# Patient Record
Sex: MALE | Race: WHITE | NOT HISPANIC OR LATINO | Employment: FULL TIME | URBAN - METROPOLITAN AREA
[De-identification: names, ages, dates, MRNs, and addresses within clinical notes are randomized per-mention and may not be internally consistent; named-entity substitution may affect disease eponyms.]

---

## 2017-01-02 RX ORDER — PREDNISONE 10 MG/1
TABLET ORAL
Qty: 25 TABLET | Refills: 0 | Status: SHIPPED | OUTPATIENT
Start: 2017-01-02 | End: 2017-08-24 | Stop reason: ALTCHOICE

## 2017-01-05 ENCOUNTER — LAB VISIT (OUTPATIENT)
Dept: LAB | Facility: HOSPITAL | Age: 62
End: 2017-01-05
Attending: INTERNAL MEDICINE
Payer: COMMERCIAL

## 2017-01-05 ENCOUNTER — PATIENT MESSAGE (OUTPATIENT)
Dept: RHEUMATOLOGY | Facility: CLINIC | Age: 62
End: 2017-01-05

## 2017-01-05 DIAGNOSIS — M06.9 RHEUMATOID ARTHRITIS, INVOLVING UNSPECIFIED SITE, UNSPECIFIED RHEUMATOID FACTOR PRESENCE: ICD-10-CM

## 2017-01-05 LAB
ALBUMIN SERPL BCP-MCNC: 3.5 G/DL
ALP SERPL-CCNC: 64 U/L
ALT SERPL W/O P-5'-P-CCNC: 23 U/L
ANION GAP SERPL CALC-SCNC: 7 MMOL/L
AST SERPL-CCNC: 17 U/L
BASOPHILS # BLD AUTO: 0.03 K/UL
BASOPHILS NFR BLD: 0.2 %
BILIRUB SERPL-MCNC: 0.5 MG/DL
BUN SERPL-MCNC: 16 MG/DL
CALCIUM SERPL-MCNC: 9.1 MG/DL
CHLORIDE SERPL-SCNC: 104 MMOL/L
CO2 SERPL-SCNC: 28 MMOL/L
CREAT SERPL-MCNC: 0.8 MG/DL
CRP SERPL-MCNC: 1.6 MG/L
DIFFERENTIAL METHOD: ABNORMAL
EOSINOPHIL # BLD AUTO: 0.3 K/UL
EOSINOPHIL NFR BLD: 2.1 %
ERYTHROCYTE [DISTWIDTH] IN BLOOD BY AUTOMATED COUNT: 13.9 %
ERYTHROCYTE [SEDIMENTATION RATE] IN BLOOD BY WESTERGREN METHOD: 24 MM/HR
EST. GFR  (AFRICAN AMERICAN): >60 ML/MIN/1.73 M^2
EST. GFR  (NON AFRICAN AMERICAN): >60 ML/MIN/1.73 M^2
GLUCOSE SERPL-MCNC: 68 MG/DL
HCT VFR BLD AUTO: 45.9 %
HGB BLD-MCNC: 15.5 G/DL
LYMPHOCYTES # BLD AUTO: 4.2 K/UL
LYMPHOCYTES NFR BLD: 30.9 %
MCH RBC QN AUTO: 31.7 PG
MCHC RBC AUTO-ENTMCNC: 33.8 %
MCV RBC AUTO: 94 FL
MONOCYTES # BLD AUTO: 1.3 K/UL
MONOCYTES NFR BLD: 9.6 %
NEUTROPHILS # BLD AUTO: 7.7 K/UL
NEUTROPHILS NFR BLD: 56.9 %
PLATELET # BLD AUTO: 231 K/UL
PMV BLD AUTO: 10.9 FL
POTASSIUM SERPL-SCNC: 4 MMOL/L
PROT SERPL-MCNC: 7.1 G/DL
RBC # BLD AUTO: 4.89 M/UL
SODIUM SERPL-SCNC: 139 MMOL/L
WBC # BLD AUTO: 13.57 K/UL

## 2017-01-05 PROCEDURE — 85025 COMPLETE CBC W/AUTO DIFF WBC: CPT

## 2017-01-05 PROCEDURE — 80053 COMPREHEN METABOLIC PANEL: CPT

## 2017-01-05 PROCEDURE — 36415 COLL VENOUS BLD VENIPUNCTURE: CPT

## 2017-01-05 PROCEDURE — 85652 RBC SED RATE AUTOMATED: CPT

## 2017-01-05 PROCEDURE — 86140 C-REACTIVE PROTEIN: CPT

## 2017-01-10 ENCOUNTER — OFFICE VISIT (OUTPATIENT)
Dept: RHEUMATOLOGY | Facility: CLINIC | Age: 62
End: 2017-01-10
Payer: COMMERCIAL

## 2017-01-10 VITALS
SYSTOLIC BLOOD PRESSURE: 117 MMHG | WEIGHT: 174 LBS | BODY MASS INDEX: 28.08 KG/M2 | DIASTOLIC BLOOD PRESSURE: 83 MMHG | RESPIRATION RATE: 18 BRPM | HEART RATE: 83 BPM

## 2017-01-10 DIAGNOSIS — M06.9 RHEUMATOID ARTHRITIS INVOLVING MULTIPLE JOINTS: Primary | ICD-10-CM

## 2017-01-10 PROCEDURE — 99214 OFFICE O/P EST MOD 30 MIN: CPT | Mod: S$GLB,,, | Performed by: INTERNAL MEDICINE

## 2017-01-10 PROCEDURE — 99999 PR PBB SHADOW E&M-EST. PATIENT-LVL III: CPT | Mod: PBBFAC,,, | Performed by: INTERNAL MEDICINE

## 2017-01-10 RX ORDER — METHOTREXATE 2.5 MG/1
TABLET ORAL
Qty: 32 TABLET | Refills: 0 | Status: SHIPPED | OUTPATIENT
Start: 2017-01-10 | End: 2017-09-06 | Stop reason: SDUPTHER

## 2017-01-10 ASSESSMENT — ROUTINE ASSESSMENT OF PATIENT INDEX DATA (RAPID3)
MDHAQ FUNCTION SCORE: .5
TOTAL RAPID3 SCORE: 4.89
FATIGUE SCORE: 6.5
AM STIFFNESS SCORE: 1, YES
PSYCHOLOGICAL DISTRESS SCORE: 0
WHEN YOU AWAKENED IN THE MORNING OVER THE LAST WEEK, PLEASE INDICATE THE AMOUNT OF TIME IT TAKES UNTIL YOU ARE AS LIMBER AS YOU WILL BE FOR THE DAY: 30 MIN
PATIENT GLOBAL ASSESSMENT SCORE: 6.5
PAIN SCORE: 6.5

## 2017-01-10 NOTE — PROGRESS NOTES
Subjective:       Patient ID: Fabiano Garvey is a 60 y.o. male.    Chief Complaint: OTHER and Swelling    HPI     59 yo male with PMH of basal cell cancer (around 2010), HL here for evaluation of join pain.  Reports pain in hands, elbows. and wrists.  Reports  swelling in hands, ankles, and feet.  Reports also has bilateral knee pain and shoulders.   Reports stiffness in morning for 30 minutes.  He has trouble swelling. Denies any rashes. No oral ulcers. No hair loss. Denies photosensitivity.  Denies any raynauds.Denies blood clots.  Denies dry eyes and dry mouth.  Reports symptoms for a year. In November, he noted the nodules in elbows.  Thinks he has swelling in lower neck.  He has trouble picking up things due to pain and swelling.  He reports that prednisone improves his pain and swelling.  He has been off steroids.  No changes in weight.  Reports good appetite.      Interval history:  He has been off MTX since early December. He reports having flare around thanksgiving that responded to prednisone.  He takes advil occasionally helps.  On occasion, he will get pain in hands,wrists, and knees. He has numbness in hands but not every day.   Reports morning stiffness for  5 minutes.  He will take advil on occasion with improvement.Pain level today is 6.5/10 and aching. He has pain hands, wrists, ankles, and feet.      Past Medical History   Diagnosis Date    Hyperlipidemia     Basal cell carcinoma      medial canthus       Review of Systems   Constitutional: Negative for fever, chills, appetite change and fatigue.   HENT: Negative for hearing loss, mouth sores, rhinorrhea, sinus pressure and trouble swallowing.    Eyes: Negative for photophobia, pain, discharge, itching and visual disturbance.   Respiratory:  Negative for cough, choking, chest tightness, wheezing and stridor.    Cardiovascular: Negative for chest pain and palpitations.   Gastrointestinal: Negative for blood in stool and abdominal distention.    Endocrine: Negative for cold intolerance and heat intolerance.   Genitourinary: Negative for dysuria, hematuria and flank pain.   Musculoskeletal: Positive for myalgias, back pain, joint swelling, arthralgias.  Skin: Negative for color change, pallor and rash.   Neurological: Negative for dizziness, light-headedness, numbness and headaches.   Hematological: Negative for adenopathy. Does not bruise/bleed easily.   Psychiatric/Behavioral: Negative for decreased concentration and agitation. The patient is not nervous/anxious.            Objective:        Physical Exam   Constitutional: He is oriented to person, place, and time and well-developed, well-nourished, and in no distress. No distress.   HENT:   Head: Normocephalic and atraumatic.   Right Ear: External ear normal.   Left Ear: External ear normal.   Nose: Nose normal.   Mouth/Throat: Oropharynx is clear and moist. No oropharyngeal exudate.   Eyes: Conjunctivae and EOM are normal. Pupils are equal, round, and reactive to light. Right eye exhibits no discharge. Left eye exhibits no discharge. No scleral icterus.   Neck: Normal range of motion. Neck supple. No JVD present. No tracheal deviation present. No thyromegaly present.   Cardiovascular: Normal rate, regular rhythm and intact distal pulses.  Exam reveals no gallop and no friction rub.    No murmur heard.  Pulmonary/Chest: Effort normal and breath sounds normal. No stridor. No respiratory distress. He has no wheezes. He has no rales. He exhibits no tenderness.   Abdominal: Soft. Bowel sounds are normal. He exhibits no distension. There is no tenderness. There is no rebound.   Lymphadenopathy:     He has no cervical adenopathy.   Neurological: He is alert and oriented to person, place, and time.   Skin: Skin is warm. He is not diaphoretic.     Musculoskeletal:   Shoulders- decreased ROM of both shoulders to 130 degrees bilaterally (resoved)  Elbows- rheumatoid nodules in extensor surfaces bilaterally; mild  restriction in extension of right elbow  Wrist- synovitis with decreased extension bilaterally (resolved)  Hands- synovitis of mcps on right side, worse on the right  Knees-bony hypertrophy but no effusions or tenderness; crepitus  Ankles- no tenderness  Feet-bilaterally mtp tenderness resolved           Labs:  Robel: 1:320  dna-1:160<160  Esr-61<44   ccp-306  rf-876  Ro,la-negative  Nicole, rnp -negtayive   Hepatitis B-negative  Hepatitis C-negative  Urine-negative      Imaging:  I personally reviewed the xrays    Arthritis survey:      12/2015: hand x rays- no erosions  12/2015: feet xray: no erosions    Chest xray (2/2016)-negative      Assessment:     60 yo male with PMH of basal cell cancer (around 2010), HL here for  Follow up of  Seropositive RA.  He has seropositive RA with nodules and also serologies consistent with early SLE given (+ROBEL and +DNA) with no other features of  SLE.  Tried plaquenil but reports it gave him dizziness after a few doses.  At this point, main issue is his arthritis so will hold off on another trial of plaquenil in the future.  Regarding is arthritis, he was doing well on MTX 8 pills a week but developed transaminitis that is now resolved.  Told patient that we would retry it but not push him past 6.  Given skin cancer history, can add rituxan.      Plan:       -off prednisone  * - follow up with  dermatology evaluation given history of skin cancer  -continue  baclofen 10mg po qhs for upper back tension and sleep  -restart MTX 4 pills a week for 2 weeks and then increase to 6 pills a week  -continue folic acid 1 mg po qday  given history of basal cell cancer and +DNA , will avoid anti-tnf therapy  - if max dose Methotrexate does not provide relief, I will likely put him on rituxan   -labs in 4 weeks and 8 weeks  rtc  In  8  weeks

## 2017-01-10 NOTE — PROGRESS NOTES
Subjective:       Patient ID: Fabiano Garvey is a 60 y.o. male.    Chief Complaint: OTHER and Swelling    HPI     59 yo male with PMH of basal cell cancer (around 2010), HL here for evaluation of join pain.  Reports pain in hands, elbows. and wrists.  Reports  swelling in hands, ankles, and feet.  Reports also has bilateral knee pain and shoulders.   Reports stiffness in morning for 30 minutes.  He has trouble swelling. Denies any rashes. No oral ulcers. No hair loss. Denies photosensitivity.  Denies any raynauds.Denies blood clots.  Denies dry eyes and dry mouth.  Reports symptoms for a year. In November, he noted the nodules in elbows.  Thinks he has swelling in lower neck.  He has trouble picking up things due to pain and swelling.  He reports that prednisone improves his pain and swelling.  He has been off steroids.  No changes in weight.  Reports good appetite.      Interval history:  He is taking MTX 6 pills a week.    Reports pain level is 4/10.Reports he may getting in swelling in hands and knees.  On occasion, he will get pain in hands,wrists, and knees. He has numbness in hands but not every day.   Reports morning stiffness for less than 5 minutes. Reports pain in both hips for last couple of weeks. He will take advil on occasion with improvement.  Reports pain in left buttock that radiates down to leg and up to back.      Past Medical History   Diagnosis Date    Hyperlipidemia     Basal cell carcinoma      medial canthus       Review of Systems   Constitutional: Negative for fever, chills, appetite change and fatigue.   HENT: Negative for hearing loss, mouth sores, rhinorrhea, sinus pressure and trouble swallowing.    Eyes: Negative for photophobia, pain, discharge, itching and visual disturbance.   Respiratory:  Negative for cough, choking, chest tightness, wheezing and stridor.    Cardiovascular: Negative for chest pain and palpitations.   Gastrointestinal: Negative for blood in stool and abdominal  distention.   Endocrine: Negative for cold intolerance and heat intolerance.   Genitourinary: Negative for dysuria, hematuria and flank pain.   Musculoskeletal: Positive for myalgias, back pain, joint swelling, arthralgias.  Skin: Negative for color change, pallor and rash.   Neurological: Negative for dizziness, light-headedness, numbness and headaches.   Hematological: Negative for adenopathy. Does not bruise/bleed easily.   Psychiatric/Behavioral: Negative for decreased concentration and agitation. The patient is not nervous/anxious.            Objective:        Physical Exam   Constitutional: He is oriented to person, place, and time and well-developed, well-nourished, and in no distress. No distress.   HENT:   Head: Normocephalic and atraumatic.   Right Ear: External ear normal.   Left Ear: External ear normal.   Nose: Nose normal.   Mouth/Throat: Oropharynx is clear and moist. No oropharyngeal exudate.   Eyes: Conjunctivae and EOM are normal. Pupils are equal, round, and reactive to light. Right eye exhibits no discharge. Left eye exhibits no discharge. No scleral icterus.   Neck: Normal range of motion. Neck supple. No JVD present. No tracheal deviation present. No thyromegaly present.   Cardiovascular: Normal rate, regular rhythm and intact distal pulses.  Exam reveals no gallop and no friction rub.    No murmur heard.  Pulmonary/Chest: Effort normal and breath sounds normal. No stridor. No respiratory distress. He has no wheezes. He has no rales. He exhibits no tenderness.   Abdominal: Soft. Bowel sounds are normal. He exhibits no distension. There is no tenderness. There is no rebound.   Lymphadenopathy:     He has no cervical adenopathy.   Neurological: He is alert and oriented to person, place, and time.   Skin: Skin is warm. He is not diaphoretic.     Musculoskeletal:   Shoulders- decreased ROM of both shoulders to 130 degrees bilaterally (resoved)  Elbows- rheumatoid nodules in extensor surfaces  bilaterally; mild restriction in extension of right elbow  Wrist- synovitis with decreased extension bilaterally (resolved)  Hands- synovitis of mcps on right side, worse on the right  Knees-bony hypertrophy but no effusions or tenderness; crepitus  Ankles- no tenderness  Feet-bilaterally mtp tenderness resolved           Labs:  Robel: 1:320  dna-1:160<160  Esr-61<44   ccp-306  rf-876  Ro,la-negative  Nicole, rnp -negtayive   Hepatitis B-negative  Hepatitis C-negative  Urine-negative      Imaging:  I personally reviewed the xrays    Arthritis survey:      12/2015: hand x rays- no erosions  12/2015: feet xray: no erosions    Chest xray (2/2016)-negative      Assessment:     62 yo male with PMH of basal cell cancer (around 2010), HL here for  Follow up of  Seropositive RA.  He has seropositive RA with nodules and also serologies consistent with early SLE given (+ROBEL and +DNA) with no other features of  SLE.  Tried plaquenil but reports it gave him dizziness after a few doses.  At this point, main issue is his arthritis so will hold off on another trial of plaquenil in the future.  Regarding is arthritis, he has worsening stiffness and swelling so will increase MTX.    He has increasing neck pain so I asked him to restart the baclofen.      Plan:       -off prednisone  * - follow up with  dermatology evaluation given history of skin cancer  -restart  baclofen 10mg po qhs for upper back tension and sleep  -continue MTX 6 pills a week  -continue folic acid 1 mg po qday  given history of basal cell cancer and +DNA , will avoid anti-tnf therapy  - if max dose Methotrexate does not provide relief, I will likely put him on rituxan   -labs in 5 weeks  rtc  In  10  weeks

## 2017-01-10 NOTE — MR AVS SNAPSHOT
[facility]  [address]  [place] [address]  Phone: [phone]  Fax: [phone]                  Fabiano Garvey   1/10/2017 8:00 AM   Office Visit    Description:  Male : 1955   Provider:  Margaret Brenner MD   Department:  [facility]           Diagnoses this Visit        Comments    Rheumatoid arthritis involving multiple joints    -  Primary            To Do List           Future Appointments        Provider Department Dept Phone    3/7/2017 7:10 AM LAB, APPOINTMENT [place] [facility] [phone]    3/8/2017 4:00 PM Margaret Brenner MD [facility] [phone]      Goals (5 Years of Data)     None       These Medications        Disp Refills Start End    methotrexate 2.5 MG Tab 32 tablet 0 1/10/2017     Take 4 tabs weekly for 2 weeks and then increase to 6 tabs weekly    Pharmacy: [facility]/pharm[facility] #[id number] - [place] - [address] AT [facility] Ph #: [phone]         [facility]     [facility] Care [facility] -  Assistance  Registered nurses in the [facility] provide clinical advisement, health education, appointment booking, and other advisory services.  Call for this free service at [phone].             Medications           Message regarding Medications     Verify the changes and/or additions to your medication regime listed below are the same as discussed with your clinician today.  If any of these changes or additions are incorrect, please notify your healthcare provider.        START taking these NEW medications        Refills    methotrexate 2.5 MG Tab 0    Sig: Take 4 tabs weekly for 2 weeks and then increase to 6 tabs weekly    Class: Normal           Verify that the below list of medications is an accurate representation of the medications you are currently taking.  If none reported, the list may be blank. If incorrect, please contact your  healthcare provider. Carry this list with you in case of emergency.           Current Medications     atorvastatin (LIPITOR) 40 MG tablet TAKE 1 TABLET (40 MG TOTAL) BY MOUTH ONCE DAILY.    folic acid (FOLVITE) 1 MG tablet TAKE 1 TABLET (1 MG TOTAL) BY MOUTH ONCE DAILY.    predniSONE (DELTASONE) 10 MG tablet TAKE PREDNISONE 2 TABS A DAY FOR 10 DAYS AND THEN ONE DAILY FOR 5 DAYS    methotrexate 2.5 MG Tab Take 4 tabs weekly for 2 weeks and then increase to 6 tabs weekly    VITAMIN D2 50,000 unit capsule TAKE 1 CAPSULE (50,000 UNITS TOTAL) BY MOUTH EVERY 7 DAYS.           Clinical Reference Information           Vital Signs - Last Recorded  Most recent update: 1/10/2017  8:02 AM by Zeenat Stovall RN    BP Pulse Resp Wt BMI    117/83 83 18 78.9 kg (174 lb) 28.08 kg/m2      Blood Pressure          Most Recent Value    BP  117/83      Allergies as of 1/10/2017     Plaquenil [Hydroxychloroquine]      Immunizations Administered on Date of Encounter - 1/10/2017     None      Orders Placed During Today's Visit     Future Labs/Procedures Expected by Expires    C-reactive protein  1/10/2017 3/11/2018    C-reactive protein  1/10/2017 3/11/2018    CBC auto differential  1/10/2017 3/11/2018    CBC auto differential  1/10/2017 3/11/2018    Comprehensive metabolic panel  1/10/2017 3/11/2018    Comprehensive metabolic panel  1/10/2017 3/11/2018    Sedimentation rate, manual  1/10/2017 3/11/2018    Sedimentation rate, manual  1/10/2017 3/11/2018    Vitamin D  1/10/2017 3/11/2018

## 2017-02-27 RX ORDER — FOLIC ACID 1 MG/1
TABLET ORAL
Qty: 30 TABLET | Refills: 3 | Status: SHIPPED | OUTPATIENT
Start: 2017-02-27 | End: 2017-07-02 | Stop reason: SDUPTHER

## 2017-03-07 ENCOUNTER — LAB VISIT (OUTPATIENT)
Dept: LAB | Facility: HOSPITAL | Age: 62
End: 2017-03-07
Attending: INTERNAL MEDICINE
Payer: COMMERCIAL

## 2017-03-07 DIAGNOSIS — M06.9 RHEUMATOID ARTHRITIS INVOLVING MULTIPLE JOINTS: ICD-10-CM

## 2017-03-07 LAB
25(OH)D3+25(OH)D2 SERPL-MCNC: 34 NG/ML
ALBUMIN SERPL BCP-MCNC: 3.4 G/DL
ALP SERPL-CCNC: 67 U/L
ALT SERPL W/O P-5'-P-CCNC: 23 U/L
ANION GAP SERPL CALC-SCNC: 7 MMOL/L
AST SERPL-CCNC: 24 U/L
BASOPHILS # BLD AUTO: 0.04 K/UL
BASOPHILS NFR BLD: 0.4 %
BILIRUB SERPL-MCNC: 0.4 MG/DL
BUN SERPL-MCNC: 13 MG/DL
CALCIUM SERPL-MCNC: 9.3 MG/DL
CHLORIDE SERPL-SCNC: 105 MMOL/L
CO2 SERPL-SCNC: 26 MMOL/L
CREAT SERPL-MCNC: 0.9 MG/DL
CRP SERPL-MCNC: 0.7 MG/L
DIFFERENTIAL METHOD: ABNORMAL
EOSINOPHIL # BLD AUTO: 0.5 K/UL
EOSINOPHIL NFR BLD: 4.4 %
ERYTHROCYTE [DISTWIDTH] IN BLOOD BY AUTOMATED COUNT: 15.2 %
ERYTHROCYTE [SEDIMENTATION RATE] IN BLOOD BY WESTERGREN METHOD: 29 MM/HR
ERYTHROCYTE [SEDIMENTATION RATE] IN BLOOD BY WESTERGREN METHOD: 29 MM/HR
EST. GFR  (AFRICAN AMERICAN): >60 ML/MIN/1.73 M^2
EST. GFR  (NON AFRICAN AMERICAN): >60 ML/MIN/1.73 M^2
GLUCOSE SERPL-MCNC: 84 MG/DL
HCT VFR BLD AUTO: 44.1 %
HGB BLD-MCNC: 14.4 G/DL
LYMPHOCYTES # BLD AUTO: 2.7 K/UL
LYMPHOCYTES NFR BLD: 24.7 %
MCH RBC QN AUTO: 31 PG
MCHC RBC AUTO-ENTMCNC: 32.7 %
MCV RBC AUTO: 95 FL
MONOCYTES # BLD AUTO: 1.2 K/UL
MONOCYTES NFR BLD: 10.8 %
NEUTROPHILS # BLD AUTO: 6.4 K/UL
NEUTROPHILS NFR BLD: 59.5 %
PLATELET # BLD AUTO: 220 K/UL
PMV BLD AUTO: 11.2 FL
POTASSIUM SERPL-SCNC: 4.2 MMOL/L
PROT SERPL-MCNC: 7.3 G/DL
RBC # BLD AUTO: 4.64 M/UL
SODIUM SERPL-SCNC: 138 MMOL/L
WBC # BLD AUTO: 10.79 K/UL

## 2017-03-07 PROCEDURE — 80053 COMPREHEN METABOLIC PANEL: CPT

## 2017-03-07 PROCEDURE — 86140 C-REACTIVE PROTEIN: CPT

## 2017-03-07 PROCEDURE — 36415 COLL VENOUS BLD VENIPUNCTURE: CPT

## 2017-03-07 PROCEDURE — 85652 RBC SED RATE AUTOMATED: CPT

## 2017-03-07 PROCEDURE — 82306 VITAMIN D 25 HYDROXY: CPT

## 2017-03-07 PROCEDURE — 85025 COMPLETE CBC W/AUTO DIFF WBC: CPT

## 2017-03-08 ENCOUNTER — TELEPHONE (OUTPATIENT)
Dept: RHEUMATOLOGY | Facility: CLINIC | Age: 62
End: 2017-03-08

## 2017-03-08 ENCOUNTER — OFFICE VISIT (OUTPATIENT)
Dept: RHEUMATOLOGY | Facility: CLINIC | Age: 62
End: 2017-03-08
Payer: COMMERCIAL

## 2017-03-08 VITALS
WEIGHT: 170.88 LBS | DIASTOLIC BLOOD PRESSURE: 64 MMHG | RESPIRATION RATE: 20 BRPM | BODY MASS INDEX: 27.58 KG/M2 | HEART RATE: 88 BPM | SYSTOLIC BLOOD PRESSURE: 112 MMHG

## 2017-03-08 DIAGNOSIS — M06.9 RHEUMATOID ARTHRITIS INVOLVING MULTIPLE JOINTS: Primary | ICD-10-CM

## 2017-03-08 DIAGNOSIS — M06.9 RHEUMATOID ARTHRITIS INVOLVING MULTIPLE JOINTS: ICD-10-CM

## 2017-03-08 DIAGNOSIS — M25.40 JOINT SWELLING: Primary | ICD-10-CM

## 2017-03-08 PROCEDURE — 99999 PR PBB SHADOW E&M-EST. PATIENT-LVL III: CPT | Mod: PBBFAC,,, | Performed by: INTERNAL MEDICINE

## 2017-03-08 PROCEDURE — 99214 OFFICE O/P EST MOD 30 MIN: CPT | Mod: S$GLB,,, | Performed by: INTERNAL MEDICINE

## 2017-03-08 PROCEDURE — 1160F RVW MEDS BY RX/DR IN RCRD: CPT | Mod: S$GLB,,, | Performed by: INTERNAL MEDICINE

## 2017-03-08 RX ORDER — ERGOCALCIFEROL 1.25 MG/1
50000 CAPSULE ORAL
Qty: 8 CAPSULE | Refills: 3 | Status: SHIPPED | OUTPATIENT
Start: 2017-03-08 | End: 2017-11-03 | Stop reason: SDUPTHER

## 2017-03-08 ASSESSMENT — ROUTINE ASSESSMENT OF PATIENT INDEX DATA (RAPID3)
MDHAQ FUNCTION SCORE: .6
PAIN SCORE: 3
TOTAL RAPID3 SCORE: 2.67
WHEN YOU AWAKENED IN THE MORNING OVER THE LAST WEEK, PLEASE INDICATE THE AMOUNT OF TIME IT TAKES UNTIL YOU ARE AS LIMBER AS YOU WILL BE FOR THE DAY: 30 MIN
FATIGUE SCORE: 3
AM STIFFNESS SCORE: 1, YES
PATIENT GLOBAL ASSESSMENT SCORE: 3
PSYCHOLOGICAL DISTRESS SCORE: 1.1

## 2017-03-08 NOTE — PROGRESS NOTES
Subjective:       Patient ID: Fabiano Garvey is a 60 y.o. male.    Chief Complaint: OTHER and Swelling    HPI     59 yo male with PMH of basal cell cancer (around 2010), HL here for evaluation of join pain.  Reports pain in hands, elbows. and wrists.  Reports  swelling in hands, ankles, and feet.  Reports also has bilateral knee pain and shoulders.   Reports stiffness in morning for 30 minutes.  He has trouble swelling. Denies any rashes. No oral ulcers. No hair loss. Denies photosensitivity.  Denies any raynauds.Denies blood clots.  Denies dry eyes and dry mouth.  Reports symptoms for a year. In November, he noted the nodules in elbows.  Thinks he has swelling in lower neck.  He has trouble picking up things due to pain and swelling.  He reports that prednisone improves his pain and swelling.  He has been off steroids.  No changes in weight.  Reports good appetite.      Interval history:  He has been on MTX 6 pills for several weeks.  On occasion, he will get pain in hands,wrists, and knees. He has numbness in hands but not every day.  He reports swelling in feet on occasion.  Reports morning stiffness for  5 minutes.  He will take advil on occasion with improvement.Pain level today is 6.5/10 and aching. He has pain hands, wrists, ankles, and feet.      Past Medical History   Diagnosis Date    Hyperlipidemia     Basal cell carcinoma      medial canthus       Review of Systems   Constitutional: Negative for fever, chills, appetite change and fatigue.   HENT: Negative for hearing loss, mouth sores, rhinorrhea, sinus pressure and trouble swallowing.    Eyes: Negative for photophobia, pain, discharge, itching and visual disturbance.   Respiratory:  Negative for cough, choking, chest tightness, wheezing and stridor.    Cardiovascular: Negative for chest pain and palpitations.   Gastrointestinal: Negative for blood in stool and abdominal distention.   Endocrine: Negative for cold intolerance and heat intolerance.    Genitourinary: Negative for dysuria, hematuria and flank pain.   Musculoskeletal: Positive for myalgias, back pain, joint swelling, arthralgias.  Skin: Negative for color change, pallor and rash.   Neurological: Negative for dizziness, light-headedness, numbness and headaches.   Hematological: Negative for adenopathy. Does not bruise/bleed easily.   Psychiatric/Behavioral: Negative for decreased concentration and agitation. The patient is not nervous/anxious.            Objective:        Physical Exam   Constitutional: He is oriented to person, place, and time and well-developed, well-nourished, and in no distress. No distress.   HENT:   Head: Normocephalic and atraumatic.   Right Ear: External ear normal.   Left Ear: External ear normal.   Nose: Nose normal.   Mouth/Throat: Oropharynx is clear and moist. No oropharyngeal exudate.   Eyes: Conjunctivae and EOM are normal. Pupils are equal, round, and reactive to light. Right eye exhibits no discharge. Left eye exhibits no discharge. No scleral icterus.   Neck: Normal range of motion. Neck supple. No JVD present. No tracheal deviation present. No thyromegaly present.   Cardiovascular: Normal rate, regular rhythm and intact distal pulses.  Exam reveals no gallop and no friction rub.    No murmur heard.  Pulmonary/Chest: Effort normal and breath sounds normal. No stridor. No respiratory distress. He has no wheezes. He has no rales. He exhibits no tenderness.   Abdominal: Soft. Bowel sounds are normal. He exhibits no distension. There is no tenderness. There is no rebound.   Lymphadenopathy:     He has no cervical adenopathy.   Neurological: He is alert and oriented to person, place, and time.   Skin: Skin is warm. He is not diaphoretic.     Musculoskeletal:   Shoulders- decreased ROM of both shoulders to 130 degrees bilaterally (resoved)  Elbows- rheumatoid nodules in extensor surfaces bilaterally; mild restriction in extension of right elbow  Wrist- synovitis with  decreased extension bilaterally (resolved)  Hands- synovitis of mcps on right side, worse on the right  Knees-bony hypertrophy but no effusions or tenderness; crepitus  Ankles- no tenderness  Feet-bilaterally mtp tenderness resolved           Labs:  Robel: 1:320  dna-1:160<160  Esr-61<44   ccp-306  rf-876  Ro,la-negative  Nicole, rnp -negtayive   Hepatitis B-negative  Hepatitis C-negative  Urine-negative      Imaging:  I personally reviewed the xrays    Arthritis survey:      12/2015: hand x rays- no erosions  12/2015: feet xray: no erosions    Chest xray (2/2016)-negative      Assessment:     62 yo male with PMH of basal cell cancer (around 2010), HL here for  Follow up of  Seropositive RA.  He has seropositive RA with nodules and also serologies consistent with early SLE given (+ROBEL and +DNA) with no other features of  SLE.  Tried plaquenil but reports it gave him dizziness after a few doses.  At this point, main issue is his arthritis so will hold off on another trial of plaquenil in the future.  Regarding is arthritis, he is on MTX 6 pills a week but reports containing to get nodules at pads of feet and painful swelling in feet and sometimes in wrists.  He developed transaminitis with 8 pills so will keep him on 6 pills a week.    Plan:       -off prednisone  * - follow up with  dermatology evaluation given history of skin cancer  -continue  baclofen 10mg po qhs for upper back tension and sleep  -continue MTX 6 pills a week   -continue folic acid 1 mg po qday  given history of basal cell cancer and +DNA , will avoid anti-tnf therapy  - DISCUSSED ADDING RITUXAN WHICH HE SHOULD  Consider  Continue vit D once a week  labs in 4 weeks   rtc in 8

## 2017-03-10 ENCOUNTER — PATIENT MESSAGE (OUTPATIENT)
Dept: RHEUMATOLOGY | Facility: CLINIC | Age: 62
End: 2017-03-10

## 2017-03-10 ENCOUNTER — HOSPITAL ENCOUNTER (OUTPATIENT)
Dept: RADIOLOGY | Facility: HOSPITAL | Age: 62
Discharge: HOME | End: 2017-03-10
Attending: INTERNAL MEDICINE
Payer: COMMERCIAL

## 2017-03-10 DIAGNOSIS — M25.40 JOINT SWELLING: ICD-10-CM

## 2017-03-10 PROCEDURE — 77077 JOINT SURVEY SINGLE VIEW: CPT | Mod: 26,,, | Performed by: RADIOLOGY

## 2017-03-10 PROCEDURE — 77077 JOINT SURVEY SINGLE VIEW: CPT | Mod: TC

## 2017-04-07 ENCOUNTER — PATIENT MESSAGE (OUTPATIENT)
Dept: RHEUMATOLOGY | Facility: CLINIC | Age: 62
End: 2017-04-07

## 2017-04-07 ENCOUNTER — LAB VISIT (OUTPATIENT)
Dept: LAB | Facility: HOSPITAL | Age: 62
End: 2017-04-07
Attending: INTERNAL MEDICINE
Payer: COMMERCIAL

## 2017-04-07 DIAGNOSIS — M06.9 RHEUMATOID ARTHRITIS INVOLVING MULTIPLE JOINTS: ICD-10-CM

## 2017-04-07 LAB
25(OH)D3+25(OH)D2 SERPL-MCNC: 37 NG/ML
ALBUMIN SERPL BCP-MCNC: 3.4 G/DL
ALP SERPL-CCNC: 67 U/L
ALT SERPL W/O P-5'-P-CCNC: 17 U/L
ANION GAP SERPL CALC-SCNC: 6 MMOL/L
AST SERPL-CCNC: 18 U/L
BASOPHILS # BLD AUTO: 0.04 K/UL
BASOPHILS NFR BLD: 0.4 %
BILIRUB SERPL-MCNC: 0.4 MG/DL
BUN SERPL-MCNC: 16 MG/DL
CALCIUM SERPL-MCNC: 9.1 MG/DL
CHLORIDE SERPL-SCNC: 105 MMOL/L
CO2 SERPL-SCNC: 29 MMOL/L
CREAT SERPL-MCNC: 0.9 MG/DL
CRP SERPL-MCNC: 4.2 MG/L
DIFFERENTIAL METHOD: ABNORMAL
EOSINOPHIL # BLD AUTO: 0.4 K/UL
EOSINOPHIL NFR BLD: 4.2 %
ERYTHROCYTE [DISTWIDTH] IN BLOOD BY AUTOMATED COUNT: 15.2 %
ERYTHROCYTE [SEDIMENTATION RATE] IN BLOOD BY WESTERGREN METHOD: 35 MM/HR
EST. GFR  (AFRICAN AMERICAN): >60 ML/MIN/1.73 M^2
EST. GFR  (NON AFRICAN AMERICAN): >60 ML/MIN/1.73 M^2
GLUCOSE SERPL-MCNC: 72 MG/DL
HCT VFR BLD AUTO: 43.8 %
HGB BLD-MCNC: 14.5 G/DL
LYMPHOCYTES # BLD AUTO: 2.7 K/UL
LYMPHOCYTES NFR BLD: 29.6 %
MCH RBC QN AUTO: 31.5 PG
MCHC RBC AUTO-ENTMCNC: 33.1 %
MCV RBC AUTO: 95 FL
MONOCYTES # BLD AUTO: 1.1 K/UL
MONOCYTES NFR BLD: 11.5 %
NEUTROPHILS # BLD AUTO: 5 K/UL
NEUTROPHILS NFR BLD: 54.2 %
PLATELET # BLD AUTO: 236 K/UL
PMV BLD AUTO: 11 FL
POTASSIUM SERPL-SCNC: 4 MMOL/L
PROT SERPL-MCNC: 7.1 G/DL
RBC # BLD AUTO: 4.61 M/UL
SODIUM SERPL-SCNC: 140 MMOL/L
WBC # BLD AUTO: 9.21 K/UL

## 2017-04-07 PROCEDURE — 36415 COLL VENOUS BLD VENIPUNCTURE: CPT

## 2017-04-07 PROCEDURE — 85652 RBC SED RATE AUTOMATED: CPT

## 2017-04-07 PROCEDURE — 82306 VITAMIN D 25 HYDROXY: CPT

## 2017-04-07 PROCEDURE — 80053 COMPREHEN METABOLIC PANEL: CPT

## 2017-04-07 PROCEDURE — 85025 COMPLETE CBC W/AUTO DIFF WBC: CPT

## 2017-04-07 PROCEDURE — 86140 C-REACTIVE PROTEIN: CPT

## 2017-05-07 ENCOUNTER — PATIENT MESSAGE (OUTPATIENT)
Dept: FAMILY MEDICINE | Facility: CLINIC | Age: 62
End: 2017-05-07

## 2017-05-07 DIAGNOSIS — E78.2 MIXED HYPERLIPIDEMIA: ICD-10-CM

## 2017-05-08 RX ORDER — ATORVASTATIN CALCIUM 40 MG/1
40 TABLET, FILM COATED ORAL DAILY
Qty: 90 TABLET | Refills: 0 | Status: SHIPPED | OUTPATIENT
Start: 2017-05-08 | End: 2018-01-28 | Stop reason: SDUPTHER

## 2017-05-12 ENCOUNTER — OFFICE VISIT (OUTPATIENT)
Dept: FAMILY MEDICINE | Facility: CLINIC | Age: 62
End: 2017-05-12
Payer: COMMERCIAL

## 2017-05-12 VITALS
HEART RATE: 97 BPM | DIASTOLIC BLOOD PRESSURE: 71 MMHG | WEIGHT: 169 LBS | TEMPERATURE: 99 F | OXYGEN SATURATION: 95 % | SYSTOLIC BLOOD PRESSURE: 99 MMHG | RESPIRATION RATE: 16 BRPM | BODY MASS INDEX: 27.28 KG/M2

## 2017-05-12 DIAGNOSIS — Z23 NEED FOR PNEUMOCOCCAL VACCINATION: ICD-10-CM

## 2017-05-12 DIAGNOSIS — M06.9 RHEUMATOID ARTHRITIS, INVOLVING UNSPECIFIED SITE, UNSPECIFIED RHEUMATOID FACTOR PRESENCE: ICD-10-CM

## 2017-05-12 DIAGNOSIS — Z00.00 ROUTINE GENERAL MEDICAL EXAMINATION AT A HEALTH CARE FACILITY: Primary | ICD-10-CM

## 2017-05-12 DIAGNOSIS — K42.9 UMBILICAL HERNIA WITHOUT OBSTRUCTION AND WITHOUT GANGRENE: ICD-10-CM

## 2017-05-12 DIAGNOSIS — E78.5 HYPERLIPIDEMIA, UNSPECIFIED HYPERLIPIDEMIA TYPE: ICD-10-CM

## 2017-05-12 DIAGNOSIS — Z12.11 COLON CANCER SCREENING: ICD-10-CM

## 2017-05-12 PROCEDURE — 90471 IMMUNIZATION ADMIN: CPT | Mod: S$GLB,,, | Performed by: FAMILY MEDICINE

## 2017-05-12 PROCEDURE — 99396 PREV VISIT EST AGE 40-64: CPT | Mod: 25,S$GLB,, | Performed by: FAMILY MEDICINE

## 2017-05-12 PROCEDURE — 90670 PCV13 VACCINE IM: CPT | Mod: S$GLB,,, | Performed by: FAMILY MEDICINE

## 2017-05-12 PROCEDURE — 99999 PR PBB SHADOW E&M-EST. PATIENT-LVL III: CPT | Mod: PBBFAC,,, | Performed by: FAMILY MEDICINE

## 2017-05-12 RX ORDER — FLUTICASONE PROPIONATE 50 MCG
2 SPRAY, SUSPENSION (ML) NASAL DAILY
Qty: 1 BOTTLE | Refills: 0 | Status: SHIPPED | OUTPATIENT
Start: 2017-05-12 | End: 2020-02-28 | Stop reason: SDUPTHER

## 2017-05-12 NOTE — PROGRESS NOTES
(Portions of this note were dictated using voice recognition software and may contain dictation related errors in spelling/grammar/syntax not found on text review)    CC:   Chief Complaint   Patient presents with    Annual Exam       HPI: 61 y.o. male here for annual exam.  Hyperlipidemia on Lipitor 40 mg.  He has seropositive Rheumatoid arthritis, followed by rheumatology. Was on plaquenil but didn't tolerate. Currently on methotrexate 2.5 mg, 6 pills weekly along with folic acid. Had discussed biologics but overall stable on MTX.  Overall doing well.  Does have small umbilical hernia but is not bothersome    Past Medical History:   Diagnosis Date    Basal cell carcinoma     medial canthus    Hyperlipidemia        Past Surgical History:   Procedure Laterality Date    NO PAST SURGERIES         Family History   Problem Relation Age of Onset    Heart failure Mother      60s    Coronary artery disease Father      70s    Cancer Paternal Uncle      lymphoma??    Diabetes Neg Hx        Social History     Social History    Marital status:      Spouse name: N/A    Number of children: N/A    Years of education: N/A     Occupational History     [employer] [employer] [facility]     Social History Main Topics    Smoking status: Former Smoker     Quit date: 7/12/2012    Smokeless tobacco: Not on file    Alcohol use Yes      Comment: occasional    Drug use: Not on file    Sexual activity: Not on file     Other Topics Concern    Not on file     Social History Narrative    No aerobic exercise, walks a lot    No diet             HEALTH SCREENINGS   Immunizations:  Adacel 2009  No PCV or PPSV on file.       Age/Gender Appropriate screenings:  Has not had a colonoscopy.      Prostate screening due    Lab Results   Component Value Date    WBC 9.21 04/07/2017    HGB 14.5 04/07/2017    HCT 43.8 04/07/2017     04/07/2017    CHOL 157 01/29/2015    TRIG 95 01/29/2015    HDL 41 01/29/2015    ALT 17 04/07/2017     AST 18 04/07/2017     04/07/2017    K 4.0 04/07/2017     04/07/2017    CREATININE 0.9 04/07/2017    BUN 16 04/07/2017    CO2 29 04/07/2017    TSH 1.243 01/29/2015    PSA 0.62 01/29/2015    LDLCALC 97.0 01/29/2015    GLU 72 04/07/2017                 Vital signs reviewed  PE:   APPEARANCE: Well nourished, well developed, in no acute distress.    HEAD: Normocephalic, atraumatic.  EYES: PERRL. EOMI.   Conjunctivae noninjected.  EARS: TM's intact. Light reflex normal. No retraction or perforation.    NOSE: Mucosa pink. Airway clear.  MOUTH & THROAT: No tonsillar enlargement. No pharyngeal erythema or exudate.   NECK: Supple with no cervical lymphadenopathy.    CHEST: Good inspiratory effort. Lungs clear to auscultation with no wheezes or crackles.  CARDIOVASCULAR: Normal S1, S2. No rubs, murmurs, or gallops.  ABDOMEN: Bowel sounds normal. Not distended. Soft. No tenderness or masses except for small reducible umbilical hernia. No organomegaly.  EXTREMITIES: No edema, cyanosis, or clubbing.  PROSTATE: Smooth, symmetric, non-enlarged, nontender      IMPRESSION  1. Routine general medical examination at a health care facility    2. Rheumatoid arthritis, involving unspecified site, unspecified rheumatoid factor presence    3. Colon cancer screening    4. Hyperlipidemia, unspecified hyperlipidemia type    5. Need for pneumococcal vaccination        PLAN  Orders Placed This Encounter   Procedures    Pneumococcal Conjugate Vaccine (13 Valent) (IM)    PSA, Screening    Lipid panel     Reviewed recent labs.  Add PSA and lipids    Needs pneumococcal series secondary to compromise status from his rheumatoid arthritis medication.  Prevnar today, Pneumovax in the next 3 months    Colonoscopy ordered    Prostate screening done today    He will continue hematology follow-up for his rheumatoid arthritis.  Stable on methotrexate plus folic acid    Hyperlipidemia, stable on Lipitor.  Lipids as above    Umbilical hernia: No  treatment needed at this time.  Could consider Gen. surgery referral if there is more symptomology developing

## 2017-05-12 NOTE — MR AVS SNAPSHOT
[address]  [place] [address]  Phone: [phone]  Fax: [phone]                  Fabiano Garvey   2017 1:20 PM   Office Visit    Description:  Male : 1955   Provider:  Clain Buchanan MD   Department:  [facility]           Reason for Visit     Annual Exam           Diagnoses this Visit        Comments    Routine general medical examination at a health care facility    -  Primary     Rheumatoid arthritis, involving unspecified site, unspecified rheumatoid factor presence         Colon cancer screening         Hyperlipidemia, unspecified hyperlipidemia type         Need for pneumococcal vaccination         Umbilical hernia without obstruction and without gangrene                To Do List           Future Appointments        Provider Department Dept Phone    5/15/2017 10:20 AM APPOINTMENT LAB, [place] [facility] [phone]    2017 4:00 PM Margaret Brenner MD [facility] Rheumatology [phone]      Goals (5 Years of Data)     None       These Medications        Disp Refills Start End    fluticasone (FLONASE) 50 mcg/actuation nasal spray 1 Bottle 0 2017     2 sprays by Each Nare route once daily. - Each Nare    Pharmacy: [facility]/pharmacy #[id number] - [place] - [address] AT [facility] #: [phone]         [facility]     [facility] - 24/7 Assistance  Unless otherwise directed by your provider, please contact [facility], our nurse care line that is available for 24/7 assistance.     Registered nurses in the [facility] provide: appointment scheduling, clinical advisement, health education, and other advisory services.  Call: [phone] (toll free)               Medications           Message regarding Medications     Verify the changes and/or additions to your medication regime listed below are the same as discussed  with your clinician today.  If any of these changes or additions are incorrect, please notify your healthcare provider.        START taking these NEW medications        Refills    fluticasone (FLONASE) 50 mcg/actuation nasal spray 0    Si sprays by Each Nare route once daily.    Class: Normal    Route: Each Nare           Verify that the below list of medications is an accurate representation of the medications you are currently taking.  If none reported, the list may be blank. If incorrect, please contact your healthcare provider. Carry this list with you in case of emergency.           Current Medications     atorvastatin (LIPITOR) 40 MG tablet Take 1 tablet (40 mg total) by mouth once daily.    ergocalciferol (ERGOCALCIFEROL) 50,000 unit Cap Take 1 capsule (50,000 Units total) by mouth every 7 days.    folic acid (FOLVITE) 1 MG tablet TAKE 1 TABLET (1 MG TOTAL) BY MOUTH ONCE DAILY.    methotrexate 2.5 MG Tab Take 4 tabs weekly for 2 weeks and then increase to 6 tabs weekly    predniSONE (DELTASONE) 10 MG tablet TAKE PREDNISONE 2 TABS A DAY FOR 10 DAYS AND THEN ONE DAILY FOR 5 DAYS    fluticasone (FLONASE) 50 mcg/actuation nasal spray 2 sprays by Each Nare route once daily.           Clinical Reference Information           Your Vitals Were     BP Pulse Temp Resp Weight SpO2    99/71 97 98.5 °F (36.9 °C) (Oral) 16 76.7 kg (169 lb) 95%    BMI                27.28 kg/m2          Blood Pressure          Most Recent Value    BP  99/71      Allergies as of 2017     Plaquenil [Hydroxychloroquine]      Immunizations Administered on Date of Encounter - 2017     Name Date Dose VIS Date Route    Pneumococcal Conjugate - 13 Valent  Incomplete 0.5 mL 2015 Intramuscular      Orders Placed During Today's Visit      Normal Orders This Visit    Case request GI: COLONOSCOPY     Pneumococcal Conjugate Vaccine (13 Valent) (IM)     Future Labs/Procedures Expected by Expires    Lipid panel  2017     PSA, Screening  5/12/2017 5/12/2018      Language Assistance Services     ATTENTION: Language assistance services are available, free of charge. Please call [phone].      ATENCIÓN: Si habla franchesca, tiene a navarrete disposición servicios gratuitos de asistencia lingüística. Llame al [phone].     CHÚ Ý: N?u b?n nói Ti?ng Vi?t, có các d?ch v? h? tr? ngôn ng? mi?n phí dành cho b?n. G?i s? [phone].         [facility] complies with applicable Federal civil rights laws and does not discriminate on the basis of race, color, national origin, age, disability, or sex.

## 2017-05-15 ENCOUNTER — PATIENT MESSAGE (OUTPATIENT)
Dept: FAMILY MEDICINE | Facility: CLINIC | Age: 62
End: 2017-05-15

## 2017-05-15 ENCOUNTER — LAB VISIT (OUTPATIENT)
Dept: LAB | Facility: HOSPITAL | Age: 62
End: 2017-05-15
Attending: FAMILY MEDICINE
Payer: COMMERCIAL

## 2017-05-15 DIAGNOSIS — E78.5 HYPERLIPIDEMIA, UNSPECIFIED HYPERLIPIDEMIA TYPE: ICD-10-CM

## 2017-05-15 DIAGNOSIS — Z00.00 ROUTINE GENERAL MEDICAL EXAMINATION AT A HEALTH CARE FACILITY: ICD-10-CM

## 2017-05-15 LAB
CHOLEST/HDLC SERPL: 3.8 {RATIO}
COMPLEXED PSA SERPL-MCNC: 1.3 NG/ML
HDL/CHOLESTEROL RATIO: 26.5 %
HDLC SERPL-MCNC: 136 MG/DL
HDLC SERPL-MCNC: 36 MG/DL
LDLC SERPL CALC-MCNC: 86.4 MG/DL
NONHDLC SERPL-MCNC: 100 MG/DL
TRIGL SERPL-MCNC: 68 MG/DL

## 2017-05-15 PROCEDURE — 36415 COLL VENOUS BLD VENIPUNCTURE: CPT

## 2017-05-15 PROCEDURE — 80061 LIPID PANEL: CPT

## 2017-05-15 PROCEDURE — 84153 ASSAY OF PSA TOTAL: CPT

## 2017-06-07 ENCOUNTER — OFFICE VISIT (OUTPATIENT)
Dept: RHEUMATOLOGY | Facility: CLINIC | Age: 62
End: 2017-06-07
Payer: COMMERCIAL

## 2017-06-07 VITALS
RESPIRATION RATE: 18 BRPM | HEART RATE: 62 BPM | SYSTOLIC BLOOD PRESSURE: 104 MMHG | BODY MASS INDEX: 27.28 KG/M2 | HEIGHT: 66 IN | WEIGHT: 169.75 LBS | DIASTOLIC BLOOD PRESSURE: 60 MMHG

## 2017-06-07 DIAGNOSIS — Z79.631 ENCOUNTER FOR METHOTREXATE MONITORING: Primary | ICD-10-CM

## 2017-06-07 DIAGNOSIS — M06.9 RHEUMATOID ARTHRITIS INVOLVING MULTIPLE JOINTS: ICD-10-CM

## 2017-06-07 DIAGNOSIS — Z12.11 SPECIAL SCREENING FOR MALIGNANT NEOPLASMS, COLON: Primary | ICD-10-CM

## 2017-06-07 DIAGNOSIS — Z51.81 ENCOUNTER FOR METHOTREXATE MONITORING: Primary | ICD-10-CM

## 2017-06-07 PROCEDURE — 99999 PR PBB SHADOW E&M-EST. PATIENT-LVL III: CPT | Mod: PBBFAC,,, | Performed by: INTERNAL MEDICINE

## 2017-06-07 PROCEDURE — 99214 OFFICE O/P EST MOD 30 MIN: CPT | Mod: S$GLB,,, | Performed by: INTERNAL MEDICINE

## 2017-06-07 RX ORDER — SODIUM, POTASSIUM,MAG SULFATES 17.5-3.13G
1 SOLUTION, RECONSTITUTED, ORAL ORAL
Qty: 1 BOTTLE | Refills: 0 | Status: ON HOLD | OUTPATIENT
Start: 2017-06-07 | End: 2017-07-25 | Stop reason: HOSPADM

## 2017-06-07 NOTE — PROGRESS NOTES
Subjective:       Patient ID: Fabiano Garvey is a 60 y.o. male.    Chief Complaint: OTHER and Swelling    HPI     59 yo male with PMH of basal cell cancer (around 2010), HL here for evaluation of join pain.  Reports pain in hands, elbows. and wrists.  Reports  swelling in hands, ankles, and feet.  Reports also has bilateral knee pain and shoulders.   Reports stiffness in morning for 30 minutes.  He has trouble swelling. Denies any rashes. No oral ulcers. No hair loss. Denies photosensitivity.  Denies any raynauds.Denies blood clots.  Denies dry eyes and dry mouth.  Reports symptoms for a year. In November, he noted the nodules in elbows.  Thinks he has swelling in lower neck.  He has trouble picking up things due to pain and swelling.  He reports that prednisone improves his pain and swelling.  He has been off steroids.  No changes in weight.  Reports good appetite.      Interval history: Last seen in January.   He has been on MTX 6 pills for several weeks.  On occasion, he will get pain in hands,wrists, and knees. He has numbness in hands but not every day.  He reports swelling in feet on occasion.  Reports morning stiffness for  5 minutes.  He will take advil on occasion with improvement.Pain level today is 6.5/10 and aching. He has pain hands, wrists, ankles, and feet.      Past Medical History   Diagnosis Date    Hyperlipidemia     Basal cell carcinoma      medial canthus       Review of Systems   Constitutional: Negative for fever, chills, appetite change and fatigue.   HENT: Negative for hearing loss, mouth sores, rhinorrhea, sinus pressure and trouble swallowing.    Eyes: Negative for photophobia, pain, discharge, itching and visual disturbance.   Respiratory:  Negative for cough, choking, chest tightness, wheezing and stridor.    Cardiovascular: Negative for chest pain and palpitations.   Gastrointestinal: Negative for blood in stool and abdominal distention.   Endocrine: Negative for cold intolerance  and heat intolerance.   Genitourinary: Negative for dysuria, hematuria and flank pain.   Musculoskeletal: Positive for myalgias, back pain, joint swelling, arthralgias.  Skin: Negative for color change, pallor and rash.   Neurological: Negative for dizziness, light-headedness, numbness and headaches.   Hematological: Negative for adenopathy. Does not bruise/bleed easily.   Psychiatric/Behavioral: Negative for decreased concentration and agitation. The patient is not nervous/anxious.            Objective:        Physical Exam   Constitutional: He is oriented to person, place, and time and well-developed, well-nourished, and in no distress. No distress.   HENT:   Head: Normocephalic and atraumatic.   Right Ear: External ear normal.   Left Ear: External ear normal.   Nose: Nose normal.   Mouth/Throat: Oropharynx is clear and moist. No oropharyngeal exudate.   Eyes: Conjunctivae and EOM are normal. Pupils are equal, round, and reactive to light. Right eye exhibits no discharge. Left eye exhibits no discharge. No scleral icterus.   Neck: Normal range of motion. Neck supple. No JVD present. No tracheal deviation present. No thyromegaly present.   Cardiovascular: Normal rate, regular rhythm and intact distal pulses.  Exam reveals no gallop and no friction rub.    No murmur heard.  Pulmonary/Chest: Effort normal and breath sounds normal. No stridor. No respiratory distress. He has no wheezes. He has no rales. He exhibits no tenderness.   Abdominal: Soft. Bowel sounds are normal. He exhibits no distension. There is no tenderness. There is no rebound.   Lymphadenopathy:     He has no cervical adenopathy.   Neurological: He is alert and oriented to person, place, and time.   Skin: Skin is warm. He is not diaphoretic.     Musculoskeletal:   Shoulders- decreased ROM of both shoulders to 130 degrees bilaterally (resoved)  Elbows- rheumatoid nodules in extensor surfaces bilaterally; mild restriction in extension of right  elbow  Wrist- synovitis with decreased extension bilaterally (resolved)  Hands- synovitis of mcps on right side, worse on the right  Knees-bony hypertrophy but no effusions or tenderness; crepitus  Ankles- no tenderness  Feet-bilaterally mtp tenderness resolved           Labs:  Robel: 1:320  dna-1:160<160  Esr-61<44   ccp-306  rf-876  Ro,la-negative  Nicole, rnp -negtayive   Hepatitis B-negative  Hepatitis C-negative  Urine-negative      Imaging:  I personally reviewed the xrays    Arthritis survey:      12/2015: hand x rays- no erosions  12/2015: feet xray: no erosions    Chest xray (2/2016)-negative      Assessment:     62 yo male with PMH of basal cell cancer (around 2010), HL here for  Follow up of  Seropositive RA.  He has seropositive RA with nodules. He also had serologies consistent with early SLE given (+ROBEL and +DNA) with no other features of  SLE.  Tried plaquenil but reports it gave him dizziness after a few doses.  At this point, main issue is his arthritis so will hold off on another trial of plaquenil in the future.  Regarding his arthritis, he is on MTX 6 pills a week but reports continuing to get nodules at pads of feet and painful swelling in feet and sometimes in wrists but much improved since last visit  Discussed adding rituxan but he declines..  He developed transaminitis with 8 pills so will keep him on 6 pills a week.    Plan:       -off prednisone  * - follow up with  dermatology evaluation given history of skin cancer  -continue  baclofen 10mg po qhs for upper back tension and sleep  -continue MTX 6 pills a week   -continue folic acid 1 mg po qday  given history of basal cell cancer and history of +DNA , will avoid anti-tnf therapy  - DISCUSSED ADDING RITUXAN WHICH HE SHOULD  Consider  Continue vit D once a week  labs in 4 weeks   rtc in 12 weeks

## 2017-07-02 RX ORDER — FOLIC ACID 1 MG/1
TABLET ORAL
Qty: 30 TABLET | Refills: 3 | Status: SHIPPED | OUTPATIENT
Start: 2017-07-02 | End: 2017-12-04 | Stop reason: SDUPTHER

## 2017-07-25 ENCOUNTER — ANESTHESIA EVENT (OUTPATIENT)
Dept: ENDOSCOPY | Facility: HOSPITAL | Age: 62
End: 2017-07-25
Payer: COMMERCIAL

## 2017-07-25 ENCOUNTER — HOSPITAL ENCOUNTER (OUTPATIENT)
Facility: HOSPITAL | Age: 62
Discharge: HOME | End: 2017-07-25
Attending: INTERNAL MEDICINE | Admitting: INTERNAL MEDICINE
Payer: COMMERCIAL

## 2017-07-25 ENCOUNTER — SURGERY (OUTPATIENT)
Age: 62
End: 2017-07-25

## 2017-07-25 ENCOUNTER — ANESTHESIA (OUTPATIENT)
Dept: ENDOSCOPY | Facility: HOSPITAL | Age: 62
End: 2017-07-25
Payer: COMMERCIAL

## 2017-07-25 VITALS
BODY MASS INDEX: 27.32 KG/M2 | DIASTOLIC BLOOD PRESSURE: 79 MMHG | SYSTOLIC BLOOD PRESSURE: 106 MMHG | HEIGHT: 66 IN | TEMPERATURE: 98 F | HEART RATE: 81 BPM | OXYGEN SATURATION: 95 % | WEIGHT: 170 LBS | RESPIRATION RATE: 18 BRPM

## 2017-07-25 VITALS — RESPIRATION RATE: 19 BRPM

## 2017-07-25 DIAGNOSIS — Z12.11 COLON CANCER SCREENING: Primary | ICD-10-CM

## 2017-07-25 PROCEDURE — 63600175 PHARM REV CODE 636 W HCPCS: Performed by: NURSE ANESTHETIST, CERTIFIED REGISTERED

## 2017-07-25 PROCEDURE — 37000009 HC ANESTHESIA EA ADD 15 MINS: Performed by: INTERNAL MEDICINE

## 2017-07-25 PROCEDURE — D9220A PRA ANESTHESIA: Mod: CRNA,,, | Performed by: NURSE ANESTHETIST, CERTIFIED REGISTERED

## 2017-07-25 PROCEDURE — D9220A PRA ANESTHESIA: Mod: ANES,,, | Performed by: ANESTHESIOLOGY

## 2017-07-25 PROCEDURE — G0121 COLON CA SCRN NOT HI RSK IND: HCPCS | Performed by: INTERNAL MEDICINE

## 2017-07-25 PROCEDURE — 37000008 HC ANESTHESIA 1ST 15 MINUTES: Performed by: INTERNAL MEDICINE

## 2017-07-25 PROCEDURE — 25000003 PHARM REV CODE 250: Performed by: INTERNAL MEDICINE

## 2017-07-25 PROCEDURE — G0121 COLON CA SCRN NOT HI RSK IND: HCPCS | Mod: ,,, | Performed by: INTERNAL MEDICINE

## 2017-07-25 RX ORDER — PROPOFOL 10 MG/ML
VIAL (ML) INTRAVENOUS
Status: DISCONTINUED | OUTPATIENT
Start: 2017-07-25 | End: 2017-07-25

## 2017-07-25 RX ORDER — SODIUM CHLORIDE 9 MG/ML
INJECTION, SOLUTION INTRAVENOUS CONTINUOUS
Status: DISCONTINUED | OUTPATIENT
Start: 2017-07-25 | End: 2017-07-25 | Stop reason: HOSPADM

## 2017-07-25 RX ORDER — SODIUM CHLORIDE 9 MG/ML
INJECTION, SOLUTION INTRAVENOUS CONTINUOUS
Status: CANCELLED | OUTPATIENT
Start: 2017-07-25

## 2017-07-25 RX ORDER — LIDOCAINE HCL/PF 100 MG/5ML
SYRINGE (ML) INTRAVENOUS
Status: DISCONTINUED | OUTPATIENT
Start: 2017-07-25 | End: 2017-07-25

## 2017-07-25 RX ORDER — PROPOFOL 10 MG/ML
VIAL (ML) INTRAVENOUS CONTINUOUS PRN
Status: DISCONTINUED | OUTPATIENT
Start: 2017-07-25 | End: 2017-07-25

## 2017-07-25 RX ADMIN — SODIUM CHLORIDE: 0.9 INJECTION, SOLUTION INTRAVENOUS at 10:07

## 2017-07-25 RX ADMIN — PROPOFOL 200 MCG/KG/MIN: 10 INJECTION, EMULSION INTRAVENOUS at 11:07

## 2017-07-25 RX ADMIN — PROPOFOL 20 MG: 10 INJECTION, EMULSION INTRAVENOUS at 11:07

## 2017-07-25 RX ADMIN — PROPOFOL 100 MG: 10 INJECTION, EMULSION INTRAVENOUS at 11:07

## 2017-07-25 RX ADMIN — LIDOCAINE HYDROCHLORIDE 100 MG: 20 INJECTION, SOLUTION INTRAVENOUS at 11:07

## 2017-07-25 NOTE — ANESTHESIA POSTPROCEDURE EVALUATION
"Anesthesia Post Evaluation    Patient: Fabiano Garvey    Procedure(s) Performed: Procedure(s) (LRB):  COLONOSCOPY (N/A)    Final Anesthesia Type: general  Patient location during evaluation: GI PACU  Patient participation: Yes- Able to Participate  Level of consciousness: awake and alert  Post-procedure vital signs: reviewed and stable  Pain management: adequate  Airway patency: patent  PONV status at discharge: No PONV  Anesthetic complications: no      Cardiovascular status: blood pressure returned to baseline  Respiratory status: unassisted, spontaneous ventilation and room air  Hydration status: euvolemic  Follow-up not needed.        Visit Vitals  /79 (BP Location: Left arm, Patient Position: Sitting, BP Method: Automatic)   Pulse 81   Temp 36.9 °C (98.4 °F) (Oral)   Resp 18   Ht 5' 6" (1.676 m)   Wt 77.1 kg (170 lb)   SpO2 95%   BMI 27.44 kg/m²       Pain/Briana Score: Pain Assessment Performed: Yes (7/25/2017 12:00 PM)  Presence of Pain: denies (7/25/2017 12:00 PM)  Briana Score: 10 (7/25/2017 12:00 PM)      "

## 2017-07-25 NOTE — ANESTHESIA PREPROCEDURE EVALUATION
07/25/2017  Fabiano Garvey is a 62 y.o., male.    Anesthesia Evaluation    I have reviewed the Patient Summary Reports.    I have reviewed the Nursing Notes.   I have reviewed the Medications.     Review of Systems  Anesthesia Hx:  No problems with previous Anesthesia    Hematology/Oncology:  Hematology Normal   Oncology Normal     EENT/Dental:EENT/Dental Normal   Cardiovascular:   Denies Hypertension.     Pulmonary:   Sleep Apnea    Renal/:  Renal/ Normal     Hepatic/GI:   Bowel Prep.    Neurological:  Neurology Normal    Endocrine:  Endocrine Normal        Physical Exam  General:  Obesity    Airway/Jaw/Neck:  Airway Findings: Mouth Opening: Normal Tongue: Normal  General Airway Assessment: Adult  Mallampati: II      Dental:  Dental Findings: In tact   Chest/Lungs:  Chest/Lungs Findings: Clear to auscultation, Normal Respiratory Rate     Heart/Vascular:  Heart Findings: Rate: Normal  Rhythm: Regular Rhythm        Mental Status:  Mental Status Findings:  Cooperative, Alert and Oriented         Anesthesia Plan  Type of Anesthesia, risks & benefits discussed:  Anesthesia Type:  general  Patient's Preference:   Intra-op Monitoring Plan:   Intra-op Monitoring Plan Comments:   Post Op Pain Control Plan:   Post Op Pain Control Plan Comments:   Induction:   IV  Beta Blocker:  Patient is not currently on a Beta-Blocker (No further documentation required).       Informed Consent: Patient understands risks and agrees with Anesthesia plan.  Questions answered. Anesthesia consent signed with patient.  ASA Score: 2     Day of Surgery Review of History & Physical:    H&P update referred to the surgeon.         Ready For Surgery From Anesthesia Perspective.

## 2017-07-25 NOTE — TRANSFER OF CARE
"Anesthesia Transfer of Care Note    Patient: Fabiano Garvey    Procedure(s) Performed: Procedure(s) (LRB):  COLONOSCOPY (N/A)    Patient location: PACU    Anesthesia Type: general    Transport from OR: Transported from OR on room air with adequate spontaneous ventilation    Post pain: adequate analgesia    Post assessment: no apparent anesthetic complications    Post vital signs: stable    Level of consciousness: sedated    Nausea/Vomiting: no nausea/vomiting    Complications: none    Transfer of care protocol was followed      Last vitals:   Visit Vitals  /63 (BP Location: Left arm, Patient Position: Lying, BP Method: Automatic)   Pulse 84   Temp 36.9 °C (98.4 °F) (Oral)   Resp 16   Ht 5' 6" (1.676 m)   Wt 77.1 kg (170 lb)   SpO2 (!) 94%   BMI 27.44 kg/m²     "

## 2017-07-25 NOTE — PATIENT INSTRUCTIONS
Discharge Summary/Instructions after an Endoscopic Procedure  Patient Name: Fabiano Garvey  Patient MRN: [id number]  Patient YOB: 1955 Tuesday, July 25, 2017  Bill Nicole MD  RESTRICTIONS ON ACTIVITY:  - DO NOT drive a car, operate machinery, make legal/financial decisions, or   drink alcohol until the day after the procedure.    - The following day: return to full activity including work, except no heavy   lifting, straining or running for 3 days if polyps were removed.  - Diet: Eat and drink normally unless instructed otherwise.  TREATMENT FOR COMMON SIDE EFFECTS:  - Mild abdominal pain, bloating or excessive gas: rest, eat lightly and use   a heating pad.  - Sore Throat - treat with throat lozenges. Gargle with warm salt water.  SYMPTOMS TO WATCH FOR AND REPORT TO YOUR PHYSICIAN:  1. Severe abdominal pain or bloating.  2. Pain in chest.  3. Chills or fever occurring within 24 hours after a procedure.  4. A large amount of rectal bleeding, which would show as bright red,   maroon, or black stools. (A small amount of blood from the rectum is not   serious, especially if hemorrhoids are present.)  5. Because air was used during the procedure, expelling large amounts of air   from your rectum or belching is normal.  6. If a bowel prep was taken, you may not have a bowel movement for 1-3   days.  This is normal.  7. Go directly to the emergency room if you notice any of the following:   Chills and/or fever over 101 F   Persistent vomiting   Severe abdominal pain, other than gas cramps   Severe chest pain   Black, tarry stools   Any bleeding - exceeding one tablespoon  Your doctor recommends these additional instructions:  If any biopsies were performed, my office will call you in 5 to 6 business   days with any results.  You have a contact number available for emergencies.  The signs and symptoms   of potential delayed complications were discussed with you.  You may return   to normal activities tomorrow.   Written discharge instructions were   provided to you.   You are being discharged to home.   Resume your previous diet.   Continue your present medications.   Your physician has recommended a repeat colonoscopy in 10 years for   screening purposes.  For questions, problems or results please call your physician - Bill Nicole MD at Work:  [phone].  [facility], EMERGENCY ROOM PHONE NUMBER: [phone]  IF A COMPLICATION OR EMERGENCY SITUATION ARISES AND YOU ARE UNABLE TO REACH   YOUR PHYSICIAN - GO TO THE EMERGENCY ROOM.  Bill Nicole MD  7/25/2017 11:27:48 AM  This report has been verified and signed electronically.

## 2017-07-25 NOTE — H&P
[facility]  History & Physical    Subjective:      Chief Complaint/Reason for Admission: screening    Fabiano Garvey is a 62 y.o. male.    Past Medical History:   Diagnosis Date    Basal cell carcinoma     medial canthus    Hyperlipidemia     NU (obstructive sleep apnea)     Rheumatoid arthritis      Past Surgical History:   Procedure Laterality Date    NO PAST SURGERIES       Family History   Problem Relation Age of Onset    Heart failure Mother      60s    Coronary artery disease Father      70s    Cancer Paternal Uncle      lymphoma??    Diabetes Neg Hx      Social History   Substance Use Topics    Smoking status: Former Smoker     Quit date: 7/12/2012    Smokeless tobacco: Never Used    Alcohol use Yes      Comment: occasional       PTA Medications   Medication Sig    atorvastatin (LIPITOR) 40 MG tablet Take 1 tablet (40 mg total) by mouth once daily.    ergocalciferol (ERGOCALCIFEROL) 50,000 unit Cap Take 1 capsule (50,000 Units total) by mouth every 7 days.    fluticasone (FLONASE) 50 mcg/actuation nasal spray 2 sprays by Each Nare route once daily.    folic acid (FOLVITE) 1 MG tablet TAKE 1 TABLET (1 MG TOTAL) BY MOUTH ONCE DAILY.    methotrexate 2.5 MG Tab Take 4 tabs weekly for 2 weeks and then increase to 6 tabs weekly    sodium,potassium,mag sulfates 17.5-3.13-1.6 gram SolR Take 1 kit by mouth as needed.    predniSONE (DELTASONE) 10 MG tablet TAKE PREDNISONE 2 TABS A DAY FOR 10 DAYS AND THEN ONE DAILY FOR 5 DAYS     Review of patient's allergies indicates:   Allergen Reactions    Plaquenil [hydroxychloroquine]      Lightheaded and muscle aches        Review of Systems   Respiratory: Negative.    Cardiovascular: Negative.        Objective:      Vital Signs (Most Recent)       Vital Signs Range (Last 24H):       Physical Exam   Constitutional: He is oriented to person, place, and time.   Neck: Normal range of motion.   Cardiovascular: Normal rate and regular  rhythm.    Pulmonary/Chest: Effort normal and breath sounds normal.   Neurological: He is alert and oriented to person, place, and time.       Data Review:     ECG: .     Assessment:      There are no hospital problems to display for this patient.      Plan:    Indication for procedure:screening    ASA:II  Airway normal  Malampati class:per anes    Personal and family history negative for anesthesia problems    Plan:colon  Anesthesia plan: general

## 2017-08-01 ENCOUNTER — TELEPHONE (OUTPATIENT)
Dept: ENDOSCOPY | Facility: HOSPITAL | Age: 62
End: 2017-08-01

## 2017-08-24 ENCOUNTER — OFFICE VISIT (OUTPATIENT)
Dept: DERMATOLOGY | Facility: CLINIC | Age: 62
End: 2017-08-24
Payer: COMMERCIAL

## 2017-08-24 DIAGNOSIS — L82.1 SK (SEBORRHEIC KERATOSIS): ICD-10-CM

## 2017-08-24 DIAGNOSIS — L81.4 LENTIGO: ICD-10-CM

## 2017-08-24 DIAGNOSIS — L90.5 SCAR: Primary | ICD-10-CM

## 2017-08-24 DIAGNOSIS — Z85.828 PERSONAL HISTORY OF SKIN CANCER: ICD-10-CM

## 2017-08-24 DIAGNOSIS — D48.9 NEOPLASM OF UNCERTAIN BEHAVIOR: ICD-10-CM

## 2017-08-24 DIAGNOSIS — L57.0 AK (ACTINIC KERATOSIS): ICD-10-CM

## 2017-08-24 PROCEDURE — 3008F BODY MASS INDEX DOCD: CPT | Mod: S$GLB,,, | Performed by: DERMATOLOGY

## 2017-08-24 PROCEDURE — 17003 DESTRUCT PREMALG LES 2-14: CPT | Mod: S$GLB,,, | Performed by: DERMATOLOGY

## 2017-08-24 PROCEDURE — 11100 PR BIOPSY OF SKIN LESION: CPT | Mod: 59,S$GLB,, | Performed by: DERMATOLOGY

## 2017-08-24 PROCEDURE — 88305 TISSUE EXAM BY PATHOLOGIST: CPT | Performed by: PATHOLOGY

## 2017-08-24 PROCEDURE — 99213 OFFICE O/P EST LOW 20 MIN: CPT | Mod: 25,S$GLB,, | Performed by: DERMATOLOGY

## 2017-08-24 PROCEDURE — 17000 DESTRUCT PREMALG LESION: CPT | Mod: S$GLB,,, | Performed by: DERMATOLOGY

## 2017-08-24 PROCEDURE — 99999 PR PBB SHADOW E&M-EST. PATIENT-LVL II: CPT | Mod: PBBFAC,,, | Performed by: DERMATOLOGY

## 2017-08-24 NOTE — PROGRESS NOTES
Subjective:       Patient ID:  Fabiano Garvey is a 62 y.o. male who presents for   Chief Complaint   Patient presents with    Skin Check     High risk pt with hx NMSC nose 11 years ago here to check for the development of new lesions./  C.o lesion scalp x several years.  denies pain or bleeding.  Periodically changes.  Feels getting larger and darker.   This is a high risk patient here to check for the development of new lesions.  Pt on MTX for RA        Review of Systems   Constitutional: Negative for fever, chills, fatigue and malaise.   Skin: Positive for activity-related sunscreen use. Negative for daily sunscreen use and tendency to form keloidal scars.   Hematologic/Lymphatic: Does not bruise/bleed easily.        Objective:    Physical Exam   Constitutional: He appears well-developed and well-nourished. No distress.   Neurological: He is alert and oriented to person, place, and time. He is not disoriented.   Psychiatric: He has a normal mood and affect.   Skin:   Areas Examined (abnormalities noted in diagram):   Scalp / Hair Palpated and Inspected  Head / Face Inspection Performed  Neck Inspection Performed  Chest / Axilla Inspection Performed  Abdomen Inspection Performed  Back Inspection Performed  RUE Inspected  LUE Inspection Performed  Nails and Digits Inspection Performed                       Diagram Legend     Erythematous scaling macule/papule c/w actinic keratosis       Vascular papule c/w angioma      Pigmented verrucoid papule/plaque c/w seborrheic keratosis      Yellow umbilicated papule c/w sebaceous hyperplasia      Irregularly shaped tan macule c/w lentigo     1-2 mm smooth white papules consistent with Milia      Movable subcutaneous cyst with punctum c/w epidermal inclusion cyst      Subcutaneous movable cyst c/w pilar cyst      Firm pink to brown papule c/w dermatofibroma      Pedunculated fleshy papule(s) c/w skin tag(s)      Evenly pigmented macule c/w junctional nevus     Mildly  variegated pigmented, slightly irregular-bordered macule c/w mildly atypical nevus      Flesh colored to evenly pigmented papule c/w intradermal nevus       Pink pearly papule/plaque c/w basal cell carcinoma      Erythematous hyperkeratotic cursted plaque c/w SCC      Surgical scar with no sign of skin cancer recurrence      Open and closed comedones      Inflammatory papules and pustules      Verrucoid papule consistent consistent with wart     Erythematous eczematous patches and plaques     Dystrophic onycholytic nail with subungual debris c/w onychomycosis     Umbilicated papule    Erythematous-base heme-crusted tan verrucoid plaque consistent with inflamed seborrheic keratosis     Erythematous Silvery Scaling Plaque c/w Psoriasis     See annotation      Assessment / Plan:      Pathology Orders:      Normal Orders This Visit    Tissue Specimen To Pathology, Dermatology     Questions:    Directional Terms:  Other(comment)    Clinical information:  r/o inflamed keratosis    Specific Site:  right crown of scalp        Scar  Personal history of skin cancer  Area(s) of previous NMSC evaluated with no signs of recurrence.    Upper body skin examination performed today including at least 6 points as noted in physical examination. No lesions suspicious for malignancy noted.    Lentigo  This is a benign hyperpigmented sun induced lesion. Daily sun protection will reduce the number of new lesions. Treatment of these benign lesions are considered cosmetic.  The nature of sun-induced photo-aging and skin cancers is discussed.  Sun avoidance, protective clothing, and the use of 30-SPF sunscreens is advised. Observe closely for skin damage/changes, and call if such occurs.    SK (seborrheic keratosis)  These are benign inherited growths without a malignant potential. Reassurance given to patient. No treatment is necessary.     AK (actinic keratosis)  Cryosurgery Procedure Note    Verbal consent from the patient is obtained and  the patient is aware of the precancerous quality and need for treatment of these lesions. Liquid nitrogen cryosurgery is applied to the 7 actinic keratoses, as detailed in the physical exam, to produce a freeze injury. The patient is aware that blisters may form and is instructed on wound care with gentle cleansing and use of vaseline ointment to keep moist until healed. The patient is supplied a handout on cryosurgery and is instructed to call if lesions do not completely resolve.    Neoplasm of uncertain behavior  Shave biopsy procedure note:    Shave biopsy performed after verbal consent including risk of infection, scar, recurrence, need for additional treatment of site. Area prepped with alcohol, anesthetized with approximately 1.0cc of 1% lidocaine with epinephrine. Lesional tissue shaved with razor blade. Hemostasis achieved with application of aluminum chloride followed by hyfrecation. No complications. Dressing applied. Wound care explained.      -     Tissue Specimen To Pathology, Dermatology             Return in about 6 months (around 2/24/2018) for prn bx report.

## 2017-08-29 ENCOUNTER — PATIENT MESSAGE (OUTPATIENT)
Dept: DERMATOLOGY | Facility: CLINIC | Age: 62
End: 2017-08-29

## 2017-09-05 ENCOUNTER — TELEPHONE (OUTPATIENT)
Dept: RHEUMATOLOGY | Facility: CLINIC | Age: 62
End: 2017-09-05

## 2017-09-05 DIAGNOSIS — M06.9 RHEUMATOID ARTHRITIS INVOLVING MULTIPLE JOINTS: Primary | ICD-10-CM

## 2017-09-05 RX ORDER — METHOTREXATE 2.5 MG/1
20 TABLET ORAL
Qty: 96 TABLET | Refills: 3 | OUTPATIENT
Start: 2017-09-05 | End: 2017-12-04

## 2017-09-05 NOTE — TELEPHONE ENCOUNTER
Left v/m                Please tell patient that he needs to do blood work and contact me after the blood work to get the methotrexate        //AEL

## 2017-09-06 ENCOUNTER — LAB VISIT (OUTPATIENT)
Dept: LAB | Facility: HOSPITAL | Age: 62
End: 2017-09-06
Attending: INTERNAL MEDICINE
Payer: COMMERCIAL

## 2017-09-06 ENCOUNTER — PATIENT MESSAGE (OUTPATIENT)
Dept: RHEUMATOLOGY | Facility: CLINIC | Age: 62
End: 2017-09-06

## 2017-09-06 DIAGNOSIS — M06.9 RHEUMATOID ARTHRITIS INVOLVING MULTIPLE JOINTS: ICD-10-CM

## 2017-09-06 LAB
ALBUMIN SERPL BCP-MCNC: 3.4 G/DL
ALP SERPL-CCNC: 67 U/L
ALT SERPL W/O P-5'-P-CCNC: 19 U/L
ANION GAP SERPL CALC-SCNC: 7 MMOL/L
AST SERPL-CCNC: 19 U/L
BASOPHILS # BLD AUTO: 0.04 K/UL
BASOPHILS NFR BLD: 0.4 %
BILIRUB SERPL-MCNC: 0.4 MG/DL
BUN SERPL-MCNC: 14 MG/DL
CALCIUM SERPL-MCNC: 9.2 MG/DL
CHLORIDE SERPL-SCNC: 107 MMOL/L
CO2 SERPL-SCNC: 27 MMOL/L
CREAT SERPL-MCNC: 0.8 MG/DL
CRP SERPL-MCNC: 1.1 MG/L
DIFFERENTIAL METHOD: ABNORMAL
EOSINOPHIL # BLD AUTO: 0.5 K/UL
EOSINOPHIL NFR BLD: 5 %
ERYTHROCYTE [DISTWIDTH] IN BLOOD BY AUTOMATED COUNT: 14.4 %
ERYTHROCYTE [SEDIMENTATION RATE] IN BLOOD BY WESTERGREN METHOD: 29 MM/HR
EST. GFR  (AFRICAN AMERICAN): >60 ML/MIN/1.73 M^2
EST. GFR  (NON AFRICAN AMERICAN): >60 ML/MIN/1.73 M^2
GLUCOSE SERPL-MCNC: 86 MG/DL
HCT VFR BLD AUTO: 43.2 %
HGB BLD-MCNC: 14.4 G/DL
LYMPHOCYTES # BLD AUTO: 2.7 K/UL
LYMPHOCYTES NFR BLD: 29.8 %
MCH RBC QN AUTO: 31.9 PG
MCHC RBC AUTO-ENTMCNC: 33.3 G/DL
MCV RBC AUTO: 96 FL
MONOCYTES # BLD AUTO: 1.1 K/UL
MONOCYTES NFR BLD: 12.2 %
NEUTROPHILS # BLD AUTO: 4.7 K/UL
NEUTROPHILS NFR BLD: 52.4 %
PLATELET # BLD AUTO: 243 K/UL
PMV BLD AUTO: 10.4 FL
POTASSIUM SERPL-SCNC: 4.2 MMOL/L
PROT SERPL-MCNC: 7.2 G/DL
RBC # BLD AUTO: 4.52 M/UL
SODIUM SERPL-SCNC: 141 MMOL/L
WBC # BLD AUTO: 9 K/UL

## 2017-09-06 PROCEDURE — 85652 RBC SED RATE AUTOMATED: CPT

## 2017-09-06 PROCEDURE — 85025 COMPLETE CBC W/AUTO DIFF WBC: CPT

## 2017-09-06 PROCEDURE — 86140 C-REACTIVE PROTEIN: CPT

## 2017-09-06 PROCEDURE — 36415 COLL VENOUS BLD VENIPUNCTURE: CPT

## 2017-09-06 PROCEDURE — 80053 COMPREHEN METABOLIC PANEL: CPT

## 2017-09-06 RX ORDER — METHOTREXATE 2.5 MG/1
15 TABLET ORAL
Qty: 32 TABLET | Refills: 3 | Status: SHIPPED | OUTPATIENT
Start: 2017-09-06 | End: 2017-12-13 | Stop reason: SDUPTHER

## 2017-09-07 ENCOUNTER — PATIENT MESSAGE (OUTPATIENT)
Dept: RHEUMATOLOGY | Facility: CLINIC | Age: 62
End: 2017-09-07

## 2017-09-07 RX ORDER — BACLOFEN 10 MG/1
10 TABLET ORAL NIGHTLY
Qty: 30 TABLET | Refills: 11 | Status: SHIPPED | OUTPATIENT
Start: 2017-09-07 | End: 2021-01-06 | Stop reason: SDUPTHER

## 2017-09-13 ENCOUNTER — OFFICE VISIT (OUTPATIENT)
Dept: RHEUMATOLOGY | Facility: CLINIC | Age: 62
End: 2017-09-13
Payer: COMMERCIAL

## 2017-09-13 VITALS
BODY MASS INDEX: 27.17 KG/M2 | HEART RATE: 68 BPM | DIASTOLIC BLOOD PRESSURE: 68 MMHG | RESPIRATION RATE: 18 BRPM | HEIGHT: 66 IN | WEIGHT: 169.06 LBS | SYSTOLIC BLOOD PRESSURE: 122 MMHG

## 2017-09-13 DIAGNOSIS — Z51.81 ENCOUNTER FOR METHOTREXATE MONITORING: ICD-10-CM

## 2017-09-13 DIAGNOSIS — M06.9 RHEUMATOID ARTHRITIS INVOLVING MULTIPLE JOINTS: Primary | ICD-10-CM

## 2017-09-13 DIAGNOSIS — Z79.631 ENCOUNTER FOR METHOTREXATE MONITORING: ICD-10-CM

## 2017-09-13 PROCEDURE — 99214 OFFICE O/P EST MOD 30 MIN: CPT | Mod: S$GLB,,, | Performed by: INTERNAL MEDICINE

## 2017-09-13 PROCEDURE — 99999 PR PBB SHADOW E&M-EST. PATIENT-LVL III: CPT | Mod: PBBFAC,,, | Performed by: INTERNAL MEDICINE

## 2017-09-13 PROCEDURE — 3008F BODY MASS INDEX DOCD: CPT | Mod: S$GLB,,, | Performed by: INTERNAL MEDICINE

## 2017-09-13 NOTE — PROGRESS NOTES
Subjective:       Patient ID: Fabiano Garvey is a 60 y.o. male.    Chief Complaint: OTHER and Swelling    HPI     59 yo male with PMH of basal cell cancer (around 2010), HL here for evaluation of join pain.  Reports pain in hands, elbows. and wrists.  Reports  swelling in hands, ankles, and feet.  Reports also has bilateral knee pain and shoulders.   Reports stiffness in morning for 30 minutes.  He has trouble swelling. Denies any rashes. No oral ulcers. No hair loss. Denies photosensitivity.  Denies any raynauds.Denies blood clots.  Denies dry eyes and dry mouth.  Reports symptoms for a year. In November, he noted the nodules in elbows.  Thinks he has swelling in lower neck.  He has trouble picking up things due to pain and swelling.  He reports that prednisone improves his pain and swelling.  He has been off steroids.  No changes in weight.  Reports good appetite.      Interval history:   He continues on MTX 6 pills a week.  On occasion, he will get pain in hands,wrists, and knees. He has numbness in hands but not every day.  He denies any swelling.  Reports morning stiffness for  5 minutes.  He will take advil on occasion with improvement.Pain level today is 1 /10 and aching.      Past Medical History   Diagnosis Date    Hyperlipidemia     Basal cell carcinoma      medial canthus       Review of Systems   Constitutional: Negative for fever, chills, appetite change and fatigue.   HENT: Negative for hearing loss, mouth sores, rhinorrhea, sinus pressure and trouble swallowing.    Eyes: Negative for photophobia, pain, discharge, itching and visual disturbance.   Respiratory:  Negative for cough, choking, chest tightness, wheezing and stridor.    Cardiovascular: Negative for chest pain and palpitations.   Gastrointestinal: Negative for blood in stool and abdominal distention.   Endocrine: Negative for cold intolerance and heat intolerance.   Genitourinary: Negative for dysuria, hematuria and flank pain.    Musculoskeletal: Positive for myalgias, back pain, joint swelling, arthralgias.  Skin: Negative for color change, pallor and rash.   Neurological: Negative for dizziness, light-headedness, numbness and headaches.   Hematological: Negative for adenopathy. Does not bruise/bleed easily.   Psychiatric/Behavioral: Negative for decreased concentration and agitation. The patient is not nervous/anxious.            Objective:        Physical Exam   Constitutional: He is oriented to person, place, and time and well-developed, well-nourished, and in no distress. No distress.   HENT:   Head: Normocephalic and atraumatic.   Right Ear: External ear normal.   Left Ear: External ear normal.   Nose: Nose normal.   Mouth/Throat: Oropharynx is clear and moist. No oropharyngeal exudate.   Eyes: Conjunctivae and EOM are normal. Pupils are equal, round, and reactive to light. Right eye exhibits no discharge. Left eye exhibits no discharge. No scleral icterus.   Neck: Normal range of motion. Neck supple. No JVD present. No tracheal deviation present. No thyromegaly present.   Cardiovascular: Normal rate, regular rhythm and intact distal pulses.  Exam reveals no gallop and no friction rub.    No murmur heard.  Pulmonary/Chest: Effort normal and breath sounds normal. No stridor. No respiratory distress. He has no wheezes. He has no rales. He exhibits no tenderness.   Abdominal: Soft. Bowel sounds are normal. He exhibits no distension. There is no tenderness. There is no rebound.   Lymphadenopathy:     He has no cervical adenopathy.   Neurological: He is alert and oriented to person, place, and time.   Skin: Skin is warm. He is not diaphoretic.     Musculoskeletal:   Shoulders- decreased ROM of both shoulders to 130 degrees bilaterally (resoved)  Elbows- rheumatoid nodules in extensor surfaces bilaterally; mild restriction in extension of right elbow  Wrist- synovitis with decreased extension bilaterally (resolved)  Hands- synovitis of  mcps on right side, worse on the right  Knees-bony hypertrophy but no effusions or tenderness; crepitus  Ankles- no tenderness  Feet-bilaterally mtp tenderness resolved           Labs:  Robel: 1:320  dna-1:160<160  Esr-61<44   ccp-306  rf-876  Ro,la-negative  Nicole, rnp -negtayive   Hepatitis B-negative  Hepatitis C-negative  Urine-negative      Imaging:  I personally reviewed the xrays    Arthritis survey:      12/2015: hand x rays- no erosions  12/2015: feet xray: no erosions    Chest xray (2/2016)-negative      Assessment:     62 yo male with PMH of basal cell cancer (around 2010), HL here for  Follow up of  Seropositive RA.  He has seropositive RA with nodules. He also had serologies consistent with early SLE given (+ROBEL and +DNA) with no other features of  SLE.  Tried plaquenil but reports it gave him dizziness after a few doses.  At this point, main issue is his arthritis so will hold off on another trial of plaquenil in the future.  Regarding his arthritis, he is on MTX 6 pills a week and doing well.  He developed transaminitis with 8 pills so will keep him on 6 pills a week.    Plan:       -off prednisone  * - follow up with  dermatology evaluation given history of skin cancer  -continue  baclofen 10mg po qhs for upper back tension and sleep  -continue MTX 6 pills a week   -continue folic acid 1 mg po qday  given history of basal cell cancer and history of +DNA , will avoid anti-tnf therapy  - DISCUSSED ADDING RITUXAN WHICH HE SHOULD  Consider in the future  Continue vit D once a week     rtc in 12 weeks with labs before

## 2017-11-03 RX ORDER — ERGOCALCIFEROL 1.25 MG/1
50000 CAPSULE ORAL
Qty: 8 CAPSULE | Refills: 3 | Status: SHIPPED | OUTPATIENT
Start: 2017-11-03 | End: 2018-06-21 | Stop reason: SDUPTHER

## 2017-12-04 RX ORDER — FOLIC ACID 1 MG/1
TABLET ORAL
Qty: 30 TABLET | Refills: 3 | OUTPATIENT
Start: 2017-12-04

## 2017-12-05 RX ORDER — FOLIC ACID 1 MG/1
TABLET ORAL
Qty: 30 TABLET | Refills: 11 | Status: SHIPPED | OUTPATIENT
Start: 2017-12-05 | End: 2018-12-01 | Stop reason: SDUPTHER

## 2017-12-13 ENCOUNTER — LAB VISIT (OUTPATIENT)
Dept: LAB | Facility: HOSPITAL | Age: 62
End: 2017-12-13
Attending: INTERNAL MEDICINE
Payer: COMMERCIAL

## 2017-12-13 ENCOUNTER — OFFICE VISIT (OUTPATIENT)
Dept: RHEUMATOLOGY | Facility: CLINIC | Age: 62
End: 2017-12-13
Payer: COMMERCIAL

## 2017-12-13 ENCOUNTER — PATIENT MESSAGE (OUTPATIENT)
Dept: RHEUMATOLOGY | Facility: CLINIC | Age: 62
End: 2017-12-13

## 2017-12-13 VITALS
SYSTOLIC BLOOD PRESSURE: 102 MMHG | HEIGHT: 66 IN | BODY MASS INDEX: 27.37 KG/M2 | WEIGHT: 170.31 LBS | RESPIRATION RATE: 18 BRPM | DIASTOLIC BLOOD PRESSURE: 71 MMHG | HEART RATE: 98 BPM

## 2017-12-13 DIAGNOSIS — R74.01 TRANSAMINITIS: ICD-10-CM

## 2017-12-13 DIAGNOSIS — Z51.81 ENCOUNTER FOR METHOTREXATE MONITORING: ICD-10-CM

## 2017-12-13 DIAGNOSIS — M06.9 RHEUMATOID ARTHRITIS INVOLVING MULTIPLE JOINTS: ICD-10-CM

## 2017-12-13 DIAGNOSIS — M06.9 RHEUMATOID ARTHRITIS INVOLVING MULTIPLE JOINTS: Primary | ICD-10-CM

## 2017-12-13 DIAGNOSIS — Z79.631 ENCOUNTER FOR METHOTREXATE MONITORING: ICD-10-CM

## 2017-12-13 LAB
ALBUMIN SERPL BCP-MCNC: 3.3 G/DL
ALP SERPL-CCNC: 72 U/L
ALT SERPL W/O P-5'-P-CCNC: 49 U/L
ANION GAP SERPL CALC-SCNC: 7 MMOL/L
AST SERPL-CCNC: 29 U/L
BASOPHILS # BLD AUTO: 0.03 K/UL
BASOPHILS NFR BLD: 0.3 %
BILIRUB SERPL-MCNC: 0.5 MG/DL
BUN SERPL-MCNC: 14 MG/DL
CALCIUM SERPL-MCNC: 9.2 MG/DL
CHLORIDE SERPL-SCNC: 104 MMOL/L
CO2 SERPL-SCNC: 28 MMOL/L
CREAT SERPL-MCNC: 0.8 MG/DL
CRP SERPL-MCNC: 1 MG/L
DIFFERENTIAL METHOD: ABNORMAL
EOSINOPHIL # BLD AUTO: 0.3 K/UL
EOSINOPHIL NFR BLD: 3.2 %
ERYTHROCYTE [DISTWIDTH] IN BLOOD BY AUTOMATED COUNT: 14.5 %
ERYTHROCYTE [SEDIMENTATION RATE] IN BLOOD BY WESTERGREN METHOD: 26 MM/HR
EST. GFR  (AFRICAN AMERICAN): >60 ML/MIN/1.73 M^2
EST. GFR  (NON AFRICAN AMERICAN): >60 ML/MIN/1.73 M^2
GLUCOSE SERPL-MCNC: 157 MG/DL
HCT VFR BLD AUTO: 45 %
HGB BLD-MCNC: 14.8 G/DL
LYMPHOCYTES # BLD AUTO: 2.2 K/UL
LYMPHOCYTES NFR BLD: 21.7 %
MCH RBC QN AUTO: 31.7 PG
MCHC RBC AUTO-ENTMCNC: 32.9 G/DL
MCV RBC AUTO: 96 FL
MONOCYTES # BLD AUTO: 0.8 K/UL
MONOCYTES NFR BLD: 7.3 %
NEUTROPHILS # BLD AUTO: 7 K/UL
NEUTROPHILS NFR BLD: 67.4 %
PLATELET # BLD AUTO: 236 K/UL
PMV BLD AUTO: 10.7 FL
POTASSIUM SERPL-SCNC: 4.5 MMOL/L
PROT SERPL-MCNC: 7 G/DL
RBC # BLD AUTO: 4.67 M/UL
SODIUM SERPL-SCNC: 139 MMOL/L
WBC # BLD AUTO: 10.34 K/UL

## 2017-12-13 PROCEDURE — 99999 PR PBB SHADOW E&M-EST. PATIENT-LVL III: CPT | Mod: PBBFAC,,, | Performed by: INTERNAL MEDICINE

## 2017-12-13 PROCEDURE — 99214 OFFICE O/P EST MOD 30 MIN: CPT | Mod: S$GLB,,, | Performed by: INTERNAL MEDICINE

## 2017-12-13 PROCEDURE — 86140 C-REACTIVE PROTEIN: CPT

## 2017-12-13 PROCEDURE — 85652 RBC SED RATE AUTOMATED: CPT

## 2017-12-13 PROCEDURE — 80053 COMPREHEN METABOLIC PANEL: CPT

## 2017-12-13 PROCEDURE — 85025 COMPLETE CBC W/AUTO DIFF WBC: CPT

## 2017-12-13 PROCEDURE — 36415 COLL VENOUS BLD VENIPUNCTURE: CPT

## 2017-12-13 RX ORDER — METHOTREXATE 2.5 MG/1
15 TABLET ORAL
Qty: 32 TABLET | Refills: 3 | Status: SHIPPED | OUTPATIENT
Start: 2017-12-13 | End: 2018-04-18 | Stop reason: SDUPTHER

## 2017-12-13 RX ORDER — METHOTREXATE 2.5 MG/1
15 TABLET ORAL
Qty: 32 TABLET | Refills: 3 | Status: SHIPPED | OUTPATIENT
Start: 2017-12-13 | End: 2017-12-13 | Stop reason: SDUPTHER

## 2017-12-13 NOTE — PROGRESS NOTES
Subjective:       Patient ID: Fabiano Garvey is a 60 y.o. male.    Chief Complaint: OTHER and Swelling    HPI     61 yo male with PMH of basal cell cancer (around 2010), HL here for evaluation of join pain.  Reports pain in hands, elbows. and wrists.  Reports  swelling in hands, ankles, and feet.  Reports also has bilateral knee pain and shoulders.   Reports stiffness in morning for 30 minutes.  He has trouble swelling. Denies any rashes. No oral ulcers. No hair loss. Denies photosensitivity.  Denies any raynauds.Denies blood clots.  Denies dry eyes and dry mouth.  Reports symptoms for a year. In November, he noted the nodules in elbows.  Thinks he has swelling in lower neck.  He has trouble picking up things due to pain and swelling.  He reports that prednisone improves h is pain and swelling.  He has been off steroids.  No changes in weight.  Reports good appetite.      Interval history:   He continues on MTX 6 pills a week.  On occasion, he will get pain in hands,wrists, and knees. He has numbness in hands but not every day.  He denies any swelling.  Reports morning stiffness for  5 minutes.  He will take advil on occasion with improvement.Pain level today is 1 /10 and aching.      Past Medical History   Diagnosis Date    Hyperlipidemia     Basal cell carcinoma      medial canthus       Review of Systems   Constitutional: Negative for fever, chills, appetite change and fatigue.   HENT: Negative for hearing loss, mouth sores, rhinorrhea, sinus pressure and trouble swallowing.    Eyes: Negative for photophobia, pain, discharge, itching and visual disturbance.   Respiratory:  Negative for cough, choking, chest tightness, wheezing and stridor.    Cardiovascular: Negative for chest pain and palpitations.   Gastrointestinal: Negative for blood in stool and abdominal distention.   Endocrine: Negative for cold intolerance and heat intolerance.   Genitourinary: Negative for dysuria, hematuria and flank pain.    Musculoskeletal: Positive for myalgias, back pain, joint swelling, arthralgias.  Skin: Negative for color change, pallor and rash.   Neurological: Negative for dizziness, light-headedness, numbness and headaches.   Hematological: Negative for adenopathy. Does not bruise/bleed easily.   Psychiatric/Behavioral: Negative for decreased concentration and agitation. The patient is not nervous/anxious.            Objective:        Physical Exam   Constitutional: He is oriented to person, place, and time and well-developed, well-nourished, and in no distress. No distress.   HENT:   Head: Normocephalic and atraumatic.   Right Ear: External ear normal.   Left Ear: External ear normal.   Nose: Nose normal.   Mouth/Throat: Oropharynx is clear and moist. No oropharyngeal exudate.   Eyes: Conjunctivae and EOM are normal. Pupils are equal, round, and reactive to light. Right eye exhibits no discharge. Left eye exhibits no discharge. No scleral icterus.   Neck: Normal range of motion. Neck supple. No JVD present. No tracheal deviation present. No thyromegaly present.   Cardiovascular: Normal rate, regular rhythm and intact distal pulses.  Exam reveals no gallop and no friction rub.    No murmur heard.  Pulmonary/Chest: Effort normal and breath sounds normal. No stridor. No respiratory distress. He has no wheezes. He has no rales. He exhibits no tenderness.   Abdominal: Soft. Bowel sounds are normal. He exhibits no distension. There is no tenderness. There is no rebound.   Lymphadenopathy:     He has no cervical adenopathy.   Neurological: He is alert and oriented to person, place, and time.   Skin: Skin is warm. He is not diaphoretic.     Musculoskeletal:   Shoulders- decreased ROM of both shoulders to 130 degrees bilaterally (resoved)  Elbows- rheumatoid nodules in extensor surfaces bilaterally; mild restriction in extension of right elbow  Wrist- synovitis with decreased extension bilaterally (resolved)  Hands- synovitis of  mcps on right side, worse on the right  Knees-bony hypertrophy but no effusions or tenderness; crepitus  Ankles- no tenderness  Feet-bilaterally mtp tenderness resolved           Labs:  Robel: 1:320  dna-1:160<160  Esr-61<44   ccp-306  rf-876  Ro,la-negative  Nicole, rnp -negtayive   Hepatitis B-negative  Hepatitis C-negative  Urine-negative      Imaging:  I personally reviewed the xrays    Arthritis survey:      12/2015: hand x rays- no erosions  12/2015: feet xray: no erosions    Chest xray (2/2016)-negative      Assessment:     62 yo male with PMH of basal cell cancer (around 2010), HL here for  Follow up of  Seropositive RA.  He has seropositive RA with nodules. He also had serologies consistent with early SLE given (+ROBEL and +DNA) with no other features of  SLE.  Tried plaquenil but reports it gave him dizziness after a few doses.  At this point, main issue is his arthritis so will hold off on another trial of plaquenil in the future.  Regarding his arthritis, he is on MTX 6 pills a week and doing well.  He developed transaminitis with 8 pills so will keep him on 6 pills a week.  He had mild transaminitis with last blood work but also reports drinking alcohol so asked him to abstain.    Plan:      * - follow up with  dermatology evaluation given history of skin cancer  -continue  baclofen 10mg po qhs for upper back tension and sleep  -continue MTX 6 pills a week   -continue folic acid 1 mg po qday  given history of basal cell cancer and history of +DNA , will avoid anti-tnf therapy  - DISCUSSED ADDING RITUXAN WHICH HE SHOULD  Consider in the future  Continue vit D once a week   labs in 8 weeeks  rtc in 12 weeks

## 2018-01-28 DIAGNOSIS — E78.2 MIXED HYPERLIPIDEMIA: ICD-10-CM

## 2018-01-28 RX ORDER — ATORVASTATIN CALCIUM 40 MG/1
40 TABLET, FILM COATED ORAL DAILY
Qty: 90 TABLET | Refills: 3 | Status: SHIPPED | OUTPATIENT
Start: 2018-01-28 | End: 2019-01-30 | Stop reason: SDUPTHER

## 2018-02-07 ENCOUNTER — PATIENT MESSAGE (OUTPATIENT)
Dept: RHEUMATOLOGY | Facility: CLINIC | Age: 63
End: 2018-02-07

## 2018-02-07 ENCOUNTER — LAB VISIT (OUTPATIENT)
Dept: LAB | Facility: HOSPITAL | Age: 63
End: 2018-02-07
Attending: INTERNAL MEDICINE
Payer: COMMERCIAL

## 2018-02-07 DIAGNOSIS — M06.9 RHEUMATOID ARTHRITIS INVOLVING MULTIPLE JOINTS: ICD-10-CM

## 2018-02-07 LAB
25(OH)D3+25(OH)D2 SERPL-MCNC: 29 NG/ML
ALBUMIN SERPL BCP-MCNC: 3.3 G/DL
ALP SERPL-CCNC: 70 U/L
ALT SERPL W/O P-5'-P-CCNC: 43 U/L
ANION GAP SERPL CALC-SCNC: 7 MMOL/L
AST SERPL-CCNC: 30 U/L
BASOPHILS # BLD AUTO: 0.05 K/UL
BASOPHILS NFR BLD: 0.5 %
BILIRUB SERPL-MCNC: 0.4 MG/DL
BUN SERPL-MCNC: 15 MG/DL
CALCIUM SERPL-MCNC: 9.2 MG/DL
CHLORIDE SERPL-SCNC: 106 MMOL/L
CO2 SERPL-SCNC: 26 MMOL/L
CREAT SERPL-MCNC: 0.8 MG/DL
CRP SERPL-MCNC: 0.8 MG/L
DIFFERENTIAL METHOD: ABNORMAL
EOSINOPHIL # BLD AUTO: 0.4 K/UL
EOSINOPHIL NFR BLD: 3.9 %
ERYTHROCYTE [DISTWIDTH] IN BLOOD BY AUTOMATED COUNT: 14.4 %
ERYTHROCYTE [SEDIMENTATION RATE] IN BLOOD BY WESTERGREN METHOD: 26 MM/HR
EST. GFR  (AFRICAN AMERICAN): >60 ML/MIN/1.73 M^2
EST. GFR  (NON AFRICAN AMERICAN): >60 ML/MIN/1.73 M^2
GLUCOSE SERPL-MCNC: 89 MG/DL
HCT VFR BLD AUTO: 44.5 %
HGB BLD-MCNC: 14.6 G/DL
LYMPHOCYTES # BLD AUTO: 3.1 K/UL
LYMPHOCYTES NFR BLD: 29.7 %
MCH RBC QN AUTO: 31.4 PG
MCHC RBC AUTO-ENTMCNC: 32.8 G/DL
MCV RBC AUTO: 96 FL
MONOCYTES # BLD AUTO: 1.2 K/UL
MONOCYTES NFR BLD: 11.2 %
NEUTROPHILS # BLD AUTO: 5.7 K/UL
NEUTROPHILS NFR BLD: 54.5 %
PLATELET # BLD AUTO: 241 K/UL
PMV BLD AUTO: 11.3 FL
POTASSIUM SERPL-SCNC: 4.1 MMOL/L
PROT SERPL-MCNC: 7 G/DL
RBC # BLD AUTO: 4.65 M/UL
SODIUM SERPL-SCNC: 139 MMOL/L
WBC # BLD AUTO: 10.45 K/UL

## 2018-02-07 PROCEDURE — 82306 VITAMIN D 25 HYDROXY: CPT

## 2018-02-07 PROCEDURE — 80053 COMPREHEN METABOLIC PANEL: CPT

## 2018-02-07 PROCEDURE — 36415 COLL VENOUS BLD VENIPUNCTURE: CPT

## 2018-02-07 PROCEDURE — 85025 COMPLETE CBC W/AUTO DIFF WBC: CPT

## 2018-02-07 PROCEDURE — 85652 RBC SED RATE AUTOMATED: CPT

## 2018-02-07 PROCEDURE — 86140 C-REACTIVE PROTEIN: CPT

## 2018-02-08 ENCOUNTER — PATIENT MESSAGE (OUTPATIENT)
Dept: RHEUMATOLOGY | Facility: CLINIC | Age: 63
End: 2018-02-08

## 2018-03-20 ENCOUNTER — TELEPHONE (OUTPATIENT)
Dept: FAMILY MEDICINE | Facility: CLINIC | Age: 63
End: 2018-03-20

## 2018-03-20 DIAGNOSIS — Z00.00 ROUTINE GENERAL MEDICAL EXAMINATION AT A HEALTH CARE FACILITY: Primary | ICD-10-CM

## 2018-03-20 DIAGNOSIS — E78.5 HYPERLIPIDEMIA, UNSPECIFIED HYPERLIPIDEMIA TYPE: ICD-10-CM

## 2018-03-21 ENCOUNTER — PATIENT MESSAGE (OUTPATIENT)
Dept: FAMILY MEDICINE | Facility: CLINIC | Age: 63
End: 2018-03-21

## 2018-03-21 NOTE — TELEPHONE ENCOUNTER
Labs 1 wk prior to appt     Orders Placed This Encounter   Procedures    CBC auto differential    Comprehensive metabolic panel    TSH    PSA, Screening    Lipid panel

## 2018-04-13 ENCOUNTER — PATIENT MESSAGE (OUTPATIENT)
Dept: RHEUMATOLOGY | Facility: CLINIC | Age: 63
End: 2018-04-13

## 2018-04-13 DIAGNOSIS — M06.9 RHEUMATOID ARTHRITIS INVOLVING MULTIPLE JOINTS: Primary | ICD-10-CM

## 2018-04-18 ENCOUNTER — OFFICE VISIT (OUTPATIENT)
Dept: RHEUMATOLOGY | Facility: CLINIC | Age: 63
End: 2018-04-18
Payer: COMMERCIAL

## 2018-04-18 ENCOUNTER — PATIENT MESSAGE (OUTPATIENT)
Dept: RHEUMATOLOGY | Facility: CLINIC | Age: 63
End: 2018-04-18

## 2018-04-18 VITALS
DIASTOLIC BLOOD PRESSURE: 79 MMHG | SYSTOLIC BLOOD PRESSURE: 102 MMHG | HEIGHT: 66 IN | HEART RATE: 96 BPM | WEIGHT: 170 LBS | BODY MASS INDEX: 27.32 KG/M2 | RESPIRATION RATE: 18 BRPM

## 2018-04-18 DIAGNOSIS — Z51.81 ENCOUNTER FOR METHOTREXATE MONITORING: ICD-10-CM

## 2018-04-18 DIAGNOSIS — M06.9 RHEUMATOID ARTHRITIS INVOLVING MULTIPLE JOINTS: Primary | ICD-10-CM

## 2018-04-18 DIAGNOSIS — Z79.631 ENCOUNTER FOR METHOTREXATE MONITORING: ICD-10-CM

## 2018-04-18 DIAGNOSIS — E55.9 VITAMIN D DEFICIENCY: ICD-10-CM

## 2018-04-18 PROCEDURE — 99999 PR PBB SHADOW E&M-EST. PATIENT-LVL III: CPT | Mod: PBBFAC,,, | Performed by: INTERNAL MEDICINE

## 2018-04-18 PROCEDURE — 99214 OFFICE O/P EST MOD 30 MIN: CPT | Mod: S$GLB,,, | Performed by: INTERNAL MEDICINE

## 2018-04-18 RX ORDER — METHOTREXATE 2.5 MG/1
15 TABLET ORAL
Qty: 32 TABLET | Refills: 3 | Status: SHIPPED | OUTPATIENT
Start: 2018-04-18 | End: 2018-10-10 | Stop reason: SDUPTHER

## 2018-04-18 NOTE — PROGRESS NOTES
Subjective:       Patient ID: Fabiano Garvey is a 60 y.o. male.    Chief Complaint: OTHER and Swelling    HPI     59 yo male with PMH of basal cell cancer (around 2010), HL here for evaluation of join pain.  Reports pain in hands, elbows. and wrists.  Reports  swelling in hands, ankles, and feet.  Reports also has bilateral knee pain and shoulders.   Reports stiffness in morning for 30 minutes.  He has trouble swelling. Denies any rashes. No oral ulcers. No hair loss. Denies photosensitivity.  Denies any raynauds.Denies blood clots.  Denies dry eyes and dry mouth.  Reports symptoms for a year. In November, he noted the nodules in elbows.  Thinks he has swelling in lower neck.  He has trouble picking up things due to pain and swelling.  He reports that prednisone improves h is pain and swelling.  He has been off steroids.  No changes in weight.  Reports good appetite.      Interval history:   He continues on MTX 6 pills a week.  On occasion, he will get pain in hands,wrists, and knees. He has numbness in hands but not every day.  He denies any swelling.  Reports morning stiffness for  5 minutes.  He will take advil on occasion with improvement.Pain level today is 1 /10 and aching.      Past Medical History   Diagnosis Date    Hyperlipidemia     Basal cell carcinoma      medial canthus       Review of Systems   Constitutional: Negative for fever, chills, appetite change and fatigue.   HENT: Negative for hearing loss, mouth sores, rhinorrhea, sinus pressure and trouble swallowing.    Eyes: Negative for photophobia, pain, discharge, itching and visual disturbance.   Respiratory:  Negative for cough, choking, chest tightness, wheezing and stridor.    Cardiovascular: Negative for chest pain and palpitations.   Gastrointestinal: Negative for blood in stool and abdominal distention.   Endocrine: Negative for cold intolerance and heat intolerance.   Genitourinary: Negative for dysuria, hematuria and flank pain.    Musculoskeletal: Positive for myalgias, back pain, joint swelling, arthralgias.  Skin: Negative for color change, pallor and rash.   Neurological: Negative for dizziness, light-headedness, numbness and headaches.   Hematological: Negative for adenopathy. Does not bruise/bleed easily.   Psychiatric/Behavioral: Negative for decreased concentration and agitation. The patient is not nervous/anxious.            Objective:        Physical Exam   Constitutional: He is oriented to person, place, and time and well-developed, well-nourished, and in no distress. No distress.   HENT:   Head: Normocephalic and atraumatic.   Right Ear: External ear normal.   Left Ear: External ear normal.   Nose: Nose normal.   Mouth/Throat: Oropharynx is clear and moist. No oropharyngeal exudate.   Eyes: Conjunctivae and EOM are normal. Pupils are equal, round, and reactive to light. Right eye exhibits no discharge. Left eye exhibits no discharge. No scleral icterus.   Neck: Normal range of motion. Neck supple. No JVD present. No tracheal deviation present. No thyromegaly present.   Cardiovascular: Normal rate, regular rhythm and intact distal pulses.  Exam reveals no gallop and no friction rub.    No murmur heard.  Pulmonary/Chest: Effort normal and breath sounds normal. No stridor. No respiratory distress. He has no wheezes. He has no rales. He exhibits no tenderness.   Abdominal: Soft. Bowel sounds are normal. He exhibits no distension. There is no tenderness. There is no rebound.   Lymphadenopathy:     He has no cervical adenopathy.   Neurological: He is alert and oriented to person, place, and time.   Skin: Skin is warm. He is not diaphoretic.     Musculoskeletal:   Shoulders- decreased ROM of both shoulders to 130 degrees bilaterally (resoved)  Elbows- rheumatoid nodules in extensor surfaces bilaterally; mild restriction in extension of right elbow  Wrist- synovitis with decreased extension bilaterally (resolved)  Hands- synovitis of  mcps on right side, worse on the right  Knees-bony hypertrophy but no effusions or tenderness; crepitus  Ankles- no tenderness  Feet-bilaterally mtp tenderness resolved           Labs:  Robel: 1:320  dna-1:160<160  Esr-61<44   ccp-306  rf-876  Ro,la-negative  Nicole, rnp -negtayive   Hepatitis B-negative  Hepatitis C-negative  Urine-negative      Imaging:  I personally reviewed the xrays    Arthritis survey:      12/2015: hand x rays- no erosions  12/2015: feet xray: no erosions    Chest xray (2/2016)-negative      Assessment:     61 yo male with PMH of basal cell cancer (around 2010), HL here for  Follow up of  Seropositive RA.  He has seropositive RA with nodules. He also had serologies consistent with early SLE given (+ROBEL and +DNA) with no other features of  SLE.    Tried plaquenil but reports it gave him dizziness after a few doses.  At this point, main issue is his arthritis so will hold off on another trial of plaquenil in the future.    Regarding his arthritis, he is on MTX 6 pills a week and doing well.  He developed transaminitis with 8 pills so will keep him on 6 pills a week.  He had mild transaminitis with last blood work but also reports drinking alcohol so asked him to abstain.    Plan:      * - follow up with  dermatology evaluation given history of skin cancer  -continue  baclofen 10mg po qhs prn for upper back tension and sleep  -continue MTX 6 pills a week   -continue folic acid 1 mg po qday  given history of basal cell cancer and history of +DNA , will avoid anti-tnf therapy  - DISCUSSED ADDING RITUXAN WHICH HE SHOULD  Consider in the future  Continue vit D once a week   labs in may   rtc in 12 weeks

## 2018-05-04 RX ORDER — METHOTREXATE 2.5 MG/1
15 TABLET ORAL
Qty: 32 TABLET | Refills: 3 | Status: SHIPPED | OUTPATIENT
Start: 2018-05-04 | End: 2018-10-10

## 2018-05-08 ENCOUNTER — LAB VISIT (OUTPATIENT)
Dept: LAB | Facility: HOSPITAL | Age: 63
End: 2018-05-08
Attending: FAMILY MEDICINE
Payer: COMMERCIAL

## 2018-05-08 DIAGNOSIS — E78.5 HYPERLIPIDEMIA, UNSPECIFIED HYPERLIPIDEMIA TYPE: ICD-10-CM

## 2018-05-08 DIAGNOSIS — Z00.00 ROUTINE GENERAL MEDICAL EXAMINATION AT A HEALTH CARE FACILITY: ICD-10-CM

## 2018-05-08 DIAGNOSIS — M06.9 RHEUMATOID ARTHRITIS INVOLVING MULTIPLE JOINTS: ICD-10-CM

## 2018-05-08 LAB
25(OH)D3+25(OH)D2 SERPL-MCNC: 29 NG/ML
ALBUMIN SERPL BCP-MCNC: 3.4 G/DL
ALBUMIN SERPL BCP-MCNC: 3.4 G/DL
ALP SERPL-CCNC: 74 U/L
ALP SERPL-CCNC: 74 U/L
ALT SERPL W/O P-5'-P-CCNC: 41 U/L
ALT SERPL W/O P-5'-P-CCNC: 41 U/L
ANION GAP SERPL CALC-SCNC: 7 MMOL/L
ANION GAP SERPL CALC-SCNC: 7 MMOL/L
AST SERPL-CCNC: 29 U/L
AST SERPL-CCNC: 29 U/L
BASOPHILS # BLD AUTO: 0.03 K/UL
BASOPHILS # BLD AUTO: 0.03 K/UL
BASOPHILS NFR BLD: 0.3 %
BASOPHILS NFR BLD: 0.3 %
BILIRUB SERPL-MCNC: 0.6 MG/DL
BILIRUB SERPL-MCNC: 0.6 MG/DL
BUN SERPL-MCNC: 17 MG/DL
BUN SERPL-MCNC: 17 MG/DL
CALCIUM SERPL-MCNC: 9.1 MG/DL
CALCIUM SERPL-MCNC: 9.1 MG/DL
CHLORIDE SERPL-SCNC: 109 MMOL/L
CHLORIDE SERPL-SCNC: 109 MMOL/L
CHOLEST SERPL-MCNC: 144 MG/DL
CHOLEST/HDLC SERPL: 3.4 {RATIO}
CO2 SERPL-SCNC: 25 MMOL/L
CO2 SERPL-SCNC: 25 MMOL/L
COMPLEXED PSA SERPL-MCNC: 0.77 NG/ML
CREAT SERPL-MCNC: 0.8 MG/DL
CREAT SERPL-MCNC: 0.8 MG/DL
CRP SERPL-MCNC: 1 MG/L
DIFFERENTIAL METHOD: ABNORMAL
DIFFERENTIAL METHOD: ABNORMAL
EOSINOPHIL # BLD AUTO: 0.4 K/UL
EOSINOPHIL # BLD AUTO: 0.4 K/UL
EOSINOPHIL NFR BLD: 4.3 %
EOSINOPHIL NFR BLD: 4.3 %
ERYTHROCYTE [DISTWIDTH] IN BLOOD BY AUTOMATED COUNT: 14.6 %
ERYTHROCYTE [DISTWIDTH] IN BLOOD BY AUTOMATED COUNT: 14.6 %
ERYTHROCYTE [SEDIMENTATION RATE] IN BLOOD BY WESTERGREN METHOD: 31 MM/HR
EST. GFR  (AFRICAN AMERICAN): >60 ML/MIN/1.73 M^2
EST. GFR  (AFRICAN AMERICAN): >60 ML/MIN/1.73 M^2
EST. GFR  (NON AFRICAN AMERICAN): >60 ML/MIN/1.73 M^2
EST. GFR  (NON AFRICAN AMERICAN): >60 ML/MIN/1.73 M^2
GLUCOSE SERPL-MCNC: 104 MG/DL
GLUCOSE SERPL-MCNC: 104 MG/DL
HCT VFR BLD AUTO: 44.9 %
HCT VFR BLD AUTO: 44.9 %
HDLC SERPL-MCNC: 42 MG/DL
HDLC SERPL: 29.2 %
HGB BLD-MCNC: 14.6 G/DL
HGB BLD-MCNC: 14.6 G/DL
LDLC SERPL CALC-MCNC: 87 MG/DL
LYMPHOCYTES # BLD AUTO: 2.2 K/UL
LYMPHOCYTES # BLD AUTO: 2.2 K/UL
LYMPHOCYTES NFR BLD: 20.9 %
LYMPHOCYTES NFR BLD: 20.9 %
MCH RBC QN AUTO: 31.1 PG
MCH RBC QN AUTO: 31.1 PG
MCHC RBC AUTO-ENTMCNC: 32.5 G/DL
MCHC RBC AUTO-ENTMCNC: 32.5 G/DL
MCV RBC AUTO: 96 FL
MCV RBC AUTO: 96 FL
MONOCYTES # BLD AUTO: 0.9 K/UL
MONOCYTES # BLD AUTO: 0.9 K/UL
MONOCYTES NFR BLD: 8.6 %
MONOCYTES NFR BLD: 8.6 %
NEUTROPHILS # BLD AUTO: 6.8 K/UL
NEUTROPHILS # BLD AUTO: 6.8 K/UL
NEUTROPHILS NFR BLD: 65.7 %
NEUTROPHILS NFR BLD: 65.7 %
NONHDLC SERPL-MCNC: 102 MG/DL
PLATELET # BLD AUTO: 233 K/UL
PLATELET # BLD AUTO: 233 K/UL
PMV BLD AUTO: 10.8 FL
PMV BLD AUTO: 10.8 FL
POTASSIUM SERPL-SCNC: 4.3 MMOL/L
POTASSIUM SERPL-SCNC: 4.3 MMOL/L
PROT SERPL-MCNC: 7.1 G/DL
PROT SERPL-MCNC: 7.1 G/DL
RBC # BLD AUTO: 4.7 M/UL
RBC # BLD AUTO: 4.7 M/UL
SODIUM SERPL-SCNC: 141 MMOL/L
SODIUM SERPL-SCNC: 141 MMOL/L
TRIGL SERPL-MCNC: 75 MG/DL
TSH SERPL DL<=0.005 MIU/L-ACNC: 1.05 UIU/ML
WBC # BLD AUTO: 10.27 K/UL
WBC # BLD AUTO: 10.27 K/UL

## 2018-05-08 PROCEDURE — 36415 COLL VENOUS BLD VENIPUNCTURE: CPT

## 2018-05-08 PROCEDURE — 86480 TB TEST CELL IMMUN MEASURE: CPT

## 2018-05-08 PROCEDURE — 80053 COMPREHEN METABOLIC PANEL: CPT

## 2018-05-08 PROCEDURE — 84443 ASSAY THYROID STIM HORMONE: CPT

## 2018-05-08 PROCEDURE — 84153 ASSAY OF PSA TOTAL: CPT

## 2018-05-08 PROCEDURE — 85652 RBC SED RATE AUTOMATED: CPT

## 2018-05-08 PROCEDURE — 85025 COMPLETE CBC W/AUTO DIFF WBC: CPT

## 2018-05-08 PROCEDURE — 86140 C-REACTIVE PROTEIN: CPT

## 2018-05-08 PROCEDURE — 80061 LIPID PANEL: CPT

## 2018-05-08 PROCEDURE — 82306 VITAMIN D 25 HYDROXY: CPT

## 2018-05-10 LAB
MITOGEN NIL: >10 IU/ML
NIL: 0.04 IU/ML
TB ANTIGEN NIL: -0.01 IU/ML
TB ANTIGEN: 0.03 IU/ML
TB GOLD: NEGATIVE

## 2018-05-14 ENCOUNTER — TELEPHONE (OUTPATIENT)
Dept: FAMILY MEDICINE | Facility: CLINIC | Age: 63
End: 2018-05-14

## 2018-05-14 NOTE — TELEPHONE ENCOUNTER
Advised patient to contact Dr Brenner to ensure that patient should or should not have labs scheduled on 5/15.

## 2018-05-14 NOTE — TELEPHONE ENCOUNTER
----- Message from Gaston Brown sent at 5/14/2018  2:44 PM CDT -----  Contact: ext [phone] [employer] employee  Patient wanted to know if he needs to do the blood work that is scheduled for tomorrow because he did blood work last week. Please call and advise.

## 2018-05-15 ENCOUNTER — OFFICE VISIT (OUTPATIENT)
Dept: FAMILY MEDICINE | Facility: CLINIC | Age: 63
End: 2018-05-15
Payer: COMMERCIAL

## 2018-05-15 VITALS
HEART RATE: 99 BPM | TEMPERATURE: 99 F | HEIGHT: 66 IN | DIASTOLIC BLOOD PRESSURE: 71 MMHG | WEIGHT: 169.56 LBS | OXYGEN SATURATION: 95 % | SYSTOLIC BLOOD PRESSURE: 109 MMHG | BODY MASS INDEX: 27.25 KG/M2

## 2018-05-15 DIAGNOSIS — R73.09 ABNORMAL BLOOD SUGAR: ICD-10-CM

## 2018-05-15 DIAGNOSIS — Z00.00 ROUTINE GENERAL MEDICAL EXAMINATION AT A HEALTH CARE FACILITY: Primary | ICD-10-CM

## 2018-05-15 DIAGNOSIS — E78.5 HYPERLIPIDEMIA, UNSPECIFIED HYPERLIPIDEMIA TYPE: ICD-10-CM

## 2018-05-15 DIAGNOSIS — M21.612 BILATERAL BUNIONS: ICD-10-CM

## 2018-05-15 DIAGNOSIS — M06.9 RHEUMATOID ARTHRITIS, INVOLVING UNSPECIFIED SITE, UNSPECIFIED RHEUMATOID FACTOR PRESENCE: ICD-10-CM

## 2018-05-15 DIAGNOSIS — M21.611 BILATERAL BUNIONS: ICD-10-CM

## 2018-05-15 PROCEDURE — 99396 PREV VISIT EST AGE 40-64: CPT | Mod: S$GLB,,, | Performed by: FAMILY MEDICINE

## 2018-05-15 PROCEDURE — 99999 PR PBB SHADOW E&M-EST. PATIENT-LVL IV: CPT | Mod: PBBFAC,,, | Performed by: FAMILY MEDICINE

## 2018-05-15 NOTE — PATIENT INSTRUCTIONS
Make sure to get the ordered (hemoglobin A1c) diabetes test with your next set of lab work from your rheumatologist.    Try KENISHA garsia supports to help with foot pain        Nutrition: How to Make Healthier Food Choices     Nutrition: How to Make Healthier Food Choices     Why is healthy eating important?  When combined with exercise, a healthy diet can help you lose weight, lower your cholesterol level and improve the way your body functions on a daily basis.  The U.S. Department of Agricultures (USDA) Food Guide Pyramid divides food into 6 basic food groups, consisting of 1) grains, 2) fruits, 3) vegetables, 4) meats and beans, 5) dairy and 6) fats.  The USDA recommends an adult daily diet include the following:  3 ounces of whole grains, and 6 ounces of grains total   2 cups of fruit   2 1/2 cups of vegetables   3 cups fat-free or low-fat dairy   The following are some ways to make healthier food choices and to get the recommended amounts of whole grains, fruits, vegetables and dairy.    Grains  Whole-grain breads are low in fat; they're also high in fiber and complex carbohydrates, which help you feel newman longer and prevent overeating. Choose breads whose first ingredient says whole in front of the grain, for example, whole wheat flour or whole white flour; enriched or other types of flour have the important fiber and nutrients removed. Choose whole grain breads for sandwiches and as additions to meals.  Avoid rich bakery foods such as donuts, sweet rolls and muffins. These foods can contain more than 50% fat calories. Snacks such as irma food cake and gingersnap cookies can satisfy your sweet tooth without adding fat to your diet.  Hot and cold cereals are usually low in fat. But instant cereals with cream may contain high-fat oils or butterfat. Granola cereals may also contain high-fat oils and extra sugars. Look for low-sugar options for both instant and granola cereals.  Avoid fried snacks such  "as potato chips and tortilla chips. Try the low-fat or baked versions instead.  Instead of this: Try this:   Croissants, biscuits, white breads and rolls Low-fat whole grain breads and rolls (wheat, rye and pumpernickel)   Doughnuts, pastries and scones English muffins and small whole grain bagels   Fried tortillas Soft tortillas (corn or whole wheat)   Sugar cereals and regular granola Oatmeal, low-fat granola and whole-grain cereal   Snack crackers Crackers (animal, lj, rye, soda, saltine, oyster)   Potato or corn chips and buttered popcorn Pretzels (unsalted) and popcorn (unbuttered)   White pasta Whole-wheat pasta   White rice Brown rice   Fried rice, or pasta and rice mixes that contain high-fat sauces Rice or pasta (without egg yolk) with vegetable sauces   All-purpose white flour 100% whole-wheat flour     Fruits and Vegetables  Fruits and vegetables are naturally low in fat. They add flavor and variety to your diet. They also contain fiber, vitamins and minerals.  Margarine, butter, mayonnaise and sour cream add fat to vegetables and fruits. Try using nonfat or low-fat versions of these foods. You can also use nonfat or low-fat yogurt or herbs as seasonings instead.  Instead of this: Try this:   Fried vegetables or vegetables served with cream, cheese or butter sauces All vegetables raw, steamed, broiled, baked or tossed with a very small amount of olive oil and salt and pepper   Coconut Fruit (fresh)   French fries, hash browns and potato   chips Baked white or sweet potatoes     Meat, Poultry and Fish  Beef, Pork, Veal and Lamb  Baking, broiling and roasting are the healthiest ways to prepare meat. Lean cuts can be pan-broiled or stir-fried. Use either a nonstick pan or nonstick spray coating instead of butter or margarine.  Trim outside fat before cooking. Trim any inside, separable fat before eating. Select low-fat, lean cuts of meat. Lean beef and veal cuts have the word "loin" or "round" in their " "names. Lean pork cuts have the word "loin" or "leg" in their names.  Use herbs, spices, fresh vegetables and nonfat marinades to season meat. Avoid high-fat sauces and gravies.  Poultry  Baking, broiling and roasting are the healthiest ways to prepare poultry. Skinless poultry can be pan-broiled or stir-fried. Use either a nonstick pan or nonstick spray coating instead of butter or margarine.  Remove skin and visible fat before cooking. Chicken breasts are a good choice because they are low in fat and high in protein. Use domestic goose and duck only once in a while because both are high in fat.  Fish  Poaching, steaming, baking and broiling are the healthiest ways to prepare fish. Fresh fish should have a clear color, a moist look, a clean smell and firm, springy flesh. If good-quality fresh fish isn't available, buy frozen fish.  Most seafood is high in healthy polyunsaturated fat. Omega-3 fatty acids are also found in some fatty fish, such as salmon and cold-water trout. They may help lower the risk of heart disease in some people.  Cross-over Foods  Dry beans, peas and lentils offer protein and fiber without the cholesterol and fat of meats. Once in a while, try substituting beans for meat in a favorite recipe, such as lasagna or chili.  TVP, or textured vegetable protein, is widely available in many foods. Vegetarian "hot dogs," "hamburger" and "chicken nuggets" are low-fat, cholesterol-free alternatives to meat.  Instead of this: Try this:   Regular or breaded fish sticks or cakes, fish canned in oil, seafood prepared with butter or served in high-fat sauce Fish (fresh, frozen, canned in water), low-fat fish sticks or cakes and shellfish (such as shrimp)   Prime and marbled cuts Select-grade lean beef (round, sirloin and loin)   Pork spare ribs and julian Lean pork (tenderloin and loin chop) and turkey julian   Regular ground beef Lean or extra-lean ground beef, ground chicken and turkey breast   Lunch meats " such as pepperoni, salami, bologna and liverwurst Lean lunch meats such as turkey, chicken and ham   Regular hot dogs or sausage Fat-free hot dogs and turkey dogs     Dairy  Choose skim milk or low-fat buttermilk. Substitute evaporated skim milk for cream in recipes for soups, sauces and coffee.  Try low-fat cheeses. Skim ricotta can replace cream cheese on a bagel or in a vegetable dip. Use part-skim cheeses in recipes. Use 1% cottage cheese for salads and cooking. String cheese is a low-fat, high-calcium snack option.  Plain nonfat yogurt can replace sour cream in many recipes. (To maintain texture, stir 1 tablespoon of cornstarch into each cup of yogurt that you use in cooking.) Try mixing frozen nonfat or low-fat yogurt with fruit for dessert.  Skim sherbet is an alternative to ice cream. Soft-serve and regular ice creams are also lower in fat than premium styles.  Instead of this: Try this:   Whole or 2% milk Non-fat or 1% milk   Evaporated milk Evaporated non-fat milk   Regular buttermilk Buttermilk made from non-fat (or 1%) milk   Yogurt made with whole milk Nonfat or low-fat yogurt   Regular cheese (examples: American, blue, Brie, cheddar, Jm and Parmesan) Low-fat cheese with less than 3 grams of fat per serving (example: natural cheese, processed cheese and nondairy cheese such as soy cheese)   Regular cottage cheese Low-fat, nonfat, and dry-curd cottage cheese with less than 2% fat   Regular cream cheese Low-fat cream cheese (no more than 3 grams of fat per ounce)   Regular ice cream Sorbet, sherbet and nonfat or low-fat ice cream (no more than 3 grams of fat per 1/2 cup serving)     Fats, Oils and Sweets  Eating too many high-fat foods not only adds excess calories (which can lead to obesity and weight gain), but can increase your risk factor for several diseases. Heart disease, diabetes, certain types of cancer and osteoarthritis have all been linked to diets too high in fat. If you consume too much  saturated and trans fats, you are more likely to develop high cholesterol and coronary artery disease.  Sugar-sweetened drinks, such as fruit juice, fruit drinks, regular soft drinks, sports drinks, energy drinks, sweetened or flavored milk and sweetened iced tea can add lots of sugar and calories to your diet. But staying hydrated is important for good health. Substitute water, zero-calorie flavored water, non-fat or reduced-fat milk, unsweetened tea or diet soda for sweetened drinks. Talk with your family doctor or a dietitian if you have questions about your diet or healthy eating for your family.  Instead of this: Try this:   Cookies Fig bars, gingersnaps and molasses cookies   Shortening, butter or margarine Olive, soybean and canola oils   Regular mayonnaise Nonfat or light mayonnaise   Regular salad dressing Nonfat or light salad dressing   Using fat (including butter) to grease pan Nonstick cooking spray         Understanding Bunions    A bunion is a bony bump on the joint at the base of the big toe. A bunion changes the shape of the foot. It can also cause pain and problems using the foot.  A person with a bunion will have a bony bump at the base of the big toe. This is caused by misalignment of the toe joint, causing the bones to sit at an angle instead of straight. The big toe often turns in toward the second toe. In time, the big toe may start to overlap the second toe.    What causes bunions?  It is not clear what causes bunions, but many factors are likely involved. Bunions often run in families. They may be more common in people who have loose joints. Wearing tight shoes may also cause bunions.  Symptoms of bunions  At first, the problem may be painless. As symptoms develop, they may include:  · Foot pain or stiffness  · Swelling over the joint  · Skin irritation or thickened skin over the joint  Treatment for bunions  In most cases, your healthcare provider will try nonsurgical treatments first. Most  of these treatments wont cure the bunion, but they can help relieve symptoms and keep the bunion from getting worse. These include:  · Wearing low-heeled shoes with a wide toe box. This can help relieve pressure on the bunion and make walking more comfortable.  · Using padding or special shoe inserts called orthotics. Inserts can be custom-made for your foot. They help support the foot and may change how the foot is aligned.  · Wearing a toe splint at night. This can help hold the toe in a straighter position.  · Putting an ice pack on a swollen joint. This can help reduce pain and swelling.  If the bunion causes severe pain or problems using the foot, your provider may recommend surgery. During surgery, excess bone may be removed and the joint is realigned.     When to call your healthcare provider  Call your healthcare provider right away if you have any of these:  · Fever of 100.4°F (38°C) or higher, or as directed  · Symptoms that dont get better, or get worse  · New symptoms   Date Last Reviewed: 3/10/2016  © 7529-9540 [employer]. [address] [place] [address]. All rights reserved. This information is not intended as a substitute for professional medical care. Always follow your healthcare professional's instructions.      LOW BACK PAIN EXERCISES    Exercises that stretch and strengthen the muscles of your abdomen and spine can help prevent back problems. Strong back and abdominal muscles help you keep good posture, with your spine in its correct position.  If your muscles are tight, take a warm shower or bath before doing the exercises. Exercise on a rug or mat. Wear loose clothing. Dont wear shoes. Stop doing any exercise that causes pain until you have talked with your healthcare provider.  Ask your provider or physical therapist to help you develop an exercise program. Ask your provider how many times a week you need to do the exercises. Remember to start slowly.    Exercises  These exercises are intended only as suggestions. Be sure to check with your provider before starting the exercises.   Standing hamstring stretch: Put the heel of one leg on a stool about 15 inches high. Keep your leg straight. Lean forward, bending at the hips until you feel a mild stretch in the back of your thigh. Make sure you do not roll your shoulders or bend at the waist when doing this. You want to stretch your leg, not your lower back. Hold the stretch for 15 to 30 seconds. Repeat with each leg 3 times.   Cat and camel: Get down on your hands and knees. Let your stomach sag, allowing your back to curve downward. Hold this position for 5 seconds. Then arch your back and hold for 5 seconds. Do 2 sets of 15.   Quadruped arm and leg raise: Get down on your hands and knees. Pull in your belly button and tighten your abdominal muscles to stiffen your spine. While keeping your abdominals tight, raise one arm and the opposite leg away from you. Hold this position for 5 seconds. Lower your arm and leg slowly and change sides. Do this 10 times on each side.   Pelvic tilt: Lie on your back with your knees bent and your feet flat on the floor. Pull your belly button in towards your spine and push your lower back into the floor, flattening your back. Hold this position for 15 seconds, then relax. Repeat 5 to 10 times.   Partial curl: Lie on your back with your knees bent and your feet flat on the floor. Draw in your abdomen and tighten your stomach muscles. With your hands stretched out in front of you, curl your upper body forward until your shoulders clear the floor. Hold this position for 3 seconds. Don't hold your breath. It helps to breathe out as you lift your shoulders. Relax back to the floor. Repeat 10 times. Build to 2 sets of 15. To challenge yourself, clasp your hands behind your head and keep your elbows out to your sides.   Gluteal stretch: Lie on your back with both knees bent. Rest your  right ankle over the knee of your left leg. Grasp the thigh of the left leg and pull toward your chest. You will feel a stretch along the buttocks and possibly along the outside of your hip. Hold the stretch for 15 to 30 seconds. Then repeat the exercise with your left ankle over your right knee. Do the exercise 3 times with each leg.   Extension exercise   Lie face down on the floor for 5 minutes. If this hurts too much, lie face down with a pillow under your stomach. This should relieve your leg or back pain. When you can lie on your stomach for 5 minutes without a pillow, you can continue with Part B of this exercise.   After lying on your stomach for 5 minutes, prop yourself up on your elbows for another 5 minutes. If you can do this without having more leg or buttock pain, you can start doing part C of this exercise.   Lie on your stomach with your hands under your shoulders. Then press down on your hands and extend your elbows while keeping your hips flat on the floor. Hold for 1 second and lower yourself to the floor. Do 3 to 5 sets of 10 repetitions. Rest for 1 minute between sets. You should have no pain in your legs when you do this, but it is normal to feel some pain in your lower back.   Do this exercise several times a day.  Side plank: Lie on your side with your legs, hips, and shoulders in a straight line. Prop yourself up onto your forearm with your elbow directly under your shoulder. Lift your hips off the floor and balance on your forearm and the outside of your foot. Try to hold this position for 15 seconds and then slowly lower your hip to the ground. Switch sides and repeat. Work up to holding for 1 minute. This exercise can be made easier by starting with your knees and hips flexed toward your chest.            Back Exercises: Leg Pull        To start, lie on your back with your knees bent and feet flat on the floor. Dont press your neck or lower back to the floor. Breathe deeply. You should  feel comfortable and relaxed in this position.  · Pull one knee to your chest.  · Hold for 30-60 seconds. Return to starting position.  · Repeat 2 times.  · Switch legs.  · For a double leg pull, pull both legs to your chest at the same time. Repeat 2 times.  For your safety, check with your healthcare provider before starting an exercise program.   © 9700-4562 [employer]. [address] [place] [address], [place] [address]. All rights reserved. This information is not intended as a substitute for professional medical care. Always follow your healthcare professional's instructions.

## 2018-05-15 NOTE — PROGRESS NOTES
(Portions of this note were dictated using voice recognition software and may contain dictation related errors in spelling/grammar/syntax not found on text review)    CC: Annual exam    HPI: 62 y.o. male     Last visit in May 2017.  Here for annual exam.    Hyperlipidemia: On Lipitor 40 mg daily    Mature arthritis, followed by rheumatology.  Did not tolerate Plaquenil.  Currently on methotrexate 2.5 mg, 6 pills weekly along with folic acid.  Had gone up to 8 pills but started having LFT elevations.  Last visit with rheumatology was last month    Recent labs shown below    Bilateral bunions, sometimes uncomfortable.  Would like to consult with podiatry in case she may need surgery in the future    Sleep apnea, admittedly does not use a CPAP very often because it is quite uncomfortable.  He feels usually fairly energetic during the day and feels rested.  Sometimes he will doze off when he is watching TV.  Initially tried fullface mask but later tried nasal mask.  Even with this however, he followed for difficult to comply throughout the night    Past Medical History:   Diagnosis Date    Basal cell carcinoma     medial canthus    Hyperlipidemia     NU (obstructive sleep apnea)     Rheumatoid arthritis        Past Surgical History:   Procedure Laterality Date    COLONOSCOPY N/A 7/25/2017    Procedure: COLONOSCOPY;  Surgeon: Bill Nicole MD;  Location: [facility] ([address] [place] [address]);  Service: Endoscopy;  Laterality: N/A;    NO PAST SURGERIES         Family History   Problem Relation Age of Onset    Heart failure Mother         60s    Coronary artery disease Father         70s    Cancer Paternal Uncle         lymphoma??    Diabetes Neg Hx        Social History     Social History    Marital status:      Spouse name: N/A    Number of children: N/A    Years of education: N/A     Occupational History     [employer]     Social History Main Topics    Smoking status: Former Smoker     Quit  date: 7/12/2012    Smokeless tobacco: Never Used    Alcohol use Yes      Comment: occasional    Drug use: Unknown    Sexual activity: Not on file     Other Topics Concern    Not on file     Social History Narrative    No aerobic exercise, walks a lot    No diet     HEALTH SCREENINGS  Immunizations:  Adacel 2009  PCV 5/12/17     Age/Gender Appropriate screenings:  Colonoscopy July 2017: Single nonbleeding angiodysplastic lesion noted otherwise normal .  Repeat in 10 years  Prostate screening done last year     Lab Results   Component Value Date    WBC 10.27 05/08/2018    WBC 10.27 05/08/2018    HGB 14.6 05/08/2018    HGB 14.6 05/08/2018    HCT 44.9 05/08/2018    HCT 44.9 05/08/2018     05/08/2018     05/08/2018    CHOL 144 05/08/2018    TRIG 75 05/08/2018    HDL 42 05/08/2018    ALT 41 05/08/2018    ALT 41 05/08/2018    AST 29 05/08/2018    AST 29 05/08/2018     05/08/2018     05/08/2018    K 4.3 05/08/2018    K 4.3 05/08/2018     05/08/2018     05/08/2018    CREATININE 0.8 05/08/2018    CREATININE 0.8 05/08/2018    CALCIUM 9.1 05/08/2018    CALCIUM 9.1 05/08/2018    BUN 17 05/08/2018    BUN 17 05/08/2018    CO2 25 05/08/2018    CO2 25 05/08/2018    TSH 1.054 05/08/2018    PSA 0.77 05/08/2018    LDLCALC 87.0 05/08/2018     05/08/2018     05/08/2018                   Vital signs reviewed  PE:   APPEARANCE: Well nourished, well developed, in no acute distress.    HEAD: Normocephalic, atraumatic.  EYES: PERRL. EOMI.   Conjunctivae noninjected.  EARS: TM's intact. Light reflex normal. No retraction or perforation.    NOSE: Mucosa pink. Airway clear.  MOUTH & THROAT: No tonsillar enlargement. No pharyngeal erythema or exudate.   NECK: Supple with no cervical lymphadenopathy.    CHEST: Good inspiratory effort. Lungs clear to auscultation with no wheezes or crackles.  CARDIOVASCULAR: Normal S1, S2. No rubs, murmurs, or gallops.  ABDOMEN: Bowel sounds normal. Not  distended. Soft. No tenderness or masses. No organomegaly.  EXTREMITIES: No edema, cyanosis, or clubbing.  Bilateral bunions of first/fifth MTP  PROSTATE: Smooth, symmetric, non-enlarged, nontender      IMPRESSION  1. Routine general medical examination at a health care facility    2. Rheumatoid arthritis, involving unspecified site, unspecified rheumatoid factor presence    3. Hyperlipidemia, unspecified hyperlipidemia type    4. Abnormal blood sugar    5. Bilateral bunions        PLAN  Orders Placed This Encounter   Procedures    Hemoglobin A1c   with next set of rheumatology labs.    Advise proper exercise and healthy diet    Podiatry referral for bunions, discuss foot stretches, wide shoes, arch supports    Sleep apnea: Discussed referral to sleep medicine for further evaluation and discussion of potential options for management.  He will think about this and let me know if he would like referral in the future.  He will try again to use his home CPAP device and see if he can get more used to it    Answers for HPI/ROS submitted by the patient on 5/14/2018   activity change: No  unexpected weight change: No  neck pain: No  hearing loss: No  rhinorrhea: No  trouble swallowing: No  eye discharge: No  visual disturbance: No  chest tightness: No  wheezing: No  chest pain: No  palpitations: No  blood in stool: No  constipation: No  vomiting: No  diarrhea: No  polydipsia: No  polyuria: No  difficulty urinating: No  urgency: No  hematuria: No  joint swelling: Yes  arthralgias: Yes  headaches: No  weakness: No  confusion: No  dysphoric mood: No

## 2018-06-07 ENCOUNTER — OFFICE VISIT (OUTPATIENT)
Dept: PODIATRY | Facility: CLINIC | Age: 63
End: 2018-06-07
Payer: COMMERCIAL

## 2018-06-07 VITALS
DIASTOLIC BLOOD PRESSURE: 74 MMHG | SYSTOLIC BLOOD PRESSURE: 117 MMHG | HEIGHT: 66 IN | WEIGHT: 169 LBS | BODY MASS INDEX: 27.16 KG/M2 | HEART RATE: 88 BPM

## 2018-06-07 DIAGNOSIS — M62.469 GASTROCNEMIUS EQUINUS, UNSPECIFIED LATERALITY: ICD-10-CM

## 2018-06-07 DIAGNOSIS — M20.11 VALGUS DEFORMITY OF BOTH GREAT TOES: Primary | ICD-10-CM

## 2018-06-07 DIAGNOSIS — M20.5X9 HALLUX LIMITUS, UNSPECIFIED LATERALITY: ICD-10-CM

## 2018-06-07 DIAGNOSIS — M20.12 VALGUS DEFORMITY OF BOTH GREAT TOES: Primary | ICD-10-CM

## 2018-06-07 PROCEDURE — 3008F BODY MASS INDEX DOCD: CPT | Mod: CPTII,S$GLB,, | Performed by: PODIATRIST

## 2018-06-07 PROCEDURE — 99203 OFFICE O/P NEW LOW 30 MIN: CPT | Mod: S$GLB,,, | Performed by: PODIATRIST

## 2018-06-07 PROCEDURE — 99999 PR PBB SHADOW E&M-EST. PATIENT-LVL III: CPT | Mod: PBBFAC,,, | Performed by: PODIATRIST

## 2018-06-07 NOTE — PATIENT INSTRUCTIONS
Recommended Sof Sole Fit Series Arch Supports with neutral arch found on [employer]        Bent-Knee Calf Stretch    This exercise is designed to stretch and strengthen your feet and ankles. Before beginning the exercise, read through all the instructions. While exercising, breathe normally and dont bounce. If you feel any pain, stop the exercise. If pain persists, inform your healthcare provider:  · Stand an arms length away from a wall. Place the palms of your hands on the wall. Step forward about 12 inches with your ______ foot.  · Keeping toes pointed forward and both heels on the floor, bend both knees and lean forward. Hold for __30____ seconds. Relax.  · Repeat __3____ times. Do __2-3____ sets a day.  Date Last Reviewed: 8/16/2015  © 1878-4332 [employer]. [address] [place] [address], [place] [address]. All rights reserved. This information is not intended as a substitute for professional medical care. Always follow your healthcare professional's instructions.

## 2018-06-07 NOTE — LETTER
Nhung 10, 2018      Calin Buchanan MD  [address]  [address]  [facility] [address]           [facility]  [address] [facility] [address]  Phone: [phone]          Patient: Fabiano Garvey   MR Number: [id number]   YOB: 1955   Date of Visit: 6/7/2018       Dear Dr. Calin Buchanan:    Thank you for referring Fabiano Garvey to me for evaluation. Attached you will find relevant portions of my assessment and plan of care.    If you have questions, please do not hesitate to call me. I look forward to following Fabiano Garvey along with you.    Sincerely,    Gopi Greene, MICHELLE    Enclosure  CC:  No Recipients    If you would like to receive this communication electronically, please contact [email or web address] or [phone] to request more information on [email or web address] Link access.    For providers and/or their staff who would like to refer a patient to [facility], please contact us through our one-stop-shop provider referral line, [facility] , at [phone].    If you feel you have received this communication in error or would no longer like to receive these types of communications, please e-mail [email or web address]

## 2018-06-10 NOTE — PROGRESS NOTES
"Subjective:      Patient ID: Fabiano Garvey is a 62 y.o. male.    Chief Complaint: Foot Problem (bilateral bunions)    Complains of painful bunions on both feet present for several years. Pain aggravated by enclosed shoes and certain activities. Concerned because he is planning a hiking trip with his daughter later this year. No pain today.    Vitals:    06/07/18 1503   BP: 117/74   Pulse: 88   Weight: 76.7 kg (169 lb)   Height: 5' 6" (1.676 m)   PainSc: 0-No pain      Past Medical History:   Diagnosis Date    Basal cell carcinoma     medial canthus    Hyperlipidemia     NU (obstructive sleep apnea)     Rheumatoid arthritis        Past Surgical History:   Procedure Laterality Date    COLONOSCOPY N/A 7/25/2017    Procedure: COLONOSCOPY;  Surgeon: Bill Nicole MD;  Location: [facility] ([address] [place] [address]);  Service: Endoscopy;  Laterality: N/A;    NO PAST SURGERIES         Family History   Problem Relation Age of Onset    Heart failure Mother         60s    Coronary artery disease Father         70s    Cancer Paternal Uncle         lymphoma??    Diabetes Neg Hx        Social History     Social History    Marital status:      Spouse name: N/A    Number of children: N/A    Years of education: N/A     Occupational History     [employer]     Social History Main Topics    Smoking status: Former Smoker     Quit date: 7/12/2012    Smokeless tobacco: Never Used    Alcohol use Yes      Comment: occasional    Drug use: Unknown    Sexual activity: Not on file     Other Topics Concern    Not on file     Social History Narrative    No aerobic exercise, walks a lot    No diet                                                                                                                                                                                           Current Outpatient Prescriptions   Medication Sig Dispense Refill    atorvastatin (LIPITOR) 40 MG tablet TAKE 1 TABLET (40 MG TOTAL) " BY MOUTH ONCE DAILY. 90 tablet 3    baclofen (LIORESAL) 10 MG tablet Take 1 tablet (10 mg total) by mouth every evening. 30 tablet 11    fluticasone (FLONASE) 50 mcg/actuation nasal spray 2 sprays by Each Nare route once daily. 1 Bottle 0    folic acid (FOLVITE) 1 MG tablet TAKE 1 TABLET (1 MG TOTAL) BY MOUTH ONCE DAILY. 30 tablet 11    methotrexate 2.5 MG Tab Take 6 tablets (15 mg total) by mouth every 7 days. 32 tablet 3    methotrexate 2.5 MG Tab TAKE 6 TABLETS (15 MG TOTAL) BY MOUTH EVERY 7 DAYS. 32 tablet 3    VITAMIN D2 50,000 unit capsule TAKE 1 CAPSULE (50,000 UNITS TOTAL) BY MOUTH EVERY 7 DAYS. 8 capsule 3     No current facility-administered medications for this visit.        Review of patient's allergies indicates:   Allergen Reactions    Plaquenil [hydroxychloroquine]      Lightheaded and muscle aches         Review of Systems   Constitution: Negative for chills, fever, weakness and malaise/fatigue.   Cardiovascular: Negative for chest pain, claudication and leg swelling.   Respiratory: Negative for cough and shortness of breath.    Skin: Negative for color change, itching, poor wound healing and rash.   Musculoskeletal: Negative for back pain, joint pain, muscle cramps and muscle weakness.   Gastrointestinal: Negative for nausea and vomiting.   Neurological: Negative for numbness and paresthesias.   Psychiatric/Behavioral: Negative for altered mental status.           Objective:      Physical Exam   Constitutional: He is oriented to person, place, and time. He appears well-developed and well-nourished. No distress.   Cardiovascular: Intact distal pulses.    Pulses:       Dorsalis pedis pulses are 2+ on the right side, and 2+ on the left side.        Posterior tibial pulses are 2+ on the right side, and 2+ on the left side.   CFT< 3 secs all toes bilateral foot, skin temp warm bilateral foot, diminished digital hair growth bilateral foot, no lower extremity edema bilateral.     Musculoskeletal:  "  Palpable bony prominence dorsal medial first met head bilateral. 1 MTP ROM <20 deg DF with loading of the first ray bilateral. Without loading 1 MTP DF reaches > 65 deg bilateral. No pain with ROM or MMT. Non-track bound hallux valgus bilateral.    Inverted met 1-5 relationship bilateral with dorsiflexed first ray.    + equinus that reduces with knee bent bilateral.       Neurological: He is alert and oriented to person, place, and time. He has normal strength. No sensory deficit.   Skin: Skin is warm, dry and intact. Capillary refill takes less than 2 seconds. No ecchymosis, no lesion, no petechiae and no rash noted. He is not diaphoretic. No cyanosis or erythema. No pallor. Nails show no clubbing.             Assessment:       Encounter Diagnoses   Name Primary?    Valgus deformity of both great toes Yes    Hallux limitus, unspecified laterality     Gastrocnemius equinus, unspecified laterality          Plan:       Fabiano was seen today for foot problem.    Diagnoses and all orders for this visit:    Valgus deformity of both great toes    Hallux limitus, unspecified laterality    Gastrocnemius equinus, unspecified laterality      I counseled the patient on his conditions, their implications and medical management.    Dispensed literature on and demonstrated calf muscle stretches. Reviewed appropriate shoe gear and discussed activity modification such as avoiding flat shoes and barefoot walking. Recommend 1/2-1" heel height.    Recommended Sof Sol Fit Series Arch Supports with neutral arch found on [employer]     Discussed importance of wearing shoes with adequate room in toe box and stiff shank.    Discussed possible surgical intervention if his pain is not alleviated by conservative means.    RTC prn.    .         "

## 2018-06-21 RX ORDER — ERGOCALCIFEROL 1.25 MG/1
50000 CAPSULE ORAL
Qty: 8 CAPSULE | Refills: 3 | Status: SHIPPED | OUTPATIENT
Start: 2018-06-21 | End: 2019-01-29 | Stop reason: SDUPTHER

## 2018-09-25 ENCOUNTER — LAB VISIT (OUTPATIENT)
Dept: LAB | Facility: HOSPITAL | Age: 63
End: 2018-09-25
Attending: INTERNAL MEDICINE
Payer: COMMERCIAL

## 2018-09-25 ENCOUNTER — PATIENT MESSAGE (OUTPATIENT)
Dept: RHEUMATOLOGY | Facility: CLINIC | Age: 63
End: 2018-09-25

## 2018-09-25 DIAGNOSIS — M06.9 RHEUMATOID ARTHRITIS INVOLVING MULTIPLE JOINTS: ICD-10-CM

## 2018-09-25 PROCEDURE — 36415 COLL VENOUS BLD VENIPUNCTURE: CPT

## 2018-09-25 PROCEDURE — 86480 TB TEST CELL IMMUN MEASURE: CPT

## 2018-09-26 LAB
M TB IFN-G CD4+ BCKGRND COR BLD-ACNC: 0 IU/ML
MITOGEN IGNF BCKGRD COR BLD-ACNC: >10 IU/ML
MITOGEN IGNF BCKGRD COR BLD-ACNC: NEGATIVE [IU]/ML
NIL: 0.05 IU/ML
TB2 - NIL: -0.02 IU/ML

## 2018-10-07 ENCOUNTER — PATIENT MESSAGE (OUTPATIENT)
Dept: RHEUMATOLOGY | Facility: CLINIC | Age: 63
End: 2018-10-07

## 2018-10-10 ENCOUNTER — OFFICE VISIT (OUTPATIENT)
Dept: RHEUMATOLOGY | Facility: CLINIC | Age: 63
End: 2018-10-10
Payer: COMMERCIAL

## 2018-10-10 VITALS
DIASTOLIC BLOOD PRESSURE: 79 MMHG | BODY MASS INDEX: 28.1 KG/M2 | WEIGHT: 174.88 LBS | HEART RATE: 107 BPM | SYSTOLIC BLOOD PRESSURE: 106 MMHG | HEIGHT: 66 IN

## 2018-10-10 DIAGNOSIS — Z79.631 ENCOUNTER FOR METHOTREXATE MONITORING: ICD-10-CM

## 2018-10-10 DIAGNOSIS — M06.9 RHEUMATOID ARTHRITIS INVOLVING MULTIPLE JOINTS: Primary | ICD-10-CM

## 2018-10-10 DIAGNOSIS — R76.8 DS DNA ANTIBODY POSITIVE: ICD-10-CM

## 2018-10-10 DIAGNOSIS — Z51.81 ENCOUNTER FOR METHOTREXATE MONITORING: ICD-10-CM

## 2018-10-10 PROCEDURE — 99214 OFFICE O/P EST MOD 30 MIN: CPT | Mod: S$GLB,,, | Performed by: INTERNAL MEDICINE

## 2018-10-10 PROCEDURE — 99999 PR PBB SHADOW E&M-EST. PATIENT-LVL III: CPT | Mod: PBBFAC,,, | Performed by: INTERNAL MEDICINE

## 2018-10-10 PROCEDURE — 3008F BODY MASS INDEX DOCD: CPT | Mod: CPTII,S$GLB,, | Performed by: INTERNAL MEDICINE

## 2018-10-10 RX ORDER — METHOTREXATE 2.5 MG/1
TABLET ORAL
Qty: 96 TABLET | Refills: 0 | Status: SHIPPED | OUTPATIENT
Start: 2018-10-10 | End: 2019-01-09 | Stop reason: SDUPTHER

## 2018-10-10 RX ORDER — PREDNISONE 20 MG/1
20 TABLET ORAL DAILY
Qty: 10 TABLET | Refills: 0 | Status: SHIPPED | OUTPATIENT
Start: 2018-10-10 | End: 2018-10-20

## 2018-10-10 NOTE — PROGRESS NOTES
Subjective:       Patient ID: Fabiano Garvey is a 60 y.o. male.    Chief Complaint: OTHER and Swelling    HPI     59 yo male with PMH of basal cell cancer (around 2010), HL here for evaluation of join pain.  Reports pain in hands, elbows. and wrists.  Reports  swelling in hands, ankles, and feet.  Reports also has bilateral knee pain and shoulders.   Reports stiffness in morning for 30 minutes.  He has trouble swelling. Denies any rashes. No oral ulcers. No hair loss. Denies photosensitivity.  Denies any raynauds.Denies blood clots.  Denies dry eyes and dry mouth.  Reports symptoms for a year. In November, he noted the nodules in elbows.  Thinks he has swelling in lower neck.  He has trouble picking up things due to pain and swelling.  He reports that prednisone improves h is pain and swelling.  He has been off steroids.  No changes in weight.  Reports good appetite.      Interval history:   He continues on MTX 6 pills a week. He is on antibiotic right now for tooth abcess. Last dose of MTX was about 7 days.  On occasion, he will get pain in hands,wrists, and knees. He has numbness in hands but not every day.  He denies any swelling.  Reports morning stiffness for  5 minutes.  He will take advil on occasion with improvement.Pain level today is 1 /10 and aching.      Past Medical History   Diagnosis Date    Hyperlipidemia     Basal cell carcinoma      medial canthus       Review of Systems   Constitutional: Negative for fever, chills, appetite change and fatigue.   HENT: Negative for hearing loss, mouth sores, rhinorrhea, sinus pressure and trouble swallowing.    Eyes: Negative for photophobia, pain, discharge, itching and visual disturbance.   Respiratory:  Negative for cough, choking, chest tightness, wheezing and stridor.    Cardiovascular: Negative for chest pain and palpitations.   Gastrointestinal: Negative for blood in stool and abdominal distention.   Endocrine: Negative for cold intolerance and heat  intolerance.   Genitourinary: Negative for dysuria, hematuria and flank pain.   Musculoskeletal: Positive for myalgias, back pain, joint swelling, arthralgias.  Skin: Negative for color change, pallor and rash.   Neurological: Negative for dizziness, light-headedness, numbness and headaches.   Hematological: Negative for adenopathy. Does not bruise/bleed easily.   Psychiatric/Behavioral: Negative for decreased concentration and agitation. The patient is not nervous/anxious.            Objective:        Physical Exam   Constitutional: He is oriented to person, place, and time and well-developed, well-nourished, and in no distress. No distress.   HENT:   Head: Normocephalic and atraumatic.   Right Ear: External ear normal.   Left Ear: External ear normal.   Nose: Nose normal.   Mouth/Throat: Oropharynx is clear and moist. No oropharyngeal exudate.   Eyes: Conjunctivae and EOM are normal. Pupils are equal, round, and reactive to light. Right eye exhibits no discharge. Left eye exhibits no discharge. No scleral icterus.   Neck: Normal range of motion. Neck supple. No JVD present. No tracheal deviation present. No thyromegaly present.   Cardiovascular: Normal rate, regular rhythm and intact distal pulses.  Exam reveals no gallop and no friction rub.    No murmur heard.  Pulmonary/Chest: Effort normal and breath sounds normal. No stridor. No respiratory distress. He has no wheezes. He has no rales. He exhibits no tenderness.   Abdominal: Soft. Bowel sounds are normal. He exhibits no distension. There is no tenderness. There is no rebound.   Lymphadenopathy:     He has no cervical adenopathy.   Neurological: He is alert and oriented to person, place, and time.   Skin: Skin is warm. He is not diaphoretic.     Musculoskeletal:   Shoulders- decreased ROM of both shoulders to 130 degrees bilaterally (resoved)  Elbows- rheumatoid nodules in extensor surfaces bilaterally; mild restriction in extension of right elbow  Wrist-  synovitis with decreased extension bilaterally (resolved)  Hands- synovitis of mcps on right side, worse on the right  Knees-bony hypertrophy but no effusions or tenderness; crepitus  Ankles- no tenderness  Feet-bilaterally mtp tenderness resolved           Labs:  Robel: 1:320  dna-1:160<160  Esr-61<44   ccp-306  rf-876  Ro,la-negative  Nicole, rnp -negtayive   Hepatitis B-negative  Hepatitis C-negative  Urine-negative      Imaging:  I personally reviewed the xrays    Arthritis survey:      12/2015: hand x rays- no erosions  12/2015: feet xray: no erosions    Chest xray (2/2016)-negative      Assessment:     64 yo male with PMH of basal cell cancer (around 2010), HL here for  Follow up of  Seropositive RA.  He has seropositive RA with nodules. He also had serologies consistent with early SLE given (+ROBEL and +DNA) with no other features of  SLE.    Tried plaquenil but reports it gave him dizziness after a few doses.  At this point, main issue is his arthritis so will hold off on another trial of plaquenil in the future.    Regarding his arthritis, he is on MTX 6 pills a week and doing well.  He developed transaminitis with 8 pills so will keep him on 6 pills a week.  He is going hiking soon so will him course of steroids in case he flares.    Plan:      * - follow up with  dermatology evaluation given history of skin cancer  -continue  baclofen 10mg po qhs prn for upper back tension and sleep  -continue MTX 6 pills a week   -continue folic acid 1 mg po qday  given history of +DNA ,  CONSIDER  ORENCIA rather than Humira  -Continue vit D once a week   labs in may   rtc in 12 weeks

## 2018-10-21 ENCOUNTER — PATIENT MESSAGE (OUTPATIENT)
Dept: RHEUMATOLOGY | Facility: CLINIC | Age: 63
End: 2018-10-21

## 2018-11-02 ENCOUNTER — PATIENT MESSAGE (OUTPATIENT)
Dept: RHEUMATOLOGY | Facility: CLINIC | Age: 63
End: 2018-11-02

## 2018-11-02 ENCOUNTER — LAB VISIT (OUTPATIENT)
Dept: LAB | Facility: HOSPITAL | Age: 63
End: 2018-11-02
Attending: FAMILY MEDICINE
Payer: COMMERCIAL

## 2018-11-02 DIAGNOSIS — M06.9 RHEUMATOID ARTHRITIS INVOLVING MULTIPLE JOINTS: ICD-10-CM

## 2018-11-02 LAB
25(OH)D3+25(OH)D2 SERPL-MCNC: 22 NG/ML
ALBUMIN SERPL BCP-MCNC: 3 G/DL
ALP SERPL-CCNC: 69 U/L
ALT SERPL W/O P-5'-P-CCNC: 24 U/L
ANION GAP SERPL CALC-SCNC: 7 MMOL/L
AST SERPL-CCNC: 19 U/L
BASOPHILS # BLD AUTO: 0.03 K/UL
BASOPHILS NFR BLD: 0.3 %
BILIRUB SERPL-MCNC: 0.4 MG/DL
BUN SERPL-MCNC: 13 MG/DL
CALCIUM SERPL-MCNC: 8.8 MG/DL
CHLORIDE SERPL-SCNC: 107 MMOL/L
CO2 SERPL-SCNC: 26 MMOL/L
CREAT SERPL-MCNC: 0.8 MG/DL
CRP SERPL-MCNC: 2.4 MG/L
DIFFERENTIAL METHOD: ABNORMAL
EOSINOPHIL # BLD AUTO: 0.3 K/UL
EOSINOPHIL NFR BLD: 2.8 %
ERYTHROCYTE [DISTWIDTH] IN BLOOD BY AUTOMATED COUNT: 14.4 %
ERYTHROCYTE [SEDIMENTATION RATE] IN BLOOD BY WESTERGREN METHOD: 24 MM/HR
EST. GFR  (AFRICAN AMERICAN): >60 ML/MIN/1.73 M^2
EST. GFR  (NON AFRICAN AMERICAN): >60 ML/MIN/1.73 M^2
GLUCOSE SERPL-MCNC: 76 MG/DL
HCT VFR BLD AUTO: 46 %
HGB BLD-MCNC: 15 G/DL
LYMPHOCYTES # BLD AUTO: 1.9 K/UL
LYMPHOCYTES NFR BLD: 16.9 %
MCH RBC QN AUTO: 31.4 PG
MCHC RBC AUTO-ENTMCNC: 32.6 G/DL
MCV RBC AUTO: 96 FL
MONOCYTES # BLD AUTO: 1.2 K/UL
MONOCYTES NFR BLD: 10.4 %
NEUTROPHILS # BLD AUTO: 8 K/UL
NEUTROPHILS NFR BLD: 69.4 %
PLATELET # BLD AUTO: 188 K/UL
PMV BLD AUTO: 10.9 FL
POTASSIUM SERPL-SCNC: 4.2 MMOL/L
PROT SERPL-MCNC: 6.7 G/DL
RBC # BLD AUTO: 4.77 M/UL
SODIUM SERPL-SCNC: 140 MMOL/L
WBC # BLD AUTO: 11.48 K/UL

## 2018-11-02 PROCEDURE — 36415 COLL VENOUS BLD VENIPUNCTURE: CPT

## 2018-11-02 PROCEDURE — 86140 C-REACTIVE PROTEIN: CPT

## 2018-11-02 PROCEDURE — 85025 COMPLETE CBC W/AUTO DIFF WBC: CPT

## 2018-11-02 PROCEDURE — 80053 COMPREHEN METABOLIC PANEL: CPT

## 2018-11-02 PROCEDURE — 82306 VITAMIN D 25 HYDROXY: CPT

## 2018-11-02 PROCEDURE — 85652 RBC SED RATE AUTOMATED: CPT

## 2018-11-07 RX ORDER — PREDNISONE 5 MG/1
10 TABLET ORAL DAILY
Qty: 60 TABLET | Refills: 1 | Status: SHIPPED | OUTPATIENT
Start: 2018-11-07 | End: 2018-11-07

## 2018-11-07 RX ORDER — PREDNISONE 5 MG/1
10 TABLET ORAL DAILY
Qty: 60 TABLET | Refills: 1 | Status: SHIPPED | OUTPATIENT
Start: 2018-11-07 | End: 2018-12-07

## 2018-12-03 RX ORDER — FOLIC ACID 1 MG/1
TABLET ORAL
Qty: 30 TABLET | Refills: 11 | Status: SHIPPED | OUTPATIENT
Start: 2018-12-03 | End: 2019-04-10 | Stop reason: SDUPTHER

## 2019-01-09 ENCOUNTER — OFFICE VISIT (OUTPATIENT)
Dept: RHEUMATOLOGY | Facility: CLINIC | Age: 64
End: 2019-01-09
Payer: COMMERCIAL

## 2019-01-09 VITALS
SYSTOLIC BLOOD PRESSURE: 108 MMHG | HEIGHT: 66 IN | BODY MASS INDEX: 27.97 KG/M2 | HEART RATE: 103 BPM | DIASTOLIC BLOOD PRESSURE: 78 MMHG | WEIGHT: 174 LBS

## 2019-01-09 DIAGNOSIS — Z51.81 ENCOUNTER FOR METHOTREXATE MONITORING: ICD-10-CM

## 2019-01-09 DIAGNOSIS — M79.10 MUSCLE TENDERNESS: ICD-10-CM

## 2019-01-09 DIAGNOSIS — Z79.631 ENCOUNTER FOR METHOTREXATE MONITORING: ICD-10-CM

## 2019-01-09 DIAGNOSIS — M06.9 RHEUMATOID ARTHRITIS INVOLVING MULTIPLE JOINTS: Primary | ICD-10-CM

## 2019-01-09 PROCEDURE — 3008F BODY MASS INDEX DOCD: CPT | Mod: CPTII,S$GLB,, | Performed by: INTERNAL MEDICINE

## 2019-01-09 PROCEDURE — 99214 OFFICE O/P EST MOD 30 MIN: CPT | Mod: S$GLB,,, | Performed by: INTERNAL MEDICINE

## 2019-01-09 PROCEDURE — 99214 PR OFFICE/OUTPT VISIT, EST, LEVL IV, 30-39 MIN: ICD-10-PCS | Mod: S$GLB,,, | Performed by: INTERNAL MEDICINE

## 2019-01-09 PROCEDURE — 3008F PR BODY MASS INDEX (BMI) DOCUMENTED: ICD-10-PCS | Mod: CPTII,S$GLB,, | Performed by: INTERNAL MEDICINE

## 2019-01-09 PROCEDURE — 99999 PR PBB SHADOW E&M-EST. PATIENT-LVL III: ICD-10-PCS | Mod: PBBFAC,,, | Performed by: INTERNAL MEDICINE

## 2019-01-09 PROCEDURE — 99999 PR PBB SHADOW E&M-EST. PATIENT-LVL III: CPT | Mod: PBBFAC,,, | Performed by: INTERNAL MEDICINE

## 2019-01-09 RX ORDER — METHOTREXATE 2.5 MG/1
TABLET ORAL
Qty: 72 TABLET | Refills: 0 | Status: SHIPPED | OUTPATIENT
Start: 2019-01-09 | End: 2019-01-09

## 2019-01-09 RX ORDER — METHOTREXATE 2.5 MG/1
TABLET ORAL
Qty: 96 TABLET | Refills: 0 | Status: SHIPPED | OUTPATIENT
Start: 2019-01-09 | End: 2019-04-10 | Stop reason: SDUPTHER

## 2019-01-09 NOTE — PROGRESS NOTES
Subjective:       Patient ID: Fabiano Garvey is a 60 y.o. male.    Chief Complaint: OTHER and Swelling    HPI     59 yo male with PMH of basal cell cancer (around 2010), HL here for evaluation of join pain.  Reports pain in hands, elbows. and wrists.  Reports  swelling in hands, ankles, and feet.  Reports also has bilateral knee pain and shoulders.   Reports stiffness in morning for 30 minutes.  He has trouble swelling. Denies any rashes. No oral ulcers. No hair loss. Denies photosensitivity.  Denies any raynauds.Denies blood clots.  Denies dry eyes and dry mouth.  Reports symptoms for a year. In November, he noted the nodules in elbows.  Thinks he has swelling in lower neck.  He has trouble picking up things due to pain and swelling.  He reports that prednisone improves h is pain and swelling.  He has been off steroids.  No changes in weight.  Reports good appetite.      Interval history:   He continues on MTX 6 pills a week.The last time he had flare was about a month and half ago, and he had swelling and pain in hands.  He took steroids.He denies any pain today.       Past Medical History   Diagnosis Date    Hyperlipidemia     Basal cell carcinoma      medial canthus       Review of Systems   Constitutional: Negative for fever, chills, appetite change and fatigue.   HENT: Negative for hearing loss, mouth sores, rhinorrhea, sinus pressure and trouble swallowing.    Eyes: Negative for photophobia, pain, discharge, itching and visual disturbance.   Respiratory:  Negative for cough, choking, chest tightness, wheezing and stridor.    Cardiovascular: Negative for chest pain and palpitations.   Gastrointestinal: Negative for blood in stool and abdominal distention.   Endocrine: Negative for cold intolerance and heat intolerance.   Genitourinary: Negative for dysuria, hematuria and flank pain.   Musculoskeletal: Positive for myalgias, back pain, joint swelling, arthralgias.  Skin: Negative for color change, pallor  and rash.   Neurological: Negative for dizziness, light-headedness, numbness and headaches.   Hematological: Negative for adenopathy. Does not bruise/bleed easily.   Psychiatric/Behavioral: Negative for decreased concentration and agitation. The patient is not nervous/anxious.            Objective:        Physical Exam   Constitutional: He is oriented to person, place, and time and well-developed, well-nourished, and in no distress. No distress.   HENT:   Head: Normocephalic and atraumatic.   Right Ear: External ear normal.   Left Ear: External ear normal.   Nose: Nose normal.   Mouth/Throat: Oropharynx is clear and moist. No oropharyngeal exudate.   Eyes: Conjunctivae and EOM are normal. Pupils are equal, round, and reactive to light. Right eye exhibits no discharge. Left eye exhibits no discharge. No scleral icterus.   Neck: Normal range of motion. Neck supple. No JVD present. No tracheal deviation present. No thyromegaly present.   Cardiovascular: Normal rate, regular rhythm and intact distal pulses.  Exam reveals no gallop and no friction rub.    No murmur heard.  Pulmonary/Chest: Effort normal and breath sounds normal. No stridor. No respiratory distress. He has no wheezes. He has no rales. He exhibits no tenderness.   Abdominal: Soft. Bowel sounds are normal. He exhibits no distension. There is no tenderness. There is no rebound.   Lymphadenopathy:     He has no cervical adenopathy.   Neurological: He is alert and oriented to person, place, and time.   Skin: Skin is warm. He is not diaphoretic.     Musculoskeletal:   Shoulders- decreased ROM of both shoulders to 130 degrees bilaterally (resoved)  Elbows- rheumatoid nodules in extensor surfaces bilaterally; mild restriction in extension of right elbow  Wrist- synovitis with decreased extension bilaterally (resolved)  Hands- synovitis of mcps on right side, worse on the right  Knees-bony hypertrophy but no effusions or tenderness; crepitus  Ankles- no  tenderness  Feet-bilaterally mtp tenderness resolved           Labs:  Robel: 1:320  dna-1:160<160  Esr-61<44   ccp-306  rf-876  Ro,la-negative  Nicole, rnp -negtayive   Hepatitis B-negative  Hepatitis C-negative  Urine-negative      Imaging:  I personally reviewed the xrays    Arthritis survey:      12/2015: hand x rays- no erosions  12/2015: feet xray: no erosions    Chest xray (2/2016)-negative      Assessment:     64 yo male with PMH of basal cell cancer (around 2010), HL here for  Follow up of  Seropositive RA.  He has seropositive RA with nodules. He also had serologies consistent with early SLE given (+ROBEL and +DNA) with no other features of  SLE.        Tried plaquenil but reports it gave him dizziness after a few doses.  At this point, main issue is his arthritis so will hold off on another trial of plaquenil in the future.    Regarding his arthritis, he is on MTX 6 pills a week and doing well.  He developed transaminitis with 8 pills so will keep him on 6 pills a week.  He is going hiking soon so will him course of steroids in case he flares.    Plan:      * - follow up with  dermatology evaluation given history of skin cancer  -continue  baclofen 10mg po qhs prn for upper back tension and sleep  -continue MTX 6 pills a week   -continue folic acid 1 mg po qday  given history of +DNA ,  CONSIDER  ORENCIA rather than Humira  -Continue vit D once a week   labs  Before next visit  rtc in 12 weeks

## 2019-01-29 ENCOUNTER — TELEPHONE (OUTPATIENT)
Dept: ENDOCRINOLOGY | Facility: CLINIC | Age: 64
End: 2019-01-29

## 2019-01-29 RX ORDER — ERGOCALCIFEROL 1.25 MG/1
50000 CAPSULE ORAL
Qty: 12 CAPSULE | Refills: 3 | Status: SHIPPED | OUTPATIENT
Start: 2019-01-29 | End: 2019-04-10 | Stop reason: SDUPTHER

## 2019-01-30 DIAGNOSIS — E78.2 MIXED HYPERLIPIDEMIA: ICD-10-CM

## 2019-01-30 RX ORDER — ATORVASTATIN CALCIUM 40 MG/1
40 TABLET, FILM COATED ORAL DAILY
Qty: 90 TABLET | Refills: 3 | Status: SHIPPED | OUTPATIENT
Start: 2019-01-30 | End: 2020-03-15 | Stop reason: SDUPTHER

## 2019-02-11 ENCOUNTER — LAB VISIT (OUTPATIENT)
Dept: LAB | Facility: HOSPITAL | Age: 64
End: 2019-02-11
Attending: INTERNAL MEDICINE
Payer: COMMERCIAL

## 2019-02-11 DIAGNOSIS — M06.9 RHEUMATOID ARTHRITIS INVOLVING MULTIPLE JOINTS: ICD-10-CM

## 2019-02-11 LAB
25(OH)D3+25(OH)D2 SERPL-MCNC: 27 NG/ML
ALBUMIN SERPL BCP-MCNC: 3.2 G/DL
ALP SERPL-CCNC: 77 U/L
ALT SERPL W/O P-5'-P-CCNC: 19 U/L
ANION GAP SERPL CALC-SCNC: 7 MMOL/L
AST SERPL-CCNC: 22 U/L
BASOPHILS # BLD AUTO: 0.02 K/UL
BASOPHILS NFR BLD: 0.2 %
BILIRUB SERPL-MCNC: 0.5 MG/DL
BUN SERPL-MCNC: 12 MG/DL
CALCIUM SERPL-MCNC: 9.2 MG/DL
CHLORIDE SERPL-SCNC: 106 MMOL/L
CO2 SERPL-SCNC: 27 MMOL/L
CREAT SERPL-MCNC: 0.9 MG/DL
CRP SERPL-MCNC: 1.8 MG/L
DIFFERENTIAL METHOD: ABNORMAL
EOSINOPHIL # BLD AUTO: 0.4 K/UL
EOSINOPHIL NFR BLD: 4.1 %
ERYTHROCYTE [DISTWIDTH] IN BLOOD BY AUTOMATED COUNT: 15.1 %
ERYTHROCYTE [SEDIMENTATION RATE] IN BLOOD BY WESTERGREN METHOD: 53 MM/HR
EST. GFR  (AFRICAN AMERICAN): >60 ML/MIN/1.73 M^2
EST. GFR  (NON AFRICAN AMERICAN): >60 ML/MIN/1.73 M^2
GLUCOSE SERPL-MCNC: 83 MG/DL
HCT VFR BLD AUTO: 43.2 %
HGB BLD-MCNC: 13.9 G/DL
LYMPHOCYTES # BLD AUTO: 2.2 K/UL
LYMPHOCYTES NFR BLD: 25.7 %
MCH RBC QN AUTO: 31.2 PG
MCHC RBC AUTO-ENTMCNC: 32.2 G/DL
MCV RBC AUTO: 97 FL
MONOCYTES # BLD AUTO: 1 K/UL
MONOCYTES NFR BLD: 11.7 %
NEUTROPHILS # BLD AUTO: 5 K/UL
NEUTROPHILS NFR BLD: 58.1 %
PLATELET # BLD AUTO: 246 K/UL
PMV BLD AUTO: 10.8 FL
POTASSIUM SERPL-SCNC: 4.4 MMOL/L
PROT SERPL-MCNC: 7 G/DL
RBC # BLD AUTO: 4.45 M/UL
SODIUM SERPL-SCNC: 140 MMOL/L
WBC # BLD AUTO: 8.56 K/UL

## 2019-02-11 PROCEDURE — 82306 VITAMIN D 25 HYDROXY: CPT

## 2019-02-11 PROCEDURE — 36415 COLL VENOUS BLD VENIPUNCTURE: CPT

## 2019-02-11 PROCEDURE — 85025 COMPLETE CBC W/AUTO DIFF WBC: CPT

## 2019-02-11 PROCEDURE — 80053 COMPREHEN METABOLIC PANEL: CPT

## 2019-02-11 PROCEDURE — 86140 C-REACTIVE PROTEIN: CPT

## 2019-02-11 PROCEDURE — 85652 RBC SED RATE AUTOMATED: CPT

## 2019-02-19 ENCOUNTER — PATIENT MESSAGE (OUTPATIENT)
Dept: RHEUMATOLOGY | Facility: CLINIC | Age: 64
End: 2019-02-19

## 2019-04-10 ENCOUNTER — OFFICE VISIT (OUTPATIENT)
Dept: RHEUMATOLOGY | Facility: CLINIC | Age: 64
End: 2019-04-10
Payer: COMMERCIAL

## 2019-04-10 VITALS
SYSTOLIC BLOOD PRESSURE: 111 MMHG | BODY MASS INDEX: 27.16 KG/M2 | WEIGHT: 169 LBS | HEART RATE: 102 BPM | DIASTOLIC BLOOD PRESSURE: 77 MMHG | HEIGHT: 66 IN

## 2019-04-10 DIAGNOSIS — E55.9 VITAMIN D DEFICIENCY: ICD-10-CM

## 2019-04-10 DIAGNOSIS — M06.9 RHEUMATOID ARTHRITIS INVOLVING MULTIPLE JOINTS: Primary | ICD-10-CM

## 2019-04-10 DIAGNOSIS — Z79.631 ENCOUNTER FOR METHOTREXATE MONITORING: ICD-10-CM

## 2019-04-10 DIAGNOSIS — Z51.81 ENCOUNTER FOR METHOTREXATE MONITORING: ICD-10-CM

## 2019-04-10 PROCEDURE — 99214 OFFICE O/P EST MOD 30 MIN: CPT | Mod: S$GLB,,, | Performed by: INTERNAL MEDICINE

## 2019-04-10 PROCEDURE — 99999 PR PBB SHADOW E&M-EST. PATIENT-LVL III: CPT | Mod: PBBFAC,,, | Performed by: INTERNAL MEDICINE

## 2019-04-10 PROCEDURE — 99999 PR PBB SHADOW E&M-EST. PATIENT-LVL III: ICD-10-PCS | Mod: PBBFAC,,, | Performed by: INTERNAL MEDICINE

## 2019-04-10 PROCEDURE — 99214 PR OFFICE/OUTPT VISIT, EST, LEVL IV, 30-39 MIN: ICD-10-PCS | Mod: S$GLB,,, | Performed by: INTERNAL MEDICINE

## 2019-04-10 PROCEDURE — 3008F PR BODY MASS INDEX (BMI) DOCUMENTED: ICD-10-PCS | Mod: CPTII,S$GLB,, | Performed by: INTERNAL MEDICINE

## 2019-04-10 PROCEDURE — 3008F BODY MASS INDEX DOCD: CPT | Mod: CPTII,S$GLB,, | Performed by: INTERNAL MEDICINE

## 2019-04-10 RX ORDER — METHOTREXATE 2.5 MG/1
17.5 TABLET ORAL
Qty: 84 TABLET | Refills: 0 | Status: SHIPPED | OUTPATIENT
Start: 2019-04-10 | End: 2019-07-09

## 2019-04-10 RX ORDER — ERGOCALCIFEROL 1.25 MG/1
50000 CAPSULE ORAL
Qty: 12 CAPSULE | Refills: 3 | Status: SHIPPED | OUTPATIENT
Start: 2019-04-10 | End: 2020-07-22 | Stop reason: SDUPTHER

## 2019-04-10 RX ORDER — METHOTREXATE 2.5 MG/1
TABLET ORAL
Qty: 96 TABLET | Refills: 0 | Status: SHIPPED | OUTPATIENT
Start: 2019-04-10 | End: 2019-04-10 | Stop reason: SDUPTHER

## 2019-04-10 RX ORDER — FOLIC ACID 1 MG/1
1 TABLET ORAL DAILY
Qty: 90 TABLET | Refills: 4 | Status: SHIPPED | OUTPATIENT
Start: 2019-04-10 | End: 2021-01-06

## 2019-04-10 NOTE — PROGRESS NOTES
Subjective:       Patient ID: Fabiano Garvey is a 60 y.o. male.    Chief Complaint: OTHER and Swelling    HPI     59 yo male with PMH of basal cell cancer (around 2010), HL here for evaluation of join pain.  Reports pain in hands, elbows. and wrists.  Reports  swelling in hands, ankles, and feet.  Reports also has bilateral knee pain and shoulders.   Reports stiffness in morning for 30 minutes.  He has trouble swelling. Denies any rashes. No oral ulcers. No hair loss. Denies photosensitivity.  Denies any raynauds.Denies blood clots.  Denies dry eyes and dry mouth.  Reports symptoms for a year. In November, he noted the nodules in elbows.  Thinks he has swelling in lower neck.  He has trouble picking up things due to pain and swelling.  He reports that prednisone improves h is pain and swelling.  He has been off steroids.  No changes in weight.  Reports good appetite.      Interval history:   He continues on MTX 6 pills a week.The last time he had flare was about a month and half ago, and he had swelling and pain in hands.  He took steroids.He denies any pain today.       Past Medical History   Diagnosis Date    Hyperlipidemia     Basal cell carcinoma      medial canthus       Review of Systems   Constitutional: Negative for fever, chills, appetite change and fatigue.   HENT: Negative for hearing loss, mouth sores, rhinorrhea, sinus pressure and trouble swallowing.    Eyes: Negative for photophobia, pain, discharge, itching and visual disturbance.   Respiratory:  Negative for cough, choking, chest tightness, wheezing and stridor.    Cardiovascular: Negative for chest pain and palpitations.   Gastrointestinal: Negative for blood in stool and abdominal distention.   Endocrine: Negative for cold intolerance and heat intolerance.   Genitourinary: Negative for dysuria, hematuria and flank pain.   Musculoskeletal: Positive for myalgias, back pain, joint swelling, arthralgias.  Skin: Negative for color change, pallor  and rash.   Neurological: Negative for dizziness, light-headedness, numbness and headaches.   Hematological: Negative for adenopathy. Does not bruise/bleed easily.   Psychiatric/Behavioral: Negative for decreased concentration and agitation. The patient is not nervous/anxious.            Objective:        Physical Exam   Constitutional: He is oriented to person, place, and time and well-developed, well-nourished, and in no distress. No distress.   HENT:   Head: Normocephalic and atraumatic.   Right Ear: External ear normal.   Left Ear: External ear normal.   Nose: Nose normal.   Mouth/Throat: Oropharynx is clear and moist. No oropharyngeal exudate.   Eyes: Conjunctivae and EOM are normal. Pupils are equal, round, and reactive to light. Right eye exhibits no discharge. Left eye exhibits no discharge. No scleral icterus.   Neck: Normal range of motion. Neck supple. No JVD present. No tracheal deviation present. No thyromegaly present.   Cardiovascular: Normal rate, regular rhythm and intact distal pulses.  Exam reveals no gallop and no friction rub.    No murmur heard.  Pulmonary/Chest: Effort normal and breath sounds normal. No stridor. No respiratory distress. He has no wheezes. He has no rales. He exhibits no tenderness.   Abdominal: Soft. Bowel sounds are normal. He exhibits no distension. There is no tenderness. There is no rebound.   Lymphadenopathy:     He has no cervical adenopathy.   Neurological: He is alert and oriented to person, place, and time.   Skin: Skin is warm. He is not diaphoretic.     Musculoskeletal:   Shoulders- decreased ROM of both shoulders to 130 degrees bilaterally (resoved)  Elbows- rheumatoid nodules in extensor surfaces bilaterally; mild restriction in extension of right elbow  Wrist- synovitis with decreased extension bilaterally (resolved)  Hands- synovitis of mcps on right side, worse on the right  Knees-bony hypertrophy but no effusions or tenderness; crepitus  Ankles- no  tenderness  Feet-bilaterally mtp tenderness resolved           Labs:  Robel: 1:320  dna-1:160<160  Esr-61<44   ccp-306  rf-876  Ro,la-negative  Nicole, rnp -negtayive   Hepatitis B-negative  Hepatitis C-negative  Urine-negative      Imaging:  I personally reviewed the xrays    Arthritis survey:      12/2015: hand x rays- no erosions  12/2015: feet xray: no erosions    Chest xray (2/2016)-negative      Assessment:     62 yo male with PMH of basal cell cancer (around 2010), HL here for  Follow up of  Seropositive RA.  He has seropositive RA with nodules. He also had serologies consistent with early SLE given (+ROBEL and +DNA) with no other features of  SLE.        Tried plaquenil but reports it gave him dizziness after a few doses.  At this point, main issue is his arthritis so will hold off on another trial of plaquenil in the future.    Regarding his arthritis, he is on MTX 6 pills a week and doing well.  He developed transaminitis with 8 pills so will keep him on 6 pills a week.  He had elevation of inflammatory markers in last labs so will monitor.    Plan:      * - follow up with  dermatology evaluation given history of skin cancer  -continue  baclofen 10mg po qhs prn for upper back tension and sleep  -continue MTX 6 pills a week   -continue folic acid 1 mg po qday  given history of +DNA ,  CONSIDER  ORENCIA rather than Humira  -Continue vit D once a week   labs  Before next visit

## 2019-05-09 RX ORDER — METHOTREXATE 2.5 MG/1
TABLET ORAL
Qty: 96 TABLET | Refills: 0 | Status: SHIPPED | OUTPATIENT
Start: 2019-05-09 | End: 2019-08-14 | Stop reason: SDUPTHER

## 2019-05-10 ENCOUNTER — LAB VISIT (OUTPATIENT)
Dept: LAB | Facility: HOSPITAL | Age: 64
End: 2019-05-10
Attending: INTERNAL MEDICINE
Payer: COMMERCIAL

## 2019-05-10 DIAGNOSIS — M06.9 RHEUMATOID ARTHRITIS INVOLVING MULTIPLE JOINTS: ICD-10-CM

## 2019-05-10 LAB
25(OH)D3+25(OH)D2 SERPL-MCNC: 32 NG/ML (ref 30–96)
ALBUMIN SERPL BCP-MCNC: 3.3 G/DL (ref 3.5–5.2)
ALP SERPL-CCNC: 73 U/L (ref 55–135)
ALT SERPL W/O P-5'-P-CCNC: 22 U/L (ref 10–44)
ANION GAP SERPL CALC-SCNC: 4 MMOL/L (ref 8–16)
AST SERPL-CCNC: 21 U/L (ref 10–40)
BASOPHILS # BLD AUTO: 0.06 K/UL (ref 0–0.2)
BASOPHILS NFR BLD: 0.6 % (ref 0–1.9)
BILIRUB SERPL-MCNC: 0.2 MG/DL (ref 0.1–1)
BUN SERPL-MCNC: 15 MG/DL (ref 8–23)
CALCIUM SERPL-MCNC: 9.4 MG/DL (ref 8.7–10.5)
CHLORIDE SERPL-SCNC: 108 MMOL/L (ref 95–110)
CO2 SERPL-SCNC: 28 MMOL/L (ref 23–29)
CREAT SERPL-MCNC: 0.9 MG/DL (ref 0.5–1.4)
CRP SERPL-MCNC: 1.5 MG/L (ref 0–8.2)
DIFFERENTIAL METHOD: ABNORMAL
EOSINOPHIL # BLD AUTO: 0.5 K/UL (ref 0–0.5)
EOSINOPHIL NFR BLD: 5.3 % (ref 0–8)
ERYTHROCYTE [DISTWIDTH] IN BLOOD BY AUTOMATED COUNT: 14.2 % (ref 11.5–14.5)
ERYTHROCYTE [SEDIMENTATION RATE] IN BLOOD BY WESTERGREN METHOD: 47 MM/HR (ref 0–10)
EST. GFR  (AFRICAN AMERICAN): >60 ML/MIN/1.73 M^2
EST. GFR  (NON AFRICAN AMERICAN): >60 ML/MIN/1.73 M^2
GLUCOSE SERPL-MCNC: 74 MG/DL (ref 70–110)
HCT VFR BLD AUTO: 42.8 % (ref 40–54)
HGB BLD-MCNC: 13.9 G/DL (ref 14–18)
LYMPHOCYTES # BLD AUTO: 2.6 K/UL (ref 1–4.8)
LYMPHOCYTES NFR BLD: 27.4 % (ref 18–48)
MCH RBC QN AUTO: 31.6 PG (ref 27–31)
MCHC RBC AUTO-ENTMCNC: 32.5 G/DL (ref 32–36)
MCV RBC AUTO: 97 FL (ref 82–98)
MONOCYTES # BLD AUTO: 0.9 K/UL (ref 0.3–1)
MONOCYTES NFR BLD: 9.8 % (ref 4–15)
NEUTROPHILS # BLD AUTO: 5.4 K/UL (ref 1.8–7.7)
NEUTROPHILS NFR BLD: 56.7 % (ref 38–73)
PLATELET # BLD AUTO: 238 K/UL (ref 150–350)
PMV BLD AUTO: 10.9 FL (ref 9.2–12.9)
POTASSIUM SERPL-SCNC: 4.3 MMOL/L (ref 3.5–5.1)
PROT SERPL-MCNC: 7.2 G/DL (ref 6–8.4)
RBC # BLD AUTO: 4.4 M/UL (ref 4.6–6.2)
SODIUM SERPL-SCNC: 140 MMOL/L (ref 136–145)
WBC # BLD AUTO: 9.51 K/UL (ref 3.9–12.7)

## 2019-05-10 PROCEDURE — 82306 VITAMIN D 25 HYDROXY: CPT

## 2019-05-10 PROCEDURE — 85652 RBC SED RATE AUTOMATED: CPT

## 2019-05-10 PROCEDURE — 80053 COMPREHEN METABOLIC PANEL: CPT

## 2019-05-10 PROCEDURE — 86140 C-REACTIVE PROTEIN: CPT

## 2019-05-10 PROCEDURE — 85025 COMPLETE CBC W/AUTO DIFF WBC: CPT

## 2019-05-17 ENCOUNTER — PATIENT MESSAGE (OUTPATIENT)
Dept: RHEUMATOLOGY | Facility: CLINIC | Age: 64
End: 2019-05-17

## 2019-05-17 DIAGNOSIS — M06.9 RHEUMATOID ARTHRITIS INVOLVING MULTIPLE JOINTS: Primary | ICD-10-CM

## 2019-06-12 ENCOUNTER — OFFICE VISIT (OUTPATIENT)
Dept: DERMATOLOGY | Facility: CLINIC | Age: 64
End: 2019-06-12
Payer: COMMERCIAL

## 2019-06-12 DIAGNOSIS — L82.1 SK (SEBORRHEIC KERATOSIS): ICD-10-CM

## 2019-06-12 DIAGNOSIS — L90.5 SCAR: ICD-10-CM

## 2019-06-12 DIAGNOSIS — L57.0 AK (ACTINIC KERATOSIS): ICD-10-CM

## 2019-06-12 DIAGNOSIS — Z85.828 PERSONAL HISTORY OF SKIN CANCER: ICD-10-CM

## 2019-06-12 DIAGNOSIS — D48.5 NEOPLASM OF UNCERTAIN BEHAVIOR OF SKIN: Primary | ICD-10-CM

## 2019-06-12 DIAGNOSIS — L81.4 LENTIGO: ICD-10-CM

## 2019-06-12 PROCEDURE — 17003 DESTRUCTION, PREMALIGNANT LESIONS; SECOND THROUGH 14 LESIONS: ICD-10-PCS | Mod: 59,S$GLB,, | Performed by: DERMATOLOGY

## 2019-06-12 PROCEDURE — 88305 TISSUE SPECIMEN TO PATHOLOGY, DERMATOLOGY: ICD-10-PCS | Mod: 26,,, | Performed by: PATHOLOGY

## 2019-06-12 PROCEDURE — 99213 OFFICE O/P EST LOW 20 MIN: CPT | Mod: 25,S$GLB,, | Performed by: DERMATOLOGY

## 2019-06-12 PROCEDURE — 99999 PR PBB SHADOW E&M-EST. PATIENT-LVL III: CPT | Mod: PBBFAC,,, | Performed by: DERMATOLOGY

## 2019-06-12 PROCEDURE — 99213 PR OFFICE/OUTPT VISIT, EST, LEVL III, 20-29 MIN: ICD-10-PCS | Mod: 25,S$GLB,, | Performed by: DERMATOLOGY

## 2019-06-12 PROCEDURE — 88305 TISSUE EXAM BY PATHOLOGIST: CPT | Performed by: PATHOLOGY

## 2019-06-12 PROCEDURE — 88342 IMHCHEM/IMCYTCHM 1ST ANTB: CPT | Mod: 26,,, | Performed by: PATHOLOGY

## 2019-06-12 PROCEDURE — 11102 PR TANGENTIAL BIOPSY, SKIN, SINGLE LESION: ICD-10-PCS | Mod: S$GLB,,, | Performed by: DERMATOLOGY

## 2019-06-12 PROCEDURE — 17000 DESTRUCT PREMALG LESION: CPT | Mod: 59,S$GLB,, | Performed by: DERMATOLOGY

## 2019-06-12 PROCEDURE — 11102 TANGNTL BX SKIN SINGLE LES: CPT | Mod: S$GLB,,, | Performed by: DERMATOLOGY

## 2019-06-12 PROCEDURE — 17003 DESTRUCT PREMALG LES 2-14: CPT | Mod: 59,S$GLB,, | Performed by: DERMATOLOGY

## 2019-06-12 PROCEDURE — 99999 PR PBB SHADOW E&M-EST. PATIENT-LVL III: ICD-10-PCS | Mod: PBBFAC,,, | Performed by: DERMATOLOGY

## 2019-06-12 PROCEDURE — 17000 PR DESTRUCTION(LASER SURGERY,CRYOSURGERY,CHEMOSURGERY),PREMALIGNANT LESIONS,FIRST LESION: ICD-10-PCS | Mod: 59,S$GLB,, | Performed by: DERMATOLOGY

## 2019-06-12 PROCEDURE — 88342 TISSUE SPECIMEN TO PATHOLOGY, DERMATOLOGY: ICD-10-PCS | Mod: 26,,, | Performed by: PATHOLOGY

## 2019-06-12 NOTE — PROGRESS NOTES
Subjective:       Patient ID:  Fabiano Garvey is a 63 y.o. male who presents for   Chief Complaint   Patient presents with    Skin Check    Lesion     Pt here today for a UBSE. Pt c/o scaly and dark colored lesions on face, back and right shoulders x a few months. No bleeding, pain or prev tx.  Pt also has lesion on left temple.present for over 6mo. Itching, scaling, flaking and not resolving. No tx,.   This is a high risk patient here to check for the development of new lesions.        Review of Systems   Skin: Positive for activity-related sunscreen use. Negative for daily sunscreen use, tendency to form keloidal scars and recent sunburn.   Hematologic/Lymphatic: Does not bruise/bleed easily.        Objective:    Physical Exam   Constitutional: He appears well-developed and well-nourished. No distress.   Neurological: He is alert and oriented to person, place, and time. He is not disoriented.   Psychiatric: He has a normal mood and affect.   Skin:   Areas Examined (abnormalities noted in diagram):   Scalp / Hair Palpated and Inspected  Head / Face Inspection Performed  Neck Inspection Performed  Chest / Axilla Inspection Performed  Abdomen Inspection Performed  Back Inspection Performed  RUE Inspected  LUE Inspection Performed  Nails and Digits Inspection Performed                           Diagram Legend     Erythematous scaling macule/papule c/w actinic keratosis       Vascular papule c/w angioma      Pigmented verrucoid papule/plaque c/w seborrheic keratosis      Yellow umbilicated papule c/w sebaceous hyperplasia      Irregularly shaped tan macule c/w lentigo     1-2 mm smooth white papules consistent with Milia      Movable subcutaneous cyst with punctum c/w epidermal inclusion cyst      Subcutaneous movable cyst c/w pilar cyst      Firm pink to brown papule c/w dermatofibroma      Pedunculated fleshy papule(s) c/w skin tag(s)      Evenly pigmented macule c/w junctional nevus     Mildly variegated  pigmented, slightly irregular-bordered macule c/w mildly atypical nevus      Flesh colored to evenly pigmented papule c/w intradermal nevus       Pink pearly papule/plaque c/w basal cell carcinoma      Erythematous hyperkeratotic cursted plaque c/w SCC      Surgical scar with no sign of skin cancer recurrence      Open and closed comedones      Inflammatory papules and pustules      Verrucoid papule consistent consistent with wart     Erythematous eczematous patches and plaques     Dystrophic onycholytic nail with subungual debris c/w onychomycosis     Umbilicated papule    Erythematous-base heme-crusted tan verrucoid plaque consistent with inflamed seborrheic keratosis     Erythematous Silvery Scaling Plaque c/w Psoriasis     See annotation      Assessment / Plan:      Pathology Orders:     Normal Orders This Visit    Tissue Specimen To Pathology, Dermatology     Questions:    Directional Terms:  Other(comment)    Clinical Information:  r/o scc    Specific Site:  left lateral temple        Neoplasm of uncertain behavior of skin  Shave biopsy procedure note:    Shave biopsy performed after verbal consent including risk of infection, scar, recurrence, need for additional treatment of site. Area prepped with alcohol, anesthetized with approximately 1.0cc of 1% lidocaine with epinephrine. Lesional tissue shaved with razor blade. Hemostasis achieved with application of aluminum chloride followed by hyfrecation. No complications. Dressing applied. Wound care explained.    If biopsy positive, schedule procedure for definitive excision.   -     Tissue Specimen To Pathology, Dermatology    AK (actinic keratosis)  Cryosurgery Procedure Note    Verbal consent from the patient is obtained including, but not limited to, risk of hypopigmentation/hyperpigmentation, scar, recurrence of lesion. The patient is aware of the precancerous quality and need for treatment of these lesions. Liquid nitrogen cryosurgery is applied to the 9  actinic keratoses, as detailed in the physical exam, to produce a freeze injury. The patient is aware that blisters may form and is instructed on wound care with gentle cleansing and use of vaseline ointment to keep moist until healed. The patient is supplied a handout on cryosurgery and is instructed to call if lesions do not completely resolve.    Will schedule PDT for numerous (>15) actinic keratoses on face with an incubation time of 70 minutes. Counseled patient about photodynamic therapy and that (s)he should avoid sunlight and bright artificial light for 48 hours after the procedure. After that, vigilant sun protection and sunscreen should be used on a daily basis. Skin will be red and peeling for about 5-7 days after the procedure, and rarely for 2 weeks. Discussed that some patients require repeat treatment 8 weeks after initial treatment.    Lentigo  This is a benign hyperpigmented sun induced lesion. Daily sun protection will reduce the number of new lesions. Treatment of these benign lesions are considered cosmetic.  The nature of sun-induced photo-aging and skin cancers is discussed.  Sun avoidance, protective clothing, and the use of 30-SPF sunscreens is advised. Observe closely for skin damage/changes, and call if such occurs.    SK (seborrheic keratosis)  These are benign inherited growths without a malignant potential. Reassurance given to patient. No treatment is necessary.     Personal history of skin cancer  Scar  Area(s) of previous NMSC evaluated with no signs of recurrence.    Upper body skin examination performed today including at least 6 points as noted in physical examination. Suspicious lesions noted.           Follow up for prn bx report.

## 2019-06-24 ENCOUNTER — TELEPHONE (OUTPATIENT)
Dept: DERMATOLOGY | Facility: CLINIC | Age: 64
End: 2019-06-24

## 2019-06-24 ENCOUNTER — PATIENT MESSAGE (OUTPATIENT)
Dept: DERMATOLOGY | Facility: CLINIC | Age: 64
End: 2019-06-24

## 2019-06-24 NOTE — TELEPHONE ENCOUNTER
The pt does not want to have PDT appt at this time. He need to think about it. He said he would call when he is ready.

## 2019-07-01 ENCOUNTER — PATIENT MESSAGE (OUTPATIENT)
Dept: DERMATOLOGY | Facility: CLINIC | Age: 64
End: 2019-07-01

## 2019-07-31 ENCOUNTER — PROCEDURE VISIT (OUTPATIENT)
Dept: DERMATOLOGY | Facility: CLINIC | Age: 64
End: 2019-07-31
Payer: COMMERCIAL

## 2019-07-31 DIAGNOSIS — D04.30 SQUAMOUS CELL CARCINOMA IN SITU (SCCIS) OF SKIN OF FACE: Primary | ICD-10-CM

## 2019-07-31 PROCEDURE — 88305 TISSUE EXAM BY PATHOLOGIST: CPT | Mod: 26,,, | Performed by: PATHOLOGY

## 2019-07-31 PROCEDURE — 12052 INTMD RPR FACE/MM 2.6-5.0 CM: CPT | Mod: S$GLB,,, | Performed by: DERMATOLOGY

## 2019-07-31 PROCEDURE — 12052 PR INTERMED WOUND REPAIR FACE/EAR/EYELID/NOSE/LIP/MUC MEBR, 2.6 TO 5.0CM: ICD-10-PCS | Mod: S$GLB,,, | Performed by: DERMATOLOGY

## 2019-07-31 PROCEDURE — 88305 TISSUE EXAM BY PATHOLOGIST: CPT | Performed by: PATHOLOGY

## 2019-07-31 PROCEDURE — 11642 EXC F/E/E/N/L MAL+MRG 1.1-2: CPT | Mod: 51,S$GLB,, | Performed by: DERMATOLOGY

## 2019-07-31 PROCEDURE — 88305 TISSUE SPECIMEN TO PATHOLOGY, DERMATOLOGY: ICD-10-PCS | Mod: 26,,, | Performed by: PATHOLOGY

## 2019-07-31 PROCEDURE — 11642 PR EXC SKIN MALIG 1.1-2 CM FACE,FACIAL: ICD-10-PCS | Mod: 51,S$GLB,, | Performed by: DERMATOLOGY

## 2019-07-31 PROCEDURE — 99499 NO LOS: ICD-10-PCS | Mod: S$GLB,,, | Performed by: DERMATOLOGY

## 2019-07-31 PROCEDURE — 99499 UNLISTED E&M SERVICE: CPT | Mod: S$GLB,,, | Performed by: DERMATOLOGY

## 2019-07-31 NOTE — PROGRESS NOTES
PROCEDURE: Elliptical excision with intermediate layered repair in order to decrease tension.    ANESTHETIC: 4.0 cc 1% Xylocaine with Epinephrine 1:100,000, buffered    SURGEON: Lucie Judge M.D.    ASSISTANTS: Ammy Arechiga M.D.    PREOPERATIVE DIAGNOSIS:  Biopsy-proven Squamous Cell Carcinoma in situ     POSTOPERATIVE DIAGNOSIS:  Same as preoperative diagnosis    PATHOLOGIC DIAGNOSIS: Pending    LOCATION: left lateral temple    INITIAL LESION SIZE: 0.6 cm    EXCISED DIAMETER: 1.3 cm    PREPARATION: The diagnosis, procedure, alternatives, benefits and risks, including but not limited to: infection, bleeding/bruising, drug reactions, pain, scar or cosmetic defect, local sensation disturbances, wound dehiscence (separation of wound edges after sutures removed) and/or recurrence of present condition were explained to the patient. The patient elected to proceed.  Patient's identity was verified using 2 patient identifiers and the side and site was verified.  Time out period with surgeon, assistant and patient in surgical suite was taken.    PROCEDURE: The location noted above was prepped, draped, and anesthetized in the usual sterile fashion per Alivia Segovia LPN. Lesional tissue was carefully marked with at least 0.-0.4 mm margins of clinically normal skin in all directions. A fusiform elliptical excision was done with #15 blade carried down completely through the dermis into the deep subcutaneous tissues to the level of the non-muscle fascia, and dissection was carried out in that plane.  Electrocoagulation was used to obtain hemostasis. Blood loss was minimal. The wound was then approximated in a layered fashion with subcutaneous and intradermal sutures of 5.0 Monocryl, approximately 2 in number, and the wound was then superficially closed with runningsutures of 5.0 Prolene.    The patient tolerated the procedure well.    The area was cleaned and dressed appropriately and the patient was given wound care  instructions, as well as an appointment for follow-up evaluation.    LENGTH OF REPAIR: 4.2 cm

## 2019-08-08 NOTE — PROGRESS NOTES
Post- operative squamous l cell carcinoma- margins clear  F/u 6 months for skin check  FINAL PATHOLOGIC DIAGNOSIS  Skin, left lateral temple, excision:  -SCAR (POST-SURGICAL)  -NO RESIDUAL SQUAMOUS CELL CARCINOMA IN-SITU IDENTIFIED

## 2019-08-09 ENCOUNTER — PATIENT MESSAGE (OUTPATIENT)
Dept: RHEUMATOLOGY | Facility: CLINIC | Age: 64
End: 2019-08-09

## 2019-08-09 ENCOUNTER — LAB VISIT (OUTPATIENT)
Dept: LAB | Facility: HOSPITAL | Age: 64
End: 2019-08-09
Attending: INTERNAL MEDICINE
Payer: COMMERCIAL

## 2019-08-09 DIAGNOSIS — M06.9 RHEUMATOID ARTHRITIS INVOLVING MULTIPLE JOINTS: ICD-10-CM

## 2019-08-09 DIAGNOSIS — R79.89 ELEVATED LFTS: ICD-10-CM

## 2019-08-09 LAB
25(OH)D3+25(OH)D2 SERPL-MCNC: 31 NG/ML (ref 30–96)
ALBUMIN SERPL BCP-MCNC: 3.6 G/DL (ref 3.5–5.2)
ALP SERPL-CCNC: 76 U/L (ref 55–135)
ALT SERPL W/O P-5'-P-CCNC: 75 U/L (ref 10–44)
ANION GAP SERPL CALC-SCNC: 6 MMOL/L (ref 8–16)
AST SERPL-CCNC: 52 U/L (ref 10–40)
BASOPHILS # BLD AUTO: 0.02 K/UL (ref 0–0.2)
BASOPHILS NFR BLD: 0.2 % (ref 0–1.9)
BILIRUB SERPL-MCNC: 0.5 MG/DL (ref 0.1–1)
BUN SERPL-MCNC: 15 MG/DL (ref 8–23)
CALCIUM SERPL-MCNC: 9.6 MG/DL (ref 8.7–10.5)
CHLORIDE SERPL-SCNC: 104 MMOL/L (ref 95–110)
CO2 SERPL-SCNC: 29 MMOL/L (ref 23–29)
CREAT SERPL-MCNC: 0.9 MG/DL (ref 0.5–1.4)
CRP SERPL-MCNC: 1.4 MG/L (ref 0–8.2)
DIFFERENTIAL METHOD: ABNORMAL
EOSINOPHIL # BLD AUTO: 0.4 K/UL (ref 0–0.5)
EOSINOPHIL NFR BLD: 4.1 % (ref 0–8)
ERYTHROCYTE [DISTWIDTH] IN BLOOD BY AUTOMATED COUNT: 14.6 % (ref 11.5–14.5)
ERYTHROCYTE [SEDIMENTATION RATE] IN BLOOD BY WESTERGREN METHOD: 44 MM/HR (ref 0–10)
EST. GFR  (AFRICAN AMERICAN): >60 ML/MIN/1.73 M^2
EST. GFR  (NON AFRICAN AMERICAN): >60 ML/MIN/1.73 M^2
GLUCOSE SERPL-MCNC: 80 MG/DL (ref 70–110)
HCT VFR BLD AUTO: 43.8 % (ref 40–54)
HGB BLD-MCNC: 14.4 G/DL (ref 14–18)
LYMPHOCYTES # BLD AUTO: 2.6 K/UL (ref 1–4.8)
LYMPHOCYTES NFR BLD: 27.3 % (ref 18–48)
MCH RBC QN AUTO: 31.9 PG (ref 27–31)
MCHC RBC AUTO-ENTMCNC: 32.9 G/DL (ref 32–36)
MCV RBC AUTO: 97 FL (ref 82–98)
MONOCYTES # BLD AUTO: 0.9 K/UL (ref 0.3–1)
MONOCYTES NFR BLD: 9.2 % (ref 4–15)
NEUTROPHILS # BLD AUTO: 5.7 K/UL (ref 1.8–7.7)
NEUTROPHILS NFR BLD: 59.2 % (ref 38–73)
PLATELET # BLD AUTO: 248 K/UL (ref 150–350)
PMV BLD AUTO: 10.8 FL (ref 9.2–12.9)
POTASSIUM SERPL-SCNC: 3.9 MMOL/L (ref 3.5–5.1)
PROT SERPL-MCNC: 7.2 G/DL (ref 6–8.4)
RBC # BLD AUTO: 4.52 M/UL (ref 4.6–6.2)
SODIUM SERPL-SCNC: 139 MMOL/L (ref 136–145)
WBC # BLD AUTO: 9.66 K/UL (ref 3.9–12.7)

## 2019-08-09 PROCEDURE — 86225 DNA ANTIBODY NATIVE: CPT

## 2019-08-09 PROCEDURE — 85652 RBC SED RATE AUTOMATED: CPT

## 2019-08-09 PROCEDURE — 82306 VITAMIN D 25 HYDROXY: CPT

## 2019-08-09 PROCEDURE — 80053 COMPREHEN METABOLIC PANEL: CPT

## 2019-08-09 PROCEDURE — 86140 C-REACTIVE PROTEIN: CPT

## 2019-08-09 PROCEDURE — 85025 COMPLETE CBC W/AUTO DIFF WBC: CPT

## 2019-08-09 PROCEDURE — 36415 COLL VENOUS BLD VENIPUNCTURE: CPT

## 2019-08-12 LAB — DSDNA AB SER-ACNC: NORMAL [IU]/ML

## 2019-08-13 ENCOUNTER — TELEPHONE (OUTPATIENT)
Dept: RHEUMATOLOGY | Facility: CLINIC | Age: 64
End: 2019-08-13

## 2019-08-14 RX ORDER — METHOTREXATE 2.5 MG/1
TABLET ORAL
Qty: 96 TABLET | Refills: 0 | Status: SHIPPED | OUTPATIENT
Start: 2019-08-14 | End: 2019-08-22 | Stop reason: SDUPTHER

## 2019-08-22 RX ORDER — METHOTREXATE 2.5 MG/1
TABLET ORAL
Qty: 96 TABLET | Refills: 0 | Status: SHIPPED | OUTPATIENT
Start: 2019-08-22 | End: 2020-01-27 | Stop reason: SDUPTHER

## 2019-09-13 ENCOUNTER — OFFICE VISIT (OUTPATIENT)
Dept: FAMILY MEDICINE | Facility: CLINIC | Age: 64
End: 2019-09-13
Payer: COMMERCIAL

## 2019-09-13 ENCOUNTER — LAB VISIT (OUTPATIENT)
Dept: LAB | Facility: HOSPITAL | Age: 64
End: 2019-09-13
Attending: INTERNAL MEDICINE
Payer: COMMERCIAL

## 2019-09-13 VITALS
SYSTOLIC BLOOD PRESSURE: 110 MMHG | WEIGHT: 166.69 LBS | OXYGEN SATURATION: 95 % | HEART RATE: 97 BPM | TEMPERATURE: 98 F | BODY MASS INDEX: 26.79 KG/M2 | HEIGHT: 66 IN | DIASTOLIC BLOOD PRESSURE: 80 MMHG

## 2019-09-13 DIAGNOSIS — R74.01 ELEVATED TRANSAMINASE LEVEL: ICD-10-CM

## 2019-09-13 DIAGNOSIS — Z00.00 ROUTINE GENERAL MEDICAL EXAMINATION AT A HEALTH CARE FACILITY: Primary | ICD-10-CM

## 2019-09-13 DIAGNOSIS — R79.89 ELEVATED LFTS: ICD-10-CM

## 2019-09-13 DIAGNOSIS — M06.9 RHEUMATOID ARTHRITIS, INVOLVING UNSPECIFIED SITE, UNSPECIFIED RHEUMATOID FACTOR PRESENCE: ICD-10-CM

## 2019-09-13 DIAGNOSIS — G47.33 OSA (OBSTRUCTIVE SLEEP APNEA): ICD-10-CM

## 2019-09-13 LAB
ALBUMIN SERPL BCP-MCNC: 3.4 G/DL (ref 3.5–5.2)
ALP SERPL-CCNC: 75 U/L (ref 55–135)
ALT SERPL W/O P-5'-P-CCNC: 65 U/L (ref 10–44)
AST SERPL-CCNC: 42 U/L (ref 10–40)
BILIRUB DIRECT SERPL-MCNC: 0.2 MG/DL (ref 0.1–0.3)
BILIRUB SERPL-MCNC: 0.5 MG/DL (ref 0.1–1)
PROT SERPL-MCNC: 6.8 G/DL (ref 6–8.4)

## 2019-09-13 PROCEDURE — 99999 PR PBB SHADOW E&M-EST. PATIENT-LVL IV: ICD-10-PCS | Mod: PBBFAC,,, | Performed by: FAMILY MEDICINE

## 2019-09-13 PROCEDURE — 90732 PPSV23 VACC 2 YRS+ SUBQ/IM: CPT | Mod: S$GLB,,, | Performed by: FAMILY MEDICINE

## 2019-09-13 PROCEDURE — 90732 PNEUMOCOCCAL POLYSACCHARIDE VACCINE 23-VALENT =>2YO SQ IM: ICD-10-PCS | Mod: S$GLB,,, | Performed by: FAMILY MEDICINE

## 2019-09-13 PROCEDURE — 99999 PR PBB SHADOW E&M-EST. PATIENT-LVL IV: CPT | Mod: PBBFAC,,, | Performed by: FAMILY MEDICINE

## 2019-09-13 PROCEDURE — 99397 PER PM REEVAL EST PAT 65+ YR: CPT | Mod: 25,S$GLB,, | Performed by: FAMILY MEDICINE

## 2019-09-13 PROCEDURE — 90471 PNEUMOCOCCAL POLYSACCHARIDE VACCINE 23-VALENT =>2YO SQ IM: ICD-10-PCS | Mod: S$GLB,,, | Performed by: FAMILY MEDICINE

## 2019-09-13 PROCEDURE — 36415 COLL VENOUS BLD VENIPUNCTURE: CPT

## 2019-09-13 PROCEDURE — 99397 PR PREVENTIVE VISIT,EST,65 & OVER: ICD-10-PCS | Mod: 25,S$GLB,, | Performed by: FAMILY MEDICINE

## 2019-09-13 PROCEDURE — 90471 IMMUNIZATION ADMIN: CPT | Mod: S$GLB,,, | Performed by: FAMILY MEDICINE

## 2019-09-13 PROCEDURE — 80076 HEPATIC FUNCTION PANEL: CPT

## 2019-09-13 NOTE — PROGRESS NOTES
(Portions of this note were dictated using voice recognition software and may contain dictation related errors in spelling/grammar/syntax not found on text review)    CC:   Chief Complaint   Patient presents with    Annual Exam       HPI: 64 y.o. male     Hyperlipidemia: On Lipitor 40 mg daily     RA, followed by rheumatology.  Did not tolerate Plaquenil.  Currently on methotrexate 2.5 mg, 6 pills weekly along with folic acid.  Had gone up to 8 pills but started having LFT elevations.  Last visit with rheumatology was .  Recent labs did show again some transaminitis but improved slightly on repeat check most recently.     Sleep apnea, admittedly does not use a CPAP very often because it is quite uncomfortable.  He feels usually fairly energetic during the day and feels rested.  Sometimes he will doze off when he is watching TV.  Initially tried fullface mask but later tried nasal mask.  Even with this however, he followed for difficult to comply throughout the night.  Had discussed referral to Sleep Medicine for other options .  On today's discussion he feels that he is not sleeping as well and does desire further consultation with Sleep Medicine    Past Medical History:   Diagnosis Date    Basal cell carcinoma     medial canthus    Hyperlipidemia     NU (obstructive sleep apnea)     Rheumatoid arthritis     Squamous cell carcinoma 2019    SCC in situ left temple       Past Surgical History:   Procedure Laterality Date    COLONOSCOPY N/A 2017    Performed by Bill Nicole MD at [facility] ([address] [place] [address])    NO PAST SURGERIES         Family History   Problem Relation Age of Onset    Heart failure Mother         60s    Coronary artery disease Father         70s    Cancer Paternal Uncle         lymphoma??    Diabetes Neg Hx        Social History     Tobacco Use    Smoking status: Former Smoker     Last attempt to quit: 2012     Years since quittin.1    Smokeless tobacco: Never Used    Substance Use Topics    Alcohol use: Yes     Comment: occasional    Drug use: Not on file     Lab Results   Component Value Date    WBC 9.66 08/09/2019    HGB 14.4 08/09/2019    HCT 43.8 08/09/2019    MCV 97 08/09/2019     08/09/2019    CHOL 144 05/08/2018    TRIG 75 05/08/2018    HDL 42 05/08/2018    ALT 65 (H) 09/13/2019    AST 42 (H) 09/13/2019    BILITOT 0.5 09/13/2019    ALKPHOS 75 09/13/2019     08/09/2019    K 3.9 08/09/2019     08/09/2019    CREATININE 0.9 08/09/2019    CALCIUM 9.6 08/09/2019    ALBUMIN 3.4 (L) 09/13/2019    BUN 15 08/09/2019    CO2 29 08/09/2019    TSH 1.054 05/08/2018    PSA 0.77 05/08/2018    LDLCALC 87.0 05/08/2018    GLU 80 08/09/2019         '  ROS:  GENERAL: No fever, chills, fatigability or weight loss.  SKIN: No rashes, no itching.  HEAD: No headaches.  EYES: No visual changes  EARS: No ear pain or changes in hearing.  NOSE: No congestion or rhinorrhea.  MOUTH & THROAT: No hoarseness, change in voice, or sore throat.  NODES: Denies swollen glands.  CHEST: Denies LEE, cyanosis, wheezing, cough and sputum production.  CARDIOVASCULAR: Denies chest pain, PND, orthopnea.  ABDOMEN: No nausea, vomiting, or changes in bowel function.  URINARY: No flank pain, dysuria or hematuria.  PERIPHERAL VASCULAR: No claudication or cyanosis.  MUSCULOSKELETAL: No joint stiffness or swelling. Denies back pain.  NEUROLOGIC: No weakness or numbness.    Vital signs reviewed  PE:   APPEARANCE: Well nourished, well developed, in no acute distress.    HEAD: Normocephalic, atraumatic.  EYES: PERRL. EOMI.   Conjunctivae noninjected.  EARS: TM's intact. Light reflex normal. No retraction or perforation  NOSE: Mucosa pink. Airway clear.  MOUTH & THROAT: No tonsillar enlargement. No pharyngeal erythema or exudate.   NECK: Supple with no cervical lymphadenopathy.  No carotid bruits.  No thyromegaly  CHEST: Good inspiratory effort. Lungs clear to auscultation with no wheezes or  crackles.  CARDIOVASCULAR: Normal S1, S2. No rubs, murmurs, or gallops.  ABDOMEN: Bowel sounds normal. Not distended. Soft. No tenderness or masses. No organomegaly.  EXTREMITIES: No edema       IMPRESSION  1. Routine general medical examination at a health care facility    2. Elevated transaminase level    3. NU (obstructive sleep apnea)    4. Rheumatoid arthritis, involving unspecified site, unspecified rheumatoid factor presence            PLAN  Reviewed recent labs.  Will check lipid, repeat LFT, and PSA as below next month    Continue current meds    Patient admits not much attention to diet and exercise, knows that he can improve on this.      HEALTH SCREENINGS  Immunizations:  Adacel 2009, due.  He defers today and would like to get the next time.  PCV 5/12/17   PPSV due     Age/Gender Appropriate screenings:  Colonoscopy July 2017: Single nonbleeding angiodysplastic lesion noted otherwise normal .  Repeat in 10 years  Prostate screening done May 2018, repeat ordered

## 2019-09-18 ENCOUNTER — PATIENT MESSAGE (OUTPATIENT)
Dept: FAMILY MEDICINE | Facility: CLINIC | Age: 64
End: 2019-09-18

## 2019-09-18 NOTE — TELEPHONE ENCOUNTER
Okay, I put together a letter that hopefully should be okay.  I signed it and have it ready at the office so you can feel free to pick it up whenever you can.  I do recall at least for special needs children there is an application that can allow them some accommodations at the park; not sure if the same exist for adults with mobility difficulty/disabilities.  If you happen to be on any social media sites like [employer], there are several [place] themed groups that often times can be very informative for guiding on things like that, surprisingly sometimes more so than the official sites themselves.    Calin Buchanan MD    ===View-only below this line===      ----- Message -----     From: Fabiano Garvey     Sent: 9/18/2019  1:47 PM CDT       To: Calin Buchanan MD  Subject: RE: Non-Urgent Medical    Dr Buchanan, I checked on [facility] site and they don't have any specific papers to be filled out. I assume that some type of note from you on my sciatica/arthritis will allow me to return at a later time to a shorter line. Thanks for your time.  Fabiano Garvey   ----- Message -----  From: Calin Buchanan MD  Sent: 9/18/2019 11:11 AM CDT  To: Fabiano Garvey  Subject: RE: Non-Urgent Medical  I would be ok with this but do you know if there is any paper from [facility] that I have to fill out for this?    Calin Buchanan MD      ----- Message -----     From: Fabiano Garvey     Sent: 9/18/2019  8:34 AM CDT       To: Calin Buchanan MD  Subject: Non-Urgent Medical    Dr Buchanan, I forgot to ask you during my Annual last week. I'm going to [place] with my granddaughter next week. My Sciatica and general arthritis in my back has really been a problem. I don't want to slow everyone down. What's the possibility of some temporary Handicap designation that's available. Let me know please as we leave next week.  Thanks  Fabiano

## 2019-09-18 NOTE — LETTER
September 18, 2019    Fabiano Garvey  [address]  [place] [address]             [address], [address]  [facility] [address]  Phone: [phone]  Fax: [phone] To whom it may concern:    Fabiano Garvey is under my medical care.  He is followed for chronic arthritis pains including a diagnosis of rheumatoid arthritis.  He informs me that he is traveling to [place] on vacation but was concerned about any accommodations that may be available given his arthritis history and pain symptoms.  Could you please work with him on any accommodations that [place] may have for patients with arthritis pains and mobility difficulties with prolonged walking and standing?        If you have any questions or concerns, please don't hesitate to call.    Sincerely,        Calin Buchanan MD

## 2019-09-19 ENCOUNTER — PATIENT MESSAGE (OUTPATIENT)
Dept: FAMILY MEDICINE | Facility: CLINIC | Age: 64
End: 2019-09-19

## 2019-10-16 ENCOUNTER — PATIENT MESSAGE (OUTPATIENT)
Dept: FAMILY MEDICINE | Facility: CLINIC | Age: 64
End: 2019-10-16

## 2019-10-17 ENCOUNTER — LAB VISIT (OUTPATIENT)
Dept: LAB | Facility: HOSPITAL | Age: 64
End: 2019-10-17
Attending: FAMILY MEDICINE
Payer: COMMERCIAL

## 2019-10-17 ENCOUNTER — PATIENT MESSAGE (OUTPATIENT)
Dept: FAMILY MEDICINE | Facility: CLINIC | Age: 64
End: 2019-10-17

## 2019-10-17 ENCOUNTER — TELEPHONE (OUTPATIENT)
Dept: FAMILY MEDICINE | Facility: CLINIC | Age: 64
End: 2019-10-17

## 2019-10-17 DIAGNOSIS — R74.01 ELEVATED TRANSAMINASE LEVEL: Primary | ICD-10-CM

## 2019-10-17 DIAGNOSIS — R74.01 ELEVATED TRANSAMINASE LEVEL: ICD-10-CM

## 2019-10-17 DIAGNOSIS — Z00.00 ROUTINE GENERAL MEDICAL EXAMINATION AT A HEALTH CARE FACILITY: ICD-10-CM

## 2019-10-17 LAB
ALBUMIN SERPL BCP-MCNC: 3.4 G/DL (ref 3.5–5.2)
ALP SERPL-CCNC: 74 U/L (ref 55–135)
ALT SERPL W/O P-5'-P-CCNC: 74 U/L (ref 10–44)
ANION GAP SERPL CALC-SCNC: 8 MMOL/L (ref 8–16)
AST SERPL-CCNC: 45 U/L (ref 10–40)
BILIRUB SERPL-MCNC: 0.6 MG/DL (ref 0.1–1)
BUN SERPL-MCNC: 15 MG/DL (ref 8–23)
CALCIUM SERPL-MCNC: 9.4 MG/DL (ref 8.7–10.5)
CHLORIDE SERPL-SCNC: 106 MMOL/L (ref 95–110)
CHOLEST SERPL-MCNC: 137 MG/DL (ref 120–199)
CHOLEST/HDLC SERPL: 2.8 {RATIO} (ref 2–5)
CO2 SERPL-SCNC: 28 MMOL/L (ref 23–29)
COMPLEXED PSA SERPL-MCNC: 0.61 NG/ML (ref 0–4)
CREAT SERPL-MCNC: 0.9 MG/DL (ref 0.5–1.4)
EST. GFR  (AFRICAN AMERICAN): >60 ML/MIN/1.73 M^2
EST. GFR  (NON AFRICAN AMERICAN): >60 ML/MIN/1.73 M^2
GLUCOSE SERPL-MCNC: 97 MG/DL (ref 70–110)
HDLC SERPL-MCNC: 49 MG/DL (ref 40–75)
HDLC SERPL: 35.8 % (ref 20–50)
LDLC SERPL CALC-MCNC: 72.4 MG/DL (ref 63–159)
NONHDLC SERPL-MCNC: 88 MG/DL
POTASSIUM SERPL-SCNC: 4.5 MMOL/L (ref 3.5–5.1)
PROT SERPL-MCNC: 7 G/DL (ref 6–8.4)
SODIUM SERPL-SCNC: 142 MMOL/L (ref 136–145)
TRIGL SERPL-MCNC: 78 MG/DL (ref 30–150)

## 2019-10-17 PROCEDURE — 84153 ASSAY OF PSA TOTAL: CPT

## 2019-10-17 PROCEDURE — 80053 COMPREHEN METABOLIC PANEL: CPT

## 2019-10-17 PROCEDURE — 36415 COLL VENOUS BLD VENIPUNCTURE: CPT

## 2019-10-17 PROCEDURE — 80061 LIPID PANEL: CPT

## 2019-10-17 NOTE — TELEPHONE ENCOUNTER
Needs liver ultrasound, ordered      Dear Fabiano Garvey    Your recent labs were reviewed and released to your account. Your lab results were mostly normal.  However your liver enzymes were very mildly elevated.  I see that they have been fairly stable over the last couple of months but it seems that they were normal before August and given the fact that you are currently on methotrexate for rheumatoid arthritis, I do advise that you follow-up with you rheumatologist regarding this is I do not know if she may want to decrease the dose of your medication.  I would also just like to get a liver ultrasound to check for any abnormalities.  I will have my staff call you to schedule an ultrasound appointment.  Please let me know if you have any questions.    Calin Buchanan MD

## 2019-10-23 ENCOUNTER — PATIENT MESSAGE (OUTPATIENT)
Dept: RHEUMATOLOGY | Facility: CLINIC | Age: 64
End: 2019-10-23

## 2019-10-26 ENCOUNTER — HOSPITAL ENCOUNTER (OUTPATIENT)
Dept: RADIOLOGY | Facility: HOSPITAL | Age: 64
Discharge: HOME | End: 2019-10-26
Attending: FAMILY MEDICINE
Payer: COMMERCIAL

## 2019-10-26 ENCOUNTER — PATIENT MESSAGE (OUTPATIENT)
Dept: FAMILY MEDICINE | Facility: CLINIC | Age: 64
End: 2019-10-26

## 2019-10-26 DIAGNOSIS — R79.89 ELEVATED LFTS: ICD-10-CM

## 2019-10-26 DIAGNOSIS — N28.1 COMPLEX RENAL CYST: Primary | ICD-10-CM

## 2019-10-26 DIAGNOSIS — R74.01 ELEVATED TRANSAMINASE LEVEL: ICD-10-CM

## 2019-10-26 PROCEDURE — 76705 US ABDOMEN LIMITED: ICD-10-PCS | Mod: 26,,, | Performed by: RADIOLOGY

## 2019-10-26 PROCEDURE — 76705 ECHO EXAM OF ABDOMEN: CPT | Mod: 26,,, | Performed by: RADIOLOGY

## 2019-10-26 PROCEDURE — 76705 ECHO EXAM OF ABDOMEN: CPT | Mod: TC

## 2019-10-26 NOTE — TELEPHONE ENCOUNTER
Reviewed recent abdominal ultrasound.  Liver most looks normal, at 1 point common bile duct look slightly dilated although was unsure if part of the adjacent artery was captured in the scan and was artificially affecting the measured size.  Pancreatic duct was reported at upper limits of normal.  His current labs did not fit with biliary obstructive pattern as did not show elevated bilirubin, alkaline phosphatase, mildly elevated AST and ALT.  Given potential artifact concern on ultrasound, can recheck liver functions, and consider dedicated MRCP if further worsening or biliary obstructive pattern.    Also there was a renal cyst noted with thick septation.  Recommending renal CT for further investigation.  Will order abdominal CT renal protocol, and in 1 month will recheck labs below    Orders Placed This Encounter   Procedures    CT Abdomen With Contrast    Comprehensive metabolic panel    Gamma GT

## 2019-10-28 ENCOUNTER — TELEPHONE (OUTPATIENT)
Dept: FAMILY MEDICINE | Facility: CLINIC | Age: 64
End: 2019-10-28

## 2019-10-28 ENCOUNTER — TELEPHONE (OUTPATIENT)
Dept: RHEUMATOLOGY | Facility: CLINIC | Age: 64
End: 2019-10-28

## 2019-10-28 DIAGNOSIS — Z51.81 ENCOUNTER FOR METHOTREXATE MONITORING: Primary | ICD-10-CM

## 2019-10-28 DIAGNOSIS — Z79.631 ENCOUNTER FOR METHOTREXATE MONITORING: Primary | ICD-10-CM

## 2019-10-29 ENCOUNTER — TELEPHONE (OUTPATIENT)
Dept: FAMILY MEDICINE | Facility: CLINIC | Age: 64
End: 2019-10-29

## 2019-10-30 ENCOUNTER — TELEPHONE (OUTPATIENT)
Dept: FAMILY MEDICINE | Facility: CLINIC | Age: 64
End: 2019-10-30

## 2019-11-06 ENCOUNTER — HOSPITAL ENCOUNTER (OUTPATIENT)
Dept: RADIOLOGY | Facility: HOSPITAL | Age: 64
Discharge: HOME | End: 2019-11-06
Attending: FAMILY MEDICINE
Payer: COMMERCIAL

## 2019-11-06 DIAGNOSIS — N28.1 COMPLEX RENAL CYST: ICD-10-CM

## 2019-11-06 PROCEDURE — 25500020 PHARM REV CODE 255: Performed by: FAMILY MEDICINE

## 2019-11-06 PROCEDURE — 74170 CT ABD WO CNTRST FLWD CNTRST: CPT | Mod: TC

## 2019-11-06 PROCEDURE — 74170 CT ABDOMEN W WO CONTRAST: ICD-10-PCS | Mod: 26,,, | Performed by: RADIOLOGY

## 2019-11-06 PROCEDURE — 74170 CT ABD WO CNTRST FLWD CNTRST: CPT | Mod: 26,,, | Performed by: RADIOLOGY

## 2019-11-06 RX ADMIN — IOHEXOL 75 ML: 350 INJECTION, SOLUTION INTRAVENOUS at 04:11

## 2019-11-11 ENCOUNTER — PATIENT MESSAGE (OUTPATIENT)
Dept: FAMILY MEDICINE | Facility: CLINIC | Age: 64
End: 2019-11-11

## 2019-11-11 DIAGNOSIS — N28.1 RENAL CYST: Primary | ICD-10-CM

## 2019-11-11 NOTE — TELEPHONE ENCOUNTER
See portal message.  Needs repeat kidney ultrasound in 6 months    Dear Fabiano Garvey    Your recent MRI just showed a kidney cyst which is likely benign but it is recommended have a repeat ultrasound in 6 months just to make sure the size is stable. My staff should contact you regarding setting up this ultrasound appointment.      Calin Buchanan MD

## 2019-11-12 ENCOUNTER — TELEPHONE (OUTPATIENT)
Dept: FAMILY MEDICINE | Facility: CLINIC | Age: 64
End: 2019-11-12

## 2019-11-13 ENCOUNTER — TELEPHONE (OUTPATIENT)
Dept: FAMILY MEDICINE | Facility: CLINIC | Age: 64
End: 2019-11-13

## 2019-11-20 ENCOUNTER — PATIENT OUTREACH (OUTPATIENT)
Dept: ADMINISTRATIVE | Facility: OTHER | Age: 64
End: 2019-11-20

## 2019-11-21 ENCOUNTER — OFFICE VISIT (OUTPATIENT)
Dept: SLEEP MEDICINE | Facility: CLINIC | Age: 64
End: 2019-11-21
Payer: COMMERCIAL

## 2019-11-21 VITALS
BODY MASS INDEX: 26.75 KG/M2 | HEART RATE: 81 BPM | DIASTOLIC BLOOD PRESSURE: 73 MMHG | SYSTOLIC BLOOD PRESSURE: 108 MMHG | HEIGHT: 66 IN | WEIGHT: 166.44 LBS

## 2019-11-21 DIAGNOSIS — E78.5 HYPERLIPIDEMIA, UNSPECIFIED HYPERLIPIDEMIA TYPE: ICD-10-CM

## 2019-11-21 DIAGNOSIS — G47.33 OBSTRUCTIVE SLEEP APNEA: Primary | ICD-10-CM

## 2019-11-21 PROCEDURE — 3008F PR BODY MASS INDEX (BMI) DOCUMENTED: ICD-10-PCS | Mod: CPTII,S$GLB,, | Performed by: NURSE PRACTITIONER

## 2019-11-21 PROCEDURE — 99203 OFFICE O/P NEW LOW 30 MIN: CPT | Mod: S$GLB,,, | Performed by: NURSE PRACTITIONER

## 2019-11-21 PROCEDURE — 3008F BODY MASS INDEX DOCD: CPT | Mod: CPTII,S$GLB,, | Performed by: NURSE PRACTITIONER

## 2019-11-21 PROCEDURE — 99203 PR OFFICE/OUTPT VISIT, NEW, LEVL III, 30-44 MIN: ICD-10-PCS | Mod: S$GLB,,, | Performed by: NURSE PRACTITIONER

## 2019-11-21 PROCEDURE — 99999 PR PBB SHADOW E&M-EST. PATIENT-LVL III: CPT | Mod: PBBFAC,,, | Performed by: NURSE PRACTITIONER

## 2019-11-21 PROCEDURE — 1101F PR PT FALLS ASSESS DOC 0-1 FALLS W/OUT INJ PAST YR: ICD-10-PCS | Mod: CPTII,S$GLB,, | Performed by: NURSE PRACTITIONER

## 2019-11-21 PROCEDURE — 99999 PR PBB SHADOW E&M-EST. PATIENT-LVL III: ICD-10-PCS | Mod: PBBFAC,,, | Performed by: NURSE PRACTITIONER

## 2019-11-21 PROCEDURE — 1101F PT FALLS ASSESS-DOCD LE1/YR: CPT | Mod: CPTII,S$GLB,, | Performed by: NURSE PRACTITIONER

## 2019-11-21 NOTE — LETTER
November 21, 2019      Calin Buchanan MD  [address]  [address]  [place] [address]           [facility]  [address] [facility] [address]  Phone: [phone]  Fax: [phone]          Patient: Fabiano Garvey   MR Number: [id number]   YOB: 1955   Date of Visit: 11/21/2019       Dear Dr. Calin Buchanan:    Thank you for referring Fabiano Garvey to me for evaluation. Attached you will find relevant portions of my assessment and plan of care.    If you have questions, please do not hesitate to call me. I look forward to following Fabiano Garvey along with you.    Sincerely,    Adela Campbell, ROMAN    Enclosure  CC:  No Recipients    If you would like to receive this communication electronically, please contact [email or web address] or [phone] to request more information on judo Link access.    For providers and/or their staff who would like to refer a patient to [facility], please contact us through our one-stop-shop provider referral line, [facility], at [phone].    If you feel you have received this communication in error or would no longer like to receive these types of communications, please e-mail [email or web address]

## 2019-11-21 NOTE — PROGRESS NOTES
Fabiano Garvey  was seen as a new patient, referred by Dr. Buchanan, for the management of obstructive sleep apnea.     CHIEF COMPLAINT: Snoring    HISTORY OF PRESENT ILLNESS:Fabiano Garvey a 64 y.o.  male presents for the management of obstructive sleep apnea. He was diagnosed with sleep apnea by study done 2007 at [facility] revealing RDI 17.5/low sat 89%, titrated to [id number]. Used his cpap therapy short time but always found mask bulky then had leaks when tried FFM, may be due to his moustache. Has used it 90/100 times maybe. Used it recently on vacation and snoring resolved. Used Johnson LT mask + chin strap. Will get nasally congested with mask on then remove it. Denies witnessed apneic pauses. Nocturia 1x. Denies am headaches or dyspnea.     Hard to sleep more than 5-7h. Often falls asleep in recliner ~8p then wakes up few hours later, may watch tv then go to bedroom. Typically up for day ~ [id number], may have coffee at this time. Never tried natural or RX sleep aides.     Denies symptoms of restless legs or kicking during sleep.       EPWORTH SLEEPINESS SCALE 11/20/2019   Sitting and reading 0   Watching TV 3   Sitting, inactive in a public place (e.g. a theatre or a meeting) 0   As a passenger in a car for an hour without a break 0   Lying down to rest in the afternoon when circumstances permit 3   Sitting and talking to someone 0   Sitting quietly after a lunch without alcohol 0   In a car, while stopped for a few minutes in traffic 0   Total score 6     Sleep Clinic New Patient 11/20/2019   What time do you go to bed on a week day? (Give a range) 8 to 11pm   What time do you go to bed on a day off? (Give a range) 8 to 11pm   How long does it take you to fall asleep? (Give a range) 5 to 10 minutes   On average, how many times per night do you wake up? 1   How long does it take you to fall back into sleep? (Give a range) 1 to 2 minutes   What time do you wake up to start your day on a week day? (Give a range)  "3:30 to 4:30am   What time do you wake up to start your day on a day off? (Give a range) 3:30 to 4:30am   What time do you get out of bed? (Give a range) 3:30 to 4:30am   On average, how many hours do you sleep? 5 to 6 hours   On average, how many naps do you take per day? On weekends usually 1   Rate your sleep quality from 0 to 5 (0-poor, 5-great). 4   Have you experienced:  N/a   How much weight have you lost or gained (in lbs.) in the last year? 0   On average, how many times per night do you go to the bathroom?  1   Have you ever had a sleep study/CPAP machine/surgery for sleep apnea? Yes   Have you ever had a CPAP machine for sleep apnea? Yes   Have you ever had surgery for sleep apnea? No     FAMILY HISTORY: No known sleep disorders.     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     REVIEW OF SYSTEMS:  Sleep related symptoms as per HPI;  Sleep Clinic ROS  11/20/2019   Difficulty breathing through the nose?  No   Sore throat or dry mouth in the morning? Sometimes   Irregular or very fast heart beat?  No   Shortness of breath?  No   Acid reflux? No   Body aches and pains?  Yes   Morning headaches? No   Dizziness? No   Mood changes?  No   Do you exercise?  No   Do you feel like moving your legs a lot?  No         PHYSICAL EXAM:   /73   Pulse 81   Ht 5' 6" (1.676 m)   Wt 75.5 kg (166 lb 7.2 oz)   BMI 26.87 kg/m²   GENERAL: W/D, W/N  body habitus, well groomed   HEENT: Conjunctivae are non-erythematous; Pupils equal, round, and reactive to light; Nose is symmetrical  SKIN: On face and neck: No abrasions, no rashes, no lesions  RESPIRATORY: Normal chest expansion and non-labored breathing at rest.   CARDIOVASCULAR: regular rate and rhythm  EXTREMITIES: No edema. No clubbing. No cyanosis. Station normal. Gait normal.   NEURO/PSYCH: Oriented to time, place and person. Normal attention span and concentration. Affect is full. Mood is normal.       ASSESSMENT:   NU, moderate. Limited adherence since setup. " Had no adherence monitoring. Snoring resolved with use. Needs new mask type and is eligible for new machine which will provide AHI data.   He has  medical comorbidities of hyperlipidemia. Warrants treatment   Poor sleep hygiene    PLAN:   1.GET FFM to resume cpap 10cm use and get NEW machine and rtc 4-5 wks after setup adherence monitoring    2. Discussed etiology of NU and potential ramifications of untreated NU, including stroke, heart disease, HTN.  We discussed potential treatment options, which could include weight loss (10-15%), Inspire,  mandibular advancement splint by dentist, or referral for surgical consideration.   3. The patient was advised to abstain from driving should he feel sleepy or drowsy.  4. Encouraged to establish set bedroom time/BT and WT and discussed merits of CBT-I , provided literature good sleep hygiene         Thank you for allowing me the opportunity to participate in the care of your patient

## 2019-11-29 ENCOUNTER — LAB VISIT (OUTPATIENT)
Dept: LAB | Facility: HOSPITAL | Age: 64
End: 2019-11-29
Attending: INTERNAL MEDICINE
Payer: COMMERCIAL

## 2019-11-29 DIAGNOSIS — Z51.81 ENCOUNTER FOR METHOTREXATE MONITORING: ICD-10-CM

## 2019-11-29 DIAGNOSIS — Z79.631 ENCOUNTER FOR METHOTREXATE MONITORING: ICD-10-CM

## 2019-11-29 LAB
ALBUMIN SERPL BCP-MCNC: 3.3 G/DL (ref 3.5–5.2)
ALP SERPL-CCNC: 70 U/L (ref 55–135)
ALT SERPL W/O P-5'-P-CCNC: 37 U/L (ref 10–44)
ANION GAP SERPL CALC-SCNC: 7 MMOL/L (ref 8–16)
AST SERPL-CCNC: 28 U/L (ref 10–40)
BASOPHILS # BLD AUTO: 0.06 K/UL (ref 0–0.2)
BASOPHILS NFR BLD: 0.8 % (ref 0–1.9)
BILIRUB SERPL-MCNC: 0.4 MG/DL (ref 0.1–1)
BUN SERPL-MCNC: 13 MG/DL (ref 8–23)
CALCIUM SERPL-MCNC: 9 MG/DL (ref 8.7–10.5)
CHLORIDE SERPL-SCNC: 106 MMOL/L (ref 95–110)
CO2 SERPL-SCNC: 28 MMOL/L (ref 23–29)
CREAT SERPL-MCNC: 0.8 MG/DL (ref 0.5–1.4)
DIFFERENTIAL METHOD: ABNORMAL
EOSINOPHIL # BLD AUTO: 0.5 K/UL (ref 0–0.5)
EOSINOPHIL NFR BLD: 6 % (ref 0–8)
ERYTHROCYTE [DISTWIDTH] IN BLOOD BY AUTOMATED COUNT: 14.3 % (ref 11.5–14.5)
EST. GFR  (AFRICAN AMERICAN): >60 ML/MIN/1.73 M^2
EST. GFR  (NON AFRICAN AMERICAN): >60 ML/MIN/1.73 M^2
GLUCOSE SERPL-MCNC: 97 MG/DL (ref 70–110)
HCT VFR BLD AUTO: 43.8 % (ref 40–54)
HGB BLD-MCNC: 14.1 G/DL (ref 14–18)
LYMPHOCYTES # BLD AUTO: 2.4 K/UL (ref 1–4.8)
LYMPHOCYTES NFR BLD: 31.5 % (ref 18–48)
MCH RBC QN AUTO: 31.5 PG (ref 27–31)
MCHC RBC AUTO-ENTMCNC: 32.2 G/DL (ref 32–36)
MCV RBC AUTO: 98 FL (ref 82–98)
MONOCYTES # BLD AUTO: 0.7 K/UL (ref 0.3–1)
MONOCYTES NFR BLD: 8.7 % (ref 4–15)
NEUTROPHILS # BLD AUTO: 4 K/UL (ref 1.8–7.7)
NEUTROPHILS NFR BLD: 53 % (ref 38–73)
PLATELET # BLD AUTO: 212 K/UL (ref 150–350)
PMV BLD AUTO: 11.1 FL (ref 9.2–12.9)
POTASSIUM SERPL-SCNC: 4 MMOL/L (ref 3.5–5.1)
PROT SERPL-MCNC: 6.8 G/DL (ref 6–8.4)
RBC # BLD AUTO: 4.47 M/UL (ref 4.6–6.2)
SODIUM SERPL-SCNC: 141 MMOL/L (ref 136–145)
WBC # BLD AUTO: 7.63 K/UL (ref 3.9–12.7)

## 2019-11-29 PROCEDURE — 80053 COMPREHEN METABOLIC PANEL: CPT

## 2019-11-29 PROCEDURE — 36415 COLL VENOUS BLD VENIPUNCTURE: CPT

## 2019-11-29 PROCEDURE — 85025 COMPLETE CBC W/AUTO DIFF WBC: CPT

## 2019-12-10 ENCOUNTER — TELEPHONE (OUTPATIENT)
Dept: RHEUMATOLOGY | Facility: CLINIC | Age: 64
End: 2019-12-10

## 2019-12-10 DIAGNOSIS — M06.9 RHEUMATOID ARTHRITIS INVOLVING MULTIPLE JOINTS: Primary | ICD-10-CM

## 2019-12-10 NOTE — TELEPHONE ENCOUNTER
----- Message from Margaret Brenner MD sent at 12/10/2019  9:36 AM CST -----  Please ask patient to do labs end of January.    Dr. SCOTT

## 2020-01-27 ENCOUNTER — LAB VISIT (OUTPATIENT)
Dept: LAB | Facility: HOSPITAL | Age: 65
End: 2020-01-27
Attending: INTERNAL MEDICINE
Payer: COMMERCIAL

## 2020-01-27 DIAGNOSIS — M06.9 RHEUMATOID ARTHRITIS INVOLVING MULTIPLE JOINTS: ICD-10-CM

## 2020-01-27 LAB
ALBUMIN SERPL BCP-MCNC: 3.3 G/DL (ref 3.5–5.2)
ALP SERPL-CCNC: 68 U/L (ref 55–135)
ALT SERPL W/O P-5'-P-CCNC: 38 U/L (ref 10–44)
ANION GAP SERPL CALC-SCNC: 6 MMOL/L (ref 8–16)
AST SERPL-CCNC: 29 U/L (ref 10–40)
BASOPHILS # BLD AUTO: 0.04 K/UL (ref 0–0.2)
BASOPHILS NFR BLD: 0.5 % (ref 0–1.9)
BILIRUB SERPL-MCNC: 0.3 MG/DL (ref 0.1–1)
BUN SERPL-MCNC: 17 MG/DL (ref 8–23)
CALCIUM SERPL-MCNC: 8.5 MG/DL (ref 8.7–10.5)
CHLORIDE SERPL-SCNC: 106 MMOL/L (ref 95–110)
CO2 SERPL-SCNC: 28 MMOL/L (ref 23–29)
CREAT SERPL-MCNC: 0.8 MG/DL (ref 0.5–1.4)
CRP SERPL-MCNC: 1.9 MG/L (ref 0–8.2)
DIFFERENTIAL METHOD: ABNORMAL
EOSINOPHIL # BLD AUTO: 0.4 K/UL (ref 0–0.5)
EOSINOPHIL NFR BLD: 5.5 % (ref 0–8)
ERYTHROCYTE [DISTWIDTH] IN BLOOD BY AUTOMATED COUNT: 14.6 % (ref 11.5–14.5)
ERYTHROCYTE [SEDIMENTATION RATE] IN BLOOD BY WESTERGREN METHOD: 37 MM/HR (ref 0–10)
EST. GFR  (AFRICAN AMERICAN): >60 ML/MIN/1.73 M^2
EST. GFR  (NON AFRICAN AMERICAN): >60 ML/MIN/1.73 M^2
GLUCOSE SERPL-MCNC: 67 MG/DL (ref 70–110)
HCT VFR BLD AUTO: 42.8 % (ref 40–54)
HGB BLD-MCNC: 13.8 G/DL (ref 14–18)
LYMPHOCYTES # BLD AUTO: 2.2 K/UL (ref 1–4.8)
LYMPHOCYTES NFR BLD: 28.9 % (ref 18–48)
MCH RBC QN AUTO: 31.7 PG (ref 27–31)
MCHC RBC AUTO-ENTMCNC: 32.2 G/DL (ref 32–36)
MCV RBC AUTO: 98 FL (ref 82–98)
MONOCYTES # BLD AUTO: 1.2 K/UL (ref 0.3–1)
MONOCYTES NFR BLD: 16 % (ref 4–15)
NEUTROPHILS # BLD AUTO: 3.7 K/UL (ref 1.8–7.7)
NEUTROPHILS NFR BLD: 49.1 % (ref 38–73)
PLATELET # BLD AUTO: 198 K/UL (ref 150–350)
PMV BLD AUTO: 11.2 FL (ref 9.2–12.9)
POTASSIUM SERPL-SCNC: 4.1 MMOL/L (ref 3.5–5.1)
PROT SERPL-MCNC: 6.7 G/DL (ref 6–8.4)
RBC # BLD AUTO: 4.36 M/UL (ref 4.6–6.2)
SODIUM SERPL-SCNC: 140 MMOL/L (ref 136–145)
WBC # BLD AUTO: 7.52 K/UL (ref 3.9–12.7)

## 2020-01-27 PROCEDURE — 85025 COMPLETE CBC W/AUTO DIFF WBC: CPT

## 2020-01-27 PROCEDURE — 80053 COMPREHEN METABOLIC PANEL: CPT

## 2020-01-27 PROCEDURE — 86140 C-REACTIVE PROTEIN: CPT

## 2020-01-27 PROCEDURE — 36415 COLL VENOUS BLD VENIPUNCTURE: CPT

## 2020-01-27 PROCEDURE — 85652 RBC SED RATE AUTOMATED: CPT

## 2020-01-27 RX ORDER — METHOTREXATE 2.5 MG/1
15 TABLET ORAL
Qty: 72 TABLET | Refills: 0 | Status: SHIPPED | OUTPATIENT
Start: 2020-01-27 | End: 2020-01-28 | Stop reason: SDUPTHER

## 2020-01-27 RX ORDER — METHOTREXATE 2.5 MG/1
TABLET ORAL
Qty: 96 TABLET | Refills: 0 | Status: SHIPPED | OUTPATIENT
Start: 2020-01-27 | End: 2020-01-27

## 2020-01-28 RX ORDER — METHOTREXATE 2.5 MG/1
15 TABLET ORAL
Qty: 72 TABLET | Refills: 0 | Status: SHIPPED | OUTPATIENT
Start: 2020-01-28 | End: 2020-02-11 | Stop reason: SDUPTHER

## 2020-02-11 RX ORDER — METHOTREXATE 2.5 MG/1
15 TABLET ORAL
Qty: 72 TABLET | Refills: 0 | Status: SHIPPED | OUTPATIENT
Start: 2020-02-11 | End: 2021-01-06

## 2020-02-26 ENCOUNTER — PATIENT MESSAGE (OUTPATIENT)
Dept: FAMILY MEDICINE | Facility: CLINIC | Age: 65
End: 2020-02-26

## 2020-02-28 ENCOUNTER — OFFICE VISIT (OUTPATIENT)
Dept: FAMILY MEDICINE | Facility: CLINIC | Age: 65
End: 2020-02-28
Payer: COMMERCIAL

## 2020-02-28 ENCOUNTER — HOSPITAL ENCOUNTER (OUTPATIENT)
Dept: RADIOLOGY | Facility: HOSPITAL | Age: 65
Discharge: HOME | End: 2020-02-28
Attending: FAMILY MEDICINE
Payer: COMMERCIAL

## 2020-02-28 ENCOUNTER — PATIENT MESSAGE (OUTPATIENT)
Dept: FAMILY MEDICINE | Facility: CLINIC | Age: 65
End: 2020-02-28

## 2020-02-28 VITALS
TEMPERATURE: 98 F | BODY MASS INDEX: 27.56 KG/M2 | WEIGHT: 171.5 LBS | SYSTOLIC BLOOD PRESSURE: 116 MMHG | HEART RATE: 95 BPM | HEIGHT: 66 IN | DIASTOLIC BLOOD PRESSURE: 80 MMHG | OXYGEN SATURATION: 96 %

## 2020-02-28 DIAGNOSIS — R09.82 PND (POST-NASAL DRIP): ICD-10-CM

## 2020-02-28 DIAGNOSIS — D84.9 IMMUNOSUPPRESSION: ICD-10-CM

## 2020-02-28 DIAGNOSIS — R91.8 LUNG FIELD ABNORMAL FINDING ON EXAMINATION: ICD-10-CM

## 2020-02-28 DIAGNOSIS — M06.9 RHEUMATOID ARTHRITIS, INVOLVING UNSPECIFIED SITE, UNSPECIFIED RHEUMATOID FACTOR PRESENCE: ICD-10-CM

## 2020-02-28 DIAGNOSIS — R91.8 ABNORMALITY OF LUNG ON CHEST X-RAY: ICD-10-CM

## 2020-02-28 DIAGNOSIS — R05.9 COUGH: ICD-10-CM

## 2020-02-28 DIAGNOSIS — R05.9 COUGH: Primary | ICD-10-CM

## 2020-02-28 DIAGNOSIS — J84.10 INTERSTITIAL PULMONARY FIBROSIS: Primary | ICD-10-CM

## 2020-02-28 PROCEDURE — 99214 PR OFFICE/OUTPT VISIT, EST, LEVL IV, 30-39 MIN: ICD-10-PCS | Mod: S$GLB,,, | Performed by: FAMILY MEDICINE

## 2020-02-28 PROCEDURE — 99214 OFFICE O/P EST MOD 30 MIN: CPT | Mod: S$GLB,,, | Performed by: FAMILY MEDICINE

## 2020-02-28 PROCEDURE — 99999 PR PBB SHADOW E&M-EST. PATIENT-LVL IV: ICD-10-PCS | Mod: PBBFAC,,, | Performed by: FAMILY MEDICINE

## 2020-02-28 PROCEDURE — 71046 X-RAY EXAM CHEST 2 VIEWS: CPT | Mod: TC,FY

## 2020-02-28 PROCEDURE — 3008F PR BODY MASS INDEX (BMI) DOCUMENTED: ICD-10-PCS | Mod: CPTII,S$GLB,, | Performed by: FAMILY MEDICINE

## 2020-02-28 PROCEDURE — 99999 PR PBB SHADOW E&M-EST. PATIENT-LVL IV: CPT | Mod: PBBFAC,,, | Performed by: FAMILY MEDICINE

## 2020-02-28 PROCEDURE — 1101F PT FALLS ASSESS-DOCD LE1/YR: CPT | Mod: CPTII,S$GLB,, | Performed by: FAMILY MEDICINE

## 2020-02-28 PROCEDURE — 71046 XR CHEST PA AND LATERAL: ICD-10-PCS | Mod: 26,,, | Performed by: RADIOLOGY

## 2020-02-28 PROCEDURE — 71046 X-RAY EXAM CHEST 2 VIEWS: CPT | Mod: 26,,, | Performed by: RADIOLOGY

## 2020-02-28 PROCEDURE — 3008F BODY MASS INDEX DOCD: CPT | Mod: CPTII,S$GLB,, | Performed by: FAMILY MEDICINE

## 2020-02-28 PROCEDURE — 1101F PR PT FALLS ASSESS DOC 0-1 FALLS W/OUT INJ PAST YR: ICD-10-PCS | Mod: CPTII,S$GLB,, | Performed by: FAMILY MEDICINE

## 2020-02-28 RX ORDER — FLUTICASONE PROPIONATE 50 MCG
2 SPRAY, SUSPENSION (ML) NASAL DAILY
Qty: 16 G | Refills: 1 | Status: SHIPPED | OUTPATIENT
Start: 2020-02-28 | End: 2020-03-23

## 2020-02-28 NOTE — PATIENT INSTRUCTIONS
1. flonase 2 sprays / nostril once daily for 1-2 weeks (drainage/congestion)    2. Zyrtec D daily for one week (drainage/congestion)    3. Mucinex DM or Robitussin dm for 1-2 weeks (cough)    4. Neti pot over the counter sinus wash    5. Trial of Afrin decongestant nasal spray at night for no more than 3-4 nights to see if this cuts down on post nasal drip and coughing.     6. Chest xray to rule out pneumonia    7. If xray is clear but none of the above treatments are helping over the next 4-5 days, then let me know by mycedis msg in case we need to treat presumptive bacterial sinus infection.

## 2020-02-28 NOTE — TELEPHONE ENCOUNTER
Chronic interstitial changes on x-ray, will need PFT.  History of rheumatoid arthritis on methotrexate    Orders Placed This Encounter   Procedures    Complete PFT with bronchodilator

## 2020-02-28 NOTE — PROGRESS NOTES
(Portions of this note were dictated using voice recognition software and may contain dictation related errors in spelling/grammar/syntax not found on text review)    CC:   Chief Complaint   Patient presents with    Cough    Nasal Congestion       HPI: 64 y.o. male Annual exam September.  Here for urgent care visit for coughing and congestion.  Symptoms going on for 2-3 weeks.  Has nasal congestion, rhinorrhea which is clear, postnasal drip, coughing which isn't only really present at nighttime.  Not present during the day if he is sitting up.  Worse when he is lying down.  No shortness of breath or chest pain.  No body aches.  No headache.  No fevers or chills.  Overall feels well except for the above as this does affect his sleeping.  Was able to sleep up on the sofa for last couple of nights and was doing better with this.    Had taken some over-the-counter medication including something with DM    Medical history including RA followed by rheumatology on methotrexate and folic acid        Past Medical History:   Diagnosis Date    Basal cell carcinoma     medial canthus    Hyperlipidemia     NU (obstructive sleep apnea)     Rheumatoid arthritis     Squamous cell carcinoma 2019    SCC in situ left temple       Past Surgical History:   Procedure Laterality Date    COLONOSCOPY N/A 2017    Procedure: COLONOSCOPY;  Surgeon: Bill Nicole MD;  Location: [facility] ([address] [place] [address]);  Service: Endoscopy;  Laterality: N/A;    NO PAST SURGERIES         Family History   Problem Relation Age of Onset    Heart failure Mother         60s    Coronary artery disease Father         70s    Cancer Paternal Uncle         lymphoma??    Diabetes Neg Hx        Social History     Tobacco Use    Smoking status: Former Smoker     Last attempt to quit: 2012     Years since quittin.6    Smokeless tobacco: Never Used   Substance Use Topics    Alcohol use: Yes     Frequency: Monthly or less     Drinks per session: 1  or 2     Binge frequency: Never     Comment: occasional    Drug use: Not on file       Lab Results   Component Value Date    WBC 7.52 01/27/2020    HGB 13.8 (L) 01/27/2020    HCT 42.8 01/27/2020    MCV 98 01/27/2020     01/27/2020    CHOL 137 10/17/2019    TRIG 78 10/17/2019    HDL 49 10/17/2019    ALT 38 01/27/2020    AST 29 01/27/2020    BILITOT 0.3 01/27/2020    ALKPHOS 68 01/27/2020     01/27/2020    K 4.1 01/27/2020     01/27/2020    CREATININE 0.8 01/27/2020    ESTGFRAFRICA >60 01/27/2020    EGFRNONAA >60 01/27/2020    CALCIUM 8.5 (L) 01/27/2020    ALBUMIN 3.3 (L) 01/27/2020    BUN 17 01/27/2020    CO2 28 01/27/2020    TSH 1.054 05/08/2018    PSA 0.61 10/17/2019    LDLCALC 72.4 10/17/2019    GLU 67 (L) 01/27/2020                 ROS:  GENERAL: No fever, chills, fatigability or weight loss.  SKIN: No rashes, no itching.  HEAD: No headaches.  EYES: No visual changes  EARS: No ear pain or changes in hearing.  NOSE:  Above  MOUTH & THROAT: No hoarseness, change in voice, or sore throat.  NODES: Denies swollen glands.  CHEST:  Above  CARDIOVASCULAR: Denies chest pain, PND, orthopnea.  ABDOMEN: No nausea, vomiting, or changes in bowel function.  URINARY: No flank pain, dysuria or hematuria.  PERIPHERAL VASCULAR: No claudication or cyanosis.  MUSCULOSKELETAL: No joint stiffness or swelling. Denies back pain.  NEUROLOGIC: No weakness or numbness.    Vital signs reviewed  PE:   APPEARANCE: Well nourished, well developed, in no acute distress.    HEAD: Normocephalic, atraumatic.  No sinus tenderness  EYES: PERRL. EOMI.   Conjunctivae noninjected.  EARS: TM's intact. Light reflex normal. No retraction or perforation  NOSE: Mucosa mildly inflamed  MOUTH & THROAT: No tonsillar enlargement. No pharyngeal erythema or exudate.   NECK: Supple with no cervical lymphadenopathy.    CHEST: Good inspiratory effort.  Decreased breath sounds globally with some inspiratory dry crackles noted globally.  No wheezing.   No rhonchi   CARDIOVASCULAR: Normal S1, S2. No rubs, murmurs, or gallops.  ABDOMEN: Bowel sounds normal. Not distended. Soft. No tenderness or masses. No organomegaly.  EXTREMITIES: No edema       IMPRESSION  1. Cough    2. PND (post-nasal drip)    3. Lung field abnormal finding on examination    4. Rheumatoid arthritis, involving unspecified site, unspecified rheumatoid factor presence    5. Immunosuppression            PLAN  Instructions to patient as follows  1. flonase 2 sprays / nostril once daily for 1-2 weeks (drainage/congestion)    2. Zyrtec D daily for one week (drainage/congestion)    3. Mucinex DM or Robitussin dm for 1-2 weeks (cough)    4. Neti pot over the counter sinus wash    5. Trial of Afrin decongestant nasal spray at night for no more than 3-4 nights to see if this cuts down on post nasal drip and coughing.     6. Chest xray to rule out pneumonia    7. If xray is clear but none of the above treatments are helping over the next 4-5 days, then let me know by mychareric msg in case we need to treat presumptive bacterial sinus infection.

## 2020-03-04 ENCOUNTER — TELEPHONE (OUTPATIENT)
Dept: FAMILY MEDICINE | Facility: CLINIC | Age: 65
End: 2020-03-04

## 2020-03-11 ENCOUNTER — HOSPITAL ENCOUNTER (OUTPATIENT)
Dept: PULMONOLOGY | Facility: HOSPITAL | Age: 65
Discharge: HOME | End: 2020-03-11
Attending: FAMILY MEDICINE
Payer: COMMERCIAL

## 2020-03-11 DIAGNOSIS — J84.10 INTERSTITIAL PULMONARY FIBROSIS: ICD-10-CM

## 2020-03-11 DIAGNOSIS — R91.8 ABNORMALITY OF LUNG ON CHEST X-RAY: ICD-10-CM

## 2020-03-11 PROCEDURE — 94010 BREATHING CAPACITY TEST: CPT

## 2020-03-11 PROCEDURE — 94729 DIFFUSING CAPACITY: CPT

## 2020-03-11 PROCEDURE — 94640 AIRWAY INHALATION TREATMENT: CPT

## 2020-03-11 PROCEDURE — 94726 PLETHYSMOGRAPHY LUNG VOLUMES: CPT

## 2020-03-14 DIAGNOSIS — E78.2 MIXED HYPERLIPIDEMIA: ICD-10-CM

## 2020-03-14 LAB
BRPFT: ABNORMAL
DLCO ADJ PRE: 14.47 ML/(MIN*MMHG) (ref 17.99–31.85)
DLCO SINGLE BREATH LLN: 17.99
DLCO SINGLE BREATH PRE REF: 58.1 %
DLCO SINGLE BREATH REF: 24.92
DLCOC SBVA LLN: 2.62
DLCOC SBVA PRE REF: 71.4 %
DLCOC SBVA REF: 3.93
DLCOC SINGLE BREATH LLN: 17.99
DLCOC SINGLE BREATH PRE REF: 58.1 %
DLCOC SINGLE BREATH REF: 24.92
DLCOVA LLN: 2.62
DLCOVA PRE REF: 71.4 %
DLCOVA PRE: 2.81 ML/(MIN*MMHG*L) (ref 2.62–5.24)
DLCOVA REF: 3.93
DLVAADJ PRE: 2.81 ML/(MIN*MMHG*L) (ref 2.62–5.24)
ERV LLN: 1.03
ERV PRE REF: 24.6 %
ERV REF: 1.03
FEF 25 75 CHG: -8.9 %
FEF 25 75 LLN: 1.12
FEF 25 75 POST REF: 31.7 %
FEF 25 75 PRE REF: 34.8 %
FEF 25 75 REF: 2.43
FET100 CHG: -14.4 %
FEV1 CHG: -3.6 %
FEV1 FVC CHG: -1.5 %
FEV1 FVC LLN: 64
FEV1 FVC POST REF: 70.4 %
FEV1 FVC PRE REF: 71.5 %
FEV1 FVC REF: 77
FEV1 LLN: 2.19
FEV1 POST REF: 58.8 %
FEV1 PRE REF: 61 %
FEV1 REF: 2.98
FRCPLETH LLN: 2.44
FRCPLETH PREREF: 90 %
FRCPLETH REF: 3.43
FVC CHG: -2.1 %
FVC LLN: 2.9
FVC POST REF: 83.4 %
FVC PRE REF: 85.2 %
FVC REF: 3.85
IVC PRE: 3.12 L (ref 2.9–4.81)
IVC SINGLE BREATH LLN: 2.9
IVC SINGLE BREATH PRE REF: 80.8 %
IVC SINGLE BREATH REF: 3.85
PEF CHG: -7.6 %
PEF LLN: 5.91
PEF POST REF: 57.8 %
PEF PRE REF: 62.6 %
PEF REF: 7.98
POST FEF 25 75: 0.77 L/S (ref 1.12–3.73)
POST FET 100: 7.13 SEC
POST FEV1 FVC: 54.43 % (ref 64.43–90.21)
POST FEV1: 1.75 L (ref 2.19–3.76)
POST FVC: 3.21 L (ref 2.9–4.81)
POST PEF: 4.61 L/S (ref 5.91–10.05)
PRE DLCO: 14.47 ML/(MIN*MMHG) (ref 17.99–31.85)
PRE ERV: 0.25 L (ref 1.03–1.03)
PRE FEF 25 75: 0.84 L/S (ref 1.12–3.73)
PRE FET 100: 8.33 SEC
PRE FEV1 FVC: 55.28 % (ref 64.43–90.21)
PRE FEV1: 1.81 L (ref 2.19–3.76)
PRE FRC PL: 3.08 L
PRE FVC: 3.28 L (ref 2.9–4.81)
PRE PEF: 5 L/S (ref 5.91–10.05)
PRE RV: 2.54 L (ref 1.73–3.08)
PRE TLC: 5.82 L (ref 5.19–7.49)
RAW LLN: 3.06
RAW PRE REF: 110.1 %
RAW PRE: 3.37 CMH2O*S/L (ref 3.06–3.06)
RAW REF: 3.06
RV LLN: 1.73
RV PRE REF: 105.6 %
RV REF: 2.4
RVTLC LLN: 30
RVTLC PRE REF: 110.9 %
RVTLC PRE: 43.58 % (ref 30.33–48.29)
RVTLC REF: 39
TLC LLN: 5.19
TLC PRE REF: 91.7 %
TLC REF: 6.34
VA PRE: 5.16 L (ref 6.19–6.19)
VA SINGLE BREATH LLN: 6.19
VA SINGLE BREATH PRE REF: 83.3 %
VA SINGLE BREATH REF: 6.19
VC LLN: 2.9
VC PRE REF: 85.2 %
VC PRE: 3.28 L (ref 2.9–4.81)
VC REF: 3.85
VTGRAWPRE: 4.53 L

## 2020-03-15 RX ORDER — ATORVASTATIN CALCIUM 40 MG/1
40 TABLET, FILM COATED ORAL DAILY
Qty: 90 TABLET | Refills: 3 | Status: SHIPPED | OUTPATIENT
Start: 2020-03-15 | End: 2021-04-25 | Stop reason: SDUPTHER

## 2020-03-22 ENCOUNTER — PATIENT MESSAGE (OUTPATIENT)
Dept: FAMILY MEDICINE | Facility: CLINIC | Age: 65
End: 2020-03-22

## 2020-03-22 DIAGNOSIS — Z87.891 FORMER SMOKER: ICD-10-CM

## 2020-03-22 DIAGNOSIS — R05.9 COUGH: ICD-10-CM

## 2020-03-22 DIAGNOSIS — M06.9 RHEUMATOID ARTHRITIS, INVOLVING UNSPECIFIED SITE, UNSPECIFIED RHEUMATOID FACTOR PRESENCE: ICD-10-CM

## 2020-03-22 DIAGNOSIS — J84.10 PULMONARY FIBROSIS: ICD-10-CM

## 2020-03-22 DIAGNOSIS — J44.9 CHRONIC OBSTRUCTIVE PULMONARY DISEASE, UNSPECIFIED COPD TYPE: Primary | ICD-10-CM

## 2020-03-22 DIAGNOSIS — D84.9 IMMUNOSUPPRESSED STATUS: ICD-10-CM

## 2020-03-23 ENCOUNTER — PATIENT MESSAGE (OUTPATIENT)
Dept: INTERNAL MEDICINE | Facility: CLINIC | Age: 65
End: 2020-03-23

## 2020-03-23 RX ORDER — FLUTICASONE PROPIONATE 50 MCG
2 SPRAY, SUSPENSION (ML) NASAL DAILY
Qty: 16 ML | Refills: 1 | Status: ON HOLD | OUTPATIENT
Start: 2020-03-23 | End: 2023-05-11 | Stop reason: HOSPADM

## 2020-03-23 NOTE — TELEPHONE ENCOUNTER
"mychart msg sent. pft shows copd, also prior abd ct shows fibrosis lower lung fields. Pt has RA on immunosuppression and is former smoker. Will order Chest CT.       Dear Fabiano Garvey    Your recent lung function does suggest some scarring related "restriction" on one of the test. Also there is sign of "obstructive lung disease" or what otherwise is referred to as COPD or emphysema. Do you have any difficulty with breathing or with chronic cough, as these could be symptoms of this. The most common cause of COPD is smoking history although autoimmune diseases like rheumatoid arthritis can cause some lung inflammation and scarring too.     I'd like to consider a medicine for lung called Anoro which is a 1 puff daily med. Please let me know if you have any symptoms which would really bolster the argument for using this medicine which can improve lung function over time.     Also it would be useful to get a dedicated chest CT scan to assess degree of scar tissue or any other abnormalities that would need to be followed with time. I can have my staff reach out to nonemergently schedule a chest CT scan.     Please let me know regarding symptoms above, and i'd like to reach back with you in the next couple of months to see how you are doing with this.     MD Calin Perez MD      "

## 2020-03-25 ENCOUNTER — HOSPITAL ENCOUNTER (OUTPATIENT)
Dept: RADIOLOGY | Facility: HOSPITAL | Age: 65
Discharge: HOME | End: 2020-03-25
Attending: FAMILY MEDICINE
Payer: COMMERCIAL

## 2020-03-25 DIAGNOSIS — M06.9 RHEUMATOID ARTHRITIS, INVOLVING UNSPECIFIED SITE, UNSPECIFIED RHEUMATOID FACTOR PRESENCE: ICD-10-CM

## 2020-03-25 DIAGNOSIS — J84.10 PULMONARY FIBROSIS: ICD-10-CM

## 2020-03-25 DIAGNOSIS — R05.9 COUGH: ICD-10-CM

## 2020-03-25 DIAGNOSIS — J44.9 CHRONIC OBSTRUCTIVE PULMONARY DISEASE, UNSPECIFIED COPD TYPE: ICD-10-CM

## 2020-03-25 DIAGNOSIS — Z87.891 FORMER SMOKER: ICD-10-CM

## 2020-03-25 DIAGNOSIS — D84.9 IMMUNOSUPPRESSED STATUS: ICD-10-CM

## 2020-03-25 PROCEDURE — 71250 CT THORAX DX C-: CPT | Mod: 26,,, | Performed by: RADIOLOGY

## 2020-03-25 PROCEDURE — 71250 CT CHEST WITHOUT CONTRAST: ICD-10-PCS | Mod: 26,,, | Performed by: RADIOLOGY

## 2020-03-25 PROCEDURE — 71250 CT THORAX DX C-: CPT | Mod: TC

## 2020-03-30 ENCOUNTER — PATIENT MESSAGE (OUTPATIENT)
Dept: FAMILY MEDICINE | Facility: CLINIC | Age: 65
End: 2020-03-30

## 2020-03-30 NOTE — TELEPHONE ENCOUNTER
Dear Fabiano Garvey    Your lung CT scan did show significant scar tissue of the lungs which is called fibrosis.  A lot of these changes could be related to rheumatoid arthritis but also could potentially be related to some of the medications for rheumatoid like methotrexate.  Not sure if your planning to see your rheumatologist any time soon but it may be review with her in case there is any decision on any potential modification to your treatment if deemed necessary.  Are you having any significant symptoms of shortness of breath?.    Calin Buchanan MD

## 2020-04-01 ENCOUNTER — PATIENT MESSAGE (OUTPATIENT)
Dept: RHEUMATOLOGY | Facility: CLINIC | Age: 65
End: 2020-04-01

## 2020-04-01 ENCOUNTER — PATIENT OUTREACH (OUTPATIENT)
Dept: ADMINISTRATIVE | Facility: OTHER | Age: 65
End: 2020-04-01

## 2020-04-02 ENCOUNTER — OFFICE VISIT (OUTPATIENT)
Dept: RHEUMATOLOGY | Facility: CLINIC | Age: 65
End: 2020-04-02
Payer: COMMERCIAL

## 2020-04-02 DIAGNOSIS — M06.9 RHEUMATOID ARTHRITIS INVOLVING MULTIPLE JOINTS: ICD-10-CM

## 2020-04-02 DIAGNOSIS — Z51.81 ENCOUNTER FOR METHOTREXATE MONITORING: Primary | ICD-10-CM

## 2020-04-02 DIAGNOSIS — Z79.631 ENCOUNTER FOR METHOTREXATE MONITORING: Primary | ICD-10-CM

## 2020-04-02 PROCEDURE — 1101F PR PT FALLS ASSESS DOC 0-1 FALLS W/OUT INJ PAST YR: ICD-10-PCS | Mod: CPTII,,, | Performed by: INTERNAL MEDICINE

## 2020-04-02 PROCEDURE — 1101F PT FALLS ASSESS-DOCD LE1/YR: CPT | Mod: CPTII,,, | Performed by: INTERNAL MEDICINE

## 2020-04-02 PROCEDURE — 99214 PR OFFICE/OUTPT VISIT, EST, LEVL IV, 30-39 MIN: ICD-10-PCS | Mod: 95,,, | Performed by: INTERNAL MEDICINE

## 2020-04-02 PROCEDURE — 99214 OFFICE O/P EST MOD 30 MIN: CPT | Mod: 95,,, | Performed by: INTERNAL MEDICINE

## 2020-04-02 NOTE — PROGRESS NOTES
Subjective:       Patient ID: Fabiano Garvey is a 60 y.o. male.    Chief Complaint: OTHER and Swelling    HPI     59 yo male with PMH of basal cell cancer (around 2010), HL here for evaluation of join pain.  Reports pain in hands, elbows. and wrists.  Reports  swelling in hands, ankles, and feet.  Reports also has bilateral knee pain and shoulders.   Reports stiffness in morning for 30 minutes.  He has trouble swelling. Denies any rashes. No oral ulcers. No hair loss. Denies photosensitivity.  Denies any raynauds.Denies blood clots.  Denies dry eyes and dry mouth.  Reports symptoms for a year. In November, he noted the nodules in elbows.  Thinks he has swelling in lower neck.  He has trouble picking up things due to pain and swelling.  He reports that prednisone improves h is pain and swelling.  He has been off steroids.  No changes in weight.  Reports good appetite.      Interval history: last seen a year ago. He had recent Ct chest that was abnormal.He reports he felt that he was having sinus drainage and cough.On occasion, he has cough. He reports mild cough several times a day since march.  Denies shortness of breath. He used to smoke. He quit 10 years ago.  He used to smoke 1/2 ppd from age to age 14 to 49.  He is taking MTX 6 pills once  A week. Denies any reflux. Denies any pain or swelling in joints.  Reports stiffness for 10 minutes.  He took MTX on Monday.    Past Medical History   Diagnosis Date    Hyperlipidemia     Basal cell carcinoma      medial canthus       Review of Systems   Constitutional: Negative for fever, chills, appetite change and fatigue.   HENT: Negative for hearing loss, mouth sores, rhinorrhea, sinus pressure and trouble swallowing.    Eyes: Negative for photophobia, pain, discharge, itching and visual disturbance.   Respiratory:  Negative for cough, choking, chest tightness, wheezing and stridor.    Cardiovascular: Negative for chest pain and palpitations.   Gastrointestinal:  Negative for blood in stool and abdominal distention.   Endocrine: Negative for cold intolerance and heat intolerance.   Genitourinary: Negative for dysuria, hematuria and flank pain.   Musculoskeletal: Positive for myalgias, back pain, joint swelling, arthralgias.  Skin: Negative for color change, pallor and rash.   Neurological: Negative for dizziness, light-headedness, numbness and headaches.   Hematological: Negative for adenopathy. Does not bruise/bleed easily.   Psychiatric/Behavioral: Negative for decreased concentration and agitation. The patient is not nervous/anxious.            Objective:        Physical Exam   Constitutional: He is oriented to person, place, and time and well-developed, well-nourished, and in no distress. No distress.   HENT:   Head: Normocephalic and atraumatic.   Right Ear: External ear normal.   Left Ear: External ear normal.   Nose: Nose normal.   Mouth/Throat: Oropharynx is clear and moist. No oropharyngeal exudate.   Eyes: Conjunctivae and EOM are normal. Pupils are equal, round, and reactive to light. Right eye exhibits no discharge. Left eye exhibits no discharge. No scleral icterus.   Neck: Normal range of motion. Neck supple. No JVD present. No tracheal deviation present. No thyromegaly present.   Cardiovascular: Normal rate, regular rhythm and intact distal pulses.  Exam reveals no gallop and no friction rub.    No murmur heard.  Pulmonary/Chest: Effort normal and breath sounds normal. No stridor. No respiratory distress. He has no wheezes. He has no rales. He exhibits no tenderness.   Abdominal: Soft. Bowel sounds are normal. He exhibits no distension. There is no tenderness. There is no rebound.   Lymphadenopathy:     He has no cervical adenopathy.   Neurological: He is alert and oriented to person, place, and time.   Skin: Skin is warm. He is not diaphoretic.     Musculoskeletal:   Shoulders- decreased ROM of both shoulders to 130 degrees bilaterally (resoved)  Elbows-  rheumatoid nodules in extensor surfaces bilaterally; mild restriction in extension of right elbow  Wrist- synovitis with decreased extension bilaterally (resolved)  Hands- synovitis of mcps on right side, worse on the right  Knees-bony hypertrophy but no effusions or tenderness; crepitus  Ankles- no tenderness  Feet-bilaterally mtp tenderness resolved           Labs:  Robel: 1:320  dna-1:160<160  Esr-61<44   ccp-306  rf-876  Ro,la-negative  Nicole, rnp -negtayive   Hepatitis B-negative  Hepatitis C-negative  Urine-negative      Imaging:  I personally reviewed the xrays    Arthritis survey:      12/2015: hand x rays- no erosions  12/2015: feet xray: no erosions    Chest xray (2/2016)-negative      Assessment:     65 yo male with PMH of basal cell cancer (around 2010), HL here for  Follow up of  Seropositive RA.  He has seropositive RA with nodules. He also had serologies consistent with early SLE given (+ROBEL and +DNA) with no other features of  SLE.  Tried plaquenil but reports it gave him dizziness after a few doses.  Regarding his arthritis, he was doing well on MTX 6 pills a week but developed cough. Had recent CT chest concerning for UIP.I had long discussion with patient and told him hard to say if his symptoms came from MTX or RA but will stop the MTX.  Given his severe RA, will put him on rinvoq.We will treat his RA more aggressively with hopes it will improve his ILD.  Once covid pandemic is over, will need to send to Dr. Daily.    Plan:      * - follow up with  dermatology evaluation given history of skin cancer  -continue  baclofen 10mg po qhs prn for upper back tension and sleep  - stop MTX  -start rinvoq 15 mg po q day (Risksdiscussed with patient and not limited to cell count abnormalities, malignancy, allergic  reaction to medication, and increased risk of infection, GI perforation, thrombosis). Patient agrees with starting medication.  -continue folic acid 1 mg po qday  given history of +DNA  Avoid  anti tnf  Avoid orencia given emphysema on CT SCNA    -Continue vit D once a week   labs  Before next visit      The patient location is: home  The chief complaint leading to consultation is  Visit type: Virtual visit with synchronous audio and video  Total time spent with patient:20 minutes  Each patient to whom he or she provides medical services by telemedicine is:  (1) informed of the relationship between the physician and patient and the respective role of any other health care provider with respect to management of the patient; and (2) notified that he or she may decline to receive medical services by telemedicine and may withdraw from such care at any time.

## 2020-04-03 ENCOUNTER — TELEPHONE (OUTPATIENT)
Dept: PHARMACY | Facility: CLINIC | Age: 65
End: 2020-04-03

## 2020-04-03 NOTE — TELEPHONE ENCOUNTER
Informed Patient  that [facility] received prescription for Rinvoq and prior authorization is required.  [facility] will be back in touch once insurance determination is received.

## 2020-04-06 ENCOUNTER — PATIENT MESSAGE (OUTPATIENT)
Dept: RHEUMATOLOGY | Facility: CLINIC | Age: 65
End: 2020-04-06

## 2020-04-08 NOTE — TELEPHONE ENCOUNTER
DOCUMENTATION ONLY:  Prior authorization for Rinvoq approved from 04/08/20 to 10/07/20.   Case ID# [id number]    Co-pay: $250    Patient Assistance IS NOT required.     Forward to patient assistance for review. CRM

## 2020-04-14 NOTE — TELEPHONE ENCOUNTER
Called patient to offer financial assistance with Rinvoq high copy through a manufacturers copay card. Patient asked the price of the medication without and I explained that on just his insurance without any additional assistance the medication runs at a copy of $250/month. Patient understood and gave consent to go forward with getting a copay card on his behalf. @[address] [place] [address]

## 2020-04-14 NOTE — TELEPHONE ENCOUNTER
Applied patient for a Rinvoq copay card per request and patient was approved. Called patient back to inform him that he was approved and that the card would be sent to the email he provided ([email or web address]). Patient was pleased to hear. I informed him to keep a look out on his inbox and junk box and once he obtains it to give us a call and we can go forward with his medication, patient understood and agreed. @11:03[facility]

## 2020-04-16 ENCOUNTER — TELEPHONE (OUTPATIENT)
Dept: PHARMACY | Facility: CLINIC | Age: 65
End: 2020-04-16

## 2020-04-17 ENCOUNTER — PATIENT MESSAGE (OUTPATIENT)
Dept: RHEUMATOLOGY | Facility: CLINIC | Age: 65
End: 2020-04-17

## 2020-04-20 ENCOUNTER — TELEPHONE (OUTPATIENT)
Dept: PHARMACY | Facility: CLINIC | Age: 65
End: 2020-04-20

## 2020-04-20 NOTE — TELEPHONE ENCOUNTER
Initial Rinvoq consult completed on 20 . Rinvoq will be shipped on 20 to arrive at patient's home on 20 via [employer]. $ 5 copay. Patient will start Rinvoq on 20. Address confirmed, CC on file. Confirmed 2 patient identifiers - name and . Therapy Appropriate.     Patient was counseled on the administration directions for Rinvoq:  -Take 1 tablet (15mg) by mouth once daily, with or without food  -If dose is missed, skip the missed dose and go back to your normal time.  Do not take 2 doses at the same time or extra doses    Patient was counseled on the following possible side effects, which include, but are not limited to:   -diarrhea, headache, nausea, cough. Contact provider if allergic reaction, changes in heartbeat, muscle pain or weakness, signs of infection, liver problems - dark urine, fatigue, light-colored stools, yellow skin or eyes, signs of thrombosis.       DDIs:  Medication list reviewed, no DDI.       Patient confirmed understanding. Patient did not have additional questions.  Patient will start Rinvoq on 2020. Consultation included: indication; goals of treatment; administration; storage and handling; side effects; how to handle side effects; the importance of compliance; how to handle missed doses; the importance of laboratory monitoring; the importance of keeping all follow up appointments.  Patient understands to report any medication changes to [facility] and provider. All questions answered and addressed to patients satisfaction. [facility] to contact patient in 3 weeks for refills.

## 2020-04-21 DIAGNOSIS — Z01.84 ANTIBODY RESPONSE EXAMINATION: ICD-10-CM

## 2020-05-04 ENCOUNTER — LAB VISIT (OUTPATIENT)
Dept: LAB | Facility: HOSPITAL | Age: 65
End: 2020-05-04
Attending: INTERNAL MEDICINE
Payer: COMMERCIAL

## 2020-05-04 DIAGNOSIS — Z01.84 ANTIBODY RESPONSE EXAMINATION: ICD-10-CM

## 2020-05-04 LAB — SARS-COV-2 IGG SERPLBLD QL IA.RAPID: NEGATIVE

## 2020-05-04 PROCEDURE — 36415 COLL VENOUS BLD VENIPUNCTURE: CPT

## 2020-05-04 PROCEDURE — 86769 SARS-COV-2 COVID-19 ANTIBODY: CPT

## 2020-05-12 ENCOUNTER — HOSPITAL ENCOUNTER (OUTPATIENT)
Dept: RADIOLOGY | Facility: HOSPITAL | Age: 65
Discharge: HOME | End: 2020-05-12
Attending: FAMILY MEDICINE
Payer: COMMERCIAL

## 2020-05-12 ENCOUNTER — PATIENT MESSAGE (OUTPATIENT)
Dept: FAMILY MEDICINE | Facility: CLINIC | Age: 65
End: 2020-05-12

## 2020-05-12 DIAGNOSIS — N28.1 RENAL CYST: ICD-10-CM

## 2020-05-12 PROCEDURE — 76770 US RETROPERITONEAL COMPLETE: ICD-10-PCS | Mod: 26,,, | Performed by: RADIOLOGY

## 2020-05-12 PROCEDURE — 76770 US EXAM ABDO BACK WALL COMP: CPT | Mod: TC

## 2020-05-12 PROCEDURE — 76770 US EXAM ABDO BACK WALL COMP: CPT | Mod: 26,,, | Performed by: RADIOLOGY

## 2020-05-12 NOTE — TELEPHONE ENCOUNTER
Dear Fabiano Garvey    Your recent kidney ultrasound shows a stable cyst of the kidney; this is a benign finding and nothing to be concerned about..    Calin Buchanan MD

## 2020-05-13 ENCOUNTER — TELEPHONE (OUTPATIENT)
Dept: PHARMACY | Facility: CLINIC | Age: 65
End: 2020-05-13

## 2020-05-16 ENCOUNTER — PATIENT MESSAGE (OUTPATIENT)
Dept: RHEUMATOLOGY | Facility: CLINIC | Age: 65
End: 2020-05-16

## 2020-05-21 ENCOUNTER — PATIENT MESSAGE (OUTPATIENT)
Dept: RHEUMATOLOGY | Facility: CLINIC | Age: 65
End: 2020-05-21

## 2020-06-10 ENCOUNTER — TELEPHONE (OUTPATIENT)
Dept: PHARMACY | Facility: CLINIC | Age: 65
End: 2020-06-10

## 2020-06-10 NOTE — TELEPHONE ENCOUNTER
Pt confirmed shipping of Rinvoq on  to arrive on . Address and  verified. Pt unsure of exact dose count on hand at this time. Pt reported no missed doses. Pt did not start any new medications. Pt had no further questions or concerns.

## 2020-07-02 ENCOUNTER — TELEPHONE (OUTPATIENT)
Dept: PHARMACY | Facility: CLINIC | Age: 65
End: 2020-07-02

## 2020-07-13 ENCOUNTER — PATIENT OUTREACH (OUTPATIENT)
Dept: ADMINISTRATIVE | Facility: OTHER | Age: 65
End: 2020-07-13

## 2020-07-14 ENCOUNTER — OFFICE VISIT (OUTPATIENT)
Dept: DERMATOLOGY | Facility: CLINIC | Age: 65
End: 2020-07-14
Payer: COMMERCIAL

## 2020-07-14 DIAGNOSIS — D48.5 NEOPLASM OF UNCERTAIN BEHAVIOR OF SKIN: Primary | ICD-10-CM

## 2020-07-14 PROCEDURE — 88305 TISSUE EXAM BY PATHOLOGIST: CPT | Performed by: PATHOLOGY

## 2020-07-14 PROCEDURE — 99999 PR PBB SHADOW E&M-EST. PATIENT-LVL III: CPT | Mod: PBBFAC,,, | Performed by: DERMATOLOGY

## 2020-07-14 PROCEDURE — 11102 TANGNTL BX SKIN SINGLE LES: CPT | Mod: S$GLB,,, | Performed by: DERMATOLOGY

## 2020-07-14 PROCEDURE — 88305 TISSUE EXAM BY PATHOLOGIST: ICD-10-PCS | Mod: 26,,, | Performed by: PATHOLOGY

## 2020-07-14 PROCEDURE — 88305 TISSUE EXAM BY PATHOLOGIST: CPT | Mod: 26,,, | Performed by: PATHOLOGY

## 2020-07-14 PROCEDURE — 11102 PR TANGENTIAL BIOPSY, SKIN, SINGLE LESION: ICD-10-PCS | Mod: S$GLB,,, | Performed by: DERMATOLOGY

## 2020-07-14 PROCEDURE — 99499 NO LOS: ICD-10-PCS | Mod: S$GLB,,, | Performed by: DERMATOLOGY

## 2020-07-14 PROCEDURE — 99499 UNLISTED E&M SERVICE: CPT | Mod: S$GLB,,, | Performed by: DERMATOLOGY

## 2020-07-14 PROCEDURE — 99999 PR PBB SHADOW E&M-EST. PATIENT-LVL III: ICD-10-PCS | Mod: PBBFAC,,, | Performed by: DERMATOLOGY

## 2020-07-14 NOTE — PROGRESS NOTES
Subjective:       Patient ID:  Fabiano Garvey is a 64 y.o. male who presents for   Chief Complaint   Patient presents with    Lesion     Pt c/o lesion to rt nose. Was surgically removed approx 10 yrs.  Ago- was told it was a skin cancer. Has grown back within past 1mo. Red in color. Raised. Tender to touch. Bled. No prev tx    Lesion        Review of Systems   Constitutional: Negative for fever and chills.   Skin: Negative for daily sunscreen use, activity-related sunscreen use and tendency to form keloidal scars.   Hematologic/Lymphatic: Does not bruise/bleed easily.        Objective:    Physical Exam   Constitutional: He appears well-developed and well-nourished. No distress.   Neurological: He is alert and oriented to person, place, and time. He is not disoriented.   Psychiatric: He has a normal mood and affect.   Skin:   Areas Examined (abnormalities noted in diagram):   Head / Face Inspection Performed                  Diagram Legend     Erythematous scaling macule/papule c/w actinic keratosis       Vascular papule c/w angioma      Pigmented verrucoid papule/plaque c/w seborrheic keratosis      Yellow umbilicated papule c/w sebaceous hyperplasia      Irregularly shaped tan macule c/w lentigo     1-2 mm smooth white papules consistent with Milia      Movable subcutaneous cyst with punctum c/w epidermal inclusion cyst      Subcutaneous movable cyst c/w pilar cyst      Firm pink to brown papule c/w dermatofibroma      Pedunculated fleshy papule(s) c/w skin tag(s)      Evenly pigmented macule c/w junctional nevus     Mildly variegated pigmented, slightly irregular-bordered macule c/w mildly atypical nevus      Flesh colored to evenly pigmented papule c/w intradermal nevus       Pink pearly papule/plaque c/w basal cell carcinoma      Erythematous hyperkeratotic cursted plaque c/w SCC      Surgical scar with no sign of skin cancer recurrence      Open and closed comedones      Inflammatory papules and pustules       Verrucoid papule consistent consistent with wart     Erythematous eczematous patches and plaques     Dystrophic onycholytic nail with subungual debris c/w onychomycosis     Umbilicated papule    Erythematous-base heme-crusted tan verrucoid plaque consistent with inflamed seborrheic keratosis     Erythematous Silvery Scaling Plaque c/w Psoriasis     See annotation      Assessment / Plan:      Pathology Orders:     Normal Orders This Visit    Specimen to Pathology, Dermatology     Questions:    Procedure Type: Dermatology and skin neoplasms    Number of Specimens: 1    ------------------------: -------------------------    Spec 1 Procedure: Biopsy    Spec 1 Clinical Impression: r/o scc v other    Spec 1 Source: right medial canthus        Neoplasm of uncertain behavior of skin  Shave biopsy procedure note:    Shave biopsy performed after verbal consent including risk of infection, scar, recurrence, need for additional treatment of site. Area prepped with alcohol, anesthetized with approximately 1.0cc of 1% lidocaine with epinephrine. Lesional tissue shaved with razor blade. Hemostasis achieved with application of aluminum chloride followed by hyfrecation. No complications. Dressing applied. Wound care explained.    If biopsy positive for malignancy, refer to Dr. Graham for Mohs surgery consultation.  -     Specimen to Pathology, Dermatology             Follow up for prn bx report.

## 2020-07-17 LAB
FINAL PATHOLOGIC DIAGNOSIS: NORMAL
GROSS: NORMAL
MICROSCOPIC EXAM: NORMAL

## 2020-08-10 ENCOUNTER — TELEPHONE (OUTPATIENT)
Dept: PHARMACY | Facility: CLINIC | Age: 65
End: 2020-08-10

## 2020-08-10 ENCOUNTER — TELEPHONE (OUTPATIENT)
Dept: DERMATOLOGY | Facility: CLINIC | Age: 65
End: 2020-08-10

## 2020-08-12 ENCOUNTER — PROCEDURE VISIT (OUTPATIENT)
Dept: DERMATOLOGY | Facility: CLINIC | Age: 65
End: 2020-08-12
Payer: COMMERCIAL

## 2020-08-12 VITALS
HEART RATE: 55 BPM | BODY MASS INDEX: 27.48 KG/M2 | HEIGHT: 66 IN | DIASTOLIC BLOOD PRESSURE: 80 MMHG | SYSTOLIC BLOOD PRESSURE: 123 MMHG | WEIGHT: 171 LBS

## 2020-08-12 DIAGNOSIS — C44.121: Primary | ICD-10-CM

## 2020-08-12 PROCEDURE — 99499 NO LOS: ICD-10-PCS | Mod: S$GLB,,, | Performed by: DERMATOLOGY

## 2020-08-12 PROCEDURE — 17312 MOHS ADDL STAGE: CPT | Mod: S$GLB,,, | Performed by: DERMATOLOGY

## 2020-08-12 PROCEDURE — 99499 UNLISTED E&M SERVICE: CPT | Mod: S$GLB,,, | Performed by: DERMATOLOGY

## 2020-08-12 PROCEDURE — 13151 CMPLX RPR E/N/E/L 1.1-2.5 CM: CPT | Mod: 51,S$GLB,, | Performed by: DERMATOLOGY

## 2020-08-12 PROCEDURE — 13151 PR RECMPL WND LID,NOS,EAR 1.1-2.5 CM: ICD-10-PCS | Mod: 51,S$GLB,, | Performed by: DERMATOLOGY

## 2020-08-12 PROCEDURE — 17311: ICD-10-PCS | Mod: S$GLB,,, | Performed by: DERMATOLOGY

## 2020-08-12 PROCEDURE — 17311 MOHS 1 STAGE H/N/HF/G: CPT | Mod: S$GLB,,, | Performed by: DERMATOLOGY

## 2020-08-12 PROCEDURE — 17312: ICD-10-PCS | Mod: S$GLB,,, | Performed by: DERMATOLOGY

## 2020-08-12 NOTE — PROGRESS NOTES
PROCEDURE: Mohs' Micrographic Surgery    INDICATION: Location in mask areas of face including central face, nose, eyelids, eyebrows, lips, chin, preauricular, temple, and ear. Biopsy-proven skin cancer of cosmetically and functionally important areas, including head, neck, genital, hand, foot, or areas known for having difficulty in healing, such as the lower anterior legs. Tumor with ill-defined borders.    REFERRING MD: Lucie Judge M.D.    CASE NUMBER:     ANESTHETIC: 2.5 cc 0.5% Lidocaine with Epi 1:200,000 mixed 1:1 with 0.5% Bupivacaine    SURGICAL PREP: Betadine    SURGEON: Laverne Graham MD    ASSISTANTS: Michelle Hill PA-C and Denise Ramirez MA    PREOPERATIVE DIAGNOSIS: squamous cell carcinoma    POSTOPERATIVE DIAGNOSIS: squamous cell carcinoma    PATHOLOGIC DIAGNOSIS: squamous cell carcinoma- invasive, moderate to poorly differentiated    HISTOLOGY OF SPECIMENS IN FIRST STAGE:   Tumor Type: Tumor seen. Invasive squamous cell carcinoma: Proliferation of squamous cells exhibiting atypia and infiltrating within the dermis.  Moderately-differentiated squamous cell carcinoma: Proliferation of squamous cells exhibiting atypia of moderate differentiation and infiltrating within the dermis.  Poorly-differentiated squamous cell carcinoma: Proliferation of squamous cells exhibiting atypia of poor differentiation and infiltrating within the dermis.   Depth of Invasion: epidermis and dermis  Perineural Invasion: No    HISTOLOGY OF SPECIMENS IN SUBSEQUENT STAGES:  Tumor Type: Tumor seen. Same as in first stage.   Depth of Invasion: epidermis and dermis  Perineural Invasion: No    STAGES OF MOHS' SURGERY PERFORMED: 3    TUMOR-FREE PLANE ACHIEVED: Yes with deep tissue removal. No invasive SCC. Residual actinic keratoses clinically and histologically. Recommend postop treatment with Efudex or imiquimod. Discussed with patient.    HEMOSTASIS: electrocoagulation     SPECIMENS: 5 (2 in stage A, 2 in stage B and 1 in  stage C)    LOCATION: right medial canthus. Patient verified location with hand held mirror.    INITIAL LESION SIZE: 0.5 x 0.5 cm    FINAL DEFECT SIZE: 0.8 x 1.2 cm    WOUND REPAIR/DISPOSITION: The patient tolerated Mohs' Micrographic Surgery for a squamous cell carcinoma very well. When the tumor was completely removed, a repair of the surgical defect was undertaken.      PROCEDURE: Complex Linear Repair with partial second intention healing    INDICATION: Status post Mohs' Micrographic Surgery for squamous cell carcinoma.    CASE NUMBER:     SURGEON: Laverne Graham MD    ASSISTANTS: Michelle Hill PA-C and Denise Ramirez MA    ANESTHETIC: 1 cc 1% Lidocaine with Epinephrine 1:100,000    SURGICAL PREP: Betadine, prepped by Denise Ramirez MA    LOCATION: right medial canthus    DEFECT SIZE: 0.8 x 1.2 cm    WOUND REPAIR/DISPOSITION:  After the patient's carcinoma had been completely removed with Mohs' Micrographic Surgery, a repair of the surgical defect was undertaken. The patient was returned to the operating suite where the area of right medial canthus was prepped, draped, and anesthetized in the usual sterile fashion. The wound was widely undermined in all directions. Then, electrocoagulation was used to obtain meticulous hemostasis. 5-0 Vicryl buried vertical mattress sutures were placed into the subcutaneous and dermal plane to close the wound and basil the cutaneous wound edge. An inferior dog ear was identified and removed by a standard Burow's triangle technique. The cutaneous wound edges were closed using interrupted 5-0 Prolene suture. The superior aspect of the defect was not sutured and would let heal by second intention.    The patient tolerated the procedure well.    The area was cleaned and dressed appropriately and the patient was given wound care instructions, as well as appointment for follow-up evaluation and suture removal in one week. Patient declined pain medication.    LENGTH OF REPAIR:  "1.5 cm    Vitals:    08/12/20 0732 08/12/20 1104   BP: 115/79 123/80   BP Location: Left arm    Patient Position: Sitting    BP Method: Small (Automatic)    Pulse: 67 (!) 55   Weight: 77.6 kg (171 lb)    Height: 5' 6" (1.676 m)            "

## 2020-08-12 NOTE — Clinical Note
Jacquelyn Faulkner,  Did Mohs on him today, cleared an invasive moderately diff SCC R medial canthus.  Would recommend topical therapy in few months once he is healed adjacent to scar for residual hypertrophic Aks clinically and histologically.  Thanks!    SSW

## 2020-08-19 ENCOUNTER — OFFICE VISIT (OUTPATIENT)
Dept: DERMATOLOGY | Facility: CLINIC | Age: 65
End: 2020-08-19
Payer: COMMERCIAL

## 2020-08-19 DIAGNOSIS — Z09 POSTOP CHECK: Primary | ICD-10-CM

## 2020-08-19 PROCEDURE — 99024 POSTOP FOLLOW-UP VISIT: CPT | Mod: S$GLB,,, | Performed by: DERMATOLOGY

## 2020-08-19 PROCEDURE — 99024 PR POST-OP FOLLOW-UP VISIT: ICD-10-PCS | Mod: S$GLB,,, | Performed by: DERMATOLOGY

## 2020-08-19 PROCEDURE — 99999 PR PBB SHADOW E&M-EST. PATIENT-LVL III: ICD-10-PCS | Mod: PBBFAC,,, | Performed by: DERMATOLOGY

## 2020-08-19 PROCEDURE — 99999 PR PBB SHADOW E&M-EST. PATIENT-LVL III: CPT | Mod: PBBFAC,,, | Performed by: DERMATOLOGY

## 2020-08-19 NOTE — PROGRESS NOTES
65 y.o. male patient is here for suture removal following Mohs' surgery.    Patient reports no problems.    WOUND PE:  The R medial canthus sutures intact. Wound healing well. Good skin edges. No signs or symptoms of infection.    IMPRESSION:  Healing operative site from Mohs' surgery SCC, R medial canthus s/p Mohs with CLC with partial 2nd intention healing, postop day # 7.    PLAN:  Sutures removed today. Steri-strips applied.  D/Continue wound care.  Keep moist with Aquaphor especially the superior aspect.    RTC:  In 1 month.

## 2020-08-26 ENCOUNTER — OFFICE VISIT (OUTPATIENT)
Dept: RHEUMATOLOGY | Facility: CLINIC | Age: 65
End: 2020-08-26
Payer: COMMERCIAL

## 2020-08-26 ENCOUNTER — TELEPHONE (OUTPATIENT)
Dept: RHEUMATOLOGY | Facility: CLINIC | Age: 65
End: 2020-08-26

## 2020-08-26 DIAGNOSIS — J84.9 ILD (INTERSTITIAL LUNG DISEASE): ICD-10-CM

## 2020-08-26 DIAGNOSIS — M06.9 RHEUMATOID ARTHRITIS INVOLVING MULTIPLE JOINTS: Primary | ICD-10-CM

## 2020-08-26 DIAGNOSIS — J84.112 UIP (USUAL INTERSTITIAL PNEUMONITIS): ICD-10-CM

## 2020-08-26 PROCEDURE — 1159F MED LIST DOCD IN RCRD: CPT | Mod: ,,, | Performed by: INTERNAL MEDICINE

## 2020-08-26 PROCEDURE — 1101F PR PT FALLS ASSESS DOC 0-1 FALLS W/OUT INJ PAST YR: ICD-10-PCS | Mod: CPTII,,, | Performed by: INTERNAL MEDICINE

## 2020-08-26 PROCEDURE — 99214 OFFICE O/P EST MOD 30 MIN: CPT | Mod: 95,,, | Performed by: INTERNAL MEDICINE

## 2020-08-26 PROCEDURE — 1159F PR MEDICATION LIST DOCUMENTED IN MEDICAL RECORD: ICD-10-PCS | Mod: ,,, | Performed by: INTERNAL MEDICINE

## 2020-08-26 PROCEDURE — 99214 PR OFFICE/OUTPT VISIT, EST, LEVL IV, 30-39 MIN: ICD-10-PCS | Mod: 95,,, | Performed by: INTERNAL MEDICINE

## 2020-08-26 PROCEDURE — 1101F PT FALLS ASSESS-DOCD LE1/YR: CPT | Mod: CPTII,,, | Performed by: INTERNAL MEDICINE

## 2020-08-26 NOTE — PROGRESS NOTES
Subjective:       Patient ID: Fabiano Garvey is a 60 y.o. male.    Chief Complaint: OTHER and Swelling    HPI     61 yo male with PMH of basal cell cancer (around 2010), HL here for evaluation of join pain.  Reports pain in hands, elbows. and wrists.  Reports  swelling in hands, ankles, and feet.  Reports also has bilateral knee pain and shoulders.   Reports stiffness in morning for 30 minutes.  He has trouble swelling. Denies any rashes. No oral ulcers. No hair loss. Denies photosensitivity.  Denies any raynauds.Denies blood clots.  Denies dry eyes and dry mouth.  Reports symptoms for a year. In November, he noted the nodules in elbows.  Thinks he has swelling in lower neck.  He has trouble picking up things due to pain and swelling.  He reports that prednisone improves h is pain and swelling.  He has been off steroids.  No changes in weight.  Reports good appetite.      Interval history: he is on rinvoq. Denies any pain, joint swelling or stiffness.   On occasion, he has cough.Denies shortness of breath. He used to smoke. He quit 10 years ago.  He used to smoke 1/2 ppd from age to age 14 to 49.  He is taking MTX 6 pills once  A week. Denies any reflux. Denies any pain or swelling in joints.  Reports stiffness for 10 minutes.  He took MTX on Monday.    Past Medical History   Diagnosis Date    Hyperlipidemia     Basal cell carcinoma      medial canthus       Review of Systems   Constitutional: Negative for fever, chills, appetite change and fatigue.   HENT: Negative for hearing loss, mouth sores, rhinorrhea, sinus pressure and trouble swallowing.    Eyes: Negative for photophobia, pain, discharge, itching and visual disturbance.   Respiratory:  Negative for cough, choking, chest tightness, wheezing and stridor.    Cardiovascular: Negative for chest pain and palpitations.   Gastrointestinal: Negative for blood in stool and abdominal distention.   Endocrine: Negative for cold intolerance and heat intolerance.    Genitourinary: Negative for dysuria, hematuria and flank pain.   Musculoskeletal: Positive for myalgias, back pain, joint swelling, arthralgias.  Skin: Negative for color change, pallor and rash.   Neurological: Negative for dizziness, light-headedness, numbness and headaches.   Hematological: Negative for adenopathy. Does not bruise/bleed easily.   Psychiatric/Behavioral: Negative for decreased concentration and agitation. The patient is not nervous/anxious.            Objective:        Physical Exam   Constitutional: He is oriented to person, place, and time and well-developed, well-nourished, and in no distress. No distress.   HENT:   Head: Normocephalic and atraumatic.   Right Ear: External ear normal.   Left Ear: External ear normal.   Nose: Nose normal.   Mouth/Throat: Oropharynx is clear and moist. No oropharyngeal exudate.   Eyes: Conjunctivae and EOM are normal. Pupils are equal, round, and reactive to light. Right eye exhibits no discharge. Left eye exhibits no discharge. No scleral icterus.   Neck: Normal range of motion. Neck supple. No JVD present. No tracheal deviation present. No thyromegaly present.   Cardiovascular: Normal rate, regular rhythm and intact distal pulses.  Exam reveals no gallop and no friction rub.    No murmur heard.  Pulmonary/Chest: Effort normal and breath sounds normal. No stridor. No respiratory distress. He has no wheezes. He has no rales. He exhibits no tenderness.   Abdominal: Soft. Bowel sounds are normal. He exhibits no distension. There is no tenderness. There is no rebound.   Lymphadenopathy:     He has no cervical adenopathy.   Neurological: He is alert and oriented to person, place, and time.   Skin: Skin is warm. He is not diaphoretic.     Musculoskeletal:   Shoulders- decreased ROM of both shoulders to 130 degrees bilaterally (resoved)  Elbows- rheumatoid nodules in extensor surfaces bilaterally; mild restriction in extension of right elbow  Wrist- synovitis with  decreased extension bilaterally (resolved)  Hands- synovitis of mcps on right side, worse on the right  Knees-bony hypertrophy but no effusions or tenderness; crepitus  Ankles- no tenderness  Feet-bilaterally mtp tenderness resolved           Labs:  Robel: 1:320  dna-1:160<160  Esr-61<44   ccp-306  rf-876  Ro,la-negative  Nicole, rnp -negtayive   Hepatitis B-negative  Hepatitis C-negative  Urine-negative      Imaging:  I personally reviewed the xrays    Arthritis survey:      12/2015: hand x rays- no erosions  12/2015: feet xray: no erosions    Chest xray (2/2016)-negative      Assessment:     64 yo male with PMH of  Squamous cell carcinoma (2010, 2020), HL here for  Follow up of  Seropositive RA.  He has seropositive RA with nodules. He also had serologies consistent with early SLE given (+ROBEL and +DNA) with no other features of  SLE.  Tried plaquenil but reports it gave him dizziness after a few doses.  Regarding his arthritis, he was doing well on MTX 6 pills a week but developed cough. Had  CT chest concerning for UIP.I had long discussion with patient and told him hard to say if his symptoms came from MTX or RA but will stop the MTX.  Given his severe RA,  I put him on rinvoq. We will treat his RA more aggressively with hopes it will improve his ILD.    Plan:      * - follow up with  dermatology evaluation given history of skin cancer  -continue  baclofen 10mg po qhs prn for upper back tension and sleep  -continue rinvoq 15 mg po q day (Risksdiscussed with patient and not limited to cell count abnormalities, malignancy, allergic  reaction to medication, and increased risk of infection, GI perforation, thrombosis). Patient agrees with starting medication.  -continue folic acid 1 mg po qday  Off MTX given UIP  given history of +DNA  Avoid anti tnf  Avoid orencia given emphysema on CT SCAN    # vitamin D deficiency: Continue vit D once a week   labs  Before next visit      #UIP:  Pulmonary consult with   [place]    The patient location is: home  The chief complaint leading to consultation is  Visit type: Virtual visit with synchronous audio and video  Total time spent with patient:45  minutes  Each patient to whom he or she provides medical services by telemedicine is:  (1) informed of the relationship between the physician and patient and the respective role of any other health care provider with respect to management of the patient; and (2) notified that he or she may decline to receive medical services by telemedicine and may withdraw from such care at any time.

## 2020-08-28 ENCOUNTER — LAB VISIT (OUTPATIENT)
Dept: LAB | Facility: HOSPITAL | Age: 65
End: 2020-08-28
Attending: INTERNAL MEDICINE
Payer: COMMERCIAL

## 2020-08-28 DIAGNOSIS — M06.9 RHEUMATOID ARTHRITIS INVOLVING MULTIPLE JOINTS: ICD-10-CM

## 2020-08-28 LAB
25(OH)D3+25(OH)D2 SERPL-MCNC: 31 NG/ML (ref 30–96)
ALBUMIN SERPL BCP-MCNC: 4 G/DL (ref 3.5–5.2)
ALP SERPL-CCNC: 56 U/L (ref 55–135)
ALT SERPL W/O P-5'-P-CCNC: 32 U/L (ref 10–44)
ANION GAP SERPL CALC-SCNC: 5 MMOL/L (ref 8–16)
AST SERPL-CCNC: 35 U/L (ref 10–40)
BASOPHILS # BLD AUTO: 0.03 K/UL (ref 0–0.2)
BASOPHILS NFR BLD: 0.4 % (ref 0–1.9)
BILIRUB SERPL-MCNC: 0.5 MG/DL (ref 0.1–1)
BUN SERPL-MCNC: 16 MG/DL (ref 8–23)
CALCIUM SERPL-MCNC: 9.2 MG/DL (ref 8.7–10.5)
CHLORIDE SERPL-SCNC: 106 MMOL/L (ref 95–110)
CO2 SERPL-SCNC: 26 MMOL/L (ref 23–29)
CREAT SERPL-MCNC: 1 MG/DL (ref 0.5–1.4)
CRP SERPL-MCNC: 0.1 MG/L (ref 0–8.2)
DIFFERENTIAL METHOD: ABNORMAL
EOSINOPHIL # BLD AUTO: 0.2 K/UL (ref 0–0.5)
EOSINOPHIL NFR BLD: 2.2 % (ref 0–8)
ERYTHROCYTE [DISTWIDTH] IN BLOOD BY AUTOMATED COUNT: 13 % (ref 11.5–14.5)
ERYTHROCYTE [SEDIMENTATION RATE] IN BLOOD BY WESTERGREN METHOD: 23 MM/HR (ref 0–10)
EST. GFR  (AFRICAN AMERICAN): >60 ML/MIN/1.73 M^2
EST. GFR  (NON AFRICAN AMERICAN): >60 ML/MIN/1.73 M^2
GLUCOSE SERPL-MCNC: 77 MG/DL (ref 70–110)
HCT VFR BLD AUTO: 42.8 % (ref 40–54)
HGB BLD-MCNC: 14.5 G/DL (ref 14–18)
IMM GRANULOCYTES # BLD AUTO: 0.04 K/UL (ref 0–0.04)
IMM GRANULOCYTES NFR BLD AUTO: 0.6 % (ref 0–0.5)
LYMPHOCYTES # BLD AUTO: 3.3 K/UL (ref 1–4.8)
LYMPHOCYTES NFR BLD: 48.1 % (ref 18–48)
MCH RBC QN AUTO: 32.6 PG (ref 27–31)
MCHC RBC AUTO-ENTMCNC: 33.9 G/DL (ref 32–36)
MCV RBC AUTO: 96 FL (ref 82–98)
MONOCYTES # BLD AUTO: 0.9 K/UL (ref 0.3–1)
MONOCYTES NFR BLD: 12.5 % (ref 4–15)
NEUTROPHILS # BLD AUTO: 2.5 K/UL (ref 1.8–7.7)
NEUTROPHILS NFR BLD: 36.2 % (ref 38–73)
NRBC BLD-RTO: 0 /100 WBC
PLATELET # BLD AUTO: 203 K/UL (ref 150–350)
PMV BLD AUTO: 10.6 FL (ref 9.2–12.9)
POTASSIUM SERPL-SCNC: 4 MMOL/L (ref 3.5–5.1)
PROT SERPL-MCNC: 7.5 G/DL (ref 6–8.4)
RBC # BLD AUTO: 4.45 M/UL (ref 4.6–6.2)
SODIUM SERPL-SCNC: 137 MMOL/L (ref 136–145)
WBC # BLD AUTO: 6.94 K/UL (ref 3.9–12.7)

## 2020-08-28 PROCEDURE — 85652 RBC SED RATE AUTOMATED: CPT

## 2020-08-28 PROCEDURE — 80053 COMPREHEN METABOLIC PANEL: CPT

## 2020-08-28 PROCEDURE — 85025 COMPLETE CBC W/AUTO DIFF WBC: CPT

## 2020-08-28 PROCEDURE — 82306 VITAMIN D 25 HYDROXY: CPT

## 2020-08-28 PROCEDURE — 86225 DNA ANTIBODY NATIVE: CPT

## 2020-08-28 PROCEDURE — 36415 COLL VENOUS BLD VENIPUNCTURE: CPT

## 2020-08-28 PROCEDURE — 86140 C-REACTIVE PROTEIN: CPT

## 2020-08-31 LAB — DSDNA AB SER-ACNC: NORMAL [IU]/ML

## 2020-09-14 ENCOUNTER — TELEPHONE (OUTPATIENT)
Dept: PHARMACY | Facility: CLINIC | Age: 65
End: 2020-09-14

## 2020-09-14 NOTE — TELEPHONE ENCOUNTER
Pt confirmed shipping of RInvoq on  to arrive on 20. Address and  verified. $192 copay in 004- FA emailed for one-time rinvoq #cc. Pt unsure how many doses he has on hand but reported no missed doses so should be due 20. Pt did not start any new medications. Pt had no further questions or concerns.

## 2020-09-23 ENCOUNTER — PATIENT OUTREACH (OUTPATIENT)
Dept: ADMINISTRATIVE | Facility: OTHER | Age: 65
End: 2020-09-23

## 2020-09-23 ENCOUNTER — OFFICE VISIT (OUTPATIENT)
Dept: DERMATOLOGY | Facility: CLINIC | Age: 65
End: 2020-09-23
Payer: COMMERCIAL

## 2020-09-23 DIAGNOSIS — C44.121 SQUAMOUS CELL CARCINOMA OF RIGHT EYELID: Primary | ICD-10-CM

## 2020-09-23 PROCEDURE — 1126F PR PAIN SEVERITY QUANTIFIED, NO PAIN PRESENT: ICD-10-PCS | Mod: S$GLB,,, | Performed by: DERMATOLOGY

## 2020-09-23 PROCEDURE — 1159F MED LIST DOCD IN RCRD: CPT | Mod: S$GLB,,, | Performed by: DERMATOLOGY

## 2020-09-23 PROCEDURE — 99999 PR PBB SHADOW E&M-EST. PATIENT-LVL III: ICD-10-PCS | Mod: PBBFAC,,, | Performed by: DERMATOLOGY

## 2020-09-23 PROCEDURE — 1101F PT FALLS ASSESS-DOCD LE1/YR: CPT | Mod: CPTII,S$GLB,, | Performed by: DERMATOLOGY

## 2020-09-23 PROCEDURE — 1101F PR PT FALLS ASSESS DOC 0-1 FALLS W/OUT INJ PAST YR: ICD-10-PCS | Mod: CPTII,S$GLB,, | Performed by: DERMATOLOGY

## 2020-09-23 PROCEDURE — 1159F PR MEDICATION LIST DOCUMENTED IN MEDICAL RECORD: ICD-10-PCS | Mod: S$GLB,,, | Performed by: DERMATOLOGY

## 2020-09-23 PROCEDURE — 99212 PR OFFICE/OUTPT VISIT, EST, LEVL II, 10-19 MIN: ICD-10-PCS | Mod: S$GLB,,, | Performed by: DERMATOLOGY

## 2020-09-23 PROCEDURE — 1126F AMNT PAIN NOTED NONE PRSNT: CPT | Mod: S$GLB,,, | Performed by: DERMATOLOGY

## 2020-09-23 PROCEDURE — 99212 OFFICE O/P EST SF 10 MIN: CPT | Mod: S$GLB,,, | Performed by: DERMATOLOGY

## 2020-09-23 PROCEDURE — 99999 PR PBB SHADOW E&M-EST. PATIENT-LVL III: CPT | Mod: PBBFAC,,, | Performed by: DERMATOLOGY

## 2020-09-23 NOTE — PROGRESS NOTES
65 y.o. male patient is here for wound check following Mohs' surgery.    Patient reports no problems.    WOUND PE:  The R medial canthus incision and wound are healing well. Good skin edges. No signs or symptoms of infection.100% re-epithelialization. R eyelid in normal anatomic position.    IMPRESSION:  Healing operative site from Mohs' surgery SCC R medial canthus s/p Mohs with CLC with 2nd intention healing, postop week # 6, all healed and no evidence of recurrence.     PLAN:  Rec massage daily  Reassured pt redness and firmness will fade with time  Daily spf  Regular skin checks    RTC:  In 3-6 months with Lucie Judge M.D. for skin check or sooner if new concern arises.

## 2020-09-24 ENCOUNTER — OFFICE VISIT (OUTPATIENT)
Dept: PULMONOLOGY | Facility: CLINIC | Age: 65
End: 2020-09-24
Payer: COMMERCIAL

## 2020-09-24 ENCOUNTER — HOSPITAL ENCOUNTER (OUTPATIENT)
Dept: RADIOLOGY | Facility: HOSPITAL | Age: 65
Discharge: HOME | End: 2020-09-24
Attending: INTERNAL MEDICINE
Payer: COMMERCIAL

## 2020-09-24 VITALS
HEART RATE: 86 BPM | WEIGHT: 170.19 LBS | DIASTOLIC BLOOD PRESSURE: 85 MMHG | HEIGHT: 66 IN | SYSTOLIC BLOOD PRESSURE: 124 MMHG | BODY MASS INDEX: 27.35 KG/M2 | OXYGEN SATURATION: 95 %

## 2020-09-24 DIAGNOSIS — J84.9 ILD (INTERSTITIAL LUNG DISEASE): ICD-10-CM

## 2020-09-24 DIAGNOSIS — J84.9 ILD (INTERSTITIAL LUNG DISEASE): Primary | ICD-10-CM

## 2020-09-24 DIAGNOSIS — R06.02 SHORTNESS OF BREATH: ICD-10-CM

## 2020-09-24 PROCEDURE — 1126F PR PAIN SEVERITY QUANTIFIED, NO PAIN PRESENT: ICD-10-PCS | Mod: S$GLB,,, | Performed by: INTERNAL MEDICINE

## 2020-09-24 PROCEDURE — 99204 OFFICE O/P NEW MOD 45 MIN: CPT | Mod: S$GLB,,, | Performed by: INTERNAL MEDICINE

## 2020-09-24 PROCEDURE — 1101F PT FALLS ASSESS-DOCD LE1/YR: CPT | Mod: CPTII,S$GLB,, | Performed by: INTERNAL MEDICINE

## 2020-09-24 PROCEDURE — 99999 PR PBB SHADOW E&M-EST. PATIENT-LVL III: CPT | Mod: PBBFAC,,, | Performed by: INTERNAL MEDICINE

## 2020-09-24 PROCEDURE — 1101F PR PT FALLS ASSESS DOC 0-1 FALLS W/OUT INJ PAST YR: ICD-10-PCS | Mod: CPTII,S$GLB,, | Performed by: INTERNAL MEDICINE

## 2020-09-24 PROCEDURE — 71250 CT CHEST WITHOUT CONTRAST: ICD-10-PCS | Mod: 26,,, | Performed by: RADIOLOGY

## 2020-09-24 PROCEDURE — 1126F AMNT PAIN NOTED NONE PRSNT: CPT | Mod: S$GLB,,, | Performed by: INTERNAL MEDICINE

## 2020-09-24 PROCEDURE — 71250 CT THORAX DX C-: CPT | Mod: 26,,, | Performed by: RADIOLOGY

## 2020-09-24 PROCEDURE — 99204 PR OFFICE/OUTPT VISIT, NEW, LEVL IV, 45-59 MIN: ICD-10-PCS | Mod: S$GLB,,, | Performed by: INTERNAL MEDICINE

## 2020-09-24 PROCEDURE — 3008F PR BODY MASS INDEX (BMI) DOCUMENTED: ICD-10-PCS | Mod: CPTII,S$GLB,, | Performed by: INTERNAL MEDICINE

## 2020-09-24 PROCEDURE — 1159F PR MEDICATION LIST DOCUMENTED IN MEDICAL RECORD: ICD-10-PCS | Mod: S$GLB,,, | Performed by: INTERNAL MEDICINE

## 2020-09-24 PROCEDURE — 71250 CT THORAX DX C-: CPT | Mod: TC

## 2020-09-24 PROCEDURE — 1159F MED LIST DOCD IN RCRD: CPT | Mod: S$GLB,,, | Performed by: INTERNAL MEDICINE

## 2020-09-24 PROCEDURE — 3008F BODY MASS INDEX DOCD: CPT | Mod: CPTII,S$GLB,, | Performed by: INTERNAL MEDICINE

## 2020-09-24 PROCEDURE — 99999 PR PBB SHADOW E&M-EST. PATIENT-LVL III: ICD-10-PCS | Mod: PBBFAC,,, | Performed by: INTERNAL MEDICINE

## 2020-09-24 NOTE — PROGRESS NOTES
Subjective:       Patient ID: Fabiano Garvey is a 65 y.o. male.    Chief Complaint: Interstitial Lung Disease    65 year old male with RA on Rinvoq who presents for evaluation for abnormal CT chest.   Patient states that he went to his PCP earlier this year.   He was found to have ILD on CT chest. Concern that Methotrexate contributing to CT findings.   Former smoker. Quit in 2012.   Denies respiratory symptoms today.       Review of Systems   Constitutional: Negative for activity change and appetite change.   HENT: Negative for rhinorrhea and congestion.    Respiratory: Negative for cough, sputum production, shortness of breath and dyspnea on extertion.    Cardiovascular: Negative for chest pain.   Endocrine: Negative for cold intolerance and heat intolerance.    Musculoskeletal: Negative for arthralgias.   Skin: Negative for rash.   Gastrointestinal: Negative for abdominal pain and abdominal distention.   Neurological: Negative for dizziness and headaches.   Hematological: Negative for adenopathy.   Psychiatric/Behavioral: Negative for confusion.       Past Medical History:   Diagnosis Date    Basal cell carcinoma     medial canthus    Hyperlipidemia     NU (obstructive sleep apnea)     Pulmonary fibrosis     Rheumatoid arthritis     Squamous cell carcinoma 07/2019    SCC in situ left temple     Past Surgical History:   Procedure Laterality Date    COLONOSCOPY N/A 7/25/2017    Procedure: COLONOSCOPY;  Surgeon: Bill Nicole MD;  Location: [address]);  Service: Endoscopy;  Laterality: N/A;    NO PAST SURGERIES       Social History     Socioeconomic History    Marital status:      Spouse name: Not on file    Number of children: Not on file    Years of education: Not on file    Highest education level: Not on file   Occupational History     Employer: [employer]   Social Needs    Financial resource strain: Not hard at all    Food insecurity     Worry: Never true     " Inability: Never true    Transportation needs     Medical: No     Non-medical: No   Tobacco Use    Smoking status: Former Smoker     Quit date: 2012     Years since quittin.2    Smokeless tobacco: Never Used   Substance and Sexual Activity    Alcohol use: Yes     Frequency: Monthly or less     Drinks per session: 1 or 2     Binge frequency: Never     Comment: occasional    Drug use: Not on file    Sexual activity: Not on file   Lifestyle    Physical activity     Days per week: 0 days     Minutes per session: Not on file    Stress: Not at all   Relationships    Social connections     Talks on phone: More than three times a week     Gets together: More than three times a week     Attends Sikhism service: Not on file     Active member of club or organization: No     Attends meetings of clubs or organizations: Never     Relationship status:    Other Topics Concern    Not on file   Social History Narrative    No aerobic exercise, walks a lot    No diet                                                                                                                                                                                           Objective:       Vitals:    20 1329   BP: 124/85   Pulse: 86   SpO2: 95%   Weight: 77.2 kg (170 lb 3.1 oz)   Height: 5' 6" (1.676 m)   PainSc: 0-No pain       Physical Exam   Constitutional: He is oriented to person, place, and time. He appears well-developed and well-nourished. No distress.   HENT:   Head: Normocephalic.   Nose: Nose normal.   Neck: Normal range of motion. Neck supple.   Cardiovascular: Normal rate and regular rhythm.   Pulmonary/Chest: Normal expansion. He has rales.   Abdominal: Soft. Bowel sounds are normal.   Musculoskeletal: Normal range of motion.         General: No edema.   Neurological: He is alert and oriented to person, place, and time.   Skin: Skin is warm and dry. He is not diaphoretic.   Psychiatric: He has a normal mood and " affect. His behavior is normal.   Nursing note and vitals reviewed.    Personal Diagnostic Review  none pertinent  No flowsheet data found.      Assessment:       No diagnosis found.    Outpatient Encounter Medications as of 9/24/2020   Medication Sig Dispense Refill    atorvastatin (LIPITOR) 40 MG tablet TAKE 1 TABLET (40 MG TOTAL) BY MOUTH ONCE DAILY. 90 tablet 3    ergocalciferol (VITAMIN D2) 50,000 unit Cap Take 1 capsule (50,000 Units total) by mouth every 7 days. 12 capsule 0    fluticasone propionate (FLONASE) 50 mcg/actuation nasal spray 2 SPRAYS (100 MCG TOTAL) BY EACH NOSTRIL ROUTE ONCE DAILY. 16 mL 1    upadacitinib (RINVOQ) 15 mg ER 24 hr tablet Take 1 tablet (15 mg total) by mouth once daily. 30 tablet 11    baclofen (LIORESAL) 10 MG tablet Take 1 tablet (10 mg total) by mouth every evening. (Patient not taking: Reported on 11/21/2019) 30 tablet 11    folic acid (FOLVITE) 1 MG tablet Take 1 tablet (1 mg total) by mouth once daily. 90 tablet 4    methotrexate 2.5 MG Tab Take 6 tablets (15 mg total) by mouth every 7 days. 72 tablet 0     No facility-administered encounter medications on file as of 9/24/2020.      No orders of the defined types were placed in this encounter.      Plan:          ILD (interstitial lung disease)  Concern that CT findings of ILD(possible UIP) secondary to MTX vs RA. Patient has been on MTX for several months.   He currently has no respiratory symptoms.   Repeat CT chest and PFTs--last 6 months ago.   Will consider follow up with Dr. Amanda Daily based on findings(if CT chest and PFTs are worse then can consider therapy with OFEV +/- Cellcept).     Follow up in 2 months.     Natasha Singh MD

## 2020-09-24 NOTE — LETTER
September 24, 2020      Margaret Brenner MD  [address]  [address]  [facility] [address]           [facility] [address]  [address]  [facility] [address]  Phone: [phone]          Patient: Fabiano Garvey   MR Number: [id number]   YOB: 1955   Date of Visit: 9/24/2020       Dear Dr. Margaret Brenner:    Thank you for referring Fabiano Garvey to me for evaluation. Attached you will find relevant portions of my assessment and plan of care.    If you have questions, please do not hesitate to call me. I look forward to following Fabiano Garvey along with you.    Sincerely,    Natasha Singh MD    Enclosure  CC:  No Recipients    If you would like to receive this communication electronically, please contact [email or web address] or [phone] to request more information on [employer] access.    For providers and/or their staff who would like to refer a patient to [facility], please contact us through our one-stop-shop provider referral line, [facility], at [phone].    If you feel you have received this communication in error or would no longer like to receive these types of communications, please e-mail [email or web address]

## 2020-09-24 NOTE — ASSESSMENT & PLAN NOTE
Concern that CT findings of ILD(possible UIP) secondary to MTX vs RA. Patient has been on MTX for several months.   He currently has no respiratory symptoms.   Repeat CT chest and PFTs--last 6 months ago.   Will consider follow up with Dr. Amanda Daily based on findings(if CT chest and PFTs are worse then can consider therapy with OFEV +/- Cellcept).

## 2020-09-25 ENCOUNTER — LAB VISIT (OUTPATIENT)
Dept: SURGERY | Facility: CLINIC | Age: 65
End: 2020-09-25
Payer: COMMERCIAL

## 2020-09-25 DIAGNOSIS — R06.02 SHORTNESS OF BREATH: ICD-10-CM

## 2020-09-25 PROCEDURE — U0003 INFECTIOUS AGENT DETECTION BY NUCLEIC ACID (DNA OR RNA); SEVERE ACUTE RESPIRATORY SYNDROME CORONAVIRUS 2 (SARS-COV-2) (CORONAVIRUS DISEASE [COVID-19]), AMPLIFIED PROBE TECHNIQUE, MAKING USE OF HIGH THROUGHPUT TECHNOLOGIES AS DESCRIBED BY CMS-2020-01-R: HCPCS

## 2020-09-26 LAB — SARS-COV-2 RNA RESP QL NAA+PROBE: NOT DETECTED

## 2020-09-28 ENCOUNTER — HOSPITAL ENCOUNTER (OUTPATIENT)
Dept: PULMONOLOGY | Facility: CLINIC | Age: 65
Discharge: HOME | End: 2020-09-28
Payer: COMMERCIAL

## 2020-09-28 DIAGNOSIS — J84.9 ILD (INTERSTITIAL LUNG DISEASE): ICD-10-CM

## 2020-09-28 PROCEDURE — 94729 DIFFUSING CAPACITY: CPT | Mod: S$GLB,,, | Performed by: INTERNAL MEDICINE

## 2020-09-28 PROCEDURE — 94727 GAS DIL/WSHOT DETER LNG VOL: CPT | Mod: S$GLB,,, | Performed by: INTERNAL MEDICINE

## 2020-09-28 PROCEDURE — 94060 EVALUATION OF WHEEZING: CPT | Mod: S$GLB,,, | Performed by: INTERNAL MEDICINE

## 2020-09-28 PROCEDURE — 94729 PR C02/MEMBANE DIFFUSE CAPACITY: ICD-10-PCS | Mod: S$GLB,,, | Performed by: INTERNAL MEDICINE

## 2020-09-28 PROCEDURE — 94727 PR PULM FUNCTION TEST BY GAS: ICD-10-PCS | Mod: S$GLB,,, | Performed by: INTERNAL MEDICINE

## 2020-09-28 PROCEDURE — 94060 PR EVAL OF BRONCHOSPASM: ICD-10-PCS | Mod: S$GLB,,, | Performed by: INTERNAL MEDICINE

## 2020-10-05 ENCOUNTER — PATIENT MESSAGE (OUTPATIENT)
Dept: PULMONOLOGY | Facility: CLINIC | Age: 65
End: 2020-10-05

## 2020-10-15 ENCOUNTER — TELEPHONE (OUTPATIENT)
Dept: PHARMACY | Facility: CLINIC | Age: 65
End: 2020-10-15

## 2020-10-15 NOTE — TELEPHONE ENCOUNTER
Pt confirmed shipping of Rinvoq on 10/15 to arrive on 10/16. Address and  verified. $192 copay in 004-one-time rinvoq #cc. Pt has about 10 doses on hand. Pt did not start any new medications. Pt had no further questions or concerns.

## 2020-10-20 ENCOUNTER — PATIENT MESSAGE (OUTPATIENT)
Dept: PULMONOLOGY | Facility: CLINIC | Age: 65
End: 2020-10-20

## 2020-10-26 ENCOUNTER — PATIENT MESSAGE (OUTPATIENT)
Dept: RHEUMATOLOGY | Facility: CLINIC | Age: 65
End: 2020-10-26

## 2020-11-19 ENCOUNTER — SPECIALTY PHARMACY (OUTPATIENT)
Dept: PHARMACY | Facility: CLINIC | Age: 65
End: 2020-11-19

## 2020-11-19 NOTE — TELEPHONE ENCOUNTER
Specialty Pharmacy - Refill Coordination    Specialty Medication Orders Linked to Encounter      Most Recent Value   Medication #1  upadacitinib (RINVOQ) 15 mg ER 24 hr tablet (Order#[id number], Rx#[id number])          Refill Questions - Documented Responses      Most Recent Value   Relationship to patient of person spoken to?  Self   HIPAA/medical authority confirmed?  Yes   Any changes in contact preferences or allowed representatives?  No   Has the patient had any insurance changes?  No   Has the patient had any changes to specialty medication, dose, or instructions?  No   Has the patient started taking any new medications, herbals, or supplements?  No   Has the patient been diagnosed with any new medical conditions?  No   Does the patient have any new allergies to medications or foods?  No   Does the patient have any concerns about side effects?  No   Can the patient store medication/sharps container properly (at the correct temperature, away from children/pets, etc.)?  Yes   Can the patient call emergency services (911) in the event of an emergency?  Yes   Does the patient have any concerns or questions about taking or administering this medication as prescribed?  No   How many doses did the patient miss in the past 4 weeks or since the last fill?  0   How many doses does the patient have on hand?  8   How many days does the patient report on hand quantity will last?  8   Does the number of doses/days supply remaining match pharmacy expected amounts?  Yes   Does the patient feel that this medication is effective?  Yes   During the past 4 weeks, has patient missed any activities due to condition or medication?  No   During the past 4 weeks, did patient have any of the following urgent care visits?  None   How will the patient receive the medication?  Mail   When does the patient need to receive the medication?  11/27/20   Shipping Address  Home   Address in [facility] confirmed and updated if neccessary?   Yes   Expected Copay ($)  5   Is the patient able to afford the medication copay?  Yes   Payment Method  CC on file   Days supply of Refill  30   Would patient like to speak to a pharmacist?  No   Do you want to trigger an intervention?  No   Do you want to trigger an additional referral task?  No   Refill activity completed?  No   Refill activity plan  Refill scheduled   Shipment/Pickup Date:  11/23/20          Current Outpatient Medications   Medication Sig    atorvastatin (LIPITOR) 40 MG tablet TAKE 1 TABLET (40 MG TOTAL) BY MOUTH ONCE DAILY.    baclofen (LIORESAL) 10 MG tablet Take 1 tablet (10 mg total) by mouth every evening. (Patient not taking: Reported on 11/21/2019)    ergocalciferol (ERGOCALCIFEROL) 50,000 unit Cap TAKE 1 CAPSULE BY MOUTH ONE TIME PER WEEK    fluticasone propionate (FLONASE) 50 mcg/actuation nasal spray 2 SPRAYS (100 MCG TOTAL) BY EACH NOSTRIL ROUTE ONCE DAILY.    folic acid (FOLVITE) 1 MG tablet Take 1 tablet (1 mg total) by mouth once daily.    methotrexate 2.5 MG Tab Take 6 tablets (15 mg total) by mouth every 7 days.    upadacitinib (RINVOQ) 15 mg ER 24 hr tablet Take 1 tablet (15 mg total) by mouth once daily.   Last reviewed on 9/24/2020  1:29 PM by Yeni Velazquez MA    Review of patient's allergies indicates:   Allergen Reactions    Plaquenil [hydroxychloroquine]      Lightheaded and muscle aches    Last reviewed on  9/24/2020 1:29 PM by Yeni Velazquez      Tasks added this encounter   12/17/2020 - Refill Call (Auto Added)   Tasks due within next 3 months   1/6/2021 - Clinical - Follow Up Assesement (90 day)     [facility] - Specialty Pharmacy  [address] [place] [id number]  Phone: [phone]  Fax: [phone]

## 2020-12-14 ENCOUNTER — PATIENT MESSAGE (OUTPATIENT)
Dept: RHEUMATOLOGY | Facility: CLINIC | Age: 65
End: 2020-12-14

## 2020-12-14 ENCOUNTER — PATIENT MESSAGE (OUTPATIENT)
Dept: FAMILY MEDICINE | Facility: CLINIC | Age: 65
End: 2020-12-14

## 2020-12-15 ENCOUNTER — PATIENT MESSAGE (OUTPATIENT)
Dept: DERMATOLOGY | Facility: CLINIC | Age: 65
End: 2020-12-15

## 2020-12-19 ENCOUNTER — SPECIALTY PHARMACY (OUTPATIENT)
Dept: PHARMACY | Facility: CLINIC | Age: 65
End: 2020-12-19

## 2020-12-19 NOTE — TELEPHONE ENCOUNTER
Specialty Pharmacy - Refill Coordination    Specialty Medication Orders Linked to Encounter      Most Recent Value   Medication #1  upadacitinib (RINVOQ) 15 mg ER 24 hr tablet (Order#[id number], Rx#[id number])          Refill Questions - Documented Responses      Most Recent Value   Relationship to patient of person spoken to?  Self   HIPAA/medical authority confirmed?  Yes   Any changes in contact preferences or allowed representatives?  No   Has the patient had any insurance changes?  No   Has the patient had any changes to specialty medication, dose, or instructions?  No   Has the patient started taking any new medications, herbals, or supplements?  No   Has the patient been diagnosed with any new medical conditions?  No   Does the patient have any new allergies to medications or foods?  No   Does the patient have any concerns about side effects?  No   Can the patient store medication/sharps container properly (at the correct temperature, away from children/pets, etc.)?  Yes   Can the patient call emergency services (911) in the event of an emergency?  Yes   Does the patient have any concerns or questions about taking or administering this medication as prescribed?  No   How many doses did the patient miss in the past 4 weeks or since the last fill?  0   How many doses does the patient have on hand?  7   How many days does the patient report on hand quantity will last?  7   Does the number of doses/days supply remaining match pharmacy expected amounts?  Yes   Does the patient feel that this medication is effective?  Yes   During the past 4 weeks, has patient missed any activities due to condition or medication?  No   During the past 4 weeks, did patient have any of the following urgent care visits?  None   How will the patient receive the medication?  Mail   When does the patient need to receive the medication?  12/22/20   Shipping Address  Home   Address in [facility] confirmed and updated if neccessary?   Yes   Days supply of Refill  30   Would patient like to speak to a pharmacist?  No   Do you want to trigger an intervention?  No   Do you want to trigger an additional referral task?  No   Refill activity completed?  Yes   Refill activity plan  Refill scheduled   Shipment/Pickup Date:  12/21/20          Current Outpatient Medications   Medication Sig    atorvastatin (LIPITOR) 40 MG tablet TAKE 1 TABLET (40 MG TOTAL) BY MOUTH ONCE DAILY.    baclofen (LIORESAL) 10 MG tablet Take 1 tablet (10 mg total) by mouth every evening. (Patient not taking: Reported on 11/21/2019)    ergocalciferol (ERGOCALCIFEROL) 50,000 unit Cap TAKE 1 CAPSULE BY MOUTH ONE TIME PER WEEK    fluticasone propionate (FLONASE) 50 mcg/actuation nasal spray 2 SPRAYS (100 MCG TOTAL) BY EACH NOSTRIL ROUTE ONCE DAILY.    folic acid (FOLVITE) 1 MG tablet Take 1 tablet (1 mg total) by mouth once daily.    methotrexate 2.5 MG Tab Take 6 tablets (15 mg total) by mouth every 7 days.    upadacitinib (RINVOQ) 15 mg ER 24 hr tablet Take 1 tablet (15 mg total) by mouth once daily.   Last reviewed on 12/19/2020  2:08 PM by Leslie Ortez    Review of patient's allergies indicates:   Allergen Reactions    Plaquenil [hydroxychloroquine]      Lightheaded and muscle aches    Last reviewed on  12/19/2020 2:08 PM by Leslie Ortez      Tasks added this encounter   No tasks added.   Tasks due within next 3 months   1/6/2021 - Clinical - Follow Up Assesement (90 day)  12/17/2020 - Refill Call (Auto Added)         FA needs to contact insurance and copay card to complete refill. Email sent  LESLIE ORTEZ  [facility] - Specialty Pharmacy  [address] [place] [id number]  Phone: [phone]  Fax: [phone]

## 2020-12-20 ENCOUNTER — PATIENT MESSAGE (OUTPATIENT)
Dept: FAMILY MEDICINE | Facility: CLINIC | Age: 65
End: 2020-12-20

## 2020-12-23 ENCOUNTER — IMMUNIZATION (OUTPATIENT)
Dept: INTERNAL MEDICINE | Facility: CLINIC | Age: 65
End: 2020-12-23
Payer: COMMERCIAL

## 2020-12-23 DIAGNOSIS — Z23 NEED FOR VACCINATION: ICD-10-CM

## 2020-12-23 PROCEDURE — 91300 COVID-19, MRNA, LNP-S, PF, 30 MCG/0.3 ML DOSE VACCINE: ICD-10-PCS | Mod: ,,, | Performed by: FAMILY MEDICINE

## 2020-12-23 PROCEDURE — 0001A COVID-19, MRNA, LNP-S, PF, 30 MCG/0.3 ML DOSE VACCINE: ICD-10-PCS | Mod: CV19,,, | Performed by: FAMILY MEDICINE

## 2020-12-23 PROCEDURE — 91300 COVID-19, MRNA, LNP-S, PF, 30 MCG/0.3 ML DOSE VACCINE: CPT | Mod: ,,, | Performed by: FAMILY MEDICINE

## 2020-12-23 PROCEDURE — 0001A COVID-19, MRNA, LNP-S, PF, 30 MCG/0.3 ML DOSE VACCINE: CPT | Mod: CV19,,, | Performed by: FAMILY MEDICINE

## 2021-01-06 ENCOUNTER — OFFICE VISIT (OUTPATIENT)
Dept: RHEUMATOLOGY | Facility: CLINIC | Age: 66
End: 2021-01-06
Payer: COMMERCIAL

## 2021-01-06 ENCOUNTER — LAB VISIT (OUTPATIENT)
Dept: LAB | Facility: HOSPITAL | Age: 66
End: 2021-01-06
Attending: INTERNAL MEDICINE
Payer: COMMERCIAL

## 2021-01-06 DIAGNOSIS — M06.9 RHEUMATOID ARTHRITIS INVOLVING MULTIPLE JOINTS: Primary | ICD-10-CM

## 2021-01-06 DIAGNOSIS — J84.9 ILD (INTERSTITIAL LUNG DISEASE): Primary | ICD-10-CM

## 2021-01-06 DIAGNOSIS — M06.9 RHEUMATOID ARTHRITIS INVOLVING MULTIPLE JOINTS: ICD-10-CM

## 2021-01-06 LAB
25(OH)D3+25(OH)D2 SERPL-MCNC: 35 NG/ML (ref 30–96)
ALBUMIN SERPL BCP-MCNC: 3.7 G/DL (ref 3.5–5.2)
ALP SERPL-CCNC: 63 U/L (ref 55–135)
ALT SERPL W/O P-5'-P-CCNC: 23 U/L (ref 10–44)
ANION GAP SERPL CALC-SCNC: 5 MMOL/L (ref 8–16)
AST SERPL-CCNC: 24 U/L (ref 10–40)
BASOPHILS # BLD AUTO: 0.04 K/UL (ref 0–0.2)
BASOPHILS NFR BLD: 0.4 % (ref 0–1.9)
BILIRUB SERPL-MCNC: 0.4 MG/DL (ref 0.1–1)
BUN SERPL-MCNC: 14 MG/DL (ref 8–23)
CALCIUM SERPL-MCNC: 9.1 MG/DL (ref 8.7–10.5)
CHLORIDE SERPL-SCNC: 104 MMOL/L (ref 95–110)
CO2 SERPL-SCNC: 28 MMOL/L (ref 23–29)
CREAT SERPL-MCNC: 1 MG/DL (ref 0.5–1.4)
CRP SERPL-MCNC: 1.7 MG/L (ref 0–8.2)
DIFFERENTIAL METHOD: ABNORMAL
EOSINOPHIL # BLD AUTO: 0.4 K/UL (ref 0–0.5)
EOSINOPHIL NFR BLD: 4.1 % (ref 0–8)
ERYTHROCYTE [DISTWIDTH] IN BLOOD BY AUTOMATED COUNT: 13 % (ref 11.5–14.5)
ERYTHROCYTE [SEDIMENTATION RATE] IN BLOOD BY WESTERGREN METHOD: 40 MM/HR (ref 0–10)
EST. GFR  (AFRICAN AMERICAN): >60 ML/MIN/1.73 M^2
EST. GFR  (NON AFRICAN AMERICAN): >60 ML/MIN/1.73 M^2
GLUCOSE SERPL-MCNC: 107 MG/DL (ref 70–110)
HCT VFR BLD AUTO: 43.5 % (ref 40–54)
HGB BLD-MCNC: 14.4 G/DL (ref 14–18)
IMM GRANULOCYTES # BLD AUTO: 0.02 K/UL (ref 0–0.04)
IMM GRANULOCYTES NFR BLD AUTO: 0.2 % (ref 0–0.5)
LYMPHOCYTES # BLD AUTO: 2.2 K/UL (ref 1–4.8)
LYMPHOCYTES NFR BLD: 25 % (ref 18–48)
MCH RBC QN AUTO: 31.8 PG (ref 27–31)
MCHC RBC AUTO-ENTMCNC: 33.1 G/DL (ref 32–36)
MCV RBC AUTO: 96 FL (ref 82–98)
MONOCYTES # BLD AUTO: 1 K/UL (ref 0.3–1)
MONOCYTES NFR BLD: 11.2 % (ref 4–15)
NEUTROPHILS # BLD AUTO: 5.3 K/UL (ref 1.8–7.7)
NEUTROPHILS NFR BLD: 59.1 % (ref 38–73)
NRBC BLD-RTO: 0 /100 WBC
PLATELET # BLD AUTO: 172 K/UL (ref 150–350)
PMV BLD AUTO: 11.1 FL (ref 9.2–12.9)
POTASSIUM SERPL-SCNC: 4.4 MMOL/L (ref 3.5–5.1)
PROT SERPL-MCNC: 7.6 G/DL (ref 6–8.4)
RBC # BLD AUTO: 4.53 M/UL (ref 4.6–6.2)
SODIUM SERPL-SCNC: 137 MMOL/L (ref 136–145)
WBC # BLD AUTO: 8.95 K/UL (ref 3.9–12.7)

## 2021-01-06 PROCEDURE — 3288F FALL RISK ASSESSMENT DOCD: CPT | Mod: CPTII,,, | Performed by: INTERNAL MEDICINE

## 2021-01-06 PROCEDURE — 85025 COMPLETE CBC W/AUTO DIFF WBC: CPT

## 2021-01-06 PROCEDURE — 99215 OFFICE O/P EST HI 40 MIN: CPT | Mod: 95,,, | Performed by: INTERNAL MEDICINE

## 2021-01-06 PROCEDURE — 1159F PR MEDICATION LIST DOCUMENTED IN MEDICAL RECORD: ICD-10-PCS | Mod: ,,, | Performed by: INTERNAL MEDICINE

## 2021-01-06 PROCEDURE — 82306 VITAMIN D 25 HYDROXY: CPT

## 2021-01-06 PROCEDURE — 1159F MED LIST DOCD IN RCRD: CPT | Mod: ,,, | Performed by: INTERNAL MEDICINE

## 2021-01-06 PROCEDURE — 86140 C-REACTIVE PROTEIN: CPT

## 2021-01-06 PROCEDURE — 80053 COMPREHEN METABOLIC PANEL: CPT

## 2021-01-06 PROCEDURE — 1101F PR PT FALLS ASSESS DOC 0-1 FALLS W/OUT INJ PAST YR: ICD-10-PCS | Mod: CPTII,,, | Performed by: INTERNAL MEDICINE

## 2021-01-06 PROCEDURE — 3288F PR FALLS RISK ASSESSMENT DOCUMENTED: ICD-10-PCS | Mod: CPTII,,, | Performed by: INTERNAL MEDICINE

## 2021-01-06 PROCEDURE — 1101F PT FALLS ASSESS-DOCD LE1/YR: CPT | Mod: CPTII,,, | Performed by: INTERNAL MEDICINE

## 2021-01-06 PROCEDURE — 99215 PR OFFICE/OUTPT VISIT, EST, LEVL V, 40-54 MIN: ICD-10-PCS | Mod: 95,,, | Performed by: INTERNAL MEDICINE

## 2021-01-06 PROCEDURE — 85652 RBC SED RATE AUTOMATED: CPT

## 2021-01-06 PROCEDURE — 1126F AMNT PAIN NOTED NONE PRSNT: CPT | Mod: ,,, | Performed by: INTERNAL MEDICINE

## 2021-01-06 PROCEDURE — 1126F PR PAIN SEVERITY QUANTIFIED, NO PAIN PRESENT: ICD-10-PCS | Mod: ,,, | Performed by: INTERNAL MEDICINE

## 2021-01-06 RX ORDER — BACLOFEN 10 MG/1
TABLET ORAL
Qty: 60 TABLET | Refills: 11 | Status: SHIPPED | OUTPATIENT
Start: 2021-01-06 | End: 2021-05-07

## 2021-01-06 ASSESSMENT — ROUTINE ASSESSMENT OF PATIENT INDEX DATA (RAPID3)
PATIENT GLOBAL ASSESSMENT SCORE: 0.5
TOTAL RAPID3 SCORE: 0.39
MDHAQ FUNCTION SCORE: 0.2
FATIGUE SCORE: 0
AM STIFFNESS SCORE: 0, NO
PSYCHOLOGICAL DISTRESS SCORE: 0
PAIN SCORE: 0

## 2021-01-07 ENCOUNTER — PATIENT MESSAGE (OUTPATIENT)
Dept: PULMONOLOGY | Facility: CLINIC | Age: 66
End: 2021-01-07

## 2021-01-13 ENCOUNTER — SPECIALTY PHARMACY (OUTPATIENT)
Dept: PHARMACY | Facility: CLINIC | Age: 66
End: 2021-01-13

## 2021-01-13 ENCOUNTER — IMMUNIZATION (OUTPATIENT)
Dept: INTERNAL MEDICINE | Facility: CLINIC | Age: 66
End: 2021-01-13
Payer: COMMERCIAL

## 2021-01-13 DIAGNOSIS — Z23 NEED FOR VACCINATION: ICD-10-CM

## 2021-01-13 PROCEDURE — 91300 COVID-19, MRNA, LNP-S, PF, 30 MCG/0.3 ML DOSE VACCINE: CPT | Mod: PBBFAC | Performed by: FAMILY MEDICINE

## 2021-01-13 PROCEDURE — 0002A COVID-19, MRNA, LNP-S, PF, 30 MCG/0.3 ML DOSE VACCINE: CPT | Mod: PBBFAC | Performed by: FAMILY MEDICINE

## 2021-02-02 RX ORDER — ERGOCALCIFEROL 1.25 MG/1
CAPSULE ORAL
Qty: 12 CAPSULE | Refills: 0 | Status: SHIPPED | OUTPATIENT
Start: 2021-02-02 | End: 2021-04-25 | Stop reason: SDUPTHER

## 2021-02-02 RX ORDER — ERGOCALCIFEROL 1.25 MG/1
CAPSULE ORAL
Qty: 12 CAPSULE | Refills: 0 | Status: CANCELLED | OUTPATIENT
Start: 2021-02-02

## 2021-02-05 ENCOUNTER — SPECIALTY PHARMACY (OUTPATIENT)
Dept: PHARMACY | Facility: CLINIC | Age: 66
End: 2021-02-05

## 2021-02-07 ENCOUNTER — PATIENT OUTREACH (OUTPATIENT)
Dept: ADMINISTRATIVE | Facility: OTHER | Age: 66
End: 2021-02-07

## 2021-02-08 ENCOUNTER — OFFICE VISIT (OUTPATIENT)
Dept: DERMATOLOGY | Facility: CLINIC | Age: 66
End: 2021-02-08
Payer: COMMERCIAL

## 2021-02-08 DIAGNOSIS — L90.5 SCAR: Primary | ICD-10-CM

## 2021-02-08 DIAGNOSIS — L57.0 AK (ACTINIC KERATOSIS): ICD-10-CM

## 2021-02-08 DIAGNOSIS — Z85.828 PERSONAL HISTORY OF SKIN CANCER: ICD-10-CM

## 2021-02-08 DIAGNOSIS — L81.4 LENTIGO: ICD-10-CM

## 2021-02-08 DIAGNOSIS — L82.1 SK (SEBORRHEIC KERATOSIS): ICD-10-CM

## 2021-02-08 PROCEDURE — 1159F MED LIST DOCD IN RCRD: CPT | Mod: S$GLB,,, | Performed by: DERMATOLOGY

## 2021-02-08 PROCEDURE — 17004 PR DESTRUCTION, PREMALIGNANT LESIONS; 15 OR MORE LESIONS: ICD-10-PCS | Mod: S$GLB,,, | Performed by: DERMATOLOGY

## 2021-02-08 PROCEDURE — 99999 PR PBB SHADOW E&M-EST. PATIENT-LVL III: ICD-10-PCS | Mod: PBBFAC,,, | Performed by: DERMATOLOGY

## 2021-02-08 PROCEDURE — 1126F PR PAIN SEVERITY QUANTIFIED, NO PAIN PRESENT: ICD-10-PCS | Mod: S$GLB,,, | Performed by: DERMATOLOGY

## 2021-02-08 PROCEDURE — 99214 PR OFFICE/OUTPT VISIT, EST, LEVL IV, 30-39 MIN: ICD-10-PCS | Mod: 25,S$GLB,, | Performed by: DERMATOLOGY

## 2021-02-08 PROCEDURE — 99214 OFFICE O/P EST MOD 30 MIN: CPT | Mod: 25,S$GLB,, | Performed by: DERMATOLOGY

## 2021-02-08 PROCEDURE — 99999 PR PBB SHADOW E&M-EST. PATIENT-LVL III: CPT | Mod: PBBFAC,,, | Performed by: DERMATOLOGY

## 2021-02-08 PROCEDURE — 3288F FALL RISK ASSESSMENT DOCD: CPT | Mod: CPTII,S$GLB,, | Performed by: DERMATOLOGY

## 2021-02-08 PROCEDURE — 1159F PR MEDICATION LIST DOCUMENTED IN MEDICAL RECORD: ICD-10-PCS | Mod: S$GLB,,, | Performed by: DERMATOLOGY

## 2021-02-08 PROCEDURE — 1126F AMNT PAIN NOTED NONE PRSNT: CPT | Mod: S$GLB,,, | Performed by: DERMATOLOGY

## 2021-02-08 PROCEDURE — 1101F PR PT FALLS ASSESS DOC 0-1 FALLS W/OUT INJ PAST YR: ICD-10-PCS | Mod: CPTII,S$GLB,, | Performed by: DERMATOLOGY

## 2021-02-08 PROCEDURE — 3288F PR FALLS RISK ASSESSMENT DOCUMENTED: ICD-10-PCS | Mod: CPTII,S$GLB,, | Performed by: DERMATOLOGY

## 2021-02-08 PROCEDURE — 1101F PT FALLS ASSESS-DOCD LE1/YR: CPT | Mod: CPTII,S$GLB,, | Performed by: DERMATOLOGY

## 2021-02-08 PROCEDURE — 17004 DESTROY PREMAL LESIONS 15/>: CPT | Mod: S$GLB,,, | Performed by: DERMATOLOGY

## 2021-02-08 RX ORDER — FLUOROURACIL 50 MG/G
CREAM TOPICAL
Qty: 40 G | Refills: 1 | Status: SHIPPED | OUTPATIENT
Start: 2021-02-08 | End: 2023-10-04 | Stop reason: SDUPTHER

## 2021-02-10 ENCOUNTER — PATIENT MESSAGE (OUTPATIENT)
Dept: DERMATOLOGY | Facility: CLINIC | Age: 66
End: 2021-02-10

## 2021-02-23 ENCOUNTER — PATIENT MESSAGE (OUTPATIENT)
Dept: RHEUMATOLOGY | Facility: CLINIC | Age: 66
End: 2021-02-23

## 2021-03-02 ENCOUNTER — CLINICAL SUPPORT (OUTPATIENT)
Dept: OTHER | Facility: CLINIC | Age: 66
End: 2021-03-02
Payer: COMMERCIAL

## 2021-03-02 DIAGNOSIS — Z00.8 ENCOUNTER FOR OTHER GENERAL EXAMINATION: ICD-10-CM

## 2021-03-03 VITALS — HEIGHT: 66 IN | BODY MASS INDEX: 27.47 KG/M2

## 2021-03-03 LAB
GLUCOSE SERPL-MCNC: 129 MG/DL (ref 60–140)
HDLC SERPL-MCNC: 57 MG/DL
POC CHOLESTEROL, LDL (DOCK): 60 MG/DL
POC CHOLESTEROL, TOTAL: 131 MG/DL
TRIGL SERPL-MCNC: 71 MG/DL

## 2021-03-08 ENCOUNTER — SPECIALTY PHARMACY (OUTPATIENT)
Dept: PHARMACY | Facility: CLINIC | Age: 66
End: 2021-03-08

## 2021-03-14 ENCOUNTER — PATIENT OUTREACH (OUTPATIENT)
Dept: ADMINISTRATIVE | Facility: OTHER | Age: 66
End: 2021-03-14

## 2021-03-15 ENCOUNTER — OFFICE VISIT (OUTPATIENT)
Dept: DERMATOLOGY | Facility: CLINIC | Age: 66
End: 2021-03-15
Payer: COMMERCIAL

## 2021-03-15 ENCOUNTER — PROCEDURE VISIT (OUTPATIENT)
Dept: DERMATOLOGY | Facility: CLINIC | Age: 66
End: 2021-03-15
Payer: COMMERCIAL

## 2021-03-15 DIAGNOSIS — L82.1 SK (SEBORRHEIC KERATOSIS): ICD-10-CM

## 2021-03-15 DIAGNOSIS — L57.0 AK (ACTINIC KERATOSIS): Primary | ICD-10-CM

## 2021-03-15 DIAGNOSIS — Z85.828 PERSONAL HISTORY OF SKIN CANCER: ICD-10-CM

## 2021-03-15 DIAGNOSIS — L90.5 SCAR: ICD-10-CM

## 2021-03-15 PROCEDURE — 99213 OFFICE O/P EST LOW 20 MIN: CPT | Mod: 25,S$GLB,, | Performed by: DERMATOLOGY

## 2021-03-15 PROCEDURE — 1159F MED LIST DOCD IN RCRD: CPT | Mod: S$GLB,,, | Performed by: DERMATOLOGY

## 2021-03-15 PROCEDURE — 17000 DESTRUCT PREMALG LESION: CPT | Mod: S$GLB,,, | Performed by: DERMATOLOGY

## 2021-03-15 PROCEDURE — 1159F PR MEDICATION LIST DOCUMENTED IN MEDICAL RECORD: ICD-10-PCS | Mod: S$GLB,,, | Performed by: DERMATOLOGY

## 2021-03-15 PROCEDURE — 17000 PR DESTRUCTION(LASER SURGERY,CRYOSURGERY,CHEMOSURGERY),PREMALIGNANT LESIONS,FIRST LESION: ICD-10-PCS | Mod: S$GLB,,, | Performed by: DERMATOLOGY

## 2021-03-15 PROCEDURE — 99213 PR OFFICE/OUTPT VISIT, EST, LEVL III, 20-29 MIN: ICD-10-PCS | Mod: 25,S$GLB,, | Performed by: DERMATOLOGY

## 2021-03-18 ENCOUNTER — TELEPHONE (OUTPATIENT)
Dept: DERMATOLOGY | Facility: CLINIC | Age: 66
End: 2021-03-18

## 2021-04-07 ENCOUNTER — CLINICAL SUPPORT (OUTPATIENT)
Dept: DERMATOLOGY | Facility: CLINIC | Age: 66
End: 2021-04-07
Payer: COMMERCIAL

## 2021-04-07 DIAGNOSIS — L57.0 AK (ACTINIC KERATOSIS): ICD-10-CM

## 2021-04-07 PROCEDURE — 96567 PR EXT PHOTODYNAMIC THERAPY: ICD-10-PCS | Mod: S$GLB,,, | Performed by: DERMATOLOGY

## 2021-04-07 PROCEDURE — 99499 NO LOS: ICD-10-PCS | Mod: S$GLB,,, | Performed by: DERMATOLOGY

## 2021-04-07 PROCEDURE — 99499 UNLISTED E&M SERVICE: CPT | Mod: S$GLB,,, | Performed by: DERMATOLOGY

## 2021-04-07 PROCEDURE — 99999 PR PBB SHADOW E&M-EST. PATIENT-LVL III: ICD-10-PCS | Mod: PBBFAC,,,

## 2021-04-07 PROCEDURE — 99999 PR PBB SHADOW E&M-EST. PATIENT-LVL III: CPT | Mod: PBBFAC,,,

## 2021-04-07 PROCEDURE — 96567 PDT DSTR PRMLG LES SKN: CPT | Mod: S$GLB,,, | Performed by: DERMATOLOGY

## 2021-04-09 RX ORDER — UPADACITINIB 15 MG/1
15 TABLET, EXTENDED RELEASE ORAL DAILY
Qty: 30 TABLET | Refills: 11 | Status: SHIPPED | OUTPATIENT
Start: 2021-04-09 | End: 2022-04-27 | Stop reason: SDUPTHER

## 2021-04-12 ENCOUNTER — PATIENT MESSAGE (OUTPATIENT)
Dept: ADMINISTRATIVE | Facility: HOSPITAL | Age: 66
End: 2021-04-12

## 2021-04-14 ENCOUNTER — PATIENT MESSAGE (OUTPATIENT)
Dept: PHARMACY | Facility: CLINIC | Age: 66
End: 2021-04-14

## 2021-04-15 ENCOUNTER — SPECIALTY PHARMACY (OUTPATIENT)
Dept: PHARMACY | Facility: CLINIC | Age: 66
End: 2021-04-15

## 2021-04-19 ENCOUNTER — PATIENT MESSAGE (OUTPATIENT)
Dept: RHEUMATOLOGY | Facility: CLINIC | Age: 66
End: 2021-04-19

## 2021-04-19 ENCOUNTER — LAB VISIT (OUTPATIENT)
Dept: LAB | Facility: HOSPITAL | Age: 66
End: 2021-04-19
Attending: INTERNAL MEDICINE
Payer: COMMERCIAL

## 2021-04-19 DIAGNOSIS — M06.9 RHEUMATOID ARTHRITIS INVOLVING MULTIPLE JOINTS: ICD-10-CM

## 2021-04-19 LAB
ALBUMIN SERPL BCP-MCNC: 3.9 G/DL (ref 3.5–5.2)
ALP SERPL-CCNC: 46 U/L (ref 55–135)
ALT SERPL W/O P-5'-P-CCNC: 31 U/L (ref 10–44)
ANION GAP SERPL CALC-SCNC: 6 MMOL/L (ref 8–16)
AST SERPL-CCNC: 31 U/L (ref 10–40)
BASOPHILS # BLD AUTO: 0.04 K/UL (ref 0–0.2)
BASOPHILS NFR BLD: 0.4 % (ref 0–1.9)
BILIRUB SERPL-MCNC: 0.3 MG/DL (ref 0.1–1)
BUN SERPL-MCNC: 15 MG/DL (ref 8–23)
CALCIUM SERPL-MCNC: 8.6 MG/DL (ref 8.7–10.5)
CHLORIDE SERPL-SCNC: 107 MMOL/L (ref 95–110)
CO2 SERPL-SCNC: 26 MMOL/L (ref 23–29)
CREAT SERPL-MCNC: 1 MG/DL (ref 0.5–1.4)
CRP SERPL-MCNC: 0.2 MG/L (ref 0–8.2)
DIFFERENTIAL METHOD: ABNORMAL
EOSINOPHIL # BLD AUTO: 0.4 K/UL (ref 0–0.5)
EOSINOPHIL NFR BLD: 4.3 % (ref 0–8)
ERYTHROCYTE [DISTWIDTH] IN BLOOD BY AUTOMATED COUNT: 13.2 % (ref 11.5–14.5)
ERYTHROCYTE [SEDIMENTATION RATE] IN BLOOD BY WESTERGREN METHOD: 18 MM/HR (ref 0–10)
EST. GFR  (AFRICAN AMERICAN): >60 ML/MIN/1.73 M^2
EST. GFR  (NON AFRICAN AMERICAN): >60 ML/MIN/1.73 M^2
GLUCOSE SERPL-MCNC: 89 MG/DL (ref 70–110)
HCT VFR BLD AUTO: 44 % (ref 40–54)
HGB BLD-MCNC: 14.8 G/DL (ref 14–18)
IMM GRANULOCYTES # BLD AUTO: 0.03 K/UL (ref 0–0.04)
IMM GRANULOCYTES NFR BLD AUTO: 0.3 % (ref 0–0.5)
LYMPHOCYTES # BLD AUTO: 2.1 K/UL (ref 1–4.8)
LYMPHOCYTES NFR BLD: 22.8 % (ref 18–48)
MCH RBC QN AUTO: 32.2 PG (ref 27–31)
MCHC RBC AUTO-ENTMCNC: 33.6 G/DL (ref 32–36)
MCV RBC AUTO: 96 FL (ref 82–98)
MONOCYTES # BLD AUTO: 1.1 K/UL (ref 0.3–1)
MONOCYTES NFR BLD: 12.3 % (ref 4–15)
NEUTROPHILS # BLD AUTO: 5.4 K/UL (ref 1.8–7.7)
NEUTROPHILS NFR BLD: 59.9 % (ref 38–73)
NRBC BLD-RTO: 0 /100 WBC
PLATELET # BLD AUTO: 169 K/UL (ref 150–450)
PMV BLD AUTO: 11.1 FL (ref 9.2–12.9)
POTASSIUM SERPL-SCNC: 4.5 MMOL/L (ref 3.5–5.1)
PROT SERPL-MCNC: 7.3 G/DL (ref 6–8.4)
RBC # BLD AUTO: 4.59 M/UL (ref 4.6–6.2)
SODIUM SERPL-SCNC: 139 MMOL/L (ref 136–145)
WBC # BLD AUTO: 8.99 K/UL (ref 3.9–12.7)

## 2021-04-19 PROCEDURE — 85652 RBC SED RATE AUTOMATED: CPT | Performed by: INTERNAL MEDICINE

## 2021-04-19 PROCEDURE — 80053 COMPREHEN METABOLIC PANEL: CPT | Performed by: INTERNAL MEDICINE

## 2021-04-19 PROCEDURE — 86140 C-REACTIVE PROTEIN: CPT | Performed by: INTERNAL MEDICINE

## 2021-04-19 PROCEDURE — 36415 COLL VENOUS BLD VENIPUNCTURE: CPT | Performed by: INTERNAL MEDICINE

## 2021-04-19 PROCEDURE — 85025 COMPLETE CBC W/AUTO DIFF WBC: CPT | Performed by: INTERNAL MEDICINE

## 2021-04-21 ENCOUNTER — OFFICE VISIT (OUTPATIENT)
Dept: RHEUMATOLOGY | Facility: CLINIC | Age: 66
End: 2021-04-21
Payer: COMMERCIAL

## 2021-04-21 VITALS
DIASTOLIC BLOOD PRESSURE: 79 MMHG | HEART RATE: 86 BPM | HEIGHT: 66 IN | WEIGHT: 161.88 LBS | SYSTOLIC BLOOD PRESSURE: 118 MMHG | BODY MASS INDEX: 26.02 KG/M2

## 2021-04-21 DIAGNOSIS — M06.9 RHEUMATOID ARTHRITIS INVOLVING MULTIPLE JOINTS: Primary | ICD-10-CM

## 2021-04-21 DIAGNOSIS — Z79.899 ENCOUNTER FOR LONG-TERM CURRENT USE OF HIGH RISK MEDICATION: ICD-10-CM

## 2021-04-21 PROCEDURE — 1126F AMNT PAIN NOTED NONE PRSNT: CPT | Mod: S$GLB,,, | Performed by: INTERNAL MEDICINE

## 2021-04-21 PROCEDURE — 99214 PR OFFICE/OUTPT VISIT, EST, LEVL IV, 30-39 MIN: ICD-10-PCS | Mod: S$GLB,,, | Performed by: INTERNAL MEDICINE

## 2021-04-21 PROCEDURE — 1159F PR MEDICATION LIST DOCUMENTED IN MEDICAL RECORD: ICD-10-PCS | Mod: S$GLB,,, | Performed by: INTERNAL MEDICINE

## 2021-04-21 PROCEDURE — 1159F MED LIST DOCD IN RCRD: CPT | Mod: S$GLB,,, | Performed by: INTERNAL MEDICINE

## 2021-04-21 PROCEDURE — 3008F PR BODY MASS INDEX (BMI) DOCUMENTED: ICD-10-PCS | Mod: CPTII,S$GLB,, | Performed by: INTERNAL MEDICINE

## 2021-04-21 PROCEDURE — 99999 PR PBB SHADOW E&M-EST. PATIENT-LVL III: ICD-10-PCS | Mod: PBBFAC,,, | Performed by: INTERNAL MEDICINE

## 2021-04-21 PROCEDURE — 99214 OFFICE O/P EST MOD 30 MIN: CPT | Mod: S$GLB,,, | Performed by: INTERNAL MEDICINE

## 2021-04-21 PROCEDURE — 3008F BODY MASS INDEX DOCD: CPT | Mod: CPTII,S$GLB,, | Performed by: INTERNAL MEDICINE

## 2021-04-21 PROCEDURE — 1126F PR PAIN SEVERITY QUANTIFIED, NO PAIN PRESENT: ICD-10-PCS | Mod: S$GLB,,, | Performed by: INTERNAL MEDICINE

## 2021-04-21 PROCEDURE — 99999 PR PBB SHADOW E&M-EST. PATIENT-LVL III: CPT | Mod: PBBFAC,,, | Performed by: INTERNAL MEDICINE

## 2021-04-30 ENCOUNTER — PATIENT OUTREACH (OUTPATIENT)
Dept: ADMINISTRATIVE | Facility: OTHER | Age: 66
End: 2021-04-30

## 2021-05-04 ENCOUNTER — PATIENT MESSAGE (OUTPATIENT)
Dept: PULMONOLOGY | Facility: CLINIC | Age: 66
End: 2021-05-04

## 2021-05-05 ENCOUNTER — OFFICE VISIT (OUTPATIENT)
Dept: DERMATOLOGY | Facility: CLINIC | Age: 66
End: 2021-05-05
Payer: COMMERCIAL

## 2021-05-05 DIAGNOSIS — Z85.828 PERSONAL HISTORY OF SKIN CANCER: ICD-10-CM

## 2021-05-05 DIAGNOSIS — L81.4 LENTIGO: ICD-10-CM

## 2021-05-05 DIAGNOSIS — L90.5 SCAR: ICD-10-CM

## 2021-05-05 DIAGNOSIS — L57.0 AK (ACTINIC KERATOSIS): Primary | ICD-10-CM

## 2021-05-05 DIAGNOSIS — L82.1 SK (SEBORRHEIC KERATOSIS): ICD-10-CM

## 2021-05-05 PROCEDURE — 99213 OFFICE O/P EST LOW 20 MIN: CPT | Mod: 25,S$GLB,, | Performed by: DERMATOLOGY

## 2021-05-05 PROCEDURE — 1101F PR PT FALLS ASSESS DOC 0-1 FALLS W/OUT INJ PAST YR: ICD-10-PCS | Mod: CPTII,S$GLB,, | Performed by: DERMATOLOGY

## 2021-05-05 PROCEDURE — 17000 PR DESTRUCTION(LASER SURGERY,CRYOSURGERY,CHEMOSURGERY),PREMALIGNANT LESIONS,FIRST LESION: ICD-10-PCS | Mod: S$GLB,,, | Performed by: DERMATOLOGY

## 2021-05-05 PROCEDURE — 1159F PR MEDICATION LIST DOCUMENTED IN MEDICAL RECORD: ICD-10-PCS | Mod: S$GLB,,, | Performed by: DERMATOLOGY

## 2021-05-05 PROCEDURE — 3288F FALL RISK ASSESSMENT DOCD: CPT | Mod: CPTII,S$GLB,, | Performed by: DERMATOLOGY

## 2021-05-05 PROCEDURE — 99213 PR OFFICE/OUTPT VISIT, EST, LEVL III, 20-29 MIN: ICD-10-PCS | Mod: 25,S$GLB,, | Performed by: DERMATOLOGY

## 2021-05-05 PROCEDURE — 17003 DESTRUCTION, PREMALIGNANT LESIONS; SECOND THROUGH 14 LESIONS: ICD-10-PCS | Mod: S$GLB,,, | Performed by: DERMATOLOGY

## 2021-05-05 PROCEDURE — 99999 PR PBB SHADOW E&M-EST. PATIENT-LVL II: ICD-10-PCS | Mod: PBBFAC,,, | Performed by: DERMATOLOGY

## 2021-05-05 PROCEDURE — 3288F PR FALLS RISK ASSESSMENT DOCUMENTED: ICD-10-PCS | Mod: CPTII,S$GLB,, | Performed by: DERMATOLOGY

## 2021-05-05 PROCEDURE — 1126F AMNT PAIN NOTED NONE PRSNT: CPT | Mod: S$GLB,,, | Performed by: DERMATOLOGY

## 2021-05-05 PROCEDURE — 99999 PR PBB SHADOW E&M-EST. PATIENT-LVL II: CPT | Mod: PBBFAC,,, | Performed by: DERMATOLOGY

## 2021-05-05 PROCEDURE — 17000 DESTRUCT PREMALG LESION: CPT | Mod: S$GLB,,, | Performed by: DERMATOLOGY

## 2021-05-05 PROCEDURE — 17003 DESTRUCT PREMALG LES 2-14: CPT | Mod: S$GLB,,, | Performed by: DERMATOLOGY

## 2021-05-05 PROCEDURE — 1126F PR PAIN SEVERITY QUANTIFIED, NO PAIN PRESENT: ICD-10-PCS | Mod: S$GLB,,, | Performed by: DERMATOLOGY

## 2021-05-05 PROCEDURE — 1101F PT FALLS ASSESS-DOCD LE1/YR: CPT | Mod: CPTII,S$GLB,, | Performed by: DERMATOLOGY

## 2021-05-05 PROCEDURE — 1159F MED LIST DOCD IN RCRD: CPT | Mod: S$GLB,,, | Performed by: DERMATOLOGY

## 2021-05-06 ENCOUNTER — TELEPHONE (OUTPATIENT)
Dept: FAMILY MEDICINE | Facility: CLINIC | Age: 66
End: 2021-05-06

## 2021-05-06 ENCOUNTER — PATIENT MESSAGE (OUTPATIENT)
Dept: FAMILY MEDICINE | Facility: CLINIC | Age: 66
End: 2021-05-06

## 2021-05-07 ENCOUNTER — OFFICE VISIT (OUTPATIENT)
Dept: FAMILY MEDICINE | Facility: CLINIC | Age: 66
End: 2021-05-07
Payer: COMMERCIAL

## 2021-05-07 VITALS
DIASTOLIC BLOOD PRESSURE: 82 MMHG | HEIGHT: 66 IN | TEMPERATURE: 98 F | OXYGEN SATURATION: 96 % | SYSTOLIC BLOOD PRESSURE: 110 MMHG | BODY MASS INDEX: 26.12 KG/M2 | WEIGHT: 162.5 LBS | HEART RATE: 93 BPM

## 2021-05-07 DIAGNOSIS — M62.830 SPASM OF LUMBAR PARASPINOUS MUSCLE: Primary | ICD-10-CM

## 2021-05-07 DIAGNOSIS — M16.11 ARTHRITIS OF RIGHT HIP: ICD-10-CM

## 2021-05-07 PROCEDURE — 1125F AMNT PAIN NOTED PAIN PRSNT: CPT | Mod: S$GLB,,, | Performed by: FAMILY MEDICINE

## 2021-05-07 PROCEDURE — 99213 PR OFFICE/OUTPT VISIT, EST, LEVL III, 20-29 MIN: ICD-10-PCS | Mod: S$GLB,,, | Performed by: FAMILY MEDICINE

## 2021-05-07 PROCEDURE — 1125F PR PAIN SEVERITY QUANTIFIED, PAIN PRESENT: ICD-10-PCS | Mod: S$GLB,,, | Performed by: FAMILY MEDICINE

## 2021-05-07 PROCEDURE — 3008F PR BODY MASS INDEX (BMI) DOCUMENTED: ICD-10-PCS | Mod: CPTII,S$GLB,, | Performed by: FAMILY MEDICINE

## 2021-05-07 PROCEDURE — 99999 PR PBB SHADOW E&M-EST. PATIENT-LVL IV: CPT | Mod: PBBFAC,,, | Performed by: FAMILY MEDICINE

## 2021-05-07 PROCEDURE — 99213 OFFICE O/P EST LOW 20 MIN: CPT | Mod: S$GLB,,, | Performed by: FAMILY MEDICINE

## 2021-05-07 PROCEDURE — 1159F PR MEDICATION LIST DOCUMENTED IN MEDICAL RECORD: ICD-10-PCS | Mod: S$GLB,,, | Performed by: FAMILY MEDICINE

## 2021-05-07 PROCEDURE — 3288F FALL RISK ASSESSMENT DOCD: CPT | Mod: CPTII,S$GLB,, | Performed by: FAMILY MEDICINE

## 2021-05-07 PROCEDURE — 99999 PR PBB SHADOW E&M-EST. PATIENT-LVL IV: ICD-10-PCS | Mod: PBBFAC,,, | Performed by: FAMILY MEDICINE

## 2021-05-07 PROCEDURE — 1101F PT FALLS ASSESS-DOCD LE1/YR: CPT | Mod: CPTII,S$GLB,, | Performed by: FAMILY MEDICINE

## 2021-05-07 PROCEDURE — 1101F PR PT FALLS ASSESS DOC 0-1 FALLS W/OUT INJ PAST YR: ICD-10-PCS | Mod: CPTII,S$GLB,, | Performed by: FAMILY MEDICINE

## 2021-05-07 PROCEDURE — 3008F BODY MASS INDEX DOCD: CPT | Mod: CPTII,S$GLB,, | Performed by: FAMILY MEDICINE

## 2021-05-07 PROCEDURE — 1159F MED LIST DOCD IN RCRD: CPT | Mod: S$GLB,,, | Performed by: FAMILY MEDICINE

## 2021-05-07 PROCEDURE — 3288F PR FALLS RISK ASSESSMENT DOCUMENTED: ICD-10-PCS | Mod: CPTII,S$GLB,, | Performed by: FAMILY MEDICINE

## 2021-05-07 RX ORDER — IBUPROFEN 800 MG/1
800 TABLET ORAL 3 TIMES DAILY PRN
Qty: 90 TABLET | Refills: 0 | Status: SHIPPED | OUTPATIENT
Start: 2021-05-07 | End: 2021-06-18 | Stop reason: SDUPTHER

## 2021-05-07 RX ORDER — CYCLOBENZAPRINE HCL 10 MG
10 TABLET ORAL 3 TIMES DAILY PRN
Qty: 90 TABLET | Refills: 0 | Status: SHIPPED | OUTPATIENT
Start: 2021-05-07 | End: 2021-11-10

## 2021-05-10 ENCOUNTER — PATIENT MESSAGE (OUTPATIENT)
Dept: FAMILY MEDICINE | Facility: CLINIC | Age: 66
End: 2021-05-10

## 2021-05-10 DIAGNOSIS — M62.830 SPASM OF LUMBAR PARASPINOUS MUSCLE: Primary | ICD-10-CM

## 2021-05-10 RX ORDER — TRAMADOL HYDROCHLORIDE 50 MG/1
50 TABLET ORAL EVERY 6 HOURS PRN
Qty: 30 TABLET | Refills: 0 | Status: SHIPPED | OUTPATIENT
Start: 2021-05-10 | End: 2021-05-18

## 2021-05-11 ENCOUNTER — OFFICE VISIT (OUTPATIENT)
Dept: FAMILY MEDICINE | Facility: CLINIC | Age: 66
End: 2021-05-11
Payer: COMMERCIAL

## 2021-05-11 VITALS
OXYGEN SATURATION: 96 % | WEIGHT: 165.56 LBS | SYSTOLIC BLOOD PRESSURE: 132 MMHG | TEMPERATURE: 98 F | HEART RATE: 86 BPM | DIASTOLIC BLOOD PRESSURE: 78 MMHG | BODY MASS INDEX: 26.61 KG/M2 | HEIGHT: 66 IN

## 2021-05-11 DIAGNOSIS — S39.012A STRAIN OF LUMBAR REGION, INITIAL ENCOUNTER: ICD-10-CM

## 2021-05-11 DIAGNOSIS — M54.9 BACK PAIN, UNSPECIFIED BACK LOCATION, UNSPECIFIED BACK PAIN LATERALITY, UNSPECIFIED CHRONICITY: Primary | ICD-10-CM

## 2021-05-11 PROCEDURE — 3288F FALL RISK ASSESSMENT DOCD: CPT | Mod: CPTII,S$GLB,, | Performed by: FAMILY MEDICINE

## 2021-05-11 PROCEDURE — 99214 OFFICE O/P EST MOD 30 MIN: CPT | Mod: 25,S$GLB,, | Performed by: FAMILY MEDICINE

## 2021-05-11 PROCEDURE — 99214 PR OFFICE/OUTPT VISIT, EST, LEVL IV, 30-39 MIN: ICD-10-PCS | Mod: 25,S$GLB,, | Performed by: FAMILY MEDICINE

## 2021-05-11 PROCEDURE — 1125F PR PAIN SEVERITY QUANTIFIED, PAIN PRESENT: ICD-10-PCS | Mod: S$GLB,,, | Performed by: FAMILY MEDICINE

## 2021-05-11 PROCEDURE — 96372 THER/PROPH/DIAG INJ SC/IM: CPT | Mod: S$GLB,,, | Performed by: FAMILY MEDICINE

## 2021-05-11 PROCEDURE — 1101F PR PT FALLS ASSESS DOC 0-1 FALLS W/OUT INJ PAST YR: ICD-10-PCS | Mod: CPTII,S$GLB,, | Performed by: FAMILY MEDICINE

## 2021-05-11 PROCEDURE — 1159F MED LIST DOCD IN RCRD: CPT | Mod: S$GLB,,, | Performed by: FAMILY MEDICINE

## 2021-05-11 PROCEDURE — 1159F PR MEDICATION LIST DOCUMENTED IN MEDICAL RECORD: ICD-10-PCS | Mod: S$GLB,,, | Performed by: FAMILY MEDICINE

## 2021-05-11 PROCEDURE — 99999 PR PBB SHADOW E&M-EST. PATIENT-LVL III: CPT | Mod: PBBFAC,,, | Performed by: FAMILY MEDICINE

## 2021-05-11 PROCEDURE — 99999 PR PBB SHADOW E&M-EST. PATIENT-LVL III: ICD-10-PCS | Mod: PBBFAC,,, | Performed by: FAMILY MEDICINE

## 2021-05-11 PROCEDURE — 3008F PR BODY MASS INDEX (BMI) DOCUMENTED: ICD-10-PCS | Mod: CPTII,S$GLB,, | Performed by: FAMILY MEDICINE

## 2021-05-11 PROCEDURE — 3008F BODY MASS INDEX DOCD: CPT | Mod: CPTII,S$GLB,, | Performed by: FAMILY MEDICINE

## 2021-05-11 PROCEDURE — 96372 PR INJECTION,THERAP/PROPH/DIAG2ST, IM OR SUBCUT: ICD-10-PCS | Mod: S$GLB,,, | Performed by: FAMILY MEDICINE

## 2021-05-11 PROCEDURE — 3288F PR FALLS RISK ASSESSMENT DOCUMENTED: ICD-10-PCS | Mod: CPTII,S$GLB,, | Performed by: FAMILY MEDICINE

## 2021-05-11 PROCEDURE — 1125F AMNT PAIN NOTED PAIN PRSNT: CPT | Mod: S$GLB,,, | Performed by: FAMILY MEDICINE

## 2021-05-11 PROCEDURE — 1101F PT FALLS ASSESS-DOCD LE1/YR: CPT | Mod: CPTII,S$GLB,, | Performed by: FAMILY MEDICINE

## 2021-05-11 RX ORDER — KETOROLAC TROMETHAMINE 30 MG/ML
30 INJECTION, SOLUTION INTRAMUSCULAR; INTRAVENOUS
Status: DISCONTINUED | OUTPATIENT
Start: 2021-05-11 | End: 2022-11-16

## 2021-05-11 RX ORDER — KETOROLAC TROMETHAMINE 30 MG/ML
30 INJECTION, SOLUTION INTRAMUSCULAR; INTRAVENOUS
Status: COMPLETED | OUTPATIENT
Start: 2021-05-11 | End: 2021-05-11

## 2021-05-11 RX ORDER — KETOROLAC TROMETHAMINE 30 MG/ML
30 INJECTION, SOLUTION INTRAMUSCULAR; INTRAVENOUS ONCE
Status: SHIPPED | OUTPATIENT
Start: 2021-05-11 | End: 2021-05-14

## 2021-05-11 RX ADMIN — KETOROLAC TROMETHAMINE 30 MG: 30 INJECTION, SOLUTION INTRAMUSCULAR; INTRAVENOUS at 04:05

## 2021-05-14 ENCOUNTER — SPECIALTY PHARMACY (OUTPATIENT)
Dept: PHARMACY | Facility: CLINIC | Age: 66
End: 2021-05-14

## 2021-05-18 ENCOUNTER — HOSPITAL ENCOUNTER (OUTPATIENT)
Dept: RADIOLOGY | Facility: HOSPITAL | Age: 66
Discharge: HOME | End: 2021-05-18
Attending: FAMILY MEDICINE
Payer: COMMERCIAL

## 2021-05-18 ENCOUNTER — PATIENT MESSAGE (OUTPATIENT)
Dept: FAMILY MEDICINE | Facility: CLINIC | Age: 66
End: 2021-05-18

## 2021-05-18 ENCOUNTER — OFFICE VISIT (OUTPATIENT)
Dept: FAMILY MEDICINE | Facility: CLINIC | Age: 66
End: 2021-05-18
Payer: COMMERCIAL

## 2021-05-18 VITALS
WEIGHT: 164 LBS | HEIGHT: 66 IN | DIASTOLIC BLOOD PRESSURE: 88 MMHG | TEMPERATURE: 99 F | SYSTOLIC BLOOD PRESSURE: 126 MMHG | HEART RATE: 80 BPM | OXYGEN SATURATION: 98 % | BODY MASS INDEX: 26.36 KG/M2

## 2021-05-18 DIAGNOSIS — E78.5 HYPERLIPIDEMIA, UNSPECIFIED HYPERLIPIDEMIA TYPE: ICD-10-CM

## 2021-05-18 DIAGNOSIS — M54.50 LOW BACK PAIN RADIATING TO RIGHT LEG: ICD-10-CM

## 2021-05-18 DIAGNOSIS — M79.604 LOW BACK PAIN RADIATING TO RIGHT LEG: ICD-10-CM

## 2021-05-18 DIAGNOSIS — J84.10 PULMONARY FIBROSIS: ICD-10-CM

## 2021-05-18 DIAGNOSIS — D84.9 IMMUNOSUPPRESSED STATUS: ICD-10-CM

## 2021-05-18 DIAGNOSIS — G47.33 OSA (OBSTRUCTIVE SLEEP APNEA): ICD-10-CM

## 2021-05-18 DIAGNOSIS — Z12.5 SCREENING FOR MALIGNANT NEOPLASM OF PROSTATE: ICD-10-CM

## 2021-05-18 DIAGNOSIS — M25.551 RIGHT HIP PAIN: ICD-10-CM

## 2021-05-18 DIAGNOSIS — Z00.00 ROUTINE GENERAL MEDICAL EXAMINATION AT A HEALTH CARE FACILITY: Primary | ICD-10-CM

## 2021-05-18 DIAGNOSIS — M06.9 RHEUMATOID ARTHRITIS, INVOLVING UNSPECIFIED SITE, UNSPECIFIED WHETHER RHEUMATOID FACTOR PRESENT: ICD-10-CM

## 2021-05-18 PROCEDURE — 72110 XR LUMBAR SPINE COMPLETE 5 VIEW: ICD-10-PCS | Mod: 26,,, | Performed by: RADIOLOGY

## 2021-05-18 PROCEDURE — 1125F PR PAIN SEVERITY QUANTIFIED, PAIN PRESENT: ICD-10-PCS | Mod: S$GLB,,, | Performed by: FAMILY MEDICINE

## 2021-05-18 PROCEDURE — 99999 PR PBB SHADOW E&M-EST. PATIENT-LVL V: ICD-10-PCS | Mod: PBBFAC,,, | Performed by: FAMILY MEDICINE

## 2021-05-18 PROCEDURE — 73502 XR HIP 2 VIEW RIGHT: ICD-10-PCS | Mod: 26,RT,, | Performed by: RADIOLOGY

## 2021-05-18 PROCEDURE — 73502 X-RAY EXAM HIP UNI 2-3 VIEWS: CPT | Mod: TC,FY,RT

## 2021-05-18 PROCEDURE — 99397 PER PM REEVAL EST PAT 65+ YR: CPT | Mod: 25,S$GLB,, | Performed by: FAMILY MEDICINE

## 2021-05-18 PROCEDURE — 72110 X-RAY EXAM L-2 SPINE 4/>VWS: CPT | Mod: 26,,, | Performed by: RADIOLOGY

## 2021-05-18 PROCEDURE — 3288F PR FALLS RISK ASSESSMENT DOCUMENTED: ICD-10-PCS | Mod: CPTII,S$GLB,, | Performed by: FAMILY MEDICINE

## 2021-05-18 PROCEDURE — 3008F PR BODY MASS INDEX (BMI) DOCUMENTED: ICD-10-PCS | Mod: CPTII,S$GLB,, | Performed by: FAMILY MEDICINE

## 2021-05-18 PROCEDURE — 1101F PR PT FALLS ASSESS DOC 0-1 FALLS W/OUT INJ PAST YR: ICD-10-PCS | Mod: CPTII,S$GLB,, | Performed by: FAMILY MEDICINE

## 2021-05-18 PROCEDURE — 1125F AMNT PAIN NOTED PAIN PRSNT: CPT | Mod: S$GLB,,, | Performed by: FAMILY MEDICINE

## 2021-05-18 PROCEDURE — 1101F PT FALLS ASSESS-DOCD LE1/YR: CPT | Mod: CPTII,S$GLB,, | Performed by: FAMILY MEDICINE

## 2021-05-18 PROCEDURE — 99999 PR PBB SHADOW E&M-EST. PATIENT-LVL V: CPT | Mod: PBBFAC,,, | Performed by: FAMILY MEDICINE

## 2021-05-18 PROCEDURE — 72110 X-RAY EXAM L-2 SPINE 4/>VWS: CPT | Mod: TC,FY

## 2021-05-18 PROCEDURE — 3008F BODY MASS INDEX DOCD: CPT | Mod: CPTII,S$GLB,, | Performed by: FAMILY MEDICINE

## 2021-05-18 PROCEDURE — 73502 X-RAY EXAM HIP UNI 2-3 VIEWS: CPT | Mod: 26,RT,, | Performed by: RADIOLOGY

## 2021-05-18 PROCEDURE — 99397 PR PREVENTIVE VISIT,EST,65 & OVER: ICD-10-PCS | Mod: 25,S$GLB,, | Performed by: FAMILY MEDICINE

## 2021-05-18 PROCEDURE — 3288F FALL RISK ASSESSMENT DOCD: CPT | Mod: CPTII,S$GLB,, | Performed by: FAMILY MEDICINE

## 2021-05-18 RX ORDER — PREDNISONE 20 MG/1
20 TABLET ORAL 2 TIMES DAILY
Qty: 12 TABLET | Refills: 0 | Status: SHIPPED | OUTPATIENT
Start: 2021-05-18 | End: 2021-05-24

## 2021-05-24 ENCOUNTER — CLINICAL SUPPORT (OUTPATIENT)
Dept: REHABILITATION | Facility: HOSPITAL | Age: 66
End: 2021-05-24
Attending: FAMILY MEDICINE
Payer: COMMERCIAL

## 2021-05-24 DIAGNOSIS — M06.9 RHEUMATOID ARTHRITIS, INVOLVING UNSPECIFIED SITE, UNSPECIFIED WHETHER RHEUMATOID FACTOR PRESENT: ICD-10-CM

## 2021-05-24 DIAGNOSIS — M54.50 ACUTE RIGHT-SIDED LOW BACK PAIN, UNSPECIFIED WHETHER SCIATICA PRESENT: ICD-10-CM

## 2021-05-24 DIAGNOSIS — M25.551 RIGHT HIP PAIN: ICD-10-CM

## 2021-05-24 DIAGNOSIS — M79.604 LOW BACK PAIN RADIATING TO RIGHT LEG: ICD-10-CM

## 2021-05-24 DIAGNOSIS — M54.50 LOW BACK PAIN RADIATING TO RIGHT LEG: ICD-10-CM

## 2021-05-24 DIAGNOSIS — M62.81 MUSCLE WEAKNESS: ICD-10-CM

## 2021-05-24 DIAGNOSIS — M25.60 DECREASED RANGE OF MOTION: ICD-10-CM

## 2021-05-24 PROCEDURE — 97161 PT EVAL LOW COMPLEX 20 MIN: CPT | Mod: PN

## 2021-05-24 PROCEDURE — 97110 THERAPEUTIC EXERCISES: CPT | Mod: PN

## 2021-05-25 ENCOUNTER — PATIENT MESSAGE (OUTPATIENT)
Dept: FAMILY MEDICINE | Facility: CLINIC | Age: 66
End: 2021-05-25

## 2021-05-25 DIAGNOSIS — M54.50 LOW BACK PAIN RADIATING TO RIGHT LEG: Primary | ICD-10-CM

## 2021-05-25 DIAGNOSIS — M79.604 LOW BACK PAIN RADIATING TO RIGHT LEG: Primary | ICD-10-CM

## 2021-05-25 DIAGNOSIS — M54.16 LUMBAR RADICULOPATHY: ICD-10-CM

## 2021-05-26 ENCOUNTER — PATIENT MESSAGE (OUTPATIENT)
Dept: FAMILY MEDICINE | Facility: CLINIC | Age: 66
End: 2021-05-26

## 2021-05-27 ENCOUNTER — PATIENT MESSAGE (OUTPATIENT)
Dept: FAMILY MEDICINE | Facility: CLINIC | Age: 66
End: 2021-05-27

## 2021-05-27 RX ORDER — GABAPENTIN 300 MG/1
300 CAPSULE ORAL 3 TIMES DAILY
Qty: 90 CAPSULE | Refills: 11 | Status: SHIPPED | OUTPATIENT
Start: 2021-05-27 | End: 2021-07-22 | Stop reason: SDUPTHER

## 2021-05-28 ENCOUNTER — HOSPITAL ENCOUNTER (OUTPATIENT)
Dept: RADIOLOGY | Facility: HOSPITAL | Age: 66
Discharge: HOME | End: 2021-05-28
Attending: FAMILY MEDICINE
Payer: COMMERCIAL

## 2021-05-28 DIAGNOSIS — M79.604 LOW BACK PAIN RADIATING TO RIGHT LEG: ICD-10-CM

## 2021-05-28 DIAGNOSIS — M54.16 LUMBAR RADICULOPATHY: ICD-10-CM

## 2021-05-28 DIAGNOSIS — M54.50 LOW BACK PAIN RADIATING TO RIGHT LEG: ICD-10-CM

## 2021-05-28 PROCEDURE — 72148 MRI LUMBAR SPINE W/O DYE: CPT | Mod: 26,,, | Performed by: INTERNAL MEDICINE

## 2021-05-28 PROCEDURE — 72148 MRI LUMBAR SPINE W/O DYE: CPT | Mod: TC

## 2021-05-28 PROCEDURE — 72148 MRI LUMBAR SPINE WITHOUT CONTRAST: ICD-10-PCS | Mod: 26,,, | Performed by: INTERNAL MEDICINE

## 2021-05-31 ENCOUNTER — PATIENT MESSAGE (OUTPATIENT)
Dept: FAMILY MEDICINE | Facility: CLINIC | Age: 66
End: 2021-05-31

## 2021-06-02 ENCOUNTER — DOCUMENTATION ONLY (OUTPATIENT)
Dept: REHABILITATION | Facility: HOSPITAL | Age: 66
End: 2021-06-02

## 2021-06-04 ENCOUNTER — PATIENT MESSAGE (OUTPATIENT)
Dept: FAMILY MEDICINE | Facility: CLINIC | Age: 66
End: 2021-06-04

## 2021-06-04 DIAGNOSIS — M54.16 LUMBAR RADICULOPATHY: Primary | ICD-10-CM

## 2021-06-09 ENCOUNTER — PATIENT MESSAGE (OUTPATIENT)
Dept: FAMILY MEDICINE | Facility: CLINIC | Age: 66
End: 2021-06-09

## 2021-06-09 DIAGNOSIS — M54.16 LUMBAR RADICULOPATHY: Primary | ICD-10-CM

## 2021-06-11 ENCOUNTER — SPECIALTY PHARMACY (OUTPATIENT)
Dept: PHARMACY | Facility: CLINIC | Age: 66
End: 2021-06-11

## 2021-06-15 ENCOUNTER — PATIENT MESSAGE (OUTPATIENT)
Dept: FAMILY MEDICINE | Facility: CLINIC | Age: 66
End: 2021-06-15

## 2021-06-15 ENCOUNTER — LAB VISIT (OUTPATIENT)
Dept: LAB | Facility: HOSPITAL | Age: 66
End: 2021-06-15
Attending: FAMILY MEDICINE
Payer: COMMERCIAL

## 2021-06-15 ENCOUNTER — PATIENT OUTREACH (OUTPATIENT)
Dept: ADMINISTRATIVE | Facility: OTHER | Age: 66
End: 2021-06-15

## 2021-06-15 DIAGNOSIS — E78.5 HYPERLIPIDEMIA, UNSPECIFIED HYPERLIPIDEMIA TYPE: ICD-10-CM

## 2021-06-15 DIAGNOSIS — Z00.00 ROUTINE GENERAL MEDICAL EXAMINATION AT A HEALTH CARE FACILITY: ICD-10-CM

## 2021-06-15 DIAGNOSIS — Z12.5 SCREENING FOR MALIGNANT NEOPLASM OF PROSTATE: ICD-10-CM

## 2021-06-15 LAB
ALBUMIN SERPL BCP-MCNC: 3.8 G/DL (ref 3.5–5.2)
ALP SERPL-CCNC: 43 U/L (ref 55–135)
ALT SERPL W/O P-5'-P-CCNC: 27 U/L (ref 10–44)
ANION GAP SERPL CALC-SCNC: 9 MMOL/L (ref 8–16)
AST SERPL-CCNC: 26 U/L (ref 10–40)
BASOPHILS # BLD AUTO: 0.03 K/UL (ref 0–0.2)
BASOPHILS NFR BLD: 0.4 % (ref 0–1.9)
BILIRUB SERPL-MCNC: 0.4 MG/DL (ref 0.1–1)
BUN SERPL-MCNC: 19 MG/DL (ref 8–23)
CALCIUM SERPL-MCNC: 8.9 MG/DL (ref 8.7–10.5)
CHLORIDE SERPL-SCNC: 108 MMOL/L (ref 95–110)
CHOLEST SERPL-MCNC: 162 MG/DL (ref 120–199)
CHOLEST/HDLC SERPL: 3.1 {RATIO} (ref 2–5)
CO2 SERPL-SCNC: 23 MMOL/L (ref 23–29)
COMPLEXED PSA SERPL-MCNC: 0.55 NG/ML (ref 0–4)
CREAT SERPL-MCNC: 1 MG/DL (ref 0.5–1.4)
DIFFERENTIAL METHOD: ABNORMAL
EOSINOPHIL # BLD AUTO: 0.4 K/UL (ref 0–0.5)
EOSINOPHIL NFR BLD: 6.2 % (ref 0–8)
ERYTHROCYTE [DISTWIDTH] IN BLOOD BY AUTOMATED COUNT: 13.4 % (ref 11.5–14.5)
EST. GFR  (AFRICAN AMERICAN): >60 ML/MIN/1.73 M^2
EST. GFR  (NON AFRICAN AMERICAN): >60 ML/MIN/1.73 M^2
ESTIMATED AVG GLUCOSE: 120 MG/DL (ref 68–131)
GLUCOSE SERPL-MCNC: 108 MG/DL (ref 70–110)
HBA1C MFR BLD: 5.8 % (ref 4–5.6)
HCT VFR BLD AUTO: 43 % (ref 40–54)
HDLC SERPL-MCNC: 52 MG/DL (ref 40–75)
HDLC SERPL: 32.1 % (ref 20–50)
HGB BLD-MCNC: 14.4 G/DL (ref 14–18)
IMM GRANULOCYTES # BLD AUTO: 0.03 K/UL (ref 0–0.04)
IMM GRANULOCYTES NFR BLD AUTO: 0.4 % (ref 0–0.5)
LDLC SERPL CALC-MCNC: 95 MG/DL (ref 63–159)
LYMPHOCYTES # BLD AUTO: 2.6 K/UL (ref 1–4.8)
LYMPHOCYTES NFR BLD: 38.4 % (ref 18–48)
MCH RBC QN AUTO: 31.7 PG (ref 27–31)
MCHC RBC AUTO-ENTMCNC: 33.5 G/DL (ref 32–36)
MCV RBC AUTO: 95 FL (ref 82–98)
MONOCYTES # BLD AUTO: 0.8 K/UL (ref 0.3–1)
MONOCYTES NFR BLD: 11.8 % (ref 4–15)
NEUTROPHILS # BLD AUTO: 2.9 K/UL (ref 1.8–7.7)
NEUTROPHILS NFR BLD: 42.8 % (ref 38–73)
NONHDLC SERPL-MCNC: 110 MG/DL
NRBC BLD-RTO: 0 /100 WBC
PLATELET # BLD AUTO: 193 K/UL (ref 150–450)
PMV BLD AUTO: 10.8 FL (ref 9.2–12.9)
POTASSIUM SERPL-SCNC: 4.3 MMOL/L (ref 3.5–5.1)
PROT SERPL-MCNC: 7.2 G/DL (ref 6–8.4)
RBC # BLD AUTO: 4.54 M/UL (ref 4.6–6.2)
SODIUM SERPL-SCNC: 140 MMOL/L (ref 136–145)
TRIGL SERPL-MCNC: 75 MG/DL (ref 30–150)
TSH SERPL DL<=0.005 MIU/L-ACNC: 1.2 UIU/ML (ref 0.4–4)
WBC # BLD AUTO: 6.8 K/UL (ref 3.9–12.7)

## 2021-06-15 PROCEDURE — 80061 LIPID PANEL: CPT | Performed by: FAMILY MEDICINE

## 2021-06-15 PROCEDURE — 36415 COLL VENOUS BLD VENIPUNCTURE: CPT | Performed by: FAMILY MEDICINE

## 2021-06-15 PROCEDURE — 80053 COMPREHEN METABOLIC PANEL: CPT | Performed by: FAMILY MEDICINE

## 2021-06-15 PROCEDURE — 84153 ASSAY OF PSA TOTAL: CPT | Performed by: FAMILY MEDICINE

## 2021-06-15 PROCEDURE — 83036 HEMOGLOBIN GLYCOSYLATED A1C: CPT | Performed by: FAMILY MEDICINE

## 2021-06-15 PROCEDURE — 84443 ASSAY THYROID STIM HORMONE: CPT | Performed by: FAMILY MEDICINE

## 2021-06-15 PROCEDURE — 85025 COMPLETE CBC W/AUTO DIFF WBC: CPT | Performed by: FAMILY MEDICINE

## 2021-06-16 ENCOUNTER — TELEPHONE (OUTPATIENT)
Dept: PAIN MEDICINE | Facility: CLINIC | Age: 66
End: 2021-06-16

## 2021-06-16 ENCOUNTER — OFFICE VISIT (OUTPATIENT)
Dept: PAIN MEDICINE | Facility: CLINIC | Age: 66
End: 2021-06-16
Payer: COMMERCIAL

## 2021-06-16 VITALS — HEART RATE: 78 BPM | DIASTOLIC BLOOD PRESSURE: 83 MMHG | SYSTOLIC BLOOD PRESSURE: 122 MMHG

## 2021-06-16 DIAGNOSIS — M54.16 LUMBAR RADICULOPATHY: ICD-10-CM

## 2021-06-16 DIAGNOSIS — M47.816 LUMBAR SPONDYLOSIS: ICD-10-CM

## 2021-06-16 DIAGNOSIS — M51.36 DDD (DEGENERATIVE DISC DISEASE), LUMBAR: Primary | ICD-10-CM

## 2021-06-16 DIAGNOSIS — M54.16 LUMBAR RADICULOPATHY: Primary | ICD-10-CM

## 2021-06-16 DIAGNOSIS — G89.29 CHRONIC BILATERAL LOW BACK PAIN WITH RIGHT-SIDED SCIATICA: ICD-10-CM

## 2021-06-16 DIAGNOSIS — M48.062 SPINAL STENOSIS OF LUMBAR REGION WITH NEUROGENIC CLAUDICATION: ICD-10-CM

## 2021-06-16 DIAGNOSIS — M54.41 CHRONIC BILATERAL LOW BACK PAIN WITH RIGHT-SIDED SCIATICA: ICD-10-CM

## 2021-06-16 PROBLEM — M51.369 DDD (DEGENERATIVE DISC DISEASE), LUMBAR: Status: ACTIVE | Noted: 2021-06-16

## 2021-06-16 PROCEDURE — 99999 PR PBB SHADOW E&M-EST. PATIENT-LVL III: CPT | Mod: PBBFAC,,, | Performed by: PAIN MEDICINE

## 2021-06-16 PROCEDURE — 99999 PR PBB SHADOW E&M-EST. PATIENT-LVL III: ICD-10-PCS | Mod: PBBFAC,,, | Performed by: PAIN MEDICINE

## 2021-06-16 PROCEDURE — 99204 PR OFFICE/OUTPT VISIT, NEW, LEVL IV, 45-59 MIN: ICD-10-PCS | Mod: S$GLB,,, | Performed by: PAIN MEDICINE

## 2021-06-16 PROCEDURE — 1125F PR PAIN SEVERITY QUANTIFIED, PAIN PRESENT: ICD-10-PCS | Mod: S$GLB,,, | Performed by: PAIN MEDICINE

## 2021-06-16 PROCEDURE — 99204 OFFICE O/P NEW MOD 45 MIN: CPT | Mod: S$GLB,,, | Performed by: PAIN MEDICINE

## 2021-06-16 PROCEDURE — 1159F MED LIST DOCD IN RCRD: CPT | Mod: S$GLB,,, | Performed by: PAIN MEDICINE

## 2021-06-16 PROCEDURE — 1159F PR MEDICATION LIST DOCUMENTED IN MEDICAL RECORD: ICD-10-PCS | Mod: S$GLB,,, | Performed by: PAIN MEDICINE

## 2021-06-16 PROCEDURE — 1125F AMNT PAIN NOTED PAIN PRSNT: CPT | Mod: S$GLB,,, | Performed by: PAIN MEDICINE

## 2021-06-18 ENCOUNTER — PATIENT MESSAGE (OUTPATIENT)
Dept: FAMILY MEDICINE | Facility: CLINIC | Age: 66
End: 2021-06-18

## 2021-06-18 DIAGNOSIS — M16.11 ARTHRITIS OF RIGHT HIP: ICD-10-CM

## 2021-06-18 DIAGNOSIS — M62.830 SPASM OF LUMBAR PARASPINOUS MUSCLE: ICD-10-CM

## 2021-06-18 RX ORDER — IBUPROFEN 800 MG/1
800 TABLET ORAL 3 TIMES DAILY PRN
Qty: 90 TABLET | Refills: 1 | Status: SHIPPED | OUTPATIENT
Start: 2021-06-18 | End: 2021-08-26 | Stop reason: SDUPTHER

## 2021-06-21 ENCOUNTER — TELEPHONE (OUTPATIENT)
Dept: PAIN MEDICINE | Facility: CLINIC | Age: 66
End: 2021-06-21

## 2021-06-28 ENCOUNTER — CLINICAL SUPPORT (OUTPATIENT)
Dept: REHABILITATION | Facility: HOSPITAL | Age: 66
End: 2021-06-28
Attending: FAMILY MEDICINE
Payer: COMMERCIAL

## 2021-06-28 ENCOUNTER — TELEPHONE (OUTPATIENT)
Dept: PAIN MEDICINE | Facility: CLINIC | Age: 66
End: 2021-06-28

## 2021-06-28 DIAGNOSIS — G89.29 CHRONIC BILATERAL LOW BACK PAIN WITH RIGHT-SIDED SCIATICA: ICD-10-CM

## 2021-06-28 DIAGNOSIS — M54.41 CHRONIC BILATERAL LOW BACK PAIN WITH RIGHT-SIDED SCIATICA: ICD-10-CM

## 2021-06-28 DIAGNOSIS — M25.60 DECREASED RANGE OF MOTION: ICD-10-CM

## 2021-06-28 DIAGNOSIS — M62.81 MUSCLE WEAKNESS: ICD-10-CM

## 2021-06-28 PROCEDURE — 97110 THERAPEUTIC EXERCISES: CPT | Mod: PN

## 2021-06-28 PROCEDURE — 97140 MANUAL THERAPY 1/> REGIONS: CPT | Mod: PN

## 2021-06-29 ENCOUNTER — PATIENT MESSAGE (OUTPATIENT)
Dept: FAMILY MEDICINE | Facility: CLINIC | Age: 66
End: 2021-06-29

## 2021-06-29 ENCOUNTER — HOSPITAL ENCOUNTER (OUTPATIENT)
Facility: HOSPITAL | Age: 66
Discharge: HOME | End: 2021-06-29
Attending: PAIN MEDICINE | Admitting: PAIN MEDICINE
Payer: COMMERCIAL

## 2021-06-29 VITALS
RESPIRATION RATE: 16 BRPM | TEMPERATURE: 97 F | SYSTOLIC BLOOD PRESSURE: 112 MMHG | WEIGHT: 172 LBS | DIASTOLIC BLOOD PRESSURE: 71 MMHG | OXYGEN SATURATION: 96 % | HEART RATE: 83 BPM | BODY MASS INDEX: 27.64 KG/M2 | HEIGHT: 66 IN

## 2021-06-29 DIAGNOSIS — M54.16 LUMBAR RADICULOPATHY: Primary | ICD-10-CM

## 2021-06-29 DIAGNOSIS — G89.29 CHRONIC PAIN: ICD-10-CM

## 2021-06-29 PROCEDURE — 99152 MOD SED SAME PHYS/QHP 5/>YRS: CPT | Performed by: PAIN MEDICINE

## 2021-06-29 PROCEDURE — 64483 NJX AA&/STRD TFRM EPI L/S 1: CPT | Performed by: PAIN MEDICINE

## 2021-06-29 PROCEDURE — 25000003 PHARM REV CODE 250: Performed by: PAIN MEDICINE

## 2021-06-29 PROCEDURE — 25500020 PHARM REV CODE 255: Performed by: PAIN MEDICINE

## 2021-06-29 PROCEDURE — 64483 NJX AA&/STRD TFRM EPI L/S 1: CPT | Mod: RT,,, | Performed by: PAIN MEDICINE

## 2021-06-29 PROCEDURE — 64484 PRA INJECT ANES/STEROID FORAMEN LUMBAR/SACRAL W IMG GUIDE ,EA ADD LEVEL: ICD-10-PCS | Mod: RT,,, | Performed by: PAIN MEDICINE

## 2021-06-29 PROCEDURE — 64484 NJX AA&/STRD TFRM EPI L/S EA: CPT | Mod: RT,,, | Performed by: PAIN MEDICINE

## 2021-06-29 PROCEDURE — 64483 PR EPIDURAL INJ, ANES/STEROID, TRANSFORAMINAL, LUMB/SACR, SNGL LEVL: ICD-10-PCS | Mod: RT,,, | Performed by: PAIN MEDICINE

## 2021-06-29 PROCEDURE — 63600175 PHARM REV CODE 636 W HCPCS: Performed by: PAIN MEDICINE

## 2021-06-29 PROCEDURE — 64484 NJX AA&/STRD TFRM EPI L/S EA: CPT | Performed by: PAIN MEDICINE

## 2021-06-29 RX ORDER — INDOMETHACIN 25 MG/1
CAPSULE ORAL
Status: DISCONTINUED | OUTPATIENT
Start: 2021-06-29 | End: 2021-06-29 | Stop reason: HOSPADM

## 2021-06-29 RX ORDER — DEXAMETHASONE SODIUM PHOSPHATE 10 MG/ML
INJECTION INTRAMUSCULAR; INTRAVENOUS
Status: DISCONTINUED | OUTPATIENT
Start: 2021-06-29 | End: 2021-06-29 | Stop reason: HOSPADM

## 2021-06-29 RX ORDER — MIDAZOLAM HYDROCHLORIDE 1 MG/ML
INJECTION, SOLUTION INTRAMUSCULAR; INTRAVENOUS
Status: DISCONTINUED | OUTPATIENT
Start: 2021-06-29 | End: 2021-06-29 | Stop reason: HOSPADM

## 2021-06-29 RX ORDER — SODIUM CHLORIDE 9 MG/ML
INJECTION, SOLUTION INTRAVENOUS CONTINUOUS
Status: DISCONTINUED | OUTPATIENT
Start: 2021-06-29 | End: 2021-06-29 | Stop reason: HOSPADM

## 2021-06-29 RX ORDER — FENTANYL CITRATE 50 UG/ML
INJECTION, SOLUTION INTRAMUSCULAR; INTRAVENOUS
Status: DISCONTINUED | OUTPATIENT
Start: 2021-06-29 | End: 2021-06-29 | Stop reason: HOSPADM

## 2021-06-29 RX ORDER — BUPIVACAINE HYDROCHLORIDE 2.5 MG/ML
INJECTION, SOLUTION EPIDURAL; INFILTRATION; INTRACAUDAL
Status: DISCONTINUED | OUTPATIENT
Start: 2021-06-29 | End: 2021-06-29 | Stop reason: HOSPADM

## 2021-06-29 RX ORDER — LIDOCAINE HYDROCHLORIDE 10 MG/ML
1 INJECTION, SOLUTION EPIDURAL; INFILTRATION; INTRACAUDAL; PERINEURAL ONCE
Status: DISCONTINUED | OUTPATIENT
Start: 2021-06-29 | End: 2021-06-29 | Stop reason: HOSPADM

## 2021-06-29 RX ORDER — LIDOCAINE HYDROCHLORIDE 10 MG/ML
INJECTION INFILTRATION; PERINEURAL
Status: DISCONTINUED | OUTPATIENT
Start: 2021-06-29 | End: 2021-06-29 | Stop reason: HOSPADM

## 2021-06-29 RX ADMIN — SODIUM CHLORIDE: 0.9 INJECTION, SOLUTION INTRAVENOUS at 11:06

## 2021-07-01 ENCOUNTER — TELEPHONE (OUTPATIENT)
Dept: FAMILY MEDICINE | Facility: CLINIC | Age: 66
End: 2021-07-01

## 2021-07-14 ENCOUNTER — SPECIALTY PHARMACY (OUTPATIENT)
Dept: PHARMACY | Facility: CLINIC | Age: 66
End: 2021-07-14

## 2021-07-15 ENCOUNTER — CLINICAL SUPPORT (OUTPATIENT)
Dept: REHABILITATION | Facility: HOSPITAL | Age: 66
End: 2021-07-15
Attending: FAMILY MEDICINE
Payer: COMMERCIAL

## 2021-07-15 ENCOUNTER — OFFICE VISIT (OUTPATIENT)
Dept: PAIN MEDICINE | Facility: CLINIC | Age: 66
End: 2021-07-15
Payer: COMMERCIAL

## 2021-07-15 VITALS
BODY MASS INDEX: 27.63 KG/M2 | HEART RATE: 76 BPM | WEIGHT: 171.94 LBS | DIASTOLIC BLOOD PRESSURE: 78 MMHG | HEIGHT: 66 IN | SYSTOLIC BLOOD PRESSURE: 129 MMHG

## 2021-07-15 DIAGNOSIS — M54.41 CHRONIC BILATERAL LOW BACK PAIN WITH RIGHT-SIDED SCIATICA: ICD-10-CM

## 2021-07-15 DIAGNOSIS — M25.60 DECREASED RANGE OF MOTION: ICD-10-CM

## 2021-07-15 DIAGNOSIS — M62.81 MUSCLE WEAKNESS: ICD-10-CM

## 2021-07-15 DIAGNOSIS — M54.16 LUMBAR RADICULOPATHY: Primary | ICD-10-CM

## 2021-07-15 DIAGNOSIS — M47.816 LUMBAR SPONDYLOSIS: ICD-10-CM

## 2021-07-15 DIAGNOSIS — M51.36 DDD (DEGENERATIVE DISC DISEASE), LUMBAR: ICD-10-CM

## 2021-07-15 DIAGNOSIS — G89.29 CHRONIC BILATERAL LOW BACK PAIN WITH RIGHT-SIDED SCIATICA: ICD-10-CM

## 2021-07-15 DIAGNOSIS — M48.062 SPINAL STENOSIS OF LUMBAR REGION WITH NEUROGENIC CLAUDICATION: ICD-10-CM

## 2021-07-15 PROCEDURE — 1159F MED LIST DOCD IN RCRD: CPT | Mod: S$GLB,,, | Performed by: NURSE PRACTITIONER

## 2021-07-15 PROCEDURE — 99999 PR PBB SHADOW E&M-EST. PATIENT-LVL IV: ICD-10-PCS | Mod: PBBFAC,,, | Performed by: NURSE PRACTITIONER

## 2021-07-15 PROCEDURE — 3008F BODY MASS INDEX DOCD: CPT | Mod: CPTII,S$GLB,, | Performed by: NURSE PRACTITIONER

## 2021-07-15 PROCEDURE — 99213 OFFICE O/P EST LOW 20 MIN: CPT | Mod: S$GLB,,, | Performed by: NURSE PRACTITIONER

## 2021-07-15 PROCEDURE — 99999 PR PBB SHADOW E&M-EST. PATIENT-LVL IV: CPT | Mod: PBBFAC,,, | Performed by: NURSE PRACTITIONER

## 2021-07-15 PROCEDURE — 1125F AMNT PAIN NOTED PAIN PRSNT: CPT | Mod: S$GLB,,, | Performed by: NURSE PRACTITIONER

## 2021-07-15 PROCEDURE — 1125F PR PAIN SEVERITY QUANTIFIED, PAIN PRESENT: ICD-10-PCS | Mod: S$GLB,,, | Performed by: NURSE PRACTITIONER

## 2021-07-15 PROCEDURE — 1101F PT FALLS ASSESS-DOCD LE1/YR: CPT | Mod: CPTII,S$GLB,, | Performed by: NURSE PRACTITIONER

## 2021-07-15 PROCEDURE — 1101F PR PT FALLS ASSESS DOC 0-1 FALLS W/OUT INJ PAST YR: ICD-10-PCS | Mod: CPTII,S$GLB,, | Performed by: NURSE PRACTITIONER

## 2021-07-15 PROCEDURE — 99213 PR OFFICE/OUTPT VISIT, EST, LEVL III, 20-29 MIN: ICD-10-PCS | Mod: S$GLB,,, | Performed by: NURSE PRACTITIONER

## 2021-07-15 PROCEDURE — 3288F PR FALLS RISK ASSESSMENT DOCUMENTED: ICD-10-PCS | Mod: CPTII,S$GLB,, | Performed by: NURSE PRACTITIONER

## 2021-07-15 PROCEDURE — 97110 THERAPEUTIC EXERCISES: CPT | Mod: PN,CQ

## 2021-07-15 PROCEDURE — 3008F PR BODY MASS INDEX (BMI) DOCUMENTED: ICD-10-PCS | Mod: CPTII,S$GLB,, | Performed by: NURSE PRACTITIONER

## 2021-07-15 PROCEDURE — 97140 MANUAL THERAPY 1/> REGIONS: CPT | Mod: PN,CQ

## 2021-07-15 PROCEDURE — 3288F FALL RISK ASSESSMENT DOCD: CPT | Mod: CPTII,S$GLB,, | Performed by: NURSE PRACTITIONER

## 2021-07-15 PROCEDURE — 1159F PR MEDICATION LIST DOCUMENTED IN MEDICAL RECORD: ICD-10-PCS | Mod: S$GLB,,, | Performed by: NURSE PRACTITIONER

## 2021-07-20 ENCOUNTER — CLINICAL SUPPORT (OUTPATIENT)
Dept: REHABILITATION | Facility: HOSPITAL | Age: 66
End: 2021-07-20
Attending: FAMILY MEDICINE
Payer: COMMERCIAL

## 2021-07-20 DIAGNOSIS — M54.41 CHRONIC BILATERAL LOW BACK PAIN WITH RIGHT-SIDED SCIATICA: ICD-10-CM

## 2021-07-20 DIAGNOSIS — M25.60 DECREASED RANGE OF MOTION: ICD-10-CM

## 2021-07-20 DIAGNOSIS — G89.29 CHRONIC BILATERAL LOW BACK PAIN WITH RIGHT-SIDED SCIATICA: ICD-10-CM

## 2021-07-20 DIAGNOSIS — M62.81 MUSCLE WEAKNESS: ICD-10-CM

## 2021-07-20 PROCEDURE — 97140 MANUAL THERAPY 1/> REGIONS: CPT | Mod: PN

## 2021-07-20 PROCEDURE — 97110 THERAPEUTIC EXERCISES: CPT | Mod: PN

## 2021-07-21 ENCOUNTER — LAB VISIT (OUTPATIENT)
Dept: LAB | Facility: HOSPITAL | Age: 66
End: 2021-07-21
Payer: COMMERCIAL

## 2021-07-21 DIAGNOSIS — M06.9 RHEUMATOID ARTHRITIS INVOLVING MULTIPLE JOINTS: ICD-10-CM

## 2021-07-21 LAB
ALBUMIN SERPL BCP-MCNC: 3.9 G/DL (ref 3.5–5.2)
ALP SERPL-CCNC: 41 U/L (ref 55–135)
ALT SERPL W/O P-5'-P-CCNC: 33 U/L (ref 10–44)
ANION GAP SERPL CALC-SCNC: 9 MMOL/L (ref 8–16)
AST SERPL-CCNC: 29 U/L (ref 10–40)
BASOPHILS # BLD AUTO: 0.03 K/UL (ref 0–0.2)
BASOPHILS NFR BLD: 0.4 % (ref 0–1.9)
BILIRUB SERPL-MCNC: 0.5 MG/DL (ref 0.1–1)
BUN SERPL-MCNC: 19 MG/DL (ref 8–23)
CALCIUM SERPL-MCNC: 9.4 MG/DL (ref 8.7–10.5)
CHLORIDE SERPL-SCNC: 106 MMOL/L (ref 95–110)
CO2 SERPL-SCNC: 23 MMOL/L (ref 23–29)
CREAT SERPL-MCNC: 1 MG/DL (ref 0.5–1.4)
CRP SERPL-MCNC: 0.6 MG/L (ref 0–8.2)
DIFFERENTIAL METHOD: ABNORMAL
EOSINOPHIL # BLD AUTO: 0.2 K/UL (ref 0–0.5)
EOSINOPHIL NFR BLD: 2.7 % (ref 0–8)
ERYTHROCYTE [DISTWIDTH] IN BLOOD BY AUTOMATED COUNT: 13.9 % (ref 11.5–14.5)
ERYTHROCYTE [SEDIMENTATION RATE] IN BLOOD BY WESTERGREN METHOD: 20 MM/HR (ref 0–10)
EST. GFR  (AFRICAN AMERICAN): >60 ML/MIN/1.73 M^2
EST. GFR  (NON AFRICAN AMERICAN): >60 ML/MIN/1.73 M^2
GLUCOSE SERPL-MCNC: 128 MG/DL (ref 70–110)
HCT VFR BLD AUTO: 39.5 % (ref 40–54)
HGB BLD-MCNC: 13.8 G/DL (ref 14–18)
IMM GRANULOCYTES # BLD AUTO: 0.02 K/UL (ref 0–0.04)
IMM GRANULOCYTES NFR BLD AUTO: 0.3 % (ref 0–0.5)
LYMPHOCYTES # BLD AUTO: 2.2 K/UL (ref 1–4.8)
LYMPHOCYTES NFR BLD: 30.9 % (ref 18–48)
MCH RBC QN AUTO: 33.7 PG (ref 27–31)
MCHC RBC AUTO-ENTMCNC: 34.9 G/DL (ref 32–36)
MCV RBC AUTO: 97 FL (ref 82–98)
MONOCYTES # BLD AUTO: 0.9 K/UL (ref 0.3–1)
MONOCYTES NFR BLD: 12.3 % (ref 4–15)
NEUTROPHILS # BLD AUTO: 3.8 K/UL (ref 1.8–7.7)
NEUTROPHILS NFR BLD: 53.4 % (ref 38–73)
NRBC BLD-RTO: 0 /100 WBC
PLATELET # BLD AUTO: 174 K/UL (ref 150–450)
PMV BLD AUTO: 11.1 FL (ref 9.2–12.9)
POTASSIUM SERPL-SCNC: 4.4 MMOL/L (ref 3.5–5.1)
PROT SERPL-MCNC: 7.1 G/DL (ref 6–8.4)
RBC # BLD AUTO: 4.09 M/UL (ref 4.6–6.2)
SODIUM SERPL-SCNC: 138 MMOL/L (ref 136–145)
WBC # BLD AUTO: 7.02 K/UL (ref 3.9–12.7)

## 2021-07-21 PROCEDURE — 86140 C-REACTIVE PROTEIN: CPT | Performed by: INTERNAL MEDICINE

## 2021-07-21 PROCEDURE — 83516 IMMUNOASSAY NONANTIBODY: CPT | Performed by: INTERNAL MEDICINE

## 2021-07-21 PROCEDURE — 86255 FLUORESCENT ANTIBODY SCREEN: CPT | Mod: 91 | Performed by: INTERNAL MEDICINE

## 2021-07-21 PROCEDURE — 85652 RBC SED RATE AUTOMATED: CPT | Performed by: INTERNAL MEDICINE

## 2021-07-21 PROCEDURE — 80053 COMPREHEN METABOLIC PANEL: CPT | Performed by: INTERNAL MEDICINE

## 2021-07-21 PROCEDURE — 85025 COMPLETE CBC W/AUTO DIFF WBC: CPT | Performed by: INTERNAL MEDICINE

## 2021-07-21 PROCEDURE — 86038 ANTINUCLEAR ANTIBODIES: CPT | Performed by: INTERNAL MEDICINE

## 2021-07-21 PROCEDURE — 83516 IMMUNOASSAY NONANTIBODY: CPT | Mod: 59 | Performed by: INTERNAL MEDICINE

## 2021-07-21 PROCEDURE — 36415 COLL VENOUS BLD VENIPUNCTURE: CPT | Performed by: INTERNAL MEDICINE

## 2021-07-22 ENCOUNTER — PATIENT MESSAGE (OUTPATIENT)
Dept: FAMILY MEDICINE | Facility: CLINIC | Age: 66
End: 2021-07-22

## 2021-07-22 ENCOUNTER — TELEPHONE (OUTPATIENT)
Dept: PAIN MEDICINE | Facility: CLINIC | Age: 66
End: 2021-07-22

## 2021-07-22 ENCOUNTER — CLINICAL SUPPORT (OUTPATIENT)
Dept: REHABILITATION | Facility: HOSPITAL | Age: 66
End: 2021-07-22
Attending: FAMILY MEDICINE
Payer: COMMERCIAL

## 2021-07-22 ENCOUNTER — PATIENT MESSAGE (OUTPATIENT)
Dept: RHEUMATOLOGY | Facility: CLINIC | Age: 66
End: 2021-07-22

## 2021-07-22 DIAGNOSIS — M62.81 MUSCLE WEAKNESS: ICD-10-CM

## 2021-07-22 DIAGNOSIS — M25.60 DECREASED RANGE OF MOTION: ICD-10-CM

## 2021-07-22 DIAGNOSIS — G89.29 CHRONIC BILATERAL LOW BACK PAIN WITH RIGHT-SIDED SCIATICA: ICD-10-CM

## 2021-07-22 DIAGNOSIS — M54.41 CHRONIC BILATERAL LOW BACK PAIN WITH RIGHT-SIDED SCIATICA: ICD-10-CM

## 2021-07-22 LAB — PROTEINASE3 IGG SER-ACNC: <0.2 U

## 2021-07-22 PROCEDURE — 97140 MANUAL THERAPY 1/> REGIONS: CPT | Mod: PN,CQ

## 2021-07-22 PROCEDURE — 97110 THERAPEUTIC EXERCISES: CPT | Mod: PN,CQ

## 2021-07-22 RX ORDER — GABAPENTIN 300 MG/1
600 CAPSULE ORAL 3 TIMES DAILY
Qty: 180 CAPSULE | Refills: 11 | Status: SHIPPED | OUTPATIENT
Start: 2021-07-22 | End: 2022-08-14 | Stop reason: SDUPTHER

## 2021-07-23 ENCOUNTER — TELEPHONE (OUTPATIENT)
Dept: PAIN MEDICINE | Facility: CLINIC | Age: 66
End: 2021-07-23

## 2021-07-23 DIAGNOSIS — M54.16 LUMBAR RADICULOPATHY: Primary | ICD-10-CM

## 2021-07-23 LAB
ANA SER QL IF: NORMAL
ANCA AB TITR SER IF: NORMAL TITER
MYELOPEROXIDASE AB SER-ACNC: 3 UNITS
P-ANCA TITR SER IF: NORMAL TITER

## 2021-07-27 ENCOUNTER — CLINICAL SUPPORT (OUTPATIENT)
Dept: REHABILITATION | Facility: HOSPITAL | Age: 66
End: 2021-07-27
Attending: FAMILY MEDICINE
Payer: COMMERCIAL

## 2021-07-27 ENCOUNTER — DOCUMENTATION ONLY (OUTPATIENT)
Dept: REHABILITATION | Facility: HOSPITAL | Age: 66
End: 2021-07-27

## 2021-07-27 DIAGNOSIS — G89.29 CHRONIC BILATERAL LOW BACK PAIN WITH RIGHT-SIDED SCIATICA: ICD-10-CM

## 2021-07-27 DIAGNOSIS — M25.60 DECREASED RANGE OF MOTION: ICD-10-CM

## 2021-07-27 DIAGNOSIS — M54.41 CHRONIC BILATERAL LOW BACK PAIN WITH RIGHT-SIDED SCIATICA: ICD-10-CM

## 2021-07-27 DIAGNOSIS — M62.81 MUSCLE WEAKNESS: ICD-10-CM

## 2021-07-27 PROCEDURE — 97110 THERAPEUTIC EXERCISES: CPT | Mod: PN

## 2021-07-28 ENCOUNTER — TELEPHONE (OUTPATIENT)
Dept: PAIN MEDICINE | Facility: CLINIC | Age: 66
End: 2021-07-28

## 2021-07-29 ENCOUNTER — HOSPITAL ENCOUNTER (OUTPATIENT)
Facility: HOSPITAL | Age: 66
Discharge: HOME | End: 2021-07-29
Attending: PAIN MEDICINE | Admitting: PAIN MEDICINE
Payer: COMMERCIAL

## 2021-07-29 VITALS
SYSTOLIC BLOOD PRESSURE: 127 MMHG | RESPIRATION RATE: 16 BRPM | OXYGEN SATURATION: 97 % | WEIGHT: 175 LBS | DIASTOLIC BLOOD PRESSURE: 87 MMHG | TEMPERATURE: 98 F | BODY MASS INDEX: 28.12 KG/M2 | HEIGHT: 66 IN | HEART RATE: 79 BPM

## 2021-07-29 DIAGNOSIS — G89.29 CHRONIC PAIN: ICD-10-CM

## 2021-07-29 DIAGNOSIS — M54.16 LUMBAR RADICULOPATHY: Primary | ICD-10-CM

## 2021-07-29 PROCEDURE — 62323 NJX INTERLAMINAR LMBR/SAC: CPT | Mod: ,,, | Performed by: PAIN MEDICINE

## 2021-07-29 PROCEDURE — 63600175 PHARM REV CODE 636 W HCPCS: Performed by: PAIN MEDICINE

## 2021-07-29 PROCEDURE — 62323 NJX INTERLAMINAR LMBR/SAC: CPT | Performed by: PAIN MEDICINE

## 2021-07-29 PROCEDURE — 25000003 PHARM REV CODE 250: Performed by: PAIN MEDICINE

## 2021-07-29 PROCEDURE — 62323 PR INJ LUMBAR/SACRAL, W/IMAGING GUIDANCE: ICD-10-PCS | Mod: ,,, | Performed by: PAIN MEDICINE

## 2021-07-29 PROCEDURE — 25500020 PHARM REV CODE 255: Performed by: PAIN MEDICINE

## 2021-07-29 RX ORDER — ALPRAZOLAM 0.5 MG/1
0.5 TABLET, ORALLY DISINTEGRATING ORAL ONCE
Status: COMPLETED | OUTPATIENT
Start: 2021-07-29 | End: 2021-07-29

## 2021-07-29 RX ORDER — LIDOCAINE HYDROCHLORIDE 10 MG/ML
INJECTION INFILTRATION; PERINEURAL
Status: DISCONTINUED | OUTPATIENT
Start: 2021-07-29 | End: 2021-07-29 | Stop reason: HOSPADM

## 2021-07-29 RX ORDER — METHYLPREDNISOLONE ACETATE 80 MG/ML
INJECTION, SUSPENSION INTRA-ARTICULAR; INTRALESIONAL; INTRAMUSCULAR; SOFT TISSUE
Status: DISCONTINUED | OUTPATIENT
Start: 2021-07-29 | End: 2021-07-29 | Stop reason: HOSPADM

## 2021-07-29 RX ORDER — BUPIVACAINE HYDROCHLORIDE 2.5 MG/ML
INJECTION, SOLUTION EPIDURAL; INFILTRATION; INTRACAUDAL
Status: DISCONTINUED | OUTPATIENT
Start: 2021-07-29 | End: 2021-07-29 | Stop reason: HOSPADM

## 2021-07-29 RX ADMIN — ALPRAZOLAM 0.5 MG: 0.5 TABLET, ORALLY DISINTEGRATING ORAL at 02:07

## 2021-07-30 ENCOUNTER — CLINICAL SUPPORT (OUTPATIENT)
Dept: REHABILITATION | Facility: HOSPITAL | Age: 66
End: 2021-07-30
Attending: FAMILY MEDICINE
Payer: COMMERCIAL

## 2021-07-30 DIAGNOSIS — M25.60 DECREASED RANGE OF MOTION: ICD-10-CM

## 2021-07-30 DIAGNOSIS — M54.41 CHRONIC BILATERAL LOW BACK PAIN WITH RIGHT-SIDED SCIATICA: ICD-10-CM

## 2021-07-30 DIAGNOSIS — M62.81 MUSCLE WEAKNESS: ICD-10-CM

## 2021-07-30 DIAGNOSIS — G89.29 CHRONIC BILATERAL LOW BACK PAIN WITH RIGHT-SIDED SCIATICA: ICD-10-CM

## 2021-07-30 PROCEDURE — 97110 THERAPEUTIC EXERCISES: CPT | Mod: PN,CQ

## 2021-08-03 ENCOUNTER — CLINICAL SUPPORT (OUTPATIENT)
Dept: REHABILITATION | Facility: HOSPITAL | Age: 66
End: 2021-08-03
Attending: FAMILY MEDICINE
Payer: COMMERCIAL

## 2021-08-03 DIAGNOSIS — G89.29 CHRONIC BILATERAL LOW BACK PAIN WITH RIGHT-SIDED SCIATICA: ICD-10-CM

## 2021-08-03 DIAGNOSIS — M62.81 MUSCLE WEAKNESS: ICD-10-CM

## 2021-08-03 DIAGNOSIS — M54.41 CHRONIC BILATERAL LOW BACK PAIN WITH RIGHT-SIDED SCIATICA: ICD-10-CM

## 2021-08-03 DIAGNOSIS — M25.60 DECREASED RANGE OF MOTION: ICD-10-CM

## 2021-08-03 PROCEDURE — 97110 THERAPEUTIC EXERCISES: CPT | Mod: PN

## 2021-08-05 ENCOUNTER — CLINICAL SUPPORT (OUTPATIENT)
Dept: REHABILITATION | Facility: HOSPITAL | Age: 66
End: 2021-08-05
Attending: FAMILY MEDICINE
Payer: COMMERCIAL

## 2021-08-05 DIAGNOSIS — M54.41 CHRONIC BILATERAL LOW BACK PAIN WITH RIGHT-SIDED SCIATICA: ICD-10-CM

## 2021-08-05 DIAGNOSIS — M25.60 DECREASED RANGE OF MOTION: ICD-10-CM

## 2021-08-05 DIAGNOSIS — G89.29 CHRONIC BILATERAL LOW BACK PAIN WITH RIGHT-SIDED SCIATICA: ICD-10-CM

## 2021-08-05 DIAGNOSIS — M62.81 MUSCLE WEAKNESS: ICD-10-CM

## 2021-08-05 PROCEDURE — 97110 THERAPEUTIC EXERCISES: CPT | Mod: PN,CQ

## 2021-08-10 ENCOUNTER — CLINICAL SUPPORT (OUTPATIENT)
Dept: REHABILITATION | Facility: HOSPITAL | Age: 66
End: 2021-08-10
Attending: FAMILY MEDICINE
Payer: COMMERCIAL

## 2021-08-10 DIAGNOSIS — M62.81 MUSCLE WEAKNESS: ICD-10-CM

## 2021-08-10 DIAGNOSIS — M25.60 DECREASED RANGE OF MOTION: ICD-10-CM

## 2021-08-10 DIAGNOSIS — M54.41 CHRONIC BILATERAL LOW BACK PAIN WITH RIGHT-SIDED SCIATICA: ICD-10-CM

## 2021-08-10 DIAGNOSIS — G89.29 CHRONIC BILATERAL LOW BACK PAIN WITH RIGHT-SIDED SCIATICA: ICD-10-CM

## 2021-08-10 PROCEDURE — 97110 THERAPEUTIC EXERCISES: CPT | Mod: PN

## 2021-08-12 ENCOUNTER — CLINICAL SUPPORT (OUTPATIENT)
Dept: REHABILITATION | Facility: HOSPITAL | Age: 66
End: 2021-08-12
Attending: FAMILY MEDICINE
Payer: COMMERCIAL

## 2021-08-12 DIAGNOSIS — G89.29 CHRONIC BILATERAL LOW BACK PAIN WITH RIGHT-SIDED SCIATICA: ICD-10-CM

## 2021-08-12 DIAGNOSIS — M25.60 DECREASED RANGE OF MOTION: ICD-10-CM

## 2021-08-12 DIAGNOSIS — M62.81 MUSCLE WEAKNESS: ICD-10-CM

## 2021-08-12 DIAGNOSIS — M54.41 CHRONIC BILATERAL LOW BACK PAIN WITH RIGHT-SIDED SCIATICA: ICD-10-CM

## 2021-08-12 PROCEDURE — 97110 THERAPEUTIC EXERCISES: CPT | Mod: PN

## 2021-08-16 ENCOUNTER — SPECIALTY PHARMACY (OUTPATIENT)
Dept: PHARMACY | Facility: CLINIC | Age: 66
End: 2021-08-16

## 2021-08-17 ENCOUNTER — CLINICAL SUPPORT (OUTPATIENT)
Dept: REHABILITATION | Facility: HOSPITAL | Age: 66
End: 2021-08-17
Attending: FAMILY MEDICINE
Payer: COMMERCIAL

## 2021-08-17 DIAGNOSIS — M25.60 DECREASED RANGE OF MOTION: ICD-10-CM

## 2021-08-17 DIAGNOSIS — M62.81 MUSCLE WEAKNESS: ICD-10-CM

## 2021-08-17 DIAGNOSIS — M54.41 CHRONIC BILATERAL LOW BACK PAIN WITH RIGHT-SIDED SCIATICA: ICD-10-CM

## 2021-08-17 DIAGNOSIS — G89.29 CHRONIC BILATERAL LOW BACK PAIN WITH RIGHT-SIDED SCIATICA: ICD-10-CM

## 2021-08-17 PROCEDURE — 97110 THERAPEUTIC EXERCISES: CPT | Mod: PN

## 2021-08-18 ENCOUNTER — PATIENT MESSAGE (OUTPATIENT)
Dept: RHEUMATOLOGY | Facility: CLINIC | Age: 66
End: 2021-08-18

## 2021-08-19 ENCOUNTER — IMMUNIZATION (OUTPATIENT)
Dept: INTERNAL MEDICINE | Facility: CLINIC | Age: 66
End: 2021-08-19

## 2021-08-19 DIAGNOSIS — Z23 NEED FOR VACCINATION: Primary | ICD-10-CM

## 2021-08-19 PROCEDURE — 91300 COVID-19, MRNA, LNP-S, PF, 30 MCG/0.3 ML DOSE VACCINE: CPT | Mod: S$GLB,,, | Performed by: INTERNAL MEDICINE

## 2021-08-19 PROCEDURE — 91300 COVID-19, MRNA, LNP-S, PF, 30 MCG/0.3 ML DOSE VACCINE: ICD-10-PCS | Mod: S$GLB,,, | Performed by: INTERNAL MEDICINE

## 2021-08-19 PROCEDURE — 0003A COVID-19, MRNA, LNP-S, PF, 30 MCG/0.3 ML DOSE VACCINE: CPT | Mod: CV19,S$GLB,, | Performed by: INTERNAL MEDICINE

## 2021-08-19 PROCEDURE — 0003A COVID-19, MRNA, LNP-S, PF, 30 MCG/0.3 ML DOSE VACCINE: ICD-10-PCS | Mod: CV19,S$GLB,, | Performed by: INTERNAL MEDICINE

## 2021-08-20 ENCOUNTER — CLINICAL SUPPORT (OUTPATIENT)
Dept: REHABILITATION | Facility: HOSPITAL | Age: 66
End: 2021-08-20
Attending: FAMILY MEDICINE
Payer: COMMERCIAL

## 2021-08-20 DIAGNOSIS — G89.29 CHRONIC BILATERAL LOW BACK PAIN WITH RIGHT-SIDED SCIATICA: ICD-10-CM

## 2021-08-20 DIAGNOSIS — M54.41 CHRONIC BILATERAL LOW BACK PAIN WITH RIGHT-SIDED SCIATICA: ICD-10-CM

## 2021-08-20 DIAGNOSIS — M25.60 DECREASED RANGE OF MOTION: ICD-10-CM

## 2021-08-20 DIAGNOSIS — M62.81 MUSCLE WEAKNESS: ICD-10-CM

## 2021-08-20 PROCEDURE — 97110 THERAPEUTIC EXERCISES: CPT | Mod: PN,CQ

## 2021-08-23 ENCOUNTER — CLINICAL SUPPORT (OUTPATIENT)
Dept: REHABILITATION | Facility: HOSPITAL | Age: 66
End: 2021-08-23
Payer: COMMERCIAL

## 2021-08-23 DIAGNOSIS — M25.60 DECREASED RANGE OF MOTION: ICD-10-CM

## 2021-08-23 DIAGNOSIS — G89.29 CHRONIC BILATERAL LOW BACK PAIN WITH RIGHT-SIDED SCIATICA: ICD-10-CM

## 2021-08-23 DIAGNOSIS — M62.81 MUSCLE WEAKNESS: ICD-10-CM

## 2021-08-23 DIAGNOSIS — M54.41 CHRONIC BILATERAL LOW BACK PAIN WITH RIGHT-SIDED SCIATICA: ICD-10-CM

## 2021-08-23 PROCEDURE — 97110 THERAPEUTIC EXERCISES: CPT | Mod: PN

## 2021-08-24 ENCOUNTER — PATIENT OUTREACH (OUTPATIENT)
Dept: ADMINISTRATIVE | Facility: OTHER | Age: 66
End: 2021-08-24

## 2021-08-25 ENCOUNTER — CLINICAL SUPPORT (OUTPATIENT)
Dept: REHABILITATION | Facility: HOSPITAL | Age: 66
End: 2021-08-25
Attending: FAMILY MEDICINE
Payer: COMMERCIAL

## 2021-08-25 DIAGNOSIS — M62.81 MUSCLE WEAKNESS: ICD-10-CM

## 2021-08-25 DIAGNOSIS — G89.29 CHRONIC BILATERAL LOW BACK PAIN WITH RIGHT-SIDED SCIATICA: ICD-10-CM

## 2021-08-25 DIAGNOSIS — M54.41 CHRONIC BILATERAL LOW BACK PAIN WITH RIGHT-SIDED SCIATICA: ICD-10-CM

## 2021-08-25 DIAGNOSIS — M25.60 DECREASED RANGE OF MOTION: ICD-10-CM

## 2021-08-25 PROCEDURE — 97110 THERAPEUTIC EXERCISES: CPT | Mod: PN

## 2021-08-26 ENCOUNTER — DOCUMENTATION ONLY (OUTPATIENT)
Dept: REHABILITATION | Facility: HOSPITAL | Age: 66
End: 2021-08-26

## 2021-08-26 ENCOUNTER — OFFICE VISIT (OUTPATIENT)
Dept: PAIN MEDICINE | Facility: CLINIC | Age: 66
End: 2021-08-26
Payer: COMMERCIAL

## 2021-08-26 VITALS
WEIGHT: 175.06 LBS | SYSTOLIC BLOOD PRESSURE: 117 MMHG | HEART RATE: 82 BPM | BODY MASS INDEX: 28.25 KG/M2 | DIASTOLIC BLOOD PRESSURE: 79 MMHG

## 2021-08-26 DIAGNOSIS — M47.816 LUMBAR SPONDYLOSIS: ICD-10-CM

## 2021-08-26 DIAGNOSIS — G89.4 CHRONIC PAIN SYNDROME: ICD-10-CM

## 2021-08-26 DIAGNOSIS — G89.29 CHRONIC BILATERAL LOW BACK PAIN WITH RIGHT-SIDED SCIATICA: ICD-10-CM

## 2021-08-26 DIAGNOSIS — M54.41 CHRONIC BILATERAL LOW BACK PAIN WITH RIGHT-SIDED SCIATICA: ICD-10-CM

## 2021-08-26 DIAGNOSIS — M48.062 SPINAL STENOSIS OF LUMBAR REGION WITH NEUROGENIC CLAUDICATION: ICD-10-CM

## 2021-08-26 DIAGNOSIS — M51.36 DDD (DEGENERATIVE DISC DISEASE), LUMBAR: ICD-10-CM

## 2021-08-26 DIAGNOSIS — M54.16 LUMBAR RADICULOPATHY: Primary | ICD-10-CM

## 2021-08-26 PROCEDURE — 99999 PR PBB SHADOW E&M-EST. PATIENT-LVL III: CPT | Mod: PBBFAC,,, | Performed by: NURSE PRACTITIONER

## 2021-08-26 PROCEDURE — 1125F PR PAIN SEVERITY QUANTIFIED, PAIN PRESENT: ICD-10-PCS | Mod: CPTII,S$GLB,, | Performed by: NURSE PRACTITIONER

## 2021-08-26 PROCEDURE — 3008F PR BODY MASS INDEX (BMI) DOCUMENTED: ICD-10-PCS | Mod: CPTII,S$GLB,, | Performed by: NURSE PRACTITIONER

## 2021-08-26 PROCEDURE — 3078F PR MOST RECENT DIASTOLIC BLOOD PRESSURE < 80 MM HG: ICD-10-PCS | Mod: CPTII,S$GLB,, | Performed by: NURSE PRACTITIONER

## 2021-08-26 PROCEDURE — 1125F AMNT PAIN NOTED PAIN PRSNT: CPT | Mod: CPTII,S$GLB,, | Performed by: NURSE PRACTITIONER

## 2021-08-26 PROCEDURE — 1159F MED LIST DOCD IN RCRD: CPT | Mod: CPTII,S$GLB,, | Performed by: NURSE PRACTITIONER

## 2021-08-26 PROCEDURE — 1160F RVW MEDS BY RX/DR IN RCRD: CPT | Mod: CPTII,S$GLB,, | Performed by: NURSE PRACTITIONER

## 2021-08-26 PROCEDURE — 1159F PR MEDICATION LIST DOCUMENTED IN MEDICAL RECORD: ICD-10-PCS | Mod: CPTII,S$GLB,, | Performed by: NURSE PRACTITIONER

## 2021-08-26 PROCEDURE — 3074F SYST BP LT 130 MM HG: CPT | Mod: CPTII,S$GLB,, | Performed by: NURSE PRACTITIONER

## 2021-08-26 PROCEDURE — 3078F DIAST BP <80 MM HG: CPT | Mod: CPTII,S$GLB,, | Performed by: NURSE PRACTITIONER

## 2021-08-26 PROCEDURE — 99214 OFFICE O/P EST MOD 30 MIN: CPT | Mod: S$GLB,,, | Performed by: NURSE PRACTITIONER

## 2021-08-26 PROCEDURE — 99999 PR PBB SHADOW E&M-EST. PATIENT-LVL III: ICD-10-PCS | Mod: PBBFAC,,, | Performed by: NURSE PRACTITIONER

## 2021-08-26 PROCEDURE — 1160F PR REVIEW ALL MEDS BY PRESCRIBER/CLIN PHARMACIST DOCUMENTED: ICD-10-PCS | Mod: CPTII,S$GLB,, | Performed by: NURSE PRACTITIONER

## 2021-08-26 PROCEDURE — 3044F HG A1C LEVEL LT 7.0%: CPT | Mod: CPTII,S$GLB,, | Performed by: NURSE PRACTITIONER

## 2021-08-26 PROCEDURE — 99214 PR OFFICE/OUTPT VISIT, EST, LEVL IV, 30-39 MIN: ICD-10-PCS | Mod: S$GLB,,, | Performed by: NURSE PRACTITIONER

## 2021-08-26 PROCEDURE — 3044F PR MOST RECENT HEMOGLOBIN A1C LEVEL <7.0%: ICD-10-PCS | Mod: CPTII,S$GLB,, | Performed by: NURSE PRACTITIONER

## 2021-08-26 PROCEDURE — 3074F PR MOST RECENT SYSTOLIC BLOOD PRESSURE < 130 MM HG: ICD-10-PCS | Mod: CPTII,S$GLB,, | Performed by: NURSE PRACTITIONER

## 2021-08-26 PROCEDURE — 3008F BODY MASS INDEX DOCD: CPT | Mod: CPTII,S$GLB,, | Performed by: NURSE PRACTITIONER

## 2021-09-16 ENCOUNTER — TELEPHONE (OUTPATIENT)
Dept: PULMONOLOGY | Facility: CLINIC | Age: 66
End: 2021-09-16

## 2021-09-16 DIAGNOSIS — R06.02 SHORTNESS OF BREATH: Primary | ICD-10-CM

## 2021-09-24 ENCOUNTER — SPECIALTY PHARMACY (OUTPATIENT)
Dept: PHARMACY | Facility: CLINIC | Age: 66
End: 2021-09-24

## 2021-09-27 ENCOUNTER — PATIENT MESSAGE (OUTPATIENT)
Dept: PAIN MEDICINE | Facility: CLINIC | Age: 66
End: 2021-09-27

## 2021-09-29 ENCOUNTER — DOCUMENTATION ONLY (OUTPATIENT)
Dept: REHABILITATION | Facility: HOSPITAL | Age: 66
End: 2021-09-29

## 2021-09-29 ENCOUNTER — CLINICAL SUPPORT (OUTPATIENT)
Dept: REHABILITATION | Facility: HOSPITAL | Age: 66
End: 2021-09-29
Payer: COMMERCIAL

## 2021-09-29 DIAGNOSIS — M62.81 MUSCLE WEAKNESS: ICD-10-CM

## 2021-09-29 DIAGNOSIS — M54.41 CHRONIC BILATERAL LOW BACK PAIN WITH RIGHT-SIDED SCIATICA: ICD-10-CM

## 2021-09-29 DIAGNOSIS — G89.29 CHRONIC BILATERAL LOW BACK PAIN WITH RIGHT-SIDED SCIATICA: ICD-10-CM

## 2021-09-29 DIAGNOSIS — M25.60 DECREASED RANGE OF MOTION: ICD-10-CM

## 2021-09-29 PROCEDURE — 97140 MANUAL THERAPY 1/> REGIONS: CPT | Mod: PN

## 2021-09-29 PROCEDURE — 97110 THERAPEUTIC EXERCISES: CPT | Mod: PN

## 2021-10-03 ENCOUNTER — PATIENT MESSAGE (OUTPATIENT)
Dept: PAIN MEDICINE | Facility: CLINIC | Age: 66
End: 2021-10-03

## 2021-10-07 ENCOUNTER — CLINICAL SUPPORT (OUTPATIENT)
Dept: REHABILITATION | Facility: HOSPITAL | Age: 66
End: 2021-10-07
Payer: COMMERCIAL

## 2021-10-07 DIAGNOSIS — M25.60 DECREASED RANGE OF MOTION: ICD-10-CM

## 2021-10-07 DIAGNOSIS — M54.41 CHRONIC BILATERAL LOW BACK PAIN WITH RIGHT-SIDED SCIATICA: ICD-10-CM

## 2021-10-07 DIAGNOSIS — M62.81 MUSCLE WEAKNESS: ICD-10-CM

## 2021-10-07 DIAGNOSIS — G89.29 CHRONIC BILATERAL LOW BACK PAIN WITH RIGHT-SIDED SCIATICA: ICD-10-CM

## 2021-10-07 PROCEDURE — 97110 THERAPEUTIC EXERCISES: CPT | Mod: PN,CQ

## 2021-10-07 PROCEDURE — 97140 MANUAL THERAPY 1/> REGIONS: CPT | Mod: PN,CQ

## 2021-10-14 ENCOUNTER — CLINICAL SUPPORT (OUTPATIENT)
Dept: REHABILITATION | Facility: HOSPITAL | Age: 66
End: 2021-10-14
Payer: COMMERCIAL

## 2021-10-14 DIAGNOSIS — M62.81 MUSCLE WEAKNESS: ICD-10-CM

## 2021-10-14 DIAGNOSIS — M25.60 DECREASED RANGE OF MOTION: ICD-10-CM

## 2021-10-14 DIAGNOSIS — G89.29 CHRONIC BILATERAL LOW BACK PAIN WITH RIGHT-SIDED SCIATICA: ICD-10-CM

## 2021-10-14 DIAGNOSIS — M54.41 CHRONIC BILATERAL LOW BACK PAIN WITH RIGHT-SIDED SCIATICA: ICD-10-CM

## 2021-10-14 PROCEDURE — 97140 MANUAL THERAPY 1/> REGIONS: CPT | Mod: PN

## 2021-10-14 PROCEDURE — 97110 THERAPEUTIC EXERCISES: CPT | Mod: PN

## 2021-10-18 DIAGNOSIS — M06.9 RHEUMATOID ARTHRITIS INVOLVING MULTIPLE JOINTS: Primary | ICD-10-CM

## 2021-10-19 ENCOUNTER — SPECIALTY PHARMACY (OUTPATIENT)
Dept: PHARMACY | Facility: CLINIC | Age: 66
End: 2021-10-19

## 2021-10-19 ENCOUNTER — CLINICAL SUPPORT (OUTPATIENT)
Dept: REHABILITATION | Facility: HOSPITAL | Age: 66
End: 2021-10-19
Payer: COMMERCIAL

## 2021-10-19 DIAGNOSIS — M62.81 MUSCLE WEAKNESS: ICD-10-CM

## 2021-10-19 DIAGNOSIS — M25.60 DECREASED RANGE OF MOTION: ICD-10-CM

## 2021-10-19 DIAGNOSIS — M54.41 CHRONIC BILATERAL LOW BACK PAIN WITH RIGHT-SIDED SCIATICA: ICD-10-CM

## 2021-10-19 DIAGNOSIS — G89.29 CHRONIC BILATERAL LOW BACK PAIN WITH RIGHT-SIDED SCIATICA: ICD-10-CM

## 2021-10-19 PROCEDURE — 97110 THERAPEUTIC EXERCISES: CPT | Mod: PN

## 2021-10-21 ENCOUNTER — CLINICAL SUPPORT (OUTPATIENT)
Dept: REHABILITATION | Facility: HOSPITAL | Age: 66
End: 2021-10-21
Payer: COMMERCIAL

## 2021-10-21 DIAGNOSIS — G89.29 CHRONIC BILATERAL LOW BACK PAIN WITH RIGHT-SIDED SCIATICA: ICD-10-CM

## 2021-10-21 DIAGNOSIS — M54.41 CHRONIC BILATERAL LOW BACK PAIN WITH RIGHT-SIDED SCIATICA: ICD-10-CM

## 2021-10-21 DIAGNOSIS — M62.81 MUSCLE WEAKNESS: ICD-10-CM

## 2021-10-21 DIAGNOSIS — M25.60 DECREASED RANGE OF MOTION: ICD-10-CM

## 2021-10-21 PROCEDURE — 97110 THERAPEUTIC EXERCISES: CPT | Mod: PN

## 2021-10-21 PROCEDURE — 97140 MANUAL THERAPY 1/> REGIONS: CPT | Mod: PN

## 2021-10-25 ENCOUNTER — CLINICAL SUPPORT (OUTPATIENT)
Dept: REHABILITATION | Facility: HOSPITAL | Age: 66
End: 2021-10-25
Payer: COMMERCIAL

## 2021-10-25 DIAGNOSIS — G89.29 CHRONIC BILATERAL LOW BACK PAIN WITH RIGHT-SIDED SCIATICA: ICD-10-CM

## 2021-10-25 DIAGNOSIS — M54.41 CHRONIC BILATERAL LOW BACK PAIN WITH RIGHT-SIDED SCIATICA: ICD-10-CM

## 2021-10-25 DIAGNOSIS — M62.81 MUSCLE WEAKNESS: ICD-10-CM

## 2021-10-25 DIAGNOSIS — M25.60 DECREASED RANGE OF MOTION: ICD-10-CM

## 2021-10-25 PROCEDURE — 97110 THERAPEUTIC EXERCISES: CPT | Mod: PN

## 2021-10-27 ENCOUNTER — LAB VISIT (OUTPATIENT)
Dept: LAB | Facility: HOSPITAL | Age: 66
End: 2021-10-27
Attending: INTERNAL MEDICINE
Payer: COMMERCIAL

## 2021-10-27 DIAGNOSIS — M06.9 RHEUMATOID ARTHRITIS INVOLVING MULTIPLE JOINTS: ICD-10-CM

## 2021-10-27 PROCEDURE — 36415 COLL VENOUS BLD VENIPUNCTURE: CPT | Performed by: INTERNAL MEDICINE

## 2021-10-27 PROCEDURE — 86480 TB TEST CELL IMMUN MEASURE: CPT | Performed by: INTERNAL MEDICINE

## 2021-10-29 LAB
GAMMA INTERFERON BACKGROUND BLD IA-ACNC: 0.06 IU/ML
M TB IFN-G CD4+ BCKGRND COR BLD-ACNC: 0 IU/ML
MITOGEN IGNF BCKGRD COR BLD-ACNC: >10 IU/ML
TB GOLD PLUS: NEGATIVE
TB2 - NIL: 0.01 IU/ML

## 2021-11-02 ENCOUNTER — CLINICAL SUPPORT (OUTPATIENT)
Dept: REHABILITATION | Facility: HOSPITAL | Age: 66
End: 2021-11-02
Payer: COMMERCIAL

## 2021-11-02 DIAGNOSIS — M25.60 DECREASED RANGE OF MOTION: ICD-10-CM

## 2021-11-02 DIAGNOSIS — G89.29 CHRONIC BILATERAL LOW BACK PAIN WITH RIGHT-SIDED SCIATICA: ICD-10-CM

## 2021-11-02 DIAGNOSIS — M54.41 CHRONIC BILATERAL LOW BACK PAIN WITH RIGHT-SIDED SCIATICA: ICD-10-CM

## 2021-11-02 DIAGNOSIS — M62.81 MUSCLE WEAKNESS: ICD-10-CM

## 2021-11-02 PROCEDURE — 97110 THERAPEUTIC EXERCISES: CPT | Mod: PN

## 2021-11-03 ENCOUNTER — TELEPHONE (OUTPATIENT)
Dept: PULMONOLOGY | Facility: CLINIC | Age: 66
End: 2021-11-03
Payer: COMMERCIAL

## 2021-11-04 ENCOUNTER — CLINICAL SUPPORT (OUTPATIENT)
Dept: REHABILITATION | Facility: HOSPITAL | Age: 66
End: 2021-11-04
Payer: COMMERCIAL

## 2021-11-04 DIAGNOSIS — G89.29 CHRONIC BILATERAL LOW BACK PAIN WITH RIGHT-SIDED SCIATICA: ICD-10-CM

## 2021-11-04 DIAGNOSIS — M62.81 MUSCLE WEAKNESS: ICD-10-CM

## 2021-11-04 DIAGNOSIS — M25.60 DECREASED RANGE OF MOTION: ICD-10-CM

## 2021-11-04 DIAGNOSIS — M54.41 CHRONIC BILATERAL LOW BACK PAIN WITH RIGHT-SIDED SCIATICA: ICD-10-CM

## 2021-11-04 PROCEDURE — 97110 THERAPEUTIC EXERCISES: CPT | Mod: PN,CQ

## 2021-11-09 ENCOUNTER — CLINICAL SUPPORT (OUTPATIENT)
Dept: REHABILITATION | Facility: HOSPITAL | Age: 66
End: 2021-11-09
Payer: COMMERCIAL

## 2021-11-09 DIAGNOSIS — M54.41 CHRONIC BILATERAL LOW BACK PAIN WITH RIGHT-SIDED SCIATICA: ICD-10-CM

## 2021-11-09 DIAGNOSIS — M25.60 DECREASED RANGE OF MOTION: ICD-10-CM

## 2021-11-09 DIAGNOSIS — M62.81 MUSCLE WEAKNESS: ICD-10-CM

## 2021-11-09 DIAGNOSIS — G89.29 CHRONIC BILATERAL LOW BACK PAIN WITH RIGHT-SIDED SCIATICA: ICD-10-CM

## 2021-11-09 PROCEDURE — 97140 MANUAL THERAPY 1/> REGIONS: CPT | Mod: PN

## 2021-11-09 PROCEDURE — 97110 THERAPEUTIC EXERCISES: CPT | Mod: PN

## 2021-11-10 ENCOUNTER — HOSPITAL ENCOUNTER (OUTPATIENT)
Dept: PULMONOLOGY | Facility: CLINIC | Age: 66
Discharge: HOME | End: 2021-11-10
Payer: COMMERCIAL

## 2021-11-10 ENCOUNTER — OFFICE VISIT (OUTPATIENT)
Dept: PULMONOLOGY | Facility: CLINIC | Age: 66
End: 2021-11-10
Payer: COMMERCIAL

## 2021-11-10 VITALS
HEIGHT: 66 IN | DIASTOLIC BLOOD PRESSURE: 80 MMHG | SYSTOLIC BLOOD PRESSURE: 120 MMHG | HEART RATE: 68 BPM | WEIGHT: 175 LBS | OXYGEN SATURATION: 96 % | BODY MASS INDEX: 28.12 KG/M2

## 2021-11-10 DIAGNOSIS — R06.02 SHORTNESS OF BREATH: ICD-10-CM

## 2021-11-10 DIAGNOSIS — J43.2 CENTRILOBULAR EMPHYSEMA: ICD-10-CM

## 2021-11-10 PROCEDURE — 99213 PR OFFICE/OUTPT VISIT, EST, LEVL III, 20-29 MIN: ICD-10-PCS | Mod: S$GLB,,, | Performed by: INTERNAL MEDICINE

## 2021-11-10 PROCEDURE — 3074F PR MOST RECENT SYSTOLIC BLOOD PRESSURE < 130 MM HG: ICD-10-PCS | Mod: CPTII,S$GLB,, | Performed by: INTERNAL MEDICINE

## 2021-11-10 PROCEDURE — 1126F AMNT PAIN NOTED NONE PRSNT: CPT | Mod: CPTII,S$GLB,, | Performed by: INTERNAL MEDICINE

## 2021-11-10 PROCEDURE — 94010 BREATHING CAPACITY TEST: CPT | Mod: S$GLB,,, | Performed by: INTERNAL MEDICINE

## 2021-11-10 PROCEDURE — 3079F DIAST BP 80-89 MM HG: CPT | Mod: CPTII,S$GLB,, | Performed by: INTERNAL MEDICINE

## 2021-11-10 PROCEDURE — 94727 GAS DIL/WSHOT DETER LNG VOL: CPT | Mod: S$GLB,,, | Performed by: INTERNAL MEDICINE

## 2021-11-10 PROCEDURE — 94010 BREATHING CAPACITY TEST: ICD-10-PCS | Mod: S$GLB,,, | Performed by: INTERNAL MEDICINE

## 2021-11-10 PROCEDURE — 3288F PR FALLS RISK ASSESSMENT DOCUMENTED: ICD-10-PCS | Mod: CPTII,S$GLB,, | Performed by: INTERNAL MEDICINE

## 2021-11-10 PROCEDURE — 94729 DIFFUSING CAPACITY: CPT | Mod: S$GLB,,, | Performed by: INTERNAL MEDICINE

## 2021-11-10 PROCEDURE — 1101F PR PT FALLS ASSESS DOC 0-1 FALLS W/OUT INJ PAST YR: ICD-10-PCS | Mod: CPTII,S$GLB,, | Performed by: INTERNAL MEDICINE

## 2021-11-10 PROCEDURE — 3044F HG A1C LEVEL LT 7.0%: CPT | Mod: CPTII,S$GLB,, | Performed by: INTERNAL MEDICINE

## 2021-11-10 PROCEDURE — 99999 PR PBB SHADOW E&M-EST. PATIENT-LVL III: CPT | Mod: PBBFAC,,, | Performed by: INTERNAL MEDICINE

## 2021-11-10 PROCEDURE — 94729 PR C02/MEMBANE DIFFUSE CAPACITY: ICD-10-PCS | Mod: S$GLB,,, | Performed by: INTERNAL MEDICINE

## 2021-11-10 PROCEDURE — 3008F BODY MASS INDEX DOCD: CPT | Mod: CPTII,S$GLB,, | Performed by: INTERNAL MEDICINE

## 2021-11-10 PROCEDURE — 99999 PR PBB SHADOW E&M-EST. PATIENT-LVL III: ICD-10-PCS | Mod: PBBFAC,,, | Performed by: INTERNAL MEDICINE

## 2021-11-10 PROCEDURE — 3044F PR MOST RECENT HEMOGLOBIN A1C LEVEL <7.0%: ICD-10-PCS | Mod: CPTII,S$GLB,, | Performed by: INTERNAL MEDICINE

## 2021-11-10 PROCEDURE — 94727 PR PULM FUNCTION TEST BY GAS: ICD-10-PCS | Mod: S$GLB,,, | Performed by: INTERNAL MEDICINE

## 2021-11-10 PROCEDURE — 3008F PR BODY MASS INDEX (BMI) DOCUMENTED: ICD-10-PCS | Mod: CPTII,S$GLB,, | Performed by: INTERNAL MEDICINE

## 2021-11-10 PROCEDURE — 1101F PT FALLS ASSESS-DOCD LE1/YR: CPT | Mod: CPTII,S$GLB,, | Performed by: INTERNAL MEDICINE

## 2021-11-10 PROCEDURE — 3288F FALL RISK ASSESSMENT DOCD: CPT | Mod: CPTII,S$GLB,, | Performed by: INTERNAL MEDICINE

## 2021-11-10 PROCEDURE — 1126F PR PAIN SEVERITY QUANTIFIED, NO PAIN PRESENT: ICD-10-PCS | Mod: CPTII,S$GLB,, | Performed by: INTERNAL MEDICINE

## 2021-11-10 PROCEDURE — 3074F SYST BP LT 130 MM HG: CPT | Mod: CPTII,S$GLB,, | Performed by: INTERNAL MEDICINE

## 2021-11-10 PROCEDURE — 3079F PR MOST RECENT DIASTOLIC BLOOD PRESSURE 80-89 MM HG: ICD-10-PCS | Mod: CPTII,S$GLB,, | Performed by: INTERNAL MEDICINE

## 2021-11-10 PROCEDURE — 99213 OFFICE O/P EST LOW 20 MIN: CPT | Mod: S$GLB,,, | Performed by: INTERNAL MEDICINE

## 2021-11-10 RX ORDER — TIOTROPIUM BROMIDE 18 UG/1
18 CAPSULE ORAL; RESPIRATORY (INHALATION) DAILY
Qty: 30 CAPSULE | Refills: 6 | Status: SHIPPED | OUTPATIENT
Start: 2021-11-10 | End: 2022-08-05 | Stop reason: SDUPTHER

## 2021-11-10 RX ORDER — BACLOFEN 10 MG/1
10 TABLET ORAL 2 TIMES DAILY
COMMUNITY
Start: 2021-07-09 | End: 2021-11-10

## 2021-11-11 ENCOUNTER — CLINICAL SUPPORT (OUTPATIENT)
Dept: REHABILITATION | Facility: HOSPITAL | Age: 66
End: 2021-11-11
Payer: COMMERCIAL

## 2021-11-11 DIAGNOSIS — M62.81 MUSCLE WEAKNESS: ICD-10-CM

## 2021-11-11 DIAGNOSIS — M54.41 CHRONIC BILATERAL LOW BACK PAIN WITH RIGHT-SIDED SCIATICA: ICD-10-CM

## 2021-11-11 DIAGNOSIS — M25.60 DECREASED RANGE OF MOTION: ICD-10-CM

## 2021-11-11 DIAGNOSIS — G89.29 CHRONIC BILATERAL LOW BACK PAIN WITH RIGHT-SIDED SCIATICA: ICD-10-CM

## 2021-11-11 PROCEDURE — 97110 THERAPEUTIC EXERCISES: CPT | Mod: PN,CQ

## 2021-11-16 ENCOUNTER — CLINICAL SUPPORT (OUTPATIENT)
Dept: REHABILITATION | Facility: HOSPITAL | Age: 66
End: 2021-11-16
Payer: COMMERCIAL

## 2021-11-16 DIAGNOSIS — M25.60 DECREASED RANGE OF MOTION: ICD-10-CM

## 2021-11-16 DIAGNOSIS — M62.81 MUSCLE WEAKNESS: ICD-10-CM

## 2021-11-16 DIAGNOSIS — M54.41 CHRONIC BILATERAL LOW BACK PAIN WITH RIGHT-SIDED SCIATICA: ICD-10-CM

## 2021-11-16 DIAGNOSIS — G89.29 CHRONIC BILATERAL LOW BACK PAIN WITH RIGHT-SIDED SCIATICA: ICD-10-CM

## 2021-11-16 PROCEDURE — 97110 THERAPEUTIC EXERCISES: CPT | Mod: PN

## 2021-11-22 ENCOUNTER — PATIENT OUTREACH (OUTPATIENT)
Dept: ADMINISTRATIVE | Facility: OTHER | Age: 66
End: 2021-11-22
Payer: COMMERCIAL

## 2021-11-24 ENCOUNTER — SPECIALTY PHARMACY (OUTPATIENT)
Dept: PHARMACY | Facility: CLINIC | Age: 66
End: 2021-11-24
Payer: COMMERCIAL

## 2021-11-24 ENCOUNTER — OFFICE VISIT (OUTPATIENT)
Dept: DERMATOLOGY | Facility: CLINIC | Age: 66
End: 2021-11-24
Payer: COMMERCIAL

## 2021-11-24 DIAGNOSIS — D48.5 NEOPLASM OF UNCERTAIN BEHAVIOR OF SKIN: ICD-10-CM

## 2021-11-24 DIAGNOSIS — Z85.828 PERSONAL HISTORY OF SKIN CANCER: ICD-10-CM

## 2021-11-24 DIAGNOSIS — L57.0 AK (ACTINIC KERATOSIS): Primary | ICD-10-CM

## 2021-11-24 DIAGNOSIS — L81.4 LENTIGO: ICD-10-CM

## 2021-11-24 DIAGNOSIS — L82.1 SK (SEBORRHEIC KERATOSIS): ICD-10-CM

## 2021-11-24 DIAGNOSIS — B07.8 COMMON WART: ICD-10-CM

## 2021-11-24 DIAGNOSIS — L90.5 SCAR: ICD-10-CM

## 2021-11-24 PROCEDURE — 17000 PR DESTRUCTION(LASER SURGERY,CRYOSURGERY,CHEMOSURGERY),PREMALIGNANT LESIONS,FIRST LESION: ICD-10-PCS | Mod: 59,S$GLB,, | Performed by: DERMATOLOGY

## 2021-11-24 PROCEDURE — 99213 OFFICE O/P EST LOW 20 MIN: CPT | Mod: 25,S$GLB,, | Performed by: DERMATOLOGY

## 2021-11-24 PROCEDURE — 99999 PR PBB SHADOW E&M-EST. PATIENT-LVL III: ICD-10-PCS | Mod: PBBFAC,,, | Performed by: DERMATOLOGY

## 2021-11-24 PROCEDURE — 11102 TANGNTL BX SKIN SINGLE LES: CPT | Mod: S$GLB,,, | Performed by: DERMATOLOGY

## 2021-11-24 PROCEDURE — 99999 PR PBB SHADOW E&M-EST. PATIENT-LVL III: CPT | Mod: PBBFAC,,, | Performed by: DERMATOLOGY

## 2021-11-24 PROCEDURE — 17003 DESTRUCT PREMALG LES 2-14: CPT | Mod: S$GLB,,, | Performed by: DERMATOLOGY

## 2021-11-24 PROCEDURE — 11102 PR TANGENTIAL BIOPSY, SKIN, SINGLE LESION: ICD-10-PCS | Mod: S$GLB,,, | Performed by: DERMATOLOGY

## 2021-11-24 PROCEDURE — 17000 DESTRUCT PREMALG LESION: CPT | Mod: 59,S$GLB,, | Performed by: DERMATOLOGY

## 2021-11-24 PROCEDURE — 88305 TISSUE EXAM BY PATHOLOGIST: ICD-10-PCS | Mod: 26,,, | Performed by: PATHOLOGY

## 2021-11-24 PROCEDURE — 99213 PR OFFICE/OUTPT VISIT, EST, LEVL III, 20-29 MIN: ICD-10-PCS | Mod: 25,S$GLB,, | Performed by: DERMATOLOGY

## 2021-11-24 PROCEDURE — 88305 TISSUE EXAM BY PATHOLOGIST: CPT | Performed by: PATHOLOGY

## 2021-11-24 PROCEDURE — 88305 TISSUE EXAM BY PATHOLOGIST: CPT | Mod: 26,,, | Performed by: PATHOLOGY

## 2021-11-24 PROCEDURE — 17003 DESTRUCTION, PREMALIGNANT LESIONS; SECOND THROUGH 14 LESIONS: ICD-10-PCS | Mod: S$GLB,,, | Performed by: DERMATOLOGY

## 2021-12-01 LAB
FINAL PATHOLOGIC DIAGNOSIS: NORMAL
GROSS: NORMAL
Lab: NORMAL
MICROSCOPIC EXAM: NORMAL

## 2021-12-02 ENCOUNTER — TELEPHONE (OUTPATIENT)
Dept: DERMATOLOGY | Facility: CLINIC | Age: 66
End: 2021-12-02
Payer: COMMERCIAL

## 2021-12-27 ENCOUNTER — SPECIALTY PHARMACY (OUTPATIENT)
Dept: PHARMACY | Facility: CLINIC | Age: 66
End: 2021-12-27
Payer: COMMERCIAL

## 2021-12-27 ENCOUNTER — TELEPHONE (OUTPATIENT)
Dept: RHEUMATOLOGY | Facility: CLINIC | Age: 66
End: 2021-12-27
Payer: COMMERCIAL

## 2022-01-03 ENCOUNTER — PROCEDURE VISIT (OUTPATIENT)
Dept: DERMATOLOGY | Facility: CLINIC | Age: 67
End: 2022-01-03
Payer: COMMERCIAL

## 2022-01-03 DIAGNOSIS — D48.5 NEOPLASM OF UNCERTAIN BEHAVIOR OF SKIN: Primary | ICD-10-CM

## 2022-01-03 DIAGNOSIS — C44.42 SCC (SQUAMOUS CELL CARCINOMA), SCALP/NECK: ICD-10-CM

## 2022-01-03 PROCEDURE — 88305 TISSUE EXAM BY PATHOLOGIST: CPT | Mod: 59 | Performed by: PATHOLOGY

## 2022-01-03 PROCEDURE — 12042 PR LAYR CLOS WND REST BODY 2.6-7.5 CM: ICD-10-PCS | Mod: S$GLB,,, | Performed by: DERMATOLOGY

## 2022-01-03 PROCEDURE — 11102 TANGNTL BX SKIN SINGLE LES: CPT | Mod: XS,S$GLB,, | Performed by: DERMATOLOGY

## 2022-01-03 PROCEDURE — 11622 PR EXC SKIN MALIG 1.1-2 CM REMAINDR BODY: ICD-10-PCS | Mod: S$GLB,,, | Performed by: DERMATOLOGY

## 2022-01-03 PROCEDURE — 99499 NO LOS: ICD-10-PCS | Mod: S$GLB,,, | Performed by: DERMATOLOGY

## 2022-01-03 PROCEDURE — 99499 UNLISTED E&M SERVICE: CPT | Mod: S$GLB,,, | Performed by: DERMATOLOGY

## 2022-01-03 PROCEDURE — 11622 EXC S/N/H/F/G MAL+MRG 1.1-2: CPT | Mod: S$GLB,,, | Performed by: DERMATOLOGY

## 2022-01-03 PROCEDURE — 88305 TISSUE EXAM BY PATHOLOGIST: ICD-10-PCS | Mod: 26,,, | Performed by: PATHOLOGY

## 2022-01-03 PROCEDURE — 12042 INTMD RPR N-HF/GENIT2.6-7.5: CPT | Mod: S$GLB,,, | Performed by: DERMATOLOGY

## 2022-01-03 PROCEDURE — 11102 PR TANGENTIAL BIOPSY, SKIN, SINGLE LESION: ICD-10-PCS | Mod: XS,S$GLB,, | Performed by: DERMATOLOGY

## 2022-01-03 PROCEDURE — 88305 TISSUE EXAM BY PATHOLOGIST: CPT | Mod: 26,,, | Performed by: PATHOLOGY

## 2022-01-03 NOTE — PROGRESS NOTES
Subjective:       Patient ID:  Fabiano Garvey is a 66 y.o. male who presents for No chief complaint on file.    Pthere for excision of SCC on mid lower neck  C/o lesion on right lateral cheek. Was tx with cryo and still present. No other tx.       Review of Systems     Objective:    Physical Exam   Constitutional: He appears well-developed and well-nourished. No distress.   Neurological: He is alert and oriented to person, place, and time. He is not disoriented.   Psychiatric: He has a normal mood and affect.   Skin:   Areas Examined (abnormalities noted in diagram):   Head / Face Inspection Performed  Neck Inspection Performed              Diagram Legend     Erythematous scaling macule/papule c/w actinic keratosis       Vascular papule c/w angioma      Pigmented verrucoid papule/plaque c/w seborrheic keratosis      Yellow umbilicated papule c/w sebaceous hyperplasia      Irregularly shaped tan macule c/w lentigo     1-2 mm smooth white papules consistent with Milia      Movable subcutaneous cyst with punctum c/w epidermal inclusion cyst      Subcutaneous movable cyst c/w pilar cyst      Firm pink to brown papule c/w dermatofibroma      Pedunculated fleshy papule(s) c/w skin tag(s)      Evenly pigmented macule c/w junctional nevus     Mildly variegated pigmented, slightly irregular-bordered macule c/w mildly atypical nevus      Flesh colored to evenly pigmented papule c/w intradermal nevus       Pink pearly papule/plaque c/w basal cell carcinoma      Erythematous hyperkeratotic cursted plaque c/w SCC      Surgical scar with no sign of skin cancer recurrence      Open and closed comedones      Inflammatory papules and pustules      Verrucoid papule consistent consistent with wart     Erythematous eczematous patches and plaques     Dystrophic onycholytic nail with subungual debris c/w onychomycosis     Umbilicated papule    Erythematous-base heme-crusted tan verrucoid plaque consistent with inflamed seborrheic  keratosis     Erythematous Silvery Scaling Plaque c/w Psoriasis     See annotation      Assessment / Plan:      Pathology Orders:     Normal Orders This Visit    Specimen to Pathology, Dermatology     Questions:    Procedure Type: Dermatology and skin neoplasms    Number of Specimens: 2    ------------------------: -------------------------    Spec 1 Procedure: Biopsy    Spec 1 Clinical Impression: r/o inflamed keratosis v other    Spec 1 Source: right lateral cheek    ------------------------: -------------------------    Spec 2 Procedure: Biopsy    Spec 2 Clinical Impression: SCC check margins    Spec 2 Source: mid lower neck    Release to patient:         Neoplasm of uncertain behavior of skin- r lateral cheek  Shave biopsy procedure note:    Shave biopsy performed after verbal consent including risk of infection, scar, recurrence, need for additional treatment of site. Area prepped with alcohol, anesthetized with approximately 1.0cc of 1% lidocaine with epinephrine. Lesional tissue shaved with razor blade. Hemostasis achieved with application of aluminum chloride followed by hyfrecation. No complications. Dressing applied. Wound care explained.      -     Specimen to Pathology, Dermatology    SCC (squamous cell carcinoma), scalp/neck  -     Specimen to Pathology, Dermatology  PROCEDURE: Elliptical excision with intermediate layered repair in order to preserve anatomical contour.    ANESTHETIC: 4.0 cc 1% Xylocaine with Epinephrine 1:100,000, buffered    SURGEON: Lucie Judge M.D.    ASSISTANTS: Berna Licnoln MA    PREOPERATIVE DIAGNOSIS:  Biopsy-proven Squamous Cell Carcinoma    POSTOPERATIVE DIAGNOSIS:  Same as preoperative diagnosis    PATHOLOGIC DIAGNOSIS: Pending    LOCATION: mid lower neck     INITIAL LESION SIZE: 0.6 cm    EXCISED DIAMETER: 1.4 cm    PREPARATION: The diagnosis, procedure, alternatives, benefits and risks, including but not limited to: infection, bleeding/bruising, drug reactions,  pain, scar or cosmetic defect, local sensation disturbances, wound dehiscence (separation of wound edges after sutures removed) and/or recurrence of present condition were explained to the patient. The patient elected to proceed.  Patient's identity was verified using 2 patient identifiers and the side and site was verified.  Time out period with surgeon, assistant and patient in surgical suite was taken.    PROCEDURE: The location noted above was prepped, draped, and anesthetized in the usual sterile fashion per Dr. Lucie Judge. Lesional tissue was carefully marked with at least 4 mm margins of clinically normal skin in all directions. A fusiform elliptical excision was done with #15 blade carried down completely through the dermis into the deep subcutaneous tissues to the level of the non-muscle fascia, and dissection was carried out in that plane.  Electrocoagulation was used to obtain hemostasis. Blood loss was minimal. The wound was then approximated in a layered fashion with subcutaneous and intradermal sutures of 5.0 Monocryl, approximately 2 in number, and the wound was then superficially closed with running  sutures of 5.0 Prolene.    The patient tolerated the procedure well.    The area was cleaned and dressed appropriately and the patient was given wound care instructions, as well as an appointment for follow-up evaluation.    LENGTH OF REPAIR: 3.5 cm           Follow up in about 2 weeks (around 1/17/2022) for po and prn bx report.

## 2022-01-10 LAB
FINAL PATHOLOGIC DIAGNOSIS: NORMAL
GROSS: NORMAL
Lab: NORMAL
MICROSCOPIC EXAM: NORMAL

## 2022-01-11 ENCOUNTER — TELEPHONE (OUTPATIENT)
Dept: DERMATOLOGY | Facility: CLINIC | Age: 67
End: 2022-01-11
Payer: COMMERCIAL

## 2022-01-11 NOTE — TELEPHONE ENCOUNTER
----- Message from Leonarda Turcios sent at 1/11/2022 11:37 AM CST -----  Regarding: returning doctor call  Contact: patient  [phone]   please call patient returning call to the doctor waiting on a call back concerning removal of cancer per patient thanks.

## 2022-01-12 ENCOUNTER — PATIENT MESSAGE (OUTPATIENT)
Dept: FAMILY MEDICINE | Facility: CLINIC | Age: 67
End: 2022-01-12
Payer: COMMERCIAL

## 2022-01-13 ENCOUNTER — LAB VISIT (OUTPATIENT)
Dept: PRIMARY CARE CLINIC | Facility: CLINIC | Age: 67
End: 2022-01-13
Payer: COMMERCIAL

## 2022-01-13 ENCOUNTER — PATIENT MESSAGE (OUTPATIENT)
Dept: DERMATOLOGY | Facility: CLINIC | Age: 67
End: 2022-01-13
Payer: COMMERCIAL

## 2022-01-13 DIAGNOSIS — D04.30 SQUAMOUS CELL CARCINOMA IN SITU (SCCIS) OF SKIN OF FACE: Primary | ICD-10-CM

## 2022-01-13 DIAGNOSIS — Z20.822 CONTACT WITH AND (SUSPECTED) EXPOSURE TO COVID-19: ICD-10-CM

## 2022-01-13 LAB
CTP QC/QA: YES
SARS-COV-2 AG RESP QL IA.RAPID: NEGATIVE

## 2022-01-13 PROCEDURE — 87811 SARS-COV-2 COVID19 W/OPTIC: CPT

## 2022-01-13 RX ORDER — IMIQUIMOD 12.5 MG/.25G
CREAM TOPICAL
Qty: 12 PACKET | Refills: 1 | Status: SHIPPED | OUTPATIENT
Start: 2022-01-13 | End: 2022-08-05 | Stop reason: SDUPTHER

## 2022-01-19 ENCOUNTER — CLINICAL SUPPORT (OUTPATIENT)
Dept: DERMATOLOGY | Facility: CLINIC | Age: 67
End: 2022-01-19
Payer: COMMERCIAL

## 2022-01-19 DIAGNOSIS — Z48.02 VISIT FOR SUTURE REMOVAL: Primary | ICD-10-CM

## 2022-01-19 DIAGNOSIS — E78.2 MIXED HYPERLIPIDEMIA: ICD-10-CM

## 2022-01-19 PROCEDURE — 99024 POSTOP FOLLOW-UP VISIT: CPT | Mod: S$GLB,,, | Performed by: DERMATOLOGY

## 2022-01-19 PROCEDURE — 99999 PR PBB SHADOW E&M-EST. PATIENT-LVL I: ICD-10-PCS | Mod: PBBFAC,,,

## 2022-01-19 PROCEDURE — 99999 PR PBB SHADOW E&M-EST. PATIENT-LVL I: CPT | Mod: PBBFAC,,,

## 2022-01-19 PROCEDURE — 99024 PR POST-OP FOLLOW-UP VISIT: ICD-10-PCS | Mod: S$GLB,,, | Performed by: DERMATOLOGY

## 2022-01-19 RX ORDER — ATORVASTATIN CALCIUM 40 MG/1
40 TABLET, FILM COATED ORAL DAILY
Qty: 90 TABLET | Refills: 3 | Status: CANCELLED | OUTPATIENT
Start: 2022-01-19

## 2022-01-19 RX ORDER — ERGOCALCIFEROL 1.25 MG/1
CAPSULE ORAL
Qty: 12 CAPSULE | Refills: 0 | Status: CANCELLED | OUTPATIENT
Start: 2022-01-19

## 2022-01-19 RX ORDER — ATORVASTATIN CALCIUM 40 MG/1
40 TABLET, FILM COATED ORAL DAILY
Qty: 90 TABLET | Refills: 3 | Status: SHIPPED | OUTPATIENT
Start: 2022-01-19 | End: 2023-02-01 | Stop reason: SDUPTHER

## 2022-01-20 RX ORDER — ERGOCALCIFEROL 1.25 MG/1
CAPSULE ORAL
Qty: 12 CAPSULE | Refills: 0 | Status: SHIPPED | OUTPATIENT
Start: 2022-01-20 | End: 2022-08-22 | Stop reason: SDUPTHER

## 2022-01-25 ENCOUNTER — PATIENT MESSAGE (OUTPATIENT)
Dept: DERMATOLOGY | Facility: CLINIC | Age: 67
End: 2022-01-25
Payer: COMMERCIAL

## 2022-01-27 ENCOUNTER — SPECIALTY PHARMACY (OUTPATIENT)
Dept: PHARMACY | Facility: CLINIC | Age: 67
End: 2022-01-27
Payer: COMMERCIAL

## 2022-01-27 NOTE — TELEPHONE ENCOUNTER
Specialty Pharmacy - Refill Coordination    Specialty Medication Orders Linked to Encounter    Flowsheet Row Most Recent Value   Medication #1 upadacitinib (RINVOQ) 15 mg 24 hr tablet (Order#[id number], Rx#[id number])          Refill Questions - Documented Responses    Flowsheet Row Most Recent Value   Patient Availability and HIPAA Verification    Does patient want to proceed with activity? Yes   HIPAA/medical authority confirmed? Yes   Relationship to patient of person spoken to? Self   Refill Screening Questions    Changes to allergies? No   Changes to medications? No   New conditions since last clinic visit? No   Unplanned office visit, urgent care, ED, or hospital admission in the last 4 weeks? No   How does patient/caregiver feel medication is working? Good   Financial problems or insurance changes? No   How many doses of your specialty medications were missed in the last 4 weeks? 0   Would patient like to speak to a pharmacist? No   When does the patient need to receive the medication? 01/31/22   Refill Delivery Questions    How will the patient receive the medication? Delivery Kezia   When does the patient need to receive the medication? 01/31/22   Shipping Address Home   Address in [facility] confirmed and updated if neccessary? Yes   Expected Copay ($) 5   Is the patient able to afford the medication copay? Yes   Payment Method CC on file   Days supply of Refill 30   Supplies needed? No supplies needed   Refill activity completed? Yes   Refill activity plan Refill scheduled   Shipment/Pickup Date: 01/28/22          Current Outpatient Medications   Medication Sig    amLODIPine (NORVASC) 10 MG tablet Take 1 tablet by mouth once daily    atorvastatin (LIPITOR) 40 MG tablet Take 1 tablet (40 mg total) by mouth once daily.    ergocalciferol (ERGOCALCIFEROL) 50,000 unit Cap TAKE 1 CAPSULE BY MOUTH ONE TIME PER WEEK    fluorouraciL (EFUDEX) 5 % cream Apply thin film to right cheek and temple 2times per  day for 2-3 weeks; d/c if area bleeding or ulcerated; avoid eyes or mouth    fluticasone propionate (FLONASE) 50 mcg/actuation nasal spray 2 SPRAYS (100 MCG TOTAL) BY EACH NOSTRIL ROUTE ONCE DAILY.    gabapentin (NEURONTIN) 300 MG capsule Take 2 capsules (600 mg total) by mouth 3 (three) times daily.    ibuprofen (ADVIL,MOTRIN) 800 MG tablet TAKE 1 TABLET (800 MG TOTAL) BY MOUTH 3 (THREE) TIMES DAILY AS NEEDED FOR PAIN.    imiquimod (ALDARA) 5 % cream Apply small amount to affected area right cheek qhs x 4 weeks; d/c if ulcerated or bleeding    metFORMIN (GLUCOPHAGE) 500 MG tablet Take 1 tablet by mouth twice daily with meals    pantoprazole (PROTONIX) 40 MG tablet Take 1 tablet by mouth once daily    telmisartan (MICARDIS) 80 MG Tab Take 1 tablet by mouth once daily    tiotropium (SPIRIVA) 18 mcg inhalation capsule Inhale 1 capsule (18 mcg total) into the lungs once daily. Controller    upadacitinib (RINVOQ) 15 mg 24 hr tablet Take 1 tablet (15 mg total) by mouth once daily.   Last reviewed on 1/3/2022  1:08 PM by Lucie Judge MD    Review of patient's allergies indicates:   Allergen Reactions    Plaquenil [hydroxychloroquine]      Lightheaded and muscle aches    Last reviewed on  1/13/2022 8:09 AM by Lucie Judge      Tasks added this encounter   2/23/2022 - Refill Call (Auto Added)   Tasks due within next 3 months   No tasks due.     Nisreen Ghosh, PharmD  [facility] - Specialty Pharmacy  [address] [place] [id number]  Phone: [phone]  Fax: [phone]

## 2022-02-22 ENCOUNTER — PATIENT OUTREACH (OUTPATIENT)
Dept: ADMINISTRATIVE | Facility: OTHER | Age: 67
End: 2022-02-22
Payer: COMMERCIAL

## 2022-02-23 ENCOUNTER — SPECIALTY PHARMACY (OUTPATIENT)
Dept: PHARMACY | Facility: CLINIC | Age: 67
End: 2022-02-23
Payer: COMMERCIAL

## 2022-02-23 ENCOUNTER — OFFICE VISIT (OUTPATIENT)
Dept: DERMATOLOGY | Facility: CLINIC | Age: 67
End: 2022-02-23
Payer: COMMERCIAL

## 2022-02-23 DIAGNOSIS — Z85.828 PERSONAL HISTORY OF SKIN CANCER: ICD-10-CM

## 2022-02-23 DIAGNOSIS — L82.1 SK (SEBORRHEIC KERATOSIS): ICD-10-CM

## 2022-02-23 DIAGNOSIS — D84.9 IMMUNOSUPPRESSED STATUS: ICD-10-CM

## 2022-02-23 DIAGNOSIS — L90.5 SCAR: ICD-10-CM

## 2022-02-23 DIAGNOSIS — D04.30 SQUAMOUS CELL CARCINOMA IN SITU (SCCIS) OF SKIN OF FACE: Primary | ICD-10-CM

## 2022-02-23 DIAGNOSIS — L57.0 AK (ACTINIC KERATOSIS): ICD-10-CM

## 2022-02-23 DIAGNOSIS — R20.2 NOTALGIA PARESTHETICA: ICD-10-CM

## 2022-02-23 PROCEDURE — 99214 PR OFFICE/OUTPT VISIT, EST, LEVL IV, 30-39 MIN: ICD-10-PCS | Mod: 25,S$GLB,, | Performed by: DERMATOLOGY

## 2022-02-23 PROCEDURE — 1126F PR PAIN SEVERITY QUANTIFIED, NO PAIN PRESENT: ICD-10-PCS | Mod: CPTII,S$GLB,, | Performed by: DERMATOLOGY

## 2022-02-23 PROCEDURE — 17000 PR DESTRUCTION(LASER SURGERY,CRYOSURGERY,CHEMOSURGERY),PREMALIGNANT LESIONS,FIRST LESION: ICD-10-PCS | Mod: S$GLB,,, | Performed by: DERMATOLOGY

## 2022-02-23 PROCEDURE — 1101F PR PT FALLS ASSESS DOC 0-1 FALLS W/OUT INJ PAST YR: ICD-10-PCS | Mod: CPTII,S$GLB,, | Performed by: DERMATOLOGY

## 2022-02-23 PROCEDURE — 99999 PR PBB SHADOW E&M-EST. PATIENT-LVL III: CPT | Mod: PBBFAC,,, | Performed by: DERMATOLOGY

## 2022-02-23 PROCEDURE — 3288F FALL RISK ASSESSMENT DOCD: CPT | Mod: CPTII,S$GLB,, | Performed by: DERMATOLOGY

## 2022-02-23 PROCEDURE — 1126F AMNT PAIN NOTED NONE PRSNT: CPT | Mod: CPTII,S$GLB,, | Performed by: DERMATOLOGY

## 2022-02-23 PROCEDURE — 1159F PR MEDICATION LIST DOCUMENTED IN MEDICAL RECORD: ICD-10-PCS | Mod: CPTII,S$GLB,, | Performed by: DERMATOLOGY

## 2022-02-23 PROCEDURE — 3288F PR FALLS RISK ASSESSMENT DOCUMENTED: ICD-10-PCS | Mod: CPTII,S$GLB,, | Performed by: DERMATOLOGY

## 2022-02-23 PROCEDURE — 99999 PR PBB SHADOW E&M-EST. PATIENT-LVL III: ICD-10-PCS | Mod: PBBFAC,,, | Performed by: DERMATOLOGY

## 2022-02-23 PROCEDURE — 1159F MED LIST DOCD IN RCRD: CPT | Mod: CPTII,S$GLB,, | Performed by: DERMATOLOGY

## 2022-02-23 PROCEDURE — 17000 DESTRUCT PREMALG LESION: CPT | Mod: S$GLB,,, | Performed by: DERMATOLOGY

## 2022-02-23 PROCEDURE — 1160F RVW MEDS BY RX/DR IN RCRD: CPT | Mod: CPTII,S$GLB,, | Performed by: DERMATOLOGY

## 2022-02-23 PROCEDURE — 1160F PR REVIEW ALL MEDS BY PRESCRIBER/CLIN PHARMACIST DOCUMENTED: ICD-10-PCS | Mod: CPTII,S$GLB,, | Performed by: DERMATOLOGY

## 2022-02-23 PROCEDURE — 17003 DESTRUCT PREMALG LES 2-14: CPT | Mod: S$GLB,,, | Performed by: DERMATOLOGY

## 2022-02-23 PROCEDURE — 17003 DESTRUCTION, PREMALIGNANT LESIONS; SECOND THROUGH 14 LESIONS: ICD-10-PCS | Mod: S$GLB,,, | Performed by: DERMATOLOGY

## 2022-02-23 PROCEDURE — 99214 OFFICE O/P EST MOD 30 MIN: CPT | Mod: 25,S$GLB,, | Performed by: DERMATOLOGY

## 2022-02-23 PROCEDURE — 1101F PT FALLS ASSESS-DOCD LE1/YR: CPT | Mod: CPTII,S$GLB,, | Performed by: DERMATOLOGY

## 2022-02-23 NOTE — TELEPHONE ENCOUNTER
Patient calling with concerns post colposcopy. She experienced 3 separate \"gobs\" of grayish discharge. She is having a little cramping and slight nausea at times.   Denies fever, bleeding or extreme pain.  Discharge is normal post colposcopy from the Monsel's solution used.  Patient verbalized understanding, and indicated she  felt relieved.    Specialty Pharmacy - Refill Coordination    Specialty Medication Orders Linked to Encounter    Flowsheet Row Most Recent Value   Medication #1 upadacitinib (RINVOQ) 15 mg 24 hr tablet (Order#[id number], Rx#[id number])          Refill Questions - Documented Responses    Flowsheet Row Most Recent Value   Patient Availability and HIPAA Verification    Does patient want to proceed with activity? Yes   HIPAA/medical authority confirmed? Yes   Relationship to patient of person spoken to? Self   Refill Screening Questions    Changes to allergies? No   Changes to medications? No   New conditions since last clinic visit? No   Unplanned office visit, urgent care, ED, or hospital admission in the last 4 weeks? No   How does patient/caregiver feel medication is working? Good   Financial problems or insurance changes? No   How many doses of your specialty medications were missed in the last 4 weeks? 0   Would patient like to speak to a pharmacist? No   When does the patient need to receive the medication? 03/02/22   Refill Delivery Questions    How will the patient receive the medication? Delivery Kezia   When does the patient need to receive the medication? 03/02/22   Shipping Address Home   Address in [facility] confirmed and updated if neccessary? Yes   Expected Copay ($) 5   Is the patient able to afford the medication copay? Yes   Payment Method CC on file   Days supply of Refill 30   Supplies needed? No supplies needed   Refill activity completed? Yes   Refill activity plan Refill scheduled   Shipment/Pickup Date: 02/28/22          Current Outpatient Medications   Medication Sig    amLODIPine (NORVASC) 10 MG tablet Take 1 tablet by mouth once daily    atorvastatin (LIPITOR) 40 MG tablet Take 1 tablet (40 mg total) by mouth once daily.    ergocalciferol (ERGOCALCIFEROL) 50,000 unit Cap TAKE 1 CAPSULE BY MOUTH ONE TIME PER WEEK (Patient taking differently: TAKE 1 CAPSULE BY MOUTH ONE TIME PER WEEK)    fluorouraciL  (EFUDEX) 5 % cream Apply thin film to right cheek and temple 2times per day for 2-3 weeks; d/c if area bleeding or ulcerated; avoid eyes or mouth    fluticasone propionate (FLONASE) 50 mcg/actuation nasal spray 2 SPRAYS (100 MCG TOTAL) BY EACH NOSTRIL ROUTE ONCE DAILY.    gabapentin (NEURONTIN) 300 MG capsule Take 2 capsules (600 mg total) by mouth 3 (three) times daily.    ibuprofen (ADVIL,MOTRIN) 800 MG tablet TAKE 1 TABLET (800 MG TOTAL) BY MOUTH 3 (THREE) TIMES DAILY AS NEEDED FOR PAIN.    imiquimod (ALDARA) 5 % cream Apply small amount to affected area right cheek qhs x 4 weeks; d/c if ulcerated or bleeding    metFORMIN (GLUCOPHAGE) 500 MG tablet Take 1 tablet by mouth twice daily with meals    pantoprazole (PROTONIX) 40 MG tablet Take 1 tablet by mouth once daily    telmisartan (MICARDIS) 80 MG Tab Take 1 tablet by mouth once daily    tiotropium (SPIRIVA) 18 mcg inhalation capsule Inhale 1 capsule (18 mcg total) into the lungs once daily. Controller    upadacitinib (RINVOQ) 15 mg 24 hr tablet Take 1 tablet (15 mg total) by mouth once daily.   Last reviewed on 2/23/2022 10:04 AM by Lucie Judge MD    Review of patient's allergies indicates:   Allergen Reactions    Plaquenil [hydroxychloroquine]      Lightheaded and muscle aches    Last reviewed on  2/23/2022 10:04 AM by Lucie Judge      Tasks added this encounter   No tasks added.   Tasks due within next 3 months   2/23/2022 - Refill Call (Auto Added)     Fermin Ayala  [facility] - Specialty Pharmacy  [address] [facility] [address]  Phone: [phone]  Fax: [phone]

## 2022-02-23 NOTE — PROGRESS NOTES
Subjective:       Patient ID:  Fabiano Garvey is a 66 y.o. male who presents for   Chief Complaint   Patient presents with    Follow-up     S/p aldara     Pt here for aldara/imiquimod follow up for SCC in situ on right lateral cheek bx 1/3/2022  Pt used aldara/imiquimod for 3-4 weeks one time per day to R lateral cheek  Pt states their reaction was mild    C/o itch on left back for several years.  Comes and goes. Severe. No tx.       Follow-up - Follow-up        Review of Systems   Skin: Positive for activity-related sunscreen use. Negative for daily sunscreen use and tendency to form keloidal scars.   Hematologic/Lymphatic: Does not bruise/bleed easily.        Objective:    Physical Exam   Constitutional: He appears well-developed and well-nourished. No distress.   Neurological: He is alert and oriented to person, place, and time. He is not disoriented.   Psychiatric: He has a normal mood and affect.   Skin:   Areas Examined (abnormalities noted in diagram):   Head / Face Inspection Performed  Back Inspection Performed                   Diagram Legend     Erythematous scaling macule/papule c/w actinic keratosis       Vascular papule c/w angioma      Pigmented verrucoid papule/plaque c/w seborrheic keratosis      Yellow umbilicated papule c/w sebaceous hyperplasia      Irregularly shaped tan macule c/w lentigo     1-2 mm smooth white papules consistent with Milia      Movable subcutaneous cyst with punctum c/w epidermal inclusion cyst      Subcutaneous movable cyst c/w pilar cyst      Firm pink to brown papule c/w dermatofibroma      Pedunculated fleshy papule(s) c/w skin tag(s)      Evenly pigmented macule c/w junctional nevus     Mildly variegated pigmented, slightly irregular-bordered macule c/w mildly atypical nevus      Flesh colored to evenly pigmented papule c/w intradermal nevus       Pink pearly papule/plaque c/w basal cell carcinoma      Erythematous hyperkeratotic cursted plaque c/w SCC       Surgical scar with no sign of skin cancer recurrence      Open and closed comedones      Inflammatory papules and pustules      Verrucoid papule consistent consistent with wart     Erythematous eczematous patches and plaques     Dystrophic onycholytic nail with subungual debris c/w onychomycosis     Umbilicated papule    Erythematous-base heme-crusted tan verrucoid plaque consistent with inflamed seborrheic keratosis     Erythematous Silvery Scaling Plaque c/w Psoriasis     See annotation      Assessment / Plan:        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 This medication is dispensed in small packets. Open the packet, use  only the amount needed to apply a thin film to the affected area and then store the packet in a ziploc bag. The potential  side effects of aldara/imiquimod including redness, scaling, and irritation. This is a normal reaction and means the medication is working. If the area becomes ulcerated or is bleeding stop the medication and message your doctor. Long term side effects can include white scarring. More rare side effects include a flu like illness.  Discontinue medication and call the office if any of these symptoms occur. For some patients , this medication is not effective and other treatment options will need to be explored.     AK (actinic keratosis)  Cryosurgery Procedure Note    Verbal consent from the patient is obtained including, but not limited to, risk of hypopigmentation/hyperpigmentation, scar, recurrence of lesion. The patient is aware of the precancerous quality and need for treatment of these lesions. Liquid nitrogen cryosurgery is applied to the 5 actinic keratoses, as detailed in the physical exam, to produce a freeze injury. The patient is aware that blisters may form and is instructed on wound care  with gentle cleansing and use of vaseline ointment to keep moist until healed. The patient is supplied a handout on cryosurgery and is instructed to call if lesions do not completely resolve.    SK (seborrheic keratosis)  These are benign inherited growths without a malignant potential. Reassurance given to patient. No treatment is necessary.     Personal history of skin cancer  Scar  Scars mo signs of recurrence   sun protection daily encouraged      Notalgia paresthetica  This condition is thought to be related to a nerve under the skin  Can be triggered by stress, hot showers, and exacerbated by chronic rubbing  Cold compresses, sarna lotion can soothe the itching sensation  Topical steroids may give mild relief  Occasionally oral medications that control nerve impulses can help              Follow up in about 4 months (around 6/23/2022).

## 2022-02-23 NOTE — PATIENT INSTRUCTIONS
Right lateral cheek- nightly until out of medication     Your doctor has prescribed a medication that can stimulate the immune system to fix the atypical cells. This medication is dispensed in small packets. Open the packet, use  only the amount needed to apply a thin film to the affected area and then store the packet in a ziploc bag. The potential  side effects of aldara/imiquimod including redness, scaling, and irritation. This is a normal reaction and means the medication is working. If the area becomes ulcerated or is bleeding stop the medication and message your doctor. Long term side effects can include white scarring. More rare side effects include a flu like illness.  Discontinue medication and call the office if any of these symptoms occur. For some patients , this medication is not effective and other treatment options will need to be explored.     This condition is thought to be related to a nerve under the skin  Can be triggered by stress, hot showers, and exacerbated by chronic rubbing  Cold compresses, sarna lotion can soothe the itching sensation  Topical steroids may give mild relief  Occasionally oral medications that control nerve impulses can help

## 2022-03-09 ENCOUNTER — CLINICAL SUPPORT (OUTPATIENT)
Dept: OTHER | Facility: CLINIC | Age: 67
End: 2022-03-09
Payer: COMMERCIAL

## 2022-03-09 DIAGNOSIS — Z00.8 ENCOUNTER FOR OTHER GENERAL EXAMINATION: ICD-10-CM

## 2022-03-10 VITALS — BODY MASS INDEX: 28.25 KG/M2 | HEIGHT: 66 IN

## 2022-03-10 LAB
GLUCOSE SERPL-MCNC: 100 MG/DL (ref 60–140)
HDLC SERPL-MCNC: 43 MG/DL
POC CHOLESTEROL, LDL (DOCK): 82.8 MG/DL
POC CHOLESTEROL, TOTAL: 155 MG/DL
TRIGL SERPL-MCNC: 146 MG/DL

## 2022-03-25 ENCOUNTER — SPECIALTY PHARMACY (OUTPATIENT)
Dept: PHARMACY | Facility: CLINIC | Age: 67
End: 2022-03-25
Payer: COMMERCIAL

## 2022-03-25 NOTE — TELEPHONE ENCOUNTER
Specialty Pharmacy - Refill Coordination    Specialty Medication Orders Linked to Encounter    Flowsheet Row Most Recent Value   Medication #1 upadacitinib (RINVOQ) 15 mg 24 hr tablet (Order#[id number], Rx#[id number])          Refill Questions - Documented Responses    Flowsheet Row Most Recent Value   Patient Availability and HIPAA Verification    Does patient want to proceed with activity? Yes   HIPAA/medical authority confirmed? Yes   Relationship to patient of person spoken to? Self   Refill Screening Questions    Changes to allergies? No   Changes to medications? No   New conditions since last clinic visit? No   Unplanned office visit, urgent care, ED, or hospital admission in the last 4 weeks? No   How does patient/caregiver feel medication is working? Good   Financial problems or insurance changes? No   How many doses of your specialty medications were missed in the last 4 weeks? 0   Would patient like to speak to a pharmacist? No   When does the patient need to receive the medication? 04/04/22   Refill Delivery Questions    How will the patient receive the medication? Delivery Kezia   When does the patient need to receive the medication? 04/04/22   Shipping Address Home   Address in [facility] confirmed and updated if neccessary? Yes   Expected Copay ($) 5   Is the patient able to afford the medication copay? Yes   Payment Method CC on file   Days supply of Refill 30   Supplies needed? No supplies needed   Refill activity completed? Yes   Refill activity plan Refill scheduled   Shipment/Pickup Date: 03/30/22          Current Outpatient Medications   Medication Sig    amLODIPine (NORVASC) 10 MG tablet Take 1 tablet by mouth once daily    atorvastatin (LIPITOR) 40 MG tablet Take 1 tablet (40 mg total) by mouth once daily.    ergocalciferol (ERGOCALCIFEROL) 50,000 unit Cap TAKE 1 CAPSULE BY MOUTH ONE TIME PER WEEK (Patient taking differently: TAKE 1 CAPSULE BY MOUTH ONE TIME PER WEEK)    fluorouraciL  (EFUDEX) 5 % cream Apply thin film to right cheek and temple 2times per day for 2-3 weeks; d/c if area bleeding or ulcerated; avoid eyes or mouth    fluticasone propionate (FLONASE) 50 mcg/actuation nasal spray 2 SPRAYS (100 MCG TOTAL) BY EACH NOSTRIL ROUTE ONCE DAILY.    gabapentin (NEURONTIN) 300 MG capsule Take 2 capsules (600 mg total) by mouth 3 (three) times daily.    ibuprofen (ADVIL,MOTRIN) 800 MG tablet TAKE 1 TABLET (800 MG TOTAL) BY MOUTH 3 (THREE) TIMES DAILY AS NEEDED FOR PAIN.    imiquimod (ALDARA) 5 % cream Apply small amount to affected area right cheek qhs x 4 weeks; d/c if ulcerated or bleeding    metFORMIN (GLUCOPHAGE) 500 MG tablet Take 1 tablet by mouth twice daily with meals    pantoprazole (PROTONIX) 40 MG tablet Take 1 tablet by mouth once daily    telmisartan (MICARDIS) 80 MG Tab Take 1 tablet by mouth once daily    tiotropium (SPIRIVA) 18 mcg inhalation capsule Inhale 1 capsule (18 mcg total) into the lungs once daily. Controller    upadacitinib (RINVOQ) 15 mg 24 hr tablet Take 1 tablet (15 mg total) by mouth once daily.   Last reviewed on 2/23/2022 10:04 AM by Lucie Judge MD    Review of patient's allergies indicates:   Allergen Reactions    Plaquenil [hydroxychloroquine]      Lightheaded and muscle aches    Last reviewed on  2/23/2022 10:04 AM by Lucie Judge      Tasks added this encounter   No tasks added.   Tasks due within next 3 months   3/25/2022 - Refill Call (Auto Added)     Fermin Ayala  [facility]  [address] [address]  Phone: [phone]  Fax: [phone]

## 2022-04-18 ENCOUNTER — PATIENT MESSAGE (OUTPATIENT)
Dept: ADMINISTRATIVE | Facility: OTHER | Age: 67
End: 2022-04-18
Payer: COMMERCIAL

## 2022-04-21 ENCOUNTER — IMMUNIZATION (OUTPATIENT)
Dept: INTERNAL MEDICINE | Facility: CLINIC | Age: 67
End: 2022-04-21
Payer: COMMERCIAL

## 2022-04-21 DIAGNOSIS — Z23 NEED FOR VACCINATION: Primary | ICD-10-CM

## 2022-04-21 PROCEDURE — 91305 COVID-19, MRNA, LNP-S, PF, 30 MCG/0.3 ML DOSE VACCINE (PFIZER): CPT | Mod: PBBFAC | Performed by: FAMILY MEDICINE

## 2022-04-27 ENCOUNTER — PATIENT MESSAGE (OUTPATIENT)
Dept: PHARMACY | Facility: CLINIC | Age: 67
End: 2022-04-27
Payer: COMMERCIAL

## 2022-04-27 DIAGNOSIS — M06.9 RHEUMATOID ARTHRITIS INVOLVING MULTIPLE JOINTS: Primary | ICD-10-CM

## 2022-04-27 RX ORDER — UPADACITINIB 15 MG/1
15 TABLET, EXTENDED RELEASE ORAL DAILY
Qty: 30 TABLET | Refills: 3 | Status: SHIPPED | OUTPATIENT
Start: 2022-04-27 | End: 2022-08-30 | Stop reason: SDUPTHER

## 2022-04-29 ENCOUNTER — LAB VISIT (OUTPATIENT)
Dept: LAB | Facility: HOSPITAL | Age: 67
End: 2022-04-29
Attending: INTERNAL MEDICINE
Payer: COMMERCIAL

## 2022-04-29 DIAGNOSIS — M06.9 RHEUMATOID ARTHRITIS INVOLVING MULTIPLE JOINTS: ICD-10-CM

## 2022-04-29 LAB
ALBUMIN SERPL BCP-MCNC: 3.8 G/DL (ref 3.5–5.2)
ALP SERPL-CCNC: 41 U/L (ref 55–135)
ALT SERPL W/O P-5'-P-CCNC: 31 U/L (ref 10–44)
ANION GAP SERPL CALC-SCNC: 8 MMOL/L (ref 8–16)
AST SERPL-CCNC: 28 U/L (ref 10–40)
BASOPHILS # BLD AUTO: 0.03 K/UL (ref 0–0.2)
BASOPHILS NFR BLD: 0.5 % (ref 0–1.9)
BILIRUB SERPL-MCNC: 0.5 MG/DL (ref 0.1–1)
BUN SERPL-MCNC: 25 MG/DL (ref 8–23)
CALCIUM SERPL-MCNC: 9.7 MG/DL (ref 8.7–10.5)
CHLORIDE SERPL-SCNC: 108 MMOL/L (ref 95–110)
CO2 SERPL-SCNC: 26 MMOL/L (ref 23–29)
CREAT SERPL-MCNC: 1 MG/DL (ref 0.5–1.4)
CRP SERPL-MCNC: 1.5 MG/L (ref 0–8.2)
DIFFERENTIAL METHOD: ABNORMAL
EOSINOPHIL # BLD AUTO: 0.2 K/UL (ref 0–0.5)
EOSINOPHIL NFR BLD: 3.4 % (ref 0–8)
ERYTHROCYTE [DISTWIDTH] IN BLOOD BY AUTOMATED COUNT: 13.3 % (ref 11.5–14.5)
ERYTHROCYTE [SEDIMENTATION RATE] IN BLOOD BY WESTERGREN METHOD: 30 MM/HR (ref 0–10)
EST. GFR  (AFRICAN AMERICAN): >60 ML/MIN/1.73 M^2
EST. GFR  (NON AFRICAN AMERICAN): >60 ML/MIN/1.73 M^2
GLUCOSE SERPL-MCNC: 100 MG/DL (ref 70–110)
HCT VFR BLD AUTO: 43.8 % (ref 40–54)
HGB BLD-MCNC: 14.3 G/DL (ref 14–18)
IMM GRANULOCYTES # BLD AUTO: 0.01 K/UL (ref 0–0.04)
IMM GRANULOCYTES NFR BLD AUTO: 0.2 % (ref 0–0.5)
LYMPHOCYTES # BLD AUTO: 2.6 K/UL (ref 1–4.8)
LYMPHOCYTES NFR BLD: 40 % (ref 18–48)
MCH RBC QN AUTO: 31.6 PG (ref 27–31)
MCHC RBC AUTO-ENTMCNC: 32.6 G/DL (ref 32–36)
MCV RBC AUTO: 97 FL (ref 82–98)
MONOCYTES # BLD AUTO: 0.7 K/UL (ref 0.3–1)
MONOCYTES NFR BLD: 10.3 % (ref 4–15)
NEUTROPHILS # BLD AUTO: 3 K/UL (ref 1.8–7.7)
NEUTROPHILS NFR BLD: 45.6 % (ref 38–73)
NRBC BLD-RTO: 0 /100 WBC
PLATELET # BLD AUTO: 176 K/UL (ref 150–450)
PMV BLD AUTO: 10.9 FL (ref 9.2–12.9)
POTASSIUM SERPL-SCNC: 4.8 MMOL/L (ref 3.5–5.1)
PROT SERPL-MCNC: 7.3 G/DL (ref 6–8.4)
RBC # BLD AUTO: 4.52 M/UL (ref 4.6–6.2)
SODIUM SERPL-SCNC: 142 MMOL/L (ref 136–145)
WBC # BLD AUTO: 6.48 K/UL (ref 3.9–12.7)

## 2022-04-29 PROCEDURE — 85652 RBC SED RATE AUTOMATED: CPT | Performed by: INTERNAL MEDICINE

## 2022-04-29 PROCEDURE — 36415 COLL VENOUS BLD VENIPUNCTURE: CPT | Performed by: INTERNAL MEDICINE

## 2022-04-29 PROCEDURE — 80053 COMPREHEN METABOLIC PANEL: CPT | Performed by: INTERNAL MEDICINE

## 2022-04-29 PROCEDURE — 86140 C-REACTIVE PROTEIN: CPT | Performed by: INTERNAL MEDICINE

## 2022-04-29 PROCEDURE — 85025 COMPLETE CBC W/AUTO DIFF WBC: CPT | Performed by: INTERNAL MEDICINE

## 2022-05-04 ENCOUNTER — SPECIALTY PHARMACY (OUTPATIENT)
Dept: PHARMACY | Facility: CLINIC | Age: 67
End: 2022-05-04
Payer: COMMERCIAL

## 2022-05-04 NOTE — TELEPHONE ENCOUNTER
Specialty Pharmacy - Refill Coordination    Specialty Medication Orders Linked to Encounter    Flowsheet Row Most Recent Value   Medication #1 upadacitinib (RINVOQ) 15 mg 24 hr tablet (Order#[id number], Rx#[id number])          Refill Questions - Documented Responses    Flowsheet Row Most Recent Value   Patient Availability and HIPAA Verification    Does patient want to proceed with activity? Yes   HIPAA/medical authority confirmed? Yes   Relationship to patient of person spoken to? Self   Refill Screening Questions    Changes to allergies? No   Changes to medications? No   New conditions since last clinic visit? No   Unplanned office visit, urgent care, ED, or hospital admission in the last 4 weeks? No   How does patient/caregiver feel medication is working? Good   Financial problems or insurance changes? No   How many doses of your specialty medications were missed in the last 4 weeks? 0   Would patient like to speak to a pharmacist? No   When does the patient need to receive the medication? 05/08/22   Refill Delivery Questions    How will the patient receive the medication? Delivery Kezia   When does the patient need to receive the medication? 05/08/22   Shipping Address Home   Address in [facility] confirmed and updated if neccessary? Yes   Expected Copay ($) 5   Is the patient able to afford the medication copay? Yes   Payment Method CC on file   Days supply of Refill 30   Supplies needed? No supplies needed   Refill activity completed? Yes   Refill activity plan Refill scheduled   Shipment/Pickup Date: 05/06/22          Current Outpatient Medications   Medication Sig    amLODIPine (NORVASC) 10 MG tablet Take 1 tablet by mouth once daily    atorvastatin (LIPITOR) 40 MG tablet Take 1 tablet (40 mg total) by mouth once daily.    ergocalciferol (ERGOCALCIFEROL) 50,000 unit Cap TAKE 1 CAPSULE BY MOUTH ONE TIME PER WEEK (Patient taking differently: TAKE 1 CAPSULE BY MOUTH ONE TIME PER WEEK)    fluorouraciL  (EFUDEX) 5 % cream Apply thin film to right cheek and temple 2times per day for 2-3 weeks; d/c if area bleeding or ulcerated; avoid eyes or mouth    fluticasone propionate (FLONASE) 50 mcg/actuation nasal spray 2 SPRAYS (100 MCG TOTAL) BY EACH NOSTRIL ROUTE ONCE DAILY.    gabapentin (NEURONTIN) 300 MG capsule Take 2 capsules (600 mg total) by mouth 3 (three) times daily.    ibuprofen (ADVIL,MOTRIN) 800 MG tablet TAKE 1 TABLET (800 MG TOTAL) BY MOUTH 3 (THREE) TIMES DAILY AS NEEDED FOR PAIN.    imiquimod (ALDARA) 5 % cream Apply small amount to affected area right cheek qhs x 4 weeks; d/c if ulcerated or bleeding    metFORMIN (GLUCOPHAGE) 500 MG tablet Take 1 tablet by mouth twice daily with meals    pantoprazole (PROTONIX) 40 MG tablet Take 1 tablet by mouth once daily    telmisartan (MICARDIS) 80 MG Tab Take 1 tablet by mouth once daily    tiotropium (SPIRIVA) 18 mcg inhalation capsule Inhale 1 capsule (18 mcg total) into the lungs once daily. Controller    upadacitinib (RINVOQ) 15 mg 24 hr tablet Take 1 tablet (15 mg total) by mouth once daily.   Last reviewed on 2/23/2022 10:04 AM by Lucie Judge MD    Review of patient's allergies indicates:   Allergen Reactions    Plaquenil [hydroxychloroquine]      Lightheaded and muscle aches    Last reviewed on  2/23/2022 10:04 AM by Lucie Judge      Tasks added this encounter   5/31/2022 - Refill Call (Auto Added)   Tasks due within next 3 months   No tasks due.     Amanda Herman, PharmD  [facility] - [facility]  [address] [place] [address]  Phone: [phone]  Fax: [phone]

## 2022-05-12 ENCOUNTER — PATIENT MESSAGE (OUTPATIENT)
Dept: FAMILY MEDICINE | Facility: CLINIC | Age: 67
End: 2022-05-12
Payer: COMMERCIAL

## 2022-05-31 ENCOUNTER — PATIENT MESSAGE (OUTPATIENT)
Dept: ADMINISTRATIVE | Facility: HOSPITAL | Age: 67
End: 2022-05-31
Payer: COMMERCIAL

## 2022-05-31 ENCOUNTER — SPECIALTY PHARMACY (OUTPATIENT)
Dept: PHARMACY | Facility: CLINIC | Age: 67
End: 2022-05-31
Payer: COMMERCIAL

## 2022-05-31 NOTE — TELEPHONE ENCOUNTER
Specialty Pharmacy - Refill Coordination    Specialty Medication Orders Linked to Encounter    Flowsheet Row Most Recent Value   Medication #1 upadacitinib (RINVOQ) 15 mg 24 hr tablet (Order#[id number], Rx#[id number])          Refill Questions - Documented Responses    Flowsheet Row Most Recent Value   Patient Availability and HIPAA Verification    Does patient want to proceed with activity? Yes   HIPAA/medical authority confirmed? Yes   Relationship to patient of person spoken to? Self   Refill Screening Questions    Changes to allergies? No   Changes to medications? No   New conditions since last clinic visit? No   Unplanned office visit, urgent care, ED, or hospital admission in the last 4 weeks? No   How does patient/caregiver feel medication is working? Good   Financial problems or insurance changes? No   How many doses of your specialty medications were missed in the last 4 weeks? 0   Would patient like to speak to a pharmacist? No   When does the patient need to receive the medication? 06/09/22   Refill Delivery Questions    How will the patient receive the medication? Delivery Kezia   When does the patient need to receive the medication? 06/09/22   Shipping Address Home   Address in [facility] confirmed and updated if neccessary? Yes   Expected Copay ($) 5   Is the patient able to afford the medication copay? Yes   Payment Method CC on file   Days supply of Refill 30   Supplies needed? No supplies needed   Refill activity completed? Yes   Refill activity plan Refill scheduled   Shipment/Pickup Date: 06/06/22          Current Outpatient Medications   Medication Sig    amLODIPine (NORVASC) 10 MG tablet Take 1 tablet by mouth once daily    atorvastatin (LIPITOR) 40 MG tablet Take 1 tablet (40 mg total) by mouth once daily.    ergocalciferol (ERGOCALCIFEROL) 50,000 unit Cap TAKE 1 CAPSULE BY MOUTH ONE TIME PER WEEK (Patient taking differently: TAKE 1 CAPSULE BY MOUTH ONE TIME PER WEEK)    fluorouraciL  (EFUDEX) 5 % cream Apply thin film to right cheek and temple 2times per day for 2-3 weeks; d/c if area bleeding or ulcerated; avoid eyes or mouth    fluticasone propionate (FLONASE) 50 mcg/actuation nasal spray 2 SPRAYS (100 MCG TOTAL) BY EACH NOSTRIL ROUTE ONCE DAILY.    gabapentin (NEURONTIN) 300 MG capsule Take 2 capsules (600 mg total) by mouth 3 (three) times daily.    ibuprofen (ADVIL,MOTRIN) 800 MG tablet TAKE 1 TABLET (800 MG TOTAL) BY MOUTH 3 (THREE) TIMES DAILY AS NEEDED FOR PAIN.    imiquimod (ALDARA) 5 % cream Apply small amount to affected area right cheek qhs x 4 weeks; d/c if ulcerated or bleeding    metFORMIN (GLUCOPHAGE) 500 MG tablet Take 1 tablet by mouth twice daily with meals    pantoprazole (PROTONIX) 40 MG tablet Take 1 tablet by mouth once daily    telmisartan (MICARDIS) 80 MG Tab Take 1 tablet by mouth once daily    tiotropium (SPIRIVA) 18 mcg inhalation capsule Inhale 1 capsule (18 mcg total) into the lungs once daily. Controller    upadacitinib (RINVOQ) 15 mg 24 hr tablet Take 1 tablet (15 mg total) by mouth once daily.   Last reviewed on 2/23/2022 10:04 AM by Lucie Judge MD    Review of patient's allergies indicates:   Allergen Reactions    Plaquenil [hydroxychloroquine]      Lightheaded and muscle aches    Last reviewed on  2/23/2022 10:04 AM by Lucie Judge      Tasks added this encounter   No tasks added.   Tasks due within next 3 months   5/31/2022 - Refill Call (Auto Added)     Fermin Ayala  [facility] - [facility]  [address] [facility] [address] [address]  Phone: [phone]  Fax: [phone]

## 2022-06-25 ENCOUNTER — SPECIALTY PHARMACY (OUTPATIENT)
Dept: PHARMACY | Facility: CLINIC | Age: 67
End: 2022-06-25
Payer: COMMERCIAL

## 2022-06-25 NOTE — TELEPHONE ENCOUNTER
Specialty Pharmacy - Clinical Reassessment    Specialty Medication Orders Linked to Encounter    Flowsheet Row Most Recent Value   Medication #1 upadacitinib (RINVOQ) 15 mg 24 hr tablet (Order#[id number], Rx#[id number])        Patient Diagnosis   M06.9 - Rheumatoid arthritis    Specialty clinical pharmacist review completed for an annual review of reassessment. Reviewed the following areas: current med list, reports of adverse effects, adherence and progress towards therapeutic goals.    Recommendations: none at this time.    Tasks added this encounter   3/25/2023 - Clinical - Follow Up Assesement (Annual)   Tasks due within next 3 months   7/2/2022 - Refill Call (Auto Added)     Joy Newell, PharmD  [facility]  [address] [address]  Phone: [phone]  Fax: [phone]

## 2022-06-28 ENCOUNTER — OFFICE VISIT (OUTPATIENT)
Dept: FAMILY MEDICINE | Facility: CLINIC | Age: 67
End: 2022-06-28
Payer: COMMERCIAL

## 2022-06-28 VITALS
BODY MASS INDEX: 26.86 KG/M2 | HEART RATE: 82 BPM | OXYGEN SATURATION: 99 % | TEMPERATURE: 98 F | DIASTOLIC BLOOD PRESSURE: 82 MMHG | SYSTOLIC BLOOD PRESSURE: 130 MMHG | HEIGHT: 66 IN | WEIGHT: 167.13 LBS

## 2022-06-28 DIAGNOSIS — E78.5 HYPERLIPIDEMIA, UNSPECIFIED HYPERLIPIDEMIA TYPE: ICD-10-CM

## 2022-06-28 DIAGNOSIS — J84.10 PULMONARY FIBROSIS: ICD-10-CM

## 2022-06-28 DIAGNOSIS — Z00.00 ROUTINE GENERAL MEDICAL EXAMINATION AT A HEALTH CARE FACILITY: Primary | ICD-10-CM

## 2022-06-28 DIAGNOSIS — M06.9 RHEUMATOID ARTHRITIS, INVOLVING UNSPECIFIED SITE, UNSPECIFIED WHETHER RHEUMATOID FACTOR PRESENT: ICD-10-CM

## 2022-06-28 DIAGNOSIS — G47.33 OSA (OBSTRUCTIVE SLEEP APNEA): ICD-10-CM

## 2022-06-28 PROCEDURE — 99999 PR PBB SHADOW E&M-EST. PATIENT-LVL IV: ICD-10-PCS | Mod: PBBFAC,,, | Performed by: FAMILY MEDICINE

## 2022-06-28 PROCEDURE — 3008F PR BODY MASS INDEX (BMI) DOCUMENTED: ICD-10-PCS | Mod: CPTII,S$GLB,, | Performed by: FAMILY MEDICINE

## 2022-06-28 PROCEDURE — 1125F PR PAIN SEVERITY QUANTIFIED, PAIN PRESENT: ICD-10-PCS | Mod: CPTII,S$GLB,, | Performed by: FAMILY MEDICINE

## 2022-06-28 PROCEDURE — 3079F PR MOST RECENT DIASTOLIC BLOOD PRESSURE 80-89 MM HG: ICD-10-PCS | Mod: CPTII,S$GLB,, | Performed by: FAMILY MEDICINE

## 2022-06-28 PROCEDURE — 99397 PR PREVENTIVE VISIT,EST,65 & OVER: ICD-10-PCS | Mod: 25,S$GLB,, | Performed by: FAMILY MEDICINE

## 2022-06-28 PROCEDURE — 90715 TDAP VACCINE 7 YRS/> IM: CPT | Mod: S$GLB,,, | Performed by: FAMILY MEDICINE

## 2022-06-28 PROCEDURE — 99397 PER PM REEVAL EST PAT 65+ YR: CPT | Mod: 25,S$GLB,, | Performed by: FAMILY MEDICINE

## 2022-06-28 PROCEDURE — 1159F MED LIST DOCD IN RCRD: CPT | Mod: CPTII,S$GLB,, | Performed by: FAMILY MEDICINE

## 2022-06-28 PROCEDURE — 3075F SYST BP GE 130 - 139MM HG: CPT | Mod: CPTII,S$GLB,, | Performed by: FAMILY MEDICINE

## 2022-06-28 PROCEDURE — 3075F PR MOST RECENT SYSTOLIC BLOOD PRESS GE 130-139MM HG: ICD-10-PCS | Mod: CPTII,S$GLB,, | Performed by: FAMILY MEDICINE

## 2022-06-28 PROCEDURE — 90471 IMMUNIZATION ADMIN: CPT | Mod: S$GLB,,, | Performed by: FAMILY MEDICINE

## 2022-06-28 PROCEDURE — 1159F PR MEDICATION LIST DOCUMENTED IN MEDICAL RECORD: ICD-10-PCS | Mod: CPTII,S$GLB,, | Performed by: FAMILY MEDICINE

## 2022-06-28 PROCEDURE — 1125F AMNT PAIN NOTED PAIN PRSNT: CPT | Mod: CPTII,S$GLB,, | Performed by: FAMILY MEDICINE

## 2022-06-28 PROCEDURE — 3079F DIAST BP 80-89 MM HG: CPT | Mod: CPTII,S$GLB,, | Performed by: FAMILY MEDICINE

## 2022-06-28 PROCEDURE — 99999 PR PBB SHADOW E&M-EST. PATIENT-LVL IV: CPT | Mod: PBBFAC,,, | Performed by: FAMILY MEDICINE

## 2022-06-28 PROCEDURE — 90471 TDAP VACCINE GREATER THAN OR EQUAL TO 7YO IM: ICD-10-PCS | Mod: S$GLB,,, | Performed by: FAMILY MEDICINE

## 2022-06-28 PROCEDURE — 90715 TDAP VACCINE GREATER THAN OR EQUAL TO 7YO IM: ICD-10-PCS | Mod: S$GLB,,, | Performed by: FAMILY MEDICINE

## 2022-06-28 PROCEDURE — 3008F BODY MASS INDEX DOCD: CPT | Mod: CPTII,S$GLB,, | Performed by: FAMILY MEDICINE

## 2022-06-28 NOTE — PROGRESS NOTES
"(Portions of this note were dictated using voice recognition software and may contain dictation related errors in spelling/grammar/syntax not found on text review)    CC:   Chief Complaint   Patient presents with    Annual Exam       HPI: 66 y.o. male Here for annual exam    Hyperlipidemia: On Lipitor 40 mg daily      RA, followed by rheumatology.  Last visit from 04/21/2021. Currently on Rinvoq.   Did not tolerate Plaquenil.  Was on methotrexate but currently off due to interstitial lung disease.  CT scan from 09/24/2020.  Similar findings from March 2020.  Has a stable pulmonary nodule inferior lingula 0.5 cm.  .  Lasts followed with pulmonology on November of 2021. Had spirometry that showed some mild obstruction, started Spiriva     Sleep apnea, currently using CPAP regularly. 5-6 hours night sleep, normal for him, no daytime drowsiness.     Mild elevation A1c 5.8 last year.    Lumbar radiculopathy, see last year.  Visit with pain management.  MRI showed multilevel degenerative change most significant L4-L5 where there is moderate to severe degree of spinal canal narrowing moderate right foraminal narrowing and mild left foraminal narrowing.  L3-L4 demonstrates right paracentral disc extrusion extending below the level of the disc plane resulting in some narrowing of the thecal sac..  Had JOSE through pain management in June and July of 2021 feels like injections didn't really help. PT helped for a while but sx came back. Hasn't really been doing a lot of regular back exercises. "tweaked' the back the other day doing something around the house. Does walk with a cane bc of leg pain. Feels like it's bearable for the moment but planning on going to [place] to go [place] in oct 2022 and would like to get started on his exercises to get the back improved.    SCC in situ right lateral cheek, follows with dermatology.  Was using imiquimod.  Had cryotherapy at last visit in February 2022 for AKAs     Answers for HPI/ROS " submitted by the patient on 6/23/2022  activity change: No  unexpected weight change: No  neck pain: No  hearing loss: No  rhinorrhea: No  trouble swallowing: No  eye discharge: No  visual disturbance: No  chest tightness: No  wheezing: No  chest pain: No  palpitations: No  blood in stool: No  constipation: No  vomiting: No  diarrhea: No  polydipsia: No  polyuria: No  difficulty urinating: No  urgency: No  hematuria: No  joint swelling: No  arthralgias: No  headaches: No  weakness: No  confusion: No  dysphoric mood: No        Past Medical History:   Diagnosis Date    Hyperlipidemia     NU (obstructive sleep apnea)     Pulmonary fibrosis     Rheumatoid arthritis     SCC (squamous cell carcinoma) 07/2020    SCC right medial canthus    SCC (squamous cell carcinoma) 11/2021    mid lower neck     SCC (squamous cell carcinoma) 01/2022    right lateral cheek    Squamous cell carcinoma 07/2019    SCC in situ left temple       Past Surgical History:   Procedure Laterality Date    COLONOSCOPY N/A 7/25/2017    Procedure: COLONOSCOPY;  Surgeon: Bill Nicole MD;  Location: [address]);  Service: Endoscopy;  Laterality: N/A;    EPIDURAL STEROID INJECTION INTO LUMBAR SPINE N/A 7/29/2021    Procedure: Injection-steroid-epidural-lumbar L5-S1;  Surgeon: Shiv Vernon Jr., MD;  Location[place] [facility];  Service: Pain Management;  Laterality: N/A;  oral sedation   no pacemaker     NO PAST SURGERIES      TRANSFORAMINAL EPIDURAL INJECTION OF STEROID Right 6/29/2021    Procedure: Injection,steroid,epidural,transforaminal approach-RIGHT-- L4-5, L5-S1-- (IV Sedation);  Surgeon: Shiv Vernon Jr., MD;  Location: [facility];  Service: Pain Management;  Laterality: Right;       Family History   Problem Relation Age of Onset    Heart failure Mother         60s    Coronary artery disease Father         70s    Cancer Paternal Uncle         lymphoma??    Diabetes Neg Hx     Melanoma Neg Hx        Social  History     Tobacco Use    Smoking status: Former Smoker     Quit date: 2012     Years since quittin.9    Smokeless tobacco: Never Used   Substance Use Topics    Alcohol use: Yes     Comment: occasional    Drug use: Never     Lab Results   Component Value Date    WBC 6.48 2022    HGB 14.3 2022    HCT 43.8 2022    MCV 97 2022     2022    CHOL 162 06/15/2021    TRIG 75 06/15/2021    HDL 52 06/15/2021    ALT 31 2022    AST 28 2022    BILITOT 0.5 2022    ALKPHOS 41 (L) 2022     2022    K 4.8 2022     2022    CREATININE 1.0 2022    CALCIUM 9.7 2022    ALBUMIN 3.8 2022    BUN 25 (H) 2022    CO2 26 2022    TSH 1.197 06/15/2021    PSA 0.55 06/15/2021    HGBA1C 5.8 (H) 06/15/2021    LDLCALC 95.0 06/15/2021     2022       Had wellness lipid screen through [facility] in 3/2022 which was normal.     Vital signs reviewed  PE:   APPEARANCE: Well nourished, well developed, in no acute distress.    HEAD: Normocephalic, atraumatic.  EYES: PERRL. EOMI.   Conjunctivae noninjected.  EARS: TM's intact. Light reflex normal. No retraction or perforation  NOSE: Mucosa pink. Airway clear.  MOUTH & THROAT: No tonsillar enlargement. No pharyngeal erythema or exudate.   NECK: Supple with no cervical lymphadenopathy.  No carotid bruits.  No thyromegaly  CHEST: Good inspiratory effort. Fine bibasilar crackles.   CARDIOVASCULAR: Normal S1, S2. No rubs, murmurs, or gallops.  ABDOMEN: Bowel sounds normal. Not distended. Soft. No tenderness or masses. No organomegaly.  EXTREMITIES: No edema      IMPRESSION  No diagnosis found.        PLAN  No orders of the defined types were placed in this encounter.    HLD stable on lipitor 40 mg    Sleep apnea:  Continue CPAP    Rheumatoid arthritis:  Continue rheumatology follow-up    Interstitial pulmonary fibrosis with mild obstruction noted on spirometry.:  Continue  pulmonology follow-up.  Continue Spiriva.      Lumbar radiculopathy: advise on regular HEP for back. If worsening issues may need neurosurg consult aislinn given short lived effectiveness of JOSE and also PT/dry needling. Handicap form for driving filled out.         HEALTH SCREENINGS  Immunizations:  Tdap today  PCV 5/12/17   PPSV 2019  COVID-19 vaccine up-to-date    Age/Gender Appropriate screenings:  Colonoscopy July 2017: Single nonbleeding angiodysplastic lesion noted otherwise normal .  Repeat in 10 years  Prostate screening done 2021/ psa 0.55. since other labs all stable can do all labs again next yr.

## 2022-07-05 ENCOUNTER — SPECIALTY PHARMACY (OUTPATIENT)
Dept: PHARMACY | Facility: CLINIC | Age: 67
End: 2022-07-05
Payer: COMMERCIAL

## 2022-07-05 NOTE — TELEPHONE ENCOUNTER
Incoming call from pt stating he wanted to come  his medication today instead of having it shipped. Updated delivery method in [facility] and informed fulfillment team.

## 2022-07-05 NOTE — TELEPHONE ENCOUNTER
Specialty Pharmacy - Refill Coordination    Specialty Medication Orders Linked to Encounter    Flowsheet Row Most Recent Value   Medication #1 upadacitinib (RINVOQ) 15 mg 24 hr tablet (Order#[id number], Rx#[id number])          Refill Questions - Documented Responses    Flowsheet Row Most Recent Value   Patient Availability and HIPAA Verification    Does patient want to proceed with activity? Yes   HIPAA/medical authority confirmed? Yes   Relationship to patient of person spoken to? Self   Refill Screening Questions    Changes to allergies? No   Changes to medications? No   New conditions since last clinic visit? No   Unplanned office visit, urgent care, ED, or hospital admission in the last 4 weeks? No   How does patient/caregiver feel medication is working? Good   Financial problems or insurance changes? No   How many doses of your specialty medications were missed in the last 4 weeks? 0   Would patient like to speak to a pharmacist? No   When does the patient need to receive the medication? 07/11/22   Refill Delivery Questions    How will the patient receive the medication? Delivery Kezia   When does the patient need to receive the medication? 07/11/22   Shipping Address Home   Address in [facility] confirmed and updated if neccessary? Yes   Expected Copay ($) 5   Is the patient able to afford the medication copay? Yes   Payment Method CC on file   Days supply of Refill 30   Supplies needed? No supplies needed   Refill activity completed? Yes   Refill activity plan Refill scheduled   Shipment/Pickup Date: 07/06/22          Current Outpatient Medications   Medication Sig    atorvastatin (LIPITOR) 40 MG tablet Take 1 tablet (40 mg total) by mouth once daily.    ergocalciferol (ERGOCALCIFEROL) 50,000 unit Cap TAKE 1 CAPSULE BY MOUTH ONE TIME PER WEEK (Patient taking differently: TAKE 1 CAPSULE BY MOUTH ONE TIME PER WEEK)    fluorouraciL (EFUDEX) 5 % cream Apply thin film to right cheek and temple 2times per  day for 2-3 weeks; d/c if area bleeding or ulcerated; avoid eyes or mouth    fluticasone propionate (FLONASE) 50 mcg/actuation nasal spray 2 SPRAYS (100 MCG TOTAL) BY EACH NOSTRIL ROUTE ONCE DAILY.    gabapentin (NEURONTIN) 300 MG capsule Take 2 capsules (600 mg total) by mouth 3 (three) times daily.    ibuprofen (ADVIL,MOTRIN) 800 MG tablet TAKE 1 TABLET (800 MG TOTAL) BY MOUTH 3 (THREE) TIMES DAILY AS NEEDED FOR PAIN.    imiquimod (ALDARA) 5 % cream Apply small amount to affected area right cheek qhs x 4 weeks; d/c if ulcerated or bleeding    metFORMIN (GLUCOPHAGE) 500 MG tablet Take 1 tablet by mouth twice daily with meals (Patient not taking: Reported on 6/28/2022)    pantoprazole (PROTONIX) 40 MG tablet Take 1 tablet by mouth once daily (Patient not taking: Reported on 6/28/2022)    telmisartan (MICARDIS) 80 MG Tab Take 1 tablet by mouth once daily (Patient not taking: Reported on 6/28/2022)    tiotropium (SPIRIVA) 18 mcg inhalation capsule Inhale 1 capsule (18 mcg total) into the lungs once daily. Controller    upadacitinib (RINVOQ) 15 mg 24 hr tablet Take 1 tablet (15 mg total) by mouth once daily.   Last reviewed on 6/28/2022  4:04 PM by Clemente Abraham LPN    Review of patient's allergies indicates:   Allergen Reactions    Plaquenil [hydroxychloroquine]      Lightheaded and muscle aches    Last reviewed on  6/28/2022 4:04 PM by Clemente Abraham      Tasks added this encounter   No tasks added.   Tasks due within next 3 months   7/2/2022 - Refill Call (Auto Added)     [facility]  [address] [facility] [address]  Phone: [phone]  Fax: [phone]

## 2022-08-02 ENCOUNTER — SPECIALTY PHARMACY (OUTPATIENT)
Dept: PHARMACY | Facility: CLINIC | Age: 67
End: 2022-08-02
Payer: COMMERCIAL

## 2022-08-02 NOTE — TELEPHONE ENCOUNTER
Specialty Pharmacy - Refill Coordination    Specialty Medication Orders Linked to Encounter    Flowsheet Row Most Recent Value   Medication #1 upadacitinib (RINVOQ) 15 mg 24 hr tablet (Order#[id number], Rx#[id number])          Refill Questions - Documented Responses    Flowsheet Row Most Recent Value   Patient Availability and HIPAA Verification    Does patient want to proceed with activity? Yes   HIPAA/medical authority confirmed? Yes   Relationship to patient of person spoken to? Self   Refill Screening Questions    Changes to allergies? No   Changes to medications? No   New conditions since last clinic visit? No   Unplanned office visit, urgent care, ED, or hospital admission in the last 4 weeks? No   How does patient/caregiver feel medication is working? Good   Financial problems or insurance changes? No   How many doses of your specialty medications were missed in the last 4 weeks? 0   Would patient like to speak to a pharmacist? No   When does the patient need to receive the medication? 08/07/22   Refill Delivery Questions    How will the patient receive the medication? Delivery Kezia   When does the patient need to receive the medication? 08/07/22   Shipping Address Home   Address in [facility] confirmed and updated if neccessary? Yes   Expected Copay ($) 5   Is the patient able to afford the medication copay? Yes   Payment Method CC on file   Days supply of Refill 30   Supplies needed? No supplies needed   Refill activity completed? Yes   Refill activity plan Refill scheduled   Shipment/Pickup Date: 08/04/22          Current Outpatient Medications   Medication Sig    atorvastatin (LIPITOR) 40 MG tablet Take 1 tablet (40 mg total) by mouth once daily.    ergocalciferol (ERGOCALCIFEROL) 50,000 unit Cap TAKE 1 CAPSULE BY MOUTH ONE TIME PER WEEK (Patient taking differently: TAKE 1 CAPSULE BY MOUTH ONE TIME PER WEEK)    fluorouraciL (EFUDEX) 5 % cream Apply thin film to right cheek and temple 2times per  day for 2-3 weeks; d/c if area bleeding or ulcerated; avoid eyes or mouth    fluticasone propionate (FLONASE) 50 mcg/actuation nasal spray 2 SPRAYS (100 MCG TOTAL) BY EACH NOSTRIL ROUTE ONCE DAILY.    gabapentin (NEURONTIN) 300 MG capsule Take 2 capsules (600 mg total) by mouth 3 (three) times daily.    ibuprofen (ADVIL,MOTRIN) 800 MG tablet TAKE 1 TABLET (800 MG TOTAL) BY MOUTH 3 (THREE) TIMES DAILY AS NEEDED FOR PAIN.    imiquimod (ALDARA) 5 % cream Apply small amount to affected area right cheek qhs x 4 weeks; d/c if ulcerated or bleeding    metFORMIN (GLUCOPHAGE) 500 MG tablet Take 1 tablet by mouth twice daily with meals (Patient not taking: Reported on 6/28/2022)    pantoprazole (PROTONIX) 40 MG tablet Take 1 tablet by mouth once daily (Patient not taking: Reported on 6/28/2022)    telmisartan (MICARDIS) 80 MG Tab Take 1 tablet by mouth once daily (Patient not taking: Reported on 6/28/2022)    tiotropium (SPIRIVA) 18 mcg inhalation capsule Inhale 1 capsule (18 mcg total) into the lungs once daily. Controller    upadacitinib (RINVOQ) 15 mg 24 hr tablet Take 1 tablet (15 mg total) by mouth once daily.   Last reviewed on 6/28/2022  4:04 PM by Clemente Abraham LPN    Review of patient's allergies indicates:   Allergen Reactions    Plaquenil [hydroxychloroquine]      Lightheaded and muscle aches    Last reviewed on  6/28/2022 4:04 PM by Clemente Abraham      Tasks added this encounter   No tasks added.   Tasks due within next 3 months   8/3/2022 - Refill Call (Auto Added)     Sam Caballero, PharmD  [facility]  [address] [place] [id number]  Phone: [phone]  Fax: [phone]

## 2022-08-14 NOTE — TELEPHONE ENCOUNTER
No new care gaps identified.  [facility] Embedded Care Gaps. Reference number: [id number]. 8/14/2022   5:09:06 AM JUANT

## 2022-08-15 RX ORDER — GABAPENTIN 300 MG/1
600 CAPSULE ORAL 3 TIMES DAILY
Qty: 180 CAPSULE | Refills: 11 | Status: SHIPPED | OUTPATIENT
Start: 2022-08-15 | End: 2023-08-16 | Stop reason: SDUPTHER

## 2022-08-17 ENCOUNTER — OFFICE VISIT (OUTPATIENT)
Dept: RHEUMATOLOGY | Facility: CLINIC | Age: 67
End: 2022-08-17
Payer: COMMERCIAL

## 2022-08-17 VITALS
DIASTOLIC BLOOD PRESSURE: 75 MMHG | HEIGHT: 66 IN | WEIGHT: 165 LBS | SYSTOLIC BLOOD PRESSURE: 121 MMHG | BODY MASS INDEX: 26.52 KG/M2 | HEART RATE: 89 BPM

## 2022-08-17 DIAGNOSIS — J84.9 ILD (INTERSTITIAL LUNG DISEASE): ICD-10-CM

## 2022-08-17 DIAGNOSIS — M06.9 RHEUMATOID ARTHRITIS INVOLVING MULTIPLE JOINTS: Primary | ICD-10-CM

## 2022-08-17 PROCEDURE — 99214 PR OFFICE/OUTPT VISIT, EST, LEVL IV, 30-39 MIN: ICD-10-PCS | Mod: S$GLB,,, | Performed by: INTERNAL MEDICINE

## 2022-08-17 PROCEDURE — 1126F PR PAIN SEVERITY QUANTIFIED, NO PAIN PRESENT: ICD-10-PCS | Mod: CPTII,S$GLB,, | Performed by: INTERNAL MEDICINE

## 2022-08-17 PROCEDURE — 1159F PR MEDICATION LIST DOCUMENTED IN MEDICAL RECORD: ICD-10-PCS | Mod: CPTII,S$GLB,, | Performed by: INTERNAL MEDICINE

## 2022-08-17 PROCEDURE — 3008F BODY MASS INDEX DOCD: CPT | Mod: CPTII,S$GLB,, | Performed by: INTERNAL MEDICINE

## 2022-08-17 PROCEDURE — 3078F PR MOST RECENT DIASTOLIC BLOOD PRESSURE < 80 MM HG: ICD-10-PCS | Mod: CPTII,S$GLB,, | Performed by: INTERNAL MEDICINE

## 2022-08-17 PROCEDURE — 1159F MED LIST DOCD IN RCRD: CPT | Mod: CPTII,S$GLB,, | Performed by: INTERNAL MEDICINE

## 2022-08-17 PROCEDURE — 3074F PR MOST RECENT SYSTOLIC BLOOD PRESSURE < 130 MM HG: ICD-10-PCS | Mod: CPTII,S$GLB,, | Performed by: INTERNAL MEDICINE

## 2022-08-17 PROCEDURE — 99999 PR PBB SHADOW E&M-EST. PATIENT-LVL III: CPT | Mod: PBBFAC,,, | Performed by: INTERNAL MEDICINE

## 2022-08-17 PROCEDURE — 99214 OFFICE O/P EST MOD 30 MIN: CPT | Mod: S$GLB,,, | Performed by: INTERNAL MEDICINE

## 2022-08-17 PROCEDURE — 3008F PR BODY MASS INDEX (BMI) DOCUMENTED: ICD-10-PCS | Mod: CPTII,S$GLB,, | Performed by: INTERNAL MEDICINE

## 2022-08-17 PROCEDURE — 3078F DIAST BP <80 MM HG: CPT | Mod: CPTII,S$GLB,, | Performed by: INTERNAL MEDICINE

## 2022-08-17 PROCEDURE — 3074F SYST BP LT 130 MM HG: CPT | Mod: CPTII,S$GLB,, | Performed by: INTERNAL MEDICINE

## 2022-08-17 PROCEDURE — 1126F AMNT PAIN NOTED NONE PRSNT: CPT | Mod: CPTII,S$GLB,, | Performed by: INTERNAL MEDICINE

## 2022-08-17 PROCEDURE — 99999 PR PBB SHADOW E&M-EST. PATIENT-LVL III: ICD-10-PCS | Mod: PBBFAC,,, | Performed by: INTERNAL MEDICINE

## 2022-08-17 NOTE — PROGRESS NOTES
Rapid3 Question Responses and Scores 8/10/2022   MDHAQ Score 0.4   Psychologic Score 0   Pain Score 1.5   When you awakened in the morning OVER THE LAST WEEK, did you feel stiff? No   Fatigue Score 1   Global Health Score 2   RAPID3 Score 1.61     Answers for HPI/ROS submitted by the patient on 8/10/2022  fever: No  eye redness: No  mouth sores: No  headaches: No  shortness of breath: No  chest pain: No  trouble swallowing: No  diarrhea: No  constipation: No  unexpected weight change: No  genital sore: No  During the last 3 days, have you had a skin rash?: No  Bruises or bleeds easily: No  cough: No

## 2022-08-17 NOTE — PROGRESS NOTES
Subjective:       Patient ID: Fabiano Garvey is a 60 y.o. male.    Chief Complaint: OTHER and Swelling    HPI     61 yo male with PMH of basal cell cancer (around 2010), HL here for evaluation of join pain.  Reports pain in hands, elbows. and wrists.  Reports  swelling in hands, ankles, and feet.  Reports also has bilateral knee pain and shoulders.   Reports stiffness in morning for 30 minutes.  He has trouble swelling. Denies any rashes. No oral ulcers. No hair loss. Denies photosensitivity.  Denies any raynauds.Denies blood clots.  Denies dry eyes and dry mouth.  Reports symptoms for a year. In November, he noted the nodules in elbows.  Thinks he has swelling in lower neck.  He has trouble picking up things due to pain and swelling.  He reports that prednisone improves h is pain and swelling.  He has been off steroids.  No changes in weight.  Reports good appetite.      Interval history: he is on rinvoq. Denies any flares. On occasion, he has cough.Denies shortness of breath. He used to smoke. He quit 10 years ago.  He used to smoke 1/2 ppd from age to age 14 to 49. . Denies any reflux. Denies any pain or swelling in joints.  Reports stiffness for 10 minutes.     Past Medical History   Diagnosis Date    Hyperlipidemia     Basal cell carcinoma      medial canthus       Review of Systems   Constitutional: Negative for fever, chills, appetite change and fatigue.   HENT: Negative for hearing loss, mouth sores, rhinorrhea, sinus pressure and trouble swallowing.    Eyes: Negative for photophobia, pain, discharge, itching and visual disturbance.   Respiratory:  Negative for cough, choking, chest tightness, wheezing and stridor.    Cardiovascular: Negative for chest pain and palpitations.   Gastrointestinal: Negative for blood in stool and abdominal distention.   Endocrine: Negative for cold intolerance and heat intolerance.   Genitourinary: Negative for dysuria, hematuria and flank pain.   Musculoskeletal: Positive  for myalgias, back pain, joint swelling, arthralgias.  Skin: Negative for color change, pallor and rash.   Neurological: Negative for dizziness, light-headedness, numbness and headaches.   Hematological: Negative for adenopathy. Does not bruise/bleed easily.   Psychiatric/Behavioral: Negative for decreased concentration and agitation. The patient is not nervous/anxious.            Objective:        Physical Exam   Constitutional: He is oriented to person, place, and time and well-developed, well-nourished, and in no distress. No distress.   HENT:   Head: Normocephalic and atraumatic.   Right Ear: External ear normal.   Left Ear: External ear normal.   Nose: Nose normal.   Mouth/Throat: Oropharynx is clear and moist. No oropharyngeal exudate.   Eyes: Conjunctivae and EOM are normal. Pupils are equal, round, and reactive to light. Right eye exhibits no discharge. Left eye exhibits no discharge. No scleral icterus.   Neck: Normal range of motion. Neck supple. No JVD present. No tracheal deviation present. No thyromegaly present.   Cardiovascular: Normal rate, regular rhythm and intact distal pulses.  Exam reveals no gallop and no friction rub.    No murmur heard.  Pulmonary/Chest: Effort normal and breath sounds normal. No stridor. No respiratory distress. He has no wheezes. He has no rales. He exhibits no tenderness.   Abdominal: Soft. Bowel sounds are normal. He exhibits no distension. There is no tenderness. There is no rebound.   Lymphadenopathy:     He has no cervical adenopathy.   Neurological: He is alert and oriented to person, place, and time.   Skin: Skin is warm. He is not diaphoretic.     Musculoskeletal:   Shoulders- decreased ROM of both shoulders to 130 degrees bilaterally (resoved)  Elbows- rheumatoid nodules in extensor surfaces bilaterally; mild restriction in extension of right elbow  Wrist- synovitis with decreased extension bilaterally (resolved)  Hands- synovitis of mcps on right side, worse on  the right  Knees-bony hypertrophy but no effusions or tenderness; crepitus  Ankles- no tenderness  Feet-bilaterally mtp tenderness resolved           Labs:  Robel: 1:320  dna-1:160<160  Esr-61<44   ccp-306  rf-876  Ro,la-negative  Nicole, rnp -negtayive   Hepatitis B-negative  Hepatitis C-negative  Urine-negative      Imaging:  I personally reviewed the xrays      CT chest (2020): I personally reviewed; Findings compatible with interstitial lung disease including UIP.  Findings appear grossly similar compared to prior examination dated 03/25/2020.     Stable pulmonary nodule within the inferior lingula measuring approximately 0.5 cm.  No new pulmonary nodule or mass.     Centrilobular and paraseptal emphysematous changes with an upper lobe predominance.     Coronary artery atherosclerosis.       Arthritis survey:      12/2015: hand x rays- no erosions  12/2015: feet xray: no erosions    Chest xray (2/2016)-negative      Assessment:     68 yo male with PMH of  Squamous cell carcinoma (2010, 2020), HL here for  Follow up of  Seropositive RA.  He has seropositive RA with nodules. He also had serologies consistent with early SLE given (+ROBEL and +DNA) with no other features of  SLE.  Tried plaquenil but reports it gave him dizziness after a few doses.  Regarding his arthritis, he was doing well on MTX 6 pills a week but developed cough. Had  CT chest concerning for UIP.I had long discussion with patient and told him hard to say if his symptoms came from MTX or RA so now off MTX.    Given his severe RA,  I put him on rinvoq with improvement.  We will treat his RA more aggressively with hopes it will improve his ILD.  Plan:      * - follow up with  dermatology evaluation given history of skin cancer  -continue  baclofen 10mg po qhs prn for upper back tension and sleep  -continue rinvoq 15 mg po q day  -continue folic acid 1 mg po qday  Off MTX given UIP  given history of +DNA  Avoid anti tnf  Avoid orencia given emphysema on  "CT SCAN    # vitamin D deficiency: Continue vit D once a week   labs  Before next visit      # lower back pain: appears radicular. Discussed doing xray but he declines.    -continue baclofen 10mg  ( take one to two tablets at bedtime)      #UIP:  Had recent Findings compatible with interstitial lung disease including UIP.  Findings appear grossly similar compared to prior examination dated 03/25/2020.   "Stable pulmonary nodule within the inferior lingula measuring approximately 0.5 cm.  No new pulmonary nodule or mass.Centrilobular and paraseptal emphysematous changes with an upper lobe predominance.   Coronary artery atherosclerosis." I have asked patient that it is critical he     Needs to follow up with  Pulmonary and stressed importance again at last visit and today.    30 * minutes of total time spent on the encounter, which includes face to face time and non-face to face time preparing to see the patient (eg, review of tests), Obtaining and/or reviewing separately obtained history, Documenting clinical information in the electronic or other health record, Independently interpreting results (not separately reported) and communicating results to the patient/family/caregiver, or Care coordination (not separately reported).     rtc in 6 months          "

## 2022-08-19 ENCOUNTER — LAB VISIT (OUTPATIENT)
Dept: LAB | Facility: HOSPITAL | Age: 67
End: 2022-08-19
Attending: INTERNAL MEDICINE
Payer: COMMERCIAL

## 2022-08-19 DIAGNOSIS — M06.9 RHEUMATOID ARTHRITIS INVOLVING MULTIPLE JOINTS: ICD-10-CM

## 2022-08-19 PROCEDURE — 86480 TB TEST CELL IMMUN MEASURE: CPT | Performed by: INTERNAL MEDICINE

## 2022-08-20 LAB
GAMMA INTERFERON BACKGROUND BLD IA-ACNC: 0.01 IU/ML
M TB IFN-G CD4+ BCKGRND COR BLD-ACNC: 0.01 IU/ML
MITOGEN IGNF BCKGRD COR BLD-ACNC: 5.54 IU/ML
TB GOLD PLUS: NEGATIVE
TB2 - NIL: 0.01 IU/ML

## 2022-08-22 RX ORDER — ERGOCALCIFEROL 1.25 MG/1
CAPSULE ORAL
Qty: 12 CAPSULE | Refills: 3 | Status: SHIPPED | OUTPATIENT
Start: 2022-08-22 | End: 2023-07-18 | Stop reason: SDUPTHER

## 2022-08-30 ENCOUNTER — SPECIALTY PHARMACY (OUTPATIENT)
Dept: PHARMACY | Facility: CLINIC | Age: 67
End: 2022-08-30
Payer: COMMERCIAL

## 2022-08-30 NOTE — TELEPHONE ENCOUNTER
Outgoing call: Spoke with patient regarding refill, he has 7 on hand. I informed him that a refill request was sent to the doctor and once the refills get approved [facility] will follow up.

## 2022-08-31 RX ORDER — UPADACITINIB 15 MG/1
15 TABLET, EXTENDED RELEASE ORAL DAILY
Qty: 30 TABLET | Refills: 3 | Status: SHIPPED | OUTPATIENT
Start: 2022-08-31 | End: 2023-01-17 | Stop reason: SDUPTHER

## 2022-09-07 NOTE — TELEPHONE ENCOUNTER
Specialty Pharmacy - Refill Coordination    Specialty Medication Orders Linked to Encounter      Flowsheet Row Most Recent Value   Medication #1 upadacitinib (RINVOQ) 15 mg 24 hr tablet (Order#[id number], Rx#[id number])            Refill Questions - Documented Responses      Flowsheet Row Most Recent Value   Patient Availability and HIPAA Verification    Does patient want to proceed with activity? Yes   HIPAA/medical authority confirmed? Yes   Relationship to patient of person spoken to? Self   Refill Screening Questions    Changes to allergies? No   Changes to medications? No   New conditions since last clinic visit? No   Unplanned office visit, urgent care, ED, or hospital admission in the last 4 weeks? No   How does patient/caregiver feel medication is working? Very good   Financial problems or insurance changes? No   How many doses of your specialty medications were missed in the last 4 weeks? 0   Would patient like to speak to a pharmacist? No   When does the patient need to receive the medication? 09/09/22   Refill Delivery Questions    How will the patient receive the medication? Delivery [facility]   When does the patient need to receive the medication? 09/09/22   Shipping Address Home   Address in [facility] confirmed and updated if neccessary? Yes   Expected Copay ($) 5   Is the patient able to afford the medication copay? Yes   Payment Method CC on file   Days supply of Refill 30   Supplies needed? No supplies needed   Refill activity completed? Yes   Refill activity plan Refill scheduled   Shipment/Pickup Date: 09/07/22            Current Outpatient Medications   Medication Sig    atorvastatin (LIPITOR) 40 MG tablet Take 1 tablet (40 mg total) by mouth once daily.    ergocalciferol (VITAMIN D2) 50,000 unit Cap TAKE 1 CAPSULE BY MOUTH ONE TIME PER WEEK    fluorouraciL (EFUDEX) 5 % cream Apply thin film to right cheek and temple 2times per day for 2-3 weeks; d/c if area bleeding or ulcerated; avoid eyes  or mouth    fluticasone propionate (FLONASE) 50 mcg/actuation nasal spray 2 SPRAYS (100 MCG TOTAL) BY EACH NOSTRIL ROUTE ONCE DAILY.    gabapentin (NEURONTIN) 300 MG capsule Take 2 capsules (600 mg total) by mouth 3 (three) times daily.    ibuprofen (ADVIL,MOTRIN) 800 MG tablet TAKE 1 TABLET (800 MG TOTAL) BY MOUTH 3 (THREE) TIMES DAILY AS NEEDED FOR PAIN.    imiquimod (ALDARA) 5 % cream Apply small amount to affected area on  right cheek at night  x 4 weeks; discontinue  if ulcerated or bleeding    metFORMIN (GLUCOPHAGE) 500 MG tablet Take 1 tablet by mouth twice daily with meals    pantoprazole (PROTONIX) 40 MG tablet Take 1 tablet by mouth once daily    telmisartan (MICARDIS) 80 MG Tab Take 1 tablet by mouth once daily    tiotropium (SPIRIVA) 18 mcg inhalation capsule Inhale 1 capsule (18 mcg total) into the lungs once daily. Controller    upadacitinib (RINVOQ) 15 mg 24 hr tablet Take 1 tablet (15 mg total) by mouth once daily.   Last reviewed on 8/17/2022  4:01 PM by Francesca Caal MA    Review of patient's allergies indicates:   Allergen Reactions    Plaquenil [hydroxychloroquine]      Lightheaded and muscle aches    Last reviewed on  8/17/2022 4:00 PM by Francesca Caal      Tasks added this encounter   10/2/2022 - Refill Call (Auto Added)   Tasks due within next 3 months   No tasks due.     Toshia Ozuna, PharmD  [facility]  [address] [place] [id number]  Phone: [phone]  Fax: [phone]

## 2022-09-08 ENCOUNTER — OFFICE VISIT (OUTPATIENT)
Dept: DERMATOLOGY | Facility: CLINIC | Age: 67
End: 2022-09-08
Payer: COMMERCIAL

## 2022-09-08 DIAGNOSIS — Z85.828 PERSONAL HISTORY OF SKIN CANCER: ICD-10-CM

## 2022-09-08 DIAGNOSIS — D48.5 NEOPLASM OF UNCERTAIN BEHAVIOR OF SKIN: ICD-10-CM

## 2022-09-08 DIAGNOSIS — L90.5 SCAR: Primary | ICD-10-CM

## 2022-09-08 DIAGNOSIS — L57.0 AK (ACTINIC KERATOSIS): ICD-10-CM

## 2022-09-08 DIAGNOSIS — B07.8 COMMON WART: ICD-10-CM

## 2022-09-08 DIAGNOSIS — L82.1 SK (SEBORRHEIC KERATOSIS): ICD-10-CM

## 2022-09-08 PROCEDURE — 3288F PR FALLS RISK ASSESSMENT DOCUMENTED: ICD-10-PCS | Mod: CPTII,S$GLB,, | Performed by: DERMATOLOGY

## 2022-09-08 PROCEDURE — 17003 DESTRUCT PREMALG LES 2-14: CPT | Mod: 59,S$GLB,, | Performed by: DERMATOLOGY

## 2022-09-08 PROCEDURE — 17000 DESTRUCT PREMALG LESION: CPT | Mod: 59,S$GLB,, | Performed by: DERMATOLOGY

## 2022-09-08 PROCEDURE — 17110 DESTRUCTION B9 LES UP TO 14: CPT | Mod: S$GLB,,, | Performed by: DERMATOLOGY

## 2022-09-08 PROCEDURE — 99999 PR PBB SHADOW E&M-EST. PATIENT-LVL III: CPT | Mod: PBBFAC,,, | Performed by: DERMATOLOGY

## 2022-09-08 PROCEDURE — 88305 TISSUE EXAM BY PATHOLOGIST: CPT | Mod: 26,,, | Performed by: DERMATOLOGY

## 2022-09-08 PROCEDURE — 17003 DESTRUCTION, PREMALIGNANT LESIONS; SECOND THROUGH 14 LESIONS: ICD-10-PCS | Mod: 59,S$GLB,, | Performed by: DERMATOLOGY

## 2022-09-08 PROCEDURE — 1101F PR PT FALLS ASSESS DOC 0-1 FALLS W/OUT INJ PAST YR: ICD-10-PCS | Mod: CPTII,S$GLB,, | Performed by: DERMATOLOGY

## 2022-09-08 PROCEDURE — 1160F PR REVIEW ALL MEDS BY PRESCRIBER/CLIN PHARMACIST DOCUMENTED: ICD-10-PCS | Mod: CPTII,S$GLB,, | Performed by: DERMATOLOGY

## 2022-09-08 PROCEDURE — 11104 PR PUNCH BIOPSY, SKIN, SINGLE LESION: ICD-10-PCS | Mod: 59,S$GLB,, | Performed by: DERMATOLOGY

## 2022-09-08 PROCEDURE — 1159F PR MEDICATION LIST DOCUMENTED IN MEDICAL RECORD: ICD-10-PCS | Mod: CPTII,S$GLB,, | Performed by: DERMATOLOGY

## 2022-09-08 PROCEDURE — 99999 PR PBB SHADOW E&M-EST. PATIENT-LVL III: ICD-10-PCS | Mod: PBBFAC,,, | Performed by: DERMATOLOGY

## 2022-09-08 PROCEDURE — 3288F FALL RISK ASSESSMENT DOCD: CPT | Mod: CPTII,S$GLB,, | Performed by: DERMATOLOGY

## 2022-09-08 PROCEDURE — 1101F PT FALLS ASSESS-DOCD LE1/YR: CPT | Mod: CPTII,S$GLB,, | Performed by: DERMATOLOGY

## 2022-09-08 PROCEDURE — 88305 TISSUE EXAM BY PATHOLOGIST: CPT | Performed by: DERMATOLOGY

## 2022-09-08 PROCEDURE — 17110 PR DESTRUCTION BENIGN LESIONS UP TO 14: ICD-10-PCS | Mod: S$GLB,,, | Performed by: DERMATOLOGY

## 2022-09-08 PROCEDURE — 1160F RVW MEDS BY RX/DR IN RCRD: CPT | Mod: CPTII,S$GLB,, | Performed by: DERMATOLOGY

## 2022-09-08 PROCEDURE — 1126F PR PAIN SEVERITY QUANTIFIED, NO PAIN PRESENT: ICD-10-PCS | Mod: CPTII,S$GLB,, | Performed by: DERMATOLOGY

## 2022-09-08 PROCEDURE — 88305 TISSUE EXAM BY PATHOLOGIST: ICD-10-PCS | Mod: 26,,, | Performed by: DERMATOLOGY

## 2022-09-08 PROCEDURE — 1159F MED LIST DOCD IN RCRD: CPT | Mod: CPTII,S$GLB,, | Performed by: DERMATOLOGY

## 2022-09-08 PROCEDURE — 17000 PR DESTRUCTION(LASER SURGERY,CRYOSURGERY,CHEMOSURGERY),PREMALIGNANT LESIONS,FIRST LESION: ICD-10-PCS | Mod: 59,S$GLB,, | Performed by: DERMATOLOGY

## 2022-09-08 PROCEDURE — 99213 OFFICE O/P EST LOW 20 MIN: CPT | Mod: 25,S$GLB,, | Performed by: DERMATOLOGY

## 2022-09-08 PROCEDURE — 99213 PR OFFICE/OUTPT VISIT, EST, LEVL III, 20-29 MIN: ICD-10-PCS | Mod: 25,S$GLB,, | Performed by: DERMATOLOGY

## 2022-09-08 PROCEDURE — 1126F AMNT PAIN NOTED NONE PRSNT: CPT | Mod: CPTII,S$GLB,, | Performed by: DERMATOLOGY

## 2022-09-08 PROCEDURE — 11104 PUNCH BX SKIN SINGLE LESION: CPT | Mod: 59,S$GLB,, | Performed by: DERMATOLOGY

## 2022-09-08 NOTE — PROGRESS NOTES
Subjective:       Patient ID:  Fabiano Garvey is a 67 y.o. male who presents for   Chief Complaint   Patient presents with    Skin Check     UBSE    Warts     L leg  L elbow     Pt here today for UBSE  Pt  c/o wart to L elbow x7mos. Prev tx with OTC solution. Pt states solution made the area irritated  C/o wart to L leg x 7mos. No prev tx    Pt had bx proved scc in situ r lateral cheek 1/2022 tx with aldara. Resolved and pt feels lesion is recurring so he resumed using the Aldara for a few days.      Warts    Review of Systems   Skin:  Positive for activity-related sunscreen use. Negative for daily sunscreen use and tendency to form keloidal scars.   Hematologic/Lymphatic: Does not bruise/bleed easily.      Objective:    Physical Exam   Constitutional: He appears well-developed and well-nourished. No distress.   Neurological: He is alert and oriented to person, place, and time. He is not disoriented.   Psychiatric: He has a normal mood and affect.   Skin:   Areas Examined (abnormalities noted in diagram):   Scalp / Hair Palpated and Inspected  Head / Face Inspection Performed  Neck Inspection Performed  Chest / Axilla Inspection Performed  Abdomen Inspection Performed  Genitals / Buttocks / Groin Inspection Performed  Back Inspection Performed  RUE Inspected  LUE Inspection Performed  RLE Inspected  LLE Inspection Performed  Nails and Digits Inspection Performed                           Diagram Legend     Erythematous scaling macule/papule c/w actinic keratosis       Vascular papule c/w angioma      Pigmented verrucoid papule/plaque c/w seborrheic keratosis      Yellow umbilicated papule c/w sebaceous hyperplasia      Irregularly shaped tan macule c/w lentigo     1-2 mm smooth white papules consistent with Milia      Movable subcutaneous cyst with punctum c/w epidermal inclusion cyst      Subcutaneous movable cyst c/w pilar cyst      Firm pink to brown papule c/w dermatofibroma      Pedunculated fleshy  papule(s) c/w skin tag(s)      Evenly pigmented macule c/w junctional nevus     Mildly variegated pigmented, slightly irregular-bordered macule c/w mildly atypical nevus      Flesh colored to evenly pigmented papule c/w intradermal nevus       Pink pearly papule/plaque c/w basal cell carcinoma      Erythematous hyperkeratotic cursted plaque c/w SCC      Surgical scar with no sign of skin cancer recurrence      Open and closed comedones      Inflammatory papules and pustules      Verrucoid papule consistent consistent with wart     Erythematous eczematous patches and plaques     Dystrophic onycholytic nail with subungual debris c/w onychomycosis     Umbilicated papule    Erythematous-base heme-crusted tan verrucoid plaque consistent with inflamed seborrheic keratosis     Erythematous Silvery Scaling Plaque c/w Psoriasis     See annotation      Assessment / Plan:      Pathology Orders:       Normal Orders This Visit    Specimen to Pathology, Dermatology     Comments:    Number of Specimens:->1  ------------------------->-------------------------  Spec 1 Procedure:->Biopsy  Spec 1 Clinical Impression:->r/o sCC in situ v other  Spec 1 Source:->right lateral cheek    Questions:    Procedure Type: Dermatology and skin neoplasms    Number of Specimens: 1    ------------------------: -------------------------    Spec 1 Procedure: Biopsy    Spec 1 Clinical Impression: r/o sCC in situ v other    Spec 1 Source: right lateral cheek    Release to patient:           Scar  Personal history of skin cancer  Area(s) of previous NMSC evaluated with no signs of recurrence.    Upper body skin examination performed today including at least 6 points as noted in physical examination. Suspicious lesions noted.    Recommend daily sun protection/avoidance and use of at least SPF 30, broad spectrum sunscreen (OTC drug).     Neoplasm of uncertain behavior of skin  Punch biopsy procedure note:  Punch biopsy performed after verbal consent  obtained. Area marked and prepped with alcohol. Approximately 1cc of 1% lidocaine with epinephrine injected. 3 mm disposable punch used to remove lesion. Hemostasis obtained and biopsy site closed with 1 - 2 Prolene sutures. Wound care instructions reviewed with patient and handout given.    -     Specimen to Pathology, Dermatology    AK (actinic keratosis)  Cryosurgery Procedure Note    Verbal consent from the patient is obtained including, but not limited to, risk of hypopigmentation/hyperpigmentation, scar, recurrence of lesion. The patient is aware of the precancerous quality and need for treatment of these lesions. Liquid nitrogen cryosurgery is applied to the 10 actinic keratoses, as detailed in the physical exam, to produce a freeze injury. The patient is aware that blisters may form and is instructed on wound care with gentle cleansing and use of vaseline ointment to keep moist until healed. The patient is supplied a handout on cryosurgery and is instructed to call if lesions do not completely resolve.    SK (seborrheic keratosis)  These are benign inherited growths without a malignant potential. Reassurance given to patient. No treatment is necessary.     Common wart  Cryosurgery procedure note:    Verbal consent from the patient is obtained including, but not limited to, risk of hypopigmentation/hyperpigmentation, scar, recurrence of lesion. Liquid nitrogen cryosurgery is applied to 2 lesions to produce a freeze injury. The patient is aware that blisters may form and is instructed on wound care with gentle cleansing and use of vaseline ointment to keep moist until healed. The patient is supplied a handout on cryosurgery and is instructed to call if lesions do not completely resolve.           Follow up in 1 week (on 9/15/2022) for prn bx report .

## 2022-09-13 LAB
FINAL PATHOLOGIC DIAGNOSIS: NORMAL
GROSS: NORMAL
Lab: NORMAL
MICROSCOPIC EXAM: NORMAL

## 2022-09-15 ENCOUNTER — CLINICAL SUPPORT (OUTPATIENT)
Dept: DERMATOLOGY | Facility: CLINIC | Age: 67
End: 2022-09-15
Payer: COMMERCIAL

## 2022-09-15 DIAGNOSIS — Z48.02 VISIT FOR SUTURE REMOVAL: Primary | ICD-10-CM

## 2022-09-15 PROCEDURE — 99999 PR PBB SHADOW E&M-EST. PATIENT-LVL I: ICD-10-PCS | Mod: PBBFAC,,,

## 2022-09-15 PROCEDURE — 99999 PR PBB SHADOW E&M-EST. PATIENT-LVL I: CPT | Mod: PBBFAC,,,

## 2022-09-15 NOTE — PROGRESS NOTES
Suture Removal note:  CC: 67 y.o. male patient is here for suture removal.         HPI: Patient is s/p excision of r/o sCC in situ v other  from the right lateral cheek on 9/8/2022.  Patient reports no problems.    WOUND PE:  Sutures intact.  Wound healing well.  Good approximation of skin edges.  No signs or symptoms of infection.    IMPRESSION:  Results Pending     PLAN:  Sutures removed today.  Continue wound care.    RTC: In PRN months.

## 2022-09-16 DIAGNOSIS — D04.30 SQUAMOUS CELL CARCINOMA IN SITU (SCCIS) OF SKIN OF FACE: ICD-10-CM

## 2022-09-16 RX ORDER — IMIQUIMOD 12.5 MG/.25G
CREAM TOPICAL
Qty: 12 PACKET | Refills: 1 | Status: SHIPPED | OUTPATIENT
Start: 2022-09-16 | End: 2023-10-04

## 2022-09-16 NOTE — PROGRESS NOTES
Skin,  right lateral cheek, shave biopsy:   - BOWEN'S DISEASE (SQUAMOUS CELL CARCINOMA IN-SITU), WITH FOLLICULAR   EXTENSION, EXTENDING TO A PERIPHERAL MARGIN    Please call pt .  Let him know I would like him to reuse the aldara/imiquimod nightly x 4 weeks. I will send in more cream. He should follow up with me in 2 months. Thanks      Aldara/imiquimod- right cheek  one time per day for 4 weeks  Your doctor has prescribed a medication that can stimulate the immune system to fix the atypical cells. This medication is dispensed in small packets. Open the packet, use  only the amount needed to apply a thin film to the affected area and then store the packet in a ziploc bag. If this same area has been treated with cryosurgery/freezing, wait until the area is healed before starting the medication. The potential  side effects of aldara/imiquimod including redness, scaling, and irritation. This is a normal reaction and means the medication is working. If the area becomes ulcerated or is bleeding stop the medication and message your doctor. Long term side effects can include white scarring. More rare side effects include a flu like illness.  Discontinue medication and call the office if any of these symptoms occur. For some patients , this medication is not effective and other treatment options will need to be explored.

## 2022-09-22 ENCOUNTER — IMMUNIZATION (OUTPATIENT)
Dept: INTERNAL MEDICINE | Facility: CLINIC | Age: 67
End: 2022-09-22
Payer: COMMERCIAL

## 2022-09-22 DIAGNOSIS — Z23 NEED FOR VACCINATION: Primary | ICD-10-CM

## 2022-09-22 PROCEDURE — 91312 COVID-19, MRNA, LNP-S, BIVALENT BOOSTER, PF, 30 MCG/0.3 ML DOSE: CPT | Mod: S$GLB,,, | Performed by: FAMILY MEDICINE

## 2022-09-22 PROCEDURE — 0124A COVID-19, MRNA, LNP-S, BIVALENT BOOSTER, PF, 30 MCG/0.3 ML DOSE: CPT | Mod: PBBFAC | Performed by: FAMILY MEDICINE

## 2022-09-22 PROCEDURE — 91312 COVID-19, MRNA, LNP-S, BIVALENT BOOSTER, PF, 30 MCG/0.3 ML DOSE: ICD-10-PCS | Mod: S$GLB,,, | Performed by: FAMILY MEDICINE

## 2022-10-03 ENCOUNTER — PATIENT MESSAGE (OUTPATIENT)
Dept: RHEUMATOLOGY | Facility: CLINIC | Age: 67
End: 2022-10-03
Payer: COMMERCIAL

## 2022-10-03 ENCOUNTER — SPECIALTY PHARMACY (OUTPATIENT)
Dept: PHARMACY | Facility: CLINIC | Age: 67
End: 2022-10-03
Payer: COMMERCIAL

## 2022-10-03 NOTE — TELEPHONE ENCOUNTER
Specialty Pharmacy - Refill Coordination    Specialty Medication Orders Linked to Encounter      Flowsheet Row Most Recent Value   Medication #1 upadacitinib (RINVOQ) 15 mg 24 hr tablet (Order#[id number], Rx#[id number])          Pt stated he was prescribed Amoxicillin for dental issues. Pt stated he has not started abx yet as he is awaiting a response from him [facility] provider on holding therapy. Called was transferred to [facility] to further discuss.     Refill Questions - Documented Responses      Flowsheet Row Most Recent Value   Patient Availability and HIPAA Verification    Does patient want to proceed with activity? Yes   HIPAA/medical authority confirmed? Yes   Relationship to patient of person spoken to? Self   Refill Screening Questions    Changes to allergies? No   Changes to medications? No   New conditions since last clinic visit? No   Unplanned office visit, urgent care, ED, or hospital admission in the last 4 weeks? No   How does patient/caregiver feel medication is working? Good   Financial problems or insurance changes? No   How many doses of your specialty medications were missed in the last 4 weeks? 0   Would patient like to speak to a pharmacist? No   When does the patient need to receive the medication? 10/08/22   Refill Delivery Questions    How will the patient receive the medication? [facility]   When does the patient need to receive the medication? 10/08/22   Shipping Address Home   Address in [facility] confirmed and updated if neccessary? Yes   Expected Copay ($) 5   Is the patient able to afford the medication copay? Yes   Payment Method CC on file   Days supply of Refill 30   Supplies needed? No supplies needed   Refill activity completed? Yes   Refill activity plan Refill scheduled   Shipment/Pickup Date: 10/05/22            Current Outpatient Medications   Medication Sig    amoxicillin (AMOXIL) 500 MG capsule Take 1 capsule by mouth every 6 hours until all taken    atorvastatin  (LIPITOR) 40 MG tablet Take 1 tablet (40 mg total) by mouth once daily.    ergocalciferol (VITAMIN D2) 50,000 unit Cap TAKE 1 CAPSULE BY MOUTH ONE TIME PER WEEK    fluorouraciL (EFUDEX) 5 % cream Apply thin film to right cheek and temple 2times per day for 2-3 weeks; d/c if area bleeding or ulcerated; avoid eyes or mouth    fluticasone propionate (FLONASE) 50 mcg/actuation nasal spray 2 SPRAYS (100 MCG TOTAL) BY EACH NOSTRIL ROUTE ONCE DAILY.    gabapentin (NEURONTIN) 300 MG capsule Take 2 capsules (600 mg total) by mouth 3 (three) times daily.    ibuprofen (ADVIL,MOTRIN) 800 MG tablet TAKE 1 TABLET (800 MG TOTAL) BY MOUTH 3 (THREE) TIMES DAILY AS NEEDED FOR PAIN.    imiquimod (ALDARA) 5 % cream Apply small amount to affected area on  right cheek at night  x 4 weeks; discontinue  if ulcerated or bleeding    metFORMIN (GLUCOPHAGE) 500 MG tablet Take 1 tablet by mouth twice daily with meals    pantoprazole (PROTONIX) 40 MG tablet Take 1 tablet by mouth once daily    telmisartan (MICARDIS) 80 MG Tab Take 1 tablet by mouth once daily    tiotropium (SPIRIVA) 18 mcg inhalation capsule Inhale 1 capsule (18 mcg total) into the lungs once daily. Controller    upadacitinib (RINVOQ) 15 mg 24 hr tablet Take 1 tablet (15 mg total) by mouth once daily.   Last reviewed on 9/8/2022  8:33 AM by Lucie Judge MD    Review of patient's allergies indicates:   Allergen Reactions    Plaquenil [hydroxychloroquine]      Lightheaded and muscle aches    Last reviewed on  9/16/2022 8:55 AM by Lucie Judge      Tasks added this encounter   10/31/2022 - Refill Call (Auto Added)   Tasks due within next 3 months   No tasks due.     Hansa Wnog, Patient Care Assistant  [facility]  [address] [place] [id number]  Phone: [phone]  Fax: [phone]

## 2022-10-12 ENCOUNTER — PATIENT MESSAGE (OUTPATIENT)
Dept: PAIN MEDICINE | Facility: CLINIC | Age: 67
End: 2022-10-12
Payer: COMMERCIAL

## 2022-10-18 ENCOUNTER — TELEPHONE (OUTPATIENT)
Dept: PAIN MEDICINE | Facility: CLINIC | Age: 67
End: 2022-10-18

## 2022-10-18 ENCOUNTER — OFFICE VISIT (OUTPATIENT)
Dept: PAIN MEDICINE | Facility: CLINIC | Age: 67
End: 2022-10-18
Payer: COMMERCIAL

## 2022-10-18 VITALS
DIASTOLIC BLOOD PRESSURE: 88 MMHG | SYSTOLIC BLOOD PRESSURE: 133 MMHG | HEART RATE: 78 BPM | BODY MASS INDEX: 26.62 KG/M2 | WEIGHT: 164.88 LBS

## 2022-10-18 DIAGNOSIS — M51.36 DDD (DEGENERATIVE DISC DISEASE), LUMBAR: ICD-10-CM

## 2022-10-18 DIAGNOSIS — M54.16 LUMBAR RADICULOPATHY: ICD-10-CM

## 2022-10-18 DIAGNOSIS — M54.16 LUMBAR RADICULOPATHY: Primary | ICD-10-CM

## 2022-10-18 DIAGNOSIS — M48.062 SPINAL STENOSIS OF LUMBAR REGION WITH NEUROGENIC CLAUDICATION: Primary | ICD-10-CM

## 2022-10-18 PROCEDURE — 99214 PR OFFICE/OUTPT VISIT, EST, LEVL IV, 30-39 MIN: ICD-10-PCS | Mod: S$GLB,,, | Performed by: NURSE PRACTITIONER

## 2022-10-18 PROCEDURE — 99999 PR PBB SHADOW E&M-EST. PATIENT-LVL IV: CPT | Mod: PBBFAC,,, | Performed by: NURSE PRACTITIONER

## 2022-10-18 PROCEDURE — 1160F PR REVIEW ALL MEDS BY PRESCRIBER/CLIN PHARMACIST DOCUMENTED: ICD-10-PCS | Mod: CPTII,S$GLB,, | Performed by: NURSE PRACTITIONER

## 2022-10-18 PROCEDURE — 3075F PR MOST RECENT SYSTOLIC BLOOD PRESS GE 130-139MM HG: ICD-10-PCS | Mod: CPTII,S$GLB,, | Performed by: NURSE PRACTITIONER

## 2022-10-18 PROCEDURE — 99999 PR PBB SHADOW E&M-EST. PATIENT-LVL IV: ICD-10-PCS | Mod: PBBFAC,,, | Performed by: NURSE PRACTITIONER

## 2022-10-18 PROCEDURE — 1125F PR PAIN SEVERITY QUANTIFIED, PAIN PRESENT: ICD-10-PCS | Mod: CPTII,S$GLB,, | Performed by: NURSE PRACTITIONER

## 2022-10-18 PROCEDURE — 1160F RVW MEDS BY RX/DR IN RCRD: CPT | Mod: CPTII,S$GLB,, | Performed by: NURSE PRACTITIONER

## 2022-10-18 PROCEDURE — 3079F DIAST BP 80-89 MM HG: CPT | Mod: CPTII,S$GLB,, | Performed by: NURSE PRACTITIONER

## 2022-10-18 PROCEDURE — 3079F PR MOST RECENT DIASTOLIC BLOOD PRESSURE 80-89 MM HG: ICD-10-PCS | Mod: CPTII,S$GLB,, | Performed by: NURSE PRACTITIONER

## 2022-10-18 PROCEDURE — 3075F SYST BP GE 130 - 139MM HG: CPT | Mod: CPTII,S$GLB,, | Performed by: NURSE PRACTITIONER

## 2022-10-18 PROCEDURE — 1125F AMNT PAIN NOTED PAIN PRSNT: CPT | Mod: CPTII,S$GLB,, | Performed by: NURSE PRACTITIONER

## 2022-10-18 PROCEDURE — 99214 OFFICE O/P EST MOD 30 MIN: CPT | Mod: S$GLB,,, | Performed by: NURSE PRACTITIONER

## 2022-10-18 PROCEDURE — 1159F PR MEDICATION LIST DOCUMENTED IN MEDICAL RECORD: ICD-10-PCS | Mod: CPTII,S$GLB,, | Performed by: NURSE PRACTITIONER

## 2022-10-18 PROCEDURE — 1159F MED LIST DOCD IN RCRD: CPT | Mod: CPTII,S$GLB,, | Performed by: NURSE PRACTITIONER

## 2022-10-18 NOTE — H&P (VIEW-ONLY)
Who low K ago  [facility]  Patient Evaluation    Referred by: Calin Buchanan MD   Reason for referral: Lumbar Radiculopathy    CC:   Chief Complaint   Patient presents with    Low-back Pain    Leg Pain     Left     Hip Pain     Left      Last 3 PDI Scores 10/18/2022 8/26/2021 7/15/2021   Pain Disability Index (PDI) 30 10 27       Interval Updates: 10/18/2022 - Mr. Garvey is following up for low back,left hip and leg pain.  Pain is currently rate 3/10 with a weekly range 3-5/10.  Patient reporting pain that begins in low back causing numbness and pain radiating down the left leg into his foot.  He denies any recent incident or trauma, denies any profound weakness.  He did report benefit with dry needling in the past.      8/26/2021- Mare returns to clinic s/p Lumbar JOSE L5-S1 on 7/29/21 with 50% relief.  He reports a pain intensity 1/10 today.  The pain is described as Aching, Sharp and Shooting.  Pain is worsened by: flexion, lifting and twisting and improved by: medications, physical therapy and rest.  He is currently taking Gabapentin and Advil, he is not sure if Gabapentin is helping but is sure that Advil 200 mg helps his pain. He is also in PT thats helping but feels soar following. He would like to wean off gabapentin.     7/15/21 - Mr. Garvey returns to clinic for follow up visit reporting improvement of low back and right leg for only 2 days and then pain returned.  Patient is s/p Right L4-4 and  L5-S1 Lumbar Transforaminal JOSE  on 6/29/21 with 85% relief for 2 days.  Pain intensity is currently 3/10.      HPI:   Fabiano Garvey is a cordial 67 y.o. male who complains of chronic back pain shooting into the RLE.  Pain has been presents for many years, but got worse with lifting a ladder 6.5 wks ago.  He notes tingling and shooting pain traveling down to the foot worse with standing and walking.    Location: low back and left leg  Onset: 6.5 weeks for severe  pain but long-standing LBP  Current Pain Score: 6/10  Daily Pain of Range: 6-7/10  Quality: Aching, Deep, Numb and Sharp  Radiation: right leg  Worsened by: exercise, standing for more than 10 minutes and walking for more than 10 minutes  Improved by: heat and medications    Previous Therapies:  PT/OT:  currently attending x2 per week   HEP: Yes, trying to perform exercises given by PT, but too painful  Interventions:   6/29/21 Lumbar TFESI , Two Levels Right L4-5 and L5-S1 85% x2 days   Surgery: none for back  Medications:   - NSAIDS:   - MSK Relaxants:   - TCAs:   - SNRIs:   - Topicals:   - Anticonvulsants:  - Opioids:     Current Pain Medications:  Ibuprofen 400 mg BID  PRN - expresses desire to reduce dependency 2/2 GI and renal risks  Tylenol - not effective  Gabapentin 300 TID - helping    Review of Systems:  Review of Systems   Constitutional:  Negative for chills and fever.   HENT:  Negative for nosebleeds.    Eyes:  Negative for blurred vision and pain.   Respiratory:  Negative for hemoptysis.    Cardiovascular:  Negative for chest pain and palpitations.   Gastrointestinal:  Negative for heartburn, nausea and vomiting.   Genitourinary:  Negative for dysuria and hematuria.   Musculoskeletal:  Positive for back pain. Negative for myalgias.   Skin:  Negative for rash.   Neurological:  Positive for tingling and focal weakness (RLE). Negative for seizures and loss of consciousness.   Endo/Heme/Allergies:  Does not bruise/bleed easily.   Psychiatric/Behavioral:  Negative for hallucinations.      History:  Current medications, allergies, medical history, surgical history,   family history, and social history were reviewed in the chart and marked.    Physical Exam:  Vitals:    10/18/22 0836   BP: 133/88   Pulse: 78   Weight: 74.8 kg (164 lb 14.5 oz)   PainSc:   3     General    Nursing note and vitals reviewed.  Constitutional: He is oriented to person, place, and time. He appears well-developed and well-nourished.  No distress.   HENT:   Head: Normocephalic and atraumatic.   Nose: Nose normal.   Eyes: Conjunctivae and EOM are normal. Pupils are equal, round, and reactive to light. Right eye exhibits no discharge. Left eye exhibits no discharge. No scleral icterus.   Neck: No JVD present.   Cardiovascular:  Intact distal pulses.            Pulmonary/Chest: Effort normal. No respiratory distress.   Abdominal: He exhibits no distension.   Neurological: He is alert and oriented to person, place, and time. Coordination normal.   Psychiatric: He has a normal mood and affect. His behavior is normal. Judgment and thought content normal.     General Musculoskeletal Exam   Gait: normal     Back (L-Spine & T-Spine) / Neck (C-Spine) Exam     Tenderness Right paramedian tenderness of the Lower L-Spine. Left paramedian tenderness of the Lower L-Spine.     Back (L-Spine & T-Spine) Range of Motion   Back extension: facet loading is positive and exacerabtes/reproduces the patient's typical low back pain    Back flexion: limited ROM but partial relief of low back pain noted.     Spinal Sensation   Right Side Sensation  L-Spine Level: normal  Left Side Sensation  L-Spine Level: normal    Other   He has no scoliosis .    Comments:  + SLR on right      Muscle Strength   Right Lower Extremity   Hip Flexion: 5/5   Hip Extensors: 5/5  Quadriceps:  5/5   Hamstrin/5   Gastrocsoleus:  5/5   Left Lower Extremity   Hip Flexion: 5/5   Hip Extensors: 5/5  Quadriceps:  5/5   Hamstrin/5   Gastrocsoleus:  5/5     Reflexes     Left Side  Achilles:  2+  Quadriceps:  2+    Right Side   Achilles:  2+  Quadriceps:  2+    [id number]  MRI Lumbar Spine Without Contrast  CLINICAL HISTORY:  Lumbar radiculopathy; Low back pain  TECHNIQUE:  Multiplanar, multisequence MR images were acquired from the thoracolumbar junction to the sacrum without the administration of contrast.  COMPARISON:  Recent radiograph  FINDINGS:  The vertebral bodies are normal in  height.  There is grade 1 anterolisthesis at L4-5, along with disc space narrowing.  Remaining levels are normally aligned.  Mild disc space narrowing is present at L3-4.  There is disc desiccation within the lumbar spine.  The spinal cord is normal in signal.  Vertebral bodies demonstrate no evidence of a marrow replacement process.  Paravertebral structures are unremarkable.  L1-2 demonstrates no significant abnormality.  L2-3 demonstrates mild disc bulge and facet degenerative change.  There is no significant spinal canal or neural foraminal narrowing.  L3-4 demonstrates diffuse disc bulge, facet degenerative change and a right paracentral/lateral recess disc extrusion below the level of the disc plane.  This narrows the anterior aspect of the thecal sac along the right.  There is mild left foraminal narrowing on the basis of disc bulging and facet degenerative change.  L4-5 demonstrates anterolisthesis, facet degenerative change, ligamentum flavum thickening, disc bulge with a right foraminal protrusion.  This results in a moderate degree of right foraminal narrowing, mild left neural foraminal narrowing and a moderate to severe degree of spinal canal stenosis.  L5-S1 demonstrates minor disc bulge and facet degenerative change.  There is no spinal canal or neural foraminal narrowing.  Impression:  Multilevel degenerative changes which are most significant at the L4-5 level where there is a moderate to severe degree of spinal canal narrowing, moderate right foraminal narrowing and mild left foraminal narrowing.  L3-4 demonstrates a right paracentral/lateral recess disc extrusion extending below the level of the disc plane resulting in some narrowing of the thecal sac.        05/18/2021  X-Ray Lumbar Spine 5 View   CLINICAL HISTORY:  low back pain rad hip. hx of RA;Low back pain  TECHNIQUE:  AP, lateral, spot and bilateral oblique views of the lumbar spine were performed.  COMPARISON:  None  FINDINGS:  Vertebral  bodies are intact.  The L 3-L4 disc space is mildly narrowed.  The L4-L5 disc space is narrowed with forward subluxation of L4 on L5.  No collapse or destruction can be seen.  Degenerative changes are present about the apophyseal joints and there is some calcification in the abdominal aorta.  Impression:  See above    Labs:  BMP  Lab Results   Component Value Date     08/19/2022    K 4.2 08/19/2022     08/19/2022    CO2 23 08/19/2022    BUN 20 08/19/2022    CREATININE 0.9 08/19/2022    CALCIUM 9.2 08/19/2022    ANIONGAP 8 08/19/2022    ESTGFRAFRICA >60 04/29/2022    EGFRNONAA >60 04/29/2022     Lab Results   Component Value Date    ALT 29 08/19/2022    AST 29 08/19/2022    ALKPHOS 47 (L) 08/19/2022    BILITOT 0.4 08/19/2022     Lab Results   Component Value Date    WBC 6.78 08/19/2022    HGB 13.5 (L) 08/19/2022    HCT 39.8 (L) 08/19/2022    MCV 95 08/19/2022     08/19/2022             Assessment:  Problem List Items Addressed This Visit    None    6/16/21 - Fabiano Garvey is a 67 y.o. male who  has a past medical history of Hyperlipidemia, NU (obstructive sleep apnea), Pulmonary fibrosis, Rheumatoid arthritis, SCC (squamous cell carcinoma) (07/2020), SCC (squamous cell carcinoma) (11/2021), SCC (squamous cell carcinoma) (01/2022), and Squamous cell carcinoma (07/2019).  By history and examination this patient has chronic low back pain with RLE (L5) radiculopathy.  The underlying cause cause is degenerative disc disease, foraminal stenosis and central canal stenosis causing nerve compression at L4-5.  Pathology is confirmed by imaging.  We discussed the underlying diagnoses and multiple treatment options including non-opioid medications, injections, physical therapy, home exercise and activity modification / rest.  The risks and benefits of each treatment option were discussed and all questions were answered.       7/15/2021- Fabiano Garvey is a 67 y.o. male who  has a past medical history  of Hyperlipidemia, NU (obstructive sleep apnea), Pulmonary fibrosis, Rheumatoid arthritis, SCC (squamous cell carcinoma) (07/2020), SCC (squamous cell carcinoma) (11/2021), SCC (squamous cell carcinoma) (01/2022), and Squamous cell carcinoma (07/2019).  By history and examination this patient has chronic low back pain with radiculopathy.  The underlying cause cause is degenerative disc disease, foraminal stenosis and central canal stenosis.  Pathology is confirmed by imaging.  We discussed the underlying diagnoses and multiple treatment options including non-opioid medications, injections, physical therapy, home exercise, activity modification / rest and weight loss.  The risks and benefits of each treatment option were discussed and all questions were answered.      8/26/2021-Fabiano Garvey is a 67 y.o. male who  has a past medical history of Hyperlipidemia, NU (obstructive sleep apnea), Pulmonary fibrosis, Rheumatoid arthritis, SCC (squamous cell carcinoma) (07/2020), SCC (squamous cell carcinoma) (11/2021), SCC (squamous cell carcinoma) (01/2022), and Squamous cell carcinoma (07/2019).  By history and examination this patient has chronic low back pain with radiculopathy.  The underlying cause cause is degenerative disc disease, foraminal stenosis and central canal stenosis.  Pathology is confirmed by imaging.  We discussed the underlying diagnoses and multiple treatment options including non-opioid medications, injections, physical therapy, home exercise, activity modification / rest and weight loss.  The risks and benefits of each treatment option were discussed and all questions were answered.        MRI significant for multilevel degenerative changes which are most significant at the L4-5 level where there is a moderate to severe degree of spinal canal narrowing, moderate right foraminal narrowing and mild left foraminal narrowing. L3-4 demonstrates a right paracentral/lateral recess disc extrusion  extending below the level of the disc plane resulting in some narrowing of the thecal sac.    [birth year]23-eejd-old male with a history of chronic low back and bilateral leg pain left greater than right.  Based on history and exam patient's pain is likely related to degenerative changes in the lumbar spine most significant at L4-5 where he has moderate to severe degree of spinal canal narrowing, moderate right foraminal narrowing and mild left narrowing at L3-4 where he also has a right paracentral disc extrusion.  He has benefitted previously from a left L4-5 and L5-S1 TF JOSE considering his pain is only in the low back and left leg I will repeat this injection she plan agreed upon below.      : Not applicable    Treatment Plan:   Procedures:  Scheduled for a left L4-5 and L5-S1 TF JOSE    PT/OT/HEP:  Consult to physical therapy today  Medications    - Currently taking  Gabapentin 300 mg( x2 capsules)  x3 per day we discussed a weaning plan as follows:                1st week- 300 mg TID                2nd week- 300 mg BID                 3rd week 300 mg daily                4th week 300 mg q other day                 5th week off.   Labs: reviewed and medications are appropriately dosed for current hepatorenal function.  Imaging: Review and educated patient using  Images from  computer and spine model     Follow Up:   RTC in 2-3 weeks     BREANA Anne  [facility]    Disclaimer: This note was partly generated using dictation software which may occasionally result in transcription errors.

## 2022-10-18 NOTE — PROGRESS NOTES
Who low K ago  [facility]  Patient Evaluation    Referred by: Calin Buchanan MD   Reason for referral: Lumbar Radiculopathy    CC:   Chief Complaint   Patient presents with    Low-back Pain    Leg Pain     Left     Hip Pain     Left      Last 3 PDI Scores 10/18/2022 8/26/2021 7/15/2021   Pain Disability Index (PDI) 30 10 27       Interval Updates: 10/18/2022 - Mr. Garvey is following up for low back,left hip and leg pain.  Pain is currently rate 3/10 with a weekly range 3-5/10.  Patient reporting pain that begins in low back causing numbness and pain radiating down the left leg into his foot.  He denies any recent incident or trauma, denies any profound weakness.  He did report benefit with dry needling in the past.      8/26/2021- Mare returns to clinic s/p Lumbar JOSE L5-S1 on 7/29/21 with 50% relief.  He reports a pain intensity 1/10 today.  The pain is described as Aching, Sharp and Shooting.  Pain is worsened by: flexion, lifting and twisting and improved by: medications, physical therapy and rest.  He is currently taking Gabapentin and Advil, he is not sure if Gabapentin is helping but is sure that Advil 200 mg helps his pain. He is also in PT thats helping but feels soar following. He would like to wean off gabapentin.     7/15/21 - Mr. Garvey returns to clinic for follow up visit reporting improvement of low back and right leg for only 2 days and then pain returned.  Patient is s/p Right L4-4 and  L5-S1 Lumbar Transforaminal JOSE  on 6/29/21 with 85% relief for 2 days.  Pain intensity is currently 3/10.      HPI:   Fabiano Garvey is a cordial 67 y.o. male who complains of chronic back pain shooting into the RLE.  Pain has been presents for many years, but got worse with lifting a ladder 6.5 wks ago.  He notes tingling and shooting pain traveling down to the foot worse with standing and walking.    Location: low back and left leg  Onset: 6.5 weeks for severe  pain but long-standing LBP  Current Pain Score: 6/10  Daily Pain of Range: 6-7/10  Quality: Aching, Deep, Numb and Sharp  Radiation: right leg  Worsened by: exercise, standing for more than 10 minutes and walking for more than 10 minutes  Improved by: heat and medications    Previous Therapies:  PT/OT:  currently attending x2 per week   HEP: Yes, trying to perform exercises given by PT, but too painful  Interventions:   6/29/21 Lumbar TFESI , Two Levels Right L4-5 and L5-S1 85% x2 days   Surgery: none for back  Medications:   - NSAIDS:   - MSK Relaxants:   - TCAs:   - SNRIs:   - Topicals:   - Anticonvulsants:  - Opioids:     Current Pain Medications:  Ibuprofen 400 mg BID  PRN - expresses desire to reduce dependency 2/2 GI and renal risks  Tylenol - not effective  Gabapentin 300 TID - helping    Review of Systems:  Review of Systems   Constitutional:  Negative for chills and fever.   HENT:  Negative for nosebleeds.    Eyes:  Negative for blurred vision and pain.   Respiratory:  Negative for hemoptysis.    Cardiovascular:  Negative for chest pain and palpitations.   Gastrointestinal:  Negative for heartburn, nausea and vomiting.   Genitourinary:  Negative for dysuria and hematuria.   Musculoskeletal:  Positive for back pain. Negative for myalgias.   Skin:  Negative for rash.   Neurological:  Positive for tingling and focal weakness (RLE). Negative for seizures and loss of consciousness.   Endo/Heme/Allergies:  Does not bruise/bleed easily.   Psychiatric/Behavioral:  Negative for hallucinations.      History:  Current medications, allergies, medical history, surgical history,   family history, and social history were reviewed in the chart and marked.    Physical Exam:  Vitals:    10/18/22 0836   BP: 133/88   Pulse: 78   Weight: 74.8 kg (164 lb 14.5 oz)   PainSc:   3     General    Nursing note and vitals reviewed.  Constitutional: He is oriented to person, place, and time. He appears well-developed and well-nourished.  No distress.   HENT:   Head: Normocephalic and atraumatic.   Nose: Nose normal.   Eyes: Conjunctivae and EOM are normal. Pupils are equal, round, and reactive to light. Right eye exhibits no discharge. Left eye exhibits no discharge. No scleral icterus.   Neck: No JVD present.   Cardiovascular:  Intact distal pulses.            Pulmonary/Chest: Effort normal. No respiratory distress.   Abdominal: He exhibits no distension.   Neurological: He is alert and oriented to person, place, and time. Coordination normal.   Psychiatric: He has a normal mood and affect. His behavior is normal. Judgment and thought content normal.     General Musculoskeletal Exam   Gait: normal     Back (L-Spine & T-Spine) / Neck (C-Spine) Exam     Tenderness Right paramedian tenderness of the Lower L-Spine. Left paramedian tenderness of the Lower L-Spine.     Back (L-Spine & T-Spine) Range of Motion   Back extension: facet loading is positive and exacerabtes/reproduces the patient's typical low back pain    Back flexion: limited ROM but partial relief of low back pain noted.     Spinal Sensation   Right Side Sensation  L-Spine Level: normal  Left Side Sensation  L-Spine Level: normal    Other   He has no scoliosis .    Comments:  + SLR on right      Muscle Strength   Right Lower Extremity   Hip Flexion: 5/5   Hip Extensors: 5/5  Quadriceps:  5/5   Hamstrin/5   Gastrocsoleus:  5/5   Left Lower Extremity   Hip Flexion: 5/5   Hip Extensors: 5/5  Quadriceps:  5/5   Hamstrin/5   Gastrocsoleus:  5/5     Reflexes     Left Side  Achilles:  2+  Quadriceps:  2+    Right Side   Achilles:  2+  Quadriceps:  2+    [id number]  MRI Lumbar Spine Without Contrast  CLINICAL HISTORY:  Lumbar radiculopathy; Low back pain  TECHNIQUE:  Multiplanar, multisequence MR images were acquired from the thoracolumbar junction to the sacrum without the administration of contrast.  COMPARISON:  Recent radiograph  FINDINGS:  The vertebral bodies are normal in  height.  There is grade 1 anterolisthesis at L4-5, along with disc space narrowing.  Remaining levels are normally aligned.  Mild disc space narrowing is present at L3-4.  There is disc desiccation within the lumbar spine.  The spinal cord is normal in signal.  Vertebral bodies demonstrate no evidence of a marrow replacement process.  Paravertebral structures are unremarkable.  L1-2 demonstrates no significant abnormality.  L2-3 demonstrates mild disc bulge and facet degenerative change.  There is no significant spinal canal or neural foraminal narrowing.  L3-4 demonstrates diffuse disc bulge, facet degenerative change and a right paracentral/lateral recess disc extrusion below the level of the disc plane.  This narrows the anterior aspect of the thecal sac along the right.  There is mild left foraminal narrowing on the basis of disc bulging and facet degenerative change.  L4-5 demonstrates anterolisthesis, facet degenerative change, ligamentum flavum thickening, disc bulge with a right foraminal protrusion.  This results in a moderate degree of right foraminal narrowing, mild left neural foraminal narrowing and a moderate to severe degree of spinal canal stenosis.  L5-S1 demonstrates minor disc bulge and facet degenerative change.  There is no spinal canal or neural foraminal narrowing.  Impression:  Multilevel degenerative changes which are most significant at the L4-5 level where there is a moderate to severe degree of spinal canal narrowing, moderate right foraminal narrowing and mild left foraminal narrowing.  L3-4 demonstrates a right paracentral/lateral recess disc extrusion extending below the level of the disc plane resulting in some narrowing of the thecal sac.        05/18/2021  X-Ray Lumbar Spine 5 View   CLINICAL HISTORY:  low back pain rad hip. hx of RA;Low back pain  TECHNIQUE:  AP, lateral, spot and bilateral oblique views of the lumbar spine were performed.  COMPARISON:  None  FINDINGS:  Vertebral  bodies are intact.  The L 3-L4 disc space is mildly narrowed.  The L4-L5 disc space is narrowed with forward subluxation of L4 on L5.  No collapse or destruction can be seen.  Degenerative changes are present about the apophyseal joints and there is some calcification in the abdominal aorta.  Impression:  See above    Labs:  BMP  Lab Results   Component Value Date     08/19/2022    K 4.2 08/19/2022     08/19/2022    CO2 23 08/19/2022    BUN 20 08/19/2022    CREATININE 0.9 08/19/2022    CALCIUM 9.2 08/19/2022    ANIONGAP 8 08/19/2022    ESTGFRAFRICA >60 04/29/2022    EGFRNONAA >60 04/29/2022     Lab Results   Component Value Date    ALT 29 08/19/2022    AST 29 08/19/2022    ALKPHOS 47 (L) 08/19/2022    BILITOT 0.4 08/19/2022     Lab Results   Component Value Date    WBC 6.78 08/19/2022    HGB 13.5 (L) 08/19/2022    HCT 39.8 (L) 08/19/2022    MCV 95 08/19/2022     08/19/2022             Assessment:  Problem List Items Addressed This Visit    None    6/16/21 - Fabiano Garvey is a 67 y.o. male who  has a past medical history of Hyperlipidemia, NU (obstructive sleep apnea), Pulmonary fibrosis, Rheumatoid arthritis, SCC (squamous cell carcinoma) (07/2020), SCC (squamous cell carcinoma) (11/2021), SCC (squamous cell carcinoma) (01/2022), and Squamous cell carcinoma (07/2019).  By history and examination this patient has chronic low back pain with RLE (L5) radiculopathy.  The underlying cause cause is degenerative disc disease, foraminal stenosis and central canal stenosis causing nerve compression at L4-5.  Pathology is confirmed by imaging.  We discussed the underlying diagnoses and multiple treatment options including non-opioid medications, injections, physical therapy, home exercise and activity modification / rest.  The risks and benefits of each treatment option were discussed and all questions were answered.       7/15/2021- Fabiano Garvey is a 67 y.o. male who  has a past medical history  of Hyperlipidemia, NU (obstructive sleep apnea), Pulmonary fibrosis, Rheumatoid arthritis, SCC (squamous cell carcinoma) (07/2020), SCC (squamous cell carcinoma) (11/2021), SCC (squamous cell carcinoma) (01/2022), and Squamous cell carcinoma (07/2019).  By history and examination this patient has chronic low back pain with radiculopathy.  The underlying cause cause is degenerative disc disease, foraminal stenosis and central canal stenosis.  Pathology is confirmed by imaging.  We discussed the underlying diagnoses and multiple treatment options including non-opioid medications, injections, physical therapy, home exercise, activity modification / rest and weight loss.  The risks and benefits of each treatment option were discussed and all questions were answered.      8/26/2021-Fabiano Garvey is a 67 y.o. male who  has a past medical history of Hyperlipidemia, NU (obstructive sleep apnea), Pulmonary fibrosis, Rheumatoid arthritis, SCC (squamous cell carcinoma) (07/2020), SCC (squamous cell carcinoma) (11/2021), SCC (squamous cell carcinoma) (01/2022), and Squamous cell carcinoma (07/2019).  By history and examination this patient has chronic low back pain with radiculopathy.  The underlying cause cause is degenerative disc disease, foraminal stenosis and central canal stenosis.  Pathology is confirmed by imaging.  We discussed the underlying diagnoses and multiple treatment options including non-opioid medications, injections, physical therapy, home exercise, activity modification / rest and weight loss.  The risks and benefits of each treatment option were discussed and all questions were answered.        MRI significant for multilevel degenerative changes which are most significant at the L4-5 level where there is a moderate to severe degree of spinal canal narrowing, moderate right foraminal narrowing and mild left foraminal narrowing. L3-4 demonstrates a right paracentral/lateral recess disc extrusion  extending below the level of the disc plane resulting in some narrowing of the thecal sac.    [id number]old male with a history of chronic low back and bilateral leg pain left greater than right.  Based on history and exam patient's pain is likely related to degenerative changes in the lumbar spine most significant at L4-5 where he has moderate to severe degree of spinal canal narrowing, moderate right foraminal narrowing and mild left narrowing at L3-4 where he also has a right paracentral disc extrusion.  He has benefitted previously from a left L4-5 and L5-S1 TF JOSE considering his pain is only in the low back and left leg I will repeat this injection she plan agreed upon below.      : Not applicable    Treatment Plan:   Procedures:  Scheduled for a left L4-5 and L5-S1 TF JOSE    PT/OT/HEP:  Consult to physical therapy today  Medications    - Currently taking  Gabapentin 300 mg( x2 capsules)  x3 per day we discussed a weaning plan as follows:                1st week- 300 mg TID                2nd week- 300 mg BID                 3rd week 300 mg daily                4th week 300 mg q other day                 5th week off.   Labs: reviewed and medications are appropriately dosed for current hepatorenal function.  Imaging: Review and educated patient using  Images from  computer and spine model     Follow Up:   RTC in 2-3 weeks     BREANA Anne  Interventional Pain Management    Disclaimer: This note was partly generated using dictation software which may occasionally result in transcription errors.

## 2022-10-24 ENCOUNTER — TELEPHONE (OUTPATIENT)
Dept: PAIN MEDICINE | Facility: CLINIC | Age: 67
End: 2022-10-24
Payer: COMMERCIAL

## 2022-10-24 NOTE — TELEPHONE ENCOUNTER
----- Message from Sherri Calles sent at 10/24/2022 10:42 AM CDT -----  Type:  Needs Medical Advice    Who Called: PT  Symptoms (please be specific):    How long has patient had these symptoms:    Pharmacy name and phone #:    Would the patient rather a call back or a response via [facility]?   Best Call Back Number: [phone]  Additional Information: HAS APPT FOR PROCEDURE 10/27 HAVING FLU SHOT THE DAY BEFORE. NEEDS TO KNOW IF THAT WILL INTERFERE WITH PROCEDURE

## 2022-10-25 ENCOUNTER — TELEPHONE (OUTPATIENT)
Dept: PAIN MEDICINE | Facility: HOSPITAL | Age: 67
End: 2022-10-25
Payer: COMMERCIAL

## 2022-10-25 NOTE — TELEPHONE ENCOUNTER
Contacted patient via telephone, patient reports his last dose of antibiotic was 10/9 for an infected tooth. Patient also informed RN he will be getting the flu shot tomorrow, RN informed him that the effectiveness of the flu vaccine could be decreased being that he is receiving a steroid during his procedure, he will get the flu shot anyway.

## 2022-10-26 ENCOUNTER — TELEPHONE (OUTPATIENT)
Dept: PAIN MEDICINE | Facility: HOSPITAL | Age: 67
End: 2022-10-26
Payer: COMMERCIAL

## 2022-10-26 NOTE — TELEPHONE ENCOUNTER
Attempted to notify patient of 0730 am arrival time on 10/27/22 and the following:  Please remember:  Check in at the registration desk on the first floor of the hospital. [address] [place] [address]  Please DO NOT eat or drink 8 hours prior to your arrival time for the procedure, if diabetic 6 hours prior. If you are taking medications for blood pressure, heart medications, thyroid, cholesterol; you can take them with a small sip of water.  Refrain from drinking alcohol within 24 hours prior to your procedure  You will need someone to drive you home after procedure. You cannot take taxi, Uber, or Lyft.  If you start feeling sick (fever, chills, or coughing) or start on any antibiotics please contact us at [phone] to reschedule.

## 2022-10-27 ENCOUNTER — HOSPITAL ENCOUNTER (OUTPATIENT)
Facility: HOSPITAL | Age: 67
Discharge: HOME | End: 2022-10-27
Attending: PAIN MEDICINE | Admitting: PAIN MEDICINE
Payer: COMMERCIAL

## 2022-10-27 VITALS
BODY MASS INDEX: 27.32 KG/M2 | HEIGHT: 66 IN | TEMPERATURE: 97 F | SYSTOLIC BLOOD PRESSURE: 131 MMHG | DIASTOLIC BLOOD PRESSURE: 80 MMHG | HEART RATE: 68 BPM | RESPIRATION RATE: 17 BRPM | OXYGEN SATURATION: 96 % | WEIGHT: 170 LBS

## 2022-10-27 DIAGNOSIS — M54.16 LUMBAR RADICULOPATHY: Primary | ICD-10-CM

## 2022-10-27 DIAGNOSIS — G89.29 CHRONIC PAIN: ICD-10-CM

## 2022-10-27 PROCEDURE — 64483 NJX AA&/STRD TFRM EPI L/S 1: CPT | Performed by: PAIN MEDICINE

## 2022-10-27 PROCEDURE — 25000003 PHARM REV CODE 250: Performed by: EMERGENCY MEDICINE

## 2022-10-27 PROCEDURE — 25500020 PHARM REV CODE 255: Performed by: PAIN MEDICINE

## 2022-10-27 PROCEDURE — 63600175 PHARM REV CODE 636 W HCPCS: Performed by: PAIN MEDICINE

## 2022-10-27 PROCEDURE — 64484 NJX AA&/STRD TFRM EPI L/S EA: CPT | Performed by: PAIN MEDICINE

## 2022-10-27 PROCEDURE — 64484 PRA INJECT ANES/STEROID FORAMEN LUMBAR/SACRAL W IMG GUIDE ,EA ADD LEVEL: ICD-10-PCS | Mod: LT,,, | Performed by: PAIN MEDICINE

## 2022-10-27 PROCEDURE — 25000003 PHARM REV CODE 250: Performed by: PAIN MEDICINE

## 2022-10-27 PROCEDURE — 64483 PR EPIDURAL INJ, ANES/STEROID, TRANSFORAMINAL, LUMB/SACR, SNGL LEVL: ICD-10-PCS | Mod: LT,,, | Performed by: PAIN MEDICINE

## 2022-10-27 PROCEDURE — 99152 MOD SED SAME PHYS/QHP 5/>YRS: CPT | Performed by: PAIN MEDICINE

## 2022-10-27 PROCEDURE — 64483 NJX AA&/STRD TFRM EPI L/S 1: CPT | Mod: LT,,, | Performed by: PAIN MEDICINE

## 2022-10-27 PROCEDURE — 64484 NJX AA&/STRD TFRM EPI L/S EA: CPT | Mod: LT,,, | Performed by: PAIN MEDICINE

## 2022-10-27 RX ORDER — INDOMETHACIN 25 MG/1
CAPSULE ORAL
Status: DISCONTINUED | OUTPATIENT
Start: 2022-10-27 | End: 2022-10-27 | Stop reason: HOSPADM

## 2022-10-27 RX ORDER — FENTANYL CITRATE 50 UG/ML
INJECTION, SOLUTION INTRAMUSCULAR; INTRAVENOUS
Status: DISCONTINUED | OUTPATIENT
Start: 2022-10-27 | End: 2022-10-27 | Stop reason: HOSPADM

## 2022-10-27 RX ORDER — LIDOCAINE HYDROCHLORIDE 10 MG/ML
1 INJECTION, SOLUTION EPIDURAL; INFILTRATION; INTRACAUDAL; PERINEURAL ONCE
Status: DISCONTINUED | OUTPATIENT
Start: 2022-10-27 | End: 2022-10-27 | Stop reason: HOSPADM

## 2022-10-27 RX ORDER — LIDOCAINE HYDROCHLORIDE 10 MG/ML
INJECTION, SOLUTION EPIDURAL; INFILTRATION; INTRACAUDAL; PERINEURAL
Status: DISCONTINUED | OUTPATIENT
Start: 2022-10-27 | End: 2022-10-27 | Stop reason: HOSPADM

## 2022-10-27 RX ORDER — LIDOCAINE HYDROCHLORIDE 10 MG/ML
INJECTION INFILTRATION; PERINEURAL
Status: DISCONTINUED | OUTPATIENT
Start: 2022-10-27 | End: 2022-10-27 | Stop reason: HOSPADM

## 2022-10-27 RX ORDER — MIDAZOLAM HYDROCHLORIDE 1 MG/ML
INJECTION INTRAMUSCULAR; INTRAVENOUS
Status: DISCONTINUED | OUTPATIENT
Start: 2022-10-27 | End: 2022-10-27 | Stop reason: HOSPADM

## 2022-10-27 RX ORDER — DEXAMETHASONE SODIUM PHOSPHATE 4 MG/ML
INJECTION, SOLUTION INTRA-ARTICULAR; INTRALESIONAL; INTRAMUSCULAR; INTRAVENOUS; SOFT TISSUE
Status: DISCONTINUED | OUTPATIENT
Start: 2022-10-27 | End: 2022-10-27 | Stop reason: HOSPADM

## 2022-10-27 RX ORDER — SODIUM CHLORIDE 9 MG/ML
500 INJECTION, SOLUTION INTRAVENOUS CONTINUOUS
Status: DISCONTINUED | OUTPATIENT
Start: 2022-10-27 | End: 2022-10-27 | Stop reason: HOSPADM

## 2022-10-27 RX ADMIN — SODIUM CHLORIDE 500 ML: 0.9 INJECTION, SOLUTION INTRAVENOUS at 08:10

## 2022-10-27 NOTE — DISCHARGE INSTRUCTIONS
Home Care Instructions Pain Management:    1.  DIET:    You may resume your normal diet today.    2.  BATHING:    You may shower with luke warm water.    3.  DRESSING:    You may remove your bandage today.    4.  ACTIVITY LEVEL:      You may resume your normal activities 24 hours after your procedure.    5.  MEDICATIONS:    You may resume your normal medications today.    6.  SPECIAL INSTRUCTIONS:    No heat to the injection site for 24 hours including bath or shower, heating pad, moist heat or hot tubs.    Use an ice pack to the injection site for any pain or discomfort.  Apply ice packs for 20 minute intervals as needed.    If you have received any sedatives by mouth today, you can not drive for 12 hours.    If you have received sedation through an IV, you can not drive for 24 hours.    PLEASE CALL YOUR DOCTOR FOR THE FOLLOWIN.  Redness or swelling around the injection site.  2.  Fever of 101 degrees.  3.  Drainage (pus) from the injection site.  4.  For any continuous bleeding (some dried blood over the incision is normal.)    FOR EMERGENCIES:    If any unusual problems or difficulties occur during clinic hours, call [phone] or dial [phone].    Follow up with with your physician in 2-3 weeks.

## 2022-10-27 NOTE — DISCHARGE SUMMARY
[facility]  Discharge Note  Short Stay     Admit Date: 10/27/2022    Discharge Date: 10/27/2022     Attending Physician: Shiv Vernon Jr, MD    Diagnoses:  There are no hospital problems to display for this patient.    Discharged Condition: Good     Hospital Course: Patient was admitted for an outpatient interventional pain management procedure and tolerated the procedure well with no complications.     Final Diagnoses: Same as principal problem.     Disposition: Home or Self Care     Follow up/Patient Instructions:        Reconciled Medications:     Medication List        CONTINUE taking these medications      amoxicillin 500 MG capsule  Commonly known as: AMOXIL  Take 1 capsule by mouth every 6 hours until all taken     atorvastatin 40 MG tablet  Commonly known as: LIPITOR  Take 1 tablet (40 mg total) by mouth once daily.     fluorouraciL 5 % cream  Commonly known as: EFUDEX  Apply thin film to right cheek and temple 2times per day for 2-3 weeks; d/c if area bleeding or ulcerated; avoid eyes or mouth     fluticasone propionate 50 mcg/actuation nasal spray  Commonly known as: FLONASE  2 SPRAYS (100 MCG TOTAL) BY EACH NOSTRIL ROUTE ONCE DAILY.     gabapentin 300 MG capsule  Commonly known as: NEURONTIN  Take 2 capsules (600 mg total) by mouth 3 (three) times daily.     ibuprofen 800 MG tablet  Commonly known as: ADVIL,MOTRIN  TAKE 1 TABLET (800 MG TOTAL) BY MOUTH 3 (THREE) TIMES DAILY AS NEEDED FOR PAIN.     imiquimod 5 % cream  Commonly known as: ALDARA  Apply small amount to affected area on  right cheek at night  x 4 weeks; discontinue  if ulcerated or bleeding     RINVOQ 15 mg 24 hr tablet  Generic drug: upadacitinib  Take 1 tablet (15 mg total) by mouth once daily.     SPIRIVA WITH HANDIHALER 18 mcg inhalation capsule  Generic drug: tiotropium  Inhale 1 capsule (18 mcg total) into the lungs once daily. Controller     VITAMIN D2 50,000 unit Cap  Generic drug: ergocalciferol  TAKE 1 CAPSULE BY  MOUTH ONE TIME PER WEEK             Discharge Procedure Orders (must include Diet, Follow-up, Activity)   Diet Adult Regular     No driving until:   Order Comments: If you received sedation, no driving for 12 hrs     Remove dressing in 24 hours     Notify your health care provider if you experience any of the following:  temperature >100.4     Notify your health care provider if you experience any of the following:  severe uncontrolled pain     Notify your health care provider if you experience any of the following:  redness, tenderness, or signs of infection (pain, swelling, redness, odor or green/yellow discharge around incision site)     Notify your health care provider if you experience any of the following:  difficulty breathing or increased cough     Notify your health care provider if you experience any of the following:  severe persistent headache     Notify your health care provider if you experience any of the following:  increased confusion or weakness     Activity as tolerated       Shiv Vernon Jr, MD  Interventional Pain Medicine / Anesthesiology

## 2022-10-27 NOTE — OP NOTE
"Procedure Note    Pre-operative Diagnosis: Lumbar Radiculopathy  Post-operative Diagnosis: Lumbar Radiculopathy  Procedure Date: 10/27/2022  Procedure:  (1) Lumbar Transforaminal Epidural Steroid Injection, Two Levels     First Level: Left L4-5     Second Level: Left L5-S1    (2) Intraoperative fluoroscopy    (3) IV Moderate Sedation (Midazolam 2 mg, Fentanyl 50 mcg)            Indications: To alleviate pain and suffering, and reduce functional impairment associated with Lumbar radiculopathy.      The patients history and physical exam were reviewed. The risks, benefits and alternatives to the procedure were discussed, and all questions were answered to the patients satisfaction. The patient agreed to proceed, and written informed consent was verified.    Procedure in Detail: Using a fenestrated drape and Chloro-prep, the skin was prepared and draped in sterile fashion. Under fluoroscopy, the appropriate foramena were identified by ipsilateral oblique angle. A mixture of Lidocaine 1% 4 mL + Sodium Bicarbonate 1 mL was used to anesthetize the skin at the skin entry point and subcutaneous tissue with 25G 1.5" in needle. Then a 22G 5" spinal needle was advanced under intermittent fluoroscopy toward the inferolateral border of the superior pedical at each level. Fluoroscopy was then inspected from lateral and AP view and needle adjusted appropriately. There was no paresthesia with needle placement. Aspiration was negative for blood and CSF. Omnipaque contrast was injected live in an AP fluoroscopic view at each level demonstrating appropriate needle position with contrast spread into the nerve root sheath and medially into the epidural space without intravascular or intrathecal spread. Next, a 4 mL mixture of PF Lidocaine 1% (3 mL) and dexamethasone 10 mg (1 mL) was divided evenly between the two levels and injected slowly and incrementally. The patient tolerated the procedure without complaint and was transported to " the recovery room in stable condition.     Disposition: The patient tolerated the procedure well, and there were no apparent complications. Vital signs remained stable throughout the procedure. The patient was taken to the recovery area where written discharge instructions for the procedure were given.     Follow-up: RTC as scheduled      Shiv Vernon Jr, MD  Interventional Pain Medicine / Anesthesiology

## 2022-10-31 ENCOUNTER — CLINICAL SUPPORT (OUTPATIENT)
Dept: REHABILITATION | Facility: HOSPITAL | Age: 67
End: 2022-10-31
Payer: COMMERCIAL

## 2022-10-31 DIAGNOSIS — M54.16 LUMBAR RADICULOPATHY: ICD-10-CM

## 2022-10-31 DIAGNOSIS — M51.36 DDD (DEGENERATIVE DISC DISEASE), LUMBAR: ICD-10-CM

## 2022-10-31 DIAGNOSIS — M48.062 SPINAL STENOSIS OF LUMBAR REGION WITH NEUROGENIC CLAUDICATION: ICD-10-CM

## 2022-10-31 DIAGNOSIS — R26.89 DECREASED FUNCTIONAL MOBILITY: ICD-10-CM

## 2022-10-31 PROCEDURE — 97161 PT EVAL LOW COMPLEX 20 MIN: CPT | Mod: PN

## 2022-10-31 PROCEDURE — 97110 THERAPEUTIC EXERCISES: CPT | Mod: PN

## 2022-10-31 NOTE — PLAN OF CARE
[facility]   Physical Therapy Initial Evaluation     Date: 10/31/2022   Name: Fabiano Garvey  [facility] Number: [id number]    Therapy Diagnosis:   Encounter Diagnoses   Name Primary?    Spinal stenosis of lumbar region with neurogenic claudication     Lumbar radiculopathy     DDD (degenerative disc disease), lumbar     Decreased functional mobility      Physician: Joseph Cummins FNP    Physician Orders: PT Eval and Treat   Medical Diagnosis from Referral:   M48.062 (ICD-10-CM) - Spinal stenosis of lumbar region with neurogenic claudication   M54.16 (ICD-10-CM) - Lumbar radiculopathy   M51.36 (ICD-10-CM) - DDD (degenerative disc disease), lumbar     Evaluation Date: 10/31/2022  Authorization Period Expiration: 10/18/2023  Plan of Care Expiration: 12/23/2022  Progress Note Due: 11/30/2022  Visit # / Visits authorized: 1/ 1   FOTO: 1/1    Precautions: Standard     Time In: 9:50 AM  Time Out: 10:35 AM  Total Appointment Time (timed & untimed codes): 45 minutes (1 LCE, 1 TE)      SUBJECTIVE     Date of onset: 6-8 weeks ago    History of current condition - Fabiano reports: Pt reports increase in back pain and numbness in left leg which started about 6-8 weeks ago.  He was here for therapy a year ago with improvement in pain but has not been compliant with HEP.  Pt did get steroid injection last week, 10/27/2022, and radicular symptoms have improved but are not totally resolved. Pt has increased his Ibuprofen back to 800 mg TID but does want to decrease to 400 TID again.  Pt is able to stay in one position for only about 30 minutes before needing to change positions.     Falls: no    Imaging, MRI studies:   Impression:     Multilevel degenerative changes which are most significant at the L4-5 level where there is a moderate to severe degree of spinal canal narrowing, moderate right foraminal narrowing and mild left foraminal narrowing.     L3-4 demonstrates a right paracentral/lateral recess disc  extrusion extending below the level of the disc plane resulting in some narrowing of the thecal sac.    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[employer], desk job   Prior Level of Function: independent  Current Level of Function: limited yard work, avoids lifting things    Pain:  Current 1/10, worst 7/10, best 1/10   Location: left back  left posterior thigh and anterior thigh  Description: Aching, Burning, and Tingling  Aggravating Factors: staying in single position for more than 30 min  Easing Factors:  leaning forward on knees in sitting position    Patients goals: Decrease pain, increase activity tolerance     Medical History:   Past Medical History:   Diagnosis Date    Hyperlipidemia     NU (obstructive sleep apnea)     Pulmonary fibrosis     Rheumatoid arthritis     SCC (squamous cell carcinoma) 07/2020    SCC right medial canthus    SCC (squamous cell carcinoma) 11/2021    mid lower neck     SCC (squamous cell carcinoma) 01/2022    right lateral cheek- in situ    Squamous cell carcinoma 07/2019    SCC in situ left temple       Surgical History:   Fabiano Garvey  has a past surgical history that includes No past surgeries; Colonoscopy (N/A, 7/25/2017); Transforaminal epidural injection of steroid (Right, 6/29/2021); Epidural steroid injection into lumbar spine (N/A, 7/29/2021); and Transforaminal epidural injection of steroid (Left, 10/27/2022).    Medications:   Fabiano has a current medication list which includes the following prescription(s): amoxicillin, atorvastatin, ergocalciferol, fluorouracil, fluticasone propionate, gabapentin, ibuprofen, imiquimod, tiotropium, and rinvoq, and the following Facility-Administered Medications: ketorolac.    Allergies:   Review of patient's allergies indicates:   Allergen Reactions    Plaquenil [hydroxychloroquine]      Lightheaded and muscle aches           OBJECTIVE     Observation: normal gait pattern    Posture:  slouched sitting posture    Lumbar Range of Motion:    Degrees Pain   Flexion WNL - fingertips reach floor   Left low back        Extension WFL   No radicular sx        Left Side Bending 46 cm fingertip to floor No radicular sx        Right Side Bending 44 cm fingertip to floor No radicular sx        Left rotation   WNL         Right Rotation   WNL              Lower Extremity Strength  Right LE  Left LE    Knee extension: 5/5 Knee extension: 4+/5   Knee flexion: 5/5 Knee flexion: 4+/5   Hip flexion: 4+/5 Hip flexion: 4/5   Hip extension:  4+/5 Hip extension: 4/5   Hip abduction: 5/5 Hip abduction: 4+/5   Hip adduction: 4+/5 Hip adduction 4+/5   Ankle dorsiflexion: 5/5 Ankle dorsiflexion: 5/5   Ankle plantarflexion: 5/5 (NWB) Ankle plantarflexion: 5/5 (NWB)     *pain with left hip IR, pain with left hip manual muscle testing     Special Tests:  -Repeated Flexion: left sided low back pain  -Repeated Ext: no radicular sx  -Piriformis Test: negative  -Prone Instability Test: NT  -Bridge Test: no increase in pain      DTR: 2+ bilateral lower extremity     Neuro Dynamic Testing:    Sciatic nerve:      SLR: R = negative     L = positive       Femoral Nerve:    Femoral nerve test: negative      Joint Mobility: decreased mobility at L4/5    Palpation: pain with palpation of left lateral border of sacrum, left PSIS, left lumbar paraspinal musculature    Sensation: intact to light touch    Flexibility:    Jose test: R = mild hip flexor tightness ; L = mild hip flexor tightness   Hamstring flexibility: right= 78 degrees, left= 61 degrees       Limitation/Restriction for FOTO Lumbar Spine Survey    Therapist reviewed FOTO scores for Fabiano Garvey on 10/31/2022.   FOTO documents entered into [employer] - see Media section.    Limitation Score: 54%  Projected Limitation Score: 43%  Modified Oswestry: 40%       TREATMENT        Treatment Time In: 10:23   Treatment  Time Out: 10:35  Total Treatment time (time-based codes) separate from Evaluation: 12 minutes      Fabiano received the treatments listed below:      therapeutic exercises to develop flexibility for 12 minutes including:  - sciatic nerve glides    Pt instructed in exercise in both sitting and supine position 30 sec x 5 sec hold each position.    PATIENT EDUCATION AND HOME EXERCISES     Education provided:   - Pt educated on POC  - Pt educated on HEP  - Pt educated on anatomy and physiology of current condition as it relates to signs and symptoms    Written Home Exercises Provided: yes. Exercises were reviewed and Fabiano was able to demonstrate them prior to the end of the session.  Fabiano demonstrated good  understanding of the education provided. See EMR under Patient Instructions for exercises provided during therapy sessions.    ASSESSMENT     Fabiano is a 67 y.o. male referred to outpatient Physical Therapy with a medical diagnosis of spinal stenosis with radiculopathy.  Patient presents with left sided low back pain with radicular symptoms into left leg which increase with any prolonged positions, + slump test in left leg, decreased left hip and core strength, decreased functional mobility and activity tolerance.     Patient prognosis is Good.   Patient will benefit from skilled outpatient Physical Therapy to address the deficits stated above and in the chart below, provide patient /family education, and to maximize patientt's level of independence.     Plan of care discussed with patient: Yes  Patient's spiritual, cultural and educational needs considered and patient is agreeable to the plan of care and goals as stated below:     Anticipated Barriers for therapy: RA    Medical Necessity is demonstrated by the following  History  Co-morbidities and personal factors that may impact the plan of care Co-morbidities:   Back pain, Chronic Obstructive Pulmonary Disease, or emphysema    Personal Factors:   no deficits     low    Examination  Body Structures and Functions, activity limitations and participation restrictions that may impact the plan of care Body Regions:   back  lower extremities  trunk    Body Systems:    strength  gross coordinated movement  balance  transfers  transitions  motor control    Participation Restrictions:   None noted    Activity limitations:   Learning and applying knowledge  no deficits    General Tasks and Commands  no deficits    Communication  no deficits    Mobility  lifting and carrying objects  walking    Self care  no deficits    Domestic Life  doing house work (cleaning house, washing dishes, laundry)    Interactions/Relationships  no deficits    Life Areas  no deficits    Community and Social Life  community life  recreation and leisure         low   Clinical Presentation stable and uncomplicated low   Decision Making/ Complexity Score: low     Goals:  Short Term Goals: 4 weeks   1. Pt will demo good transverse abdominal muscle contraction for improved deep abdominal strength and lumbar stability.  2. Pt will be compliant with initial HEP to maintain and progress gains made in PT.   3. Pt will demonstrate good sitting/standing posture and body mechanics for improved spine health and decreased risk of future injury.   4. Patient will report decreased low back pain without radiculopathy to </= 4/10 with prolonged walking/standing activities to increase tolerance for activities of daily living.     Long Term Goals: 8 weeks   1 Pt to demonstrate negative Slump Test in order to show decreased nerve/dural tension and radicular symptoms.  2. Patient will improve bilateral lower extremity MMT grades by >/=1/2 grade in order to improve strength for standing ADLs.    3 Pt will improve FOTO score to </= 43% limited to decrease perceived limitation with maintaining/changing body position.   4. Pt will reduce bacl pain to 2/10  without radicular symptoms in order to get back to doing regular household  chores.  5. Pt will tolerated sitting and standing positions for more than one hour to allow for improved tolerance for household chores.     PLAN   Plan of care Certification: 10/31/2022 to 12/23/2022.    Outpatient Physical Therapy 2 times weekly for 8 weeks to include the following interventions: Cervical/Lumbar Traction, Electrical Stimulation PRN, Manual Therapy, Moist Heat/ Ice, Neuromuscular Re-ed, Patient Education, Therapeutic Activities, Therapeutic Exercise, and functional dry needling PRN .     Mimi Zimmerman, PT      I CERTIFY THE NEED FOR THESE SERVICES FURNISHED UNDER THIS PLAN OF TREATMENT AND WHILE UNDER MY CARE   Physician's comments:     Physician's Signature: ___________________________________________________

## 2022-11-02 ENCOUNTER — PATIENT MESSAGE (OUTPATIENT)
Dept: PAIN MEDICINE | Facility: CLINIC | Age: 67
End: 2022-11-02
Payer: COMMERCIAL

## 2022-11-03 ENCOUNTER — PATIENT MESSAGE (OUTPATIENT)
Dept: FAMILY MEDICINE | Facility: CLINIC | Age: 67
End: 2022-11-03
Payer: COMMERCIAL

## 2022-11-03 ENCOUNTER — TELEPHONE (OUTPATIENT)
Dept: PAIN MEDICINE | Facility: CLINIC | Age: 67
End: 2022-11-03
Payer: COMMERCIAL

## 2022-11-03 DIAGNOSIS — M62.830 SPASM OF LUMBAR PARASPINOUS MUSCLE: ICD-10-CM

## 2022-11-03 DIAGNOSIS — M48.062 SPINAL STENOSIS OF LUMBAR REGION WITH NEUROGENIC CLAUDICATION: ICD-10-CM

## 2022-11-03 DIAGNOSIS — M54.16 LUMBAR RADICULOPATHY, CHRONIC: Primary | ICD-10-CM

## 2022-11-03 DIAGNOSIS — M16.11 ARTHRITIS OF RIGHT HIP: ICD-10-CM

## 2022-11-03 DIAGNOSIS — M51.36 DDD (DEGENERATIVE DISC DISEASE), LUMBAR: ICD-10-CM

## 2022-11-03 RX ORDER — IBUPROFEN 800 MG/1
800 TABLET ORAL 3 TIMES DAILY PRN
Qty: 90 TABLET | Refills: 1 | Status: ON HOLD | OUTPATIENT
Start: 2022-11-03 | End: 2023-05-11 | Stop reason: HOSPADM

## 2022-11-03 NOTE — TELEPHONE ENCOUNTER
----- Message from GENNARO Goldberg sent at 11/3/2022  9:40 AM CDT -----  Regarding: MRI schedule  Please call this patient and schedule him for his lumbar MRI once he gets this done will consult Neurosurgery for an appointment.  Please let him know he will need to have the MRI done prior to his neurosurgery consult.    Ty    CL

## 2022-11-04 RX ORDER — TRAMADOL HYDROCHLORIDE 50 MG/1
50 TABLET ORAL 2 TIMES DAILY
Qty: 28 TABLET | Refills: 0 | Status: SHIPPED | OUTPATIENT
Start: 2022-11-04 | End: 2022-11-18

## 2022-11-05 ENCOUNTER — HOSPITAL ENCOUNTER (EMERGENCY)
Facility: HOSPITAL | Age: 67
Discharge: HOME | End: 2022-11-05
Attending: STUDENT IN AN ORGANIZED HEALTH CARE EDUCATION/TRAINING PROGRAM
Payer: COMMERCIAL

## 2022-11-05 VITALS
HEART RATE: 96 BPM | DIASTOLIC BLOOD PRESSURE: 78 MMHG | OXYGEN SATURATION: 98 % | SYSTOLIC BLOOD PRESSURE: 108 MMHG | RESPIRATION RATE: 18 BRPM | TEMPERATURE: 99 F

## 2022-11-05 DIAGNOSIS — S39.012A LUMBAR STRAIN, INITIAL ENCOUNTER: Primary | ICD-10-CM

## 2022-11-05 DIAGNOSIS — M54.42 ACUTE MIDLINE LOW BACK PAIN WITH LEFT-SIDED SCIATICA: ICD-10-CM

## 2022-11-05 LAB
BILIRUB UR QL STRIP: NEGATIVE
CLARITY UR: CLEAR
COLOR UR: YELLOW
GLUCOSE UR QL STRIP: NEGATIVE
HGB UR QL STRIP: ABNORMAL
KETONES UR QL STRIP: ABNORMAL
LEUKOCYTE ESTERASE UR QL STRIP: NEGATIVE
NITRITE UR QL STRIP: NEGATIVE
PH UR STRIP: 5 [PH] (ref 5–8)
PROT UR QL STRIP: NEGATIVE
SP GR UR STRIP: 1.02 (ref 1–1.03)
URN SPEC COLLECT METH UR: ABNORMAL
UROBILINOGEN UR STRIP-ACNC: NEGATIVE EU/DL

## 2022-11-05 PROCEDURE — 81003 URINALYSIS AUTO W/O SCOPE: CPT | Performed by: NURSE PRACTITIONER

## 2022-11-05 PROCEDURE — 25000003 PHARM REV CODE 250: Performed by: NURSE PRACTITIONER

## 2022-11-05 PROCEDURE — 63600175 PHARM REV CODE 636 W HCPCS: Performed by: NURSE PRACTITIONER

## 2022-11-05 PROCEDURE — 96372 THER/PROPH/DIAG INJ SC/IM: CPT | Performed by: NURSE PRACTITIONER

## 2022-11-05 PROCEDURE — 99285 EMERGENCY DEPT VISIT HI MDM: CPT | Mod: 25

## 2022-11-05 PROCEDURE — 99284 EMERGENCY DEPT VISIT MOD MDM: CPT | Mod: 25

## 2022-11-05 RX ORDER — LIDOCAINE 50 MG/G
1 PATCH TOPICAL
Status: DISCONTINUED | OUTPATIENT
Start: 2022-11-05 | End: 2022-11-05 | Stop reason: HOSPADM

## 2022-11-05 RX ORDER — KETOROLAC TROMETHAMINE 30 MG/ML
30 INJECTION, SOLUTION INTRAMUSCULAR; INTRAVENOUS
Status: COMPLETED | OUTPATIENT
Start: 2022-11-05 | End: 2022-11-05

## 2022-11-05 RX ORDER — METHOCARBAMOL 750 MG/1
750 TABLET, FILM COATED ORAL 3 TIMES DAILY
Qty: 15 TABLET | Refills: 0 | Status: SHIPPED | OUTPATIENT
Start: 2022-11-05 | End: 2022-11-10

## 2022-11-05 RX ORDER — METHOCARBAMOL 500 MG/1
500 TABLET, FILM COATED ORAL
Status: COMPLETED | OUTPATIENT
Start: 2022-11-05 | End: 2022-11-05

## 2022-11-05 RX ORDER — LIDOCAINE 50 MG/G
1 PATCH TOPICAL DAILY
Qty: 7 PATCH | Refills: 0 | Status: SHIPPED | OUTPATIENT
Start: 2022-11-05 | End: 2022-11-12

## 2022-11-05 RX ADMIN — KETOROLAC TROMETHAMINE 30 MG: 30 INJECTION, SOLUTION INTRAMUSCULAR; INTRAVENOUS at 05:11

## 2022-11-05 RX ADMIN — METHOCARBAMOL 500 MG: 500 TABLET ORAL at 05:11

## 2022-11-05 NOTE — ED NOTES
APPEARANCE: Alert, oriented and in no acute distress.  CARDIAC: Normal rate and rhythm, no murmur heard.   PERIPHERAL VASCULAR: peripheral pulses present. Normal cap refill. No edema. Warm to touch.    RESPIRATORY:Normal rate and effort, breath sounds clear bilaterally throughout chest. Respirations are equal and unlabored no obvious signs of distress.  GASTRO: soft, bowel sounds normal, no tenderness, no abdominal distention.  GI: denies any burning or difficulty with urination.   MUSC: Full ROM. Tenderness noted to lower back. No obvious deformity. Complains of pain to lower back for several weeks and has MRI scheduled for the 17th of this month for back pain. Reports prior to pain increasing he was ambulating but is unable to ambulate much now due to increase in pain   SKIN: Skin is warm and dry, normal skin turgor, mucous membranes moist.  NEURO: 5/5 strength major flexors/extensors bilaterally. Sensory intact to light touch bilaterally. Valencia coma scale: eyes open spontaneously-4, oriented & converses-5, obeys commands-6. No neurological abnormalities.   MENTAL STATUS: awake, alert and aware of environment.

## 2022-11-05 NOTE — ED PROVIDER NOTES
"Encounter Date: 11/5/2022    SCRIBE #1 NOTE: I, Phani Lee, am scribing for, and in the presence of,  Mamie Weinberg NP. I have scribed the following portions of the note - Other sections scribed: HPI, ROS, PE, MDM.     History     Chief Complaint   Patient presents with    Back Pain     Pt presents to ED today lower back pain and left leg denney x several weeks pt denies injury or trauma pt reports pain unrelieved by advil      The patient is a 67 year old male presenting to the ED with complaint of intermittent lower back pain that radiates down his left leg and foot, onset 1 year ago. Patient describes the pain as it "feels sore". He states his leg "goes numb" sometimes. Patient reports taking Tylenol and Advil with no relief. Patient denies history of similar symptoms. Patient denies urine problem, abdominal pain, rash, fever, neck stiffness, chest pain or SOB. No other complaint at this time.    History was provided by the patient.      Back Pain  Associated symptoms include numbness (left leg). Pertinent negatives include no abdominal pain, chest pain, dysuria, fever or headaches.   Review of patient's allergies indicates:   Allergen Reactions    Plaquenil [hydroxychloroquine]      Lightheaded and muscle aches     Past Medical History:   Diagnosis Date    Hyperlipidemia     NU (obstructive sleep apnea)     Pulmonary fibrosis     Rheumatoid arthritis     SCC (squamous cell carcinoma) 07/2020    SCC right medial canthus    SCC (squamous cell carcinoma) 11/2021    mid lower neck     SCC (squamous cell carcinoma) 01/2022    right lateral cheek- in situ    Squamous cell carcinoma 07/2019    SCC in situ left temple     Past Surgical History:   Procedure Laterality Date    COLONOSCOPY N/A 7/25/2017    Procedure: COLONOSCOPY;  Surgeon: Bill Nicole MD;  Location: [facility] ([address] [place] [address]);  Service: Endoscopy;  Laterality: N/A;    EPIDURAL STEROID INJECTION INTO LUMBAR SPINE N/A 7/29/2021    Procedure: " Injection-steroid-epidural-lumbar L5-S1;  Surgeon: Shiv Vernon Jr., MD;  Location: [facility];  Service: Pain Management;  Laterality: N/A;  oral sedation   no pacemaker     NO PAST SURGERIES      TRANSFORAMINAL EPIDURAL INJECTION OF STEROID Right 6/29/2021    Procedure: Injection,steroid,epidural,transforaminal approach-RIGHT-- L4-5, L5-S1-- (IV Sedation);  Surgeon: hSiv Vernon Jr., MD;  Location: [facility];  Service: Pain Management;  Laterality: Right;    TRANSFORAMINAL EPIDURAL INJECTION OF STEROID Left 10/27/2022    Procedure: Injection,steroid,epidural,transforaminal left L4-5 and L5-S1;  Surgeon: Shiv Vernon Jr., MD;  Location: [facility];  Service: Pain Management;  Laterality: Left;     Family History   Problem Relation Age of Onset    Heart failure Mother         60s    Coronary artery disease Father         70s    Cancer Paternal Uncle         lymphoma??    Diabetes Neg Hx     Melanoma Neg Hx      Social History     Tobacco Use    Smoking status: Former     Types: Cigarettes     Quit date: 7/12/2012     Years since quitting: 10.3    Smokeless tobacco: Never   Substance Use Topics    Alcohol use: Yes     Comment: occasional    Drug use: Never     Review of Systems   Constitutional:  Negative for chills and fever.   HENT:  Negative for sore throat.    Eyes:  Negative for redness.   Respiratory:  Negative for shortness of breath.    Cardiovascular:  Negative for chest pain.   Gastrointestinal:  Negative for abdominal pain, diarrhea, nausea and vomiting.   Genitourinary:  Negative for dysuria and hematuria.   Musculoskeletal:  Positive for back pain. Negative for neck stiffness.   Skin:  Negative for rash.   Neurological:  Positive for numbness (left leg). Negative for headaches.     Physical Exam     Initial Vitals   BP Pulse Resp Temp SpO2   11/05/22 1528 11/05/22 1528 11/05/22 1528 11/05/22 1529 11/05/22 1528   (!) 146/87 (!) 128 18 99.9 °F (37.7 °C) 95 %      MAP       --                 Physical Exam    Nursing note and vitals reviewed.  Constitutional: He appears well-developed and well-nourished.   HENT:   Head: Normocephalic and atraumatic.   Mouth/Throat: Oropharynx is clear and moist.   Eyes: EOM are normal. Pupils are equal, round, and reactive to light.   Neck: No tracheal deviation present.   Cardiovascular:  Normal rate and regular rhythm.           Pulmonary/Chest: Breath sounds normal. No stridor. No respiratory distress.   Abdominal: Abdomen is soft. He exhibits no distension and no mass. There is no abdominal tenderness.   Genitourinary:    Genitourinary Comments: No testicle pain or swelling.     Musculoskeletal:         General: No edema. Normal range of motion.      Comments: Mild paraspinous tenderness to the lower lumbar.      Neurological: He is alert and oriented to person, place, and time. No cranial nerve deficit or sensory deficit.   Skin: Skin is warm and dry. Capillary refill takes less than 2 seconds. No rash noted.   Psychiatric: He has a normal mood and affect. His behavior is normal. Thought content normal.       ED Course   Procedures  Labs Reviewed   URINALYSIS, REFLEX TO URINE CULTURE - Abnormal; Notable for the following components:       Result Value    Ketones, UA 1+ (*)     Occult Blood UA Trace (*)     All other components within normal limits    Narrative:     Specimen Source->Urine          Imaging Results              CT Lumbar Spine Without Contrast (Final result)  Result time 11/05/22 18:27:33      Final result by Theodore Dalal DO (11/05/22 18:27:33)                   Impression:      Multilevel degenerative changes of the lumbar spine as detailed above.  Findings have progressed at the level of L3-L4 where there is now moderate spinal canal stenosis.    Grade 1 anterolisthesis of L4 on L5 with at least moderate spinal canal stenosis and moderate bilateral neural foraminal narrowing.      Electronically signed by: Theodore  Katlin  Date:    11/05/2022  Time:    18:27               Narrative:    EXAMINATION:  CT LUMBAR SPINE WITHOUT CONTRAST    CLINICAL HISTORY:  Lumbar radiculopathy, symptoms persist with conservative treatment;    TECHNIQUE:  Axial, sagittal, and coronal reformations are obtained through the lumbar spine without intravenous contrast.    COMPARISON:  MRI lumbar spine from 05/28/2021.    FINDINGS:  Alignment: There is grade 1 anterolisthesis of L4 on L5.  Alignment otherwise normal.    Vertebrae: There is no acute fracture or subluxation of the lumbar spine. Vertebral body heights are maintained. There is no aggressive osseous lesion.    Discs: There is mild disc height loss at L4-L5.  There is vacuum disc phenomenon at L3-L4 and L4-L5.  Remaining discs are unremarkable.    Degenerative changes: There are multilevel degenerative changes.  At L3-L4 there is a circumferential disc bulge with bilateral facet arthropathy and ligamentum flavum hypertrophy resulting in at least moderate spinal canal stenosis, apparently progressed in comparison with MRI from 05/28/2021.  At L4-L5 there is anterolisthesis with a circumferential disc bulge, bilateral ligamentum flavum hypertrophy, and bilateral facet arthropathy resulting in at least moderate spinal canal stenosis, similar in comparison with prior.  At L3-L4 there is moderate left and mild right neural foraminal narrowing.  At L4-L5 there is moderate bilateral neural foraminal narrowing.  Remaining levels demonstrate no evidence of high-grade spinal canal stenosis or neural foraminal narrowing    Miscellaneous: There are vascular calcifications.  Partially imaged portions of the lung bases demonstrate questionable patchy opacities.                                       Medications   LIDOcaine 5 % patch 1 patch (1 patch Transdermal Not Given 11/5/22 1700)   methocarbamoL tablet 500 mg (500 mg Oral Given 11/5/22 1705)   ketorolac injection 30 mg (30 mg Intramuscular Given 11/5/22  1705)     Medical Decision Making:   Initial Assessment:   The patient is a 67 year old male presenting to the ED with complaint of intermittent lower back pain that radiates down his left leg and foot, onset 1 year ago.   Differential Diagnosis:   Differential Diagnosis includes, but is not limited to:  Cauda equina syndrome, diskitis/osteomyelitis, epidural/paraspinal abscess, AAA, aortic dissection, post-op/hardware infection, trauma/vertebral fracture, spinal cord injury, disc herniation, spinal stenosis, sciatica, radiculopathy, neoplasm, lumbar muscle strain, muscle spasm, neuropathic pain, UTI/pyelonephritis, nephrolithiasis.    Clinical Tests:   Lab Tests: Ordered and Reviewed  Radiological Study: Ordered and Reviewed        Scribe Attestation:   Scribe #1: I performed the above scribed service and the documentation accurately describes the services I performed. I attest to the accuracy of the note.      ED Course as of 11/05/22 1911   Sat Nov 05, 2022 1819 CT pending.  [DT]   1839 Dr Aguilar messaged concerning CT findings.  [DT]   1910 Spoke with Dr Aguilar who will see pt in clinic on Monday. Pt reports improvement of pain after meds given.  [DT]      ED Course User Index  [DT] Mamie Weinberg NP                 Clinical Impression:   Final diagnoses:  [S39.012A] Lumbar strain, initial encounter (Primary)  [M54.42] Acute midline low back pain with left-sided sciatica      ED Disposition Condition    Discharge Stable        I, Mamie Weinberg NP, personally performed the services described in this documentation.All medical record entries made by the scribe were at my direction and in my presence.I have reviewed the chart and agree that the record reflects my personal performance and is accurate and complete.     ED Prescriptions       Medication Sig Dispense Start Date End Date Auth. Provider    methocarbamoL (ROBAXIN) 750 MG Tab Take 1 tablet (750 mg total) by mouth 3 (three) times daily. for 5 days 15  tablet 11/5/2022 11/10/2022 Mamie Weinberg NP    LIDOcaine (LIDODERM) 5 % Place 1 patch onto the skin once daily. Remove & Discard patch within 12 hours or as directed by MD for 7 days 7 patch 11/5/2022 11/12/2022 Mamie Weinberg NP          Follow-up Information       Follow up With Specialties Details Why Contact Info    August Aguilar MD Neurosurgery Schedule an appointment as soon as possible for a visit  Appmt scheduled for Monday, they will call with appmt time. [address] [facility]  [address]  [facility] [address]  [phone]               Mamie Weinberg NP  11/05/22 1919

## 2022-11-06 NOTE — ED NOTES
Reviewed discharge instructions and Rx with pt.  Educated on appt Monday and follow up with Neurosurgery.   Pt verbalized understanding  VS stable at time of discharge.

## 2022-11-06 NOTE — DISCHARGE INSTRUCTIONS

## 2022-11-07 ENCOUNTER — SPECIALTY PHARMACY (OUTPATIENT)
Dept: PHARMACY | Facility: CLINIC | Age: 67
End: 2022-11-07
Payer: COMMERCIAL

## 2022-11-07 ENCOUNTER — TELEPHONE (OUTPATIENT)
Dept: NEUROSURGERY | Facility: CLINIC | Age: 67
End: 2022-11-07
Payer: COMMERCIAL

## 2022-11-07 ENCOUNTER — PATIENT MESSAGE (OUTPATIENT)
Dept: NEUROSURGERY | Facility: CLINIC | Age: 67
End: 2022-11-07
Payer: COMMERCIAL

## 2022-11-07 DIAGNOSIS — M47.816 LUMBAR SPONDYLOSIS: Primary | ICD-10-CM

## 2022-11-08 ENCOUNTER — PATIENT MESSAGE (OUTPATIENT)
Dept: DERMATOLOGY | Facility: CLINIC | Age: 67
End: 2022-11-08
Payer: COMMERCIAL

## 2022-11-08 NOTE — TELEPHONE ENCOUNTER
Pt confirmed he has experienced > 20% improvement in joint pain since being on Rinvoq.  Pt said his improvement is >50%.      Rinvoq PA submitted via CMM   [id number]

## 2022-11-09 ENCOUNTER — PATIENT MESSAGE (OUTPATIENT)
Dept: FAMILY MEDICINE | Facility: CLINIC | Age: 67
End: 2022-11-09
Payer: COMMERCIAL

## 2022-11-09 NOTE — TELEPHONE ENCOUNTER
Called patient. Spoke to his wife who said that he has had a couple of falls, has intermittent disorientation lately, and pain. Scheduled appointment.

## 2022-11-10 ENCOUNTER — TELEPHONE (OUTPATIENT)
Dept: NEUROSURGERY | Facility: CLINIC | Age: 67
End: 2022-11-10
Payer: COMMERCIAL

## 2022-11-10 ENCOUNTER — OFFICE VISIT (OUTPATIENT)
Dept: FAMILY MEDICINE | Facility: CLINIC | Age: 67
End: 2022-11-10
Payer: COMMERCIAL

## 2022-11-10 VITALS
DIASTOLIC BLOOD PRESSURE: 78 MMHG | OXYGEN SATURATION: 95 % | BODY MASS INDEX: 25.75 KG/M2 | SYSTOLIC BLOOD PRESSURE: 114 MMHG | HEART RATE: 82 BPM | HEIGHT: 66 IN | TEMPERATURE: 98 F | WEIGHT: 160.25 LBS

## 2022-11-10 DIAGNOSIS — M06.9 RHEUMATOID ARTHRITIS, INVOLVING UNSPECIFIED SITE, UNSPECIFIED WHETHER RHEUMATOID FACTOR PRESENT: ICD-10-CM

## 2022-11-10 DIAGNOSIS — J84.10 PULMONARY FIBROSIS: ICD-10-CM

## 2022-11-10 DIAGNOSIS — E78.5 HYPERLIPIDEMIA, UNSPECIFIED HYPERLIPIDEMIA TYPE: ICD-10-CM

## 2022-11-10 DIAGNOSIS — M54.16 LUMBAR RADICULOPATHY: ICD-10-CM

## 2022-11-10 DIAGNOSIS — Z79.899 OTHER LONG TERM (CURRENT) DRUG THERAPY: ICD-10-CM

## 2022-11-10 DIAGNOSIS — R41.82 ALTERED MENTAL STATUS, UNSPECIFIED ALTERED MENTAL STATUS TYPE: Primary | ICD-10-CM

## 2022-11-10 DIAGNOSIS — D64.9 ANEMIA, UNSPECIFIED TYPE: ICD-10-CM

## 2022-11-10 PROCEDURE — 3078F DIAST BP <80 MM HG: CPT | Mod: CPTII,S$GLB,, | Performed by: FAMILY MEDICINE

## 2022-11-10 PROCEDURE — 1101F PR PT FALLS ASSESS DOC 0-1 FALLS W/OUT INJ PAST YR: ICD-10-PCS | Mod: CPTII,S$GLB,, | Performed by: FAMILY MEDICINE

## 2022-11-10 PROCEDURE — 1125F AMNT PAIN NOTED PAIN PRSNT: CPT | Mod: CPTII,S$GLB,, | Performed by: FAMILY MEDICINE

## 2022-11-10 PROCEDURE — 99999 PR PBB SHADOW E&M-EST. PATIENT-LVL IV: ICD-10-PCS | Mod: PBBFAC,,, | Performed by: FAMILY MEDICINE

## 2022-11-10 PROCEDURE — 99999 PR PBB SHADOW E&M-EST. PATIENT-LVL IV: CPT | Mod: PBBFAC,,, | Performed by: FAMILY MEDICINE

## 2022-11-10 PROCEDURE — 3008F PR BODY MASS INDEX (BMI) DOCUMENTED: ICD-10-PCS | Mod: CPTII,S$GLB,, | Performed by: FAMILY MEDICINE

## 2022-11-10 PROCEDURE — 1125F PR PAIN SEVERITY QUANTIFIED, PAIN PRESENT: ICD-10-PCS | Mod: CPTII,S$GLB,, | Performed by: FAMILY MEDICINE

## 2022-11-10 PROCEDURE — 3008F BODY MASS INDEX DOCD: CPT | Mod: CPTII,S$GLB,, | Performed by: FAMILY MEDICINE

## 2022-11-10 PROCEDURE — 3288F FALL RISK ASSESSMENT DOCD: CPT | Mod: CPTII,S$GLB,, | Performed by: FAMILY MEDICINE

## 2022-11-10 PROCEDURE — 1101F PT FALLS ASSESS-DOCD LE1/YR: CPT | Mod: CPTII,S$GLB,, | Performed by: FAMILY MEDICINE

## 2022-11-10 PROCEDURE — 3074F SYST BP LT 130 MM HG: CPT | Mod: CPTII,S$GLB,, | Performed by: FAMILY MEDICINE

## 2022-11-10 PROCEDURE — 1159F MED LIST DOCD IN RCRD: CPT | Mod: CPTII,S$GLB,, | Performed by: FAMILY MEDICINE

## 2022-11-10 PROCEDURE — 1159F PR MEDICATION LIST DOCUMENTED IN MEDICAL RECORD: ICD-10-PCS | Mod: CPTII,S$GLB,, | Performed by: FAMILY MEDICINE

## 2022-11-10 PROCEDURE — 3074F PR MOST RECENT SYSTOLIC BLOOD PRESSURE < 130 MM HG: ICD-10-PCS | Mod: CPTII,S$GLB,, | Performed by: FAMILY MEDICINE

## 2022-11-10 PROCEDURE — 99215 PR OFFICE/OUTPT VISIT, EST, LEVL V, 40-54 MIN: ICD-10-PCS | Mod: S$GLB,,, | Performed by: FAMILY MEDICINE

## 2022-11-10 PROCEDURE — 99215 OFFICE O/P EST HI 40 MIN: CPT | Mod: S$GLB,,, | Performed by: FAMILY MEDICINE

## 2022-11-10 PROCEDURE — 3078F PR MOST RECENT DIASTOLIC BLOOD PRESSURE < 80 MM HG: ICD-10-PCS | Mod: CPTII,S$GLB,, | Performed by: FAMILY MEDICINE

## 2022-11-10 PROCEDURE — 3288F PR FALLS RISK ASSESSMENT DOCUMENTED: ICD-10-PCS | Mod: CPTII,S$GLB,, | Performed by: FAMILY MEDICINE

## 2022-11-10 NOTE — PROGRESS NOTES
(Portions of this note were dictated using voice recognition software and may contain dictation related errors in spelling/grammar/syntax not found on text review)    CC:   Chief Complaint   Patient presents with    Back Pain     Weak, disoriented, falling        HPI: 67 y.o. male    annual exam was in June of 2022.    Concerned about back pain, falls, feeling weak.  Lumbar radiculopathy,  Visit with pain management.  MRI showed multilevel degenerative change most significant L4-L5 where there is moderate to severe degree of spinal canal narrowing moderate right foraminal narrowing and mild left foraminal narrowing.  L3-L4 demonstrates right paracentral disc extrusion extending below the level of the disc plane resulting in some narrowing of the thecal sac..  Had JOSE through pain management in June and July of 2021 feels like injections didn't really help. PT helped for a while but sx came back.  .  Had repeat L4-L5 and L5-S1 JOSE but didn't help.   MRI scheduled Was subsequently referred to neuro surgery per last pain management notes.  Appointment is on November 18th.  Was given gabapentin for pain, 600 mg t.i.d., ibuprofen 800 mg as needed t.i.d..  Did receive tramadol prescription from pain management recently given continued pain symptoms.    Did end up going to ER on 11/05/2022 with back pain down left leg and foot with paresthesias.  Was given lidocaine patch, methocarbamol. CT lumbar spine shows progression at L3-L4 degenerative change with now moderate spinal canal stenosis., gradient to your listhesis of L4 on L5 with at least moderate spinal canal stenosis and moderate bilateral neural foraminal narrowing.  Urinalysis was done showing trace occult blood (chronic on ua) 1+ ketones    He was concerned because a couple of times he was a bit disoriented, wife states that he seemed a little bit strange with his demeanor and was talking out of his head.  She noticed this yesterday.  He has noticed overall  decreased appetite given all the above issues, has had some constipation as well (did take some milk of magnesia yesterday in helped) denies any chest pain or shortness of breath.  No fevers or chills.  Not sure if the above symptoms were pain related since he did have quite a bit of pain yesterday although not as severe as when he went to the ED. he has been taking tramadol once in a while but not every day.  Still takes gabapentin 600 t.i.d. and ibuprofen at 800 t.i.d..  Did not take tramadol yesterday morning when the above symptoms started.  His wife felt that he was perhaps a little dehydrated.  She push fluids, had him rest and he slept for few hours and woke up feeling better.  He feels back at his normal mental baseline at this time.  Denies any other neurological issues with    Medical history below      Hyperlipidemia: On Lipitor 40 mg daily      RA, followed by rheumatology.  Last visit from 04/21/2021. Currently on Rinvoq.   Did not tolerate Plaquenil.  Was on methotrexate but currently off due to interstitial lung disease.  CT scan from 09/24/2020.  Similar findings from March 2020.  Has a stable pulmonary nodule inferior lingula 0.5 cm.  .  Lasts followed with pulmonology on November of 2021. Had spirometry that showed some mild obstruction, started Spiriva     Sleep apnea, currently using CPAP regularly. 5-6 hours night sleep, normal for him, no daytime drowsiness.     Mild elevation A1c 5.8 last year.      SCC in situ right lateral cheek, follows with dermatology.  Was using imiquimod.  Had cryotherapy at last visit in February 2022 for AKAs     Answers for HPI/ROS submitted by the patient on 6/23/2022  activity change: No  unexpected weight change: No  neck pain: No  hearing loss: No  rhinorrhea: No  trouble swallowing: No  eye discharge: No  visual disturbance: No  chest tightness: No  wheezing: No  chest pain: No  palpitations: No  blood in stool: No  constipation: No  vomiting: No  diarrhea:  No  polydipsia: No  polyuria: No  difficulty urinating: No  urgency: No  hematuria: No  joint swelling: No  arthralgias: No  headaches: No  weakness: No  confusion: No  dysphoric mood: No        Past Medical History:   Diagnosis Date    Hyperlipidemia     NU (obstructive sleep apnea)     Pulmonary fibrosis     Rheumatoid arthritis     SCC (squamous cell carcinoma) 07/2020    SCC right medial canthus    SCC (squamous cell carcinoma) 11/2021    mid lower neck     SCC (squamous cell carcinoma) 01/2022    right lateral cheek- in situ    Squamous cell carcinoma 07/2019    SCC in situ left temple       Past Surgical History:   Procedure Laterality Date    COLONOSCOPY N/A 7/25/2017    Procedure: COLONOSCOPY;  Surgeon: Bill Nicole MD;  Location: [facility] ENDO [address]);  Service: Endoscopy;  Laterality: N/A;    EPIDURAL STEROID INJECTION INTO LUMBAR SPINE N/A 7/29/2021    Procedure: Injection-steroid-epidural-lumbar L5-S1;  Surgeon: Shiv Vernon Jr., MD;  Location: [facility] PAIN MGT;  Service: Pain Management;  Laterality: N/A;  oral sedation   no pacemaker     NO PAST SURGERIES      TRANSFORAMINAL EPIDURAL INJECTION OF STEROID Right 6/29/2021    Procedure: Injection,steroid,epidural,transforaminal approach-RIGHT-- L4-5, L5-S1-- (IV Sedation);  Surgeon: Shiv Vernon Jr., MD;  Location: [facility] PAIN [facility]GT;  Service: Pain Management;  Laterality: Right;    TRANSFORAMINAL EPIDURAL INJECTION OF STEROID Left 10/27/2022    Procedure: Injection,steroid,epidural,transforaminal left L4-5 and L5-S1;  Surgeon: Shiv Vernon Jr., MD;  Location: [facility] PAIN [facility]GT;  Service: Pain Management;  Laterality: Left;       Family History   Problem Relation Age of Onset    Heart failure Mother         60s    Coronary artery disease Father         70s    Cancer Paternal Uncle         lymphoma??    Diabetes Neg Hx     Melanoma Neg Hx        Social History     Tobacco Use    Smoking status: Former     Types: Cigarettes     Quit date: 7/12/2012      Years since quitting: 10.3    Smokeless tobacco: Never   Substance Use Topics    Alcohol use: Yes     Comment: occasional    Drug use: Never     Lab Results   Component Value Date    WBC 6.78 08/19/2022    HGB 13.5 (L) 08/19/2022    HCT 39.8 (L) 08/19/2022    MCV 95 08/19/2022     08/19/2022    CHOL 162 06/15/2021    TRIG 75 06/15/2021    HDL 52 06/15/2021    ALT 29 08/19/2022    AST 29 08/19/2022    BILITOT 0.4 08/19/2022    ALKPHOS 47 (L) 08/19/2022     08/19/2022    K 4.2 08/19/2022     08/19/2022    CREATININE 0.9 08/19/2022    CALCIUM 9.2 08/19/2022    ALBUMIN 3.8 08/19/2022    BUN 20 08/19/2022    CO2 23 08/19/2022    TSH 1.197 06/15/2021    PSA 0.55 06/15/2021    HGBA1C 5.8 (H) 06/15/2021    LDLCALC 95.0 06/15/2021    GLU 81 08/19/2022       Had wellness lipid screen through [facility] in 3/2022 which was normal.     Vital signs reviewed  PE:   APPEARANCE: Well nourished, well developed, in no acute distress.    HEAD: Normocephalic, atraumatic.  EYES: PERRL. EOMI.   Conjunctivae noninjected.  EARS: TM's intact. Light reflex normal. No retraction or perforation  NOSE: Mucosa pink. Airway clear.  MOUTH & THROAT: No tonsillar enlargement. No pharyngeal erythema or exudate.   NECK: Supple with no cervical lymphadenopathy.  No carotid bruits.  No thyromegaly  CHEST: Good inspiratory effort. Fine bibasilar crackles.   CARDIOVASCULAR: Normal S1, S2. No rubs, murmurs, or gallops.  ABDOMEN: Bowel sounds normal. Not distended. Soft. No tenderness or masses. No organomegaly.  EXTREMITIES: No edema    Neuro:  Cranial nerves intact.  Normal motor 5/5 all extremities, symmetric.  Antalgic gait secondary to his back and leg pain.  Negative Romberg testing.  No pronator drift.  2+ reflexes knee ankle biceps brachioradialis bilaterally.    IMPRESSION  1. Altered mental status, unspecified altered mental status type    2. Rheumatoid arthritis, involving unspecified site, unspecified whether rheumatoid factor  present    3. Hyperlipidemia, unspecified hyperlipidemia type    4. Pulmonary fibrosis    5. Lumbar radiculopathy    6. Anemia, unspecified type    7. Other long term (current) drug therapy            PLAN  Orders Placed This Encounter   Procedures    CULTURE, URINE    Comprehensive Metabolic Panel    Lipid Panel    TSH    Urinalysis    CBC Auto Differential    Iron and TIBC    Ferritin    Folate    Vitamin B12    Reticulocytes     Given episode of feeling disoriented, will recheck labs above.  Discussed several potential etiologies for delirium including pain, medication including gabapentin, decreased p.o. intake and some dehydration.   If symptoms continue despite normal labs above, could consider brain MRI    Blood pressure stable at this time     Follow-up with planned MRI and neuro surgery visit for his low back pain and radiculopathy symptoms    Total time spent including face-to-face time and chart time:  44 min

## 2022-11-10 NOTE — TELEPHONE ENCOUNTER
"Case ID: [id number]  The request has been approved. The authorization is effective for a maximum of 12 fills from 11/09/2022 to 11/08/2023    Copay card rejecting "PLE annual benefit cap has been reached."  Recommended for pt to call [employer] to obtain override/ more funding for copay card.   [phone] ([phone])  Pt will call back once this is resolved.         9/8 skin pathology report resulted as squamous cell carcinoma and he has not had a f/u apt to confirm successful treatment yet (was advised to use Aldara x 4 weeks).  Dermatology confirmed pt ok to continue Rinvoq.   "

## 2022-11-12 ENCOUNTER — LAB VISIT (OUTPATIENT)
Dept: LAB | Facility: HOSPITAL | Age: 67
End: 2022-11-12
Attending: FAMILY MEDICINE
Payer: COMMERCIAL

## 2022-11-12 DIAGNOSIS — M06.9 RHEUMATOID ARTHRITIS, INVOLVING UNSPECIFIED SITE, UNSPECIFIED WHETHER RHEUMATOID FACTOR PRESENT: ICD-10-CM

## 2022-11-12 DIAGNOSIS — E78.5 HYPERLIPIDEMIA, UNSPECIFIED HYPERLIPIDEMIA TYPE: ICD-10-CM

## 2022-11-12 DIAGNOSIS — M54.16 LUMBAR RADICULOPATHY: ICD-10-CM

## 2022-11-12 DIAGNOSIS — D64.9 ANEMIA, UNSPECIFIED TYPE: ICD-10-CM

## 2022-11-12 DIAGNOSIS — J84.10 PULMONARY FIBROSIS: ICD-10-CM

## 2022-11-12 DIAGNOSIS — Z79.899 OTHER LONG TERM (CURRENT) DRUG THERAPY: ICD-10-CM

## 2022-11-12 DIAGNOSIS — R41.82 ALTERED MENTAL STATUS, UNSPECIFIED ALTERED MENTAL STATUS TYPE: ICD-10-CM

## 2022-11-12 LAB
ALBUMIN SERPL BCP-MCNC: 3 G/DL (ref 3.5–5.2)
ALP SERPL-CCNC: 69 U/L (ref 55–135)
ALT SERPL W/O P-5'-P-CCNC: 41 U/L (ref 10–44)
ANION GAP SERPL CALC-SCNC: 16 MMOL/L (ref 8–16)
AST SERPL-CCNC: 29 U/L (ref 10–40)
BASOPHILS # BLD AUTO: 0.05 K/UL (ref 0–0.2)
BASOPHILS NFR BLD: 0.5 % (ref 0–1.9)
BILIRUB SERPL-MCNC: 0.5 MG/DL (ref 0.1–1)
BILIRUB UR QL STRIP: NEGATIVE
BUN SERPL-MCNC: 17 MG/DL (ref 8–23)
CALCIUM SERPL-MCNC: 10 MG/DL (ref 8.7–10.5)
CHLORIDE SERPL-SCNC: 103 MMOL/L (ref 95–110)
CHOLEST SERPL-MCNC: 139 MG/DL (ref 120–199)
CHOLEST/HDLC SERPL: 4 {RATIO} (ref 2–5)
CLARITY UR: CLEAR
CO2 SERPL-SCNC: 19 MMOL/L (ref 23–29)
COLOR UR: YELLOW
CREAT SERPL-MCNC: 0.9 MG/DL (ref 0.5–1.4)
DIFFERENTIAL METHOD: ABNORMAL
EOSINOPHIL # BLD AUTO: 0.3 K/UL (ref 0–0.5)
EOSINOPHIL NFR BLD: 2.8 % (ref 0–8)
ERYTHROCYTE [DISTWIDTH] IN BLOOD BY AUTOMATED COUNT: 13.3 % (ref 11.5–14.5)
EST. GFR  (NO RACE VARIABLE): >60 ML/MIN/1.73 M^2
FERRITIN SERPL-MCNC: 438 NG/ML (ref 20–300)
FOLATE SERPL-MCNC: 15.4 NG/ML (ref 4–24)
GLUCOSE SERPL-MCNC: 102 MG/DL (ref 70–110)
GLUCOSE UR QL STRIP: NEGATIVE
HCT VFR BLD AUTO: 39.2 % (ref 40–54)
HDLC SERPL-MCNC: 35 MG/DL (ref 40–75)
HDLC SERPL: 25.2 % (ref 20–50)
HGB BLD-MCNC: 13 G/DL (ref 14–18)
HGB UR QL STRIP: NEGATIVE
IMM GRANULOCYTES # BLD AUTO: 0.08 K/UL (ref 0–0.04)
IMM GRANULOCYTES NFR BLD AUTO: 0.7 % (ref 0–0.5)
IRON SERPL-MCNC: 41 UG/DL (ref 45–160)
KETONES UR QL STRIP: NEGATIVE
LDLC SERPL CALC-MCNC: 91.2 MG/DL (ref 63–159)
LEUKOCYTE ESTERASE UR QL STRIP: NEGATIVE
LYMPHOCYTES # BLD AUTO: 1.6 K/UL (ref 1–4.8)
LYMPHOCYTES NFR BLD: 14 % (ref 18–48)
MCH RBC QN AUTO: 31.6 PG (ref 27–31)
MCHC RBC AUTO-ENTMCNC: 33.2 G/DL (ref 32–36)
MCV RBC AUTO: 95 FL (ref 82–98)
MONOCYTES # BLD AUTO: 1.1 K/UL (ref 0.3–1)
MONOCYTES NFR BLD: 9.6 % (ref 4–15)
NEUTROPHILS # BLD AUTO: 8 K/UL (ref 1.8–7.7)
NEUTROPHILS NFR BLD: 72.4 % (ref 38–73)
NITRITE UR QL STRIP: NEGATIVE
NONHDLC SERPL-MCNC: 104 MG/DL
NRBC BLD-RTO: 0 /100 WBC
PH UR STRIP: 6 [PH] (ref 5–8)
PLATELET # BLD AUTO: 224 K/UL (ref 150–450)
PMV BLD AUTO: 11.2 FL (ref 9.2–12.9)
POTASSIUM SERPL-SCNC: 4.1 MMOL/L (ref 3.5–5.1)
PROT SERPL-MCNC: 7.9 G/DL (ref 6–8.4)
PROT UR QL STRIP: NEGATIVE
RBC # BLD AUTO: 4.11 M/UL (ref 4.6–6.2)
RETICS/RBC NFR AUTO: 1.4 % (ref 0.4–2)
SATURATED IRON: 13 % (ref 20–50)
SODIUM SERPL-SCNC: 138 MMOL/L (ref 136–145)
SP GR UR STRIP: 1.01 (ref 1–1.03)
TOTAL IRON BINDING CAPACITY: 324 UG/DL (ref 250–450)
TRANSFERRIN SERPL-MCNC: 219 MG/DL (ref 200–375)
TRIGL SERPL-MCNC: 64 MG/DL (ref 30–150)
TSH SERPL DL<=0.005 MIU/L-ACNC: 0.64 UIU/ML (ref 0.4–4)
URN SPEC COLLECT METH UR: NORMAL
UROBILINOGEN UR STRIP-ACNC: NEGATIVE EU/DL
VIT B12 SERPL-MCNC: 582 PG/ML (ref 210–950)
WBC # BLD AUTO: 11.04 K/UL (ref 3.9–12.7)

## 2022-11-12 PROCEDURE — 84443 ASSAY THYROID STIM HORMONE: CPT | Performed by: FAMILY MEDICINE

## 2022-11-12 PROCEDURE — 81003 URINALYSIS AUTO W/O SCOPE: CPT | Performed by: FAMILY MEDICINE

## 2022-11-12 PROCEDURE — 85025 COMPLETE CBC W/AUTO DIFF WBC: CPT | Performed by: FAMILY MEDICINE

## 2022-11-12 PROCEDURE — 36415 COLL VENOUS BLD VENIPUNCTURE: CPT | Performed by: FAMILY MEDICINE

## 2022-11-12 PROCEDURE — 80053 COMPREHEN METABOLIC PANEL: CPT | Performed by: FAMILY MEDICINE

## 2022-11-12 PROCEDURE — 82728 ASSAY OF FERRITIN: CPT | Performed by: FAMILY MEDICINE

## 2022-11-12 PROCEDURE — 82746 ASSAY OF FOLIC ACID SERUM: CPT | Performed by: FAMILY MEDICINE

## 2022-11-12 PROCEDURE — 80061 LIPID PANEL: CPT | Performed by: FAMILY MEDICINE

## 2022-11-12 PROCEDURE — 82607 VITAMIN B-12: CPT | Performed by: FAMILY MEDICINE

## 2022-11-12 PROCEDURE — 85045 AUTOMATED RETICULOCYTE COUNT: CPT | Performed by: FAMILY MEDICINE

## 2022-11-12 PROCEDURE — 84466 ASSAY OF TRANSFERRIN: CPT | Performed by: FAMILY MEDICINE

## 2022-11-15 ENCOUNTER — TELEPHONE (OUTPATIENT)
Dept: PAIN MEDICINE | Facility: CLINIC | Age: 67
End: 2022-11-15
Payer: COMMERCIAL

## 2022-11-15 DIAGNOSIS — M48.062 SPINAL STENOSIS OF LUMBAR REGION WITH NEUROGENIC CLAUDICATION: Primary | ICD-10-CM

## 2022-11-15 DIAGNOSIS — M54.16 LUMBAR RADICULOPATHY: ICD-10-CM

## 2022-11-15 DIAGNOSIS — M54.16 LUMBAR RADICULOPATHY, CHRONIC: ICD-10-CM

## 2022-11-15 NOTE — TELEPHONE ENCOUNTER
Discussed recent Lumbar MRI and results lumbar MRI results explaining them in layman's terms.  Patient verbalized understanding of these results he has a scheduled appointment with Neurosurgery/Dr. Aguilar tomorrow for further discussion of his lumbar MRI and possible surgical interventions that would be warranted based on his pathology.      Joseph Cummins, NP-C  Interventional Pain Management

## 2022-11-15 NOTE — TELEPHONE ENCOUNTER
Outgoing call: Patient still haven't gotten the copayment card resolved. Patient state he spoke with [employer] and they said it was something regarding his primary insurance. Patient would like to speak with someone who can help him through this. Transferring to [facility].

## 2022-11-15 NOTE — TELEPHONE ENCOUNTER
Conducted a 4-way call with pt, Roxanne, and Leandro to resolve Rinvoq copay card rejection. Rep Lion from [employer] stated they will reach out to [employer] with a PA override code by tomorrow afternoon that will allow the Rinvoq copay card to be utilized. Will monitor.

## 2022-11-15 NOTE — TELEPHONE ENCOUNTER
Spoke with - they confirmed the limit has been reached on the copay.  Then they suggested to pay full price and then do a rebate on [email or web address].  Pt is concerned as he feels like this sounds kind of sketchy.      Advised pt I will check with supervisor on the best course of action and get back to him.

## 2022-11-16 ENCOUNTER — TELEPHONE (OUTPATIENT)
Dept: REHABILITATION | Facility: HOSPITAL | Age: 67
End: 2022-11-16
Payer: COMMERCIAL

## 2022-11-16 ENCOUNTER — SPECIALTY PHARMACY (OUTPATIENT)
Dept: PHARMACY | Facility: CLINIC | Age: 67
End: 2022-11-16
Payer: COMMERCIAL

## 2022-11-16 ENCOUNTER — HOSPITAL ENCOUNTER (OUTPATIENT)
Dept: RADIOLOGY | Facility: HOSPITAL | Age: 67
Discharge: HOME | End: 2022-11-16
Attending: NEUROLOGICAL SURGERY
Payer: COMMERCIAL

## 2022-11-16 ENCOUNTER — OFFICE VISIT (OUTPATIENT)
Dept: NEUROSURGERY | Facility: CLINIC | Age: 67
End: 2022-11-16
Payer: COMMERCIAL

## 2022-11-16 VITALS — BODY MASS INDEX: 28.13 KG/M2 | HEIGHT: 63 IN | WEIGHT: 158.75 LBS

## 2022-11-16 DIAGNOSIS — M43.16 SPONDYLOLISTHESIS AT L4-L5 LEVEL: Primary | ICD-10-CM

## 2022-11-16 DIAGNOSIS — M54.16 LUMBAR RADICULOPATHY, CHRONIC: ICD-10-CM

## 2022-11-16 DIAGNOSIS — M54.42 ACUTE MIDLINE LOW BACK PAIN WITH LEFT-SIDED SCIATICA: ICD-10-CM

## 2022-11-16 DIAGNOSIS — M43.16 SPONDYLOLISTHESIS AT L4-L5 LEVEL: ICD-10-CM

## 2022-11-16 DIAGNOSIS — M48.062 SPINAL STENOSIS OF LUMBAR REGION WITH NEUROGENIC CLAUDICATION: ICD-10-CM

## 2022-11-16 DIAGNOSIS — M51.36 DEGENERATIVE DISC DISEASE, LUMBAR: ICD-10-CM

## 2022-11-16 PROCEDURE — 1101F PT FALLS ASSESS-DOCD LE1/YR: CPT | Mod: CPTII,S$GLB,, | Performed by: NEUROLOGICAL SURGERY

## 2022-11-16 PROCEDURE — 99205 PR OFFICE/OUTPT VISIT, NEW, LEVL V, 60-74 MIN: ICD-10-PCS | Mod: S$GLB,,, | Performed by: NEUROLOGICAL SURGERY

## 2022-11-16 PROCEDURE — 3008F BODY MASS INDEX DOCD: CPT | Mod: CPTII,S$GLB,, | Performed by: NEUROLOGICAL SURGERY

## 2022-11-16 PROCEDURE — 99205 OFFICE O/P NEW HI 60 MIN: CPT | Mod: S$GLB,,, | Performed by: NEUROLOGICAL SURGERY

## 2022-11-16 PROCEDURE — 3288F PR FALLS RISK ASSESSMENT DOCUMENTED: ICD-10-PCS | Mod: CPTII,S$GLB,, | Performed by: NEUROLOGICAL SURGERY

## 2022-11-16 PROCEDURE — 99999 PR PBB SHADOW E&M-EST. PATIENT-LVL IV: CPT | Mod: PBBFAC,,, | Performed by: NEUROLOGICAL SURGERY

## 2022-11-16 PROCEDURE — 1125F PR PAIN SEVERITY QUANTIFIED, PAIN PRESENT: ICD-10-PCS | Mod: CPTII,S$GLB,, | Performed by: NEUROLOGICAL SURGERY

## 2022-11-16 PROCEDURE — 3008F PR BODY MASS INDEX (BMI) DOCUMENTED: ICD-10-PCS | Mod: CPTII,S$GLB,, | Performed by: NEUROLOGICAL SURGERY

## 2022-11-16 PROCEDURE — 1101F PR PT FALLS ASSESS DOC 0-1 FALLS W/OUT INJ PAST YR: ICD-10-PCS | Mod: CPTII,S$GLB,, | Performed by: NEUROLOGICAL SURGERY

## 2022-11-16 PROCEDURE — 1125F AMNT PAIN NOTED PAIN PRSNT: CPT | Mod: CPTII,S$GLB,, | Performed by: NEUROLOGICAL SURGERY

## 2022-11-16 PROCEDURE — 1159F PR MEDICATION LIST DOCUMENTED IN MEDICAL RECORD: ICD-10-PCS | Mod: CPTII,S$GLB,, | Performed by: NEUROLOGICAL SURGERY

## 2022-11-16 PROCEDURE — 3288F FALL RISK ASSESSMENT DOCD: CPT | Mod: CPTII,S$GLB,, | Performed by: NEUROLOGICAL SURGERY

## 2022-11-16 PROCEDURE — 1159F MED LIST DOCD IN RCRD: CPT | Mod: CPTII,S$GLB,, | Performed by: NEUROLOGICAL SURGERY

## 2022-11-16 PROCEDURE — 99999 PR PBB SHADOW E&M-EST. PATIENT-LVL IV: ICD-10-PCS | Mod: PBBFAC,,, | Performed by: NEUROLOGICAL SURGERY

## 2022-11-16 RX ORDER — CELECOXIB 200 MG/1
200 CAPSULE ORAL 2 TIMES DAILY
Qty: 60 CAPSULE | Refills: 2 | Status: SHIPPED | OUTPATIENT
Start: 2022-11-16 | End: 2023-02-26 | Stop reason: SDUPTHER

## 2022-11-16 NOTE — TELEPHONE ENCOUNTER
Spoke with patient regarding no showed appointment. Patient missed due to appointment with neurosurgery and he is planning on having surgery in next 2 months. He is going to contact his doctor regarding whether to continue with PT or not.

## 2022-11-16 NOTE — PROGRESS NOTES
NEUROSURGICAL OUTPATIENT CONSULTATION NOTE    DATE OF SERVICE:  2022    ATTENDING PHYSICIAN:  August Aguilar MD    CONSULT REQUESTED BY:  GENNARO Anne    REASON FOR CONSULT:  Back pain, left leg pain, difficulty walking    GENE:42%    NRS low back:5-10/10    NRS right le/10    NRS left le/10    Opioid use daily:NA    Neuropathic pain meds daily:600 mg TID    NSAIDs daily:800 mg Ibuprofen    Muscle relaxant daily:NA    Smoking:Former smoker    SUBJECTIVE:    HISTORY:  This is a 67 y.o. male who having worsening low back pain, left leg pain and difficulty walking for several years.  The back pain varies from 5 to 10/10.  Back pain is worse with activity such as standing, walking, leaning forward, lifting.  Pain is also felt in the left leg as a form of numbness below the knee in the L5 distribution.  He cannot walk more than half a mi without the need to sit due to severe back and leg pain.  Does not report motor weakness or sphincter dysfunction symptoms.  He has tried physical therapy for his lumbar condition and has received 3 epidural steroid injection with only 1 week of pain relief.  His condition his progressively getting worse.  It is affecting his quality of life and functional status.  The left L5 distribution numbness has been constant for several months.  He uses a cane due to difficulty walking.       PAST MEDICAL HISTORY:  Active Ambulatory Problems     Diagnosis Date Noted    Hyperlipidemia     Rheumatoid arthritis     Colon cancer screening 2017    Personal history of skin cancer 2017    Elevated LFTs 2019    Obstructive sleep apnea 2019    Interstitial pulmonary fibrosis     ILD (interstitial lung disease) 2020    Chronic bilateral low back pain with right-sided sciatica 2021    Decreased range of motion 2021    Muscle weakness 2021    Lumbar spondylosis 2021    Spinal stenosis of lumbar region with neurogenic claudication 2021     DDD (degenerative disc disease), lumbar 06/16/2021    Lumbar radiculopathy 06/16/2021    Chronic pain 06/29/2021    Shortness of breath     Centrilobular emphysema 11/10/2021    Decreased functional mobility 10/31/2022     Resolved Ambulatory Problems     Diagnosis Date Noted    No Resolved Ambulatory Problems     Past Medical History:   Diagnosis Date    NU (obstructive sleep apnea)     Pulmonary fibrosis     SCC (squamous cell carcinoma) 07/2020    SCC (squamous cell carcinoma) 11/2021    SCC (squamous cell carcinoma) 01/2022    Squamous cell carcinoma 07/2019       PAST SURGICAL HISTORY:  Past Surgical History:   Procedure Laterality Date    COLONOSCOPY N/A 7/25/2017    Procedure: COLONOSCOPY;  Surgeon: Bill Nicole MD;  Location: [facility] ENDO ([address] [place] [address]);  Service: Endoscopy;  Laterality: N/A;    EPIDURAL STEROID INJECTION INTO LUMBAR SPINE N/A 7/29/2021    Procedure: Injection-steroid-epidural-lumbar L5-S1;  Surgeon: Shiv Vernon Jr., MD;  Location: [facility] PAIN [facility];  Service: Pain Management;  Laterality: N/A;  oral sedation   no pacemaker     NO PAST SURGERIES      TRANSFORAMINAL EPIDURAL INJECTION OF STEROID Right 6/29/2021    Procedure: Injection,steroid,epidural,transforaminal approach-RIGHT-- L4-5, L5-S1-- (IV Sedation);  Surgeon: Shiv Vernon Jr., MD;  Location: [facility] PAIN MGT;  Service: Pain Management;  Laterality: Right;    TRANSFORAMINAL EPIDURAL INJECTION OF STEROID Left 10/27/2022    Procedure: Injection,steroid,epidural,transforaminal left L4-5 and L5-S1;  Surgeon: Shiv Vernon Jr., MD;  Location: [facility] PAIN [facility]GT;  Service: Pain Management;  Laterality: Left;       SOCIAL HISTORY:   Social History     Socioeconomic History    Marital status:    Occupational History     Employer: [employer]   Tobacco Use    Smoking status: Former     Types: Cigarettes     Quit date: 7/12/2012     Years since quitting: 10.3     Passive exposure: Never    Smokeless tobacco: Never    Substance and Sexual Activity    Alcohol use: Yes     Comment: occasional    Drug use: Never    Sexual activity: Yes     Partners: Female     Birth control/protection: None   Social History Narrative    No aerobic exercise, walks a lot    No diet                                                                                                                                                                                         Social Determinants of Health     Financial Resource Strain: Low Risk     Difficulty of Paying Living Expenses: Not hard at all   Food Insecurity: No Food Insecurity    Worried About Running Out of Food in the Last Year: Never true    Ran Out of Food in the Last Year: Never true   Transportation Needs: No Transportation Needs    Lack of Transportation (Medical): No    Lack of Transportation (Non-Medical): No   Physical Activity: Inactive    Days of Exercise per Week: 0 days    Minutes of Exercise per Session: 0 min   Stress: No Stress Concern Present    Feeling of Stress : Not at all   Social Connections: Unknown    Frequency of Communication with Friends and Family: More than three times a week    Frequency of Social Gatherings with Friends and Family: More than three times a week    Active Member of Clubs or Organizations: No    Attends Club or Organization Meetings: Never    Marital Status:    Housing Stability: Low Risk     Unable to Pay for Housing in the Last Year: No    Number of Places Lived in the Last Year: 1    Unstable Housing in the Last Year: No       FAMILY HISTORY:  Family History   Problem Relation Age of Onset    Heart failure Mother         60s    Coronary artery disease Father         70s    Cancer Paternal Uncle         lymphoma??    Diabetes Neg Hx     Melanoma Neg Hx        CURRENTS MEDICATIONS:  Current Outpatient Medications on File Prior to Visit   Medication Sig Dispense Refill    amoxicillin (AMOXIL) 500 MG capsule Take 1 capsule by mouth every 6 hours  until all taken 28 capsule 0    atorvastatin (LIPITOR) 40 MG tablet Take 1 tablet (40 mg total) by mouth once daily. 90 tablet 3    ergocalciferol (VITAMIN D2) 50,000 unit Cap TAKE 1 CAPSULE BY MOUTH ONE TIME PER WEEK 12 capsule 3    fluorouraciL (EFUDEX) 5 % cream Apply thin film to right cheek and temple 2times per day for 2-3 weeks; d/c if area bleeding or ulcerated; avoid eyes or mouth 40 g 1    fluticasone propionate (FLONASE) 50 mcg/actuation nasal spray 2 SPRAYS (100 MCG TOTAL) BY EACH NOSTRIL ROUTE ONCE DAILY. 16 mL 1    gabapentin (NEURONTIN) 300 MG capsule Take 2 capsules (600 mg total) by mouth 3 (three) times daily. 180 capsule 11    ibuprofen (ADVIL,MOTRIN) 800 MG tablet Take 1 tablet (800 mg total) by mouth 3 (three) times daily as needed for Pain. 90 tablet 1    imiquimod (ALDARA) 5 % cream Apply small amount to affected area on  right cheek at night  x 4 weeks; discontinue  if ulcerated or bleeding 12 packet 1    tiotropium (SPIRIVA) 18 mcg inhalation capsule Inhale 1 capsule (18 mcg total) into the lungs once daily. Controller 30 capsule 6    traMADoL (ULTRAM) 50 mg tablet Take 1 tablet (50 mg total) by mouth 2 (two) times a day. for 14 days 28 tablet 0    upadacitinib (RINVOQ) 15 mg 24 hr tablet Take 1 tablet (15 mg total) by mouth once daily. 30 tablet 3     Current Facility-Administered Medications on File Prior to Visit   Medication Dose Route Frequency Provider Last Rate Last Admin    ketorolac injection 30 mg  30 mg Intramuscular 1 time in Clinic/HOD Calin Buchanan MD           ALLERGIES:  Review of patient's allergies indicates:   Allergen Reactions    Plaquenil [hydroxychloroquine]      Lightheaded and muscle aches       REVIEW OF SYSTEMS:  Review of Systems   Constitutional:  Negative for diaphoresis, fever and weight loss.   Respiratory:  Negative for shortness of breath.    Cardiovascular:  Negative for chest pain.   Gastrointestinal:  Negative for blood in stool.   Genitourinary:   Negative for hematuria.   Endo/Heme/Allergies:  Does not bruise/bleed easily.   All other systems reviewed and are negative.    OBJECTIVE:    PHYSICAL EXAMINATION:   There were no vitals filed for this visit.    Physical Exam:  Vitals reviewed.    Constitutional: He appears well-developed and well-nourished.     Eyes: Pupils are equal, round, and reactive to light. Conjunctivae and EOM are normal.     Cardiovascular: Normal distal pulses and no edema.     Abdominal: Soft.     Skin: Skin displays no rash on trunk and no rash on extremities. Skin displays no lesions on trunk and no lesions on extremities.     Psych/Behavior: He is alert. He is oriented to person, place, and time. He has a normal mood and affect.     Musculoskeletal:        Neck: Range of motion is full.     Neurological:        DTRs: Tricep reflexes are 2+ on the right side and 2+ on the left side. Bicep reflexes are 2+ on the right side and 2+ on the left side. Brachioradialis reflexes are 2+ on the right side and 2+ on the left side. Patellar reflexes are 2+ on the right side and 2+ on the left side. Achilles reflexes are 0 on the right side and 0 on the left side.     Back Exam     Tenderness   The patient is experiencing no tenderness.     Range of Motion   Extension:  abnormal   Flexion:  abnormal   Lateral bend right:  normal   Lateral bend left:  normal   Rotation right:  normal   Rotation left:  normal     Muscle Strength   Right Quadriceps:  5/5   Left Quadriceps:  5/5   Right Hamstrings:  5/5   Left Hamstrings:  5/5     Tests   Straight leg raise right: negative  Straight leg raise left: negative    Other   Toe walk: normal  Heel walk: normal            Neurologic Exam     Mental Status   Oriented to person, place, and time.   Speech: speech is normal   Level of consciousness: alert    Cranial Nerves   Cranial nerves II through XII intact.     CN III, IV, VI   Pupils are equal, round, and reactive to light.  Extraocular motions are normal.      Motor Exam   Muscle bulk: normal  Overall muscle tone: normal    Strength   Right deltoid: 5/5  Left deltoid: 5/5  Right biceps: 5/5  Left biceps: 5/5  Right triceps: 5/5  Left triceps: 5/5  Right wrist flexion: 5/5  Left wrist flexion: 5/5  Right wrist extension: 5/5  Left wrist extension: 5/5  Right interossei: 5/5  Left interossei: 5/5  Right iliopsoas: 5/5  Left iliopsoas: 5/5  Right quadriceps: 5/5  Left quadriceps: 5/5  Right hamstrin/5  Left hamstrin/5  Right anterior tibial: 5/5  Left anterior tibial: 5/5  Right posterior tibial: 5/5  Left posterior tibial: 5/5  Right peroneal: 5/5  Left peroneal: 5/5  Right gastroc: 5/5  Left gastroc: 5/5    Sensory Exam   Light touch normal.   Pinprick normal.     Gait, Coordination, and Reflexes     Gait  Gait: (Antalgic)    Coordination   Finger to nose coordination: normal  Tandem walking coordination: normal    Reflexes   Right brachioradialis: 2+  Left brachioradialis: 2+  Right biceps: 2+  Left biceps: 2+  Right triceps: 2+  Left triceps: 2+  Right patellar: 2+  Left patellar: 2+  Right achilles: 0  Left achilles: 0  Right plantar: normal  Left plantar: normal  Right Gill: absent  Left Gill: absent  Right ankle clonus: absent  Left ankle clonus: absent      DIAGNOSTIC DATA:  I personally interpreted the following imaging:   Lumbar spine MRI showed grade 2 L4-5 spondylolisthesis with severe spinal stenosis, right more than left foraminal stenosis, L3-4 degenerative disc disease with severe stenosis, left L3-4 disc herniation causing lateral recess stenosis, the L2-3 and L5-S1 disc appear LT    Lumbar flexion-extension x-ray shows L4-5 anterolisthesis measured at 10 mm in extension and 13 mm in flexion    Scoliosis parameters    PI 66 deg  LL 62 deg        ASSESMENT:  This is a 67 y.o. male with     Problem List Items Addressed This Visit          Neuro    Spinal stenosis of lumbar region with neurogenic claudication     Other Visit Diagnoses        Spondylolisthesis at L4-L5 level    -  Primary    Relevant Orders    X-Ray Scoliosis Complete    Acute midline low back pain with left-sided sciatica        Lumbar radiculopathy, chronic        Degenerative disc disease, lumbar                PLAN:  I explained the natural history of the disease and all treatment options. I recommended a left minimally invasive L4-5, L3-4 oblique interbody fusion with placement of interbody spacer using allograft BMP, minimally invasive L3-L5 posterior segmental instrumentation and posterolateral arthrodesis.     We have discussed the risks of surgery including death, coma, bleeding, infection, failure of surgery, CSF leak, nerve root injury, spinal cord injury, ureter injury, weakness, paralysis, peripheral neuropathy, malplaced hardware, migration of hardware, non-union, need for reoperation. Patient understands the risks and would like to proceed with surgery.      The patient has increase perioperative risks because of these comorbidities:  Rheumatoid arthritis, managed by rheumatology, osteopenia patient is on vitamin-D supplementation I recommended to add calcium supplements.         August Aguilar MD  Cell:[phone]

## 2022-11-16 NOTE — TELEPHONE ENCOUNTER
Specialty Pharmacy - Refill Coordination    Specialty Medication Orders Linked to Encounter      Flowsheet Row Most Recent Value   Medication #1 upadacitinib (RINVOQ) 15 mg 24 hr tablet (Order#[id number], Rx#[id number])            Refill Questions - Documented Responses      Flowsheet Row Most Recent Value   Patient Availability and HIPAA Verification    Does patient want to proceed with activity? Yes   HIPAA/medical authority confirmed? Yes   Relationship to patient of person spoken to? Self   Refill Screening Questions    Changes to allergies? No   Changes to medications? No   New conditions since last clinic visit? No   Unplanned office visit, urgent care, ED, or hospital admission in the last 4 weeks? No   How does patient/caregiver feel medication is working? Good   Financial problems or insurance changes? No   How many doses of your specialty medications were missed in the last 4 weeks? 0   Would patient like to speak to a pharmacist? No   When does the patient need to receive the medication? 11/17/22   Refill Delivery Questions    How will the patient receive the medication? [facility]   When does the patient need to receive the medication? 11/17/22   Shipping Address Home   Address in [facility] confirmed and updated if neccessary? Yes   Expected Copay ($) 5   Is the patient able to afford the medication copay? Yes   Payment Method CC on file   Days supply of Refill 30   Supplies needed? No supplies needed   Refill activity completed? Yes   Refill activity plan Refill scheduled   Shipment/Pickup Date: 11/17/22            Current Outpatient Medications   Medication Sig    amoxicillin (AMOXIL) 500 MG capsule Take 1 capsule by mouth every 6 hours until all taken    atorvastatin (LIPITOR) 40 MG tablet Take 1 tablet (40 mg total) by mouth once daily.    celecoxib (CELEBREX) 200 MG capsule Take 1 capsule (200 mg total) by mouth 2 (two) times daily.    ergocalciferol (VITAMIN D2) 50,000 unit Cap TAKE 1 CAPSULE BY  MOUTH ONE TIME PER WEEK    fluorouraciL (EFUDEX) 5 % cream Apply thin film to right cheek and temple 2times per day for 2-3 weeks; d/c if area bleeding or ulcerated; avoid eyes or mouth    fluticasone propionate (FLONASE) 50 mcg/actuation nasal spray 2 SPRAYS (100 MCG TOTAL) BY EACH NOSTRIL ROUTE ONCE DAILY.    gabapentin (NEURONTIN) 300 MG capsule Take 2 capsules (600 mg total) by mouth 3 (three) times daily.    ibuprofen (ADVIL,MOTRIN) 800 MG tablet Take 1 tablet (800 mg total) by mouth 3 (three) times daily as needed for Pain.    imiquimod (ALDARA) 5 % cream Apply small amount to affected area on  right cheek at night  x 4 weeks; discontinue  if ulcerated or bleeding    tiotropium (SPIRIVA) 18 mcg inhalation capsule Inhale 1 capsule (18 mcg total) into the lungs once daily. Controller    traMADoL (ULTRAM) 50 mg tablet Take 1 tablet (50 mg total) by mouth 2 (two) times a day. for 14 days    upadacitinib (RINVOQ) 15 mg 24 hr tablet Take 1 tablet (15 mg total) by mouth once daily.   Last reviewed on 11/16/2022  1:20 PM by Gricel Alcantar MA    Review of patient's allergies indicates:   Allergen Reactions    Plaquenil [hydroxychloroquine]      Lightheaded and muscle aches    Last reviewed on  11/16/2022 1:20 PM by Gricel Alcantar      Tasks added this encounter   12/21/2022 - Refill Call (Auto Added)   Tasks due within next 3 months   No tasks due.     Hoda Wang, PharmD  [facility]  [address] [place] [id number]  Phone: [phone]  Fax: [phone]

## 2022-11-16 NOTE — PROGRESS NOTES
Oswestry Low Back Pain Disability Questionnaire    DATE OF SERVICE:  11/16/2022    ATTENDING PHYSICIAN:  August Aguilar MD    Instructions    This questionnaire has been designed to give us information as to how your back or leg pain is affecting your ability to manage in everyday life. Please answer by checking ONE box in each section for the statement which best applies to you. We realize you may consider that two or more statements in any one section apply but please just shade out the spot that indicates the statement which most clearly describes your problem.    Section 1 - Pain intensity    * I have no pain at the moment.  * The pain is very mild at the moment.  * The pain is moderate at the moment.  * The pain is fairly severe at the moment.  * The pain is very severe at the moment.  * The pain is the worst imaginable at the moment.    Score : 1    Section 2 - Personal care (washing, dressing etc)    * I can look after myself normally without causing extra pain.  * I can look after myself normally but it causes extra pain.  * It is painful to look after myself and I am slow and careful.  * I need some help but manage most of my personal care.  * I need help every day in most aspects of self-care.  * I do not get dressed, I wash with difficulty and stay in bed.    Score : 1    Section 3 - Lifting    * I can lift heavy weights without extra pain.  * I can lift heavy weights but it gives extra pain.  * Pain prevents me from lifting heavy weights off the floor, but I can manage if they are conveniently placed eg. on a table.  * Pain prevents me from lifting heavy weights, but I can manage light to medium weights if they are conveniently positioned.  * I can lift very light weights.  * I cannot lift or carry anything at all.    Score : 4    Section 4 - Walking    * Pain does not prevent me walking any distance.  * Pain prevents me from walking more than 1 mile.         * Pain prevents me from walking more than  1/2 mile.         * Pain prevents me from walking more than 100 yards.            * I can only walk using a stick or crutches.  * I am in bed most of the time.    Score : 3    Section 5 - Sitting    * I can sit in any chair as long as I like.  * I can only sit in my favourite chair as long as I like.  * Pain prevents me sitting more than one hour.  * Pain prevents me from sitting more than 30 minutes.  * Pain prevents me from sitting more than 10 minutes.  * Pain prevents me from sitting at all.    Score : 3    Section 6 - Standing    * I can stand as long as I want without extra pain.  * I can stand as long as I want but it gives me extra pain.  * Pain prevents me from standing for more than 1 hour  * Pain prevents me from standing for more than 30 minutes.  * Pain prevents me from standing for more than 10 minutes.  * Pain prevents me from standing at all.     Score : 4    Section 7 - Sleeping    * My sleep is never disturbed by pain.  * My sleep is occasionally disturbed by pain.  * Because of pain I have less than 6 hours sleep.   * Because of pain I have less than 4 hours sleep.   * Because of pain I have less than 2 hours sleep.  * Pain prevents me from sleeping at all.    Score : 1    Section 8 - Sex life (if applicable)    * My sex life is normal and causes no extra pain.  * My sex life is normal but causes some extra pain.  * My sex life is nearly normal but is very painful.   * My sex life is severely restricted by pain.  * My sex life is nearly absent because of pain.   * Pain prevents any sex life at all.    Score : 0    Section 9 - Social life    * My social life is normal and gives me no extra pain.  * My social life is normal but increases the degree of pain.  * Pain has no significant effect on my social life apart from limiting my more energetic interests eg, sport.  * Pain has restricted my social life and I do not go out as often.  * Pain has restricted my social life to my home.   * I have no  social life because of pain.     Score : 3    Section 10 - Travelling    * I can travel anywhere without pain.  * I can travel anywhere but it gives me extra pain.  * Pain is bad but I manage journeys over two hours.  * Pain restricts me to journeys of less than one hour.  * Pain restricts me to short necessary journeys under 30 minutes.  * Pain prevents me from travelling except to receive treatment.    Score : 1      TOTAL SCORE :  21 / 50 x 2 = 42 %      References  1. [place] MICK, Jt PB. The Oswestry Disability Index. Spine 2000 Nov 15;25(22):2944-52; discussion 52.  from standing for more than from standing for more than from standing for more than from standing at all

## 2022-11-17 ENCOUNTER — TELEPHONE (OUTPATIENT)
Dept: NEUROSURGERY | Facility: CLINIC | Age: 67
End: 2022-11-17
Payer: COMMERCIAL

## 2022-11-17 NOTE — TELEPHONE ENCOUNTER
----- Message from Sherri Calles sent at 11/17/2022  2:59 PM CST -----  Type:  Needs Medical Advice    Who Called:PT  Symptoms (please be specific):    How long has patient had these symptoms:    Pharmacy name and phone #:    Would the patient rather a call back or a response via [facility]?   Best Call Back Number: [phone] (M)   Additional Information: WANTS TO SPEAK TO THE OFFICE ABOUT SCHEDULING SURGERY[facility]

## 2022-11-18 ENCOUNTER — TELEPHONE (OUTPATIENT)
Dept: NEUROSURGERY | Facility: CLINIC | Age: 67
End: 2022-11-18
Payer: COMMERCIAL

## 2022-11-18 ENCOUNTER — TELEPHONE (OUTPATIENT)
Dept: FAMILY MEDICINE | Facility: CLINIC | Age: 67
End: 2022-11-18
Payer: COMMERCIAL

## 2022-11-18 DIAGNOSIS — M54.16 LUMBAR RADICULOPATHY, CHRONIC: ICD-10-CM

## 2022-11-18 DIAGNOSIS — Z79.899 OTHER LONG TERM (CURRENT) DRUG THERAPY: ICD-10-CM

## 2022-11-18 DIAGNOSIS — D64.9 ANEMIA, UNSPECIFIED TYPE: ICD-10-CM

## 2022-11-18 DIAGNOSIS — J84.10 PULMONARY FIBROSIS: ICD-10-CM

## 2022-11-18 DIAGNOSIS — Z01.818 PREOP EXAMINATION: Primary | ICD-10-CM

## 2022-11-18 DIAGNOSIS — M06.9 RHEUMATOID ARTHRITIS, INVOLVING UNSPECIFIED SITE, UNSPECIFIED WHETHER RHEUMATOID FACTOR PRESENT: ICD-10-CM

## 2022-11-18 DIAGNOSIS — M51.36 DEGENERATIVE DISC DISEASE, LUMBAR: ICD-10-CM

## 2022-11-18 DIAGNOSIS — M48.062 SPINAL STENOSIS OF LUMBAR REGION WITH NEUROGENIC CLAUDICATION: ICD-10-CM

## 2022-11-18 DIAGNOSIS — M54.16 LUMBAR RADICULOPATHY: ICD-10-CM

## 2022-11-18 DIAGNOSIS — Z98.1 S/P LUMBAR FUSION: Primary | ICD-10-CM

## 2022-11-18 DIAGNOSIS — E78.5 HYPERLIPIDEMIA, UNSPECIFIED HYPERLIPIDEMIA TYPE: ICD-10-CM

## 2022-11-18 NOTE — TELEPHONE ENCOUNTER
Yousif can order labs EKG, chest x-ray.  Recently seen on 11/10/2022 main issue was his back pain.  Did have episode of disorientation but was back to normal at time of visit, believe possibly dehydration.  Had labs done on November 12th which were overall reassuring but since there over 30 days out from his planned surgery will reorder as below.  As long as labs, EKG, chest x-ray are stable, nonacute appearing, should be medically able to have his lumbar surgery, main risk coming from his rheumatoid arthritis and immunosuppression    Orders Placed This Encounter   Procedures    X-Ray Chest PA And Lateral    CBC Auto Differential    Comprehensive Metabolic Panel    Urinalysis    Protime-INR    APTT    SCHEDULED EKG 12-LEAD (to Muse)

## 2022-11-18 NOTE — TELEPHONE ENCOUNTER
Patient dropped off request for clearance for a procedure with Dr Aguilar. He was seen for an urgent visit on 11/10 with multiple issues discussed. Do you need to see him again?    Request is for:  -H&P with assessment giving clearance for surgery and anesthesia  -CMP, CBC, PT/PTT, urinalysis  -EKG  -Chest x-ray    Please advise.

## 2022-11-21 ENCOUNTER — DOCUMENTATION ONLY (OUTPATIENT)
Dept: REHABILITATION | Facility: HOSPITAL | Age: 67
End: 2022-11-21
Payer: COMMERCIAL

## 2022-11-21 ENCOUNTER — OFFICE VISIT (OUTPATIENT)
Dept: DERMATOLOGY | Facility: CLINIC | Age: 67
End: 2022-11-21
Payer: COMMERCIAL

## 2022-11-21 ENCOUNTER — TELEPHONE (OUTPATIENT)
Dept: DERMATOLOGY | Facility: CLINIC | Age: 67
End: 2022-11-21
Payer: COMMERCIAL

## 2022-11-21 DIAGNOSIS — D48.5 NEOPLASM OF UNCERTAIN BEHAVIOR OF SKIN: ICD-10-CM

## 2022-11-21 DIAGNOSIS — D04.39 SQUAMOUS CELL CARCINOMA IN SITU (SCCIS) OF SKIN OF RIGHT CHEEK: Primary | ICD-10-CM

## 2022-11-21 PROCEDURE — 1101F PR PT FALLS ASSESS DOC 0-1 FALLS W/OUT INJ PAST YR: ICD-10-PCS | Mod: CPTII,S$GLB,, | Performed by: DERMATOLOGY

## 2022-11-21 PROCEDURE — 99214 PR OFFICE/OUTPT VISIT, EST, LEVL IV, 30-39 MIN: ICD-10-PCS | Mod: 25,S$GLB,, | Performed by: DERMATOLOGY

## 2022-11-21 PROCEDURE — 1126F AMNT PAIN NOTED NONE PRSNT: CPT | Mod: CPTII,S$GLB,, | Performed by: DERMATOLOGY

## 2022-11-21 PROCEDURE — 1160F RVW MEDS BY RX/DR IN RCRD: CPT | Mod: CPTII,S$GLB,, | Performed by: DERMATOLOGY

## 2022-11-21 PROCEDURE — 1126F PR PAIN SEVERITY QUANTIFIED, NO PAIN PRESENT: ICD-10-PCS | Mod: CPTII,S$GLB,, | Performed by: DERMATOLOGY

## 2022-11-21 PROCEDURE — 3288F PR FALLS RISK ASSESSMENT DOCUMENTED: ICD-10-PCS | Mod: CPTII,S$GLB,, | Performed by: DERMATOLOGY

## 2022-11-21 PROCEDURE — 99999 PR PBB SHADOW E&M-EST. PATIENT-LVL III: CPT | Mod: PBBFAC,,, | Performed by: DERMATOLOGY

## 2022-11-21 PROCEDURE — 1101F PT FALLS ASSESS-DOCD LE1/YR: CPT | Mod: CPTII,S$GLB,, | Performed by: DERMATOLOGY

## 2022-11-21 PROCEDURE — 3288F FALL RISK ASSESSMENT DOCD: CPT | Mod: CPTII,S$GLB,, | Performed by: DERMATOLOGY

## 2022-11-21 PROCEDURE — 1159F MED LIST DOCD IN RCRD: CPT | Mod: CPTII,S$GLB,, | Performed by: DERMATOLOGY

## 2022-11-21 PROCEDURE — 99999 PR PBB SHADOW E&M-EST. PATIENT-LVL III: ICD-10-PCS | Mod: PBBFAC,,, | Performed by: DERMATOLOGY

## 2022-11-21 PROCEDURE — 1160F PR REVIEW ALL MEDS BY PRESCRIBER/CLIN PHARMACIST DOCUMENTED: ICD-10-PCS | Mod: CPTII,S$GLB,, | Performed by: DERMATOLOGY

## 2022-11-21 PROCEDURE — 88305 TISSUE EXAM BY PATHOLOGIST: ICD-10-PCS | Mod: 26,,, | Performed by: PATHOLOGY

## 2022-11-21 PROCEDURE — 88305 TISSUE EXAM BY PATHOLOGIST: CPT | Mod: 26,,, | Performed by: PATHOLOGY

## 2022-11-21 PROCEDURE — 1159F PR MEDICATION LIST DOCUMENTED IN MEDICAL RECORD: ICD-10-PCS | Mod: CPTII,S$GLB,, | Performed by: DERMATOLOGY

## 2022-11-21 PROCEDURE — 88305 TISSUE EXAM BY PATHOLOGIST: CPT | Performed by: PATHOLOGY

## 2022-11-21 PROCEDURE — 99214 OFFICE O/P EST MOD 30 MIN: CPT | Mod: 25,S$GLB,, | Performed by: DERMATOLOGY

## 2022-11-21 RX ORDER — MUPIROCIN 20 MG/G
OINTMENT TOPICAL
Qty: 22 G | Refills: 3 | Status: SHIPPED | OUTPATIENT
Start: 2022-11-21 | End: 2023-10-04

## 2022-11-21 NOTE — PROGRESS NOTES
PATIENT DISCHARGE:    See previous telephone conversation from 11/16. He has since cancelled all of his remaining visits and will not be requiring any PT prior to his scheduled surgery.

## 2022-11-21 NOTE — PROGRESS NOTES
Subjective:       Patient ID:  Fabiano Garvey is a 67 y.o. male who presents for   Chief Complaint   Patient presents with    Follow-up     S/p aldara     Pt here for aldara/imiquimod follow up for  a SCC  located on the R lateral cheek  Pt used aldara/imiquimod for  14 days bc he had a lot of back issues.  Pt is having back surgery Dec 14th.    Pt states their reaction was mild     Final Pathologic Diagnosis       Date                     Value               Ref Range           Status                09/08/2022                                                       Final             Skin,  right lateral cheek, shave biopsy: - BOWEN'S DISEASE (SQUAMOUS CELL CARCINOMA IN-SITU), WITH FOLLICULAR EXTENSION, EXTENDING TO A PERIPHERAL MARGIN This lesion is skin cancer. You will be contacted regarding treatment.     Comment:    Interp By Nikole Kong M.D., Signed on 09/13/2022 at 14:29    Pt has wart on left elbow. Tx with cryo and OTC wart paste and still present.   ----------              Follow-up    Review of Systems   Skin:  Positive for activity-related sunscreen use.   Hematologic/Lymphatic: Does not bruise/bleed easily.      Objective:    Physical Exam   Constitutional: He appears well-developed and well-nourished. No distress.   Neurological: He is alert and oriented to person, place, and time. He is not disoriented.   Psychiatric: He has a normal mood and affect.   Skin:   Areas Examined (abnormalities noted in diagram):   Head / Face Inspection Performed  LUE Inspection Performed                       Diagram Legend     Erythematous scaling macule/papule c/w actinic keratosis       Vascular papule c/w angioma      Pigmented verrucoid papule/plaque c/w seborrheic keratosis      Yellow umbilicated papule c/w sebaceous hyperplasia      Irregularly shaped tan macule c/w lentigo     1-2 mm smooth white papules consistent with Milia      Movable subcutaneous cyst with punctum c/w epidermal inclusion cyst       Subcutaneous movable cyst c/w pilar cyst      Firm pink to brown papule c/w dermatofibroma      Pedunculated fleshy papule(s) c/w skin tag(s)      Evenly pigmented macule c/w junctional nevus     Mildly variegated pigmented, slightly irregular-bordered macule c/w mildly atypical nevus      Flesh colored to evenly pigmented papule c/w intradermal nevus       Pink pearly papule/plaque c/w basal cell carcinoma      Erythematous hyperkeratotic cursted plaque c/w SCC      Surgical scar with no sign of skin cancer recurrence      Open and closed comedones      Inflammatory papules and pustules      Verrucoid papule consistent consistent with wart     Erythematous eczematous patches and plaques     Dystrophic onycholytic nail with subungual debris c/w onychomycosis     Umbilicated papule    Erythematous-base heme-crusted tan verrucoid plaque consistent with inflamed seborrheic keratosis     Erythematous Silvery Scaling Plaque c/w Psoriasis     See annotation      Assessment / Plan:      Pathology Orders:       Normal Orders This Visit    Specimen to Pathology, Dermatology     Questions:    Procedure Type: Dermatology and skin neoplasms    Number of Specimens: 1    ------------------------: -------------------------    Spec 1 Procedure: Biopsy    Spec 1 Clinical Impression: r/o wart v other    Spec 1 Source: L elbow    Release to patient:           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  Has failed aldara    Aldara/imiquimod- right cheek  one time per day for 4 weeks  Your doctor has prescribed a medication that can stimulate the immune system to fix the atypical cells. This medication is dispensed in small packets. Open the packet, use  only the amount needed to apply a thin film to the affected area and then store the packet in a ziploc bag. If this same area has been treated with  cryosurgery/freezing, wait until the area is healed before starting the medication. The potential  side effects of aldara/imiquimod including redness, scaling, and irritation. This is a normal reaction and means the medication is working. If the area becomes ulcerated or is bleeding stop the medication and message your doctor. Long term side effects can include white scarring. More rare side effects include a flu like illness.  Discontinue medication and call the office if any of these symptoms occur. For some patients , this medication is not effective and other treatment options will need to be explored. You will need follow up with me in 6months.        Final Pathologic Diagnosis   Date Value Ref Range Status   09/08/2022   Final    Skin,  right lateral cheek, shave biopsy:  - BOWEN'S DISEASE (SQUAMOUS CELL CARCINOMA IN-SITU), WITH FOLLICULAR  EXTENSION, EXTENDING TO A PERIPHERAL MARGIN  This lesion is skin cancer. You will be contacted regarding treatment.       Comment:     Interp By Nikole Kong M.D., Signed on 09/13/2022 at 14:29         Neoplasm of uncertain behavior of skin- L elbow  Shave biopsy procedure note:    Shave biopsy performed after verbal consent including risk of infection, scar, recurrence, need for additional treatment of site. Area prepped with alcohol, anesthetized with approximately 1.0cc of 1% lidocaine with epinephrine. Lesional tissue shaved with razor blade. Hemostasis achieved with application of aluminum chloride followed by hyfrecation. No complications. Dressing applied. Wound care explained.    -     Specimen to Pathology, Dermatology  -     mupirocin (BACTROBAN) 2 % ointment; AAA bid  Dispense: 22 g; Refill: 3           Follow up in about 6 months (around 5/21/2023) for UBSE.

## 2022-11-21 NOTE — Clinical Note
Please schedule for [facility]s SCC in situ R cheek , February 20223, pt having back surgery in Dec.  Has failed aldara

## 2022-11-21 NOTE — PATIENT INSTRUCTIONS
Aldara/imiquimod- right cheek  one time per day for 4 weeks  Your doctor has prescribed a medication that can stimulate the immune system to fix the atypical cells. This medication is dispensed in small packets. Open the packet, use  only the amount needed to apply a thin film to the affected area and then store the packet in a ziploc bag. If this same area has been treated with cryosurgery/freezing, wait until the area is healed before starting the medication. The potential  side effects of aldara/imiquimod including redness, scaling, and irritation. This is a normal reaction and means the medication is working. If the area becomes ulcerated or is bleeding stop the medication and message your doctor. Long term side effects can include white scarring. More rare side effects include a flu like illness.  Discontinue medication and call the office if any of these symptoms occur. For some patients , this medication is not effective and other treatment options will need to be explored. You will need follow up with me in 6months.     We would like to see you back in the clinic in 6 months.  You will be able to schedule this appointment by calling or by using your [facility] portal 3 months before this time. Because our schedule fills so quickly, please set a reminder in your phone or on your calendar to schedule 3 months before you are due to come in so that we can see you in a timely fashion.  You should also receive a reminder from us in the mail. This will help us ensure we can continue to provide excellent healthcare for you. Thank you.

## 2022-11-21 NOTE — TELEPHONE ENCOUNTER
Ep left voicemail for pt to call the office on home and mobile phone in regards to bx results for SCC right lateral cheek per Dr. Judge.

## 2022-11-22 ENCOUNTER — HOSPITAL ENCOUNTER (OUTPATIENT)
Dept: RADIOLOGY | Facility: HOSPITAL | Age: 67
Discharge: HOME | End: 2022-11-22
Attending: FAMILY MEDICINE
Payer: COMMERCIAL

## 2022-11-22 ENCOUNTER — CLINICAL SUPPORT (OUTPATIENT)
Dept: LAB | Facility: HOSPITAL | Age: 67
End: 2022-11-22
Attending: FAMILY MEDICINE
Payer: COMMERCIAL

## 2022-11-22 ENCOUNTER — TELEPHONE (OUTPATIENT)
Dept: DERMATOLOGY | Facility: CLINIC | Age: 67
End: 2022-11-22
Payer: COMMERCIAL

## 2022-11-22 DIAGNOSIS — E78.5 HYPERLIPIDEMIA, UNSPECIFIED HYPERLIPIDEMIA TYPE: ICD-10-CM

## 2022-11-22 DIAGNOSIS — M06.9 RHEUMATOID ARTHRITIS, INVOLVING UNSPECIFIED SITE, UNSPECIFIED WHETHER RHEUMATOID FACTOR PRESENT: ICD-10-CM

## 2022-11-22 DIAGNOSIS — D64.9 ANEMIA, UNSPECIFIED TYPE: ICD-10-CM

## 2022-11-22 DIAGNOSIS — J84.10 PULMONARY FIBROSIS: ICD-10-CM

## 2022-11-22 DIAGNOSIS — M54.16 LUMBAR RADICULOPATHY: ICD-10-CM

## 2022-11-22 DIAGNOSIS — Z01.818 PREOP EXAMINATION: ICD-10-CM

## 2022-11-22 DIAGNOSIS — Z79.899 OTHER LONG TERM (CURRENT) DRUG THERAPY: ICD-10-CM

## 2022-11-22 PROCEDURE — 71046 X-RAY EXAM CHEST 2 VIEWS: CPT | Mod: TC,FY

## 2022-11-22 PROCEDURE — 93010 EKG 12-LEAD: ICD-10-PCS | Mod: ,,, | Performed by: INTERNAL MEDICINE

## 2022-11-22 PROCEDURE — 71046 X-RAY EXAM CHEST 2 VIEWS: CPT | Mod: 26,,, | Performed by: RADIOLOGY

## 2022-11-22 PROCEDURE — 71046 XR CHEST PA AND LATERAL: ICD-10-PCS | Mod: 26,,, | Performed by: RADIOLOGY

## 2022-11-22 PROCEDURE — 93005 ELECTROCARDIOGRAM TRACING: CPT

## 2022-11-22 PROCEDURE — 93010 ELECTROCARDIOGRAM REPORT: CPT | Mod: ,,, | Performed by: INTERNAL MEDICINE

## 2022-11-22 NOTE — TELEPHONE ENCOUNTER
EP scheduled for same-day Mohs SCCIS R lateral cheek on 1/30 at 12:30pm. Pt confirmed date, time, and location. Appt information sent in the mail.

## 2022-11-23 ENCOUNTER — PATIENT MESSAGE (OUTPATIENT)
Dept: SURGERY | Facility: HOSPITAL | Age: 67
End: 2022-11-23
Payer: COMMERCIAL

## 2022-11-23 ENCOUNTER — PATIENT MESSAGE (OUTPATIENT)
Dept: FAMILY MEDICINE | Facility: CLINIC | Age: 67
End: 2022-11-23
Payer: COMMERCIAL

## 2022-11-23 NOTE — TELEPHONE ENCOUNTER
Just a note this patient should be medically stable for upcoming surgery, I forwarded him a patient portal note outlining results of his studies.  Has  rheumatoid arthritis and interstitial lung disease, slightly progressed on recent x-ray but no acute pulmonary symptoms concerning for pneumonia like fevers or shortness of breath increase or productive cough.  We can follow him for any further chronic issues after his procedure.

## 2022-11-29 ENCOUNTER — HOSPITAL ENCOUNTER (OUTPATIENT)
Dept: PREADMISSION TESTING | Facility: HOSPITAL | Age: 67
Discharge: HOME | End: 2022-11-29
Attending: NEUROLOGICAL SURGERY
Payer: COMMERCIAL

## 2022-11-29 ENCOUNTER — ANESTHESIA EVENT (OUTPATIENT)
Dept: SURGERY | Facility: HOSPITAL | Age: 67
DRG: 455 | End: 2022-11-29
Payer: COMMERCIAL

## 2022-11-29 ENCOUNTER — TELEPHONE (OUTPATIENT)
Dept: ANESTHESIOLOGY | Facility: HOSPITAL | Age: 67
End: 2022-11-29
Payer: COMMERCIAL

## 2022-11-29 VITALS
SYSTOLIC BLOOD PRESSURE: 126 MMHG | RESPIRATION RATE: 16 BRPM | DIASTOLIC BLOOD PRESSURE: 73 MMHG | HEART RATE: 87 BPM | HEIGHT: 63 IN | OXYGEN SATURATION: 96 % | BODY MASS INDEX: 28.65 KG/M2 | TEMPERATURE: 98 F | WEIGHT: 161.69 LBS

## 2022-11-29 RX ORDER — LIDOCAINE HYDROCHLORIDE 10 MG/ML
1 INJECTION, SOLUTION EPIDURAL; INFILTRATION; INTRACAUDAL; PERINEURAL ONCE
Status: CANCELLED | OUTPATIENT
Start: 2022-11-29 | End: 2022-11-29

## 2022-11-29 RX ORDER — IPRATROPIUM BROMIDE 0.5 MG/2.5ML
0.5 SOLUTION RESPIRATORY (INHALATION) ONCE
Status: CANCELLED | OUTPATIENT
Start: 2022-12-14

## 2022-11-29 RX ORDER — SODIUM CHLORIDE, SODIUM LACTATE, POTASSIUM CHLORIDE, CALCIUM CHLORIDE 600; 310; 30; 20 MG/100ML; MG/100ML; MG/100ML; MG/100ML
INJECTION, SOLUTION INTRAVENOUS CONTINUOUS
Status: CANCELLED | OUTPATIENT
Start: 2022-11-29

## 2022-11-29 NOTE — PRE-PROCEDURE INSTRUCTIONS
[facility] [phone]      Allergies, medical, surgical, family and psychosocial histories reviewed with patient. Periop plan of care reviewed. Preop instructions given, including medications to take and to hold. Hibiclens soap and instructions on use given. Time allotted for questions to be addressed.  Patient verbalized understanding.

## 2022-11-29 NOTE — ANESTHESIA PREPROCEDURE EVALUATION
"                                                                                                             11/29/2022  Fabiano Garvey is a 67 y.o., male scheduled for  FUSION, SPINE, WITH INSTRUMENTATION Stg1: Left L3-5 OLiF conduit lateral cage BMP (Left: Spine Lumbar) and FUSION,SPINE,POSTERIOR APPROACH Stg 2: L3-5 posterior instrumentation viper prime, L3-5 posterolateral arthrodesis (Back) 12/14/22.    Per family medicine Dr. Buchanan, " this patient should be medically stable for upcoming surgery".  Per pulmonology Dr. Singh,"    Past Medical History:   Diagnosis Date    Hyperlipidemia     NU (obstructive sleep apnea)     Pulmonary fibrosis     Rheumatoid arthritis     SCC (squamous cell carcinoma) 07/2020    SCC right medial canthus    SCC (squamous cell carcinoma) 11/2021    mid lower neck     SCC (squamous cell carcinoma) 01/2022    right lateral cheek- in situ    Squamous cell carcinoma 07/2019    SCC in situ left temple     Past Surgical History:   Procedure Laterality Date    COLONOSCOPY N/A 7/25/2017    Procedure: COLONOSCOPY;  Surgeon: Bill Nicole MD;  Location: [address]);  Service: Endoscopy;  Laterality: N/A;    EPIDURAL STEROID INJECTION INTO LUMBAR SPINE N/A 7/29/2021    Procedure: Injection-steroid-epidural-lumbar L5-S1;  Surgeon: Shiv Vernon Jr., MD;  Location: [facility];  Service: Pain Management;  Laterality: N/A;  oral sedation   no pacemaker     NO PAST SURGERIES      TRANSFORAMINAL EPIDURAL INJECTION OF STEROID Right 6/29/2021    Procedure: Injection,steroid,epidural,transforaminal approach-RIGHT-- L4-5, L5-S1-- (IV Sedation);  Surgeon: Shiv Vernon Jr., MD;  Location: [facility];  Service: Pain Management;  Laterality: Right;    TRANSFORAMINAL EPIDURAL INJECTION OF STEROID Left 10/27/2022    Procedure: Injection,steroid,epidural,transforaminal left L4-5 and L5-S1;  Surgeon: Shiv Vernon Jr., MD;  Location: [facility];  Service: Pain " Management;  Laterality: Left;       Pre-op Assessment    I have reviewed the Patient Summary Reports.     I have reviewed the Nursing Notes.    I have reviewed the Medications.     Review of Systems  Anesthesia Hx:  No previous Anesthesia  Denies Family Hx of Anesthesia complications.   Denies Personal Hx of Anesthesia complications.   Social:  Former Smoker, Social Alcohol Use    Hematology/Oncology:         -- Cancer in past history:   Cardiovascular:   Exercise tolerance: good Denies Pacemaker.   Denies Angina. hyperlipidemia    Pulmonary:   COPD, mild Sleep Apnea, CPAP    Education provided regarding risk of obstructive sleep apnea     Renal/:  Renal/ Normal     Hepatic/GI:  Hepatic/GI Normal  Denies GERD.    Musculoskeletal:   Arthritis (rheumatoid)   Spine Disorders: lumbar Chronic Pain, Disc disease and Degenerative disease    Neurological:   Denies TIA. Denies CVA. Neuromuscular Disease,  Denies Seizures.    Endocrine:  Endocrine Normal    Psych:  Psychiatric Normal           Physical Exam  General: Well nourished and Cooperative    Airway:  Mallampati: IV / III  Mouth Opening: Small, but > 3cm  TM Distance: Normal  Tongue: Normal  Neck ROM: Normal ROM    Dental:  Partial Dentures  Lower partial dentures  Chest/Lungs:  Clear to auscultation, Normal Respiratory Rate    Heart:  Rate: Normal  Rhythm: Regular Rhythm  Sounds: Normal      Lab Results   Component Value Date    WBC 6.53 11/22/2022    HGB 12.8 (L) 11/22/2022    HCT 39.3 (L) 11/22/2022     11/22/2022    CHOL 139 11/12/2022    TRIG 64 11/12/2022    HDL 35 (L) 11/12/2022    ALT 24 11/22/2022    AST 28 11/22/2022     11/22/2022    K 4.2 11/22/2022     11/22/2022    CREATININE 0.9 11/22/2022    BUN 14 11/22/2022    CO2 28 11/22/2022    TSH 0.635 11/12/2022    PSA 0.55 06/15/2021    INR 1.0 11/22/2022    HGBA1C 5.8 (H) 06/15/2021     Results for orders placed or performed in visit on 11/22/22   SCHEDULED EKG 12-LEAD (to Muse)     Collection Time: 11/22/22  9:36 AM    Narrative    Test Reason : Z01.818,Z79.899,E78.5,M06.9,J84.10,M54.16,D64.9,    Vent. Rate : 089 BPM     Atrial Rate : 089 BPM     P-R Int : 138 ms          QRS Dur : 090 ms      QT Int : 350 ms       P-R-T Axes : 056 070 072 degrees     QTc Int : 425 ms    Normal sinus rhythm  Normal ECG  When compared with ECG of 22-NOV-2022 09:35,  No significant change was found  Confirmed by Chip Chavira MD ([id number]) on 11/22/2022 10:16:37 AM    Referred By: OTILIA CAGLE           Confirmed By:Chip Chavira MD     CXR 11/22/22  Findings suggest a combination interstitial lung disease and emphysematous changes progressed from prior.  Ground-glass opacities in the right lung may relate to underlying interstitial lung disease with superimposed airspace inflammation or infection not excluded.  This report was flagged in Epic as abnormal. (PCP aware)      Anesthesia Plan  Type of Anesthesia, risks & benefits discussed:    Anesthesia Type: Gen ETT  Intra-op Monitoring Plan: Standard ASA Monitors  Post Op Pain Control Plan: multimodal analgesia  Induction:  IV  ASA Score: 3  Anesthesia Plan Notes: Anesthesia consent to be signed prior to surgery 12/14/22      Ready For Surgery From Anesthesia Perspective.     .

## 2022-11-29 NOTE — DISCHARGE INSTRUCTIONS
Your surgery is scheduled for 12/14/22.    Please report to [facility] on the 1st Floor at 0830 a.m.    THIS TIME IS SUBJECT TO CHANGE.  YOU WILL RECEIVE A PHONE CALL THE DAY BEFORE SURGERY BY 3:30 PM TO CONFIRM YOUR TIME OF ARRIVAL.  IF YOU HAVE NOT RECEIVED A PHONE CALL BY 3:30 PM THE DAY BEFORE YOUR SURGERY PLEASE CALL [phone]     INSTRUCTIONS IMPORTANT!!!  ¨ Do not eat or drink after 12 midnight-including water, candy, gum, & mints. OK to brush teeth.      ____  Proceed to [facility] on *** for additional testing.        ____  Do not wear makeup, including mascara.  ____  No powder, lotions or creams to surgical area.  ____  Please remove all jewelry, including piercings and leave at home.  ____  No money or valuables needed. Please leave at home.  ____  Please bring any documents given by your doctor.  ____  If going home the same day, arrange for a ride home. You will not be able to             drive if Anesthesia was used.  ____  Children under 18 years require a parent / guardian present the entire time             they are in surgery / recovery.  ____  Wear loose fitting clothing. Allow for dressings, bandages.  ____  Stop Aspirin, Ibuprofen, Motrin, Aleve, Goody's/BC powders, Excedrine and Naproxen (NSAIDS) at least 3-5 days before surgery, unless otherwise instructed by your doctor, or the nurse.   You MAY use Tylenol/acetaminophen until day of surgery.  ____  Wash the surgical area with Hibiclens the night before surgery, and again the             morning of surgery.  Be sure to rinse hibiclens off completely (if instructed by   nurse).  ____  If you take diabetic medication, do not take am of surgery unless instructed by Doctor.  ____  Call MD for temperature above 101 degrees or any other signs of infection such as Urinary (bladder) infection, Upper respiratory infection, skin boils, etc.  ____ Stop taking any Fish Oil supplement or any Vitamins that contain Vitamin E at  least 5 days prior to surgery.  ____ Do Not wear your contact lenses the day of your procedure.  You may wear your glasses.      ____Do not shave surgical site for 3 days prior to surgery.  ____ Practice Good hand washing before, during, and after procedure.      I have read or had read and explained to me, and understand the above information.  Additional comments or instructions:  For additional questions call [phone]      ANESTHESIA SIDE EFFECTS  -For the first 24 hours after surgery:  Do not drive, use heavy equipment, make important decisions, or drink alcohol  -It is normal to feel sleepy for several hours.  Rest until you are more awake.  -Have someone stay with you, if needed.  They can watch for problems and help keep you safe.  -Some possible post anesthesia side effects include: nausea and vomiting, sore throat and hoarseness, sleepiness, and dizziness.        Pre-Op Bathing Instructions    Before surgery, you can play an important role in your own health.    Because skin is not sterile, we need to be sure that your skin is as free of germs as possible. By following the instructions below, you can reduce the number of germs on your skin before surgery.    IMPORTANT: You will need to shower with a special soap called Hibiclens*, available at any pharmacy.  If you are allergic to Chlorhexidine (the antiseptic in Hibiclens), use an antibacterial soap such as Dial Soap for your preoperative shower.  You will shower with Hibiclens both the night before your surgery and the morning of your surgery.  Do not use Hibiclens on the head, face or genitals to avoid injury to those areas.    STEP #1: THE NIGHT BEFORE YOUR SURGERY     Do not shave the area of your body where your surgery will be performed.  Shower and wash your hair and body as usual with your normal soap and shampoo.  Rinse your hair and body thoroughly after you shower to remove all soap residue.  With your hand, apply one packet of Hibiclens soap to  the surgical site.   Wash the site gently for five (5) minutes. Do not scrub your skin too hard.   Do not wash with your regular soap after Hibiclens is used.  Rinse your body thoroughly.  Pat yourself dry with a clean, soft towel.  Do not use lotion, cream, or powder.  Wear clean clothes.    STEP #2: THE MORNING OF YOUR SURGERY     Repeat Step #1.    * Not to be used by people allergic to Chlorhexidine.

## 2022-11-29 NOTE — TELEPHONE ENCOUNTER
Patient notified of appointment on 12/1 at 1pm with Dr. Singh for pulmonology clearance in preparation for surgery with Dr. Aguilar 12/14. He verbalized understanding. All questions and concerns addressed.

## 2022-12-01 ENCOUNTER — OFFICE VISIT (OUTPATIENT)
Dept: PULMONOLOGY | Facility: CLINIC | Age: 67
End: 2022-12-01
Payer: COMMERCIAL

## 2022-12-01 VITALS
WEIGHT: 160.88 LBS | SYSTOLIC BLOOD PRESSURE: 112 MMHG | BODY MASS INDEX: 28.5 KG/M2 | HEART RATE: 73 BPM | OXYGEN SATURATION: 94 % | HEIGHT: 63 IN | DIASTOLIC BLOOD PRESSURE: 73 MMHG

## 2022-12-01 DIAGNOSIS — J43.2 CENTRILOBULAR EMPHYSEMA: ICD-10-CM

## 2022-12-01 PROCEDURE — 3078F DIAST BP <80 MM HG: CPT | Mod: CPTII,S$GLB,, | Performed by: INTERNAL MEDICINE

## 2022-12-01 PROCEDURE — 3078F PR MOST RECENT DIASTOLIC BLOOD PRESSURE < 80 MM HG: ICD-10-PCS | Mod: CPTII,S$GLB,, | Performed by: INTERNAL MEDICINE

## 2022-12-01 PROCEDURE — 3074F SYST BP LT 130 MM HG: CPT | Mod: CPTII,S$GLB,, | Performed by: INTERNAL MEDICINE

## 2022-12-01 PROCEDURE — 1125F AMNT PAIN NOTED PAIN PRSNT: CPT | Mod: CPTII,S$GLB,, | Performed by: INTERNAL MEDICINE

## 2022-12-01 PROCEDURE — 3288F PR FALLS RISK ASSESSMENT DOCUMENTED: ICD-10-PCS | Mod: CPTII,S$GLB,, | Performed by: INTERNAL MEDICINE

## 2022-12-01 PROCEDURE — 99214 OFFICE O/P EST MOD 30 MIN: CPT | Mod: S$GLB,,, | Performed by: INTERNAL MEDICINE

## 2022-12-01 PROCEDURE — 1159F PR MEDICATION LIST DOCUMENTED IN MEDICAL RECORD: ICD-10-PCS | Mod: CPTII,S$GLB,, | Performed by: INTERNAL MEDICINE

## 2022-12-01 PROCEDURE — 99999 PR PBB SHADOW E&M-EST. PATIENT-LVL III: ICD-10-PCS | Mod: PBBFAC,,, | Performed by: INTERNAL MEDICINE

## 2022-12-01 PROCEDURE — 99999 PR PBB SHADOW E&M-EST. PATIENT-LVL III: CPT | Mod: PBBFAC,,, | Performed by: INTERNAL MEDICINE

## 2022-12-01 PROCEDURE — 3008F PR BODY MASS INDEX (BMI) DOCUMENTED: ICD-10-PCS | Mod: CPTII,S$GLB,, | Performed by: INTERNAL MEDICINE

## 2022-12-01 PROCEDURE — 99214 PR OFFICE/OUTPT VISIT, EST, LEVL IV, 30-39 MIN: ICD-10-PCS | Mod: S$GLB,,, | Performed by: INTERNAL MEDICINE

## 2022-12-01 PROCEDURE — 1125F PR PAIN SEVERITY QUANTIFIED, PAIN PRESENT: ICD-10-PCS | Mod: CPTII,S$GLB,, | Performed by: INTERNAL MEDICINE

## 2022-12-01 PROCEDURE — 3074F PR MOST RECENT SYSTOLIC BLOOD PRESSURE < 130 MM HG: ICD-10-PCS | Mod: CPTII,S$GLB,, | Performed by: INTERNAL MEDICINE

## 2022-12-01 PROCEDURE — 1101F PT FALLS ASSESS-DOCD LE1/YR: CPT | Mod: CPTII,S$GLB,, | Performed by: INTERNAL MEDICINE

## 2022-12-01 PROCEDURE — 1101F PR PT FALLS ASSESS DOC 0-1 FALLS W/OUT INJ PAST YR: ICD-10-PCS | Mod: CPTII,S$GLB,, | Performed by: INTERNAL MEDICINE

## 2022-12-01 PROCEDURE — 1160F PR REVIEW ALL MEDS BY PRESCRIBER/CLIN PHARMACIST DOCUMENTED: ICD-10-PCS | Mod: CPTII,S$GLB,, | Performed by: INTERNAL MEDICINE

## 2022-12-01 PROCEDURE — 3008F BODY MASS INDEX DOCD: CPT | Mod: CPTII,S$GLB,, | Performed by: INTERNAL MEDICINE

## 2022-12-01 PROCEDURE — 1160F RVW MEDS BY RX/DR IN RCRD: CPT | Mod: CPTII,S$GLB,, | Performed by: INTERNAL MEDICINE

## 2022-12-01 PROCEDURE — 3288F FALL RISK ASSESSMENT DOCD: CPT | Mod: CPTII,S$GLB,, | Performed by: INTERNAL MEDICINE

## 2022-12-01 PROCEDURE — 1159F MED LIST DOCD IN RCRD: CPT | Mod: CPTII,S$GLB,, | Performed by: INTERNAL MEDICINE

## 2022-12-01 NOTE — PROGRESS NOTES
Subjective:       Patient ID: Fabiano Garvey is a 67 y.o. male.    Chief Complaint: No chief complaint on file.    65 year old male with RA on Rinvoq who presents for evaluation for abnormal CT chest.   Patient states that he went to his PCP earlier this year.   He was found to have ILD on CT chest. Concern that Methotrexate contributing to CT findings.   Former smoker. Quit in 2012.   Denies respiratory symptoms today.      Interval hx 11/10/2021: No acute issues.      Interval hx: 12/1/2022: No issues today. On Spiriva.   Review of Systems   Respiratory:  Negative for cough, shortness of breath, wheezing and dyspnea on extertion.      Objective:      Physical Exam   Constitutional: He appears well-developed and well-nourished. No distress.   Cardiovascular: Normal rate and regular rhythm.   Pulmonary/Chest: Normal expansion and effort normal. He has no rhonchi.   Abdominal: Soft. Bowel sounds are normal. He exhibits no mass. There is no guarding.   Musculoskeletal:         General: No edema. Normal range of motion.      Cervical back: Normal range of motion and neck supple.   Neurological: He is alert.   Skin: Skin is warm and dry. No rash noted. He is not diaphoretic. No erythema.   Psychiatric: He has a normal mood and affect. His behavior is normal.   Nursing note and vitals reviewed.  Personal Diagnostic Review  none pertinent  No flowsheet data found.      Assessment:       No diagnosis found.    Outpatient Encounter Medications as of 12/1/2022   Medication Sig Dispense Refill    atorvastatin (LIPITOR) 40 MG tablet Take 1 tablet (40 mg total) by mouth once daily. 90 tablet 3    celecoxib (CELEBREX) 200 MG capsule Take 1 capsule (200 mg total) by mouth 2 (two) times daily. 60 capsule 2    ergocalciferol (VITAMIN D2) 50,000 unit Cap TAKE 1 CAPSULE BY MOUTH ONE TIME PER WEEK 12 capsule 3    fluticasone propionate (FLONASE) 50 mcg/actuation nasal spray 2 SPRAYS (100 MCG TOTAL) BY EACH NOSTRIL ROUTE ONCE DAILY.  16 mL 1    gabapentin (NEURONTIN) 300 MG capsule Take 2 capsules (600 mg total) by mouth 3 (three) times daily. 180 capsule 11    ibuprofen (ADVIL,MOTRIN) 800 MG tablet Take 1 tablet (800 mg total) by mouth 3 (three) times daily as needed for Pain. 90 tablet 1    imiquimod (ALDARA) 5 % cream Apply small amount to affected area on  right cheek at night  x 4 weeks; discontinue  if ulcerated or bleeding (Patient taking differently: Apply small amount to affected area on  right cheek at night  x 4 weeks; discontinue  if ulcerated or bleeding) 12 packet 1    mupirocin (BACTROBAN) 2 % ointment Apply to Affected Area as directed  twice daily 22 g 3    tiotropium (SPIRIVA) 18 mcg inhalation capsule Inhale 1 capsule (18 mcg total) into the lungs once daily. Controller 30 capsule 6    upadacitinib (RINVOQ) 15 mg 24 hr tablet Take 1 tablet (15 mg total) by mouth once daily. 30 tablet 3    fluorouraciL (EFUDEX) 5 % cream Apply thin film to right cheek and temple 2times per day for 2-3 weeks; d/c if area bleeding or ulcerated; avoid eyes or mouth (Patient not taking: Reported on 11/21/2022) 40 g 1     No facility-administered encounter medications on file as of 12/1/2022.     No orders of the defined types were placed in this encounter.      Plan:     Centrilobular emphysema  Patient doing well on only Spiriva. Patient with moderate obstruction on spirometry. COPD is well controlled. No contrindications to proceed with spine surgery.     Follow up as needed. Yearly follow up ok with NP.     Juan Singh MD

## 2022-12-02 LAB
FINAL PATHOLOGIC DIAGNOSIS: NORMAL
GROSS: NORMAL
Lab: NORMAL
MICROSCOPIC EXAM: NORMAL

## 2022-12-05 NOTE — ASSESSMENT & PLAN NOTE
Patient doing well on only Spiriva. Patient with moderate obstruction on spirometry. COPD is well controlled. No contrindications to proceed with spine surgery.

## 2022-12-06 ENCOUNTER — PATIENT MESSAGE (OUTPATIENT)
Dept: SURGERY | Facility: HOSPITAL | Age: 67
End: 2022-12-06
Payer: COMMERCIAL

## 2022-12-14 ENCOUNTER — TELEPHONE (OUTPATIENT)
Dept: NEUROSURGERY | Facility: CLINIC | Age: 67
End: 2022-12-14
Payer: COMMERCIAL

## 2022-12-14 ENCOUNTER — PATIENT MESSAGE (OUTPATIENT)
Dept: NEUROSURGERY | Facility: CLINIC | Age: 67
End: 2022-12-14
Payer: COMMERCIAL

## 2022-12-14 ENCOUNTER — ANESTHESIA (OUTPATIENT)
Dept: SURGERY | Facility: HOSPITAL | Age: 67
DRG: 455 | End: 2022-12-14
Payer: COMMERCIAL

## 2022-12-14 ENCOUNTER — HOSPITAL ENCOUNTER (INPATIENT)
Facility: HOSPITAL | Age: 67
LOS: 2 days | Discharge: HOME | DRG: 455 | End: 2022-12-16
Attending: NEUROLOGICAL SURGERY | Admitting: NEUROLOGICAL SURGERY
Payer: COMMERCIAL

## 2022-12-14 DIAGNOSIS — M51.36 DEGENERATIVE DISC DISEASE, LUMBAR: ICD-10-CM

## 2022-12-14 DIAGNOSIS — M48.062 SPINAL STENOSIS OF LUMBAR REGION WITH NEUROGENIC CLAUDICATION: ICD-10-CM

## 2022-12-14 DIAGNOSIS — M54.16 LUMBAR RADICULOPATHY: ICD-10-CM

## 2022-12-14 DIAGNOSIS — M51.36 DDD (DEGENERATIVE DISC DISEASE), LUMBAR: Primary | ICD-10-CM

## 2022-12-14 LAB
ABO + RH BLD: NORMAL
BLD GP AB SCN CELLS X3 SERPL QL: NORMAL

## 2022-12-14 PROCEDURE — 22558 PR ARTHRODESIS ANT INTERBODY MIN DISCECTOMY,LUMBAR: ICD-10-PCS | Mod: ,,, | Performed by: NEUROLOGICAL SURGERY

## 2022-12-14 PROCEDURE — 22853 INSJ BIOMECHANICAL DEVICE: CPT | Mod: ,,, | Performed by: NEUROLOGICAL SURGERY

## 2022-12-14 PROCEDURE — 25000003 PHARM REV CODE 250: Performed by: NURSE ANESTHETIST, CERTIFIED REGISTERED

## 2022-12-14 PROCEDURE — 22842 PR POSTERIOR SEGMENTAL INSTRUMENTATION 3-6 VRT SEG: ICD-10-PCS | Mod: ,,, | Performed by: NEUROLOGICAL SURGERY

## 2022-12-14 PROCEDURE — 63600175 PHARM REV CODE 636 W HCPCS: Performed by: NEUROLOGICAL SURGERY

## 2022-12-14 PROCEDURE — 63600175 PHARM REV CODE 636 W HCPCS: Performed by: NURSE PRACTITIONER

## 2022-12-14 PROCEDURE — 20930 PR ALLOGRAFT FOR SPINE SURGERY ONLY MORSELIZED: ICD-10-PCS | Mod: ,,, | Performed by: NEUROLOGICAL SURGERY

## 2022-12-14 PROCEDURE — 22614 PR ARTHRODESIS, POST/POSTLAT, SNGL INTERSPACE, EA ADDTL: ICD-10-PCS | Mod: ,,, | Performed by: NEUROLOGICAL SURGERY

## 2022-12-14 PROCEDURE — 22612 PR ARTHRODESIS, POST/POSTLAT, SNGL INTERSPACE, LUMBAR: ICD-10-PCS | Mod: 51,,, | Performed by: NEUROLOGICAL SURGERY

## 2022-12-14 PROCEDURE — 36415 COLL VENOUS BLD VENIPUNCTURE: CPT | Performed by: NEUROLOGICAL SURGERY

## 2022-12-14 PROCEDURE — 27800903 OPTIME MED/SURG SUP & DEVICES OTHER IMPLANTS: Performed by: NEUROLOGICAL SURGERY

## 2022-12-14 PROCEDURE — P9045 ALBUMIN (HUMAN), 5%, 250 ML: HCPCS | Mod: JG | Performed by: NURSE ANESTHETIST, CERTIFIED REGISTERED

## 2022-12-14 PROCEDURE — D9220A PRA ANESTHESIA: ICD-10-PCS | Mod: ANES,,, | Performed by: ANESTHESIOLOGY

## 2022-12-14 PROCEDURE — 63600175 PHARM REV CODE 636 W HCPCS: Performed by: ANESTHESIOLOGY

## 2022-12-14 PROCEDURE — 37000009 HC ANESTHESIA EA ADD 15 MINS: Performed by: NEUROLOGICAL SURGERY

## 2022-12-14 PROCEDURE — 22612 ARTHRD PST TQ 1NTRSPC LUMBAR: CPT | Mod: 51,,, | Performed by: NEUROLOGICAL SURGERY

## 2022-12-14 PROCEDURE — 94761 N-INVAS EAR/PLS OXIMETRY MLT: CPT

## 2022-12-14 PROCEDURE — 22585 ARTHRD ANT NTRBD MIN DSC EA: CPT | Mod: ,,, | Performed by: NEUROLOGICAL SURGERY

## 2022-12-14 PROCEDURE — 94640 AIRWAY INHALATION TREATMENT: CPT

## 2022-12-14 PROCEDURE — 36000710: Performed by: NEUROLOGICAL SURGERY

## 2022-12-14 PROCEDURE — 20936 PR AUTOGRAFT SPINE SURGERY LOCAL FROM SAME INCISION: ICD-10-PCS | Mod: ,,, | Performed by: NEUROLOGICAL SURGERY

## 2022-12-14 PROCEDURE — 22842 INSERT SPINE FIXATION DEVICE: CPT | Mod: ,,, | Performed by: NEUROLOGICAL SURGERY

## 2022-12-14 PROCEDURE — D9220A PRA ANESTHESIA: Mod: CRNA,,, | Performed by: NURSE ANESTHETIST, CERTIFIED REGISTERED

## 2022-12-14 PROCEDURE — 25000003 PHARM REV CODE 250: Performed by: NEUROLOGICAL SURGERY

## 2022-12-14 PROCEDURE — C1831 OPTIME ANTERIOR AND LATERAL INTERBODY CAGE: HCPCS | Performed by: NEUROLOGICAL SURGERY

## 2022-12-14 PROCEDURE — 71000039 HC RECOVERY, EACH ADD'L HOUR: Performed by: NEUROLOGICAL SURGERY

## 2022-12-14 PROCEDURE — 22853 PR INSERT BIOMECH DEV W/INTERBODY ARTHRODESIS, EA CONTIGUOUS DEFECT: ICD-10-PCS | Mod: ,,, | Performed by: NEUROLOGICAL SURGERY

## 2022-12-14 PROCEDURE — 22614 ARTHRD PST TQ 1NTRSPC EA ADD: CPT | Mod: ,,, | Performed by: NEUROLOGICAL SURGERY

## 2022-12-14 PROCEDURE — D9220A PRA ANESTHESIA: ICD-10-PCS | Mod: CRNA,,, | Performed by: NURSE ANESTHETIST, CERTIFIED REGISTERED

## 2022-12-14 PROCEDURE — 37000008 HC ANESTHESIA 1ST 15 MINUTES: Performed by: NEUROLOGICAL SURGERY

## 2022-12-14 PROCEDURE — 63600175 PHARM REV CODE 636 W HCPCS: Performed by: NURSE ANESTHETIST, CERTIFIED REGISTERED

## 2022-12-14 PROCEDURE — 27000221 HC OXYGEN, UP TO 24 HOURS

## 2022-12-14 PROCEDURE — C1713 ANCHOR/SCREW BN/BN,TIS/BN: HCPCS | Performed by: NEUROLOGICAL SURGERY

## 2022-12-14 PROCEDURE — 86901 BLOOD TYPING SEROLOGIC RH(D): CPT | Performed by: NEUROLOGICAL SURGERY

## 2022-12-14 PROCEDURE — 36000711: Performed by: NEUROLOGICAL SURGERY

## 2022-12-14 PROCEDURE — 20930 SP BONE ALGRFT MORSEL ADD-ON: CPT | Mod: ,,, | Performed by: NEUROLOGICAL SURGERY

## 2022-12-14 PROCEDURE — 22585 PR ARTHRODESIS ANT INTERBODY MIN DISCECTOMY,EA ADDL: ICD-10-PCS | Mod: ,,, | Performed by: NEUROLOGICAL SURGERY

## 2022-12-14 PROCEDURE — 20936 SP BONE AGRFT LOCAL ADD-ON: CPT | Mod: ,,, | Performed by: NEUROLOGICAL SURGERY

## 2022-12-14 PROCEDURE — 27201423 OPTIME MED/SURG SUP & DEVICES STERILE SUPPLY: Performed by: NEUROLOGICAL SURGERY

## 2022-12-14 PROCEDURE — 11000001 HC ACUTE MED/SURG PRIVATE ROOM

## 2022-12-14 PROCEDURE — 25000242 PHARM REV CODE 250 ALT 637 W/ HCPCS: Performed by: NURSE PRACTITIONER

## 2022-12-14 PROCEDURE — D9220A PRA ANESTHESIA: Mod: ANES,,, | Performed by: ANESTHESIOLOGY

## 2022-12-14 PROCEDURE — 22558 ARTHRD ANT NTRBD MIN DSC LUM: CPT | Mod: ,,, | Performed by: NEUROLOGICAL SURGERY

## 2022-12-14 PROCEDURE — 71000033 HC RECOVERY, INTIAL HOUR: Performed by: NEUROLOGICAL SURGERY

## 2022-12-14 PROCEDURE — 94799 UNLISTED PULMONARY SVC/PX: CPT

## 2022-12-14 DEVICE — IMPLANTABLE DEVICE: Type: IMPLANTABLE DEVICE | Site: SPINE LUMBAR | Status: FUNCTIONAL

## 2022-12-14 DEVICE — GRAFT BONE PRIME DBM HD 5CC: Type: IMPLANTABLE DEVICE | Site: SPINE LUMBAR | Status: FUNCTIONAL

## 2022-12-14 DEVICE — SCREW VIPER PRIME XTAB 6X45MM: Type: IMPLANTABLE DEVICE | Site: SPINE LUMBAR | Status: FUNCTIONAL

## 2022-12-14 DEVICE — SCREW VIPER PRIME XTAB 7X45MM: Type: IMPLANTABLE DEVICE | Site: SPINE LUMBAR | Status: FUNCTIONAL

## 2022-12-14 DEVICE — SCREW SET SPINAL SINGLE INNER: Type: IMPLANTABLE DEVICE | Site: SPINE LUMBAR | Status: FUNCTIONAL

## 2022-12-14 DEVICE — GRAFT PRIME DBM HD BONE 1.0CC: Type: IMPLANTABLE DEVICE | Site: SPINE LUMBAR | Status: FUNCTIONAL

## 2022-12-14 RX ORDER — SODIUM CHLORIDE, SODIUM LACTATE, POTASSIUM CHLORIDE, CALCIUM CHLORIDE 600; 310; 30; 20 MG/100ML; MG/100ML; MG/100ML; MG/100ML
INJECTION, SOLUTION INTRAVENOUS CONTINUOUS
Status: DISCONTINUED | OUTPATIENT
Start: 2022-12-14 | End: 2022-12-14

## 2022-12-14 RX ORDER — LIDOCAINE HYDROCHLORIDE 10 MG/ML
1 INJECTION, SOLUTION EPIDURAL; INFILTRATION; INTRACAUDAL; PERINEURAL ONCE
Status: DISCONTINUED | OUTPATIENT
Start: 2022-12-14 | End: 2022-12-14 | Stop reason: HOSPADM

## 2022-12-14 RX ORDER — BISACODYL 10 MG
10 SUPPOSITORY, RECTAL RECTAL DAILY
Status: DISCONTINUED | OUTPATIENT
Start: 2022-12-14 | End: 2022-12-16 | Stop reason: HOSPADM

## 2022-12-14 RX ORDER — ACETAMINOPHEN 325 MG/1
650 TABLET ORAL
Status: COMPLETED | OUTPATIENT
Start: 2022-12-14 | End: 2022-12-14

## 2022-12-14 RX ORDER — CELECOXIB 100 MG/1
200 CAPSULE ORAL 2 TIMES DAILY
Status: DISCONTINUED | OUTPATIENT
Start: 2022-12-14 | End: 2022-12-16 | Stop reason: HOSPADM

## 2022-12-14 RX ORDER — SUCCINYLCHOLINE CHLORIDE 20 MG/ML
INJECTION INTRAMUSCULAR; INTRAVENOUS
Status: DISCONTINUED | OUTPATIENT
Start: 2022-12-14 | End: 2022-12-14

## 2022-12-14 RX ORDER — OXYCODONE HCL 10 MG/1
10 TABLET, FILM COATED, EXTENDED RELEASE ORAL
Status: COMPLETED | OUTPATIENT
Start: 2022-12-14 | End: 2022-12-14

## 2022-12-14 RX ORDER — NEOSTIGMINE METHYLSULFATE 1 MG/ML
INJECTION, SOLUTION INTRAVENOUS
Status: DISCONTINUED | OUTPATIENT
Start: 2022-12-14 | End: 2022-12-14

## 2022-12-14 RX ORDER — CYCLOBENZAPRINE HCL 10 MG
10 TABLET ORAL
Status: COMPLETED | OUTPATIENT
Start: 2022-12-14 | End: 2022-12-14

## 2022-12-14 RX ORDER — MAG HYDROX/ALUMINUM HYD/SIMETH 200-200-20
30 SUSPENSION, ORAL (FINAL DOSE FORM) ORAL EVERY 4 HOURS PRN
Status: DISCONTINUED | OUTPATIENT
Start: 2022-12-14 | End: 2022-12-16 | Stop reason: HOSPADM

## 2022-12-14 RX ORDER — ONDANSETRON 2 MG/ML
4 INJECTION INTRAMUSCULAR; INTRAVENOUS ONCE AS NEEDED
Status: DISCONTINUED | OUTPATIENT
Start: 2022-12-14 | End: 2022-12-14 | Stop reason: HOSPADM

## 2022-12-14 RX ORDER — TRAMADOL HYDROCHLORIDE 50 MG/1
50 TABLET ORAL EVERY 6 HOURS PRN
Status: DISCONTINUED | OUTPATIENT
Start: 2022-12-14 | End: 2022-12-16 | Stop reason: HOSPADM

## 2022-12-14 RX ORDER — CELECOXIB 100 MG/1
200 CAPSULE ORAL
Status: COMPLETED | OUTPATIENT
Start: 2022-12-14 | End: 2022-12-14

## 2022-12-14 RX ORDER — SODIUM CHLORIDE, SODIUM LACTATE, POTASSIUM CHLORIDE, CALCIUM CHLORIDE 600; 310; 30; 20 MG/100ML; MG/100ML; MG/100ML; MG/100ML
INJECTION, SOLUTION INTRAVENOUS CONTINUOUS
Status: DISCONTINUED | OUTPATIENT
Start: 2022-12-14 | End: 2022-12-15

## 2022-12-14 RX ORDER — DEXAMETHASONE SODIUM PHOSPHATE 4 MG/ML
INJECTION, SOLUTION INTRA-ARTICULAR; INTRALESIONAL; INTRAMUSCULAR; INTRAVENOUS; SOFT TISSUE
Status: DISCONTINUED | OUTPATIENT
Start: 2022-12-14 | End: 2022-12-14

## 2022-12-14 RX ORDER — ATORVASTATIN CALCIUM 40 MG/1
40 TABLET, FILM COATED ORAL DAILY
Status: DISCONTINUED | OUTPATIENT
Start: 2022-12-14 | End: 2022-12-14

## 2022-12-14 RX ORDER — ONDANSETRON 2 MG/ML
INJECTION INTRAMUSCULAR; INTRAVENOUS
Status: DISCONTINUED | OUTPATIENT
Start: 2022-12-14 | End: 2022-12-14

## 2022-12-14 RX ORDER — SODIUM CHLORIDE 0.9 % (FLUSH) 0.9 %
10 SYRINGE (ML) INJECTION
Status: DISCONTINUED | OUTPATIENT
Start: 2022-12-14 | End: 2022-12-14 | Stop reason: HOSPADM

## 2022-12-14 RX ORDER — ONDANSETRON 8 MG/1
8 TABLET, ORALLY DISINTEGRATING ORAL EVERY 6 HOURS PRN
Status: DISCONTINUED | OUTPATIENT
Start: 2022-12-14 | End: 2022-12-16 | Stop reason: HOSPADM

## 2022-12-14 RX ORDER — PROPOFOL 10 MG/ML
VIAL (ML) INTRAVENOUS
Status: DISCONTINUED | OUTPATIENT
Start: 2022-12-14 | End: 2022-12-14

## 2022-12-14 RX ORDER — GABAPENTIN 300 MG/1
600 CAPSULE ORAL 3 TIMES DAILY
Status: DISCONTINUED | OUTPATIENT
Start: 2022-12-14 | End: 2022-12-16 | Stop reason: HOSPADM

## 2022-12-14 RX ORDER — HEPARIN SODIUM 5000 [USP'U]/ML
5000 INJECTION, SOLUTION INTRAVENOUS; SUBCUTANEOUS EVERY 12 HOURS
Status: DISCONTINUED | OUTPATIENT
Start: 2022-12-14 | End: 2022-12-16 | Stop reason: HOSPADM

## 2022-12-14 RX ORDER — FENTANYL CITRATE 50 UG/ML
INJECTION, SOLUTION INTRAMUSCULAR; INTRAVENOUS
Status: DISCONTINUED | OUTPATIENT
Start: 2022-12-14 | End: 2022-12-14

## 2022-12-14 RX ORDER — OXYCODONE HYDROCHLORIDE 5 MG/1
10 TABLET ORAL EVERY 4 HOURS PRN
Status: DISCONTINUED | OUTPATIENT
Start: 2022-12-14 | End: 2022-12-16 | Stop reason: HOSPADM

## 2022-12-14 RX ORDER — MUPIROCIN 20 MG/G
OINTMENT TOPICAL 2 TIMES DAILY
Status: DISCONTINUED | OUTPATIENT
Start: 2022-12-14 | End: 2022-12-14 | Stop reason: HOSPADM

## 2022-12-14 RX ORDER — MUPIROCIN 20 MG/G
OINTMENT TOPICAL 2 TIMES DAILY
Status: DISCONTINUED | OUTPATIENT
Start: 2022-12-14 | End: 2022-12-16 | Stop reason: HOSPADM

## 2022-12-14 RX ORDER — HYDROMORPHONE HYDROCHLORIDE 2 MG/ML
2 INJECTION, SOLUTION INTRAMUSCULAR; INTRAVENOUS; SUBCUTANEOUS
Status: DISCONTINUED | OUTPATIENT
Start: 2022-12-14 | End: 2022-12-16 | Stop reason: HOSPADM

## 2022-12-14 RX ORDER — BUPIVACAINE HCL/EPINEPHRINE 0.25-.0005
VIAL (ML) INJECTION
Status: DISCONTINUED | OUTPATIENT
Start: 2022-12-14 | End: 2022-12-14 | Stop reason: HOSPADM

## 2022-12-14 RX ORDER — IPRATROPIUM BROMIDE 0.5 MG/2.5ML
0.5 SOLUTION RESPIRATORY (INHALATION) ONCE
Status: COMPLETED | OUTPATIENT
Start: 2022-12-14 | End: 2022-12-14

## 2022-12-14 RX ORDER — AMOXICILLIN 250 MG
2 CAPSULE ORAL NIGHTLY PRN
Status: DISCONTINUED | OUTPATIENT
Start: 2022-12-14 | End: 2022-12-16 | Stop reason: HOSPADM

## 2022-12-14 RX ORDER — LIDOCAINE HCL/PF 100 MG/5ML
SYRINGE (ML) INTRAVENOUS
Status: DISCONTINUED | OUTPATIENT
Start: 2022-12-14 | End: 2022-12-14

## 2022-12-14 RX ORDER — PROCHLORPERAZINE EDISYLATE 5 MG/ML
5 INJECTION INTRAMUSCULAR; INTRAVENOUS EVERY 6 HOURS PRN
Status: DISCONTINUED | OUTPATIENT
Start: 2022-12-14 | End: 2022-12-16 | Stop reason: HOSPADM

## 2022-12-14 RX ORDER — KETAMINE HCL IN 0.9 % NACL 50 MG/5 ML
SYRINGE (ML) INTRAVENOUS
Status: DISCONTINUED | OUTPATIENT
Start: 2022-12-14 | End: 2022-12-14

## 2022-12-14 RX ORDER — PREGABALIN 75 MG/1
75 CAPSULE ORAL
Status: COMPLETED | OUTPATIENT
Start: 2022-12-14 | End: 2022-12-14

## 2022-12-14 RX ORDER — ATORVASTATIN CALCIUM 40 MG/1
40 TABLET, FILM COATED ORAL NIGHTLY
Status: DISCONTINUED | OUTPATIENT
Start: 2022-12-14 | End: 2022-12-16 | Stop reason: HOSPADM

## 2022-12-14 RX ORDER — ACETAMINOPHEN 325 MG/1
650 TABLET ORAL EVERY 6 HOURS
Status: DISCONTINUED | OUTPATIENT
Start: 2022-12-14 | End: 2022-12-16 | Stop reason: HOSPADM

## 2022-12-14 RX ORDER — HYDROMORPHONE HYDROCHLORIDE 2 MG/ML
0.5 INJECTION, SOLUTION INTRAMUSCULAR; INTRAVENOUS; SUBCUTANEOUS EVERY 5 MIN PRN
Status: DISCONTINUED | OUTPATIENT
Start: 2022-12-14 | End: 2022-12-14 | Stop reason: HOSPADM

## 2022-12-14 RX ORDER — CEFAZOLIN SODIUM 2 G/50ML
2 SOLUTION INTRAVENOUS ONCE
Status: COMPLETED | OUTPATIENT
Start: 2022-12-14 | End: 2022-12-14

## 2022-12-14 RX ORDER — ALBUMIN HUMAN 50 G/1000ML
SOLUTION INTRAVENOUS CONTINUOUS PRN
Status: DISCONTINUED | OUTPATIENT
Start: 2022-12-14 | End: 2022-12-14

## 2022-12-14 RX ORDER — PHENYLEPHRINE HYDROCHLORIDE 10 MG/ML
INJECTION INTRAVENOUS
Status: DISCONTINUED | OUTPATIENT
Start: 2022-12-14 | End: 2022-12-14

## 2022-12-14 RX ORDER — ROCURONIUM BROMIDE 10 MG/ML
INJECTION, SOLUTION INTRAVENOUS
Status: DISCONTINUED | OUTPATIENT
Start: 2022-12-14 | End: 2022-12-14

## 2022-12-14 RX ADMIN — FENTANYL CITRATE 100 MCG: 50 INJECTION, SOLUTION INTRAMUSCULAR; INTRAVENOUS at 08:12

## 2022-12-14 RX ADMIN — ALBUMIN (HUMAN): 12.5 SOLUTION INTRAVENOUS at 09:12

## 2022-12-14 RX ADMIN — SODIUM CHLORIDE, SODIUM LACTATE, POTASSIUM CHLORIDE, AND CALCIUM CHLORIDE: .6; .31; .03; .02 INJECTION, SOLUTION INTRAVENOUS at 09:12

## 2022-12-14 RX ADMIN — MUPIROCIN: 20 OINTMENT TOPICAL at 08:12

## 2022-12-14 RX ADMIN — CELECOXIB 200 MG: 100 CAPSULE ORAL at 08:12

## 2022-12-14 RX ADMIN — SODIUM CHLORIDE, SODIUM LACTATE, POTASSIUM CHLORIDE, AND CALCIUM CHLORIDE: .6; .31; .03; .02 INJECTION, SOLUTION INTRAVENOUS at 11:12

## 2022-12-14 RX ADMIN — PHENYLEPHRINE HYDROCHLORIDE 0.2 MCG/KG/MIN: 10 INJECTION INTRAVENOUS at 08:12

## 2022-12-14 RX ADMIN — ROCURONIUM BROMIDE 20 MG: 10 INJECTION, SOLUTION INTRAVENOUS at 09:12

## 2022-12-14 RX ADMIN — IPRATROPIUM BROMIDE 0.5 MG: 0.5 SOLUTION RESPIRATORY (INHALATION) at 07:12

## 2022-12-14 RX ADMIN — OXYCODONE HYDROCHLORIDE 10 MG: 5 TABLET ORAL at 04:12

## 2022-12-14 RX ADMIN — SODIUM CHLORIDE, SODIUM LACTATE, POTASSIUM CHLORIDE, AND CALCIUM CHLORIDE: .6; .31; .03; .02 INJECTION, SOLUTION INTRAVENOUS at 08:12

## 2022-12-14 RX ADMIN — PHENYLEPHRINE HYDROCHLORIDE 100 MCG: 10 INJECTION INTRAVENOUS at 11:12

## 2022-12-14 RX ADMIN — PHENYLEPHRINE HYDROCHLORIDE 100 MCG: 10 INJECTION INTRAVENOUS at 09:12

## 2022-12-14 RX ADMIN — PROPOFOL 150 MG: 10 INJECTION, EMULSION INTRAVENOUS at 08:12

## 2022-12-14 RX ADMIN — PHENYLEPHRINE HYDROCHLORIDE 50 MCG: 10 INJECTION INTRAVENOUS at 11:12

## 2022-12-14 RX ADMIN — SODIUM CHLORIDE, SODIUM LACTATE, POTASSIUM CHLORIDE, AND CALCIUM CHLORIDE: .6; .31; .03; .02 INJECTION, SOLUTION INTRAVENOUS at 02:12

## 2022-12-14 RX ADMIN — OXYCODONE HYDROCHLORIDE 10 MG: 10 TABLET, FILM COATED, EXTENDED RELEASE ORAL at 06:12

## 2022-12-14 RX ADMIN — ACETAMINOPHEN 650 MG: 325 TABLET ORAL at 11:12

## 2022-12-14 RX ADMIN — SODIUM CHLORIDE: 0.9 INJECTION, SOLUTION INTRAVENOUS at 08:12

## 2022-12-14 RX ADMIN — GABAPENTIN 600 MG: 300 CAPSULE ORAL at 08:12

## 2022-12-14 RX ADMIN — PREGABALIN 75 MG: 75 CAPSULE ORAL at 06:12

## 2022-12-14 RX ADMIN — FENTANYL CITRATE 25 MCG: 50 INJECTION, SOLUTION INTRAMUSCULAR; INTRAVENOUS at 12:12

## 2022-12-14 RX ADMIN — CELECOXIB 200 MG: 100 CAPSULE ORAL at 06:12

## 2022-12-14 RX ADMIN — ROCURONIUM BROMIDE 30 MG: 10 INJECTION, SOLUTION INTRAVENOUS at 08:12

## 2022-12-14 RX ADMIN — CYCLOBENZAPRINE 10 MG: 10 TABLET, FILM COATED ORAL at 06:12

## 2022-12-14 RX ADMIN — GLYCOPYRROLATE 0.6 MG: 0.2 INJECTION, SOLUTION INTRAMUSCULAR; INTRAVITREAL at 11:12

## 2022-12-14 RX ADMIN — SUCCINYLCHOLINE CHLORIDE 120 MG: 20 INJECTION, SOLUTION INTRAMUSCULAR; INTRAVENOUS at 08:12

## 2022-12-14 RX ADMIN — GABAPENTIN 600 MG: 300 CAPSULE ORAL at 02:12

## 2022-12-14 RX ADMIN — LIDOCAINE HYDROCHLORIDE 100 MG: 20 INJECTION, SOLUTION INTRAVENOUS at 08:12

## 2022-12-14 RX ADMIN — FENTANYL CITRATE 25 MCG: 50 INJECTION, SOLUTION INTRAMUSCULAR; INTRAVENOUS at 11:12

## 2022-12-14 RX ADMIN — ATORVASTATIN CALCIUM 40 MG: 40 TABLET, FILM COATED ORAL at 08:12

## 2022-12-14 RX ADMIN — NEOSTIGMINE METHYLSULFATE 5 MG: 1 INJECTION INTRAVENOUS at 11:12

## 2022-12-14 RX ADMIN — HYDROMORPHONE HYDROCHLORIDE 0.5 MG: 2 INJECTION, SOLUTION INTRAMUSCULAR; INTRAVENOUS; SUBCUTANEOUS at 01:12

## 2022-12-14 RX ADMIN — ONDANSETRON 8 MG: 2 INJECTION, SOLUTION INTRAMUSCULAR; INTRAVENOUS at 11:12

## 2022-12-14 RX ADMIN — PHENYLEPHRINE HYDROCHLORIDE 100 MCG: 10 INJECTION INTRAVENOUS at 08:12

## 2022-12-14 RX ADMIN — Medication 25 MG: at 09:12

## 2022-12-14 RX ADMIN — CEFAZOLIN SODIUM 2 G: 2 SOLUTION INTRAVENOUS at 08:12

## 2022-12-14 RX ADMIN — DEXAMETHASONE SODIUM PHOSPHATE 8 MG: 4 INJECTION, SOLUTION INTRA-ARTICULAR; INTRALESIONAL; INTRAMUSCULAR; INTRAVENOUS; SOFT TISSUE at 08:12

## 2022-12-14 RX ADMIN — FENTANYL CITRATE 50 MCG: 50 INJECTION, SOLUTION INTRAMUSCULAR; INTRAVENOUS at 10:12

## 2022-12-14 RX ADMIN — SODIUM CHLORIDE, SODIUM LACTATE, POTASSIUM CHLORIDE, AND CALCIUM CHLORIDE: .6; .31; .03; .02 INJECTION, SOLUTION INTRAVENOUS at 06:12

## 2022-12-14 RX ADMIN — FENTANYL CITRATE 50 MCG: 50 INJECTION, SOLUTION INTRAMUSCULAR; INTRAVENOUS at 09:12

## 2022-12-14 RX ADMIN — Medication 25 MG: at 08:12

## 2022-12-14 RX ADMIN — HEPARIN SODIUM 5000 UNITS: 5000 INJECTION INTRAVENOUS; SUBCUTANEOUS at 08:12

## 2022-12-14 RX ADMIN — ACETAMINOPHEN 650 MG: 325 TABLET ORAL at 06:12

## 2022-12-14 NOTE — ANESTHESIA PROCEDURE NOTES
Arterial    Diagnosis: degenerative disc disease, lumbar    Patient location during procedure: done in OR  Procedure end time: 12/14/2022 8:28 AM    Staffing  Authorizing Provider: Alexia Kwon MD  Performing Provider: Tran Israel CRNA    Anesthesiologist was present at the time of the procedure.    Preanesthetic Checklist  Completed: patient identified, IV checked, site marked, risks and benefits discussed, surgical consent, monitors and equipment checked, pre-op evaluation, timeout performed and anesthesia consent givenArterial  Skin Prep: chlorhexidine gluconate  Local Infiltration: none  Orientation: right  Location: radial    Catheter Size: 20 G  Catheter placement by Anatomical landmarks. Heme positive aspiration all ports. Insertion Attempts: 1  Assessment  Dressing: secured with tape and tegaderm  Patient: Tolerated well

## 2022-12-14 NOTE — PLAN OF CARE
Patient is awake and alert.  Has complaints of pain with movement.  IVF started. Wife at bedside.  Dressings intact to back and left flank.  Safety maintained.  Will continue to monitor.

## 2022-12-14 NOTE — PLAN OF CARE
Problem: Adult Inpatient Plan of Care  Goal: Plan of Care Review  Outcome: Ongoing, Progressing      12/14/22 1420   Admission   Initial VN Admission Questions Complete  (Patient arrived to unit from OR, wife at bedside. Admission questions completed with wife's assistance. POC reviewed, allowed time for questions. Instructed to call for assistance.)   Communication Issues? None   Shift   Virtual Nurse - Rounding Complete   Virtual Nurse - Patient Verbalized Approval Of Camera Use;VN Rounding   Type of Frequent Check   Type Patient Rounds   Safety/Activity   Patient Rounds call light in patient/parent reach;visualized patient   Safety Promotion/Fall Prevention instructed to call staff for mobility;Fall Risk reviewed with patient/family;family to remain at bedside

## 2022-12-14 NOTE — NURSING
Patient transferred to room from PACU.  Patient is awake and alert.  Dressings to left flank are intact with gauze and Tegaderm with old drainage noted to dressings.  Back dressings X2 are intact.  O2 at 3L in use with pulse ox of 96%.  IVF will be started.  Wife is at bedside.  Safety is maintained with bed low, wheels locked and side rails up.  Call light within reach.  Bed alarm on.  Will continue to monitor.

## 2022-12-14 NOTE — TELEPHONE ENCOUNTER
Pt's FMLA forms were scanned into chart under media manager and faxed to [employer] [phone]. Confirmation was received. Pt notified.

## 2022-12-14 NOTE — ANESTHESIA PROCEDURE NOTES
Intubation    Date/Time: 12/14/2022 8:21 AM  Performed by: Tran Israel CRNA  Authorized by: Alexia Kwon MD     Intubation:     Induction:  Intravenous    Intubated:  Postinduction    Mask Ventilation:  Easy mask    Attempts:  1    Attempted By:  Student    Method of Intubation:  Video laryngoscopy    Blade:  Ram 3    Laryngeal View Grade: Grade I - full view of cords      Difficult Airway Encountered?: No      Complications:  None    Airway Device:  Oral endotracheal tube    Airway Device Size:  7.5    Style/Cuff Inflation:  Cuffed (inflated to minimal occlusive pressure)    Tube secured:  22    Secured at:  The lips    Placement Verified By:  Capnometry    Complicating Factors:  None    Findings Post-Intubation:  BS equal bilateral and atraumatic/condition of teeth unchanged

## 2022-12-14 NOTE — TRANSFER OF CARE
"Anesthesia Transfer of Care Note    Patient: Fabiano Garvey    Procedure(s) Performed: Procedure(s) (LRB):  FUSION, SPINE, WITH INSTRUMENTATION Stg1: Left L3-5 OLiF conduit lateral cage BMP (Left)  FUSION,SPINE,POSTERIOR APPROACH Stg 2: L3-5 posterior instrumentation viper prime, L3-5 posterolateral arthrodesis (N/A)    Patient location: PACU    Anesthesia Type: general    Transport from OR: Transported from OR on 6-10 L/min O2 by face mask with adequate spontaneous ventilation    Post pain: adequate analgesia    Post assessment: no apparent anesthetic complications and tolerated procedure well    Post vital signs: stable    Level of consciousness: awake and sedated    Nausea/Vomiting: no nausea/vomiting    Complications: none    Transfer of care protocol was followed      Last vitals:   Visit Vitals  /74 (BP Location: Right arm, Patient Position: Lying)   Pulse 87   Temp 37 °C (98.6 °F)   Resp 16   Ht 5' 6" (1.676 m)   Wt 72.6 kg (160 lb)   SpO2 99%   BMI 25.82 kg/m²     "

## 2022-12-14 NOTE — H&P
NEUROSURGICAL OUTPATIENT CONSULTATION NOTE     DATE OF SERVICE:  22     ATTENDING PHYSICIAN:  August Aguilar MD     CONSULT REQUESTED BY:  GENNARO Anne     REASON FOR CONSULT:  Back pain, left leg pain, difficulty walking     GENE:42%     NRS low back:5-10/10     NRS right le/10     NRS left le/10     Opioid use daily:NA     Neuropathic pain meds daily:600 mg TID     NSAIDs daily:800 mg Ibuprofen     Muscle relaxant daily:NA     Smoking:Former smoker     SUBJECTIVE:     HISTORY:  This is a 67 y.o. male who having worsening low back pain, left leg pain and difficulty walking for several years.  The back pain varies from 5 to 10/10.  Back pain is worse with activity such as standing, walking, leaning forward, lifting.  Pain is also felt in the left leg as a form of numbness below the knee in the L5 distribution.  He cannot walk more than half a mi without the need to sit due to severe back and leg pain.  Does not report motor weakness or sphincter dysfunction symptoms.  He has tried physical therapy for his lumbar condition and has received 3 epidural steroid injection with only 1 week of pain relief.  His condition his progressively getting worse.  It is affecting his quality of life and functional status.  The left L5 distribution numbness has been constant for several months.  He uses a cane due to difficulty walking.         PAST MEDICAL HISTORY:       Active Ambulatory Problems     Diagnosis Date Noted    Hyperlipidemia      Rheumatoid arthritis      Colon cancer screening 2017    Personal history of skin cancer 2017    Elevated LFTs 2019    Obstructive sleep apnea 2019    Interstitial pulmonary fibrosis      ILD (interstitial lung disease) 2020    Chronic bilateral low back pain with right-sided sciatica 2021    Decreased range of motion 2021    Muscle weakness 2021    Lumbar spondylosis 2021    Spinal stenosis of lumbar region with neurogenic  claudication 06/16/2021    DDD (degenerative disc disease), lumbar 06/16/2021    Lumbar radiculopathy 06/16/2021    Chronic pain 06/29/2021    Shortness of breath      Centrilobular emphysema 11/10/2021    Decreased functional mobility 10/31/2022           Resolved Ambulatory Problems     Diagnosis Date Noted    No Resolved Ambulatory Problems           Past Medical History:   Diagnosis Date    NU (obstructive sleep apnea)      Pulmonary fibrosis      SCC (squamous cell carcinoma) 07/2020    SCC (squamous cell carcinoma) 11/2021    SCC (squamous cell carcinoma) 01/2022    Squamous cell carcinoma 07/2019         PAST SURGICAL HISTORY:        Past Surgical History:   Procedure Laterality Date    COLONOSCOPY N/A 7/25/2017     Procedure: COLONOSCOPY;  Surgeon: Bill Nicole MD;  Location: [facility] ENDO [address]);  Service: Endoscopy;  Laterality: N/A;    EPIDURAL STEROID INJECTION INTO LUMBAR SPINE N/A 7/29/2021     Procedure: Injection-steroid-epidural-lumbar L5-S1;  Surgeon: Shiv Vernon Jr., MD;  Location: [facility] PAIN [facility] [place];  Service: Pain Management;  Laterality: N/A;  oral sedation   no pacemaker     NO PAST SURGERIES        TRANSFORAMINAL EPIDURAL INJECTION OF STEROID Right 6/29/2021     Procedure: Injection,steroid,epidural,transforaminal approach-RIGHT-- L4-5, L5-S1-- (IV Sedation);  Surgeon: Shiv Vernon Jr., MD;  Location: [facility] PAIN [facility];  Service: Pain Management;  Laterality: Right;    TRANSFORAMINAL EPIDURAL INJECTION OF STEROID Left 10/27/2022     Procedure: Injection,steroid,epidural,transforaminal left L4-5 and L5-S1;  Surgeon: Shiv Vernon Jr., MD;  Location: [facility] PAIN [facility];  Service: Pain Management;  Laterality: Left;         SOCIAL HISTORY:   Social History               Socioeconomic History    Marital status:    Occupational History       Employer: [employer]   Tobacco Use    Smoking status: Former       Types: Cigarettes       Quit date: 7/12/2012       Years since  quitting: 10.3       Passive exposure: Never    Smokeless tobacco: Never   Substance and Sexual Activity    Alcohol use: Yes       Comment: occasional    Drug use: Never    Sexual activity: Yes       Partners: Female       Birth control/protection: None   Social History Narrative     No aerobic exercise, walks a lot     No diet                                                                                                                                                                                                                                                                                    Social Determinants of Health          Financial Resource Strain: Low Risk     Difficulty of Paying Living Expenses: Not hard at all   Food Insecurity: No Food Insecurity    Worried About Running Out of Food in the Last Year: Never true    Ran Out of Food in the Last Year: Never true   Transportation Needs: No Transportation Needs    Lack of Transportation (Medical): No    Lack of Transportation (Non-Medical): No   Physical Activity: Inactive    Days of Exercise per Week: 0 days    Minutes of Exercise per Session: 0 min   Stress: No Stress Concern Present    Feeling of Stress : Not at all   Social Connections: Unknown    Frequency of Communication with Friends and Family: More than three times a week    Frequency of Social Gatherings with Friends and Family: More than three times a week    Active Member of Clubs or Organizations: No    Attends Club or Organization Meetings: Never    Marital Status:    Housing Stability: Low Risk     Unable to Pay for Housing in the Last Year: No    Number of Places Lived in the Last Year: 1    Unstable Housing in the Last Year: No            FAMILY HISTORY:        Family History   Problem Relation Age of Onset    Heart failure Mother           60s    Coronary artery disease Father           70s    Cancer Paternal Uncle           lymphoma??    Diabetes Neg Hx      Melanoma Neg Hx            CURRENTS MEDICATIONS:         Current Outpatient Medications on File Prior to Visit   Medication Sig Dispense Refill    amoxicillin (AMOXIL) 500 MG capsule Take 1 capsule by mouth every 6 hours until all taken 28 capsule 0    atorvastatin (LIPITOR) 40 MG tablet Take 1 tablet (40 mg total) by mouth once daily. 90 tablet 3    ergocalciferol (VITAMIN D2) 50,000 unit Cap TAKE 1 CAPSULE BY MOUTH ONE TIME PER WEEK 12 capsule 3    fluorouraciL (EFUDEX) 5 % cream Apply thin film to right cheek and temple 2times per day for 2-3 weeks; d/c if area bleeding or ulcerated; avoid eyes or mouth 40 g 1    fluticasone propionate (FLONASE) 50 mcg/actuation nasal spray 2 SPRAYS (100 MCG TOTAL) BY EACH NOSTRIL ROUTE ONCE DAILY. 16 mL 1    gabapentin (NEURONTIN) 300 MG capsule Take 2 capsules (600 mg total) by mouth 3 (three) times daily. 180 capsule 11    ibuprofen (ADVIL,MOTRIN) 800 MG tablet Take 1 tablet (800 mg total) by mouth 3 (three) times daily as needed for Pain. 90 tablet 1    imiquimod (ALDARA) 5 % cream Apply small amount to affected area on  right cheek at night  x 4 weeks; discontinue  if ulcerated or bleeding 12 packet 1    tiotropium (SPIRIVA) 18 mcg inhalation capsule Inhale 1 capsule (18 mcg total) into the lungs once daily. Controller 30 capsule 6    traMADoL (ULTRAM) 50 mg tablet Take 1 tablet (50 mg total) by mouth 2 (two) times a day. for 14 days 28 tablet 0    upadacitinib (RINVOQ) 15 mg 24 hr tablet Take 1 tablet (15 mg total) by mouth once daily. 30 tablet 3                Current Facility-Administered Medications on File Prior to Visit   Medication Dose Route Frequency Provider Last Rate Last Admin    ketorolac injection 30 mg  30 mg Intramuscular 1 time in Clinic/HOD Calin Buchanan MD             ALLERGIES:        Review of patient's allergies indicates:   Allergen Reactions    Plaquenil [hydroxychloroquine]         Lightheaded and muscle aches         REVIEW OF SYSTEMS:  Review of Systems    Constitutional:  Negative for diaphoresis, fever and weight loss.   Respiratory:  Negative for shortness of breath.    Cardiovascular:  Negative for chest pain.   Gastrointestinal:  Negative for blood in stool.   Genitourinary:  Negative for hematuria.   Endo/Heme/Allergies:  Does not bruise/bleed easily.   All other systems reviewed and are negative.     OBJECTIVE:     PHYSICAL EXAMINATION:   There were no vitals filed for this visit.     Physical Exam:  Vitals reviewed.     Constitutional: He appears well-developed and well-nourished.      Eyes: Pupils are equal, round, and reactive to light. Conjunctivae and EOM are normal.      Cardiovascular: Normal distal pulses and no edema.      Abdominal: Soft.      Skin: Skin displays no rash on trunk and no rash on extremities. Skin displays no lesions on trunk and no lesions on extremities.      Psych/Behavior: He is alert. He is oriented to person, place, and time. He has a normal mood and affect.      Musculoskeletal:        Neck: Range of motion is full.      Neurological:        DTRs: Tricep reflexes are 2+ on the right side and 2+ on the left side. Bicep reflexes are 2+ on the right side and 2+ on the left side. Brachioradialis reflexes are 2+ on the right side and 2+ on the left side. Patellar reflexes are 2+ on the right side and 2+ on the left side. Achilles reflexes are 0 on the right side and 0 on the left side.      Back Exam      Tenderness   The patient is experiencing no tenderness.      Range of Motion   Extension:  abnormal   Flexion:  abnormal   Lateral bend right:  normal   Lateral bend left:  normal   Rotation right:  normal   Rotation left:  normal      Muscle Strength   Right Quadriceps:  5/5   Left Quadriceps:  5/5   Right Hamstrings:  5/5   Left Hamstrings:  5/5      Tests   Straight leg raise right: negative  Straight leg raise left: negative     Other   Toe walk: normal  Heel walk: normal                 Neurologic Exam      Mental Status    Oriented to person, place, and time.   Speech: speech is normal   Level of consciousness: alert     Cranial Nerves   Cranial nerves II through XII intact.      CN III, IV, VI   Pupils are equal, round, and reactive to light.  Extraocular motions are normal.      Motor Exam   Muscle bulk: normal  Overall muscle tone: normal     Strength   Right deltoid: 5/5  Left deltoid: 5/5  Right biceps: 5/5  Left biceps: 5/5  Right triceps: 5/5  Left triceps: 5/5  Right wrist flexion: 5/5  Left wrist flexion: 5/5  Right wrist extension: 5/5  Left wrist extension: 5/5  Right interossei: 5/5  Left interossei: 5/5  Right iliopsoas: 5/5  Left iliopsoas: 5/5  Right quadriceps: 5/5  Left quadriceps: 5/5  Right hamstrin/5  Left hamstrin/5  Right anterior tibial: 5/5  Left anterior tibial: 5/5  Right posterior tibial: 5/5  Left posterior tibial: 5/5  Right peroneal: 5/5  Left peroneal: 5/5  Right gastroc: 5/5  Left gastroc: 5/5     Sensory Exam   Light touch normal.   Pinprick normal.      Gait, Coordination, and Reflexes      Gait  Gait: (Antalgic)     Coordination   Finger to nose coordination: normal  Tandem walking coordination: normal     Reflexes   Right brachioradialis: 2+  Left brachioradialis: 2+  Right biceps: 2+  Left biceps: 2+  Right triceps: 2+  Left triceps: 2+  Right patellar: 2+  Left patellar: 2+  Right achilles: 0  Left achilles: 0  Right plantar: normal  Left plantar: normal  Right Gill: absent  Left Gill: absent  Right ankle clonus: absent  Left ankle clonus: absent        DIAGNOSTIC DATA:  I personally interpreted the following imaging:   Lumbar spine MRI showed grade 2 L4-5 spondylolisthesis with severe spinal stenosis, right more than left foraminal stenosis, L3-4 degenerative disc disease with severe stenosis, left L3-4 disc herniation causing lateral recess stenosis, the L2-3 and L5-S1 disc appear LT     Lumbar flexion-extension x-ray shows L4-5 anterolisthesis measured at 10 mm in extension and  13 mm in flexion     Scoliosis parameters     PI 66 deg  LL 62 deg           ASSESMENT:  This is a 67 y.o. male with      Problem List Items Addressed This Visit                  Neuro     Spinal stenosis of lumbar region with neurogenic claudication      Other Visit Diagnoses         Spondylolisthesis at L4-L5 level    -  Primary     Relevant Orders     X-Ray Scoliosis Complete     Acute midline low back pain with left-sided sciatica         Lumbar radiculopathy, chronic         Degenerative disc disease, lumbar                    PLAN:  I explained the natural history of the disease and all treatment options. I recommended a left minimally invasive L4-5, L3-4 oblique interbody fusion with placement of interbody spacer using allograft BMP, minimally invasive L3-L5 posterior segmental instrumentation and posterolateral arthrodesis.      We have discussed the risks of surgery including death, coma, bleeding, infection, failure of surgery, CSF leak, nerve root injury, spinal cord injury, ureter injury, weakness, paralysis, peripheral neuropathy, malplaced hardware, migration of hardware, non-union, need for reoperation. Patient understands the risks and would like to proceed with surgery.        The patient has increase perioperative risks because of these comorbidities:  Rheumatoid arthritis, managed by rheumatology, osteopenia patient is on vitamin-D supplementation I recommended to add calcium supplements.         August Aguilar MD  Cell:[phone]

## 2022-12-14 NOTE — PT/OT/SLP PROGRESS
Physical Therapy      Patient Name:  Fabiano Garvey   MRN:  [id number]    Patient not seen today secondary to  (pt just transferred to the floor, is groggy at this time. Pt's wife at bedside reporting pt's LSO is in her car, asked her to bring it to pt's room as able). Will follow-up.    12/14/2022

## 2022-12-14 NOTE — OP NOTE
Date of surgery 12/14/2022    Preop diagnosis   1. L4-5 degenerative spondylolisthesis with spinal stenosis and radiculopathy  2. L3-4 degenerative disc disease with spinal stenosis and radiculopathy    Postop diagnosis   Same    Surgery   1. Left L3-4, L4-5 oblique interbody fusion, placement of interbody spacer, De[employer]ynthe[employer] Conduit cage filled with DBM  2. Posterior L3 through L5 segmental instrumentation using [employer] Viper Prime system  3. Posterolateral arthrodesis at L3-4, L4-5 with autograft harvested from the same incisions facets fusion  4. Fluoroscopy  5. neuro monitoring using SSEP and EMG    Surgeon   August Aguilar MD    Indication  This is a 67 y.o. male who having worsening low back pain, left leg pain and difficulty walking for several years.  The back pain varies from 5 to 10/10.  Back pain is worse with activity such as standing, walking, leaning forward, lifting.  Pain is also felt in the left leg as a form of numbness below the knee in the L5 distribution.  He cannot walk more than half a mi without the need to sit due to severe back and leg pain.  Does not report motor weakness or sphincter dysfunction symptoms.  He has tried physical therapy for his lumbar condition and has received 3 epidural steroid injection with only 1 week of pain relief.  His condition his progressively getting worse.  It is affecting his quality of life and functional status.  The left L5 distribution numbness has been constant for several months.  He uses a cane due to difficulty walking.      Procedure  The patient was intubated under general anesthesia and positioned in lateral decubitus, on a radiolucent table, all pressure points were carefully padded.  The left flank and abdominal area was prepped and draped in typical sterile fashion.  Using fluoroscopy we planned 2 anterolateral incision 1 at L3-4 and 1 at L4-5.  Anesthesia with 0.5 Marcaine with epi.  The skin was incised and orthostatic retractor were positioned.   The external oblique fascia was divided sharply, the external oblique, internal oblique, transversalis muscles were divided bluntly and the retroperitoneal space was dissected with a finger.  The corridor was created in front of the psoas muscle in serial dilator was used to put a final 3 blade retractor right in front of the L4-5 disc space at the junction of the psoas muscle.  Blunt dissection and using the angled Barbara we divided the ipsilateral and contralateral annulus and resected all disc material from the endplates.  We then used serial trials and placed a final interbody spacer measuring 18 mm x 12 mm with 8 degree of lordosis by 55 mm in the L4-5 disc space.  The spacer was filled with DBM to achieve the arthrodesis.  The same procedure was done at L3-4 with the same type of spacer.  The retractor was removed, hemostasis and irrigation.  The muscle and fascial layer was closed with interrupted 0 Vicryl.  The dermal layer was closed with inverted interrupted 2-0 Vicryl.  The skin was closed with staples.    The patient was then flipped prone on the Marcin table.  All pressure points were carefully padded.  The thoracolumbar area was prepped and draped in a typical sterile fashion.  Using fluoroscopy we planned bilateral 4 cm incision at 40 mm off midline.  Local anesthesia with 0.5 Marcaine with epi.  The skin was incised and the fat layer was divided down to the thoracolumbar fascia.  Using AP and lateral dural fluoroscopy we then cannulated the pedicles of L3, L4, L5 using a 5 0 tap.  We then placed bilateral 6.0 x 45 mm screws at L3 and L4 and 7.0 x 45 mm screws at L5.  Good positioning of the hardware was confirmed with fluoroscopy.  We then used serial dilation and placed a tubular retractor measuring 18 mm x 6 cm at the junction of the L3-4 facet joints bilaterally and L4-5 facet joints bilaterally and the muscle layer was dissected over the facet joints.  The facet capsules were burned with the  Bovie and the joints was drilled to achieve the posterolateral arthrodesis.  The bone dust was left in place.  This way we achieve posterolateral arthrodesis at L3-4 and L4-5 bilaterally.    We then measured contoured titanium rods and reduced the rods on the screw Tulip towers with caps.  All caps were torqued.  The Tulip tabs were snapped.  Good alignment and positioning of the hardware was confirmed with AP and lateral fluoroscopy.  Abundant irrigation and hemostasis.  The thoracolumbar fascia was closed with interrupted 0 Vicryl.  The dermal layer was closed with inverted interrupted 3-0 Vicryl.  The skin was closed with staples.  No complication.  Blood loss was estimated at 100 cc.

## 2022-12-15 LAB
ANION GAP SERPL CALC-SCNC: 8 MMOL/L (ref 8–16)
BASOPHILS # BLD AUTO: 0.01 K/UL (ref 0–0.2)
BASOPHILS NFR BLD: 0.1 % (ref 0–1.9)
BUN SERPL-MCNC: 17 MG/DL (ref 8–23)
CALCIUM SERPL-MCNC: 8.6 MG/DL (ref 8.7–10.5)
CHLORIDE SERPL-SCNC: 105 MMOL/L (ref 95–110)
CO2 SERPL-SCNC: 26 MMOL/L (ref 23–29)
CREAT SERPL-MCNC: 0.8 MG/DL (ref 0.5–1.4)
DIFFERENTIAL METHOD: ABNORMAL
EOSINOPHIL # BLD AUTO: 0 K/UL (ref 0–0.5)
EOSINOPHIL NFR BLD: 0 % (ref 0–8)
ERYTHROCYTE [DISTWIDTH] IN BLOOD BY AUTOMATED COUNT: 13.9 % (ref 11.5–14.5)
EST. GFR  (NO RACE VARIABLE): >60 ML/MIN/1.73 M^2
GLUCOSE SERPL-MCNC: 101 MG/DL (ref 70–110)
HCT VFR BLD AUTO: 34.7 % (ref 40–54)
HGB BLD-MCNC: 11.3 G/DL (ref 14–18)
IMM GRANULOCYTES # BLD AUTO: 0.06 K/UL (ref 0–0.04)
IMM GRANULOCYTES NFR BLD AUTO: 0.5 % (ref 0–0.5)
LYMPHOCYTES # BLD AUTO: 1.1 K/UL (ref 1–4.8)
LYMPHOCYTES NFR BLD: 9.4 % (ref 18–48)
MCH RBC QN AUTO: 31.7 PG (ref 27–31)
MCHC RBC AUTO-ENTMCNC: 32.6 G/DL (ref 32–36)
MCV RBC AUTO: 98 FL (ref 82–98)
MONOCYTES # BLD AUTO: 1.1 K/UL (ref 0.3–1)
MONOCYTES NFR BLD: 9.1 % (ref 4–15)
NEUTROPHILS # BLD AUTO: 9.5 K/UL (ref 1.8–7.7)
NEUTROPHILS NFR BLD: 80.9 % (ref 38–73)
NRBC BLD-RTO: 0 /100 WBC
PLATELET # BLD AUTO: 149 K/UL (ref 150–450)
PMV BLD AUTO: 11.3 FL (ref 9.2–12.9)
POTASSIUM SERPL-SCNC: 4.5 MMOL/L (ref 3.5–5.1)
RBC # BLD AUTO: 3.56 M/UL (ref 4.6–6.2)
SODIUM SERPL-SCNC: 139 MMOL/L (ref 136–145)
WBC # BLD AUTO: 11.73 K/UL (ref 3.9–12.7)

## 2022-12-15 PROCEDURE — 94799 UNLISTED PULMONARY SVC/PX: CPT

## 2022-12-15 PROCEDURE — 25000242 PHARM REV CODE 250 ALT 637 W/ HCPCS: Performed by: NEUROLOGICAL SURGERY

## 2022-12-15 PROCEDURE — 97535 SELF CARE MNGMENT TRAINING: CPT

## 2022-12-15 PROCEDURE — 63600175 PHARM REV CODE 636 W HCPCS: Performed by: NEUROLOGICAL SURGERY

## 2022-12-15 PROCEDURE — 94761 N-INVAS EAR/PLS OXIMETRY MLT: CPT

## 2022-12-15 PROCEDURE — 97165 OT EVAL LOW COMPLEX 30 MIN: CPT

## 2022-12-15 PROCEDURE — 36415 COLL VENOUS BLD VENIPUNCTURE: CPT | Performed by: NEUROLOGICAL SURGERY

## 2022-12-15 PROCEDURE — 85025 COMPLETE CBC W/AUTO DIFF WBC: CPT | Performed by: NEUROLOGICAL SURGERY

## 2022-12-15 PROCEDURE — 97161 PT EVAL LOW COMPLEX 20 MIN: CPT

## 2022-12-15 PROCEDURE — 97116 GAIT TRAINING THERAPY: CPT

## 2022-12-15 PROCEDURE — 97530 THERAPEUTIC ACTIVITIES: CPT

## 2022-12-15 PROCEDURE — 99900035 HC TECH TIME PER 15 MIN (STAT)

## 2022-12-15 PROCEDURE — 80048 BASIC METABOLIC PNL TOTAL CA: CPT | Performed by: NEUROLOGICAL SURGERY

## 2022-12-15 PROCEDURE — 97110 THERAPEUTIC EXERCISES: CPT

## 2022-12-15 PROCEDURE — 11000001 HC ACUTE MED/SURG PRIVATE ROOM

## 2022-12-15 PROCEDURE — 27000221 HC OXYGEN, UP TO 24 HOURS

## 2022-12-15 PROCEDURE — 25000003 PHARM REV CODE 250: Performed by: NEUROLOGICAL SURGERY

## 2022-12-15 PROCEDURE — 94640 AIRWAY INHALATION TREATMENT: CPT

## 2022-12-15 RX ORDER — METHOCARBAMOL 750 MG/1
750 TABLET, FILM COATED ORAL 3 TIMES DAILY PRN
Status: DISCONTINUED | OUTPATIENT
Start: 2022-12-15 | End: 2022-12-16 | Stop reason: HOSPADM

## 2022-12-15 RX ADMIN — TIOTROPIUM BROMIDE INHALATION SPRAY 2 PUFF: 3.12 SPRAY, METERED RESPIRATORY (INHALATION) at 08:12

## 2022-12-15 RX ADMIN — OXYCODONE HYDROCHLORIDE 10 MG: 5 TABLET ORAL at 08:12

## 2022-12-15 RX ADMIN — OXYCODONE HYDROCHLORIDE 10 MG: 5 TABLET ORAL at 05:12

## 2022-12-15 RX ADMIN — HEPARIN SODIUM 5000 UNITS: 5000 INJECTION INTRAVENOUS; SUBCUTANEOUS at 08:12

## 2022-12-15 RX ADMIN — ATORVASTATIN CALCIUM 40 MG: 40 TABLET, FILM COATED ORAL at 08:12

## 2022-12-15 RX ADMIN — CELECOXIB 200 MG: 100 CAPSULE ORAL at 08:12

## 2022-12-15 RX ADMIN — ACETAMINOPHEN 650 MG: 325 TABLET ORAL at 11:12

## 2022-12-15 RX ADMIN — MUPIROCIN: 20 OINTMENT TOPICAL at 08:12

## 2022-12-15 RX ADMIN — ACETAMINOPHEN 650 MG: 325 TABLET ORAL at 05:12

## 2022-12-15 RX ADMIN — TRAMADOL HYDROCHLORIDE 50 MG: 50 TABLET, COATED ORAL at 08:12

## 2022-12-15 RX ADMIN — SODIUM CHLORIDE, SODIUM LACTATE, POTASSIUM CHLORIDE, AND CALCIUM CHLORIDE: .6; .31; .03; .02 INJECTION, SOLUTION INTRAVENOUS at 08:12

## 2022-12-15 RX ADMIN — GABAPENTIN 600 MG: 300 CAPSULE ORAL at 03:12

## 2022-12-15 RX ADMIN — SENNOSIDES AND DOCUSATE SODIUM 2 TABLET: 8.6; 5 TABLET ORAL at 08:12

## 2022-12-15 RX ADMIN — GABAPENTIN 600 MG: 300 CAPSULE ORAL at 08:12

## 2022-12-15 NOTE — PROGRESS NOTES
[facility] - [facility]  Neurosurgery  Progress Note    Subjective:     Interval History: Back pain.  Left foot paresthesias.  Has not gotten up yet.  Voiding.    History of Present Illness:     This is a 67 y.o. male who having worsening low back pain, left leg pain and difficulty walking for several years.  The back pain varies from 5 to 10/10.  Back pain is worse with activity such as standing, walking, leaning forward, lifting.  Pain is also felt in the left leg as a form of numbness below the knee in the L5 distribution.  He cannot walk more than half a mi without the need to sit due to severe back and leg pain.  Does not report motor weakness or sphincter dysfunction symptoms.  He has tried physical therapy for his lumbar condition and has received 3 epidural steroid injection with only 1 week of pain relief.  His condition his progressively getting worse.  It is affecting his quality of life and functional status.  The left L5 distribution numbness has been constant for several months.  He uses a cane due to difficulty walking.        Post-Op Info:  Procedure(s) (LRB):  FUSION, SPINE, WITH INSTRUMENTATION Stg1: Left L3-5 OLiF conduit lateral cage BMP (Left)  FUSION,SPINE,POSTERIOR APPROACH Stg 2: L3-5 posterior instrumentation viper prime, L3-5 posterolateral arthrodesis (N/A)   1 Day Post-Op      Medications:  Continuous Infusions:  Scheduled Meds:   acetaminophen  650 mg Oral Q6H    atorvastatin  40 mg Oral QHS    bisacodyL  10 mg Rectal Daily    celecoxib  200 mg Oral BID    gabapentin  600 mg Oral TID    heparin (porcine)  5,000 Units Subcutaneous Q12H    mupirocin   Nasal BID    tiotropium bromide  2 puff Inhalation Daily     PRN Meds:aluminum-magnesium hydroxide-simethicone, HYDROmorphone, ondansetron, oxyCODONE, prochlorperazine, senna-docusate 8.6-50 mg, traMADoL     Review of Systems  Objective:     Weight: 74 kg (163 lb 2.3 oz)  Body mass index is 26.33 kg/m².  Vital Signs (Most Recent):  Temp: 98.4 °F  (36.9 °C) (12/15/22 1121)  Pulse: 83 (12/15/22 1121)  Resp: 18 (12/15/22 1121)  BP: (!) 95/55 (12/15/22 1121)  SpO2: (!) 91 % (12/15/22 1121)   Vital Signs (24h Range):  Temp:  [96.9 °F (36.1 °C)-98.5 °F (36.9 °C)] 98.4 °F (36.9 °C)  Pulse:  [66-94] 83  Resp:  [12-20] 18  SpO2:  [91 %-100 %] 91 %  BP: ()/(55-92) 95/55     Date 12/15/22 0700 - 12/16/22 0659   Shift 1091-4115 1460-0638 4006-4320 24 Hour Total   INTAKE   Shift Total(mL/kg)       OUTPUT   Urine(mL/kg/hr) 600   600   Shift Total(mL/kg) 600(8.1)   600(8.1)   Weight (kg) 74 74 74 74                        Neurosurgery Physical Exam    General: well developed, well nourished, no distress  Mental Status: Awake, Alert, Oriented X3.Thought content appropriate  GCS: Motor: 6/Verbal: 5/Eyes: 4 GCS Total: 15    Motor Strength:  Strength                                HF KF KE DF PF EHL   Lower: R 5/5 5/5 5/5 5/5 5/5 5/5    L 5/5 5/5 5/5 5/5 5/5 5/5       Incision- CDI        Significant Labs:  Recent Labs   Lab 12/15/22  0435         K 4.5      CO2 26   BUN 17   CREATININE 0.8   CALCIUM 8.6*     Recent Labs   Lab 12/15/22  0435   WBC 11.73   HGB 11.3*   HCT 34.7*   *     No results for input(s): LABPT, INR, APTT in the last 48 hours.  Microbiology Results (last 7 days)       ** No results found for the last 168 hours. **              Assessment/Plan:     Active Diagnoses:    Diagnosis Date Noted POA    PRINCIPAL PROBLEM:  Degenerative disc disease, lumbar [M51.36] 06/16/2021 Yes      Problems Resolved During this Admission:     A/P:  POD #1 left L3-4, L4-5 OLIF with L3-5 posterior instrumentation     --Neurologically stable          -q4h neuro checks  --Imaging: Post op xrays pending    --Pain control: Tylenol 650mg Q 6 hours, Tramadol 50mg Q 6 hours PRN, Oxycodone IR 10mg Q 4 hours PRN, Will add Robaxin 750mg TID, Dilaudid 2mg SQ Q 3 hours PRN, celebrex 200mg BID, gabapentin 600mg TID   --DVT ppx: TEDs/SCDs/SQH  --Activity: PT/OT,  OOB LSO brace  --Diet: Regular, Heplock IV  --Bowel regimen: PRN suppository, senna PRN  --Urinary: Voiding spontaneously  --Atelectasis ppx: Encourage IS hourly     Dispo: Pending PT/OT recs, imaging    All of the above discussed and reviewed with Dr. Aguilar.    FERNANDO LazaroC  Neurosurgery  [facility]

## 2022-12-15 NOTE — PLAN OF CARE
AOX4. VS stable. Safety maintained. Meds given per MAR. Pain treated with scheduled meds. Denies N/V at this time. Traore catheter in place draining clear yellow urine. Dressings to left side and back monitored. IV fluids infusing per orders. Clear liquid diet maintained. Resting quietly. SR up X 2. Call light in reach. Bed alarm set. Will continue to monitor.         Problem: Adult Inpatient Plan of Care  Goal: Plan of Care Review  Outcome: Ongoing, Progressing  Goal: Patient-Specific Goal (Individualized)  Outcome: Ongoing, Progressing     Problem: Obstructive Sleep Apnea Risk or Actual Comorbidity Management  Goal: Unobstructed Breathing During Sleep  Outcome: Ongoing, Progressing     Problem: Pain (Spinal Surgery)  Goal: Acceptable Pain Control  Outcome: Ongoing, Progressing

## 2022-12-15 NOTE — PROGRESS NOTES
Future Appointments   Date Time Provider Department Center   12/22/2022 11:00 AM [facility] ODC XR-A LIMIT 350 LBS [facility] XRAY OP [place] [facility]   12/23/2022  8:30 AM Kelsy Leslie PA-C [facility] NEUROSU [facility]   1/24/2023 10:00 AM [facility] ODC XR-A LIMIT 350 LBS [facility] XRAY OP [facility]   1/25/2023  3:30 PM Kelsy Leslie PA-C [facility] NEUROSU [place] [facility]   1/30/2023 12:30 PM Laverne Graham MD [facility] DERMSUR [place]   2/1/2023  4:00 PM Margaret Brenner MD [facility] RMTLGY [place]   3/17/2023 11:00 AM [facility] ODC XR-A LIMIT 350 LBS [facility] XRAY OP [place] [facility]   3/20/2023  3:30 PM August Aguilar MD [facility] NEUROSU [place] [facility]

## 2022-12-15 NOTE — PLAN OF CARE
pt POD #1  fusion 12/14 -- dx:  Spinal Stenosis     CM met with pt and wife Rhona      at discharge pt will recuperate with daughter Trish Chapa [phone] delivered to pt today as recc by PT   [facility] then transition to [facility] (will 1st book with [facility] then staff with [facility])     CM asked Neurosurgeon/PA to check to see if xray and f/u apt can be completed in one day as pt is traveling from [facility].    Wife will transport pt to home at d/c.      Future Appointments   Date Time Provider Department Center   12/22/2022 11:00 AM [facility]DC XR-A LIMIT 350 LBS [facility] XRAY OP [facility]   12/23/2022  8:30 AM Kelsy Leslie PA-C [facility] NEUROSU [facility]   1/24/2023 10:00 AM [facility] ODC XR-A LIMIT 350 LBS [facility] XRAY OP [facility]   1/25/2023  3:30 PM Kelsy Leslie PA-C [facility] NEUROSU [place] [facility]   1/30/2023 12:30 PM Laverne Graham MD [facility] DERMSUR [place]   2/1/2023  4:00 PM [facility] [place]   3/17/2023 11:00 AM [facility] ODC XR-A LIMIT 350 LBS [facility] XRAY OP [facility]   3/20/2023  3:30 PM August Aguilar MD [facility] NEUROSU [facility]

## 2022-12-15 NOTE — PLAN OF CARE
Problem: Physical Therapy  Goal: Physical Therapy Goal  Description: Goals to be met by: 1/15/23     Patient will increase functional independence with mobility by performin. Supine <> sit with Supervision  2. Sit to stand transfer with Supervision  3. Bed to chair transfer with Supervision using Rolling Walker  4. Gait  x 100 feet with Supervision using Rolling Walker.   5. Lower extremity exercise program x10 reps per handout, with supervision    Outcome: Ongoing, Progressing     PT evaluation completed, note to follow. Pt and pt's wife educated on spinal precautions, use of LSO, LE exercises, and mobility post spinal surgery. Pt ambulated 55 ft with RW and CGA/SBA. Recommending HH PT/OT and RW for home use.

## 2022-12-15 NOTE — PLAN OF CARE
Patient on oxygen @ documented setting. Attempt to wean FiO2 per [facility] oxygen protocol. Patient instructed on benefits of therapy.

## 2022-12-15 NOTE — PROGRESS NOTES
CM notes therapy recc RW for pt (will need HH/pt/ot)   [facility]

## 2022-12-15 NOTE — PT/OT/SLP EVAL
Physical Therapy Evaluation    Patient Name:  Fabiano Garvey   MRN:  [id number]    Recommendations:     Discharge Recommendations: home health PT, home health OT   Discharge Equipment Recommendations: walker, rolling   Barriers to Discharge: None    Assessment:     Fabiano Garvey is a 67 y.o. male admitted with a medical diagnosis of Degenerative disc disease, lumbar.  He presents with the following impairments/functional limitations: weakness, impaired endurance, impaired sensation, impaired self care skills, impaired functional mobility, gait instability, decreased lower extremity function, pain, orthopedic precautions. Pt and pt's wife educated on spinal precautions, use of LSO, LE exercises, and mobility post spinal surgery. Pt ambulated 55 ft with RW and CGA/SBA. Recommending HH PT/OT and RW for home use.    Rehab Prognosis: Good; patient would benefit from acute skilled PT services to address these deficits and reach maximum level of function.    Recent Surgery: Procedure(s) (LRB):  FUSION, SPINE, WITH INSTRUMENTATION Stg1: Left L3-5 OLiF conduit lateral cage BMP (Left)  FUSION,SPINE,POSTERIOR APPROACH Stg 2: L3-5 posterior instrumentation viper prime, L3-5 posterolateral arthrodesis (N/A) 1 Day Post-Op    Plan:     During this hospitalization, patient to be seen daily to address the identified rehab impairments via gait training, therapeutic activities, therapeutic exercises, neuromuscular re-education and progress toward the following goals:    Plan of Care Expires:  01/15/23    Subjective     Chief Complaint: pain during transition from supine to sit and sit to stand  Patient/Family Comments/goals: pt is pleasant and motivated to participate in therapy session  Pain/Comfort:  Pain Rating 1: 4/10  Location - Orientation 1: lower  Location 1: back  Pain Addressed 1: Pre-medicate for activity, Reposition, Distraction  Pain Rating Post-Intervention 1: 3/10    Patients cultural, spiritual, Sikhism  conflicts given the current situation: no    Living Environment:  Pt lives with his wife in a [facility] with threshold entrance and WIS with grab bar. Pt will stay with his daughter for ~1 week - [facility] with threshold entrance as well.  Prior to admission, patients level of function was independent without AD for mobility and ADLs, drives and works from home.  Equipment used at home: none (has access to family member's shower chair and w/c).  DME owned (not currently used):  does not use any DME .  Upon discharge, patient will have assistance from his wife and daughter.    Objective:     Communicated with nurse prior to session. Patient found HOB elevated with bed alarm  upon PT entry to room.    General Precautions: Standard, fall  Orthopedic Precautions:N/A   Braces: N/A  Placed on RA during session and SaO2 was 93-95% following activity.    Exams:  Gross Motor Coordination:  WFL  Postural Exam:  Patient presented with the following abnormalities:    -       Rounded shoulders  -       Forward head  Sensation:    -       Impaired  baseline decreased sensation to dorsal aspect of L foot - rating it ~60-70% compared to normal  Skin Integrity/Edema:      -       Skin integrity: surgical incisions to back with dressing in place  RLE ROM: WFL  RLE Strength: WFLs - limited resistance applied to hip flexion  LLE ROM: WFL except hip flexion limited  LLE Strength: WFL except hip flexion 3-/5    Functional Mobility:  Bed Mobility:     Rolling Right: contact guard assistance  Scooting: stand by assistance  Supine to Sit: minimum assistance and for slight boost to raise trunk into seated position  Transfers:     Sit to Stand:  contact guard assistance with rolling walker  Bed to Chair: stand by assistance and contact guard assistance with  rolling walker  using  Step Transfer  Gait: 55 ft with RW and CGA/SBA - educated on 3-point gait pattern then pt progressed to step through gait pattern. Pt reporting no increase in pain with  ambulation      AM-PAC 6 CLICK MOBILITY  Total Score:18       Treatment & Education:  Pt educated on role of PT and pt agreeable to participate in therapy session.  Educated on supine LE exercises: APs, heel slides  Educated on spinal precautions and log roll method to transition to sit EOB.  Pt reporting some increase in pain during supine>sit transition which improved upon sitting EOB.  Completed stand and ambulation as reported above then sat up in chair.  Educated on car transfers, step negotiation, and LE exercises.    Patient left up in chair with all lines intact, call button in reach, chair alarm on, nurse notified, and pt's wife present.    GOALS:   Multidisciplinary Problems       Physical Therapy Goals          Problem: Physical Therapy    Goal Priority Disciplines Outcome Goal Variances Interventions   Physical Therapy Goal     PT, PT/OT Ongoing, Progressing     Description: Goals to be met by: 1/15/23     Patient will increase functional independence with mobility by performin. Supine <> sit with Supervision  2. Sit to stand transfer with Supervision  3. Bed to chair transfer with Supervision using Rolling Walker  4. Gait  x 100 feet with Supervision using Rolling Walker.   5. Lower extremity exercise program x10 reps per handout, with supervision                         History:     Past Medical History:   Diagnosis Date    Hyperlipidemia     NU (obstructive sleep apnea)     Pulmonary fibrosis     Rheumatoid arthritis     SCC (squamous cell carcinoma) 2020    SCC right medial canthus    SCC (squamous cell carcinoma) 2021    mid lower neck     SCC (squamous cell carcinoma) 2022    right lateral cheek- in situ    Squamous cell carcinoma 2019    SCC in situ left temple       Past Surgical History:   Procedure Laterality Date    COLONOSCOPY N/A 2017    Procedure: COLONOSCOPY;  Surgeon: Bill Nicole MD;  Location: [address];  Service: Endoscopy;  Laterality: N/A;     EPIDURAL STEROID INJECTION INTO LUMBAR SPINE N/A 7/29/2021    Procedure: Injection-steroid-epidural-lumbar L5-S1;  Surgeon: Shiv Vernon Jr., MD;  Location: [facility] PAIN MGT;  Service: Pain Management;  Laterality: N/A;  oral sedation   no pacemaker     FUSION OF SPINE WITH INSTRUMENTATION Left 12/14/2022    Procedure: FUSION, SPINE, WITH INSTRUMENTATION Stg1: Left L3-5 OLiF conduit lateral cage BMP;  Surgeon: August Aguilar MD;  Location: [facility] OR;  Service: Neurosurgery;  Laterality: Left;  Procedure: Stg1: Left L3-5 OLiF conduit lateral cage BMP  Surgery Time: 2hrs  LOS: 3  Anesthesia: General  Blood: Type & Screen  Radiology: C-arm  Brace: LSO  Bed: Regular Bed  Position: Lateral Right Down  Equip    FUSION, SPINE, POSTERIOR APPROACH N/A 12/14/2022    Procedure: FUSION,SPINE,POSTERIOR APPROACH Stg 2: L3-5 posterior instrumentation viper prime, L3-5 posterolateral arthrodesis;  Surgeon: August Aguilar MD;  Location: [facility] OR;  Service: Neurosurgery;  Laterality: N/A;  Procedure: Stg 2: L3-5 posterior instrumentation viper prime, L3-5 posterolateral arthrodesis  Surgery Time: 3hrs  LOS: 3  Anesthesia: General  Blood: Type & Screen  Radiology: Bra    NO PAST SURGERIES      TRANSFORAMINAL EPIDURAL INJECTION OF STEROID Right 6/29/2021    Procedure: Injection,steroid,epidural,transforaminal approach-RIGHT-- L4-5, L5-S1-- (IV Sedation);  Surgeon: Shiv Vernon Jr., MD;  Location: [facility];  Service: Pain Management;  Laterality: Right;    TRANSFORAMINAL EPIDURAL INJECTION OF STEROID Left 10/27/2022    Procedure: Injection,steroid,epidural,transforaminal left L4-5 and L5-S1;  Surgeon: Shiv Vernon Jr., MD;  Location: [facility];  Service: Pain Management;  Laterality: Left;       Time Tracking:     PT Received On: 12/15/22  PT Start Time: 0930     PT Stop Time: 1018  PT Total Time (min): 48 min     Billable Minutes: Evaluation 10, Gait Training 15, Therapeutic Activity 8, and Therapeutic Exercise  15      12/15/2022

## 2022-12-16 VITALS
HEIGHT: 66 IN | SYSTOLIC BLOOD PRESSURE: 114 MMHG | OXYGEN SATURATION: 98 % | DIASTOLIC BLOOD PRESSURE: 74 MMHG | WEIGHT: 163.13 LBS | BODY MASS INDEX: 26.22 KG/M2 | HEART RATE: 76 BPM | RESPIRATION RATE: 20 BRPM | TEMPERATURE: 98 F

## 2022-12-16 PROCEDURE — 63600175 PHARM REV CODE 636 W HCPCS: Performed by: NEUROLOGICAL SURGERY

## 2022-12-16 PROCEDURE — 97530 THERAPEUTIC ACTIVITIES: CPT | Mod: CQ

## 2022-12-16 PROCEDURE — 25000003 PHARM REV CODE 250: Performed by: NEUROLOGICAL SURGERY

## 2022-12-16 PROCEDURE — 97530 THERAPEUTIC ACTIVITIES: CPT | Mod: CO

## 2022-12-16 PROCEDURE — 94640 AIRWAY INHALATION TREATMENT: CPT

## 2022-12-16 PROCEDURE — 99900035 HC TECH TIME PER 15 MIN (STAT)

## 2022-12-16 PROCEDURE — 97116 GAIT TRAINING THERAPY: CPT | Mod: CQ

## 2022-12-16 PROCEDURE — 94760 N-INVAS EAR/PLS OXIMETRY 1: CPT

## 2022-12-16 RX ORDER — METHOCARBAMOL 750 MG/1
750 TABLET, FILM COATED ORAL 3 TIMES DAILY PRN
Qty: 45 TABLET | Refills: 3 | Status: SHIPPED | OUTPATIENT
Start: 2022-12-16 | End: 2022-12-31

## 2022-12-16 RX ORDER — OXYCODONE HYDROCHLORIDE 10 MG/1
10 TABLET ORAL EVERY 6 HOURS PRN
Qty: 28 TABLET | Refills: 0 | Status: ON HOLD | OUTPATIENT
Start: 2022-12-16 | End: 2023-05-11 | Stop reason: HOSPADM

## 2022-12-16 RX ADMIN — CELECOXIB 200 MG: 100 CAPSULE ORAL at 09:12

## 2022-12-16 RX ADMIN — GABAPENTIN 600 MG: 300 CAPSULE ORAL at 09:12

## 2022-12-16 RX ADMIN — OXYCODONE HYDROCHLORIDE 10 MG: 5 TABLET ORAL at 12:12

## 2022-12-16 RX ADMIN — MUPIROCIN: 20 OINTMENT TOPICAL at 09:12

## 2022-12-16 RX ADMIN — HEPARIN SODIUM 5000 UNITS: 5000 INJECTION INTRAVENOUS; SUBCUTANEOUS at 09:12

## 2022-12-16 RX ADMIN — TIOTROPIUM BROMIDE INHALATION SPRAY 2 PUFF: 3.12 SPRAY, METERED RESPIRATORY (INHALATION) at 08:12

## 2022-12-16 RX ADMIN — ACETAMINOPHEN 650 MG: 325 TABLET ORAL at 05:12

## 2022-12-16 NOTE — PT/OT/SLP EVAL
Occupational Therapy   Evaluation, tx    Name: Fabiano Garvey  MRN: [id number]  Admitting Diagnosis: Degenerative disc disease, lumbar  Recent Surgery: Procedure(s) (LRB):  FUSION, SPINE, WITH INSTRUMENTATION Stg1: Left L3-5 OLiF conduit lateral cage BMP (Left)  FUSION,SPINE,POSTERIOR APPROACH Stg 2: L3-5 posterior instrumentation viper prime, L3-5 posterolateral arthrodesis (N/A) 1 Day Post-Op  The primary encounter diagnosis was DDD (degenerative disc disease), lumbar. Diagnoses of Degenerative disc disease, lumbar, Lumbar radiculopathy, and Spinal stenosis of lumbar region with neurogenic claudication were also pertinent to this visit.    Recommendations:     Discharge Recommendations: home health OT, home health PT  Discharge Equipment Recommendations:  bedside commode  Barriers to discharge:  None    Assessment:     Fabiano Garvey is a 67 y.o. male with a medical diagnosis of Degenerative disc disease, lumbar.  He presents with Performance deficits affecting function: weakness, impaired self care skills, impaired functional mobility, gait instability, decreased lower extremity function, decreased safety awareness, pain, decreased ROM, impaired skin, orthopedic precautions.      Pt reports minmal low back pain, found sitting on toilet without LSO brace. OT re-educated pt in the impt of wearing LSO brace and spinal precautions. He and spouse verbalized understanding. Pt. performed toileting SBA with RW,  toilet tf SBA with RW and GB, g/h standing at sink SBA with RW, LE dressing Max assist socks, UE dressing Min A for LSO brace to don and doff. OT initiated family training with spouse in spinal precautions -no bending, lifting or twisting as relates to bed mobility including log roll, LE  drsg tech, standing ADLS with RW for safety, fall prevention, bed positioning with pilow under knees to protect low back in supine. Pt would benefit from 3n1 commode for home safety. HHOT recommended at discharge.  Continue  with OT POC.       Rehab Prognosis: Good; patient would benefit from acute skilled OT services to address these deficits and reach maximum level of function.       Plan:     Patient to be seen 5 x/week to address the above listed problems via self-care/home management, therapeutic activities, therapeutic exercises  Plan of Care Expires: 01/15/23  Plan of Care Reviewed with: patient, spouse    Subjective     Chief Complaint: minimal low back pain  Patient/Family Comments/goals: none    Occupational Profile:  Living Environment: Pt lives with his wife in a [facility] with threshold entrance and WIS with grab bar. Pt will stay with his daughter for ~1 week - [facility] with threshold entrance as well.  Previous level of function: Indep ADLS, ambulated Indep without a device  Roles and Routines: active  Equipment Used at Home: none  Assistance upon Discharge: spouse, daughter    Pain/Comfort:  Pain Rating 1: 3/10  Location - Side 1: Bilateral  Location - Orientation 1: lower  Location 1: back  Pain Addressed 1: Reposition, Distraction  Pain Rating Post-Intervention 1: 3/10    Patients cultural, spiritual, Mu-ism conflicts given the current situation: no    Objective:     Communicated with: nurse, PT prior to session.  Patient found  on toilet  with bed alarm, chair check upon OT entry to room.    General Precautions: Standard, fall  Orthopedic Precautions: spinal precautions  Braces: LSO  Respiratory Status: Room air    Occupational Performance:    Bed Mobility:    Patient completed Scooting/Bridging with stand by assistance  Patient completed Sit to Supine with stand by assistance and log roll    Functional Mobility/Transfers:  Patient completed Sit <> Stand Transfer with stand by assistance  with  rolling walker and grab bars(s)   Patient completed Toilet Transfer Step Transfer technique with stand by assistance with  rolling walker  Functional Mobility: ambulated SBA with RW short distance in room from bathroom to sink and  back to bed; min vc for spinal precautions-no twisting especially on turns, slight instability, no LOB    Activities of Daily Living:  Feeding:  independence .  Grooming: stand by assistance washed hands standing inside RW at sink  Upper Body Dressing: minimum assistance to don/doff LSO brace standing to don and supine to doff  Lower Body Dressing: maximal assistance socks in bed  Toileting: stand by assistance with RW    Cognitive/Visual Perceptual:  Cognitive/Psychosocial Skills:     -       Oriented to: Person, Place, Time, and Situation   -       Follows Commands/attention:Follows multistep  commands  -       Communication: clear/fluent  -       Memory: Poor immediate recall of spinal precautions, repeated vc to uncross ankles lying in bed  -       Safety awareness/insight to disability: impaired   -       Mood/Affect/Coping skills/emotional control: Cooperative  Visual/Perceptual:      -Intact .    Physical Exam:  Balance:    -       sitting: good  dynamic: fair plus 2/2 spinal prec.  Standng: fair plus  dynamic: fair  Postural examination/scapula alignment:    -       No postural abnormalities identified  Skin integrity: post op dressing low back intact  Dominant hand:    -       right  Upper Extremity Range of Motion:     -       Right Upper Extremity: WFL  -       Left Upper Extremity: WFL  Upper Extremity Strength:    -       Right Upper Extremity: WFL  -       Left Upper Extremity: WFL   Strength:    -       Right Upper Extremity: WFL  -       Left Upper Extremity: WFL    AMPAC 6 Click ADL:  AMPAC Total Score: 18    Treatment & Education:  purpose of OT & POC, spinal precautions no bending, lifting or twisting as relates to bed mobility including log roll, LE  drsg tech, standing ADLS with RW for safety, fall prevention, bed positioning with pilow under knees to protect low back in supine     Patient left HOB elevated with all lines intact, call button in reach, bed alarm on, nurse notified, and spouse  present    GOALS:   Multidisciplinary Problems       Occupational Therapy Goals          Problem: Occupational Therapy    Goal Priority Disciplines Outcome Interventions   Occupational Therapy Goal     OT, PT/OT Ongoing, Progressing    Description: Goals to be met by: 12/31/2022     Patient will increase functional independence with ADLs by performing:    UE Dressing with Modified [place].  LE Dressing with Modified [place] and Assistive Devices as needed.  Grooming while standing at sink with Modified [place].  Toileting from toilet with Modified [place] for hygiene and clothing management.   Supine to sit with Modified [place].  Toilet transfer to toilet with Modified [place].                         History:     Past Medical History:   Diagnosis Date    Hyperlipidemia     NU (obstructive sleep apnea)     Pulmonary fibrosis     Rheumatoid arthritis     SCC (squamous cell carcinoma) 07/2020    SCC right medial canthus    SCC (squamous cell carcinoma) 11/2021    mid lower neck     SCC (squamous cell carcinoma) 01/2022    right lateral cheek- in situ    Squamous cell carcinoma 07/2019    SCC in situ left temple         Past Surgical History:   Procedure Laterality Date    COLONOSCOPY N/A 7/25/2017    Procedure: COLONOSCOPY;  Surgeon: Bill Nicole MD;  Location: [address];  Service: Endoscopy;  Laterality: N/A;    EPIDURAL STEROID INJECTION INTO LUMBAR SPINE N/A 7/29/2021    Procedure: Injection-steroid-epidural-lumbar L5-S1;  Surgeon: Shiv Vernon Jr., MD;  Location: [facility] PAIN [facility];  Service: Pain Management;  Laterality: N/A;  oral sedation   no pacemaker     FUSION OF SPINE WITH INSTRUMENTATION Left 12/14/2022    Procedure: FUSION, SPINE, WITH INSTRUMENTATION Stg1: Left L3-5 OLiF conduit lateral cage BMP;  Surgeon: August Aguilar MD;  Location: [facility] OR;  Service: Neurosurgery;  Laterality: Left;  Procedure: Stg1: Left L3-5 OLiF conduit lateral cage BMP  Surgery Time:  2hrs  LOS: 3  Anesthesia: General  Blood: Type & Screen  Radiology: C-arm  Brace: LSO  Bed: Regular Bed  Position: Lateral Right Down  Equip    FUSION, SPINE, POSTERIOR APPROACH N/A 12/14/2022    Procedure: FUSION,SPINE,POSTERIOR APPROACH Stg 2: L3-5 posterior instrumentation viper prime, L3-5 posterolateral arthrodesis;  Surgeon: August Aguilar MD;  Location: [facility] OR;  Service: Neurosurgery;  Laterality: N/A;  Procedure: Stg 2: L3-5 posterior instrumentation viper prime, L3-5 posterolateral arthrodesis  Surgery Time: 3hrs  LOS: 3  Anesthesia: General  Blood: Type & Screen  Radiology: Bra    NO PAST SURGERIES      TRANSFORAMINAL EPIDURAL INJECTION OF STEROID Right 6/29/2021    Procedure: Injection,steroid,epidural,transforaminal approach-RIGHT-- L4-5, L5-S1-- (IV Sedation);  Surgeon: Shiv Vernon Jr., MD;  [place] [facility] [place] [facility]  Service: Pain Management;  Laterality: Right;    TRANSFORAMINAL EPIDURAL INJECTION OF STEROID Left 10/27/2022    Procedure: Injection,steroid,epidural,transforaminal left L4-5 and L5-S1;  Surgeon: Shiv Vernon Jr., MD;  Location: [facility] PAIN T;  Service: Pain Management;  Laterality: Left;       Time Tracking:     OT Date of Treatment: 12/15/22  OT Start Time: 1325  OT Stop Time: 1353  OT Total Time (min): 28 min    Billable Minutes:Evaluation 10  Self Care/Home Management 18  Total Time 28    12/15/2022

## 2022-12-16 NOTE — PT/OT/SLP PROGRESS
Occupational Therapy   Treatment    Name: Fabiano Garvey  MRN: [id number]  Admitting Diagnosis:  Degenerative disc disease, lumbar  2 Days Post-Op    Recommendations:     Discharge Recommendations: home health OT, home health PT  Discharge Equipment Recommendations:  bedside commode  Barriers to discharge:  None    Assessment:     Fabiano Garvey is a 67 y.o. male with a medical diagnosis of Degenerative disc disease, lumbar.  Performance deficits affecting function are weakness, impaired endurance, impaired self care skills, impaired functional mobility, gait instability, impaired balance, decreased upper extremity function, decreased lower extremity function, pain, orthopedic precautions, decreased ROM.     Rehab Prognosis:  Good; patient would benefit from acute skilled OT services to address these deficits and reach maximum level of function.       Plan:     Patient to be seen 5 x/week to address the above listed problems via self-care/home management, therapeutic activities, therapeutic exercises  Plan of Care Expires: 01/15/23  Plan of Care Reviewed with: patient    Subjective     Pain/Comfort:  Pain Rating 1:  (did not rate; at rest minimal pain)  Location - Orientation 1: lower  Location 1: back    Objective:     Communicated with: Amisha flores prior to session.  Patient found up in chair with chair check upon OT entry to room.    General Precautions: Standard, fall    Orthopedic Precautions:spinal precautions  Braces: LSO  Respiratory Status: Room air    AMPAC 6 Click ADL: 18    Treatment & Education:  Educated on role of OT  Reviewed spinal precautions (able to recall 3/3)  Issued gait belt and educated on use/wear  Discussed HH therapy and message sent to  re: patient desire for therapy upon D/C    Patient left up in chair with all lines intact, call button in reach, and wife present    GOALS:   Multidisciplinary Problems       Occupational Therapy Goals       Not on file               Multidisciplinary Problems (Resolved)          Problem: Occupational Therapy    Goal Priority Disciplines Outcome Interventions   Occupational Therapy Goal   (Resolved)     OT, PT/OT Met    Description: Goals to be met by: 12/31/2022     Patient will increase functional independence with ADLs by performing:    UE Dressing with Modified [place].  LE Dressing with Modified [place] and Assistive Devices as needed.  Grooming while standing at sink with Modified [place].  Toileting from toilet with Modified [place] for hygiene and clothing management.   Supine to sit with Modified [place].  Toilet transfer to toilet with Modified [place].                         Time Tracking:     OT Date of Treatment: 12/16/22  OT Start Time: 1100  OT Stop Time: 1115  OT Total Time (min): 15 min    Billable Minutes:Therapeutic Activity 15    OT/MANAD: MANDA     MANDA Visit Number: 1    12/16/2022

## 2022-12-16 NOTE — NURSING
AOX4. VS stable. Safety maintained. Meds given per MAR. Pain treated with scheduled meds. Dressings to left side and mid back intact with scant drainage. Regular diet maintained. Neurovascular checks Q 4. Resting quietly. SR up X 2. Call light in reach. Bed alarm set. Will continue to monitor.

## 2022-12-16 NOTE — PROGRESS NOTES
Future Appointments   Date Time Provider Department Center   12/23/2022  7:15 AM [facility]DC XR-A LIMIT 350 LBS [facility] XRAY OP [facility]   12/23/2022  8:30 AM Kelsy Leslie PA-C [facility] NEUROSU [facility]   1/24/2023 10:00 AM [facility]DC XR-A LIMIT 350 LBS [facility] XRAY OP [facility]   1/25/2023  3:30 PM Kelsy Leslie PA-C [facility] NEUROSU [facility]   1/30/2023 12:30 PM Laverne Graham MD [facility] DERMSUR [facility]   2/1/2023  4:00 PM Margaret Brenner MD [facility] RMTLGY [place]   3/17/2023 11:00 AM [facility] ODC XR-A LIMIT 350 LBS [facility] XRAY OP [facility]   3/20/2023  3:30 PM August Aguilar MD [facility] NEUROSU [facility]

## 2022-12-16 NOTE — DISCHARGE INSTRUCTIONS
Remain active with walking and other light activities daily.  No heavy lifting, no extreme bending or twisting.     Wear LSO brace when sitting or standing.    Remove dressing on tomorrow.  Ok to shower on tomorrow, but do not immerse wound in water

## 2022-12-16 NOTE — PROGRESS NOTES
NEUROSURGICAL POST-OPERATIVE PROGRESS NOTE    DATE OF SERVICE:  12/16/2022      ATTENDING PHYSICIAN:  August Aguilar MD    SUBJECTIVE:    INTERIM HISTORY:    This is a very pleasant 67 y.o. y.o. male, who is status to L3-L5 oblique interbody fusion with posterior instrumented fusion.  Doing well.  Mobilizing okay.  No new onset of motor weakness or numbness.  Pain well controlled.  Voiding appropriately.  No significant abdominal pain.               OBJECTIVE:    PHYSICAL EXAMINATION:   Vitals:    12/16/22 0755   BP: 114/74   Pulse: 68   Resp: 18   Temp: 98.2 °F (36.8 °C)       Physical Exam:  Vitals reviewed.    Constitutional: He appears well-developed and well-nourished.     Eyes: Pupils are equal, round, and reactive to light. Conjunctivae and EOM are normal.     Cardiovascular: Normal distal pulses and no edema.     Abdominal: Soft.     Skin: Skin displays no rash on trunk and no rash on extremities. Skin displays no lesions on trunk and no lesions on extremities.     Psych/Behavior: He is alert. He is oriented to person, place, and time. He has a normal mood and affect.     Musculoskeletal:        Neck: Range of motion is full.     Ortho Exam    Neurologic Exam     Mental Status   Oriented to person, place, and time.     Cranial Nerves     CN III, IV, VI   Pupils are equal, round, and reactive to light.  Extraocular motions are normal.     Motor Exam     Strength   Strength 5/5 throughout.     Wound CDI    DIAGNOSTIC DATA:    X-ray of the lumbar spine, AP and lateral pending    ASSESMENT:    This is a 67 y.o. male who is s/p day 2 L3-5 fusion, doing well.     Problem List Items Addressed This Visit          Neuro    Spinal stenosis of lumbar region with neurogenic claudication    Relevant Orders    WALKER FOR HOME USE    DISCHARGE PATIENT    * (Principal) Degenerative disc disease, lumbar - Primary    Relevant Orders    WALKER FOR HOME USE    Lumbar radiculopathy    Relevant Orders    WALKER FOR HOME USE        PLAN:    DC home today  See dc instructions and prescriptions        August Aguilar MD  Pager: [phone]

## 2022-12-16 NOTE — PROGRESS NOTES
[facility]                    Pharmacy       Discharge Medication Education    Patient ACCEPTED medication education. Pharmacy has provided education on the name, indication, and possible side effects of the medication(s) prescribed, using teach-back method.     The following medications have also been discussed, during this admission.        Medication List        START taking these medications      methocarbamoL 750 MG Tab  Commonly known as: ROBAXIN  Take 1 tablet (750 mg total) by mouth 3 (three) times daily as needed (pain).     oxyCODONE 10 mg Tab immediate release tablet  Commonly known as: ROXICODONE  Take 1 tablet (10 mg total) by mouth every 6 (six) hours as needed for Pain.            CONTINUE taking these medications      atorvastatin 40 MG tablet  Commonly known as: LIPITOR  Take 1 tablet (40 mg total) by mouth once daily.     celecoxib 200 MG capsule  Commonly known as: CeleBREX  Take 1 capsule (200 mg total) by mouth 2 (two) times daily.     fluticasone propionate 50 mcg/actuation nasal spray  Commonly known as: FLONASE  2 SPRAYS (100 MCG TOTAL) BY EACH NOSTRIL ROUTE ONCE DAILY.     gabapentin 300 MG capsule  Commonly known as: NEURONTIN  Take 2 capsules (600 mg total) by mouth 3 (three) times daily.     ibuprofen 800 MG tablet  Commonly known as: ADVIL,MOTRIN  Take 1 tablet (800 mg total) by mouth 3 (three) times daily as needed for Pain.     imiquimod 5 % cream  Commonly known as: ALDARA  Apply small amount to affected area on  right cheek at night  x 4 weeks; discontinue  if ulcerated or bleeding     mupirocin 2 % ointment  Commonly known as: BACTROBAN  Apply to Affected Area as directed  twice daily     RINVOQ 15 mg 24 hr tablet  Generic drug: upadacitinib  Take 1 tablet (15 mg total) by mouth once daily.     SPIRIVA WITH HANDIHALER 18 mcg inhalation capsule  Generic drug: tiotropium  Inhale 1 capsule (18 mcg total) into the lungs once daily. Controller     VITAMIN D2 50,000  unit Cap  Generic drug: ergocalciferol  TAKE 1 CAPSULE BY MOUTH ONE TIME PER WEEK            ASK your doctor about these medications      fluorouraciL 5 % cream  Commonly known as: EFUDEX  Apply thin film to right cheek and temple 2times per day for 2-3 weeks; d/c if area bleeding or ulcerated; avoid eyes or mouth               Where to Get Your Medications        These medications were sent to [facility]  [address], [place] [address]      Hours: Mon-Fri, 8a-5:30p Phone: [phone]   methocarbamoL 750 MG Tab  oxyCODONE 10 mg Tab immediate release tablet          Thank you  Alivia Vivar, PharmD  [phone]

## 2022-12-16 NOTE — PLAN OF CARE
Problem: Adult Inpatient Plan of Care  Goal: Plan of Care Review  12/16/2022 1124 by Katie Nicole RN  Outcome: Met  Discharge education completed via teach-back method. Patient verbalized understanding. Additional clinical references attached for informational purposes.   Medication picked up from pharmacy per wife. Floor nurse notified. Stable for discharge.

## 2022-12-16 NOTE — ANESTHESIA POSTPROCEDURE EVALUATION
Anesthesia Post Evaluation    Patient: Fabiano Garvey    Procedure(s) Performed: Procedure(s) (LRB):  FUSION, SPINE, WITH INSTRUMENTATION Stg1: Left L3-5 OLiF conduit lateral cage BMP (Left)  FUSION,SPINE,POSTERIOR APPROACH Stg 2: L3-5 posterior instrumentation viper prime, L3-5 posterolateral arthrodesis (N/A)    Final Anesthesia Type: general      Patient location during evaluation: PACU  Patient participation: Yes- Able to Participate  Level of consciousness: awake and alert  Post-procedure vital signs: reviewed and stable  Pain management: adequate  Airway patency: patent    PONV status at discharge: No PONV  Anesthetic complications: no      Cardiovascular status: blood pressure returned to baseline  Respiratory status: unassisted  Hydration status: euvolemic  Follow-up not needed.          Vitals Value Taken Time   /74 12/16/22 0755   Temp 36.8 °C (98.2 °F) 12/16/22 0755   Pulse 76 12/16/22 0829   Resp 16 12/16/22 0829   SpO2 98 % 12/16/22 0829         Event Time   Out of Recovery 13:57:28         Pain/Briana Score: Pain Rating Prior to Med Admin: 2 (12/16/2022  5:33 AM)  Pain Rating Post Med Admin: 3 (12/15/2022  8:21 PM)

## 2022-12-16 NOTE — PLAN OF CARE
KEYUR clarified with Neurosurgeon - pt doesn't need HH at d/c   CM called and spoke with pt to convey this info -- pt encouraged to walk - also advised pt to call Surgeon's office if he has any concerns.      RW delivered to pt prior to d/c   Pt has transportation to home   Discharging nurse to review all d/c meds/instructions.   ---------------------------------  CM contacted by Therapist - they asked if HH could be ordered as pt feels it would be of benefit - Neurosurgeon consented to HH - orders to be placed.    KEYUR spoke with  with [facility]  --- she will re-book - pt will be admitted to [place] office but admit will be by  office nurse as pt will be in [facility].  Care will transfer to [place] when pt returns to home.   Pt and wife informed.          12/16/22 0923   Final Note   Assessment Type Final Discharge Note   Anticipated Discharge Disposition Home   What phone number can be called within the next 1-3 days to see how you are doing after discharge? [phone]   Hospital Resources/Appts/Education Provided Appointments scheduled and added to AVS   Post-Acute Status   Discharge Delays None known at this time

## 2022-12-16 NOTE — PLAN OF CARE
Problem: Occupational Therapy  Goal: Occupational Therapy Goal  Description: Goals to be met by: 12/31/2022     Patient will increase functional independence with ADLs by performing:    UE Dressing with Modified [place].  LE Dressing with Modified [place] and Assistive Devices as needed.  Grooming while standing at sink with Modified [place].  Toileting from toilet with Modified [place] for hygiene and clothing management.   Supine to sit with Modified [place].  Toilet transfer to toilet with Modified [place].    Outcome: Ongoing, Progressing

## 2022-12-16 NOTE — PLAN OF CARE
Problem: Adult Inpatient Plan of Care  Goal: Plan of Care Review  Outcome: Ongoing, Progressing    Chart check complete. Vitals, orders, labs, and progress notes reviewed. Care plan updated. Will monitor.

## 2022-12-17 PROCEDURE — G0180 MD CERTIFICATION HHA PATIENT: HCPCS | Mod: ,,, | Performed by: NEUROLOGICAL SURGERY

## 2022-12-17 PROCEDURE — G0180 PR HOME HEALTH MD CERTIFICATION: ICD-10-PCS | Mod: ,,, | Performed by: NEUROLOGICAL SURGERY

## 2022-12-17 NOTE — PLAN OF CARE
Problem: Physical Therapy  Goal: Physical Therapy Goal  Description: Goals to be met by: 1/15/23     Patient will increase functional independence with mobility by performin. Supine <> sit with Supervision  2. Sit to stand transfer with Supervision  3. Bed to chair transfer with Supervision using Rolling Walker  4. Gait  x 100 feet with Supervision using Rolling Walker.   5. Lower extremity exercise program x10 reps per handout, with supervision    2022 by Viviana Baptiste PTA  Outcome: Ongoing, Progressing  2022 by Viviana Baptiste PTA  Reactivated   Continue working toward goals.

## 2022-12-19 ENCOUNTER — SPECIALTY PHARMACY (OUTPATIENT)
Dept: PHARMACY | Facility: CLINIC | Age: 67
End: 2022-12-19
Payer: COMMERCIAL

## 2022-12-19 ENCOUNTER — PATIENT MESSAGE (OUTPATIENT)
Dept: NEUROSURGERY | Facility: CLINIC | Age: 67
End: 2022-12-19
Payer: COMMERCIAL

## 2022-12-19 NOTE — TELEPHONE ENCOUNTER
Specialty Pharmacy - Refill Coordination    Specialty Medication Orders Linked to Encounter      Flowsheet Row Most Recent Value   Medication #1 upadacitinib (RINVOQ) 15 mg 24 hr tablet (Order#[id number], Rx#[id number])            Refill Questions - Documented Responses      Flowsheet Row Most Recent Value   Patient Availability and HIPAA Verification    Does patient want to proceed with activity? Yes   HIPAA/medical authority confirmed? Yes   Relationship to patient of person spoken to? Self   Refill Screening Questions    Changes to allergies? No   Changes to medications? No   New conditions since last clinic visit? No   Unplanned office visit, urgent care, ED, or hospital admission in the last 4 weeks? Yes  [Patient reported he had back surgery on 12/14 and held the Rinvoq for one day per MD order - patient was started on celebrex - no DDI with Rinvoq]   How does patient/caregiver feel medication is working? Good   Financial problems or insurance changes? No   How many doses of your specialty medications were missed in the last 4 weeks? 1   Why were doses missed? Other (comment)  [Patient was told to hold the Rinvoq on the day of back surgery per MD order]   Would patient like to speak to a pharmacist? No   When does the patient need to receive the medication? 12/23/22   Refill Delivery Questions    How will the patient receive the medication? MEDRx   When does the patient need to receive the medication? 12/23/22   Shipping Address Home   Address in [facility] confirmed and updated if neccessary? Yes   Expected Copay ($) 5   Is the patient able to afford the medication copay? Yes   Payment Method CC on file   Days supply of Refill 30   Supplies needed? No supplies needed   Refill activity completed? Yes   Refill activity plan Refill scheduled   Shipment/Pickup Date: 12/21/22            Current Outpatient Medications   Medication Sig    atorvastatin (LIPITOR) 40 MG tablet Take 1 tablet (40 mg total) by  mouth once daily.    celecoxib (CELEBREX) 200 MG capsule Take 1 capsule (200 mg total) by mouth 2 (two) times daily.    ergocalciferol (VITAMIN D2) 50,000 unit Cap TAKE 1 CAPSULE BY MOUTH ONE TIME PER WEEK    fluorouraciL (EFUDEX) 5 % cream Apply thin film to right cheek and temple 2times per day for 2-3 weeks; d/c if area bleeding or ulcerated; avoid eyes or mouth (Patient not taking: Reported on 11/21/2022)    fluticasone propionate (FLONASE) 50 mcg/actuation nasal spray 2 SPRAYS (100 MCG TOTAL) BY EACH NOSTRIL ROUTE ONCE DAILY.    gabapentin (NEURONTIN) 300 MG capsule Take 2 capsules (600 mg total) by mouth 3 (three) times daily.    ibuprofen (ADVIL,MOTRIN) 800 MG tablet Take 1 tablet (800 mg total) by mouth 3 (three) times daily as needed for Pain.    imiquimod (ALDARA) 5 % cream Apply small amount to affected area on  right cheek at night  x 4 weeks; discontinue  if ulcerated or bleeding (Patient taking differently: Apply small amount to affected area on  right cheek at night  x 4 weeks; discontinue  if ulcerated or bleeding)    methocarbamoL (ROBAXIN) 750 MG Tab Take 1 tablet (750 mg total) by mouth 3 (three) times daily as needed (pain).    mupirocin (BACTROBAN) 2 % ointment Apply to Affected Area as directed  twice daily    oxyCODONE (ROXICODONE) 10 mg Tab immediate release tablet Take 1 tablet (10 mg total) by mouth every 6 (six) hours as needed for Pain.    tiotropium (SPIRIVA) 18 mcg inhalation capsule Inhale 1 capsule (18 mcg total) into the lungs once daily. Controller    upadacitinib (RINVOQ) 15 mg 24 hr tablet Take 1 tablet (15 mg total) by mouth once daily.   Last reviewed on 12/14/2022  7:12 AM by Dori Cruz LPN    Review of patient's allergies indicates:   Allergen Reactions    Plaquenil [hydroxychloroquine]      Lightheaded and muscle aches    Last reviewed on  12/14/2022 12:12 PM by Betzaida Hennessy      Tasks added this encounter   1/15/2023 - Refill Call (Auto Added)   Tasks due within next  3 months   No tasks due.     Leonila Nunez, PharmD  [facility]  [address]  [facility] [address]  Phone: [phone]  Fax: [phone]

## 2022-12-20 DIAGNOSIS — Z98.1 S/P LUMBAR FUSION: ICD-10-CM

## 2022-12-20 DIAGNOSIS — R60.0 LEG EDEMA, LEFT: Primary | ICD-10-CM

## 2022-12-20 NOTE — DISCHARGE SUMMARY
[facility] Surg  Neurosurgery  Discharge Summary      Patient Name: Fabiano Garvey  MRN: [id number]  Admission Date: 12/14/2022  Hospital Length of Stay: 2 days  Discharge Date and Time: 12/16/2022  1:07 PM  Attending Physician: No att. providers found   Discharging Provider: August Aguilar MD  Primary Care Provider: Calin Buchanan MD     HPI: This is a 67 y.o. male who having worsening low back pain, left leg pain and difficulty walking for several years.  The back pain varies from 5 to 10/10.  Back pain is worse with activity such as standing, walking, leaning forward, lifting.  Pain is also felt in the left leg as a form of numbness below the knee in the L5 distribution.  He cannot walk more than half a mi without the need to sit due to severe back and leg pain.  Does not report motor weakness or sphincter dysfunction symptoms.  He has tried physical therapy for his lumbar condition and has received 3 epidural steroid injection with only 1 week of pain relief.  His condition his progressively getting worse.  It is affecting his quality of life and functional status.  The left L5 distribution numbness has been constant for several months.  He uses a cane due to difficulty walking.    Procedure(s) (LRB):  1. Left L3-4, L4-5 oblique interbody fusion, placement of interbody spacer, DePuy-Synthes Conduit cage filled with DBM  2. Posterior L3 through L5 segmental instrumentation using DePuy Viper Prime system  3. Posterolateral arthrodesis at L3-4, L4-5 with autograft harvested from the same incisions facets fusion  4. Fluoroscopy  5. neuro monitoring using SSEP and EMG    Hospital Course: The surgery was uncomplicated and postoperative course uneventful.  Patient was able to be discharged after voiding. Patient ambulated without new deficit.       Consults: PT-OT    Significant Diagnostic Studies: post-op lumbar xr showed good positioning of hardware    Pending Diagnostic Studies:       None          Final Active  "Diagnoses:    Diagnosis Date Noted POA    PRINCIPAL PROBLEM:  Degenerative disc disease, lumbar [M51.36] 06/16/2021 Yes      Problems Resolved During this Admission:      Discharged Condition: good    Disposition: Home or Self Care    Follow Up:   Follow-up Information       Kelsy Leslie PA-C Follow up on 12/23/2022.    Specialties: Neurosurgery, Spine Surgery  Why: X ray  -- 7:15 am;   8:30 am apt with PA  Contact information:  [address]  [address]  [facility] [address] [phone]               [facility] Follow up.    Specialty: Home Health Services  Why: You will be admitted by [place] nurses - your care will then transfer to [place] when you return to home.  Contact information:  [address]  [address]  [facility] [address] [phone]                           Patient Instructions:      WALKER FOR HOME USE     Order Specific Question Answer Comments   Type of Walker: Adult (5'4"-6'6")    With wheels? Yes    Height: 5' 6" (1.676 m)    Weight: 74 kg (163 lb 2.3 oz)    Length of need (1-99 months): 99    Please check all that apply: Patient's condition impairs ambulation.    Please check all that apply: Patient is unable to safely ambulate without equipment.      Medications:  Reconciled Home Medications:      Medication List        START taking these medications      methocarbamoL 750 MG Tab  Commonly known as: ROBAXIN  Take 1 tablet (750 mg total) by mouth 3 (three) times daily as needed (pain).     oxyCODONE 10 mg Tab immediate release tablet  Commonly known as: ROXICODONE  Take 1 tablet (10 mg total) by mouth every 6 (six) hours as needed for Pain.            CONTINUE taking these medications      atorvastatin 40 MG tablet  Commonly known as: LIPITOR  Take 1 tablet (40 mg total) by mouth once daily.     celecoxib 200 MG capsule  Commonly known as: CeleBREX  Take 1 capsule (200 mg total) by mouth 2 (two) times daily.     fluticasone propionate 50 mcg/actuation " nasal spray  Commonly known as: FLONASE  2 SPRAYS (100 MCG TOTAL) BY EACH NOSTRIL ROUTE ONCE DAILY.     gabapentin 300 MG capsule  Commonly known as: NEURONTIN  Take 2 capsules (600 mg total) by mouth 3 (three) times daily.     ibuprofen 800 MG tablet  Commonly known as: ADVIL,MOTRIN  Take 1 tablet (800 mg total) by mouth 3 (three) times daily as needed for Pain.     imiquimod 5 % cream  Commonly known as: ALDARA  Apply small amount to affected area on  right cheek at night  x 4 weeks; discontinue  if ulcerated or bleeding     mupirocin 2 % ointment  Commonly known as: BACTROBAN  Apply to Affected Area as directed  twice daily     RINVOQ 15 mg 24 hr tablet  Generic drug: upadacitinib  Take 1 tablet (15 mg total) by mouth once daily.     SPIRIVA WITH HANDIHALER 18 mcg inhalation capsule  Generic drug: tiotropium  Inhale 1 capsule (18 mcg total) into the lungs once daily. Controller     VITAMIN D2 50,000 unit Cap  Generic drug: ergocalciferol  TAKE 1 CAPSULE BY MOUTH ONE TIME PER WEEK            ASK your doctor about these medications      fluorouraciL 5 % cream  Commonly known as: EFUDEX  Apply thin film to right cheek and temple 2times per day for 2-3 weeks; d/c if area bleeding or ulcerated; avoid eyes or mouth              August Aguilar MD  Neurosurgery  [place] [facility] Surg

## 2022-12-21 ENCOUNTER — HOSPITAL ENCOUNTER (OUTPATIENT)
Dept: RADIOLOGY | Facility: HOSPITAL | Age: 67
Discharge: HOME | End: 2022-12-21
Attending: NEUROLOGICAL SURGERY
Payer: COMMERCIAL

## 2022-12-21 ENCOUNTER — PATIENT MESSAGE (OUTPATIENT)
Dept: NEUROSURGERY | Facility: CLINIC | Age: 67
End: 2022-12-21
Payer: COMMERCIAL

## 2022-12-21 DIAGNOSIS — Z98.1 S/P LUMBAR FUSION: ICD-10-CM

## 2022-12-21 DIAGNOSIS — R60.0 LEG EDEMA, LEFT: ICD-10-CM

## 2022-12-21 PROCEDURE — 93971 EXTREMITY STUDY: CPT | Mod: TC,LT

## 2022-12-21 PROCEDURE — 93971 EXTREMITY STUDY: CPT | Mod: 26,LT,, | Performed by: RADIOLOGY

## 2022-12-21 PROCEDURE — 93971 US LOWER EXTREMITY VEINS LEFT: ICD-10-PCS | Mod: 26,LT,, | Performed by: RADIOLOGY

## 2022-12-21 NOTE — TELEPHONE ENCOUNTER
Pt's medical consideration form was scanned into chart under [facility] and faxed to [facility] [phone]. Confirmation was received.

## 2022-12-22 ENCOUNTER — HOSPITAL ENCOUNTER (OUTPATIENT)
Dept: RADIOLOGY | Facility: HOSPITAL | Age: 67
Discharge: HOME | End: 2022-12-22
Attending: NEUROLOGICAL SURGERY
Payer: COMMERCIAL

## 2022-12-22 ENCOUNTER — OFFICE VISIT (OUTPATIENT)
Dept: NEUROSURGERY | Facility: CLINIC | Age: 67
End: 2022-12-22
Payer: COMMERCIAL

## 2022-12-22 VITALS
HEART RATE: 87 BPM | BODY MASS INDEX: 26.2 KG/M2 | TEMPERATURE: 98 F | DIASTOLIC BLOOD PRESSURE: 72 MMHG | WEIGHT: 163 LBS | SYSTOLIC BLOOD PRESSURE: 106 MMHG | HEIGHT: 66 IN

## 2022-12-22 DIAGNOSIS — Z98.1 S/P LUMBAR SPINAL FUSION: Primary | ICD-10-CM

## 2022-12-22 DIAGNOSIS — Z98.1 S/P LUMBAR FUSION: ICD-10-CM

## 2022-12-22 PROCEDURE — 3078F DIAST BP <80 MM HG: CPT | Mod: CPTII,S$GLB,, | Performed by: PHYSICIAN ASSISTANT

## 2022-12-22 PROCEDURE — 3288F FALL RISK ASSESSMENT DOCD: CPT | Mod: CPTII,S$GLB,, | Performed by: PHYSICIAN ASSISTANT

## 2022-12-22 PROCEDURE — 1160F RVW MEDS BY RX/DR IN RCRD: CPT | Mod: CPTII,S$GLB,, | Performed by: PHYSICIAN ASSISTANT

## 2022-12-22 PROCEDURE — 1125F AMNT PAIN NOTED PAIN PRSNT: CPT | Mod: CPTII,S$GLB,, | Performed by: PHYSICIAN ASSISTANT

## 2022-12-22 PROCEDURE — 1159F MED LIST DOCD IN RCRD: CPT | Mod: CPTII,S$GLB,, | Performed by: PHYSICIAN ASSISTANT

## 2022-12-22 PROCEDURE — 99024 PR POST-OP FOLLOW-UP VISIT: ICD-10-PCS | Mod: S$GLB,,, | Performed by: PHYSICIAN ASSISTANT

## 2022-12-22 PROCEDURE — 1101F PR PT FALLS ASSESS DOC 0-1 FALLS W/OUT INJ PAST YR: ICD-10-PCS | Mod: CPTII,S$GLB,, | Performed by: PHYSICIAN ASSISTANT

## 2022-12-22 PROCEDURE — 1101F PT FALLS ASSESS-DOCD LE1/YR: CPT | Mod: CPTII,S$GLB,, | Performed by: PHYSICIAN ASSISTANT

## 2022-12-22 PROCEDURE — 1159F PR MEDICATION LIST DOCUMENTED IN MEDICAL RECORD: ICD-10-PCS | Mod: CPTII,S$GLB,, | Performed by: PHYSICIAN ASSISTANT

## 2022-12-22 PROCEDURE — 72100 XR LUMBAR SPINE AP AND LATERAL: ICD-10-PCS | Mod: 26,,, | Performed by: RADIOLOGY

## 2022-12-22 PROCEDURE — 72100 X-RAY EXAM L-S SPINE 2/3 VWS: CPT | Mod: TC,FY

## 2022-12-22 PROCEDURE — 99024 POSTOP FOLLOW-UP VISIT: CPT | Mod: S$GLB,,, | Performed by: PHYSICIAN ASSISTANT

## 2022-12-22 PROCEDURE — 99999 PR PBB SHADOW E&M-EST. PATIENT-LVL IV: CPT | Mod: PBBFAC,,, | Performed by: PHYSICIAN ASSISTANT

## 2022-12-22 PROCEDURE — 3078F PR MOST RECENT DIASTOLIC BLOOD PRESSURE < 80 MM HG: ICD-10-PCS | Mod: CPTII,S$GLB,, | Performed by: PHYSICIAN ASSISTANT

## 2022-12-22 PROCEDURE — 3288F PR FALLS RISK ASSESSMENT DOCUMENTED: ICD-10-PCS | Mod: CPTII,S$GLB,, | Performed by: PHYSICIAN ASSISTANT

## 2022-12-22 PROCEDURE — 99999 PR PBB SHADOW E&M-EST. PATIENT-LVL IV: ICD-10-PCS | Mod: PBBFAC,,, | Performed by: PHYSICIAN ASSISTANT

## 2022-12-22 PROCEDURE — 1160F PR REVIEW ALL MEDS BY PRESCRIBER/CLIN PHARMACIST DOCUMENTED: ICD-10-PCS | Mod: CPTII,S$GLB,, | Performed by: PHYSICIAN ASSISTANT

## 2022-12-22 PROCEDURE — 3074F PR MOST RECENT SYSTOLIC BLOOD PRESSURE < 130 MM HG: ICD-10-PCS | Mod: CPTII,S$GLB,, | Performed by: PHYSICIAN ASSISTANT

## 2022-12-22 PROCEDURE — 1125F PR PAIN SEVERITY QUANTIFIED, PAIN PRESENT: ICD-10-PCS | Mod: CPTII,S$GLB,, | Performed by: PHYSICIAN ASSISTANT

## 2022-12-22 PROCEDURE — 3074F SYST BP LT 130 MM HG: CPT | Mod: CPTII,S$GLB,, | Performed by: PHYSICIAN ASSISTANT

## 2022-12-22 PROCEDURE — 3008F PR BODY MASS INDEX (BMI) DOCUMENTED: ICD-10-PCS | Mod: CPTII,S$GLB,, | Performed by: PHYSICIAN ASSISTANT

## 2022-12-22 PROCEDURE — 72100 X-RAY EXAM L-S SPINE 2/3 VWS: CPT | Mod: 26,,, | Performed by: RADIOLOGY

## 2022-12-22 PROCEDURE — 3008F BODY MASS INDEX DOCD: CPT | Mod: CPTII,S$GLB,, | Performed by: PHYSICIAN ASSISTANT

## 2022-12-22 NOTE — PROGRESS NOTES
"Postoperative Wound Check:    Date of surgery 12/14/2022     Preop diagnosis   1. L4-5 degenerative spondylolisthesis with spinal stenosis and radiculopathy  2. L3-4 degenerative disc disease with spinal stenosis and radiculopathy     Postop diagnosis   Same     Surgery   1. Left L3-4, L4-5 oblique interbody fusion, placement of interbody spacer, DePuy-Synthes Conduit cage filled with DBM  2. Posterior L3 through L5 segmental instrumentation using DePuy Viper Prime system  3. Posterolateral arthrodesis at L3-4, L4-5 with autograft harvested from the same incisions facets fusion  4. Fluoroscopy  5. neuro monitoring using SSEP and EMG     Surgeon   August Aguilar MD    HPI:    Overall patient is doing well.  Minimal back pain.  No leg pain or paresthesias.  Some left foot swelling.  Left ultrasound was negative for DVT.  He has been wearing his back brace.  Takes the medications on and off.    Patient denies any problems with the incisions.  No redness, swelling, or drainage, and no fever, chills, or sweats.      Physical Exam:    Vitals:    12/22/22 0801   BP: 106/72   Pulse: 87   Temp: 98.1 °F (36.7 °C)   Weight: 73.9 kg (163 lb)   Height: 5' 6" (1.676 m)   PainSc:   4   PainLoc: Back         Incision:  Wound edges are approximated.  No redness, swelling, or drainage.      Diagnosis:     1. S/P lumbar spinal fusion              Plan:              -Medical Assistant removed the staples without any complications.  Chloraprep applied.  -continue wearing back brace   -continue walking  -continue working with home health   -I will discuss with Dr. Aguilar if he wants him to have a bone growth stimulator        The patient will follow up with me in 4 weeks for the 6 week po fu with xrays beforehand.    Kelsy Leslie, [facility], PA-C  Neurosurgery  [facility]          "

## 2023-01-12 ENCOUNTER — EXTERNAL HOME HEALTH (OUTPATIENT)
Dept: HOME HEALTH SERVICES | Facility: HOSPITAL | Age: 68
End: 2023-01-12
Payer: COMMERCIAL

## 2023-01-17 ENCOUNTER — SPECIALTY PHARMACY (OUTPATIENT)
Dept: PHARMACY | Facility: CLINIC | Age: 68
End: 2023-01-17
Payer: COMMERCIAL

## 2023-01-17 NOTE — TELEPHONE ENCOUNTER
Outgoing call; patient has about 6 on hand, I informed him, that a refill request was sent to his doctor and once approved [facility] will follow up.

## 2023-01-18 RX ORDER — UPADACITINIB 15 MG/1
15 TABLET, EXTENDED RELEASE ORAL DAILY
Qty: 30 TABLET | Refills: 11 | Status: SHIPPED | OUTPATIENT
Start: 2023-01-18 | End: 2023-06-17 | Stop reason: ALTCHOICE

## 2023-01-20 NOTE — TELEPHONE ENCOUNTER
Specialty Pharmacy - Refill Coordination    Specialty Medication Orders Linked to Encounter      Flowsheet Row Most Recent Value   Medication #1 upadacitinib (RINVOQ) 15 mg 24 hr tablet (Order#[id number], Rx#[id number])            Refill Questions - Documented Responses      Flowsheet Row Most Recent Value   Patient Availability and HIPAA Verification    Does patient want to proceed with activity? Yes   HIPAA/medical authority confirmed? Yes   Relationship to patient of person spoken to? Self   Refill Screening Questions    Changes to allergies? No   Changes to medications? No   New conditions since last clinic visit? No   Unplanned office visit, urgent care, ED, or hospital admission in the last 4 weeks? No   How does patient/caregiver feel medication is working? Excellent   Financial problems or insurance changes? No   How many doses of your specialty medications were missed in the last 4 weeks? 0   Would patient like to speak to a pharmacist? No   When does the patient need to receive the medication? 01/24/23   Refill Delivery Questions    How will the patient receive the medication? MEDRx   When does the patient need to receive the medication? 01/24/23   Shipping Address Home   Address in [facility] confirmed and updated if neccessary? Yes   Expected Copay ($) 5   Is the patient able to afford the medication copay? Yes   Payment Method CC on file   Days supply of Refill 30   Supplies needed? No supplies needed   Refill activity completed? Yes   Refill activity plan Refill scheduled   Shipment/Pickup Date: 01/23/23            Current Outpatient Medications   Medication Sig    atorvastatin (LIPITOR) 40 MG tablet Take 1 tablet (40 mg total) by mouth once daily.    celecoxib (CELEBREX) 200 MG capsule Take 1 capsule (200 mg total) by mouth 2 (two) times daily.    ergocalciferol (VITAMIN D2) 50,000 unit Cap TAKE 1 CAPSULE BY MOUTH ONE TIME PER WEEK    fluorouraciL (EFUDEX) 5 % cream Apply thin film to right  cheek and temple 2times per day for 2-3 weeks; d/c if area bleeding or ulcerated; avoid eyes or mouth    fluticasone propionate (FLONASE) 50 mcg/actuation nasal spray 2 SPRAYS (100 MCG TOTAL) BY EACH NOSTRIL ROUTE ONCE DAILY.    gabapentin (NEURONTIN) 300 MG capsule Take 2 capsules (600 mg total) by mouth 3 (three) times daily.    ibuprofen (ADVIL,MOTRIN) 800 MG tablet Take 1 tablet (800 mg total) by mouth 3 (three) times daily as needed for Pain.    imiquimod (ALDARA) 5 % cream Apply small amount to affected area on  right cheek at night  x 4 weeks; discontinue  if ulcerated or bleeding (Patient taking differently: Apply small amount to affected area on  right cheek at night  x 4 weeks; discontinue  if ulcerated or bleeding)    mupirocin (BACTROBAN) 2 % ointment Apply to Affected Area as directed  twice daily    oxyCODONE (ROXICODONE) 10 mg Tab immediate release tablet Take 1 tablet (10 mg total) by mouth every 6 (six) hours as needed for Pain.    tiotropium (SPIRIVA) 18 mcg inhalation capsule Inhale 1 capsule (18 mcg total) into the lungs once daily. Controller    upadacitinib (RINVOQ) 15 mg 24 hr tablet Take 1 tablet (15 mg total) by mouth once daily.   Last reviewed on 12/22/2022  9:43 AM by Kelsy Leslie PAFigueroaC    Review of patient's allergies indicates:   Allergen Reactions    Plaquenil [hydroxychloroquine]      Lightheaded and muscle aches    Last reviewed on  12/22/2022 9:43 AM by Kelsy Leslie      Tasks added this encounter   No tasks added.   Tasks due within next 3 months   3/25/2023 - Clinical - Follow Up Assesement (Annual)  1/15/2023 - Refill Call (Auto Added)     Lillian ChristensenD  [facility]  [address] [place] [id number]  Phone: [phone]  Fax: [phone]

## 2023-01-24 ENCOUNTER — HOSPITAL ENCOUNTER (OUTPATIENT)
Dept: RADIOLOGY | Facility: HOSPITAL | Age: 68
Discharge: HOME | End: 2023-01-24
Attending: NEUROLOGICAL SURGERY
Payer: COMMERCIAL

## 2023-01-24 DIAGNOSIS — Z98.1 S/P LUMBAR FUSION: ICD-10-CM

## 2023-01-24 PROCEDURE — 72100 X-RAY EXAM L-S SPINE 2/3 VWS: CPT | Mod: TC,FY

## 2023-01-24 PROCEDURE — 72100 XR LUMBAR SPINE AP AND LATERAL: ICD-10-PCS | Mod: 26,,, | Performed by: RADIOLOGY

## 2023-01-24 PROCEDURE — 72100 X-RAY EXAM L-S SPINE 2/3 VWS: CPT | Mod: 26,,, | Performed by: RADIOLOGY

## 2023-01-25 ENCOUNTER — OFFICE VISIT (OUTPATIENT)
Dept: NEUROSURGERY | Facility: CLINIC | Age: 68
End: 2023-01-25
Payer: COMMERCIAL

## 2023-01-25 VITALS
BODY MASS INDEX: 26.19 KG/M2 | DIASTOLIC BLOOD PRESSURE: 71 MMHG | WEIGHT: 162.94 LBS | TEMPERATURE: 98 F | HEIGHT: 66 IN | HEART RATE: 91 BPM | SYSTOLIC BLOOD PRESSURE: 120 MMHG

## 2023-01-25 DIAGNOSIS — Z98.1 S/P LUMBAR SPINAL FUSION: Primary | ICD-10-CM

## 2023-01-25 PROCEDURE — 99024 PR POST-OP FOLLOW-UP VISIT: ICD-10-PCS | Mod: S$GLB,,, | Performed by: PHYSICIAN ASSISTANT

## 2023-01-25 PROCEDURE — 3074F SYST BP LT 130 MM HG: CPT | Mod: CPTII,S$GLB,, | Performed by: PHYSICIAN ASSISTANT

## 2023-01-25 PROCEDURE — 1101F PT FALLS ASSESS-DOCD LE1/YR: CPT | Mod: CPTII,S$GLB,, | Performed by: PHYSICIAN ASSISTANT

## 2023-01-25 PROCEDURE — 1126F AMNT PAIN NOTED NONE PRSNT: CPT | Mod: CPTII,S$GLB,, | Performed by: PHYSICIAN ASSISTANT

## 2023-01-25 PROCEDURE — 1101F PR PT FALLS ASSESS DOC 0-1 FALLS W/OUT INJ PAST YR: ICD-10-PCS | Mod: CPTII,S$GLB,, | Performed by: PHYSICIAN ASSISTANT

## 2023-01-25 PROCEDURE — 1159F MED LIST DOCD IN RCRD: CPT | Mod: CPTII,S$GLB,, | Performed by: PHYSICIAN ASSISTANT

## 2023-01-25 PROCEDURE — 3288F PR FALLS RISK ASSESSMENT DOCUMENTED: ICD-10-PCS | Mod: CPTII,S$GLB,, | Performed by: PHYSICIAN ASSISTANT

## 2023-01-25 PROCEDURE — 1159F PR MEDICATION LIST DOCUMENTED IN MEDICAL RECORD: ICD-10-PCS | Mod: CPTII,S$GLB,, | Performed by: PHYSICIAN ASSISTANT

## 2023-01-25 PROCEDURE — 1160F RVW MEDS BY RX/DR IN RCRD: CPT | Mod: CPTII,S$GLB,, | Performed by: PHYSICIAN ASSISTANT

## 2023-01-25 PROCEDURE — 1160F PR REVIEW ALL MEDS BY PRESCRIBER/CLIN PHARMACIST DOCUMENTED: ICD-10-PCS | Mod: CPTII,S$GLB,, | Performed by: PHYSICIAN ASSISTANT

## 2023-01-25 PROCEDURE — 3288F FALL RISK ASSESSMENT DOCD: CPT | Mod: CPTII,S$GLB,, | Performed by: PHYSICIAN ASSISTANT

## 2023-01-25 PROCEDURE — 99999 PR PBB SHADOW E&M-EST. PATIENT-LVL IV: ICD-10-PCS | Mod: PBBFAC,,, | Performed by: PHYSICIAN ASSISTANT

## 2023-01-25 PROCEDURE — 3074F PR MOST RECENT SYSTOLIC BLOOD PRESSURE < 130 MM HG: ICD-10-PCS | Mod: CPTII,S$GLB,, | Performed by: PHYSICIAN ASSISTANT

## 2023-01-25 PROCEDURE — 3008F PR BODY MASS INDEX (BMI) DOCUMENTED: ICD-10-PCS | Mod: CPTII,S$GLB,, | Performed by: PHYSICIAN ASSISTANT

## 2023-01-25 PROCEDURE — 3078F DIAST BP <80 MM HG: CPT | Mod: CPTII,S$GLB,, | Performed by: PHYSICIAN ASSISTANT

## 2023-01-25 PROCEDURE — 99024 POSTOP FOLLOW-UP VISIT: CPT | Mod: S$GLB,,, | Performed by: PHYSICIAN ASSISTANT

## 2023-01-25 PROCEDURE — 3008F BODY MASS INDEX DOCD: CPT | Mod: CPTII,S$GLB,, | Performed by: PHYSICIAN ASSISTANT

## 2023-01-25 PROCEDURE — 1126F PR PAIN SEVERITY QUANTIFIED, NO PAIN PRESENT: ICD-10-PCS | Mod: CPTII,S$GLB,, | Performed by: PHYSICIAN ASSISTANT

## 2023-01-25 PROCEDURE — 3078F PR MOST RECENT DIASTOLIC BLOOD PRESSURE < 80 MM HG: ICD-10-PCS | Mod: CPTII,S$GLB,, | Performed by: PHYSICIAN ASSISTANT

## 2023-01-25 PROCEDURE — 99999 PR PBB SHADOW E&M-EST. PATIENT-LVL IV: CPT | Mod: PBBFAC,,, | Performed by: PHYSICIAN ASSISTANT

## 2023-01-26 ENCOUNTER — TELEPHONE (OUTPATIENT)
Dept: NEUROSURGERY | Facility: CLINIC | Age: 68
End: 2023-01-26
Payer: COMMERCIAL

## 2023-01-26 NOTE — PROGRESS NOTES
"  Subjective:     Patient ID:  Fabiano Garvey is a 67 y.o. male.    Jeff    Chief Complaint: 6 week po fu    Date of surgery 12/14/2022     Preop diagnosis   1. L4-5 degenerative spondylolisthesis with spinal stenosis and radiculopathy  2. L3-4 degenerative disc disease with spinal stenosis and radiculopathy     Postop diagnosis   Same     Surgery   1. Left L3-4, L4-5 oblique interbody fusion, placement of interbody spacer, DePuy-Synthes Conduit cage filled with DBM  2. Posterior L3 through L5 segmental instrumentation using DePCloudOn Viper Prime system  3. Posterolateral arthrodesis at L3-4, L4-5 with autograft harvested from the same incisions facets fusion  4. Fluoroscopy  5. neuro monitoring using SSEP and EMG     Surgeon   August Aguilar MD    HPI    Fabiano Garvey is a 67 y.o. male who presents for follow up.  Overall patient is doing well.  He is wearing his back brace he is walking.  He has some numbness in the left anterior thigh when he touches it.  He also has some tingling in the left foot and swelling.  His previous left lower leg ultrasound was negative for DVT.  He denies any radiating leg pain.      He is taking Celebrex twice a day and gabapentin 2 pills 3 times a day    Patient denies any recent accidents or trauma, no saddle anesthesias, and no bowel or bladder incontinence.      Review of Systems:  Constitution: Negative for chills, fever, night sweats and weight loss.   Musculoskeletal: Negative for falls.   Gastrointestinal: Negative for bowel incontinence, nausea and vomiting.   Genitourinary: Negative for bladder incontinence.   Neurological: Negative for disturbances in coordination and loss of balance.      Objective:      Vitals:    01/25/23 1527   BP: 120/71   Pulse: 91   Temp: 98.1 °F (36.7 °C)   Weight: 73.9 kg (162 lb 14.7 oz)   Height: 5' 6" (1.676 m)   PainSc: 0-No pain         Physical Exam: Exam done in the chair      General:  Fabiano Garvey is well-developed, well-nourished, " appears stated age, in no acute distress, alert and oriented to person, place, and time.    Pulmonary/Chest:  Respiratory effort normal  Abdominal: Exhibits no distension  Psychiatric:  Normal mood and affect.  Behavior is normal.  Judgement and thought content normal      Musculoskeletal:      Lumbar ROM:   No pain in lumbar flexion, extension, left lateral bending, and right lateral bending.    Lumbar Spine Inspection:  Healed surgical scars with no visible rashes.    Lumbar Spine Palpation:  No tenderness to low back palpation.    SI Joint Palpation:  No tenderness to SI Joint palpation.        Neurological:  Alert and oriented to person, place, and time    Muscle strength against resistance:     Right Left   Hip flexion  5 / 5 5 / 5   Hip extension 5 / 5 5 / 5   Hip abduction 5 / 5 5 / 5   Hip adduction  5 / 5 5 / 5   Knee extension  5 / 5 5 / 5   Knee flexion 5 / 5 5 / 5   Dorsiflexion  5 / 5 5 / 5   EHL  5 / 5 5 / 5   Plantar flexion  5 / 5 5 / 5   Inversion of the feet 5 / 5 5 / 5   Eversion of the feet  5 / 5 5 / 5       Reflexes:     Right Left   Patellar 2+ 2+   Achilles 2+ 2+     Clonus:  Negative bilaterally    On gross examination of the bilateral upper extremities, patient has full painfree ROM with no signs of clubbing, cyanosis, edema, or weakness.       XRAY Interpretation:     Lumbar spine xrays were personally reviewed today.  No fractures.  Hardware intact      Assessment:          1. S/P lumbar spinal fusion            Plan:             Okay to wean off back brace  Gabapentin decreased to 1 pill 3 times a day for a week stop  Start taking Celebrex once a day for a week then take as needed   Continue walking   He states he received a call about a bone growth stimulator.  Will ask Dr. Aguilar if he needs one  Return to work letter given for February 6 with no restrictions.  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    Follow-up with Dr. Aguilar in 6 weeks for 3 month postop follow-up with  x-rays      Follow-Up:  Follow up in about 6 weeks (around 3/8/2023). If there are any questions prior to this, the patient was instructed to contact the office.       SHANTHI Early, MANI  Neurosurgery  [facility]  01/26/2023

## 2023-01-26 NOTE — TELEPHONE ENCOUNTER
Dr. Aguilar,    Patient 6 weeks po lumbar fusion.  Doing well.  Doesn't smoke.    Does he need a bone growth stimulator?    Thanks,  Kelsy Leslie, [facility], PA-C  Neurosurgery  [facility]  01/26/2023

## 2023-01-30 ENCOUNTER — PROCEDURE VISIT (OUTPATIENT)
Dept: DERMATOLOGY | Facility: CLINIC | Age: 68
End: 2023-01-30
Payer: COMMERCIAL

## 2023-01-30 VITALS
WEIGHT: 162 LBS | SYSTOLIC BLOOD PRESSURE: 122 MMHG | HEIGHT: 66 IN | HEART RATE: 81 BPM | DIASTOLIC BLOOD PRESSURE: 83 MMHG | BODY MASS INDEX: 26.03 KG/M2

## 2023-01-30 DIAGNOSIS — D04.39 SQUAMOUS CELL CARCINOMA IN SITU OF SKIN OF RIGHT CHEEK: Primary | ICD-10-CM

## 2023-01-30 PROCEDURE — 17311 MOHS 1 STAGE H/N/HF/G: CPT | Mod: 51,S$GLB,, | Performed by: DERMATOLOGY

## 2023-01-30 PROCEDURE — 13132 CMPLX RPR F/C/C/M/N/AX/G/H/F: CPT | Mod: S$GLB,,, | Performed by: DERMATOLOGY

## 2023-01-30 PROCEDURE — 99499 UNLISTED E&M SERVICE: CPT | Mod: S$GLB,,, | Performed by: DERMATOLOGY

## 2023-01-30 PROCEDURE — 13132 PR RECMPL WND HEAD,FAC,HAND 2.6-7.5 CM: ICD-10-PCS | Mod: S$GLB,,, | Performed by: DERMATOLOGY

## 2023-01-30 PROCEDURE — 17311: ICD-10-PCS | Mod: 51,S$GLB,, | Performed by: DERMATOLOGY

## 2023-01-30 PROCEDURE — 99499 NO LOS: ICD-10-PCS | Mod: S$GLB,,, | Performed by: DERMATOLOGY

## 2023-01-30 NOTE — PROGRESS NOTES
PROCEDURE: Mohs' Micrographic Surgery    INDICATION: Location in non-mask areas of face where maximum conservation of tumor-free tissue is needed. Size > 1.0 cm on face. Biopsy-proven skin cancer of cosmetically and functionally important areas, including head, neck, genital, hand, foot, or areas known for having difficulty in healing, such as the lower anterior legs. Tumors with aggressive clinical behavior (rapidly growing, greater than 1 cm in diameter). Tumor with ill-defined borders. Still with residual tumor s/p Aldara topical treatment.    REFERRING PROVIDER: Lucie Judge M.D.    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  Tumor Type: No tumor seen. Actinic keratosis: Keratinocyte atypia along the basal layer.    STAGES OF MOHS' SURGERY PERFORMED: 1    TUMOR-FREE PLANE ACHIEVED: Yes    HEMOSTASIS: electrocoagulation     SPECIMENS: 5    LOCATION: right lateral cheek. Location verified with Dr. Judge's clinical photograph. Patient also verified location with hand held mirror.    INITIAL LESION SIZE: 1.4 x 2.3 cm    FINAL DEFECT SIZE: 2.0 x 3.1 cm    WOUND REPAIR/DISPOSITION: The patient tolerated Mohs' Micrographic Surgery for a squamous cell carcinoma in situ very well. When the tumor was completely removed, a repair of the surgical defect was undertaken.    PROCEDURE: Complex Linear Repair    INDICATION: Status post Mohs' Micrographic Surgery for squamous cell carcinoma in situ.    CASE NUMBER:     SURGEON: Laverne Graham MD    ASSISTANTS: Michelle  "MANI Hill and Corey Duggan    ANESTHETIC: 3 cc 1% Lidocaine with Epinephrine 1:100,000    SURGICAL PREP: Nitin, prepped by  Corey Duggan    LOCATION: right lateral cheek    DEFECT SIZE: 2.0 x 3.1 cm    WOUND REPAIR/DISPOSITION:  After the patient's carcinoma had been completely removed with Mohs' Micrographic Surgery, a repair of the surgical defect was undertaken. The patient was returned to the operating suite where the area of right lateral cheek was prepped, draped, and anesthetized in the usual sterile fashion. The wound was widely undermined in all directions. The wound was undermined to a distance at least the maximum width of the defect as measured perpendicular to the closure line along at least one entire edge of the defect, in this case 2 cm. Then, electrocoagulation was used to obtain meticulous hemostasis. 5-0 Vicryl buried vertical mattress sutures were placed into the subcutaneous and dermal plane to close the wound and basil the cutaneous wound edge. Bilateral dog ears were identified and were removed by a standard Burow's triangle technique. The cutaneous wound edges were closed using interrupted 5-0 Prolene suture.    The patient tolerated the procedure well.    The area was cleaned and dressed appropriately and the patient was given wound care instructions, as well as appointment for follow-up evaluation and suture removal in 7 days. Patient already has pain medication at home.    LENGTH OF REPAIR: 6 cm    Vitals:    01/30/23 1232 01/30/23 1443   BP: 122/82 122/83   BP Location: Right arm    Patient Position: Sitting    BP Method: Medium (Automatic)    Pulse: 88 81   Weight: 73.5 kg (162 lb)    Height: 5' 6" (1.676 m)          "

## 2023-01-31 ENCOUNTER — PATIENT MESSAGE (OUTPATIENT)
Dept: NEUROSURGERY | Facility: CLINIC | Age: 68
End: 2023-01-31
Payer: COMMERCIAL

## 2023-01-31 ENCOUNTER — TELEPHONE (OUTPATIENT)
Dept: NEUROSURGERY | Facility: CLINIC | Age: 68
End: 2023-01-31
Payer: COMMERCIAL

## 2023-01-31 NOTE — TELEPHONE ENCOUNTER
Please call patient and let him know that Dr. Aguilar said he does not need a bone growth stimulator.    Thanks,  Kelsy Leslie [facility], PA-C  Neurosurgery  [facility]  01/31/2023

## 2023-02-01 DIAGNOSIS — E78.2 MIXED HYPERLIPIDEMIA: ICD-10-CM

## 2023-02-01 RX ORDER — ATORVASTATIN CALCIUM 40 MG/1
40 TABLET, FILM COATED ORAL DAILY
Qty: 90 TABLET | Refills: 3 | Status: SHIPPED | OUTPATIENT
Start: 2023-02-01 | End: 2024-02-25 | Stop reason: SDUPTHER

## 2023-02-01 RX ORDER — ATORVASTATIN CALCIUM 40 MG/1
40 TABLET, FILM COATED ORAL DAILY
Qty: 90 TABLET | Refills: 3 | Status: CANCELLED | OUTPATIENT
Start: 2023-02-01

## 2023-02-01 NOTE — TELEPHONE ENCOUNTER
No new care gaps identified.  [facility] Embedded Care Gaps. Reference number: [id number]. 2/01/2023   9:16:38 AM CST

## 2023-02-01 NOTE — TELEPHONE ENCOUNTER
No new care gaps identified.  [facility] Embedded Care Gaps. Reference number: [id number]. 2/01/2023   10:48:00 AM CST

## 2023-02-02 ENCOUNTER — PATIENT MESSAGE (OUTPATIENT)
Dept: RHEUMATOLOGY | Facility: CLINIC | Age: 68
End: 2023-02-02
Payer: COMMERCIAL

## 2023-02-07 ENCOUNTER — OFFICE VISIT (OUTPATIENT)
Dept: DERMATOLOGY | Facility: CLINIC | Age: 68
End: 2023-02-07
Payer: COMMERCIAL

## 2023-02-07 DIAGNOSIS — Z09 POSTOP CHECK: Primary | ICD-10-CM

## 2023-02-07 PROCEDURE — 99999 PR PBB SHADOW E&M-EST. PATIENT-LVL III: CPT | Mod: PBBFAC,,, | Performed by: DERMATOLOGY

## 2023-02-07 PROCEDURE — 99024 POSTOP FOLLOW-UP VISIT: CPT | Mod: S$GLB,,, | Performed by: DERMATOLOGY

## 2023-02-07 PROCEDURE — 99024 PR POST-OP FOLLOW-UP VISIT: ICD-10-PCS | Mod: S$GLB,,, | Performed by: DERMATOLOGY

## 2023-02-07 PROCEDURE — 1126F AMNT PAIN NOTED NONE PRSNT: CPT | Mod: CPTII,S$GLB,, | Performed by: DERMATOLOGY

## 2023-02-07 PROCEDURE — 1159F PR MEDICATION LIST DOCUMENTED IN MEDICAL RECORD: ICD-10-PCS | Mod: CPTII,S$GLB,, | Performed by: DERMATOLOGY

## 2023-02-07 PROCEDURE — 1101F PT FALLS ASSESS-DOCD LE1/YR: CPT | Mod: CPTII,S$GLB,, | Performed by: DERMATOLOGY

## 2023-02-07 PROCEDURE — 1159F MED LIST DOCD IN RCRD: CPT | Mod: CPTII,S$GLB,, | Performed by: DERMATOLOGY

## 2023-02-07 PROCEDURE — 1101F PR PT FALLS ASSESS DOC 0-1 FALLS W/OUT INJ PAST YR: ICD-10-PCS | Mod: CPTII,S$GLB,, | Performed by: DERMATOLOGY

## 2023-02-07 PROCEDURE — 99999 PR PBB SHADOW E&M-EST. PATIENT-LVL III: ICD-10-PCS | Mod: PBBFAC,,, | Performed by: DERMATOLOGY

## 2023-02-07 PROCEDURE — 3288F PR FALLS RISK ASSESSMENT DOCUMENTED: ICD-10-PCS | Mod: CPTII,S$GLB,, | Performed by: DERMATOLOGY

## 2023-02-07 PROCEDURE — 3288F FALL RISK ASSESSMENT DOCD: CPT | Mod: CPTII,S$GLB,, | Performed by: DERMATOLOGY

## 2023-02-07 PROCEDURE — 1126F PR PAIN SEVERITY QUANTIFIED, NO PAIN PRESENT: ICD-10-PCS | Mod: CPTII,S$GLB,, | Performed by: DERMATOLOGY

## 2023-02-07 NOTE — PROGRESS NOTES
67 y.o. male patient is here for suture removal following Mohs' surgery.    Patient reports no problems.    WOUND PE:  The R lateral cheek sutures intact. Wound healing well. Good skin edges. No signs or symptoms of infection.    IMPRESSION:  Healing operative site from Mohs' surgery, SCCIS R lateral cheek s/p Mohs with CLC, postop day #8.    PLAN:  Sutures removed today by  Mary Acosta MA .   Steri-strips applied.  Keep moist with Aquaphor.  Call if any issues    RTC:  In 3-6 months with Lucie Judge M.D. for skin check or sooner if new concern arises.

## 2023-02-16 ENCOUNTER — SPECIALTY PHARMACY (OUTPATIENT)
Dept: PHARMACY | Facility: CLINIC | Age: 68
End: 2023-02-16
Payer: COMMERCIAL

## 2023-02-16 NOTE — TELEPHONE ENCOUNTER
Specialty Pharmacy - Refill Coordination    Specialty Medication Orders Linked to Encounter      Flowsheet Row Most Recent Value   Medication #1 upadacitinib (RINVOQ) 15 mg 24 hr tablet (Order#[id number], Rx#[id number])            Refill Questions - Documented Responses      Flowsheet Row Most Recent Value   Patient Availability and HIPAA Verification    Does patient want to proceed with activity? Yes   HIPAA/medical authority confirmed? Yes   Relationship to patient of person spoken to? Self   Refill Screening Questions    Changes to allergies? No   Changes to medications? No   New conditions since last clinic visit? No   Unplanned office visit, urgent care, ED, or hospital admission in the last 4 weeks? No   How does patient/caregiver feel medication is working? Good   Financial problems or insurance changes? No   How many doses of your specialty medications were missed in the last 4 weeks? 0   Would patient like to speak to a pharmacist? No   When does the patient need to receive the medication? 02/22/23   Refill Delivery Questions    How will the patient receive the medication? MEDRx   When does the patient need to receive the medication? 02/22/23   Shipping Address Home   Address in [facility] confirmed and updated if neccessary? Yes   Expected Copay ($) 5   Is the patient able to afford the medication copay? Yes   Payment Method CC on file   Days supply of Refill 30   Supplies needed? No supplies needed   Refill activity completed? Yes   Refill activity plan Refill scheduled   Shipment/Pickup Date: 02/17/23            Current Outpatient Medications   Medication Sig    atorvastatin (LIPITOR) 40 MG tablet Take 1 tablet (40 mg total) by mouth once daily.    celecoxib (CELEBREX) 200 MG capsule Take 1 capsule (200 mg total) by mouth 2 (two) times daily.    ergocalciferol (VITAMIN D2) 50,000 unit Cap TAKE 1 CAPSULE BY MOUTH ONE TIME PER WEEK    fluorouraciL (EFUDEX) 5 % cream Apply thin film to right cheek  and temple 2times per day for 2-3 weeks; d/c if area bleeding or ulcerated; avoid eyes or mouth    fluticasone propionate (FLONASE) 50 mcg/actuation nasal spray 2 SPRAYS (100 MCG TOTAL) BY EACH NOSTRIL ROUTE ONCE DAILY.    gabapentin (NEURONTIN) 300 MG capsule Take 2 capsules (600 mg total) by mouth 3 (three) times daily.    ibuprofen (ADVIL,MOTRIN) 800 MG tablet Take 1 tablet (800 mg total) by mouth 3 (three) times daily as needed for Pain.    imiquimod (ALDARA) 5 % cream Apply small amount to affected area on  right cheek at night  x 4 weeks; discontinue  if ulcerated or bleeding (Patient taking differently: Apply small amount to affected area on  right cheek at night  x 4 weeks; discontinue  if ulcerated or bleeding)    mupirocin (BACTROBAN) 2 % ointment Apply to Affected Area as directed  twice daily    oxyCODONE (ROXICODONE) 10 mg Tab immediate release tablet Take 1 tablet (10 mg total) by mouth every 6 (six) hours as needed for Pain.    tiotropium (SPIRIVA) 18 mcg inhalation capsule Inhale 1 capsule (18 mcg total) into the lungs once daily. Controller    upadacitinib (RINVOQ) 15 mg 24 hr tablet Take 1 tablet (15 mg total) by mouth once daily.   Last reviewed on 2/7/2023  2:51 PM by Mary Acosta MA    Review of patient's allergies indicates:   Allergen Reactions    Plaquenil [hydroxychloroquine]      Lightheaded and muscle aches    Last reviewed on  2/7/2023 2:51 PM by Mary Acosta      Tasks added this encounter   3/17/2023 - Refill Call (Auto Added)   Tasks due within next 3 months   3/25/2023 - Clinical - Follow Up Assesement (Annual)     [facility]  [address] [place] [id number]  Phone: [phone]  Fax: [phone]

## 2023-02-26 ENCOUNTER — PATIENT MESSAGE (OUTPATIENT)
Dept: FAMILY MEDICINE | Facility: CLINIC | Age: 68
End: 2023-02-26
Payer: COMMERCIAL

## 2023-02-27 ENCOUNTER — PATIENT MESSAGE (OUTPATIENT)
Dept: FAMILY MEDICINE | Facility: CLINIC | Age: 68
End: 2023-02-27

## 2023-02-27 ENCOUNTER — E-VISIT (OUTPATIENT)
Dept: FAMILY MEDICINE | Facility: CLINIC | Age: 68
End: 2023-02-27
Payer: COMMERCIAL

## 2023-02-27 ENCOUNTER — HOSPITAL ENCOUNTER (OUTPATIENT)
Dept: RADIOLOGY | Facility: HOSPITAL | Age: 68
Discharge: HOME | End: 2023-02-27
Attending: FAMILY MEDICINE
Payer: COMMERCIAL

## 2023-02-27 DIAGNOSIS — R05.9 COUGH, UNSPECIFIED TYPE: ICD-10-CM

## 2023-02-27 DIAGNOSIS — M06.9 RHEUMATOID ARTHRITIS, INVOLVING UNSPECIFIED SITE, UNSPECIFIED WHETHER RHEUMATOID FACTOR PRESENT: ICD-10-CM

## 2023-02-27 DIAGNOSIS — R05.9 COUGH, UNSPECIFIED TYPE: Primary | ICD-10-CM

## 2023-02-27 DIAGNOSIS — J84.10 PULMONARY FIBROSIS: ICD-10-CM

## 2023-02-27 PROCEDURE — 99421 OL DIG E/M SVC 5-10 MIN: CPT | Mod: 95,,, | Performed by: FAMILY MEDICINE

## 2023-02-27 PROCEDURE — 71046 X-RAY EXAM CHEST 2 VIEWS: CPT | Mod: TC,FY

## 2023-02-27 PROCEDURE — 71046 X-RAY EXAM CHEST 2 VIEWS: CPT | Mod: 26,,, | Performed by: RADIOLOGY

## 2023-02-27 PROCEDURE — 99421 PR E&M, ONLINE DIGIT, EST, < 7 DAYS, 5-10 MINS: ICD-10-PCS | Mod: 95,,, | Performed by: FAMILY MEDICINE

## 2023-02-27 PROCEDURE — 71046 XR CHEST PA AND LATERAL: ICD-10-PCS | Mod: 26,,, | Performed by: RADIOLOGY

## 2023-02-27 RX ORDER — ALBUTEROL SULFATE 90 UG/1
2 AEROSOL, METERED RESPIRATORY (INHALATION) EVERY 6 HOURS PRN
Qty: 18 G | Refills: 0 | Status: SHIPPED | OUTPATIENT
Start: 2023-02-27 | End: 2023-03-20 | Stop reason: SDUPTHER

## 2023-02-27 RX ORDER — PREDNISONE 20 MG/1
20 TABLET ORAL 2 TIMES DAILY
Qty: 10 TABLET | Refills: 0 | Status: SHIPPED | OUTPATIENT
Start: 2023-02-27 | End: 2023-03-05

## 2023-02-27 RX ORDER — CELECOXIB 200 MG/1
200 CAPSULE ORAL 2 TIMES DAILY
Qty: 60 CAPSULE | Refills: 2 | Status: SHIPPED | OUTPATIENT
Start: 2023-02-27 | End: 2023-10-27 | Stop reason: SDUPTHER

## 2023-02-27 NOTE — PROGRESS NOTES
Patient ID: Fabiano Garvey is a 67 y.o. male.    Chief Complaint: Cough    The patient initiated a request through [employer] on 2/27/2023 for evaluation and management with a chief complaint of Cough     I evaluated the questionnaire submission on 02/27/2023      Hi I saw your recent questionnaire regarding cough for the last 5 days along with some congestion and runny nose.  I saw that you had taken a home COVID test that was negative.  Given your history of rheumatoid arthritis and interstitial lung disease, if the cough is not getting any better or is getting worse, it may be a good to get a x-ray just to rule out any pneumonia.  If it is a viral cold type issue like we have been seeing, I have attached  some information below that may help with symptoms.  If the chest x-ray shows any concern for pneumonia you need to be appropriately treated with antibiotics for that.  Also given your lung history I will give you an albuterol  inhaler to try in case this help some coughing, and or wheezing or shortness of breath.  You can do 2 inhalations with the inhaler 3-4 times a a day if necessary.  If over the course of the next several day your cough is getting worse or your starting to wheeze more significantly or have more shortness of breath and the above treatments have not helped and chest x-ray is clear, we may have to do a short course of steroids.   I will have my staff contact you about setting up a chest x-ray    Orders Placed This Encounter   Procedures    X-Ray Chest PA And Lateral     Medications Ordered This Encounter   Medications    albuterol (VENTOLIN HFA) 90 mcg/actuation inhaler     Sig: Inhale 2 puffs into the lungs every 6 (six) hours as needed for Wheezing (cough). Rescue     Dispense:  18 g     Refill:  0         [facility]  [address]  [place] [address]  Phone: [phone] Fax: [phone]        Ohs Peq Evisit Upper Respitatory/Cough Questionnaire    2/27/2023  6:36  AM CST - Filed by Patient   Do you agree to participate in an E-Visit? Yes   If you have any of the following symptoms, please present to your local ER or call 911:  I acknowledge   What is the main issue that you would like for your doctor to address today? persistent cough for the past 5 days   Are you able to take your vital signs? No   What symptoms do you currently have?  Cough;  Nasal Congestion;  Runny nose   Have you had a fever? No   When did your symptoms first appear? 2/22/2023   In the last two weeks, have you been in close contact with someone who has COVID-19 or the Flu? No   In the last two weeks, have you worked or volunteered in a healthcare facility or as a ? Healthcare facilities include a hospital, medical or dental clinic, long-term care facility, or nursing home No   Do you live in a long-term care facility, nursing home, group home, or homeless shelter? No   List what you have done or taken to help your symptoms. taken OTC cough medicine   How severe are your symptoms? Moderate   Have you taken an at home Covid test? Yes   What were the results? Negative   Have you taken a Flu test? No   Have you been fully vaccinated for COVID? (2 Pfizer, 2 Moderna or 1 Sushil & Sushil vaccine injections) Yes   Have you received a booster? Yes   Have you recieved a Flu shot? Yes   When did you recieve your Flu shot? 10/26/2022   Do you have transportation to get tested for COVID if it is indicated and ordered for you at an [facility] location? Yes   Provide any information you feel is important to your history not asked above    Please attach any relevant images or files           Active Problem List with Overview Notes    Diagnosis Date Noted    Decreased functional mobility 10/31/2022    Centrilobular emphysema 11/10/2021    Shortness of breath     Chronic pain 06/29/2021    Lumbar spondylosis 06/16/2021    Spinal stenosis of lumbar region with neurogenic claudication 06/16/2021     Degenerative disc disease, lumbar 06/16/2021    Lumbar radiculopathy 06/16/2021    Chronic bilateral low back pain with right-sided sciatica 05/24/2021    Decreased range of motion 05/24/2021    Muscle weakness 05/24/2021    ILD (interstitial lung disease) 09/24/2020    Interstitial pulmonary fibrosis     Obstructive sleep apnea 11/21/2019    Elevated LFTs 08/09/2019    Personal history of skin cancer 08/24/2017     NMSC nose many years ago  SCC in situ left temple- 7/2019  SCC right medial canthus- mohs 7/2020  SCC mid lower neck - 11/2021  SCC in situ right lateral cheek- 1/2022, aldara      Colon cancer screening 07/25/2017    Rheumatoid arthritis     Hyperlipidemia       Recent Labs Obtained:  No visits with results within 7 Day(s) from this visit.   Latest known visit with results is:   Admission on 12/14/2022, Discharged on 12/16/2022   Component Date Value Ref Range Status    Group & Rh 12/14/2022 A POS   Final    Indirect Layla 12/14/2022 NEG   Final    WBC 12/15/2022 11.73  3.90 - 12.70 K/uL Final    RBC 12/15/2022 3.56 (L)  4.60 - 6.20 M/uL Final    Hemoglobin 12/15/2022 11.3 (L)  14.0 - 18.0 g/dL Final    Hematocrit 12/15/2022 34.7 (L)  40.0 - 54.0 % Final    MCV 12/15/2022 98  82 - 98 fL Final    MCH 12/15/2022 31.7 (H)  27.0 - 31.0 pg Final    MCHC 12/15/2022 32.6  32.0 - 36.0 g/dL Final    RDW 12/15/2022 13.9  11.5 - 14.5 % Final    Platelets 12/15/2022 149 (L)  150 - 450 K/uL Final    MPV 12/15/2022 11.3  9.2 - 12.9 fL Final    Immature Granulocytes 12/15/2022 0.5  0.0 - 0.5 % Final    Gran # (ANC) 12/15/2022 9.5 (H)  1.8 - 7.7 K/uL Final    Immature Grans (Abs) 12/15/2022 0.06 (H)  0.00 - 0.04 K/uL Final    Comment: Mild elevation in immature granulocytes is non specific and   can be seen in a variety of conditions including stress response,   acute inflammation, trauma and pregnancy. Correlation with other   laboratory and clinical findings is essential.      Lymph # 12/15/2022 1.1  1.0 - 4.8 K/uL  Final    Mono # 12/15/2022 1.1 (H)  0.3 - 1.0 K/uL Final    Eos # 12/15/2022 0.0  0.0 - 0.5 K/uL Final    Baso # 12/15/2022 0.01  0.00 - 0.20 K/uL Final    nRBC 12/15/2022 0  0 /100 WBC Final    Gran % 12/15/2022 80.9 (H)  38.0 - 73.0 % Final    Lymph % 12/15/2022 9.4 (L)  18.0 - 48.0 % Final    Mono % 12/15/2022 9.1  4.0 - 15.0 % Final    Eosinophil % 12/15/2022 0.0  0.0 - 8.0 % Final    Basophil % 12/15/2022 0.1  0.0 - 1.9 % Final    Differential Method 12/15/2022 Automated   Final    Sodium 12/15/2022 139  136 - 145 mmol/L Final    Potassium 12/15/2022 4.5  3.5 - 5.1 mmol/L Final    Chloride 12/15/2022 105  95 - 110 mmol/L Final    CO2 12/15/2022 26  23 - 29 mmol/L Final    Glucose 12/15/2022 101  70 - 110 mg/dL Final    BUN 12/15/2022 17  8 - 23 mg/dL Final    Creatinine 12/15/2022 0.8  0.5 - 1.4 mg/dL Final    Calcium 12/15/2022 8.6 (L)  8.7 - 10.5 mg/dL Final    Anion Gap 12/15/2022 8  8 - 16 mmol/L Final    eGFR 12/15/2022 >60  >60 mL/min/1.73 m^2 Final       Encounter Diagnoses   Name Primary?    Cough, unspecified type Yes    Rheumatoid arthritis, involving unspecified site, unspecified whether rheumatoid factor present     Pulmonary fibrosis         Orders Placed This Encounter   Procedures    X-Ray Chest PA And Lateral     Standing Status:   Future     Standing Expiration Date:   2/27/2024      Medications Ordered This Encounter   Medications    albuterol (VENTOLIN HFA) 90 mcg/actuation inhaler     Sig: Inhale 2 puffs into the lungs every 6 (six) hours as needed for Wheezing (cough). Rescue     Dispense:  18 g     Refill:  0        E-Visit Time Tracking:    Day 1 Time (in minutes): 6     Total Time (in minutes): 6

## 2023-02-27 NOTE — Clinical Note
CXR for cough/ pulmonary fibrosis hx.   Orders Placed This Encounter     X-Ray Chest PA And Lateral

## 2023-03-15 ENCOUNTER — OFFICE VISIT (OUTPATIENT)
Dept: RHEUMATOLOGY | Facility: CLINIC | Age: 68
End: 2023-03-15
Payer: COMMERCIAL

## 2023-03-15 VITALS
DIASTOLIC BLOOD PRESSURE: 87 MMHG | SYSTOLIC BLOOD PRESSURE: 124 MMHG | HEIGHT: 66 IN | WEIGHT: 161 LBS | BODY MASS INDEX: 25.88 KG/M2 | HEART RATE: 89 BPM

## 2023-03-15 DIAGNOSIS — M06.9 RHEUMATOID ARTHRITIS INVOLVING MULTIPLE JOINTS: Primary | ICD-10-CM

## 2023-03-15 PROCEDURE — 3074F SYST BP LT 130 MM HG: CPT | Mod: CPTII,S$GLB,, | Performed by: INTERNAL MEDICINE

## 2023-03-15 PROCEDURE — 99999 PR PBB SHADOW E&M-EST. PATIENT-LVL III: ICD-10-PCS | Mod: PBBFAC,,, | Performed by: INTERNAL MEDICINE

## 2023-03-15 PROCEDURE — 3079F PR MOST RECENT DIASTOLIC BLOOD PRESSURE 80-89 MM HG: ICD-10-PCS | Mod: CPTII,S$GLB,, | Performed by: INTERNAL MEDICINE

## 2023-03-15 PROCEDURE — 1159F PR MEDICATION LIST DOCUMENTED IN MEDICAL RECORD: ICD-10-PCS | Mod: CPTII,S$GLB,, | Performed by: INTERNAL MEDICINE

## 2023-03-15 PROCEDURE — 3008F BODY MASS INDEX DOCD: CPT | Mod: CPTII,S$GLB,, | Performed by: INTERNAL MEDICINE

## 2023-03-15 PROCEDURE — 3008F PR BODY MASS INDEX (BMI) DOCUMENTED: ICD-10-PCS | Mod: CPTII,S$GLB,, | Performed by: INTERNAL MEDICINE

## 2023-03-15 PROCEDURE — 3074F PR MOST RECENT SYSTOLIC BLOOD PRESSURE < 130 MM HG: ICD-10-PCS | Mod: CPTII,S$GLB,, | Performed by: INTERNAL MEDICINE

## 2023-03-15 PROCEDURE — 99214 OFFICE O/P EST MOD 30 MIN: CPT | Mod: S$GLB,,, | Performed by: INTERNAL MEDICINE

## 2023-03-15 PROCEDURE — 1126F PR PAIN SEVERITY QUANTIFIED, NO PAIN PRESENT: ICD-10-PCS | Mod: CPTII,S$GLB,, | Performed by: INTERNAL MEDICINE

## 2023-03-15 PROCEDURE — 99999 PR PBB SHADOW E&M-EST. PATIENT-LVL III: CPT | Mod: PBBFAC,,, | Performed by: INTERNAL MEDICINE

## 2023-03-15 PROCEDURE — 1126F AMNT PAIN NOTED NONE PRSNT: CPT | Mod: CPTII,S$GLB,, | Performed by: INTERNAL MEDICINE

## 2023-03-15 PROCEDURE — 99214 PR OFFICE/OUTPT VISIT, EST, LEVL IV, 30-39 MIN: ICD-10-PCS | Mod: S$GLB,,, | Performed by: INTERNAL MEDICINE

## 2023-03-15 PROCEDURE — 3079F DIAST BP 80-89 MM HG: CPT | Mod: CPTII,S$GLB,, | Performed by: INTERNAL MEDICINE

## 2023-03-15 PROCEDURE — 1159F MED LIST DOCD IN RCRD: CPT | Mod: CPTII,S$GLB,, | Performed by: INTERNAL MEDICINE

## 2023-03-15 NOTE — PROGRESS NOTES
Subjective:       Patient ID: Fabiano Garvey is a 60 y.o. male.    Chief Complaint: OTHER and Swelling    HPI     59 yo male with PMH of basal cell cancer (around 2010), HL here for evaluation of join pain.  Reports pain in hands, elbows. and wrists.  Reports  swelling in hands, ankles, and feet.  Reports also has bilateral knee pain and shoulders.   Reports stiffness in morning for 30 minutes.  He has trouble swelling. Denies any rashes. No oral ulcers. No hair loss. Denies photosensitivity.  Denies any raynauds.Denies blood clots.  Denies dry eyes and dry mouth.  Reports symptoms for a year. In November, he noted the nodules in elbows.  Thinks he has swelling in lower neck.  He has trouble picking up things due to pain and swelling.  He reports that prednisone improves h is pain and swelling.  He has been off steroids.  No changes in weight.  Reports good appetite.      Interval history:  He is s/p lumbar surgery. He is on rinvoq. Denies any flares. On occasion, he has cough.Denies shortness of breath. He used to smoke. H. Denies any reflux. Denies any pain or swelling in joints.  Reports stiffness for 10 minutes.     Past Medical History   Diagnosis Date    Hyperlipidemia     Basal cell carcinoma      medial canthus       Review of Systems   Constitutional: Negative for fever, chills, appetite change and fatigue.   HENT: Negative for hearing loss, mouth sores, rhinorrhea, sinus pressure and trouble swallowing.    Eyes: Negative for photophobia, pain, discharge, itching and visual disturbance.   Respiratory:  Negative for cough, choking, chest tightness, wheezing and stridor.    Cardiovascular: Negative for chest pain and palpitations.   Gastrointestinal: Negative for blood in stool and abdominal distention.   Endocrine: Negative for cold intolerance and heat intolerance.   Genitourinary: Negative for dysuria, hematuria and flank pain.   Musculoskeletal: Positive for myalgias, back pain, joint swelling,  arthralgias.  Skin: Negative for color change, pallor and rash.   Neurological: Negative for dizziness, light-headedness, numbness and headaches.   Hematological: Negative for adenopathy. Does not bruise/bleed easily.   Psychiatric/Behavioral: Negative for decreased concentration and agitation. The patient is not nervous/anxious.            Objective:        Physical Exam   Constitutional: He is oriented to person, place, and time and well-developed, well-nourished, and in no distress. No distress.   HENT:   Head: Normocephalic and atraumatic.   Right Ear: External ear normal.   Left Ear: External ear normal.   Nose: Nose normal.   Mouth/Throat: Oropharynx is clear and moist. No oropharyngeal exudate.   Eyes: Conjunctivae and EOM are normal. Pupils are equal, round, and reactive to light. Right eye exhibits no discharge. Left eye exhibits no discharge. No scleral icterus.   Neck: Normal range of motion. Neck supple. No JVD present. No tracheal deviation present. No thyromegaly present.   Cardiovascular: Normal rate, regular rhythm and intact distal pulses.  Exam reveals no gallop and no friction rub.    No murmur heard.  Pulmonary/Chest: Effort normal and breath sounds normal. No stridor. No respiratory distress. He has no wheezes. He has no rales. He exhibits no tenderness.   Abdominal: Soft. Bowel sounds are normal. He exhibits no distension. There is no tenderness. There is no rebound.   Lymphadenopathy:     He has no cervical adenopathy.   Neurological: He is alert and oriented to person, place, and time.   Skin: Skin is warm. He is not diaphoretic.     Musculoskeletal:   Shoulders- decreased ROM of both shoulders to 130 degrees bilaterally (resoved)  Elbows- rheumatoid nodules in extensor surfaces bilaterally; mild restriction in extension of right elbow  Wrist- synovitis with decreased extension bilaterally (resolved)  Hands- synovitis of mcps on right side, worse on the right  Knees-bony hypertrophy but no  effusions or tenderness; crepitus  Ankles- no tenderness  Feet-bilaterally mtp tenderness resolved           Labs:  Robel: 1:320  dna-1:160<160  Esr-61<44   ccp-306  rf-876  Ro,la-negative  Nicole, rnp -negtayive   Hepatitis B-negative  Hepatitis C-negative  Urine-negative      Imaging:  I personally reviewed the xrays      CT chest (2020): I personally reviewed; Findings compatible with interstitial lung disease including UIP.  Findings appear grossly similar compared to prior examination dated 03/25/2020.     Stable pulmonary nodule within the inferior lingula measuring approximately 0.5 cm.  No new pulmonary nodule or mass.     Centrilobular and paraseptal emphysematous changes with an upper lobe predominance.     Coronary artery atherosclerosis.       Arthritis survey:      12/2015: hand x rays- no erosions  12/2015: feet xray: no erosions    Chest xray (2/2016)-negative      Assessment:     66 yo male with PMH of  Squamous cell carcinoma (2010, 2020), HL here for  Follow up of  Seropositive RA.  He has seropositive RA with nodules. He also had serologies consistent with early SLE given (+ROBEL and +DNA) with no other features of  SLE.  Tried plaquenil but reports it gave him dizziness after a few doses.  Regarding his arthritis, he was doing well on MTX 6 pills a week but developed cough. Had  CT chest concerning for UIP.I had long discussion with patient and told him hard to say if his symptoms came from MTX or RA so now off MTX.    Given his severe RA,  I put him on rinvoq with improvement.  Plan:      * - follow up with  dermatology evaluation given history of skin cancer  --continue rinvoq 15 mg po q day  -continue folic acid 1 mg po qday  Off MTX given UIP  Labs xrays    given history of +DNA  Avoid anti tnf  Avoid orencia given emphysema on CT SCAN    # vitamin D deficiency: Continue vit D once a week   labs  Before next visit      # lower back pain: appears radicular. Discussed doing xray but he  "declines.    -continue baclofen 10mg  ( take one to two tablets at bedtime)      #UIP:  Had recent Findings compatible with interstitial lung disease including UIP.  Findings appear grossly similar compared to prior examination dated 03/25/2020.   "Stable pulmonary nodule within the inferior lingula measuring approximately 0.5 cm.  No new pulmonary nodule or mass.Centrilobular and paraseptal emphysematous changes with an upper lobe predominance.   Coronary artery atherosclerosis." I have asked patient that it is critical he     Needs to follow up with  Pulmonary and stressed importance again at last visit and today.    30 * minutes of total time spent on the encounter, which includes face to face time and non-face to face time preparing to see the patient (eg, review of tests), Obtaining and/or reviewing separately obtained history, Documenting clinical information in the electronic or other health record, Independently interpreting results (not separately reported) and communicating results to the patient/family/caregiver, or Care coordination (not separately reported).     rtc in 6 months            "

## 2023-03-17 ENCOUNTER — HOSPITAL ENCOUNTER (OUTPATIENT)
Dept: RADIOLOGY | Facility: HOSPITAL | Age: 68
Discharge: HOME | End: 2023-03-17
Attending: INTERNAL MEDICINE
Payer: COMMERCIAL

## 2023-03-17 ENCOUNTER — HOSPITAL ENCOUNTER (OUTPATIENT)
Dept: RADIOLOGY | Facility: HOSPITAL | Age: 68
Discharge: HOME | End: 2023-03-17
Attending: NEUROLOGICAL SURGERY
Payer: COMMERCIAL

## 2023-03-17 DIAGNOSIS — Z98.1 S/P LUMBAR FUSION: ICD-10-CM

## 2023-03-17 DIAGNOSIS — M06.9 RHEUMATOID ARTHRITIS INVOLVING MULTIPLE JOINTS: ICD-10-CM

## 2023-03-17 PROCEDURE — 72082 XR SCOLIOSIS COMPLETE: ICD-10-PCS | Mod: 26,,, | Performed by: RADIOLOGY

## 2023-03-17 PROCEDURE — 77077 XR ARTHRITIS SURVEY: ICD-10-PCS | Mod: 26,,, | Performed by: INTERNAL MEDICINE

## 2023-03-17 PROCEDURE — 72082 X-RAY EXAM ENTIRE SPI 2/3 VW: CPT | Mod: TC,FY

## 2023-03-17 PROCEDURE — 72082 X-RAY EXAM ENTIRE SPI 2/3 VW: CPT | Mod: 26,,, | Performed by: RADIOLOGY

## 2023-03-17 PROCEDURE — 77077 JOINT SURVEY SINGLE VIEW: CPT | Mod: TC

## 2023-03-17 PROCEDURE — 77077 JOINT SURVEY SINGLE VIEW: CPT | Mod: 26,,, | Performed by: INTERNAL MEDICINE

## 2023-03-20 ENCOUNTER — OFFICE VISIT (OUTPATIENT)
Dept: NEUROSURGERY | Facility: CLINIC | Age: 68
End: 2023-03-20
Payer: COMMERCIAL

## 2023-03-20 ENCOUNTER — TELEPHONE (OUTPATIENT)
Dept: NEUROSURGERY | Facility: CLINIC | Age: 68
End: 2023-03-20
Payer: COMMERCIAL

## 2023-03-20 ENCOUNTER — SPECIALTY PHARMACY (OUTPATIENT)
Dept: PHARMACY | Facility: CLINIC | Age: 68
End: 2023-03-20
Payer: COMMERCIAL

## 2023-03-20 VITALS — HEIGHT: 66 IN | WEIGHT: 160.94 LBS | BODY MASS INDEX: 25.86 KG/M2

## 2023-03-20 DIAGNOSIS — Z98.1 STATUS POST LUMBAR AND LUMBOSACRAL FUSION BY ANTERIOR TECHNIQUE: Primary | ICD-10-CM

## 2023-03-20 DIAGNOSIS — Z98.1 S/P LUMBAR SPINAL FUSION: Primary | ICD-10-CM

## 2023-03-20 PROCEDURE — 3008F BODY MASS INDEX DOCD: CPT | Mod: CPTII,S$GLB,, | Performed by: NEUROLOGICAL SURGERY

## 2023-03-20 PROCEDURE — 99999 PR PBB SHADOW E&M-EST. PATIENT-LVL III: CPT | Mod: PBBFAC,,, | Performed by: NEUROLOGICAL SURGERY

## 2023-03-20 PROCEDURE — 3008F PR BODY MASS INDEX (BMI) DOCUMENTED: ICD-10-PCS | Mod: CPTII,S$GLB,, | Performed by: NEUROLOGICAL SURGERY

## 2023-03-20 PROCEDURE — 1126F AMNT PAIN NOTED NONE PRSNT: CPT | Mod: CPTII,S$GLB,, | Performed by: NEUROLOGICAL SURGERY

## 2023-03-20 PROCEDURE — 1101F PR PT FALLS ASSESS DOC 0-1 FALLS W/OUT INJ PAST YR: ICD-10-PCS | Mod: CPTII,S$GLB,, | Performed by: NEUROLOGICAL SURGERY

## 2023-03-20 PROCEDURE — 99213 OFFICE O/P EST LOW 20 MIN: CPT | Mod: S$GLB,,, | Performed by: NEUROLOGICAL SURGERY

## 2023-03-20 PROCEDURE — 1126F PR PAIN SEVERITY QUANTIFIED, NO PAIN PRESENT: ICD-10-PCS | Mod: CPTII,S$GLB,, | Performed by: NEUROLOGICAL SURGERY

## 2023-03-20 PROCEDURE — 1101F PT FALLS ASSESS-DOCD LE1/YR: CPT | Mod: CPTII,S$GLB,, | Performed by: NEUROLOGICAL SURGERY

## 2023-03-20 PROCEDURE — 99213 PR OFFICE/OUTPT VISIT, EST, LEVL III, 20-29 MIN: ICD-10-PCS | Mod: S$GLB,,, | Performed by: NEUROLOGICAL SURGERY

## 2023-03-20 PROCEDURE — 99999 PR PBB SHADOW E&M-EST. PATIENT-LVL III: ICD-10-PCS | Mod: PBBFAC,,, | Performed by: NEUROLOGICAL SURGERY

## 2023-03-20 PROCEDURE — 1159F MED LIST DOCD IN RCRD: CPT | Mod: CPTII,S$GLB,, | Performed by: NEUROLOGICAL SURGERY

## 2023-03-20 PROCEDURE — 3288F PR FALLS RISK ASSESSMENT DOCUMENTED: ICD-10-PCS | Mod: CPTII,S$GLB,, | Performed by: NEUROLOGICAL SURGERY

## 2023-03-20 PROCEDURE — 1159F PR MEDICATION LIST DOCUMENTED IN MEDICAL RECORD: ICD-10-PCS | Mod: CPTII,S$GLB,, | Performed by: NEUROLOGICAL SURGERY

## 2023-03-20 PROCEDURE — 3288F FALL RISK ASSESSMENT DOCD: CPT | Mod: CPTII,S$GLB,, | Performed by: NEUROLOGICAL SURGERY

## 2023-03-20 RX ORDER — ALBUTEROL SULFATE 90 UG/1
2 AEROSOL, METERED RESPIRATORY (INHALATION) EVERY 6 HOURS PRN
Qty: 18 G | Refills: 2 | Status: SHIPPED | OUTPATIENT
Start: 2023-03-20

## 2023-03-20 NOTE — PROGRESS NOTES
Oswestry Low Back Pain Disability Questionnaire    DATE OF SERVICE:  03/20/2023    ATTENDING PHYSICIAN:  August Aguilar MD    Instructions    This questionnaire has been designed to give us information as to how your back or leg pain is affecting your ability to manage in everyday life. Please answer by checking ONE box in each section for the statement which best applies to you. We realize you may consider that two or more statements in any one section apply but please just shade out the spot that indicates the statement which most clearly describes your problem.    Section 1 - Pain intensity    * I have no pain at the moment.  * The pain is very mild at the moment.  * The pain is moderate at the moment.  * The pain is fairly severe at the moment.  * The pain is very severe at the moment.  * The pain is the worst imaginable at the moment.    Score : 0    Section 2 - Personal care (washing, dressing etc)    * I can look after myself normally without causing extra pain.  * I can look after myself normally but it causes extra pain.  * It is painful to look after myself and I am slow and careful.  * I need some help but manage most of my personal care.  * I need help every day in most aspects of self-care.  * I do not get dressed, I wash with difficulty and stay in bed.    Score : 0    Section 3 - Lifting    * I can lift heavy weights without extra pain.  * I can lift heavy weights but it gives extra pain.  * Pain prevents me from lifting heavy weights off the floor, but I can manage if they are conveniently placed eg. on a table.  * Pain prevents me from lifting heavy weights, but I can manage light to medium weights if they are conveniently positioned.  * I can lift very light weights.  * I cannot lift or carry anything at all.    Score : 2    Section 4 - Walking    * Pain does not prevent me walking any distance.  * Pain prevents me from walking more than 1 mile.         * Pain prevents me from walking more than  1/2 mile.         * Pain prevents me from walking more than 100 yards.            * I can only walk using a stick or crutches.  * I am in bed most of the time.    Score : 1    Section 5 - Sitting    * I can sit in any chair as long as I like.  * I can only sit in my favourite chair as long as I like.  * Pain prevents me sitting more than one hour.  * Pain prevents me from sitting more than 30 minutes.  * Pain prevents me from sitting more than 10 minutes.  * Pain prevents me from sitting at all.    Score : 0    Section 6 - Standing    * I can stand as long as I want without extra pain.  * I can stand as long as I want but it gives me extra pain.  * Pain prevents me from standing for more than 1 hour  * Pain prevents me from standing for more than 30 minutes.  * Pain prevents me from standing for more than 10 minutes.  * Pain prevents me from standing at all.     Score : 0    Section 7 - Sleeping    * My sleep is never disturbed by pain.  * My sleep is occasionally disturbed by pain.  * Because of pain I have less than 6 hours sleep.   * Because of pain I have less than 4 hours sleep.   * Because of pain I have less than 2 hours sleep.  * Pain prevents me from sleeping at all.    Score : 0    Section 8 - Sex life (if applicable)    * My sex life is normal and causes no extra pain.  * My sex life is normal but causes some extra pain.  * My sex life is nearly normal but is very painful.   * My sex life is severely restricted by pain.  * My sex life is nearly absent because of pain.   * Pain prevents any sex life at all.    Score : 0    Section 9 - Social life    * My social life is normal and gives me no extra pain.  * My social life is normal but increases the degree of pain.  * Pain has no significant effect on my social life apart from limiting my more energetic interests eg, sport.  * Pain has restricted my social life and I do not go out as often.  * Pain has restricted my social life to my home.   * I have no  social life because of pain.     Score : 0    Section 10 - Travelling    * I can travel anywhere without pain.  * I can travel anywhere but it gives me extra pain.  * Pain is bad but I manage journeys over two hours.  * Pain restricts me to journeys of less than one hour.  * Pain restricts me to short necessary journeys under 30 minutes.  * Pain prevents me from travelling except to receive treatment.    Score : 0      TOTAL SCORE :  3 / 50 x 2 = 6 %      References  1. [place] MICK, Jt PB. The Oswestry Disability Index. Spine 2000 Nov 15;25(22):2946-52; discussion 52.  from standing for more than from standing for more than from standing for more than from standing at all

## 2023-03-20 NOTE — TELEPHONE ENCOUNTER
Specialty Pharmacy - Refill Coordination    Specialty Medication Orders Linked to Encounter      Flowsheet Row Most Recent Value   Medication #1 upadacitinib (RINVOQ) 15 mg 24 hr tablet (Order#[id number], Rx#[id number])            Refill Questions - Documented Responses      Flowsheet Row Most Recent Value   Patient Availability and HIPAA Verification    Does patient want to proceed with activity? Yes   HIPAA/medical authority confirmed? Yes   Relationship to patient of person spoken to? Self   Refill Screening Questions    Changes to allergies? No   Changes to medications? No   New conditions since last clinic visit? No   Unplanned office visit, urgent care, ED, or hospital admission in the last 4 weeks? No   How does patient/caregiver feel medication is working? Good   Financial problems or insurance changes? No   How many doses of your specialty medications were missed in the last 4 weeks? 0   Would patient like to speak to a pharmacist? No   When does the patient need to receive the medication? 03/25/23   Refill Delivery Questions    How will the patient receive the medication? [facility]   When does the patient need to receive the medication? 03/25/23   Shipping Address Home   Address in [facility] confirmed and updated if neccessary? Yes   Expected Copay ($) 5   Is the patient able to afford the medication copay? Yes   Payment Method zero copay   Days supply of Refill 30   Supplies needed? No supplies needed   Refill activity completed? Yes   Refill activity plan Refill scheduled   Shipment/Pickup Date: 03/23/23            Current Outpatient Medications   Medication Sig    albuterol (VENTOLIN HFA) 90 mcg/actuation inhaler Inhale 2 puffs into the lungs every 6 (six) hours as needed for Wheezing (cough). Rescue    atorvastatin (LIPITOR) 40 MG tablet Take 1 tablet (40 mg total) by mouth once daily.    celecoxib (CELEBREX) 200 MG capsule Take 1 capsule (200 mg total) by mouth 2 (two) times daily.     ergocalciferol (VITAMIN D2) 50,000 unit Cap TAKE 1 CAPSULE BY MOUTH ONE TIME PER WEEK    fluorouraciL (EFUDEX) 5 % cream Apply thin film to right cheek and temple 2times per day for 2-3 weeks; d/c if area bleeding or ulcerated; avoid eyes or mouth    fluticasone propionate (FLONASE) 50 mcg/actuation nasal spray 2 SPRAYS (100 MCG TOTAL) BY EACH NOSTRIL ROUTE ONCE DAILY.    gabapentin (NEURONTIN) 300 MG capsule Take 2 capsules (600 mg total) by mouth 3 (three) times daily.    ibuprofen (ADVIL,MOTRIN) 800 MG tablet Take 1 tablet (800 mg total) by mouth 3 (three) times daily as needed for Pain.    imiquimod (ALDARA) 5 % cream Apply small amount to affected area on  right cheek at night  x 4 weeks; discontinue  if ulcerated or bleeding (Patient taking differently: Apply small amount to affected area on  right cheek at night  x 4 weeks; discontinue  if ulcerated or bleeding)    mupirocin (BACTROBAN) 2 % ointment Apply to Affected Area as directed  twice daily    oxyCODONE (ROXICODONE) 10 mg Tab immediate release tablet Take 1 tablet (10 mg total) by mouth every 6 (six) hours as needed for Pain.    tiotropium (SPIRIVA) 18 mcg inhalation capsule Inhale 1 capsule (18 mcg total) into the lungs once daily. Controller    upadacitinib (RINVOQ) 15 mg 24 hr tablet Take 1 tablet (15 mg total) by mouth once daily.   Last reviewed on 3/15/2023  3:10 PM by Francesca Caal MA    Review of patient's allergies indicates:   Allergen Reactions    Plaquenil [hydroxychloroquine]      Lightheaded and muscle aches    Last reviewed on  3/20/2023 6:36 AM by Judith Rios      Tasks added this encounter   No tasks added.   Tasks due within next 3 months   3/25/2023 - Clinical - Follow Up Assesement (Annual)  3/17/2023 - Refill Call (Auto Added)     Cleo Allen, PharmD  [facility]  [address] [place] [id number]  Phone: [phone]  Fax: [phone]

## 2023-03-20 NOTE — PROGRESS NOTES
NEUROSURGICAL OUTPATIENT PROGRESS NOTE    DATE OF SERVICE:  2023    ATTENDING PHYSICIAN:  August Aguilar MD    GENE:6%    NRS low back:0/10    NRS right le/10    NRS left le/10    Opioid use daily:NA    Neuropathic pain meds daily:  Decreased to 300 twice a day    NSAIDs daily: Celebrex twice a day    Smoking:No    SUBJECTIVE:    INTERIM HISTORY:  This is a 67 y.o. male who is s/p 3 months L3-4 and L4-5 oblique interbody fusion with posterior instrumentation.  The patient is doing very well.  He denies having any residual back pain or leg pain.  He has become more active.  He was able to go to [employer] and did not have any pain walking and doing rides.  He has been compliant with wearing his back brace.  No new onset of motor weakness or numbness.  No complaint at this time.      PAST MEDICAL HISTORY:  Active Ambulatory Problems     Diagnosis Date Noted    Hyperlipidemia     Rheumatoid arthritis     Colon cancer screening 2017    Personal history of skin cancer 2017    Elevated LFTs 2019    Obstructive sleep apnea 2019    Interstitial pulmonary fibrosis     ILD (interstitial lung disease) 2020    Chronic bilateral low back pain with right-sided sciatica 2021    Decreased range of motion 2021    Muscle weakness 2021    Lumbar spondylosis 2021    Spinal stenosis of lumbar region with neurogenic claudication 2021    Degenerative disc disease, lumbar 2021    Lumbar radiculopathy 2021    Chronic pain 2021    Shortness of breath     Centrilobular emphysema 11/10/2021    Decreased functional mobility 10/31/2022     Resolved Ambulatory Problems     Diagnosis Date Noted    No Resolved Ambulatory Problems     Past Medical History:   Diagnosis Date    NU (obstructive sleep apnea)     Pulmonary fibrosis     SCC (squamous cell carcinoma) 2020    SCC (squamous cell carcinoma) 2021    SCC (squamous cell carcinoma) 2022     Squamous cell carcinoma 07/2019    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       PAST SURGICAL HISTORY:  Past Surgical History:   Procedure Laterality Date    COLONOSCOPY N/A 7/25/2017    Procedure: COLONOSCOPY;  Surgeon: Bill Nicole MD;  Location: [address]);  Service: Endoscopy;  Laterality: N/A;    EPIDURAL STEROID INJECTION INTO LUMBAR SPINE N/A 7/29/2021    Procedure: Injection-steroid-epidural-lumbar L5-S1;  Surgeon: Shiv Vernon Jr., MD;  Location: [facility] PAIN [facility]GT;  Service: Pain Management;  Laterality: N/A;  oral sedation   no pacemaker     FUSION OF SPINE WITH INSTRUMENTATION Left 12/14/2022    Procedure: FUSION, SPINE, WITH INSTRUMENTATION Stg1: Left L3-5 OLiF conduit lateral cage BMP;  Surgeon: August Aguilar MD;  Location: [facility] OR;  Service: Neurosurgery;  Laterality: Left;  Procedure: Stg1: Left L3-5 OLiF conduit lateral cage BMP  Surgery Time: 2hrs  LOS: 3  Anesthesia: General  Blood: Type & Screen  Radiology: C-arm  Brace: LSO  Bed: Regular Bed  Position: Lateral Right Down  Equip    FUSION, SPINE, POSTERIOR APPROACH N/A 12/14/2022    Procedure: FUSION,SPINE,POSTERIOR APPROACH Stg 2: L3-5 posterior instrumentation viper prime, L3-5 posterolateral arthrodesis;  Surgeon: August Aguilar MD;  Location: [facility] OR;  Service: Neurosurgery;  Laterality: N/A;  Procedure: Stg 2: L3-5 posterior instrumentation viper prime, L3-5 posterolateral arthrodesis  Surgery Time: 3hrs  LOS: 3  Anesthesia: General  Blood: Type & Screen  Radiology: Bra    NO PAST SURGERIES      TRANSFORAMINAL EPIDURAL INJECTION OF STEROID Right 6/29/2021    Procedure: Injection,steroid,epidural,transforaminal approach-RIGHT-- L4-5, L5-S1-- (IV Sedation);  Surgeon: Shiv Vernon Jr., MD;  Location: [facility] PAIN [facility];  Service: Pain Management;  Laterality: Right;    TRANSFORAMINAL EPIDURAL INJECTION OF STEROID Left 10/27/2022    Procedure: Injection,steroid,epidural,transforaminal left L4-5  and L5-S1;  Surgeon: Shiv Vernon Jr., MD;  Location: [facility];  Service: Pain Management;  Laterality: Left;       SOCIAL HISTORY:   Social History     Socioeconomic History    Marital status:    Occupational History     Employer: [employer]   Tobacco Use    Smoking status: Former     Types: Cigarettes     Quit date: 7/12/2012     Years since quitting: 10.6     Passive exposure: Never    Smokeless tobacco: Never   Substance and Sexual Activity    Alcohol use: Yes     Comment: occasional    Drug use: Never    Sexual activity: Yes     Partners: Female     Birth control/protection: None   Social History Narrative    No aerobic exercise, walks a lot    No diet                                                                                                                                                                                         Social Determinants of Health     Financial Resource Strain: Low Risk     Difficulty of Paying Living Expenses: Not hard at all   Food Insecurity: No Food Insecurity    Worried About Running Out of Food in the Last Year: Never true    Ran Out of Food in the Last Year: Never true   Transportation Needs: No Transportation Needs    Lack of Transportation (Medical): No    Lack of Transportation (Non-Medical): No   Physical Activity: Inactive    Days of Exercise per Week: 0 days    Minutes of Exercise per Session: 0 min   Stress: No Stress Concern Present    Feeling of Stress : Not at all   Social Connections: Unknown    Frequency of Communication with Friends and Family: More than three times a week    Frequency of Social Gatherings with Friends and Family: More than three times a week    Active Member of Clubs or Organizations: No    Attends Club or Organization Meetings: Never    Marital Status:    Housing Stability: Low Risk     Unable to Pay for Housing in the Last Year: No    Number of Places Lived in the Last Year: 1    Unstable Housing  in the Last Year: No       FAMILY HISTORY:  Family History   Problem Relation Age of Onset    Heart failure Mother         60s    Coronary artery disease Father         70s    Cancer Paternal Uncle         lymphoma??    Diabetes Neg Hx     Melanoma Neg Hx        CURRENTS MEDICATIONS:  Current Outpatient Medications on File Prior to Visit   Medication Sig Dispense Refill    atorvastatin (LIPITOR) 40 MG tablet Take 1 tablet (40 mg total) by mouth once daily. 90 tablet 3    celecoxib (CELEBREX) 200 MG capsule Take 1 capsule (200 mg total) by mouth 2 (two) times daily. 60 capsule 2    ergocalciferol (VITAMIN D2) 50,000 unit Cap TAKE 1 CAPSULE BY MOUTH ONE TIME PER WEEK 12 capsule 3    fluorouraciL (EFUDEX) 5 % cream Apply thin film to right cheek and temple 2times per day for 2-3 weeks; d/c if area bleeding or ulcerated; avoid eyes or mouth 40 g 1    fluticasone propionate (FLONASE) 50 mcg/actuation nasal spray 2 SPRAYS (100 MCG TOTAL) BY EACH NOSTRIL ROUTE ONCE DAILY. 16 mL 1    gabapentin (NEURONTIN) 300 MG capsule Take 2 capsules (600 mg total) by mouth 3 (three) times daily. 180 capsule 11    ibuprofen (ADVIL,MOTRIN) 800 MG tablet Take 1 tablet (800 mg total) by mouth 3 (three) times daily as needed for Pain. 90 tablet 1    imiquimod (ALDARA) 5 % cream Apply small amount to affected area on  right cheek at night  x 4 weeks; discontinue  if ulcerated or bleeding (Patient taking differently: Apply small amount to affected area on  right cheek at night  x 4 weeks; discontinue  if ulcerated or bleeding) 12 packet 1    mupirocin (BACTROBAN) 2 % ointment Apply to Affected Area as directed  twice daily 22 g 3    oxyCODONE (ROXICODONE) 10 mg Tab immediate release tablet Take 1 tablet (10 mg total) by mouth every 6 (six) hours as needed for Pain. 28 tablet 0    tiotropium (SPIRIVA) 18 mcg inhalation capsule Inhale 1 capsule (18 mcg total) into the lungs once daily. Controller 30 capsule 6    upadacitinib (RINVOQ) 15 mg 24 hr  tablet Take 1 tablet (15 mg total) by mouth once daily. 30 tablet 11    [DISCONTINUED] albuterol (VENTOLIN HFA) 90 mcg/actuation inhaler Inhale 2 puffs into the lungs every 6 (six) hours as needed for Wheezing (cough). Rescue 18 g 0     No current facility-administered medications on file prior to visit.       ALLERGIES:  Review of patient's allergies indicates:   Allergen Reactions    Plaquenil [hydroxychloroquine]      Lightheaded and muscle aches       REVIEW OF SYSTEMS:  Review of Systems   Constitutional:  Negative for diaphoresis, fever and weight loss.   Respiratory:  Negative for shortness of breath.    Cardiovascular:  Negative for chest pain.   Gastrointestinal:  Negative for blood in stool.   Genitourinary:  Negative for hematuria.   Endo/Heme/Allergies:  Does not bruise/bleed easily.   All other systems reviewed and are negative.    OBJECTIVE:    PHYSICAL EXAMINATION:   There were no vitals filed for this visit.    Physical Exam:  Vitals reviewed.    Constitutional: He appears well-developed and well-nourished.     Eyes: Pupils are equal, round, and reactive to light. Conjunctivae and EOM are normal.     Cardiovascular: Normal distal pulses and no edema.     Abdominal: Soft.     Skin: Skin displays no rash on trunk and no rash on extremities. Skin displays no lesions on trunk and no lesions on extremities.     Psych/Behavior: He is alert. He is oriented to person, place, and time. He has a normal mood and affect.     Musculoskeletal:        Neck: Range of motion is full.     Neurological:        DTRs: Tricep reflexes are 2+ on the right side and 2+ on the left side. Bicep reflexes are 2+ on the right side and 2+ on the left side. Brachioradialis reflexes are 2+ on the right side and 2+ on the left side. Patellar reflexes are 2+ on the right side and 2+ on the left side. Achilles reflexes are 2+ on the right side and 2+ on the left side.     Back Exam     Muscle Strength   Right Quadriceps:  5/5   Left  Quadriceps:  5/5   Right Hamstrings:  5/5   Left Hamstrings:  5/5             Neurologic Exam     Mental Status   Oriented to person, place, and time.   Speech: speech is normal   Level of consciousness: alert    Cranial Nerves   Cranial nerves II through XII intact.     CN III, IV, VI   Pupils are equal, round, and reactive to light.  Extraocular motions are normal.     Motor Exam   Muscle bulk: normal  Overall muscle tone: normal    Strength   Right deltoid: 5/5  Left deltoid: 5/5  Right biceps: 5/5  Left biceps: 5/5  Right triceps: 5/5  Left triceps: 5/5  Right wrist flexion: 5/5  Left wrist flexion: 5/5  Right wrist extension: 5/5  Left wrist extension: 5/5  Right interossei: 5/5  Left interossei: 5/5  Right iliopsoas: 5/5  Left iliopsoas: 5/5  Right quadriceps: 5/5  Left quadriceps: 5/5  Right hamstrin/5  Left hamstrin/5  Right anterior tibial: 5/5  Left anterior tibial: 5/5  Right posterior tibial: 5/5  Left posterior tibial: 5/5  Right peroneal: 5/5  Left peroneal: 5/5  Right gastroc: 5/5  Left gastroc: 5/5    Sensory Exam   Light touch normal.   Pinprick normal.     Gait, Coordination, and Reflexes     Gait  Gait: normal    Coordination   Finger to nose coordination: normal  Tandem walking coordination: normal    Reflexes   Right brachioradialis: 2+  Left brachioradialis: 2+  Right biceps: 2+  Left biceps: 2+  Right triceps: 2+  Left triceps: 2+  Right patellar: 2+  Left patellar: 2+  Right achilles: 2+  Left achilles: 2+  Right plantar: normal  Left plantar: normal  Right Gill: absent  Left Gill: absent  Right ankle clonus: absent  Left ankle clonus: absent      DIAGNOSTIC DATA:  I personally interpreted the following imaging:   Lumbar x-ray, scoliosis film shown today good position of hardware, complete reduction of the L4-5 spondylolisthesis, good disc height, no subsidence or lucency around the hardware      ASSESMENT:  This is a 67 y.o. male with     Problem List Items Addressed This Visit     None  Visit Diagnoses       Status post lumbar and lumbosacral fusion by anterior technique    -  Primary            PLAN:  Follow-up in 3 months with repeat lumbar x-ray   All questions answered  More than 50% of the time was spent on discussing conservative management treatments (medication, physical therapy exercises). Care coordination was discussed.    20 minutes          August Aguilar MD  Cell:[phone]

## 2023-03-20 NOTE — TELEPHONE ENCOUNTER
No new care gaps identified.  [facility] Embedded Care Gaps. Reference number: [id number]. 3/20/2023   5:08:54 AM CDT

## 2023-05-08 ENCOUNTER — HOSPITAL ENCOUNTER (INPATIENT)
Facility: HOSPITAL | Age: 68
LOS: 1 days | Discharge: HOME | DRG: 337 | End: 2023-05-11
Attending: SURGERY | Admitting: SURGERY
Payer: COMMERCIAL

## 2023-05-08 DIAGNOSIS — R11.0 NAUSEA: ICD-10-CM

## 2023-05-08 DIAGNOSIS — K56.50 SMALL BOWEL OBSTRUCTION DUE TO ADHESIONS: Primary | ICD-10-CM

## 2023-05-08 DIAGNOSIS — R10.9 ABDOMINAL PAIN, UNSPECIFIED ABDOMINAL LOCATION: ICD-10-CM

## 2023-05-08 LAB
ALBUMIN SERPL BCP-MCNC: 4.1 G/DL (ref 3.5–5.2)
ALP SERPL-CCNC: 55 U/L (ref 55–135)
ALT SERPL W/O P-5'-P-CCNC: 25 U/L (ref 10–44)
ANION GAP SERPL CALC-SCNC: 12 MMOL/L (ref 8–16)
AST SERPL-CCNC: 31 U/L (ref 10–40)
BASOPHILS # BLD AUTO: 0.04 K/UL (ref 0–0.2)
BASOPHILS NFR BLD: 0.3 % (ref 0–1.9)
BILIRUB SERPL-MCNC: 0.4 MG/DL (ref 0.1–1)
BILIRUB UR QL STRIP: NEGATIVE
BUN SERPL-MCNC: 15 MG/DL (ref 8–23)
CALCIUM SERPL-MCNC: 9.6 MG/DL (ref 8.7–10.5)
CHLORIDE SERPL-SCNC: 106 MMOL/L (ref 95–110)
CLARITY UR: CLEAR
CO2 SERPL-SCNC: 23 MMOL/L (ref 23–29)
COLOR UR: YELLOW
CREAT SERPL-MCNC: 1.1 MG/DL (ref 0.5–1.4)
DIFFERENTIAL METHOD: ABNORMAL
EOSINOPHIL # BLD AUTO: 0.1 K/UL (ref 0–0.5)
EOSINOPHIL NFR BLD: 1 % (ref 0–8)
ERYTHROCYTE [DISTWIDTH] IN BLOOD BY AUTOMATED COUNT: 14.5 % (ref 11.5–14.5)
EST. GFR  (NO RACE VARIABLE): >60 ML/MIN/1.73 M^2
GLUCOSE SERPL-MCNC: 104 MG/DL (ref 70–110)
GLUCOSE UR QL STRIP: NEGATIVE
HCT VFR BLD AUTO: 45 % (ref 40–54)
HGB BLD-MCNC: 15.2 G/DL (ref 14–18)
HGB UR QL STRIP: ABNORMAL
IMM GRANULOCYTES # BLD AUTO: 0.04 K/UL (ref 0–0.04)
IMM GRANULOCYTES NFR BLD AUTO: 0.3 % (ref 0–0.5)
KETONES UR QL STRIP: NEGATIVE
LEUKOCYTE ESTERASE UR QL STRIP: NEGATIVE
LIPASE SERPL-CCNC: 64 U/L (ref 4–60)
LYMPHOCYTES # BLD AUTO: 1.4 K/UL (ref 1–4.8)
LYMPHOCYTES NFR BLD: 11.8 % (ref 18–48)
MCH RBC QN AUTO: 31.2 PG (ref 27–31)
MCHC RBC AUTO-ENTMCNC: 33.8 G/DL (ref 32–36)
MCV RBC AUTO: 92 FL (ref 82–98)
MONOCYTES # BLD AUTO: 0.9 K/UL (ref 0.3–1)
MONOCYTES NFR BLD: 7.5 % (ref 4–15)
NEUTROPHILS # BLD AUTO: 9.1 K/UL (ref 1.8–7.7)
NEUTROPHILS NFR BLD: 79.1 % (ref 38–73)
NITRITE UR QL STRIP: NEGATIVE
NRBC BLD-RTO: 0 /100 WBC
PH UR STRIP: 6 [PH] (ref 5–8)
PLATELET # BLD AUTO: 207 K/UL (ref 150–450)
PMV BLD AUTO: 10.6 FL (ref 9.2–12.9)
POTASSIUM SERPL-SCNC: 4.5 MMOL/L (ref 3.5–5.1)
PROT SERPL-MCNC: 8.1 G/DL (ref 6–8.4)
PROT UR QL STRIP: ABNORMAL
RBC # BLD AUTO: 4.87 M/UL (ref 4.6–6.2)
SODIUM SERPL-SCNC: 141 MMOL/L (ref 136–145)
SP GR UR STRIP: 1.02 (ref 1–1.03)
TROPONIN I SERPL DL<=0.01 NG/ML-MCNC: <0.006 NG/ML (ref 0–0.03)
URN SPEC COLLECT METH UR: ABNORMAL
UROBILINOGEN UR STRIP-ACNC: NEGATIVE EU/DL
WBC # BLD AUTO: 11.46 K/UL (ref 3.9–12.7)

## 2023-05-08 PROCEDURE — 99285 EMERGENCY DEPT VISIT HI MDM: CPT | Mod: 25

## 2023-05-08 PROCEDURE — 25000003 PHARM REV CODE 250: Performed by: PHYSICIAN ASSISTANT

## 2023-05-08 PROCEDURE — 96375 TX/PRO/DX INJ NEW DRUG ADDON: CPT

## 2023-05-08 PROCEDURE — 96361 HYDRATE IV INFUSION ADD-ON: CPT

## 2023-05-08 PROCEDURE — 96374 THER/PROPH/DIAG INJ IV PUSH: CPT

## 2023-05-08 PROCEDURE — 80053 COMPREHEN METABOLIC PANEL: CPT | Performed by: NURSE PRACTITIONER

## 2023-05-08 PROCEDURE — 85025 COMPLETE CBC W/AUTO DIFF WBC: CPT | Performed by: NURSE PRACTITIONER

## 2023-05-08 PROCEDURE — G0378 HOSPITAL OBSERVATION PER HR: HCPCS

## 2023-05-08 PROCEDURE — 84484 ASSAY OF TROPONIN QUANT: CPT | Performed by: NURSE PRACTITIONER

## 2023-05-08 PROCEDURE — 63600175 PHARM REV CODE 636 W HCPCS: Performed by: SURGERY

## 2023-05-08 PROCEDURE — 25500020 PHARM REV CODE 255: Performed by: PHYSICIAN ASSISTANT

## 2023-05-08 PROCEDURE — 63600175 PHARM REV CODE 636 W HCPCS: Performed by: PHYSICIAN ASSISTANT

## 2023-05-08 PROCEDURE — 81003 URINALYSIS AUTO W/O SCOPE: CPT | Performed by: NURSE PRACTITIONER

## 2023-05-08 PROCEDURE — 25000003 PHARM REV CODE 250: Performed by: SURGERY

## 2023-05-08 PROCEDURE — 83690 ASSAY OF LIPASE: CPT | Performed by: NURSE PRACTITIONER

## 2023-05-08 RX ORDER — ONDANSETRON 2 MG/ML
4 INJECTION INTRAMUSCULAR; INTRAVENOUS
Status: COMPLETED | OUTPATIENT
Start: 2023-05-08 | End: 2023-05-08

## 2023-05-08 RX ORDER — MORPHINE SULFATE 2 MG/ML
2 INJECTION, SOLUTION INTRAMUSCULAR; INTRAVENOUS
Status: COMPLETED | OUTPATIENT
Start: 2023-05-08 | End: 2023-05-08

## 2023-05-08 RX ORDER — ONDANSETRON 2 MG/ML
4 INJECTION INTRAMUSCULAR; INTRAVENOUS EVERY 6 HOURS PRN
Status: DISCONTINUED | OUTPATIENT
Start: 2023-05-08 | End: 2023-05-11 | Stop reason: HOSPADM

## 2023-05-08 RX ORDER — SODIUM CHLORIDE 9 MG/ML
INJECTION, SOLUTION INTRAVENOUS CONTINUOUS
Status: DISCONTINUED | OUTPATIENT
Start: 2023-05-08 | End: 2023-05-11

## 2023-05-08 RX ORDER — KETOROLAC TROMETHAMINE 30 MG/ML
15 INJECTION, SOLUTION INTRAMUSCULAR; INTRAVENOUS EVERY 6 HOURS PRN
Status: DISCONTINUED | OUTPATIENT
Start: 2023-05-08 | End: 2023-05-10

## 2023-05-08 RX ADMIN — ONDANSETRON HYDROCHLORIDE 4 MG: 2 SOLUTION INTRAMUSCULAR; INTRAVENOUS at 06:05

## 2023-05-08 RX ADMIN — KETOROLAC TROMETHAMINE 15 MG: 30 INJECTION, SOLUTION INTRAMUSCULAR; INTRAVENOUS at 10:05

## 2023-05-08 RX ADMIN — MORPHINE SULFATE 2 MG: 2 INJECTION, SOLUTION INTRAMUSCULAR; INTRAVENOUS at 06:05

## 2023-05-08 RX ADMIN — IOHEXOL 75 ML: 350 INJECTION, SOLUTION INTRAVENOUS at 07:05

## 2023-05-08 RX ADMIN — SODIUM CHLORIDE 500 ML: 0.9 INJECTION, SOLUTION INTRAVENOUS at 06:05

## 2023-05-08 RX ADMIN — SODIUM CHLORIDE: 0.9 INJECTION, SOLUTION INTRAVENOUS at 10:05

## 2023-05-08 NOTE — ED PROVIDER NOTES
Encounter Date: 5/8/2023       History     Chief Complaint   Patient presents with    Abdominal Pain     Upper abd pain  and nausea that started today after lunch.      67-year-old male PMH rheumatoid arthritis, chronic lower back pain, hypercholesterolemia, presents to ED with concern of epigastric abdominal pain that began this afternoon after eating gumbo for lunch.  Denies history of similar symptoms.  Pain is sharp, intermittent, radiating towards left side of abdomen, severity 7/10.  He has had intermittent nausea but no vomiting.  No constipation or diarrhea with normal NB bowel movements.  Last bowel movement today.  No fevers, chills, chest pain, cough, shortness of breath, dysuria, hematuria, changes in urine color or frequency.  No previous abdominal surgeries.  No other acute complaints at this time.    The history is provided by the patient.   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    R lateral cheek     Past Surgical History:   Procedure Laterality Date    COLONOSCOPY N/A 7/25/2017    Procedure: COLONOSCOPY;  Surgeon: Bill Nicole MD;  Location: [address];  Service: Endoscopy;  Laterality: N/A;    EPIDURAL STEROID INJECTION INTO LUMBAR SPINE N/A 7/29/2021    Procedure: Injection-steroid-epidural-lumbar L5-S1;  Surgeon: Shiv Vernon Jr., MD;  Location: [facility] PAIN [facility]GT;  Service: Pain Management;  Laterality: N/A;  oral  sedation   no pacemaker     FUSION OF SPINE WITH INSTRUMENTATION Left 12/14/2022    Procedure: FUSION, SPINE, WITH INSTRUMENTATION Stg1: Left L3-5 OLiF conduit lateral cage BMP;  Surgeon: August Aguilar MD;  Location: [facility] OR;  Service: Neurosurgery;  Laterality: Left;  Procedure: Stg1: Left L3-5 OLiF conduit lateral cage BMP  Surgery Time: 2hrs  LOS: 3  Anesthesia: General  Blood: Type & Screen  Radiology: C-arm  Brace: LSO  Bed: Regular Bed  Position: Lateral Right Down  Equip    FUSION, SPINE, POSTERIOR APPROACH N/A 12/14/2022    Procedure: FUSION,SPINE,POSTERIOR APPROACH Stg 2: L3-5 posterior instrumentation viper prime, L3-5 posterolateral arthrodesis;  Surgeon: August Aguilar MD;  Location: [facility] OR;  Service: Neurosurgery;  Laterality: N/A;  Procedure: Stg 2: L3-5 posterior instrumentation viper prime, L3-5 posterolateral arthrodesis  Surgery Time: 3hrs  LOS: 3  Anesthesia: General  Blood: Type & Screen  Radiology: Bra    NO PAST SURGERIES      TRANSFORAMINAL EPIDURAL INJECTION OF STEROID Right 6/29/2021    Procedure: Injection,steroid,epidural,transforaminal approach-RIGHT-- L4-5, L5-S1-- (IV Sedation);  Surgeon: Shiv Venron Jr., MD;  Location: [facility] PAIN MGT;  Service: Pain Management;  Laterality: Right;    TRANSFORAMINAL EPIDURAL INJECTION OF STEROID Left 10/27/2022    Procedure: Injection,steroid,epidural,transforaminal left L4-5 and L5-S1;  Surgeon: Shiv Vernon Jr., MD;  Location: [facility] PAIN [facility]GT;  Service: Pain Management;  Laterality: Left;     Family History   Problem Relation Age of Onset    Heart failure Mother         60s    Coronary artery disease Father         70s    Cancer Paternal Uncle         lymphoma??    Diabetes Neg Hx     Melanoma Neg Hx      Social History     Tobacco Use    Smoking status: Former     Types: Cigarettes     Quit date: 7/12/2012     Years since quitting: 10.8     Passive exposure: Never    Smokeless tobacco: Never   Substance Use Topics    Alcohol use: Yes      Comment: occasional    Drug use: Never     Review of Systems   Constitutional:  Negative for chills and fever.   HENT:  Negative for congestion and sore throat.    Respiratory:  Negative for cough and shortness of breath.    Cardiovascular:  Negative for chest pain.   Gastrointestinal:  Positive for abdominal pain and nausea. Negative for diarrhea and vomiting.   Genitourinary:  Negative for dysuria and hematuria.   Musculoskeletal:  Negative for neck pain and neck stiffness.   Skin:  Negative for rash.     Physical Exam     Initial Vitals [05/08/23 1718]   BP Pulse Resp Temp SpO2   (!) 118/55 83 18 98.2 °F (36.8 °C) 97 %      MAP       --         Physical Exam    Nursing note and vitals reviewed.  Constitutional: He appears well-developed and well-nourished. He is cooperative. He does not have a sickly appearance. He does not appear ill. No distress.   HENT:   Head: Normocephalic and atraumatic.   Eyes: EOM are normal.   Neck: Neck supple.   Normal range of motion.  Cardiovascular:  Normal rate, regular rhythm and normal heart sounds.           Pulmonary/Chest: Effort normal and breath sounds normal.   Abdominal: Abdomen is soft. There is abdominal tenderness in the epigastric area, left upper quadrant and left lower quadrant.   Abdominal tenderness predominantly to epigastric region but also noted have tenderness towards left side of abdomen.  No guarding or distention.  No overlying skin changes.  Periumbilical hernia soft, nontender and easily reducible with no overlying skin changes.  Denying flank or CVA tenderness.  Bowel sounds intact.   Musculoskeletal:         General: No tenderness.      Cervical back: Normal range of motion and neck supple.     Neurological: He is alert and oriented to person, place, and time. GCS score is 15. GCS eye subscore is 4. GCS verbal subscore is 5. GCS motor subscore is 6.   Skin: Skin is warm and dry.   Psychiatric: He has a normal mood and affect. His speech is normal and  behavior is normal. Thought content normal.       ED Course   Procedures  Labs Reviewed   CBC W/ AUTO DIFFERENTIAL - Abnormal; Notable for the following components:       Result Value    MCH 31.2 (*)     Gran # (ANC) 9.1 (*)     Gran % 79.1 (*)     Lymph % 11.8 (*)     All other components within normal limits   LIPASE - Abnormal; Notable for the following components:    Lipase 64 (*)     All other components within normal limits   URINALYSIS, REFLEX TO URINE CULTURE - Abnormal; Notable for the following components:    Protein, UA Trace (*)     Occult Blood UA Trace (*)     All other components within normal limits    Narrative:     Specimen Source->Urine   COMPREHENSIVE METABOLIC PANEL   TROPONIN I   TROPONIN I          Imaging Results              CT Abdomen Pelvis With Contrast (Final result)  Result time 05/08/23 20:14:46      Final result by Theodore Dalal DO (05/08/23 20:14:46)                   Impression:      1. Small bowel obstruction with transition point in the left lower quadrant.  No bowel wall thickening or pneumatosis.  Findings may be due to adhesions.  2. Fibrotic changes in the lung bases.  3. Prostatomegaly.      Electronically signed by: Theodore Dalal  Date:    05/08/2023  Time:    20:14               Narrative:    EXAMINATION:  CT ABDOMEN PELVIS WITH CONTRAST    CLINICAL HISTORY:  LLQ abdominal pain;Epigastric pain;    TECHNIQUE:  Axial CT images with sagittal and coronal reformats were obtained of the abdomen and pelvis from the hemidiaphragms through the symphysis pubis after the administration of 75mL Omnipaque 350.    COMPARISON:  CT abdomen and pelvis from 11/06/2019.    FINDINGS:  Lung Bases: There are fibrotic changes in lung bases, stable.    Heart: Heart size is normal.  No pericardial effusion.    Liver: The liver is normal in size and demonstrates homogeneous enhancement without focal lesion.  The portal vasculature is patent.    Biliary tract: No intrahepatic or extrahepatic  biliary ductal dilatation.    Gallbladder: No radiodense gallstone. No wall thickening or pericholecystic fluid.    Pancreas: Normal. No pancreatic ductal dilatation.    Spleen: Normal size without focal lesion.    Adrenals: Stable small nodule in the left adrenal gland measuring 1.3 cm, indeterminate but likely benign.    Kidneys and urinary collecting systems: There is a small simple cyst in the right kidney midpole.  The kidneys are otherwise unremarkable.  No hydronephrosis or urolithiasis.    Lymph nodes: None enlarged.    Stomach and bowel: There is a small hiatal hernia.  There is a small bowel obstruction with transition point in the left lower quadrant (series 601, image 69).  Small bowel loops measure up to approximately 3.5 cm.  There is no evidence of bowel wall thickening or pneumatosis.  There is minimal perienteric mesenteric edema.  The appendix is normal.    Peritoneum and mesentery: No ascites or free intraperitoneal air.  No abdominal fluid collection.    Vasculature: There is mild atherosclerotic disease.  No aneurysm.    Urinary bladder: No wall thickening.    Reproductive organs: The prostate is enlarged and heterogeneous.    Body wall: There is a small fat containing umbilical hernia.    Musculoskeletal: No aggressive osseous lesion.  There are multilevel degenerative changes of the lumbar spine.                                       Medications   sodium chloride 0.9% bolus 500 mL 500 mL (0 mLs Intravenous Stopped 5/8/23 1949)   ondansetron injection 4 mg (4 mg Intravenous Given 5/8/23 1850)   morphine injection 2 mg (2 mg Intravenous Given 5/8/23 1850)   iohexoL (OMNIPAQUE 350) injection 75 mL (75 mLs Intravenous Given 5/8/23 1930)     Medical Decision Making:   Initial Assessment:   Patient presents with concern of epigastric abdominal pain and mild/intermittent nausea that began today after eating lunch.  Afebrile with vitals grossly unremarkable.  Patient appears to be in mild discomfort  but no distress.  Tenderness to epigastric region but also noted have mild tenderness towards left side of abdomen.  No guarding.  Differential Diagnosis:   GERD, intestinal spasm, gastroenteritis, gastritis, ulcer, cholecystitis, cholelithiasis, gallstones, pancreatitis, ileus, small bowel obstruction, appendicitis, diverticulitis, colitis, constipation, intestinal gas pain, UTI, pyelonephritis, nephrolithiasis      ED Management:  CBC, CMP, lipase, UA, troponin    CBC grossly unremarkable with no elevation in WBC.  CMP with no significant electrolyte abnormalities.  Creatinine 1.1.  Lipase minimally elevated 64.  UA with no evidence of urinary infection.    CT abdomen pelvis showing small bowel obstruction with transition point in the left lower quadrant.  No bowel wall thickening or pneumatosis.  Findings may be due to adhesions.  Will discussed with General surgery.    8:35 PM  Patient discussed with general surgery, Dr. Palomares, who states he will review patient's chart    Patient will be placed in observation under Dr. Palomares's services.  He is requesting no NG tube at this time.  Pain currently controlled.                        Clinical Impression:   Final diagnoses:  [R10.9] Abdominal pain, unspecified abdominal location  [R11.0] Nausea  [K56.50] Small bowel obstruction due to adhesions (Primary)        ED Disposition Condition    Observation                 Praveen Belcher PA-C  05/08/23 2057       Praveen Belcher PA-C  05/08/23 2057

## 2023-05-08 NOTE — ED NOTES
Pt in room 10 from waiting room. Pt awake and alert and has abdominal pain since earlier today. Pt denies nausea, vomiting or diarrhea. Plan of care reviewed, call bell within reach.

## 2023-05-08 NOTE — FIRST PROVIDER EVALUATION
Emergency Department TeleTriage Encounter Note      CHIEF COMPLAINT    Chief Complaint   Patient presents with    Abdominal Pain     Upper abd pain  and nausea that started today after lunch.        VITAL SIGNS   Initial Vitals [05/08/23 1718]   BP Pulse Resp Temp SpO2   (!) 118/55 83 18 98.2 °F (36.8 °C) 97 %      MAP       --            ALLERGIES    Review of patient's allergies indicates:   Allergen Reactions    Plaquenil [hydroxychloroquine]      Lightheaded and muscle aches       PROVIDER TRIAGE NOTE  This is a teletriage evaluation of a 67 y.o. male presenting to the ED complaining of upper abd pain after eating today. Denies v/d, urinary symptoms, CP, and SOB.     Well-appearing, no distress.     Initial orders will be placed and care will be transferred to an alternate provider when patient is roomed for a full evaluation. Any additional orders and the final disposition will be determined by that provider.         ORDERS  Labs Reviewed   CBC W/ AUTO DIFFERENTIAL   COMPREHENSIVE METABOLIC PANEL   LIPASE   URINALYSIS, REFLEX TO URINE CULTURE       ED Orders (720h ago, onward)      Start Ordered     Status Ordering Provider    05/08/23 1726 05/08/23 1725  Vital signs  Every 2 hours         Ordered RACHELLE GARCIA N.    05/08/23 1726 05/08/23 1725  Diet NPO  Diet effective now         Ordered RACHELLE GARCIA N.    05/08/23 1726 05/08/23 1725  Insert peripheral IV  Once         Ordered RACHELLE GARCIA N.    05/08/23 1726 05/08/23 1725  CBC W/ AUTO DIFFERENTIAL  STAT         Ordered RACHELLE GARCIA N.    05/08/23 1726 05/08/23 1725  Comp. Metabolic Panel  STAT         Ordered RACHELLE GARCIA N.    05/08/23 1726 05/08/23 1725  Lipase  STAT         Ordered RACHELLE GARCIA N.    05/08/23 1726 05/08/23 1725  Urinalysis, Reflex to Urine Culture Urine, Clean Catch  STAT         Ordered RACHELLE GARCIA N.              Virtual Visit Note: The provider triage portion of  this emergency department evaluation and documentation was performed via [employer], a HIPAA-compliant telemedicine application, in concert with a tele-presenter in the room. A face to face patient evaluation with one of my colleagues will occur once the patient is placed in an emergency department room.      DISCLAIMER: This note was prepared with [employer] voice recognition transcription software. Garbled syntax, mangled pronouns, and other bizarre constructions may be attributed to that software system.

## 2023-05-09 PROBLEM — R10.9 ABDOMINAL PAIN: Status: ACTIVE | Noted: 2023-05-09

## 2023-05-09 LAB
ALBUMIN SERPL BCP-MCNC: 3.5 G/DL (ref 3.5–5.2)
ALP SERPL-CCNC: 53 U/L (ref 55–135)
ALT SERPL W/O P-5'-P-CCNC: 18 U/L (ref 10–44)
ANION GAP SERPL CALC-SCNC: 9 MMOL/L (ref 8–16)
AST SERPL-CCNC: 25 U/L (ref 10–40)
BASOPHILS # BLD AUTO: 0.01 K/UL (ref 0–0.2)
BASOPHILS NFR BLD: 0.1 % (ref 0–1.9)
BILIRUB SERPL-MCNC: 0.6 MG/DL (ref 0.1–1)
BUN SERPL-MCNC: 16 MG/DL (ref 8–23)
CALCIUM SERPL-MCNC: 9.1 MG/DL (ref 8.7–10.5)
CHLORIDE SERPL-SCNC: 107 MMOL/L (ref 95–110)
CO2 SERPL-SCNC: 24 MMOL/L (ref 23–29)
CREAT SERPL-MCNC: 1 MG/DL (ref 0.5–1.4)
DIFFERENTIAL METHOD: ABNORMAL
EOSINOPHIL # BLD AUTO: 0.1 K/UL (ref 0–0.5)
EOSINOPHIL NFR BLD: 0.7 % (ref 0–8)
ERYTHROCYTE [DISTWIDTH] IN BLOOD BY AUTOMATED COUNT: 14.6 % (ref 11.5–14.5)
EST. GFR  (NO RACE VARIABLE): >60 ML/MIN/1.73 M^2
GLUCOSE SERPL-MCNC: 107 MG/DL (ref 70–110)
HCT VFR BLD AUTO: 43.5 % (ref 40–54)
HGB BLD-MCNC: 14.2 G/DL (ref 14–18)
IMM GRANULOCYTES # BLD AUTO: 0.03 K/UL (ref 0–0.04)
IMM GRANULOCYTES NFR BLD AUTO: 0.3 % (ref 0–0.5)
LACTATE SERPL-SCNC: 0.9 MMOL/L (ref 0.5–2.2)
LYMPHOCYTES # BLD AUTO: 0.9 K/UL (ref 1–4.8)
LYMPHOCYTES NFR BLD: 10.2 % (ref 18–48)
MAGNESIUM SERPL-MCNC: 2 MG/DL (ref 1.6–2.6)
MCH RBC QN AUTO: 30.9 PG (ref 27–31)
MCHC RBC AUTO-ENTMCNC: 32.6 G/DL (ref 32–36)
MCV RBC AUTO: 95 FL (ref 82–98)
MONOCYTES # BLD AUTO: 0.7 K/UL (ref 0.3–1)
MONOCYTES NFR BLD: 8 % (ref 4–15)
NEUTROPHILS # BLD AUTO: 7.1 K/UL (ref 1.8–7.7)
NEUTROPHILS NFR BLD: 80.7 % (ref 38–73)
NRBC BLD-RTO: 0 /100 WBC
PHOSPHATE SERPL-MCNC: 3.7 MG/DL (ref 2.7–4.5)
PLATELET # BLD AUTO: 180 K/UL (ref 150–450)
PMV BLD AUTO: 10.4 FL (ref 9.2–12.9)
POTASSIUM SERPL-SCNC: 4.2 MMOL/L (ref 3.5–5.1)
PROT SERPL-MCNC: 7.3 G/DL (ref 6–8.4)
RBC # BLD AUTO: 4.59 M/UL (ref 4.6–6.2)
SODIUM SERPL-SCNC: 140 MMOL/L (ref 136–145)
WBC # BLD AUTO: 8.83 K/UL (ref 3.9–12.7)

## 2023-05-09 PROCEDURE — G0378 HOSPITAL OBSERVATION PER HR: HCPCS

## 2023-05-09 PROCEDURE — 83605 ASSAY OF LACTIC ACID: CPT

## 2023-05-09 PROCEDURE — 84100 ASSAY OF PHOSPHORUS: CPT

## 2023-05-09 PROCEDURE — 83735 ASSAY OF MAGNESIUM: CPT

## 2023-05-09 PROCEDURE — 99204 PR OFFICE/OUTPT VISIT, NEW, LEVL IV, 45-59 MIN: ICD-10-PCS | Mod: ICN,,, | Performed by: SURGERY

## 2023-05-09 PROCEDURE — 25000003 PHARM REV CODE 250: Performed by: SURGERY

## 2023-05-09 PROCEDURE — 96361 HYDRATE IV INFUSION ADD-ON: CPT

## 2023-05-09 PROCEDURE — 94761 N-INVAS EAR/PLS OXIMETRY MLT: CPT

## 2023-05-09 PROCEDURE — 99204 OFFICE O/P NEW MOD 45 MIN: CPT | Mod: ICN,,, | Performed by: SURGERY

## 2023-05-09 PROCEDURE — 85025 COMPLETE CBC W/AUTO DIFF WBC: CPT

## 2023-05-09 PROCEDURE — 96376 TX/PRO/DX INJ SAME DRUG ADON: CPT

## 2023-05-09 PROCEDURE — 80053 COMPREHEN METABOLIC PANEL: CPT

## 2023-05-09 PROCEDURE — 63600175 PHARM REV CODE 636 W HCPCS

## 2023-05-09 PROCEDURE — 25500020 PHARM REV CODE 255: Performed by: SURGERY

## 2023-05-09 PROCEDURE — 63600175 PHARM REV CODE 636 W HCPCS: Performed by: SURGERY

## 2023-05-09 PROCEDURE — 36415 COLL VENOUS BLD VENIPUNCTURE: CPT

## 2023-05-09 RX ORDER — SODIUM CHLORIDE 0.9 % (FLUSH) 0.9 %
10 SYRINGE (ML) INJECTION
Status: DISCONTINUED | OUTPATIENT
Start: 2023-05-09 | End: 2023-05-11 | Stop reason: HOSPADM

## 2023-05-09 RX ORDER — ONDANSETRON 8 MG/1
8 TABLET, ORALLY DISINTEGRATING ORAL EVERY 8 HOURS PRN
Status: DISCONTINUED | OUTPATIENT
Start: 2023-05-09 | End: 2023-05-11 | Stop reason: HOSPADM

## 2023-05-09 RX ORDER — DEXTROSE MONOHYDRATE, SODIUM CHLORIDE, AND POTASSIUM CHLORIDE 50; 1.49; 4.5 G/1000ML; G/1000ML; G/1000ML
INJECTION, SOLUTION INTRAVENOUS CONTINUOUS
Status: DISCONTINUED | OUTPATIENT
Start: 2023-05-09 | End: 2023-05-09

## 2023-05-09 RX ADMIN — KETOROLAC TROMETHAMINE 15 MG: 30 INJECTION, SOLUTION INTRAMUSCULAR; INTRAVENOUS at 05:05

## 2023-05-09 RX ADMIN — KETOROLAC TROMETHAMINE 15 MG: 30 INJECTION, SOLUTION INTRAMUSCULAR; INTRAVENOUS at 03:05

## 2023-05-09 RX ADMIN — DIATRIZOATE MEGLUMINE AND DIATRIZOATE SODIUM 100 ML: 660; 100 LIQUID ORAL; RECTAL at 01:05

## 2023-05-09 RX ADMIN — KETOROLAC TROMETHAMINE 15 MG: 30 INJECTION, SOLUTION INTRAMUSCULAR; INTRAVENOUS at 09:05

## 2023-05-09 RX ADMIN — DEXTROSE, SODIUM CHLORIDE, AND POTASSIUM CHLORIDE: 5; .45; .15 INJECTION INTRAVENOUS at 01:05

## 2023-05-09 RX ADMIN — ONDANSETRON 4 MG: 2 INJECTION INTRAMUSCULAR; INTRAVENOUS at 09:05

## 2023-05-09 RX ADMIN — SODIUM CHLORIDE: 0.9 INJECTION, SOLUTION INTRAVENOUS at 05:05

## 2023-05-09 NOTE — H&P
[facility]  General Surgery  History & Physical    Patient Name: Fabiano Garvey  MRN: [id number]  Admission Date: 5/8/2023  Attending Physician: Eitan Palomares MD   Primary Care Provider: Calin Buchanan MD    Patient information was obtained from patient, spouse/SO and ER records.     Subjective:     Chief Complaint/Reason for Admission: abdominal pain    History of Present Illness: 66 yo M with h/o HLD, NU, pulm fibrosis, RA, SCC and spinal fusion surgery who presents with concerns for SBO. He was doing okay until yesterday afternoon around 12:30pm at which point, he had increasing bloating and abdominal pain. Last BM was yesterday morning. He has had no N/V. He has not previously had abdominal surgery.       No current facility-administered medications on file prior to encounter.     Current Outpatient Medications on File Prior to Encounter   Medication Sig    albuterol (VENTOLIN HFA) 90 mcg/actuation inhaler Inhale 2 puffs into the lungs every 6 (six) hours as needed for Wheezing (cough). Rescue    atorvastatin (LIPITOR) 40 MG tablet Take 1 tablet (40 mg total) by mouth once daily.    gabapentin (NEURONTIN) 300 MG capsule Take 2 capsules (600 mg total) by mouth 3 (three) times daily.    tiotropium (SPIRIVA) 18 mcg inhalation capsule Inhale 1 capsule (18 mcg total) into the lungs once daily. Controller    upadacitinib (RINVOQ) 15 mg 24 hr tablet Take 1 tablet (15 mg total) by mouth once daily.    celecoxib (CELEBREX) 200 MG capsule Take 1 capsule (200 mg total) by mouth 2 (two) times daily.    ergocalciferol (VITAMIN D2) 50,000 unit Cap TAKE 1 CAPSULE BY MOUTH ONE TIME PER WEEK    fluorouraciL (EFUDEX) 5 % cream Apply thin film to right cheek and temple 2times per day for 2-3 weeks; d/c if area bleeding or ulcerated; avoid eyes or mouth    fluticasone propionate (FLONASE) 50 mcg/actuation nasal spray 2 SPRAYS (100 MCG TOTAL) BY EACH NOSTRIL ROUTE ONCE DAILY.    ibuprofen (ADVIL,MOTRIN) 800 MG tablet  Take 1 tablet (800 mg total) by mouth 3 (three) times daily as needed for Pain.    imiquimod (ALDARA) 5 % cream Apply small amount to affected area on  right cheek at night  x 4 weeks; discontinue  if ulcerated or bleeding (Patient taking differently: Apply small amount to affected area on  right cheek at night  x 4 weeks; discontinue  if ulcerated or bleeding)    mupirocin (BACTROBAN) 2 % ointment Apply to Affected Area as directed  twice daily    oxyCODONE (ROXICODONE) 10 mg Tab immediate release tablet Take 1 tablet (10 mg total) by mouth every 6 (six) hours as needed for Pain.       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    R lateral cheek     Past Surgical History:   Procedure Laterality Date    COLONOSCOPY N/A 7/25/2017    Procedure: COLONOSCOPY;  Surgeon: Bill Nicole MD;  Location: [address];  Service: Endoscopy;  Laterality: N/A;    EPIDURAL STEROID INJECTION INTO LUMBAR SPINE N/A 7/29/2021    Procedure: Injection-steroid-epidural-lumbar L5-S1;  Surgeon: Shiv Vernon Jr., MD;  Location: [facility] PAIN MGT;  Service: Pain Management;  Laterality: N/A;  oral sedation   no pacemaker     FUSION OF SPINE WITH INSTRUMENTATION Left 12/14/2022    Procedure: FUSION, SPINE, WITH INSTRUMENTATION Stg1: Left L3-5 OLiF conduit lateral cage BMP;  Surgeon: August Aguilar MD;  Location: [facility] OR;  Service: Neurosurgery;  Laterality: Left;   Procedure: Stg1: Left L3-5 OLiF conduit lateral cage BMP  Surgery Time: 2hrs  LOS: 3  Anesthesia: General  Blood: Type & Screen  Radiology: C-arm  Brace: LSO  Bed: Regular Bed  Position: Lateral Right Down  Equip    FUSION, SPINE, POSTERIOR APPROACH N/A 12/14/2022    Procedure: FUSION,SPINE,POSTERIOR APPROACH Stg 2: L3-5 posterior instrumentation viper prime, L3-5 posterolateral arthrodesis;  Surgeon: August Aguilar MD;  Location: [facility] OR;  Service: Neurosurgery;  Laterality: N/A;  Procedure: Stg 2: L3-5 posterior instrumentation viper prime, L3-5 posterolateral arthrodesis  Surgery Time: 3hrs  LOS: 3  Anesthesia: General  Blood: Type & Screen  Radiology: Bra    NO PAST SURGERIES      TRANSFORAMINAL EPIDURAL INJECTION OF STEROID Right 6/29/2021    Procedure: Injection,steroid,epidural,transforaminal approach-RIGHT-- L4-5, L5-S1-- (IV Sedation);  Surgeon: Shiv Vernon Jr., MD;  Location: [facility];  Service: Pain Management;  Laterality: Right;    TRANSFORAMINAL EPIDURAL INJECTION OF STEROID Left 10/27/2022    Procedure: Injection,steroid,epidural,transforaminal left L4-5 and L5-S1;  Surgeon: Shiv Vernon Jr., MD;  Location: [facility] PAIN [facility];  Service: Pain Management;  Laterality: Left;     Family History       Problem Relation (Age of Onset)    Cancer Paternal Uncle    Coronary artery disease Father    Heart failure Mother          Tobacco Use    Smoking status: Former     Types: Cigarettes     Quit date: 7/12/2012     Years since quitting: 10.8     Passive exposure: Never    Smokeless tobacco: Never   Substance and Sexual Activity    Alcohol use: Yes     Comment: occasional    Drug use: Never    Sexual activity: Yes     Partners: Female     Birth control/protection: None     Review of Systems   Constitutional:  Negative for activity change, appetite change, chills, fatigue and fever.   Respiratory:  Negative for cough, chest tightness and shortness of breath.    Cardiovascular:  Negative for chest  pain and palpitations.   Gastrointestinal:  Positive for abdominal pain. Negative for abdominal distention, anal bleeding, blood in stool, constipation, diarrhea, nausea, rectal pain and vomiting.   Genitourinary:  Negative for difficulty urinating, dysuria, flank pain and penile discharge.   Musculoskeletal:  Negative for arthralgias.   Neurological:  Negative for dizziness and light-headedness.   Hematological:  Negative for adenopathy. Does not bruise/bleed easily.   Objective:     Vital Signs (Most Recent):  Temp: 97.3 °F (36.3 °C) (05/09/23 0513)  Pulse: 65 (05/09/23 0513)  Resp: 18 (05/09/23 0513)  BP: 129/83 (05/09/23 0513)  SpO2: (!) 92 % (05/09/23 0513) Vital Signs (24h Range):  Temp:  [97.2 °F (36.2 °C)-98.2 °F (36.8 °C)] 97.3 °F (36.3 °C)  Pulse:  [55-83] 65  Resp:  [18] 18  SpO2:  [92 %-97 %] 92 %  BP: (118-135)/(55-85) 129/83     Weight: 74.4 kg (164 lb)  Body mass index is 26.47 kg/m².     Physical Exam  Constitutional:       General: He is not in acute distress.     Appearance: Normal appearance. He is not ill-appearing.   HENT:      Head: Normocephalic and atraumatic.   Eyes:      Extraocular Movements: Extraocular movements intact.      Pupils: Pupils are equal, round, and reactive to light.   Pulmonary:      Effort: Pulmonary effort is normal. No respiratory distress.   Abdominal:      General: Abdomen is flat. There is no distension.      Palpations: Abdomen is soft.      Tenderness: There is no abdominal tenderness. There is no guarding.      Comments: Abdomen is soft, mildly distended. Moderately tender, mostly in LLQ  Small, easily reducible umbilical hernia   Musculoskeletal:         General: No swelling. Normal range of motion.   Skin:     General: Skin is warm and dry.   Neurological:      General: No focal deficit present.      Mental Status: He is alert.          I have reviewed all pertinent lab results within the past 24 hours.  CBC:   Recent Labs   Lab 05/08/23  1801   WBC 11.46   RBC  4.87   HGB 15.2   HCT 45.0      MCV 92   MCH 31.2*   MCHC 33.8     CMP:   Recent Labs   Lab 05/08/23  1801      CALCIUM 9.6   ALBUMIN 4.1   PROT 8.1      K 4.5   CO2 23      BUN 15   CREATININE 1.1   ALKPHOS 55   ALT 25   AST 31   BILITOT 0.4       Significant Diagnostics:  I have reviewed all pertinent imaging results/findings within the past 24 hours.      Assessment/Plan:     No notes have been filed under this hospital service.  Service: General Surgery    VTE Risk Mitigation (From admission, onward)      None            Aris Antonio MD  General Surgery  [facility] - Telemetry    Pt seen and examined.  Agree with note and plan.  Pt with no emesis, only mild nausea.  Abd mildly distended, some flatus, +hungry  Doubt any true SBO, Will order gastrograffin challenge    Eitan Palomares M.D., F.A.C.S.  [facility] and General Surgery  [facility]

## 2023-05-09 NOTE — PLAN OF CARE
[facility] - Telemetry  Initial Discharge Assessment       Primary Care Provider: Calin Buchanan MD    Admission Diagnosis: Nausea [R11.0]  Small bowel obstruction due to adhesions [K56.50]  Abdominal pain, unspecified abdominal location [R10.9]    Admission Date: 5/8/2023  Expected Discharge Date:     Pt oriented. DCA done at bedside with patient. Demographics/Ins/NextofKin/PCP verified with patient/family and updated as needed. Denies any DME needs at present from pt/family. Patient/family plans to return home with family. Educated on bedside RX. Patient consents for [place] to discuss discharge plan with next of kin. Preferences appointment times and location obtained. Patient reports they will have transportation home upon discharge.           Payor: BLUE [employer]OSS [employer] B[employer]EFIT / Plan: [employer] BLUE CROSS [place] / Product Type: Self Funded /     Extended Emergency Contact Information  Primary Emergency Contact: Rhona Garvey  Address: [address] [place] [address] [place] [address] [facility]  Home Phone: [phone]  Mobile Phone: [phone]  Relation: Spouse    Discharge Plan A: (P) Home, Home with family         [facility]  [address]  [place] [address]  Phone: [phone] Fax: [phone]      Initial Assessment (most recent)       Adult Discharge Assessment - 05/09/23 1358          Discharge Assessment    Assessment Type Discharge Planning Assessment (P)      Confirmed/corrected address, phone number and insurance Yes (P)      Confirmed Demographics Correct on Facesheet (P)      Source of Information patient (P)      People in Home spouse (P)      Do you expect to return to your current living situation? Yes (P)      Prior to hospitilization cognitive status: Alert/Oriented;No Deficits (P)      Current cognitive status: Alert/Oriented;No Deficits (P)      Equipment Currently Used at Home CPAP (P)      Do you currently have service(s) that help you manage your  care at home? No (P)      Do you take prescription medications? Yes (P)      Do you have prescription coverage? Yes (P)      Do you have any problems affording any of your prescribed medications? No (P)      Is the patient taking medications as prescribed? yes (P)      How do you get to doctors appointments? car, drives self;family or friend will provide (P)      Are you on dialysis? No (P)      Do you take coumadin? No (P)      Discharge Plan A Home;Home with family (P)      DME Needed Upon Discharge  none (P)      Discharge Plan discussed with: Patient (P)         Financial Resource Strain    How hard is it for you to pay for the very basics like food, housing, medical care, and heating? Not hard at all (P)         Housing Stability    In the last 12 months, was there a time when you were not able to pay the mortgage or rent on time? No (P)      In the last 12 months, was there a time when you did not have a steady place to sleep or slept in a shelter (including now)? No (P)         Transportation Needs    In the past 12 months, has lack of transportation kept you from medical appointments or from getting medications? No (P)      In the past 12 months, has lack of transportation kept you from meetings, work, or from getting things needed for daily living? No (P)         Food Insecurity    Within the past 12 months, you worried that your food would run out before you got the money to buy more. Never true (P)      Within the past 12 months, the food you bought just didn't last and you didn't have money to get more. Never true (P)                    Future Appointments   Date Time Provider Department Center   6/15/2023 10:15 AM [facility] [place]   6/15/2023 10:30 AM [facility], [facility] [place]   6/19/2023 11:00 AM [facility] ODC XR-A LIMIT 350 LBS [facility] XRAY OP [facility]   6/20/2023 10:00 AM Kelsy Leslie PA-C [facility] NEUROSU [place] [facility]   8/2/2023  4:00 PM Calin Buchanan MD [facility] CORDELIA Mar[facility]  "Clini   8/11/2023  1:30 PM [facility], [facility] [place]     /68 (BP Location: Right arm, Patient Position: Lying)   Pulse 65   Temp 98.6 °F (37 °C) (Oral)   Resp 20   Ht 5' 6" (1.676 m)   Wt 74.4 kg (164 lb)   SpO2 (!) 93%   BMI 26.47 kg/m²      diatrizoate meglumineand-diatrizoate sodium  60 mL Oral Once                    "

## 2023-05-09 NOTE — HPI
66 yo M with h/o HLD, NU, pulm fibrosis, RA, SCC and spinal fusion surgery who presents with concerns for SBO. He was doing okay until yesterday afternoon around 12:30pm at which point, he had increasing bloating and abdominal pain. Last BM was yesterday morning. He has had no N/V. He has not previously had abdominal surgery.

## 2023-05-09 NOTE — PROGRESS NOTES
05/08/23 2218   Admission   Initial VN Admission Questions Complete   Communication Issues? None   Shift   Virtual Nurse - Patient Verbalized Approval Of Camera Use   Safety/Activity   Patient Rounds bed in low position;placement of personal items at bedside;call light in patient/parent reach;visualized patient;clutter free environment maintained   Safety Promotion/Fall Prevention Fall Risk reviewed with patient/family;side rails raised x 2;instructed to call staff for mobility;medications reviewed;assistive device/personal item within reach   Positioning   Body Position position changed independently

## 2023-05-09 NOTE — SUBJECTIVE & OBJECTIVE
No current facility-administered medications on file prior to encounter.     Current Outpatient Medications on File Prior to Encounter   Medication Sig    albuterol (VENTOLIN HFA) 90 mcg/actuation inhaler Inhale 2 puffs into the lungs every 6 (six) hours as needed for Wheezing (cough). Rescue    atorvastatin (LIPITOR) 40 MG tablet Take 1 tablet (40 mg total) by mouth once daily.    gabapentin (NEURONTIN) 300 MG capsule Take 2 capsules (600 mg total) by mouth 3 (three) times daily.    tiotropium (SPIRIVA) 18 mcg inhalation capsule Inhale 1 capsule (18 mcg total) into the lungs once daily. Controller    upadacitinib (RINVOQ) 15 mg 24 hr tablet Take 1 tablet (15 mg total) by mouth once daily.    celecoxib (CELEBREX) 200 MG capsule Take 1 capsule (200 mg total) by mouth 2 (two) times daily.    ergocalciferol (VITAMIN D2) 50,000 unit Cap TAKE 1 CAPSULE BY MOUTH ONE TIME PER WEEK    fluorouraciL (EFUDEX) 5 % cream Apply thin film to right cheek and temple 2times per day for 2-3 weeks; d/c if area bleeding or ulcerated; avoid eyes or mouth    fluticasone propionate (FLONASE) 50 mcg/actuation nasal spray 2 SPRAYS (100 MCG TOTAL) BY EACH NOSTRIL ROUTE ONCE DAILY.    ibuprofen (ADVIL,MOTRIN) 800 MG tablet Take 1 tablet (800 mg total) by mouth 3 (three) times daily as needed for Pain.    imiquimod (ALDARA) 5 % cream Apply small amount to affected area on  right cheek at night  x 4 weeks; discontinue  if ulcerated or bleeding (Patient taking differently: Apply small amount to affected area on  right cheek at night  x 4 weeks; discontinue  if ulcerated or bleeding)    mupirocin (BACTROBAN) 2 % ointment Apply to Affected Area as directed  twice daily    oxyCODONE (ROXICODONE) 10 mg Tab immediate release tablet Take 1 tablet (10 mg total) by mouth every 6 (six) hours as needed for Pain.       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    R lateral cheek     Past Surgical History:   Procedure Laterality Date    COLONOSCOPY N/A 7/25/2017    Procedure: COLONOSCOPY;  Surgeon: Bill Nicole MD;  [facility]  Service: Endoscopy;  Laterality: N/A;    EPIDURAL STEROID INJECTION INTO LUMBAR SPINE N/A 7/29/2021    Procedure: Injection-steroid-epidural-lumbar L5-S1;  Surgeon: Shiv Vernon Jr., MD;  Location: [facility] PAIN MGT;  Service: Pain Management;  Laterality: N/A;  oral sedation   no pacemaker     FUSION OF SPINE WITH INSTRUMENTATION Left 12/14/2022    Procedure: FUSION, SPINE, WITH INSTRUMENTATION Stg1: Left L3-5 OLiF conduit lateral cage BMP;  Surgeon: August Aguilar MD;  Location: [facility] OR;  Service: Neurosurgery;  Laterality: Left;  Procedure: Stg1: Left L3-5 OLiF conduit lateral cage BMP  Surgery Time: 2hrs  LOS: 3  Anesthesia: General  Blood: Type & Screen  Radiology: C-arm  Brace: LSO  Bed: Regular Bed  Position: Lateral Right Down  Equip    FUSION, SPINE, POSTERIOR APPROACH N/A 12/14/2022    Procedure: FUSION,SPINE,POSTERIOR APPROACH Stg 2: L3-5 posterior instrumentation viper prime, L3-5 posterolateral arthrodesis;  Surgeon: August Aguilar MD;  Location: [facility] OR;  Service: Neurosurgery;  Laterality: N/A;  Procedure: Stg 2: L3-5 posterior instrumentation viper prime, L3-5 posterolateral arthrodesis  Surgery Time: 3hrs  LOS: 3  Anesthesia: General  Blood: Type & Screen  Radiology: Bra    NO PAST SURGERIES      TRANSFORAMINAL EPIDURAL INJECTION OF  STEROID Right 6/29/2021    Procedure: Injection,steroid,epidural,transforaminal approach-RIGHT-- L4-5, L5-S1-- (IV Sedation);  Surgeon: Shiv Vernon Jr., MD;  Location: [facility];  Service: Pain Management;  Laterality: Right;    TRANSFORAMINAL EPIDURAL INJECTION OF STEROID Left 10/27/2022    Procedure: Injection,steroid,epidural,transforaminal left L4-5 and L5-S1;  Surgeon: Shiv Vernon Jr., MD;  Location: [facility];  Service: Pain Management;  Laterality: Left;     Family History       Problem Relation (Age of Onset)    Cancer Paternal Uncle    Coronary artery disease Father    Heart failure Mother          Tobacco Use    Smoking status: Former     Types: Cigarettes     Quit date: 7/12/2012     Years since quitting: 10.8     Passive exposure: Never    Smokeless tobacco: Never   Substance and Sexual Activity    Alcohol use: Yes     Comment: occasional    Drug use: Never    Sexual activity: Yes     Partners: Female     Birth control/protection: None     Review of Systems   Constitutional:  Negative for activity change, appetite change, chills, fatigue and fever.   Respiratory:  Negative for cough, chest tightness and shortness of breath.    Cardiovascular:  Negative for chest pain and palpitations.   Gastrointestinal:  Positive for abdominal pain. Negative for abdominal distention, anal bleeding, blood in stool, constipation, diarrhea, nausea, rectal pain and vomiting.   Genitourinary:  Negative for difficulty urinating, dysuria, flank pain and penile discharge.   Musculoskeletal:  Negative for arthralgias.   Neurological:  Negative for dizziness and light-headedness.   Hematological:  Negative for adenopathy. Does not bruise/bleed easily.   Objective:     Vital Signs (Most Recent):  Temp: 97.3 °F (36.3 °C) (05/09/23 0513)  Pulse: 65 (05/09/23 0513)  Resp: 18 (05/09/23 0513)  BP: 129/83 (05/09/23 0513)  SpO2: (!) 92 % (05/09/23 0513) Vital Signs (24h Range):  Temp:  [97.2 °F (36.2 °C)-98.2 °F (36.8  °C)] 97.3 °F (36.3 °C)  Pulse:  [55-83] 65  Resp:  [18] 18  SpO2:  [92 %-97 %] 92 %  BP: (118-135)/(55-85) 129/83     Weight: 74.4 kg (164 lb)  Body mass index is 26.47 kg/m².     Physical Exam  Constitutional:       General: He is not in acute distress.     Appearance: Normal appearance. He is not ill-appearing.   HENT:      Head: Normocephalic and atraumatic.   Eyes:      Extraocular Movements: Extraocular movements intact.      Pupils: Pupils are equal, round, and reactive to light.   Pulmonary:      Effort: Pulmonary effort is normal. No respiratory distress.   Abdominal:      General: Abdomen is flat. There is no distension.      Palpations: Abdomen is soft.      Tenderness: There is no abdominal tenderness. There is no guarding.      Comments: Abdomen is soft, mildly distended. Moderately tender, mostly in LLQ  Small, easily reducible umbilical hernia   Musculoskeletal:         General: No swelling. Normal range of motion.   Skin:     General: Skin is warm and dry.   Neurological:      General: No focal deficit present.      Mental Status: He is alert.          I have reviewed all pertinent lab results within the past 24 hours.  CBC:   Recent Labs   Lab 05/08/23  1801   WBC 11.46   RBC 4.87   HGB 15.2   HCT 45.0      MCV 92   MCH 31.2*   MCHC 33.8     CMP:   Recent Labs   Lab 05/08/23  1801      CALCIUM 9.6   ALBUMIN 4.1   PROT 8.1      K 4.5   CO2 23      BUN 15   CREATININE 1.1   ALKPHOS 55   ALT 25   AST 31   BILITOT 0.4       Significant Diagnostics:  I have reviewed all pertinent imaging results/findings within the past 24 hours.

## 2023-05-10 ENCOUNTER — ANESTHESIA EVENT (OUTPATIENT)
Dept: SURGERY | Facility: HOSPITAL | Age: 68
DRG: 337 | End: 2023-05-10
Payer: COMMERCIAL

## 2023-05-10 ENCOUNTER — ANESTHESIA (OUTPATIENT)
Dept: SURGERY | Facility: HOSPITAL | Age: 68
DRG: 337 | End: 2023-05-10
Payer: COMMERCIAL

## 2023-05-10 PROBLEM — K56.50 SMALL BOWEL OBSTRUCTION DUE TO ADHESIONS: Status: ACTIVE | Noted: 2023-05-10

## 2023-05-10 PROBLEM — R10.9 ABDOMINAL PAIN: Status: RESOLVED | Noted: 2023-05-09 | Resolved: 2023-05-10

## 2023-05-10 PROCEDURE — 36000709 HC OR TIME LEV III EA ADD 15 MIN: Performed by: SURGERY

## 2023-05-10 PROCEDURE — 37000008 HC ANESTHESIA 1ST 15 MINUTES: Performed by: SURGERY

## 2023-05-10 PROCEDURE — 71000033 HC RECOVERY, INTIAL HOUR: Performed by: SURGERY

## 2023-05-10 PROCEDURE — 96361 HYDRATE IV INFUSION ADD-ON: CPT

## 2023-05-10 PROCEDURE — 44180 PR LAP, SURG ENTEROLYSIS: ICD-10-PCS | Mod: ,,, | Performed by: SURGERY

## 2023-05-10 PROCEDURE — 63600175 PHARM REV CODE 636 W HCPCS: Performed by: NURSE ANESTHETIST, CERTIFIED REGISTERED

## 2023-05-10 PROCEDURE — 25000003 PHARM REV CODE 250: Performed by: NURSE ANESTHETIST, CERTIFIED REGISTERED

## 2023-05-10 PROCEDURE — 63600175 PHARM REV CODE 636 W HCPCS: Performed by: SURGERY

## 2023-05-10 PROCEDURE — D9220A PRA ANESTHESIA: ICD-10-PCS | Mod: ANES,,, | Performed by: ANESTHESIOLOGY

## 2023-05-10 PROCEDURE — 25000242 PHARM REV CODE 250 ALT 637 W/ HCPCS: Performed by: NURSE ANESTHETIST, CERTIFIED REGISTERED

## 2023-05-10 PROCEDURE — 37000009 HC ANESTHESIA EA ADD 15 MINS: Performed by: SURGERY

## 2023-05-10 PROCEDURE — 25000003 PHARM REV CODE 250: Performed by: SURGERY

## 2023-05-10 PROCEDURE — 99900035 HC TECH TIME PER 15 MIN (STAT)

## 2023-05-10 PROCEDURE — D9220A PRA ANESTHESIA: Mod: CRNA,,, | Performed by: NURSE ANESTHETIST, CERTIFIED REGISTERED

## 2023-05-10 PROCEDURE — 27201423 OPTIME MED/SURG SUP & DEVICES STERILE SUPPLY: Performed by: SURGERY

## 2023-05-10 PROCEDURE — 94761 N-INVAS EAR/PLS OXIMETRY MLT: CPT

## 2023-05-10 PROCEDURE — 63600175 PHARM REV CODE 636 W HCPCS: Performed by: ANESTHESIOLOGY

## 2023-05-10 PROCEDURE — D9220A PRA ANESTHESIA: ICD-10-PCS | Mod: CRNA,,, | Performed by: NURSE ANESTHETIST, CERTIFIED REGISTERED

## 2023-05-10 PROCEDURE — 96376 TX/PRO/DX INJ SAME DRUG ADON: CPT

## 2023-05-10 PROCEDURE — D9220A PRA ANESTHESIA: Mod: ANES,,, | Performed by: ANESTHESIOLOGY

## 2023-05-10 PROCEDURE — 99233 PR SUBSEQUENT HOSPITAL CARE,LEVL III: ICD-10-PCS | Mod: 57,ICN,, | Performed by: SURGERY

## 2023-05-10 PROCEDURE — 71000039 HC RECOVERY, EACH ADD'L HOUR: Performed by: SURGERY

## 2023-05-10 PROCEDURE — 44180 LAP ENTEROLYSIS: CPT | Mod: ,,, | Performed by: SURGERY

## 2023-05-10 PROCEDURE — 99233 SBSQ HOSP IP/OBS HIGH 50: CPT | Mod: 57,ICN,, | Performed by: SURGERY

## 2023-05-10 PROCEDURE — 36000708 HC OR TIME LEV III 1ST 15 MIN: Performed by: SURGERY

## 2023-05-10 PROCEDURE — 11000001 HC ACUTE MED/SURG PRIVATE ROOM

## 2023-05-10 RX ORDER — FENTANYL CITRATE 50 UG/ML
INJECTION, SOLUTION INTRAMUSCULAR; INTRAVENOUS
Status: DISCONTINUED | OUTPATIENT
Start: 2023-05-10 | End: 2023-05-10

## 2023-05-10 RX ORDER — ALBUTEROL SULFATE 90 UG/1
AEROSOL, METERED RESPIRATORY (INHALATION)
Status: DISCONTINUED | OUTPATIENT
Start: 2023-05-10 | End: 2023-05-10

## 2023-05-10 RX ORDER — LIDOCAINE HYDROCHLORIDE 10 MG/ML
INJECTION INFILTRATION; PERINEURAL
Status: DISCONTINUED | OUTPATIENT
Start: 2023-05-10 | End: 2023-05-10 | Stop reason: HOSPADM

## 2023-05-10 RX ORDER — ONDANSETRON 2 MG/ML
4 INJECTION INTRAMUSCULAR; INTRAVENOUS DAILY PRN
Status: ACTIVE | OUTPATIENT
Start: 2023-05-10 | End: 2023-05-10

## 2023-05-10 RX ORDER — OXYCODONE AND ACETAMINOPHEN 5; 325 MG/1; MG/1
1 TABLET ORAL EVERY 4 HOURS PRN
Status: CANCELLED | OUTPATIENT
Start: 2023-05-10

## 2023-05-10 RX ORDER — FENTANYL CITRATE 50 UG/ML
25 INJECTION, SOLUTION INTRAMUSCULAR; INTRAVENOUS EVERY 5 MIN PRN
Status: ACTIVE | OUTPATIENT
Start: 2023-05-10 | End: 2023-05-10

## 2023-05-10 RX ORDER — BUPIVACAINE HYDROCHLORIDE 5 MG/ML
INJECTION, SOLUTION PERINEURAL
Status: DISCONTINUED | OUTPATIENT
Start: 2023-05-10 | End: 2023-05-10 | Stop reason: HOSPADM

## 2023-05-10 RX ORDER — ROCURONIUM BROMIDE 10 MG/ML
INJECTION, SOLUTION INTRAVENOUS
Status: DISCONTINUED | OUTPATIENT
Start: 2023-05-10 | End: 2023-05-10

## 2023-05-10 RX ORDER — HYDROMORPHONE HYDROCHLORIDE 2 MG/ML
0.2 INJECTION, SOLUTION INTRAMUSCULAR; INTRAVENOUS; SUBCUTANEOUS EVERY 5 MIN PRN
Status: DISPENSED | OUTPATIENT
Start: 2023-05-10 | End: 2023-05-10

## 2023-05-10 RX ORDER — MIDAZOLAM HYDROCHLORIDE 1 MG/ML
INJECTION INTRAMUSCULAR; INTRAVENOUS
Status: DISCONTINUED | OUTPATIENT
Start: 2023-05-10 | End: 2023-05-10

## 2023-05-10 RX ORDER — ONDANSETRON 2 MG/ML
INJECTION INTRAMUSCULAR; INTRAVENOUS
Status: DISCONTINUED | OUTPATIENT
Start: 2023-05-10 | End: 2023-05-10

## 2023-05-10 RX ORDER — LIDOCAINE HYDROCHLORIDE 20 MG/ML
INJECTION, SOLUTION EPIDURAL; INFILTRATION; INTRACAUDAL; PERINEURAL
Status: DISCONTINUED | OUTPATIENT
Start: 2023-05-10 | End: 2023-05-10

## 2023-05-10 RX ORDER — PROCHLORPERAZINE EDISYLATE 5 MG/ML
5 INJECTION INTRAMUSCULAR; INTRAVENOUS EVERY 30 MIN PRN
Status: DISCONTINUED | OUTPATIENT
Start: 2023-05-10 | End: 2023-05-11 | Stop reason: HOSPADM

## 2023-05-10 RX ORDER — PROPOFOL 10 MG/ML
VIAL (ML) INTRAVENOUS
Status: DISCONTINUED | OUTPATIENT
Start: 2023-05-10 | End: 2023-05-10

## 2023-05-10 RX ORDER — MORPHINE SULFATE 4 MG/ML
4 INJECTION, SOLUTION INTRAMUSCULAR; INTRAVENOUS EVERY 6 HOURS PRN
Status: DISCONTINUED | OUTPATIENT
Start: 2023-05-10 | End: 2023-05-11

## 2023-05-10 RX ORDER — OXYCODONE AND ACETAMINOPHEN 5; 325 MG/1; MG/1
1 TABLET ORAL
Status: DISCONTINUED | OUTPATIENT
Start: 2023-05-10 | End: 2023-05-11 | Stop reason: HOSPADM

## 2023-05-10 RX ORDER — SUCCINYLCHOLINE CHLORIDE 20 MG/ML
INJECTION INTRAMUSCULAR; INTRAVENOUS
Status: DISCONTINUED | OUTPATIENT
Start: 2023-05-10 | End: 2023-05-10

## 2023-05-10 RX ADMIN — SODIUM CHLORIDE, SODIUM LACTATE, POTASSIUM CHLORIDE, AND CALCIUM CHLORIDE: .6; .31; .03; .02 INJECTION, SOLUTION INTRAVENOUS at 01:05

## 2023-05-10 RX ADMIN — HYDROMORPHONE HYDROCHLORIDE 0.2 MG: 2 INJECTION, SOLUTION INTRAMUSCULAR; INTRAVENOUS; SUBCUTANEOUS at 02:05

## 2023-05-10 RX ADMIN — PROPOFOL 120 MG: 10 INJECTION, EMULSION INTRAVENOUS at 01:05

## 2023-05-10 RX ADMIN — KETOROLAC TROMETHAMINE 15 MG: 30 INJECTION, SOLUTION INTRAMUSCULAR; INTRAVENOUS at 03:05

## 2023-05-10 RX ADMIN — ONDANSETRON 4 MG: 2 INJECTION, SOLUTION INTRAMUSCULAR; INTRAVENOUS at 02:05

## 2023-05-10 RX ADMIN — ROCURONIUM BROMIDE 50 MG: 10 INJECTION, SOLUTION INTRAVENOUS at 01:05

## 2023-05-10 RX ADMIN — FENTANYL CITRATE 50 MCG: 50 INJECTION, SOLUTION INTRAMUSCULAR; INTRAVENOUS at 01:05

## 2023-05-10 RX ADMIN — MIDAZOLAM HYDROCHLORIDE 2 MG: 1 INJECTION, SOLUTION INTRAMUSCULAR; INTRAVENOUS at 01:05

## 2023-05-10 RX ADMIN — ALBUTEROL SULFATE 4 PUFF: 90 AEROSOL, METERED RESPIRATORY (INHALATION) at 02:05

## 2023-05-10 RX ADMIN — SUCCINYLCHOLINE CHLORIDE 120 MG: 20 INJECTION, SOLUTION INTRAMUSCULAR; INTRAVENOUS at 01:05

## 2023-05-10 RX ADMIN — HYDROMORPHONE HYDROCHLORIDE 0.2 MG: 2 INJECTION, SOLUTION INTRAMUSCULAR; INTRAVENOUS; SUBCUTANEOUS at 03:05

## 2023-05-10 RX ADMIN — LIDOCAINE HYDROCHLORIDE 50 MG: 20 INJECTION, SOLUTION EPIDURAL; INFILTRATION; INTRACAUDAL; PERINEURAL at 01:05

## 2023-05-10 RX ADMIN — SUGAMMADEX 200 MG: 100 INJECTION, SOLUTION INTRAVENOUS at 02:05

## 2023-05-10 RX ADMIN — SODIUM CHLORIDE: 0.9 INJECTION, SOLUTION INTRAVENOUS at 05:05

## 2023-05-10 RX ADMIN — DEXTROSE 2 G: 50 INJECTION, SOLUTION INTRAVENOUS at 01:05

## 2023-05-10 RX ADMIN — ONDANSETRON 4 MG: 2 INJECTION INTRAMUSCULAR; INTRAVENOUS at 05:05

## 2023-05-10 NOTE — TRANSFER OF CARE
"Anesthesia Transfer of Care Note    Patient: Fabiano Garvey    Procedure(s) Performed: Procedure(s) (LRB):  LAPAROSCOPY, DIAGNOSTIC possible laparotomy other as indicated (N/A)  LYSIS, ADHESIONS (N/A)    Patient location: PACU    Transport from OR: Transported from OR on 6-10 L/min O2 by face mask with adequate spontaneous ventilation    Post pain: adequate analgesia    Post assessment: no apparent anesthetic complications    Post vital signs: stable    Level of consciousness: awake and alert    Nausea/Vomiting: no nausea/vomiting    Complications: none    Transfer of care protocol was followed      Last vitals:   Visit Vitals  /75 (BP Location: Right arm, Patient Position: Lying)   Pulse 75   Temp 36.1 °C (97 °F) (Oral)   Resp 20   Ht 5' 6" (1.676 m)   Wt 74.4 kg (164 lb)   SpO2 (!) 93%   BMI 26.47 kg/m²     "

## 2023-05-10 NOTE — ANESTHESIA PREPROCEDURE EVALUATION
05/10/2023  Fabiano Garvey is a 67 y.o., male scheduled for diag laparoscopy. 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    R lateral cheek     Past Surgical History:   Procedure Laterality Date    COLONOSCOPY N/A 7/25/2017    Procedure: COLONOSCOPY;  Surgeon: Bill Nicole MD;  Location: [facility] ENDO ([address] [place] [address]);  Service: Endoscopy;  Laterality: N/A;    EPIDURAL STEROID INJECTION INTO LUMBAR SPINE N/A 7/29/2021    Procedure: Injection-steroid-epidural-lumbar L5-S1;  Surgeon: Shiv Vernon Jr., MD;  Location: [facility] PAIN MGT;  Service: Pain Management;  Laterality: N/A;  oral sedation   no pacemaker     FUSION OF SPINE WITH INSTRUMENTATION Left 12/14/2022    Procedure: FUSION, SPINE, WITH INSTRUMENTATION Stg1: Left L3-5 OLiF conduit lateral cage BMP;  Surgeon: August Aguilar MD;  Location: [facility] OR;  Service: Neurosurgery;  Laterality: Left;  Procedure: Stg1: Left L3-5 OLiF conduit lateral cage BMP  Surgery Time: 2hrs  LOS: 3  Anesthesia: General  Blood: Type & Screen  Radiology: C-arm  Brace: LSO  Bed: Regular Bed  Position: Lateral Right Down  Equip    FUSION, SPINE, POSTERIOR APPROACH N/A 12/14/2022    Procedure: FUSION,SPINE,POSTERIOR APPROACH Stg 2: L3-5 posterior instrumentation viper prime, L3-5 posterolateral arthrodesis;  Surgeon: August Aguilar MD;  Location: [facility] OR;  Service: Neurosurgery;  Laterality: N/A;  Procedure: Stg 2: L3-5  CC: f/u HTN 
 
HPI: Pt is a 78 y.o. female who presents for f/u HTN. She is new to me and is interested in switching PCP's to a female provider. She has been checking her BP at home every day including this morning and it has been ranging in the 130's/50's. She has been feeling well. Per the chart she recently had a stress test with Dr. Ivanna Huang which was normal but he saw changes of HTN on her EKG and recommended that she increase the metoprolol. They have been playing phone tag and she has not gotten back in touch with him yet. She had been started on metoprolol 25mg XL at a recent visit but after taking it she felt like her thoughts were racing and she was not able to sleep. She was concerned about the metoprolol interacting with her dilantin and causing these side effects because she read that can happen. So for the past few days she has been taking only 12.5mg of HCTZ, and cutting her metoprolol XL in half. She has also only been taking one of her dilantin capsules. She states that since then she has been feeling better. She tried to call her Neurologist to discuss her dilantin dose but their office is closed today and she will call them again tomorrow. She does say that she has been on her current dose for many years and has never had any side effects. Past Medical History:  
Diagnosis Date  Essential hypertension 11/2/2016  Seizure ([facility] [address]  Seizures ([facility] [address] Family History Problem Relation Age of Onset  Stroke Father  Heart Disease Sister Social History Tobacco Use  Smoking status: Never Smoker  Smokeless tobacco: Never Used Substance Use Topics  Alcohol use: Yes  Drug use: No  
 
 
ROS: 
Positive only when bolded Constitutional: HA, F/C, changes in weight Eyes: Changes in vision Respiratory: SOB, wheezing Cardiovascular: CP, palpitations Hematologic/lymphatic: XENIA 
 
PE: 
Visit Vitals /64 (BP 1 Location: Left arm, BP Patient Position: Sitting) Pulse 67 Temp 98.7 °F (37.1 °C) (Oral) Resp 16 Ht 5' 3\" (1.6 m) Wt 132 lb (59.9 kg) SpO2 98% BMI 23.38 kg/m² Gen: Pt sitting in chair, in NAD Head: Normocephalic, atraumatic Eyes: Sclera anicteric, EOM grossly intact, PERRL Throat: MMM Neck: Supple, no LAD, no thyromegaly or carotid bruits CVS: Normal S1, S2, no m/r/g Resp: CTAB, no wheezes or rales Extrem: Atraumatic, no cyanosis or edema Pulses: 2+ Skin: Warm, dry Neuro: Alert, oriented, appropriate A/P: Pt is a 78 y.o. female who presents for f/u HTN. She is new to me and has some questions/concerns about medications prescribed by her specialists. - Advised that I would recommend she take her dilantin as directed given that she has been taking this for many years with prevention of seizures and it is unlikely that it is contributing to the symptoms that started after her metoprolol.  
- Advised that I would send a message to her Cardiologist to ask for his recommendation regarding the metoprolol given her side effects and see if he would be amenable to choosing a different beta blocker or even a different class of medication for HTN given that her stress test was negative. She wanted to clear any changes with him first given that this is the first time I'm seeing her and she has a relationship with him already. 
- RTC in 3 months for f/u chronic conditions, or sooner prn pending changes to her medications. Advised her to bring her BP cuff to next visit to check against ours, as well as her logs. Discussed diagnoses in detail with patient. Medication risks/benefits/side effects discussed with patient. All of the patient's questions were addressed. The patient understands and agrees with our plan of care. The patient knows to call back if they are unsure of or forget any changes we discussed today or if the symptoms change. posterior instrumentation viper prime, L3-5 posterolateral arthrodesis  Surgery Time: 3hrs  LOS: 3  Anesthesia: General  Blood: Type & Screen  Radiology: Bra    NO PAST SURGERIES      TRANSFORAMINAL EPIDURAL INJECTION OF STEROID Right 6/29/2021    Procedure: Injection,steroid,epidural,transforaminal approach-RIGHT-- L4-5, L5-S1-- (IV Sedation);  Surgeon: Shiv Vernon Jr., MD;  Location: [facility] PAIN MGT;  Service: Pain Management;  Laterality: Right;    TRANSFORAMINAL EPIDURAL INJECTION OF STEROID Left 10/27/2022    Procedure: Injection,steroid,epidural,transforaminal left L4-5 and L5-S1;  Surgeon: Shiv Vernon Jr., MD;  Location: [facility] PAIN T;  Service: Pain Management;  Laterality: Left;       Pre-op Assessment    I have reviewed the Patient Summary Reports.     I have reviewed the Nursing Notes. I have reviewed the NPO Status.   I have reviewed the Medications.     Review of Systems  Anesthesia Hx:  No previous Anesthesia  Denies Family Hx of Anesthesia complications.   Denies Personal Hx of Anesthesia complications.   Social:  Former Smoker, Social Alcohol Use    Hematology/Oncology:         -- Cancer in past history:   Cardiovascular:   Exercise tolerance: good Denies Pacemaker.   Denies Angina. hyperlipidemia    Pulmonary:   COPD, mild Sleep Apnea, CPAP    Education provided regarding risk of obstructive sleep apnea     Renal/:  Renal/ Normal     Hepatic/GI:  Hepatic/GI Normal  Denies GERD.    Musculoskeletal:   Arthritis (rheumatoid)   Spine Disorders: lumbar Chronic Pain, Disc disease and Degenerative disease    Neurological:   Denies TIA. Denies CVA. Neuromuscular Disease,  Denies Seizures.    Endocrine:  Endocrine Normal    Psych:  Psychiatric Normal           Physical Exam  General: Well nourished and Cooperative    Airway:  Mallampati: IV / III  Mouth Opening: Small, but > 3cm  TM Distance: Normal  Tongue: Normal  Neck ROM: Normal ROM    Dental:  Partial Dentures  Lower partial  The patient received an After-Visit Summary which contains VS, orders, medication list and allergy list. This can be used as a \"mini-medical record\" should they have to seek medical care while out of town. Current Outpatient Medications on File Prior to Visit Medication Sig Dispense Refill  hydroCHLOROthiazide (HYDRODIURIL) 12.5 mg tablet Take 2 Tabs by mouth daily. 30 Tab 2  
 atorvastatin (LIPITOR) 20 mg tablet Take 1 Tab by mouth daily. 30 Tab 2  
 metoprolol succinate (TOPROL-XL) 25 mg XL tablet Take 1 Tab by mouth daily. 30 Tab 0  
 LORazepam (ATIVAN) 0.5 mg tablet Take 1 Tab by mouth every eight (8) hours as needed for Anxiety. Max Daily Amount: 1.5 mg. 5 Tab 0  
 timolol (TIMOPTIC) 0.5 % ophthalmic solution 1 Drop two (2) times a day.  phenytoin ER (DILANTIN ER) 100 mg ER capsule TAKE 3 CAPSULES BY MOUTH AT BEDTIME 90 Cap 3  
 aspirin delayed-release 81 mg tablet Take 1 Tab by mouth daily. 100 Tab 3  
 diclofenac EC (VOLTAREN) 25 mg EC tablet Take  by mouth two (2) times a day.  cyclobenzaprine (FLEXERIL) 10 mg tablet Take  by mouth three (3) times daily as needed for Muscle Spasm(s).  aspirin (ASPIRIN) 325 mg tablet Take 1 Tab by mouth daily. No current facility-administered medications on file prior to visit. dentures  Chest/Lungs:  Clear to auscultation, Normal Respiratory Rate    Heart:  Rate: Normal  Rhythm: Regular Rhythm  Sounds: Normal      Lab Results   Component Value Date    WBC 8.83 05/09/2023    HGB 14.2 05/09/2023    HCT 43.5 05/09/2023     05/09/2023    CHOL 139 11/12/2022    TRIG 64 11/12/2022    HDL 35 (L) 11/12/2022    ALT 18 05/09/2023    AST 25 05/09/2023     05/09/2023    K 4.2 05/09/2023     05/09/2023    CREATININE 1.0 05/09/2023    BUN 16 05/09/2023    CO2 24 05/09/2023    TSH 0.635 11/12/2022    PSA 0.55 06/15/2021    INR 1.0 11/22/2022    HGBA1C 5.8 (H) 06/15/2021     Results for orders placed or performed in visit on 11/22/22   SCHEDULED EKG 12-LEAD (to Muse)    Collection Time: 11/22/22  9:36 AM    Narrative    Test Reason : Z01.818,Z79.899,E78.5,M06.9,J84.10,M54.16,D64.9,    Vent. Rate : 089 BPM     Atrial Rate : 089 BPM     P-R Int : 138 ms          QRS Dur : 090 ms      QT Int : 350 ms       P-R-T Axes : 056 070 072 degrees     QTc Int : 425 ms    Normal sinus rhythm  Normal ECG  When compared with ECG of 22-NOV-2022 09:35,  No significant change was found  Confirmed by Chip Chavira MD ([id number]) on 11/22/2022 10:16:37 AM    Referred By: OTILIA CAGLE           Confirmed By:Chip Chavira MD     CXR 11/22/22  Findings suggest a combination interstitial lung disease and emphysematous changes progressed from prior.  Ground-glass opacities in the right lung may relate to underlying interstitial lung disease with superimposed airspace inflammation or infection not excluded.  This report was flagged in Epic as abnormal. (PCP aware)      Anesthesia Plan  Type of Anesthesia, risks & benefits discussed:    Anesthesia Type: Gen ETT  Intra-op Monitoring Plan: Standard ASA Monitors  Post Op Pain Control Plan: multimodal analgesia  Induction:  IV  ASA Score: 3  Anesthesia Plan Notes: GETA with RSI      Ready For Surgery From Anesthesia Perspective.     .

## 2023-05-10 NOTE — NURSING
Received report from surgery Nurse. Pt arrived on unit. 3 incisions, clean dry intact. NGT on low intermittent suction. Pt will be NPO. VS taken. No complaints of pain. Daughter at bedside.

## 2023-05-10 NOTE — ANESTHESIA PROCEDURE NOTES
Intubation    Date/Time: 5/10/2023 1:22 PM  Performed by: Joanie Ramirez CRNA  Authorized by: Humphrey Trinidad MD     Intubation:     Induction:  Rapid sequence induction    Intubated:  Postinduction    Mask Ventilation:  Not attempted    Attempts:  1    Attempted By:  CRNA    Method of Intubation:  Video laryngoscopy    Blade:  Ram 4    Laryngeal View Grade: Grade I - full view of cords      Difficult Airway Encountered?: No      Complications:  None    Airway Device:  Oral endotracheal tube    Airway Device Size:  7.5    Style/Cuff Inflation:  Cuffed (inflated to minimal occlusive pressure)    Tube secured:  22    Secured at:  The lips    Placement Verified By:  Capnometry    Complicating Factors:  None    Findings Post-Intubation:  BS equal bilateral

## 2023-05-10 NOTE — PROGRESS NOTES
Progress Note  General Surgery    Admit Date: 5/8/2023  S/P: * No surgery found *    Post-operative Day:      Hospital Day: 3    SUBJECTIVE:     No advancement of contrast into colon, continues N/V.  OBJECTIVE:     Vital Signs (Most Recent)  Temp: 97.3 °F (36.3 °C) (05/10/23 0850)  Pulse: 68 (05/10/23 0850)  Resp: 18 (05/10/23 0850)  BP: 118/64 (05/10/23 0850)  SpO2: 95 % (05/10/23 0850)    Vital Signs Range (Last 24H):  Temp:  [97.3 °F (36.3 °C)-98.6 °F (37 °C)]   Pulse:  [60-78]   Resp:  [18-20]   BP: (114-130)/(64-78)   SpO2:  [92 %-95 %]     I & O (Last 24H):  Intake/Output Summary (Last 24 hours) at 5/10/2023 0957  Last data filed at 5/10/2023 0844  Gross per 24 hour   Intake --   Output 1435 ml   Net -1435 ml       Physical Exam:  Gen: NAD   HEENT: NCAT  Pulm: unlabored, symmetrical   Abd: Soft, nttp. No rebound, guarding.         ASSESSMENT/PLAN:     Assessment/Plan:  NGT this AM  To OR for Dx Lap, possible ex lap    Eitan Palomares M.D., F.A.C.S.  Juvybf-Haaihvcwl-Lqzsxwz and General Surgery  [facility]

## 2023-05-10 NOTE — OP NOTE
PATIENT: Fabiano Garvey    MRN: [id number]    DATE OF PROCEDURE: 05/10/2023    PREOPERATIVE DIAGNOSIS: Small Bowel Obstruction    POSTOPERATIVE DIAGNOSIS: same    PROCEDURE: Diagnostic Laparoscopy with Lysis of Adhesions    SURGEON: Eitan Palomares M.D.    ASSISTANT: Aris Antonio M.D.    ANESTHESIA: General     ESTIMATED BLOOD LOSS: minimal    SPECIMEN: none    CONDITION: stable    COMPLICATIONS: None    INDICATIONS: The patient is a 66 yo WM with persistent nausea and vomiting and concern for small bowel obstruction on CT scan.  Patient failed Gastrografin challenge and the decision was made to take him to surgery for diagnostic laparoscopy.  The risks, benefits, and alternatives to the procedure explained to the patient in detail.  All questions were answered and the patient requested the procedure be done.    PROCEDURE IN DETAIL:  After surgical consent was obtained, the patient was transported to the operative theater and onto the operating room table in the supine position.  General endotracheal anesthesia was administered and a time-out was performed.  Appropriate perioperative antibiotics were confirmed and the abdomen is prepped and draped in a standard sterile fashion.  A small incision was made in the epigastric region and the fascia was grasped and sharply divided.  The abdomen was entered bluntly and a 11 mm trocar was inserted.  Two additional 5 mm trocars were inserted in the right lateral position.  There is a small piece of omentum which was stuck inside of a small umbilical hernia which was reduced.  This was not contributing at all to the small bowel obstruction.  There were 3 small adhesive band between the omentum and the left colon as well as the mesentery of the small bowel.  These seemed to be the source of the bowel obstruction.  These were all lysed sharply with scissors.  The small bowel was run from the ligament of Treitz to the cecum.  There were no additional signs of obstruction or  overt pathology.  At this point the abdomen was desufflated and the trocars were removed.  The fascial defect at the epigastric region was closed using a 0 Vicryl suture.  The incisions were irrigated out with sterile saline and injected with local anesthetic.  The skin incisions were closed using Monocryl and Dermabond.  At the end of the procedure the patient was woken from anesthesia having tolerated the procedure without difficulty returned to the PACU in a stable condition.  All counts were correct and the patient's family was updated.    DISPO: Return to floor      Eitan Palomares M.D., F.A.C.S.  Vkiizk-Nkqyuchrm-Ftjqqzh and General Surgery  [facility]

## 2023-05-10 NOTE — PLAN OF CARE
SW attempted to introduce self to pt as new assigned CM. Pt has 2 nurses providing care to him. Pt is noted to have support of family at bedside. Pt is alert with NG tube. Pt is able to ambulate on his own. SW will continue to follow pt throughout care and assist with any discharge needs.      Future Appointments   Date Time Provider Department Center   5/22/2023  1:30 PM Calin Buchanan MD [facility] MED [facility]   5/26/2023 11:00 AM Eitan Palomares MD [facility] GENSUR [facility]   6/15/2023 10:15 AM LAB, [place] [facility] LAB [place]   6/15/2023 10:30 AM [facility] [place] [facility] [place]   6/19/2023 11:00 AM [facility] ODC XR-A LIMIT 350 LBS [facility] XRAY OP [facility]   6/20/2023 10:00 AM Kelsy Leslie PA-C [facility] NEUROSU [facility]   8/2/2023  4:00 PM Calin Buchanan MD [facility] MED [facility]   8/11/2023  1:30 PM [facility] [place]     FLYNN Daniels  [facility]  [phone]

## 2023-05-10 NOTE — NURSING
CXR for NGT placement completed.   Call placed to Dr. Palomares regarding placement of NGT and confirm that ok to connect to suction.  Left message for him to rtn phone call.

## 2023-05-11 VITALS
TEMPERATURE: 99 F | OXYGEN SATURATION: 92 % | HEART RATE: 72 BPM | HEIGHT: 66 IN | DIASTOLIC BLOOD PRESSURE: 71 MMHG | RESPIRATION RATE: 20 BRPM | BODY MASS INDEX: 25.85 KG/M2 | WEIGHT: 160.88 LBS | SYSTOLIC BLOOD PRESSURE: 109 MMHG

## 2023-05-11 PROCEDURE — 25000003 PHARM REV CODE 250: Performed by: SURGERY

## 2023-05-11 PROCEDURE — 94761 N-INVAS EAR/PLS OXIMETRY MLT: CPT

## 2023-05-11 PROCEDURE — 63600175 PHARM REV CODE 636 W HCPCS: Performed by: INTERNAL MEDICINE

## 2023-05-11 RX ORDER — MORPHINE SULFATE 2 MG/ML
2 INJECTION, SOLUTION INTRAMUSCULAR; INTRAVENOUS EVERY 6 HOURS PRN
Status: DISCONTINUED | OUTPATIENT
Start: 2023-05-11 | End: 2023-05-11

## 2023-05-11 RX ORDER — OXYCODONE HYDROCHLORIDE 5 MG/1
5 TABLET ORAL EVERY 4 HOURS PRN
Qty: 15 TABLET | Refills: 0 | Status: SHIPPED | OUTPATIENT
Start: 2023-05-11 | End: 2023-10-04

## 2023-05-11 RX ADMIN — SODIUM CHLORIDE: 0.9 INJECTION, SOLUTION INTRAVENOUS at 04:05

## 2023-05-11 RX ADMIN — MORPHINE SULFATE 2 MG: 2 INJECTION, SOLUTION INTRAMUSCULAR; INTRAVENOUS at 05:05

## 2023-05-11 NOTE — PLAN OF CARE
"[place] - Telemetry  Discharge Final Note    Primary Care Provider: Calin Buchanan MD    Expected Discharge Date: 5/11/2023    Pharmacist will go over home medications and reasons for medications. VN and bedside nurse to reiterate final discharge instructions.     Cleared from CM . Bedside Nurse and VN notified.    Lifting restrictions noted on AVS.       Final Discharge Note (most recent)       Final Note - 05/11/23 1623          Final Note    Assessment Type Final Discharge Note (P)      Anticipated Discharge Disposition Home or Self Care (P)      Hospital Resources/Appts/Education Provided Appointments scheduled and added to AVS (P)         Post-Acute Status    Other Status No Post-Acute Service Needs (P)      Discharge Delays None known at this time (P)                    Future Appointments   Date Time Provider Department Center   5/22/2023  1:30 PM Calin Buchanan MD [facility] MED [place] [facility]   5/26/2023 11:00 AM Eitan Palomares MD [facility] GENSUR [facility]   6/15/2023 10:15 AM [facility]   6/15/2023 10:30 AM [facility]   6/19/2023 11:00 AM [facility] ODC XR-A LIMIT 350 LBS [facility] XRAY OP [facility]   6/20/2023 10:00 AM Kelsy Leslie PA-C [facility] NEUROSU [facility]   8/2/2023  4:00 PM Calin Buchanan MD [facility] MED [facility]   8/11/2023  1:30 PM [facility]     /71 (BP Location: Right arm, Patient Position: Lying)   Pulse 72   Temp 98.6 °F (37 °C) (Oral)   Resp 20   Ht 5' 6" (1.676 m)   Wt 73 kg (160 lb 14.4 oz)   SpO2 (!) 92%   BMI 25.97 kg/m²            Contact Info       Eitan Palomares MD   Specialty: General Surgery    [address]  [address]  [place] [address]   Phone: [phone]       Next Steps: Follow up on 5/26/2023             Medication List        CHANGE how you take these medications      oxyCODONE 5 MG immediate release tablet  Commonly known as: ROXICODONE  Take 1 tablet (5 mg total) by mouth every 4 " (four) hours as needed for Pain.  What changed:   medication strength  how much to take  when to take this            CONTINUE taking these medications      albuterol 90 mcg/actuation inhaler  Commonly known as: VENTOLIN HFA  Inhale 2 puffs into the lungs every 6 (six) hours as needed for Wheezing (cough). Rescue     atorvastatin 40 MG tablet  Commonly known as: LIPITOR  Take 1 tablet (40 mg total) by mouth once daily.     celecoxib 200 MG capsule  Commonly known as: CeleBREX  Take 1 capsule (200 mg total) by mouth 2 (two) times daily.     fluorouraciL 5 % cream  Commonly known as: EFUDEX  Apply thin film to right cheek and temple 2times per day for 2-3 weeks; d/c if area bleeding or ulcerated; avoid eyes or mouth     gabapentin 300 MG capsule  Commonly known as: NEURONTIN  Take 2 capsules (600 mg total) by mouth 3 (three) times daily.     imiquimod 5 % cream  Commonly known as: ALDARA  Apply small amount to affected area on  right cheek at night  x 4 weeks; discontinue  if ulcerated or bleeding     mupirocin 2 % ointment  Commonly known as: BACTROBAN  Apply to Affected Area as directed  twice daily     RINVOQ 15 mg 24 hr tablet  Generic drug: upadacitinib  Take 1 tablet (15 mg total) by mouth once daily.     SPIRIVA WITH HANDIHALER 18 mcg inhalation capsule  Generic drug: tiotropium  Inhale 1 capsule (18 mcg total) into the lungs once daily. Controller     VITAMIN D2 50,000 unit Cap  Generic drug: ergocalciferol  TAKE 1 CAPSULE BY MOUTH ONE TIME PER WEEK            STOP taking these medications      fluticasone propionate 50 mcg/actuation nasal spray  Commonly known as: FLONASE     ibuprofen 800 MG tablet  Commonly known as: BETHANY GLYNN               Where to Get Your Medications        These medications were sent to [facility] [address], [place] [address]      Hours: Mon-Fri, 8a-5:30p Phone: [phone]   oxyCODONE 5 MG immediate release tablet

## 2023-05-11 NOTE — PLAN OF CARE
Problem: Adult Inpatient Plan of Care  Goal: Plan of Care Review  5/11/2023 1739 by Margaret Weaver RN  Outcome: Met  5/11/2023 0961 by Margaret Weaver RN  Outcome: Ongoing, Progressing  Goal: Patient-Specific Goal (Individualized)  Outcome: Met  Goal: Absence of Hospital-Acquired Illness or Injury  Outcome: Met  Goal: Optimal Comfort and Wellbeing  Outcome: Met  Goal: Readiness for Transition of Care  Outcome: Met     Problem: Fluid Deficit (Intestinal Obstruction)  Goal: Fluid Balance  Outcome: Met     Problem: Infection (Intestinal Obstruction)  Goal: Absence of Infection Signs and Symptoms  Outcome: Met     Problem: Nausea and Vomiting (Intestinal Obstruction)  Goal: Nausea and Vomiting Relief  Outcome: Met     Problem: Pain (Intestinal Obstruction)  Goal: Acceptable Pain Control  Outcome: Met     Problem: Fall Injury Risk  Goal: Absence of Fall and Fall-Related Injury  Outcome: Met     Problem: COPD (Chronic Obstructive Pulmonary Disease) Comorbidity  Goal: Maintenance of COPD Symptom Control  Outcome: Met     Problem: Skin Injury Risk Increased  Goal: Skin Health and Integrity  Outcome: Met     Problem: Infection  Goal: Absence of Infection Signs and Symptoms  Outcome: Met

## 2023-05-11 NOTE — PLAN OF CARE
"   05/11/23 0923   Post-Acute Status   Post-Acute Authorization Other   Other Status No Post-Acute Service Needs       Pt not medically ready. NGT still in place. S/P procedure yesterday.    Peripheral IV - Single Lumen 05/10/23 1324 18 G Left Wrist  Placement Date/Time: 05/10/23 1324 Present Prior to Hospital Arrival?: No Inserted by: MD Size/Length: 18 G Orientation: Left Location: Wrist  0 days  NG/OG Tube 05/10/23 1022 14 Fr. Right nostril  Placement Date/Time: 05/10/23 1022 Inserted by: LPN Insertion attempts (enter comment if more than 2 attempts): 1 Tube Size (Fr.): 14 Fr. Tube Location: Right nostril Anesthetic: ice chips  0 days  Incision/Site 05/10/23 1412 Abdomen  Date First Assessed/Time First Assessed: 05/10/23 1412 Location: Abdomen  0 days    Future Appointments   Date Time Provider Department Center   5/22/2023  1:30 PM Calin Buchanan MD [facility] MED [facility]   5/26/2023 11:00 AM Eitan Palomares MD [facility] GENSUR [facility]   6/15/2023 10:15 AM [facility] LAB [place]   6/15/2023 10:30 AM [facility] LAB [place]   6/19/2023 11:00 AM [facility] ODC XR-A LIMIT 350 LBS [facility] XRAY OP [facility]   6/20/2023 10:00 AM Kelsy Leslie PA-C [facility] NEUROSU [facility]   8/2/2023  4:00 PM Calin Buchanan MD [facility] MED [facility]   8/11/2023  1:30 [facility]     /65 (BP Location: Right arm, Patient Position: Lying)   Pulse 71   Temp 97.2 °F (36.2 °C) (Oral)   Resp 14   Ht 5' 6" (1.676 m)   Wt 73 kg (160 lb 14.4 oz)   SpO2 (!) 93%   BMI 25.97 kg/m²      diatrizoate meglumineand-diatrizoate sodium  60 mL Oral Once       "

## 2023-05-11 NOTE — DISCHARGE SUMMARY
[facility]  DISCHARGE SUMMARY  General Surgery      Admit Date:  5/8/2023    Discharge Date and Time:  5/11/2023  12:00 PM    Attending Physician:  Eitan Palomares MD     Discharge Provider:  Aris Antonio MD     Reason for Admission:  Small bowel obstruction due to adhesions     Procedures Performed:  Procedure(s) (LRB):  LAPAROSCOPY, DIAGNOSTIC possible laparotomy other as indicated (N/A)  LYSIS, ADHESIONS (N/A)    Hospital Course:  Please see the preoperative H&P and other available documentation for full details related to history prior to this admission.  Briefly, Fabiano Garvey is a 67 y.o. male who was admitted following scheduled elective surgery for Small bowel obstruction due to adhesions    Following a complete preoperative discussion of the risks and benefits of surgery with signed informed consent, the patient was taken to the operating room on 5/10/2023 and underwent the above stated procedures.  The patient tolerated surgery well and there were no complications.  Please see the operative report for full intraoperative findings and details.  Postoperatively, the patient did well and was transferred from the PACU to the floor in stable condition where they had a stable and uncomplicated hospital course.  Labs and vital signs remained stable and appropriate throughout course.  Diet was advanced as tolerated and the patient's pain was controlled on oral pain medications without problem.  Currently, the patient is doing well at 1 Day Post-Op and is stable and appropriate for discharge home at this time.      Consults:  None.    Significant Diagnostic Studies:   Recent Labs   Lab 05/08/23  1801 05/09/23  0847   WBC 11.46 8.83   HGB 15.2 14.2   HCT 45.0 43.5    180     Recent Labs   Lab 05/08/23  1801 05/09/23  0847    140   K 4.5 4.2    107   CO2 23 24   BUN 15 16   CREATININE 1.1 1.0    107   CALCIUM 9.6 9.1   MG  --  2.0   PHOS  --  3.7   AST 31 25   ALT 25 18    ALKPHOS 55 53*   BILITOT 0.4 0.6   PROT 8.1 7.3   ALBUMIN 4.1 3.5   LIPASE 64*  --      Recent Labs   Lab 05/08/23  1801 05/09/23  0847   LACTATE  --  0.9   TROPONINI <0.006  --    No results for input(s): PH, PCO2, PO2, HCO3 in the last 72 hours.      Final Diagnoses:   Principal Problem:    Small bowel obstruction due to adhesions   Secondary Diagnoses:    Active Hospital Problems    Diagnosis  POA    *Small bowel obstruction due to adhesions [K56.50]  Yes      Resolved Hospital Problems    Diagnosis Date Resolved POA    Abdominal pain [R10.9] 05/10/2023 Yes     68 yo M with concerns of SBO on CT imaging  - admit for observation with general surgery  - NPO, IVF  - will give GGFN PO today   - No NGT required yet  - can place NGT for N/V           Discharged Condition:  Good    Disposition:  Home or Self Care    Follow Up/Patient Instructions:     Medications:  Reconciled Home Medications:    Current Discharge Medication List        CONTINUE these medications which have CHANGED    Details   oxyCODONE (ROXICODONE) 5 MG immediate release tablet Take 1 tablet (5 mg total) by mouth every 4 (four) hours as needed for Pain.  Qty: 15 tablet, Refills: 0    Comments: Quantity prescribed more than 7 day supply? No           CONTINUE these medications which have NOT CHANGED    Details   albuterol (VENTOLIN HFA) 90 mcg/actuation inhaler Inhale 2 puffs into the lungs every 6 (six) hours as needed for Wheezing (cough). Rescue  Qty: 18 g, Refills: 2      atorvastatin (LIPITOR) 40 MG tablet Take 1 tablet (40 mg total) by mouth once daily.  Qty: 90 tablet, Refills: 3    Associated Diagnoses: Mixed hyperlipidemia      gabapentin (NEURONTIN) 300 MG capsule Take 2 capsules (600 mg total) by mouth 3 (three) times daily.  Qty: 180 capsule, Refills: 11      tiotropium (SPIRIVA) 18 mcg inhalation capsule Inhale 1 capsule (18 mcg total) into the lungs once daily. Controller  Qty: 30 capsule, Refills: 6      upadacitinib (RINVOQ) 15 mg 24  hr tablet Take 1 tablet (15 mg total) by mouth once daily.  Qty: 30 tablet, Refills: 11      celecoxib (CELEBREX) 200 MG capsule Take 1 capsule (200 mg total) by mouth 2 (two) times daily.  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      mupirocin (BACTROBAN) 2 % ointment Apply to Affected Area as directed  twice daily  Qty: 22 g, Refills: 3    Associated Diagnoses: Neoplasm of uncertain behavior of skin           STOP taking these medications       fluticasone propionate (FLONASE) 50 mcg/actuation nasal spray Comments:   Reason for Stopping:         ibuprofen (ADVIL,MOTRIN) 800 MG tablet Comments:   Reason for Stopping:             Discharge Procedure Orders   Lifting restrictions   Order Comments: Do not lift anything >10 lbs (about a gallon of milk) for 6 weeks     No driving until:   Order Comments: No longer taking narcotic pain medication and can turn around safely without pain.     Notify your health care provider if you experience any of the following:  increased confusion or weakness     Notify your health care provider if you experience any of the following:  persistent dizziness, light-headedness, or visual disturbances     Notify your health care provider if you experience any of the following:  severe persistent headache     Notify your health care provider if you experience any of the following:  difficulty breathing or  increased cough     Notify your health care provider if you experience any of the following:  redness, tenderness, or signs of infection (pain, swelling, redness, odor or green/yellow discharge around incision site)     Notify your health care provider if you experience any of the following:  severe uncontrolled pain     Notify your health care provider if you experience any of the following:  persistent nausea and vomiting or diarrhea     Activity as tolerated   Order Comments: Okay to shower the day after surgery. Please do not submerge wounds underwater (no bath, no pool, no lake, etc.) for at least 2 weeks.      Follow-up Information       Eitan Palomares MD Follow up on 5/26/2023.    Specialty: General Surgery  Contact information:  [address]  [address]  [place] [id number] [phone]                             Aris Antonio MD

## 2023-05-11 NOTE — PROGRESS NOTES
Discharge orders noted. Additional clinical references attached. Patient's discharge instructions given by bedside RN and reviewed via this VN.  Education provided on new and previous medications, diagnosis, and follow-up appointments.  Patient verbalized understanding and teach back method was used.  All questions answered. Transport to [facility] requested will be requested by bedside nurse, once his ride gets here.  Floor nurse notified.              05/11/23 9299    Notification    Notified Of Discharge Status   Admission   Communication Issues? None   Shift   Virtual Nurse - Rounding Complete  (discharge)   Virtual Nurse - Patient Verbalized Approval Of Camera Use   Safety/Activity   Patient Rounds bed in low position;clutter free environment maintained;call light in patient/parent reach   Safety Promotion/Fall Prevention side rails raised x 2   Activity Management Sitting at edge of bed - L2

## 2023-05-11 NOTE — PROGRESS NOTES
Progress Note  General Surgery    Admit Date: 5/8/2023  S/P: Procedure(s) (LRB):  LAPAROSCOPY, DIAGNOSTIC possible laparotomy other as indicated (N/A)  LYSIS, ADHESIONS (N/A)    Post-operative Day: 1 Day Post-Op    Hospital Day: 4    SUBJECTIVE:     No acute events overnight. Patient states pain is much better, +flatus and BM, tolerated liquids for breakfast   OBJECTIVE:     Vital Signs (Most Recent)  Temp: 98.6 °F (37 °C) (05/11/23 0933)  Pulse: 79 (05/11/23 0933)  Resp: 20 (05/11/23 0933)  BP: 113/68 (05/11/23 0933)  SpO2: (!) 93 % (05/11/23 0933)    Vital Signs Range (Last 24H):  Temp:  [97 °F (36.1 °C)-98.7 °F (37.1 °C)]   Pulse:  [66-90]   Resp:  [14-20]   BP: ()/(55-77)   SpO2:  [91 %-100 %]     I & O (Last 24H):  Intake/Output Summary (Last 24 hours) at 5/11/2023 0958  Last data filed at 5/11/2023 0649  Gross per 24 hour   Intake 900 ml   Output 585 ml   Net 315 ml       Physical Exam:  Gen: NAD   HEENT: NCAT  Pulm: unlabored, symmetrical   Abd: Soft, nttp. No rebound, guarding.     Wound/Incision:  clean, dry, intact    Laboratory:  Labs within the past 24 hours have been reviewed.    ASSESSMENT/PLAN:     Assessment/Plan:  Regular diet lunch  DC IVFs  ?Home today    Eitan Palomares M.D., F.A.C.S.  [facility] General [facility]  [facility]

## 2023-05-11 NOTE — ANESTHESIA POSTPROCEDURE EVALUATION
Anesthesia Post Evaluation    Patient: Fabiano Garvey    Procedure(s) Performed: Procedure(s) (LRB):  LAPAROSCOPY, DIAGNOSTIC possible laparotomy other as indicated (N/A)  LYSIS, ADHESIONS (N/A)    Final Anesthesia Type: general      Patient location during evaluation: PACU  Patient participation: Yes- Able to Participate  Level of consciousness: awake and alert  Post-procedure vital signs: reviewed and stable  Pain management: adequate  Airway patency: patent    PONV status at discharge: No PONV  Anesthetic complications: no      Cardiovascular status: stable  Respiratory status: spontaneous ventilation  Hydration status: euvolemic  Follow-up not needed.          Vitals Value Taken Time   /65 05/11/23 0405   Temp 36.2 °C (97.2 °F) 05/11/23 0405   Pulse 71 05/11/23 0405   Resp 14 05/11/23 0521   SpO2 93 % 05/11/23 0405         Event Time   Out of Recovery 05/10/2023 16:02:24         Pain/Briana Score: Pain Rating Prior to Med Admin: 7 (5/11/2023  5:21 AM)  Pain Rating Post Med Admin: 5 (5/11/2023  5:51 AM)  Briana Score: 10 (5/10/2023  3:48 PM)

## 2023-05-11 NOTE — PLAN OF CARE
AAOx4. VS wnl on room air. NG to left nostril, to low intermitted suction, no drainage at this time.Bed locked, in low position, and call light within reach. Will continue to monitor.

## 2023-05-12 ENCOUNTER — PATIENT OUTREACH (OUTPATIENT)
Dept: ADMINISTRATIVE | Facility: CLINIC | Age: 68
End: 2023-05-12
Payer: COMMERCIAL

## 2023-05-12 NOTE — PROGRESS NOTES
C3 nurse spoke with Fabiano Garvey  for a TCC post hospital discharge follow up call. The patient has a scheduled [facility] appointment with Calin Buchanan MD  on 5/22/23 @ 3177.

## 2023-05-16 ENCOUNTER — PATIENT MESSAGE (OUTPATIENT)
Dept: SURGERY | Facility: CLINIC | Age: 68
End: 2023-05-16
Payer: COMMERCIAL

## 2023-05-17 ENCOUNTER — TELEPHONE (OUTPATIENT)
Dept: SURGERY | Facility: CLINIC | Age: 68
End: 2023-05-17
Payer: COMMERCIAL

## 2023-05-17 NOTE — TELEPHONE ENCOUNTER
05/17/2023  09:47   Spoke with pt to tell him we can't send his work letter to him through his e-mail, so he is going to  his work letter.         ----- Message from Phil Bravo sent at 5/17/2023  9:34 AM CDT -----  Contact: pt  .Type:  Needs Medical Advice    Who Called: pt    Would the patient rather a call back or a response via [facility]?  Call back  Best Call Back Number: [phone]  Additional Information: Pt. Needs his return to work letter sent to e mail address [email or web address] because he can not access it through the portual.

## 2023-05-19 ENCOUNTER — SPECIALTY PHARMACY (OUTPATIENT)
Dept: PHARMACY | Facility: CLINIC | Age: 68
End: 2023-05-19
Payer: COMMERCIAL

## 2023-05-19 NOTE — TELEPHONE ENCOUNTER
Specialty Pharmacy - Refill Coordination    Specialty Medication Orders Linked to Encounter      Flowsheet Row Most Recent Value   Medication #1 upadacitinib (RINVOQ) 15 mg 24 hr tablet (Order#[id number], Rx#[id number])            Refill Questions - Documented Responses      Flowsheet Row Most Recent Value   Patient Availability and HIPAA Verification    Does patient want to proceed with activity? Yes   HIPAA/medical authority confirmed? Yes   Relationship to patient of person spoken to? Self   Refill Screening Questions    Changes to allergies? No   Changes to medications? No   New conditions since last clinic visit? Yes  [bowel obstruction]   Unplanned office visit, urgent care, ED, or hospital admission in the last 4 weeks? Yes  [surgery]   How does patient/caregiver feel medication is working? Good   Financial problems or insurance changes? No   How many doses of your specialty medications were missed in the last 4 weeks? 2   Why were doses missed? Other (comment)  [surgery]   Would patient like to speak to a pharmacist? No   When does the patient need to receive the medication? 05/23/23   Refill Delivery Questions    How will the patient receive the medication? [facility]   When does the patient need to receive the medication? 05/23/23   Shipping Address Home   Address in [facility] confirmed and updated if neccessary? Yes   Expected Copay ($) 5   Is the patient able to afford the medication copay? Yes   Payment Method CC on file   Days supply of Refill 30   Supplies needed? No supplies needed   Refill activity completed? Yes   Refill activity plan Refill scheduled   Shipment/Pickup Date: 05/19/23            Current Outpatient Medications   Medication Sig    albuterol (VENTOLIN HFA) 90 mcg/actuation inhaler Inhale 2 puffs into the lungs every 6 (six) hours as needed for Wheezing (cough). Rescue    atorvastatin (LIPITOR) 40 MG tablet Take 1 tablet (40 mg total) by mouth once daily.    celecoxib (CELEBREX) 200  MG capsule Take 1 capsule (200 mg total) by mouth 2 (two) times daily.    ergocalciferol (VITAMIN D2) 50,000 unit Cap TAKE 1 CAPSULE BY MOUTH ONE TIME PER WEEK    fluorouraciL (EFUDEX) 5 % cream Apply thin film to right cheek and temple 2times per day for 2-3 weeks; d/c if area bleeding or ulcerated; avoid eyes or mouth (Patient not taking: Reported on 5/12/2023)    gabapentin (NEURONTIN) 300 MG capsule Take 2 capsules (600 mg total) by mouth 3 (three) times daily.    imiquimod (ALDARA) 5 % cream Apply small amount to affected area on  right cheek at night  x 4 weeks; discontinue  if ulcerated or bleeding (Patient not taking: Reported on 5/12/2023)    mupirocin (BACTROBAN) 2 % ointment Apply to Affected Area as directed  twice daily (Patient not taking: Reported on 5/12/2023)    oxyCODONE (ROXICODONE) 5 MG immediate release tablet Take 1 tablet (5 mg total) by mouth every 4 (four) hours as needed for Pain. (Patient not taking: Reported on 5/12/2023)    tiotropium (SPIRIVA) 18 mcg inhalation capsule Inhale 1 capsule (18 mcg total) into the lungs once daily. Controller    upadacitinib (RINVOQ) 15 mg 24 hr tablet Take 1 tablet (15 mg total) by mouth once daily.   Last reviewed on 5/12/2023  8:44 AM by Alex Solomon LPN    Review of patient's allergies indicates:   Allergen Reactions    Plaquenil [hydroxychloroquine]      Lightheaded and muscle aches    Last reviewed on  5/10/2023 12:02 PM by Ryan Damon      Tasks added this encounter   No tasks added.   Tasks due within next 3 months   No tasks due.     Sam Caballero, PharmD  [facility]  [address] [place] [id number]  Phone: [phone]  Fax: [phone]

## 2023-05-22 ENCOUNTER — TELEPHONE (OUTPATIENT)
Dept: FAMILY MEDICINE | Facility: CLINIC | Age: 68
End: 2023-05-22

## 2023-05-22 ENCOUNTER — OFFICE VISIT (OUTPATIENT)
Dept: FAMILY MEDICINE | Facility: CLINIC | Age: 68
End: 2023-05-22
Payer: COMMERCIAL

## 2023-05-22 VITALS
HEIGHT: 66 IN | TEMPERATURE: 98 F | BODY MASS INDEX: 25.72 KG/M2 | OXYGEN SATURATION: 94 % | HEART RATE: 98 BPM | WEIGHT: 160.06 LBS | SYSTOLIC BLOOD PRESSURE: 108 MMHG | DIASTOLIC BLOOD PRESSURE: 74 MMHG

## 2023-05-22 DIAGNOSIS — K56.609 SBO (SMALL BOWEL OBSTRUCTION): Primary | ICD-10-CM

## 2023-05-22 DIAGNOSIS — M06.9 RHEUMATOID ARTHRITIS, INVOLVING UNSPECIFIED SITE, UNSPECIFIED WHETHER RHEUMATOID FACTOR PRESENT: ICD-10-CM

## 2023-05-22 DIAGNOSIS — Z00.00 ROUTINE GENERAL MEDICAL EXAMINATION AT A HEALTH CARE FACILITY: Primary | ICD-10-CM

## 2023-05-22 DIAGNOSIS — Z12.5 SCREENING FOR MALIGNANT NEOPLASM OF PROSTATE: ICD-10-CM

## 2023-05-22 DIAGNOSIS — Z79.899 OTHER LONG TERM (CURRENT) DRUG THERAPY: ICD-10-CM

## 2023-05-22 DIAGNOSIS — J43.2 CENTRILOBULAR EMPHYSEMA: ICD-10-CM

## 2023-05-22 DIAGNOSIS — G47.33 OSA (OBSTRUCTIVE SLEEP APNEA): ICD-10-CM

## 2023-05-22 DIAGNOSIS — J84.10 PULMONARY FIBROSIS: ICD-10-CM

## 2023-05-22 DIAGNOSIS — E78.5 HYPERLIPIDEMIA, UNSPECIFIED HYPERLIPIDEMIA TYPE: ICD-10-CM

## 2023-05-22 DIAGNOSIS — D84.9 IMMUNOSUPPRESSED STATUS: ICD-10-CM

## 2023-05-22 PROCEDURE — 3078F PR MOST RECENT DIASTOLIC BLOOD PRESSURE < 80 MM HG: ICD-10-PCS | Mod: CPTII,S$GLB,, | Performed by: FAMILY MEDICINE

## 2023-05-22 PROCEDURE — 3008F BODY MASS INDEX DOCD: CPT | Mod: CPTII,S$GLB,, | Performed by: FAMILY MEDICINE

## 2023-05-22 PROCEDURE — 1126F PR PAIN SEVERITY QUANTIFIED, NO PAIN PRESENT: ICD-10-PCS | Mod: CPTII,S$GLB,, | Performed by: FAMILY MEDICINE

## 2023-05-22 PROCEDURE — 1101F PR PT FALLS ASSESS DOC 0-1 FALLS W/OUT INJ PAST YR: ICD-10-PCS | Mod: CPTII,S$GLB,, | Performed by: FAMILY MEDICINE

## 2023-05-22 PROCEDURE — 3074F PR MOST RECENT SYSTOLIC BLOOD PRESSURE < 130 MM HG: ICD-10-PCS | Mod: CPTII,S$GLB,, | Performed by: FAMILY MEDICINE

## 2023-05-22 PROCEDURE — 99215 PR OFFICE/OUTPT VISIT, EST, LEVL V, 40-54 MIN: ICD-10-PCS | Mod: S$GLB,,, | Performed by: FAMILY MEDICINE

## 2023-05-22 PROCEDURE — 3074F SYST BP LT 130 MM HG: CPT | Mod: CPTII,S$GLB,, | Performed by: FAMILY MEDICINE

## 2023-05-22 PROCEDURE — 1159F MED LIST DOCD IN RCRD: CPT | Mod: CPTII,S$GLB,, | Performed by: FAMILY MEDICINE

## 2023-05-22 PROCEDURE — 1101F PT FALLS ASSESS-DOCD LE1/YR: CPT | Mod: CPTII,S$GLB,, | Performed by: FAMILY MEDICINE

## 2023-05-22 PROCEDURE — 1160F PR REVIEW ALL MEDS BY PRESCRIBER/CLIN PHARMACIST DOCUMENTED: ICD-10-PCS | Mod: CPTII,S$GLB,, | Performed by: FAMILY MEDICINE

## 2023-05-22 PROCEDURE — 1111F PR DISCHARGE MEDS RECONCILED W/ CURRENT OUTPATIENT MED LIST: ICD-10-PCS | Mod: CPTII,S$GLB,, | Performed by: FAMILY MEDICINE

## 2023-05-22 PROCEDURE — 99215 OFFICE O/P EST HI 40 MIN: CPT | Mod: S$GLB,,, | Performed by: FAMILY MEDICINE

## 2023-05-22 PROCEDURE — 3288F PR FALLS RISK ASSESSMENT DOCUMENTED: ICD-10-PCS | Mod: CPTII,S$GLB,, | Performed by: FAMILY MEDICINE

## 2023-05-22 PROCEDURE — 3078F DIAST BP <80 MM HG: CPT | Mod: CPTII,S$GLB,, | Performed by: FAMILY MEDICINE

## 2023-05-22 PROCEDURE — 1126F AMNT PAIN NOTED NONE PRSNT: CPT | Mod: CPTII,S$GLB,, | Performed by: FAMILY MEDICINE

## 2023-05-22 PROCEDURE — 1159F PR MEDICATION LIST DOCUMENTED IN MEDICAL RECORD: ICD-10-PCS | Mod: CPTII,S$GLB,, | Performed by: FAMILY MEDICINE

## 2023-05-22 PROCEDURE — 3288F FALL RISK ASSESSMENT DOCD: CPT | Mod: CPTII,S$GLB,, | Performed by: FAMILY MEDICINE

## 2023-05-22 PROCEDURE — 1160F RVW MEDS BY RX/DR IN RCRD: CPT | Mod: CPTII,S$GLB,, | Performed by: FAMILY MEDICINE

## 2023-05-22 PROCEDURE — 1111F DSCHRG MED/CURRENT MED MERGE: CPT | Mod: CPTII,S$GLB,, | Performed by: FAMILY MEDICINE

## 2023-05-22 PROCEDURE — 99999 PR PBB SHADOW E&M-EST. PATIENT-LVL IV: CPT | Mod: PBBFAC,,, | Performed by: FAMILY MEDICINE

## 2023-05-22 PROCEDURE — 99999 PR PBB SHADOW E&M-EST. PATIENT-LVL IV: ICD-10-PCS | Mod: PBBFAC,,, | Performed by: FAMILY MEDICINE

## 2023-05-22 PROCEDURE — 3008F PR BODY MASS INDEX (BMI) DOCUMENTED: ICD-10-PCS | Mod: CPTII,S$GLB,, | Performed by: FAMILY MEDICINE

## 2023-05-22 NOTE — PROGRESS NOTES
Transitional Care Note  Subjective:       Patient ID: Fabiano Garvey is a 67 y.o. male.  Chief Complaint: Hospital Follow Up    Family and/or Caretaker present at visit?  no  Diagnostic tests reviewed/disposition: I have reviewed all completed as well as pending diagnostic tests at the time of discharge.  Disease/illness education: y  Home health/community services discussion/referrals: Patient does not have home health established from hospital visit.  They do not need home health.  If needed, we will set up home health for the patient.   Establishment or re-establishment of referral orders for community resources: No other necessary community resources.   Discussion with other health care providers: No discussion with other health care providers necessary.   HPI  Review of Systems    Objective:      Physical Exam    Assessment:       1. SBO (small bowel obstruction)    2. Rheumatoid arthritis, involving unspecified site, unspecified whether rheumatoid factor present    3. Other long term (current) drug therapy    4. Pulmonary fibrosis    5. NU (obstructive sleep apnea)    6. Immunosuppressed status    7. Centrilobular emphysema        Plan:       (Portions of this note were dictated using voice recognition software and may contain dictation related errors in spelling/grammar/syntax not found on text review)    CC:   Chief Complaint   Patient presents with    Hospital Follow Up       HPI: 67 y.o. male     Hospital follow-up following SBO.  Presented with increasing bloating and abdominal pain and lack of bowel movement for a day.  No prior abdominal surgery.  + vomiting. CT showed small bowel obstruction with transition point left lower quadrant no bowel wall thickening or pneumatosis, fibrotic changes lung bases, prostatomegaly.  Was evaluated by surgery, had exploratory laparoscopy on 5/10/23 with lysis of adhesions.  postoperatively no complications. Advanced diet, now back to normal diet. Pain resolved. Had  some loose stools initially now normal bowel movements. No blood.     Medical history otherwise as below including pulm fibrosis (sees pulm), RA on Rinvoq, HLD on lipitor, lumbar DDD followed by NS. Back is doing well, still taking gabapentin. On spiriva currently, doesn't really notice much improvement on breathing but denies any acute issues. Has surgery f/u appt coming up later this week.     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    R lateral cheek       Past Surgical History:   Procedure Laterality Date    COLONOSCOPY N/A 7/25/2017    Procedure: COLONOSCOPY;  Surgeon: Bill Nicole MD;  Location: [address];  Service: Endoscopy;  Laterality: N/A;    DIAGNOSTIC LAPAROSCOPY N/A 5/10/2023    Procedure: LAPAROSCOPY, DIAGNOSTIC possible laparotomy other as indicated;  Surgeon: Eitan Palomares MD;  Location: [facility] OR;  Service: General;  Laterality: N/A;    EPIDURAL STEROID INJECTION INTO LUMBAR SPINE N/A 7/29/2021    Procedure: Injection-steroid-epidural-lumbar L5-S1;  Surgeon: Shiv Vernon Jr., MD;  Location: [facility] PAIN MGT;  Service: Pain Management;  Laterality: N/A;  oral sedation   no pacemaker     FUSION OF SPINE WITH INSTRUMENTATION Left 12/14/2022    Procedure: FUSION, SPINE, WITH INSTRUMENTATION Stg1: Left L3-5 OLiF conduit lateral cage BMP;  Surgeon: August Aguilar MD;  Location: [facility] OR;  Service: Neurosurgery;  Laterality: Left;  Procedure: Stg1: Left L3-5 OLiF conduit lateral cage BMP  Surgery Time:  2hrs  LOS: 3  Anesthesia: General  Blood: Type & Screen  Radiology: C-arm  Brace: LSO  Bed: Regular Bed  Position: Lateral Right Down  Equip    FUSION, SPINE, POSTERIOR APPROACH N/A 12/14/2022    Procedure: FUSION,SPINE,POSTERIOR APPROACH Stg 2: L3-5 posterior instrumentation viper prime, L3-5 posterolateral arthrodesis;  Surgeon: August Aguilar MD;  Location: [facility] OR;  Service: Neurosurgery;  Laterality: N/A;  Procedure: Stg 2: L3-5 posterior instrumentation viper prime, L3-5 posterolateral arthrodesis  Surgery Time: 3hrs  LOS: 3  Anesthesia: General  Blood: Type & Screen  Radiology: Bra    LYSIS OF ADHESIONS N/A 5/10/2023    Procedure: LYSIS, ADHESIONS;  Surgeon: Eitan Palomares MD;  Location: [facility] OR;  Service: General;  Laterality: N/A;    NO PAST SURGERIES      TRANSFORAMINAL EPIDURAL INJECTION OF STEROID Right 6/29/2021    Procedure: Injection,steroid,epidural,transforaminal approach-RIGHT-- L4-5, L5-S1-- (IV Sedation);  Surgeon: Shiv Vernon Jr., MD;  Location: [facility] PAIN MGT;  Service: Pain Management;  Laterality: Right;    TRANSFORAMINAL EPIDURAL INJECTION OF STEROID Left 10/27/2022    Procedure: Injection,steroid,epidural,transforaminal left L4-5 and L5-S1;  Surgeon: Shiv Vernon Jr., MD;  Location: [facility] PAIN MGT;  Service: Pain Management;  Laterality: Left;       Family History   Problem Relation Age of Onset    Heart failure Mother         60s    Coronary artery disease Father         70s    Cancer Paternal Uncle         lymphoma??    Diabetes Neg Hx     Melanoma Neg Hx        Social History     Tobacco Use    Smoking status: Former     Types: Cigarettes     Quit date: 7/12/2012     Years since quitting: 10.8     Passive exposure: Never    Smokeless tobacco: Never   Substance Use Topics    Alcohol use: Yes     Comment: occasional    Drug use: Never       Lab Results   Component Value Date    WBC 8.83 05/09/2023    HGB 14.2 05/09/2023    HCT 43.5 05/09/2023    MCV 95 05/09/2023      "05/09/2023    CHOL 139 11/12/2022    TRIG 64 11/12/2022    HDL 35 (L) 11/12/2022    ALT 18 05/09/2023    AST 25 05/09/2023    BILITOT 0.6 05/09/2023    ALKPHOS 53 (L) 05/09/2023     05/09/2023    K 4.2 05/09/2023     05/09/2023    CREATININE 1.0 05/09/2023    ESTGFRAFRICA >60 04/29/2022    EGFRNONAA >60 04/29/2022    EGFRNORACEVR >60 05/09/2023    CALCIUM 9.1 05/09/2023    ALBUMIN 3.5 05/09/2023    BUN 16 05/09/2023    CO2 24 05/09/2023    TSH 0.635 11/12/2022    PSA 0.55 06/15/2021    INR 1.0 11/22/2022    HGBA1C 5.8 (H) 06/15/2021    LDLCALC 91.2 11/12/2022     05/09/2023    GGSFBLAO88DA 35 01/06/2021                 Vital signs reviewed  Vitals:    05/22/23 1336   BP: 108/74   BP Location: Right arm   Patient Position: Sitting   BP Method: Medium (Manual)   Pulse: 98   Temp: 98 °F (36.7 °C)   TempSrc: Oral   SpO2: (!) 94%   Weight: 72.6 kg (160 lb 0.9 oz)   Height: 5' 6" (1.676 m)       Wt Readings from Last 4 Encounters:   05/22/23 72.6 kg (160 lb 0.9 oz)   05/11/23 73 kg (160 lb 14.4 oz)   03/20/23 73 kg (160 lb 15 oz)   03/15/23 73 kg (161 lb)       PE:   APPEARANCE: Well nourished, well developed, in no acute distress.    HEAD: Normocephalic, atraumatic.  EYES:    Conjunctivae noninjected.  EARS: TM's intact. Light reflex normal. No retraction or perforation  NOSE: Mucosa pink. Airway clear.  MOUTH & THROAT: No tonsillar enlargement. No pharyngeal erythema or exudate.   NECK: Supple with no cervical lymphadenopathy.    CHEST: Good inspiratory effort. Lungs clear to auscultation with no wheezes or crackles.  CARDIOVASCULAR: Normal S1, S2. No rubs, murmurs, or gallops.  ABDOMEN: Bowel sounds normal. Not distended. Soft. No tenderness or masses. No organomegaly. Incisions healing, no s/s infection.   EXTREMITIES: No edema       IMPRESSION  1. SBO (small bowel obstruction)    2. Rheumatoid arthritis, involving unspecified site, unspecified whether rheumatoid factor present    3. Other long term " (current) drug therapy    4. Pulmonary fibrosis    5. NU (obstructive sleep apnea)    6. Immunosuppressed status    7. Centrilobular emphysema            PLAN  Reviewed hospitalization summary, imaging, labs  SBO symptoms have resolved  Continue normal diet  Surgery f/u upcoming.    Other medical issues currently well controlled.    Total time spent including face-to-face time and chart review and documentation time:   40 min

## 2023-05-24 NOTE — NURSING
Pt down to xray   [Cough] : cough [Negative] : Genitourinary [Fever] : no fever [Nasal Discharge] : nasal discharge

## 2023-06-15 ENCOUNTER — LAB VISIT (OUTPATIENT)
Dept: LAB | Facility: HOSPITAL | Age: 68
End: 2023-06-15
Attending: INTERNAL MEDICINE
Payer: COMMERCIAL

## 2023-06-15 ENCOUNTER — SPECIALTY PHARMACY (OUTPATIENT)
Dept: PHARMACY | Facility: CLINIC | Age: 68
End: 2023-06-15
Payer: COMMERCIAL

## 2023-06-15 DIAGNOSIS — E78.5 HYPERLIPIDEMIA, UNSPECIFIED HYPERLIPIDEMIA TYPE: ICD-10-CM

## 2023-06-15 DIAGNOSIS — Z79.899 OTHER LONG TERM (CURRENT) DRUG THERAPY: ICD-10-CM

## 2023-06-15 DIAGNOSIS — Z00.00 ROUTINE GENERAL MEDICAL EXAMINATION AT A HEALTH CARE FACILITY: ICD-10-CM

## 2023-06-15 DIAGNOSIS — M06.9 RHEUMATOID ARTHRITIS INVOLVING MULTIPLE JOINTS: ICD-10-CM

## 2023-06-15 DIAGNOSIS — Z12.5 SCREENING FOR MALIGNANT NEOPLASM OF PROSTATE: ICD-10-CM

## 2023-06-15 LAB
25(OH)D3+25(OH)D2 SERPL-MCNC: 34 NG/ML (ref 30–96)
ALBUMIN SERPL BCP-MCNC: 3.7 G/DL (ref 3.5–5.2)
ALP SERPL-CCNC: 65 U/L (ref 55–135)
ALT SERPL W/O P-5'-P-CCNC: 22 U/L (ref 10–44)
ANION GAP SERPL CALC-SCNC: 11 MMOL/L (ref 8–16)
AST SERPL-CCNC: 27 U/L (ref 10–40)
BASOPHILS # BLD AUTO: 0.03 K/UL (ref 0–0.2)
BASOPHILS NFR BLD: 0.6 % (ref 0–1.9)
BILIRUB SERPL-MCNC: 0.4 MG/DL (ref 0.1–1)
BUN SERPL-MCNC: 13 MG/DL (ref 8–23)
CALCIUM SERPL-MCNC: 9.4 MG/DL (ref 8.7–10.5)
CHLORIDE SERPL-SCNC: 108 MMOL/L (ref 95–110)
CHOLEST SERPL-MCNC: 154 MG/DL (ref 120–199)
CHOLEST/HDLC SERPL: 3.2 {RATIO} (ref 2–5)
CO2 SERPL-SCNC: 22 MMOL/L (ref 23–29)
COMPLEXED PSA SERPL-MCNC: 0.85 NG/ML (ref 0–4)
CREAT SERPL-MCNC: 0.9 MG/DL (ref 0.5–1.4)
CRP SERPL-MCNC: 2.3 MG/L (ref 0–8.2)
DIFFERENTIAL METHOD: ABNORMAL
EOSINOPHIL # BLD AUTO: 0.1 K/UL (ref 0–0.5)
EOSINOPHIL NFR BLD: 2.2 % (ref 0–8)
ERYTHROCYTE [DISTWIDTH] IN BLOOD BY AUTOMATED COUNT: 14.6 % (ref 11.5–14.5)
ERYTHROCYTE [SEDIMENTATION RATE] IN BLOOD BY PHOTOMETRIC METHOD: 51 MM/HR (ref 0–23)
EST. GFR  (NO RACE VARIABLE): >60 ML/MIN/1.73 M^2
ESTIMATED AVG GLUCOSE: 120 MG/DL (ref 68–131)
GLUCOSE SERPL-MCNC: 93 MG/DL (ref 70–110)
HBA1C MFR BLD: 5.8 % (ref 4–5.6)
HBV CORE AB SERPL QL IA: NORMAL
HBV SURFACE AG SERPL QL IA: NORMAL
HCT VFR BLD AUTO: 44 % (ref 40–54)
HDLC SERPL-MCNC: 48 MG/DL (ref 40–75)
HDLC SERPL: 31.2 % (ref 20–50)
HGB BLD-MCNC: 14 G/DL (ref 14–18)
IMM GRANULOCYTES # BLD AUTO: 0.02 K/UL (ref 0–0.04)
IMM GRANULOCYTES NFR BLD AUTO: 0.4 % (ref 0–0.5)
LDLC SERPL CALC-MCNC: 85.4 MG/DL (ref 63–159)
LYMPHOCYTES # BLD AUTO: 1.9 K/UL (ref 1–4.8)
LYMPHOCYTES NFR BLD: 34.4 % (ref 18–48)
MCH RBC QN AUTO: 30.6 PG (ref 27–31)
MCHC RBC AUTO-ENTMCNC: 31.8 G/DL (ref 32–36)
MCV RBC AUTO: 96 FL (ref 82–98)
MONOCYTES # BLD AUTO: 0.7 K/UL (ref 0.3–1)
MONOCYTES NFR BLD: 12.7 % (ref 4–15)
NEUTROPHILS # BLD AUTO: 2.7 K/UL (ref 1.8–7.7)
NEUTROPHILS NFR BLD: 49.7 % (ref 38–73)
NONHDLC SERPL-MCNC: 106 MG/DL
NRBC BLD-RTO: 0 /100 WBC
PLATELET # BLD AUTO: 195 K/UL (ref 150–450)
PMV BLD AUTO: 11.2 FL (ref 9.2–12.9)
POTASSIUM SERPL-SCNC: 4.2 MMOL/L (ref 3.5–5.1)
PROT SERPL-MCNC: 7.1 G/DL (ref 6–8.4)
RBC # BLD AUTO: 4.57 M/UL (ref 4.6–6.2)
SODIUM SERPL-SCNC: 141 MMOL/L (ref 136–145)
TRIGL SERPL-MCNC: 103 MG/DL (ref 30–150)
TSH SERPL DL<=0.005 MIU/L-ACNC: 1.68 UIU/ML (ref 0.4–4)
WBC # BLD AUTO: 5.43 K/UL (ref 3.9–12.7)

## 2023-06-15 PROCEDURE — 36415 COLL VENOUS BLD VENIPUNCTURE: CPT | Mod: PO | Performed by: INTERNAL MEDICINE

## 2023-06-15 PROCEDURE — 84153 ASSAY OF PSA TOTAL: CPT | Performed by: FAMILY MEDICINE

## 2023-06-15 PROCEDURE — 87340 HEPATITIS B SURFACE AG IA: CPT | Performed by: INTERNAL MEDICINE

## 2023-06-15 PROCEDURE — 83036 HEMOGLOBIN GLYCOSYLATED A1C: CPT | Performed by: FAMILY MEDICINE

## 2023-06-15 PROCEDURE — 85652 RBC SED RATE AUTOMATED: CPT | Performed by: INTERNAL MEDICINE

## 2023-06-15 PROCEDURE — 80061 LIPID PANEL: CPT | Performed by: FAMILY MEDICINE

## 2023-06-15 PROCEDURE — 82306 VITAMIN D 25 HYDROXY: CPT | Performed by: INTERNAL MEDICINE

## 2023-06-15 PROCEDURE — 80053 COMPREHEN METABOLIC PANEL: CPT | Performed by: INTERNAL MEDICINE

## 2023-06-15 PROCEDURE — 85025 COMPLETE CBC W/AUTO DIFF WBC: CPT | Performed by: INTERNAL MEDICINE

## 2023-06-15 PROCEDURE — 86140 C-REACTIVE PROTEIN: CPT | Performed by: INTERNAL MEDICINE

## 2023-06-15 PROCEDURE — 84443 ASSAY THYROID STIM HORMONE: CPT | Performed by: FAMILY MEDICINE

## 2023-06-15 PROCEDURE — 86225 DNA ANTIBODY NATIVE: CPT | Performed by: INTERNAL MEDICINE

## 2023-06-15 PROCEDURE — 86704 HEP B CORE ANTIBODY TOTAL: CPT | Performed by: INTERNAL MEDICINE

## 2023-06-15 NOTE — TELEPHONE ENCOUNTER
Specialty Pharmacy - Refill Coordination    Specialty Medication Orders Linked to Encounter      Flowsheet Row Most Recent Value   Medication #1 upadacitinib (RINVOQ) 15 mg 24 hr tablet (Order#[id number], Rx#[id number])            Refill Questions - Documented Responses      Flowsheet Row Most Recent Value   Patient Availability and HIPAA Verification    Does patient want to proceed with activity? Yes   HIPAA/medical authority confirmed? Yes   Relationship to patient of person spoken to? Self   Refill Screening Questions    Changes to allergies? No   Changes to medications? No   New conditions since last clinic visit? No   Unplanned office visit, urgent care, ED, or hospital admission in the last 4 weeks? No   How does patient/caregiver feel medication is working? Good   Financial problems or insurance changes? No   How many doses of your specialty medications were missed in the last 4 weeks? 0   Would patient like to speak to a pharmacist? No   When does the patient need to receive the medication? 06/23/23   Refill Delivery Questions    How will the patient receive the medication? [facility]   When does the patient need to receive the medication? 06/23/23   Shipping Address Home   Address in [facility] confirmed and updated if neccessary? Yes   Expected Copay ($) 5   Is the patient able to afford the medication copay? Yes   Payment Method CC on file   Days supply of Refill 30   Supplies needed? No supplies needed   Refill activity completed? Yes   Refill activity plan Refill scheduled   Shipment/Pickup Date: 06/20/23            Current Outpatient Medications   Medication Sig    albuterol (VENTOLIN HFA) 90 mcg/actuation inhaler Inhale 2 puffs into the lungs every 6 (six) hours as needed for Wheezing (cough). Rescue    atorvastatin (LIPITOR) 40 MG tablet Take 1 tablet (40 mg total) by mouth once daily.    celecoxib (CELEBREX) 200 MG capsule Take 1 capsule (200 mg total) by mouth 2 (two) times daily.     ergocalciferol (VITAMIN D2) 50,000 unit Cap TAKE 1 CAPSULE BY MOUTH ONE TIME PER WEEK    fluorouraciL (EFUDEX) 5 % cream Apply thin film to right cheek and temple 2times per day for 2-3 weeks; d/c if area bleeding or ulcerated; avoid eyes or mouth    gabapentin (NEURONTIN) 300 MG capsule Take 2 capsules (600 mg total) by mouth 3 (three) times daily.    imiquimod (ALDARA) 5 % cream Apply small amount to affected area on  right cheek at night  x 4 weeks; discontinue  if ulcerated or bleeding (Patient taking differently: Apply small amount to affected area on  right cheek at night  x 4 weeks; discontinue  if ulcerated or bleeding)    mupirocin (BACTROBAN) 2 % ointment Apply to Affected Area as directed  twice daily (Patient taking differently: Apply to Affected Area as directed  twice daily)    oxyCODONE (ROXICODONE) 5 MG immediate release tablet Take 1 tablet (5 mg total) by mouth every 4 (four) hours as needed for Pain. (Patient not taking: Reported on 5/12/2023)    tiotropium (SPIRIVA) 18 mcg inhalation capsule Inhale 1 capsule (18 mcg total) into the lungs once daily. Controller    upadacitinib (RINVOQ) 15 mg 24 hr tablet Take 1 tablet (15 mg total) by mouth once daily.   Last reviewed on 5/22/2023  1:40 PM by Calin Buchanan MD    Review of patient's allergies indicates:   Allergen Reactions    Plaquenil [hydroxychloroquine]      Lightheaded and muscle aches    Last reviewed on  5/22/2023 1:40 PM by Calin Buchanan      Tasks added this encounter   No tasks added.   Tasks due within next 3 months   6/15/2023 - Refill Coordination Outreach (1 time occurrence)     Aliya Lawson [facility]  [address] [address]  Phone: [phone]  Fax: [phone]

## 2023-06-16 LAB — DSDNA AB SER-ACNC: NORMAL [IU]/ML

## 2023-06-19 ENCOUNTER — HOSPITAL ENCOUNTER (OUTPATIENT)
Dept: RADIOLOGY | Facility: HOSPITAL | Age: 68
Discharge: HOME | End: 2023-06-19
Attending: NEUROLOGICAL SURGERY
Payer: COMMERCIAL

## 2023-06-19 DIAGNOSIS — Z98.1 S/P LUMBAR SPINAL FUSION: ICD-10-CM

## 2023-06-19 PROCEDURE — 72100 XR LUMBAR SPINE AP AND LATERAL: ICD-10-PCS | Mod: 26,,, | Performed by: RADIOLOGY

## 2023-06-19 PROCEDURE — 72100 X-RAY EXAM L-S SPINE 2/3 VWS: CPT | Mod: TC,FY

## 2023-06-19 PROCEDURE — 72100 X-RAY EXAM L-S SPINE 2/3 VWS: CPT | Mod: 26,,, | Performed by: RADIOLOGY

## 2023-06-20 ENCOUNTER — OFFICE VISIT (OUTPATIENT)
Dept: NEUROSURGERY | Facility: CLINIC | Age: 68
End: 2023-06-20
Payer: COMMERCIAL

## 2023-06-20 VITALS
DIASTOLIC BLOOD PRESSURE: 72 MMHG | SYSTOLIC BLOOD PRESSURE: 105 MMHG | BODY MASS INDEX: 25.72 KG/M2 | HEIGHT: 66 IN | HEART RATE: 96 BPM | WEIGHT: 160.06 LBS

## 2023-06-20 DIAGNOSIS — Z98.1 S/P LUMBAR SPINAL FUSION: Primary | ICD-10-CM

## 2023-06-20 PROCEDURE — 3044F PR MOST RECENT HEMOGLOBIN A1C LEVEL <7.0%: ICD-10-PCS | Mod: CPTII,S$GLB,, | Performed by: PHYSICIAN ASSISTANT

## 2023-06-20 PROCEDURE — 1126F AMNT PAIN NOTED NONE PRSNT: CPT | Mod: CPTII,S$GLB,, | Performed by: PHYSICIAN ASSISTANT

## 2023-06-20 PROCEDURE — 3008F PR BODY MASS INDEX (BMI) DOCUMENTED: ICD-10-PCS | Mod: CPTII,S$GLB,, | Performed by: PHYSICIAN ASSISTANT

## 2023-06-20 PROCEDURE — 1159F PR MEDICATION LIST DOCUMENTED IN MEDICAL RECORD: ICD-10-PCS | Mod: CPTII,S$GLB,, | Performed by: PHYSICIAN ASSISTANT

## 2023-06-20 PROCEDURE — 1101F PR PT FALLS ASSESS DOC 0-1 FALLS W/OUT INJ PAST YR: ICD-10-PCS | Mod: CPTII,S$GLB,, | Performed by: PHYSICIAN ASSISTANT

## 2023-06-20 PROCEDURE — 99214 PR OFFICE/OUTPT VISIT, EST, LEVL IV, 30-39 MIN: ICD-10-PCS | Mod: S$GLB,,, | Performed by: PHYSICIAN ASSISTANT

## 2023-06-20 PROCEDURE — 3008F BODY MASS INDEX DOCD: CPT | Mod: CPTII,S$GLB,, | Performed by: PHYSICIAN ASSISTANT

## 2023-06-20 PROCEDURE — 1101F PT FALLS ASSESS-DOCD LE1/YR: CPT | Mod: CPTII,S$GLB,, | Performed by: PHYSICIAN ASSISTANT

## 2023-06-20 PROCEDURE — 1160F RVW MEDS BY RX/DR IN RCRD: CPT | Mod: CPTII,S$GLB,, | Performed by: PHYSICIAN ASSISTANT

## 2023-06-20 PROCEDURE — 99999 PR PBB SHADOW E&M-EST. PATIENT-LVL IV: CPT | Mod: PBBFAC,,, | Performed by: PHYSICIAN ASSISTANT

## 2023-06-20 PROCEDURE — 3074F SYST BP LT 130 MM HG: CPT | Mod: CPTII,S$GLB,, | Performed by: PHYSICIAN ASSISTANT

## 2023-06-20 PROCEDURE — 1159F MED LIST DOCD IN RCRD: CPT | Mod: CPTII,S$GLB,, | Performed by: PHYSICIAN ASSISTANT

## 2023-06-20 PROCEDURE — 3288F PR FALLS RISK ASSESSMENT DOCUMENTED: ICD-10-PCS | Mod: CPTII,S$GLB,, | Performed by: PHYSICIAN ASSISTANT

## 2023-06-20 PROCEDURE — 1160F PR REVIEW ALL MEDS BY PRESCRIBER/CLIN PHARMACIST DOCUMENTED: ICD-10-PCS | Mod: CPTII,S$GLB,, | Performed by: PHYSICIAN ASSISTANT

## 2023-06-20 PROCEDURE — 99214 OFFICE O/P EST MOD 30 MIN: CPT | Mod: S$GLB,,, | Performed by: PHYSICIAN ASSISTANT

## 2023-06-20 PROCEDURE — 99999 PR PBB SHADOW E&M-EST. PATIENT-LVL IV: ICD-10-PCS | Mod: PBBFAC,,, | Performed by: PHYSICIAN ASSISTANT

## 2023-06-20 PROCEDURE — 3078F DIAST BP <80 MM HG: CPT | Mod: CPTII,S$GLB,, | Performed by: PHYSICIAN ASSISTANT

## 2023-06-20 PROCEDURE — 3044F HG A1C LEVEL LT 7.0%: CPT | Mod: CPTII,S$GLB,, | Performed by: PHYSICIAN ASSISTANT

## 2023-06-20 PROCEDURE — 1126F PR PAIN SEVERITY QUANTIFIED, NO PAIN PRESENT: ICD-10-PCS | Mod: CPTII,S$GLB,, | Performed by: PHYSICIAN ASSISTANT

## 2023-06-20 PROCEDURE — 3288F FALL RISK ASSESSMENT DOCD: CPT | Mod: CPTII,S$GLB,, | Performed by: PHYSICIAN ASSISTANT

## 2023-06-20 PROCEDURE — 3078F PR MOST RECENT DIASTOLIC BLOOD PRESSURE < 80 MM HG: ICD-10-PCS | Mod: CPTII,S$GLB,, | Performed by: PHYSICIAN ASSISTANT

## 2023-06-20 PROCEDURE — 3074F PR MOST RECENT SYSTOLIC BLOOD PRESSURE < 130 MM HG: ICD-10-PCS | Mod: CPTII,S$GLB,, | Performed by: PHYSICIAN ASSISTANT

## 2023-06-20 NOTE — TELEPHONE ENCOUNTER
"Spoke with pt- Rinvoq Rx on file set to deliver tomorrow has been D/C by provider with note "alternative therapy." Pt is not aware of alternative therapy plans.  Messaging provider to clarify. Pt aware of [facility] f/u.   "

## 2023-06-21 ENCOUNTER — PATIENT MESSAGE (OUTPATIENT)
Dept: RHEUMATOLOGY | Facility: CLINIC | Age: 68
End: 2023-06-21
Payer: COMMERCIAL

## 2023-06-21 PROBLEM — Z98.1 S/P LUMBAR SPINAL FUSION: Status: ACTIVE | Noted: 2023-06-21

## 2023-06-21 NOTE — TELEPHONE ENCOUNTER
MD ONEAL/c Rinvoq due to recent SBO.  Pt needs to call [facility] to schedule f/u apt to discuss alt therapy. Pt advised. He will call provider's office.

## 2023-06-21 NOTE — PROGRESS NOTES
"Subjective:     Patient ID:  Fabiano Garvey is a 67 y.o. male.    Jeff    Chief Complaint: 6 month po fu    Date of surgery 12/14/2022     Preop diagnosis   1. L4-5 degenerative spondylolisthesis with spinal stenosis and radiculopathy  2. L3-4 degenerative disc disease with spinal stenosis and radiculopathy     Postop diagnosis   Same     Surgery   1. Left L3-4, L4-5 oblique interbody fusion, placement of interbody spacer, DePuy-Synthes Conduit cage filled with DBM  2. Posterior L3 through L5 segmental instrumentation using DePTM3 Software Viper Prime system  3. Posterolateral arthrodesis at L3-4, L4-5 with autograft harvested from the same incisions facets fusion  4. Fluoroscopy  5. neuro monitoring using SSEP and EMG     Surgeon   August Aguilar MD       HPI    Fabiano Garvey is a 67 y.o. male who presents for follow up.  He is doing well and has some pain in his low back that comes and goes if he is too active.  He has paresthesias in his left foot.  He tried to wean off the gabapentin but once he did that the left foot paresthesias increased.  He is now down to one pill twice a day.  He used to take 2 pills 3 times a day.  No leg pain or paresthesias.    Patient denies any recent accidents or trauma, no saddle anesthesias, and no bowel or bladder incontinence.      Review of Systems:  Constitution: Negative for chills, fever, night sweats and weight loss.   Musculoskeletal: Negative for falls.   Gastrointestinal: Negative for bowel incontinence, nausea and vomiting.   Genitourinary: Negative for bladder incontinence.   Neurological: Negative for disturbances in coordination and loss of balance.      Objective:      Vitals:    06/20/23 1413   BP: 105/72   Pulse: 96   Weight: 72.6 kg (160 lb 0.9 oz)   Height: 5' 6" (1.676 m)   PainSc: 0-No pain         Physical Exam: exam done in chair      General:  Fabiano Garvey is well-developed, well-nourished, appears stated age, in no acute distress, alert and oriented to " person, place, and time.    Pulmonary/Chest:  Respiratory effort normal  Abdominal: Exhibits no distension  Psychiatric:  Normal mood and affect.  Behavior is normal.  Judgement and thought content normal      Musculoskeletal:    Lumbar Spine Inspection:  Healed surgical scars with no visible rashes.    Neurological:  Alert and oriented to person, place, and time    Muscle strength against resistance:     Right Left   Hip flexion  5 / 5 5 / 5   Hip extension 5 / 5 5 / 5   Hip abduction 5 / 5 5 / 5   Hip adduction  5 / 5 5 / 5   Knee extension  5 / 5 5 / 5   Knee flexion 5 / 5 5 / 5   Dorsiflexion  5 / 5 5 / 5   EHL  5 / 5 5 / 5   Plantar flexion  5 / 5 5 / 5   Inversion of the feet 5 / 5 5 / 5   Eversion of the feet  5 / 5 5 / 5       On gross examination of the bilateral upper extremities, patient has full painfree ROM with no signs of clubbing, cyanosis, edema, or weakness.       XRAY Interpretation:     Lumbar spine xrays were personally reviewed today.  No fractures.  Hardware intact.      Assessment:          1. S/P lumbar spinal fusion            Plan:          Orders Placed This Encounter    X-Ray Scoliosis Complete       Patient doing well  Would continue gabapentin for his foot  FU in 6 months with Dr. Aguilar with scoliosis xrays for 1 year po fu    Follow-Up:  Follow up in about 6 months (around 12/20/2023). If there are any questions prior to this, the patient was instructed to contact the office.       Kelsy Leslie [facility], PA-C  Neurosurgery  [facility]  06/21/2023

## 2023-06-26 ENCOUNTER — PATIENT MESSAGE (OUTPATIENT)
Dept: RHEUMATOLOGY | Facility: CLINIC | Age: 68
End: 2023-06-26
Payer: COMMERCIAL

## 2023-06-26 DIAGNOSIS — M06.9 RHEUMATOID ARTHRITIS INVOLVING MULTIPLE JOINTS: Primary | ICD-10-CM

## 2023-06-26 RX ORDER — ABATACEPT 125 MG/ML
125 INJECTION, SOLUTION SUBCUTANEOUS WEEKLY
Qty: 4 ML | Refills: 11 | Status: SHIPPED | OUTPATIENT
Start: 2023-06-26 | End: 2023-07-03 | Stop reason: ALTCHOICE

## 2023-07-03 ENCOUNTER — OFFICE VISIT (OUTPATIENT)
Dept: RHEUMATOLOGY | Facility: CLINIC | Age: 68
End: 2023-07-03
Payer: COMMERCIAL

## 2023-07-03 DIAGNOSIS — M06.9 RHEUMATOID ARTHRITIS INVOLVING MULTIPLE JOINTS: Primary | ICD-10-CM

## 2023-07-03 DIAGNOSIS — J84.9 ILD (INTERSTITIAL LUNG DISEASE): ICD-10-CM

## 2023-07-03 PROCEDURE — 99214 PR OFFICE/OUTPT VISIT, EST, LEVL IV, 30-39 MIN: ICD-10-PCS | Mod: 95,,, | Performed by: INTERNAL MEDICINE

## 2023-07-03 PROCEDURE — 99214 OFFICE O/P EST MOD 30 MIN: CPT | Mod: 95,,, | Performed by: INTERNAL MEDICINE

## 2023-07-03 PROCEDURE — 3044F PR MOST RECENT HEMOGLOBIN A1C LEVEL <7.0%: ICD-10-PCS | Mod: CPTII,95,, | Performed by: INTERNAL MEDICINE

## 2023-07-03 PROCEDURE — 3044F HG A1C LEVEL LT 7.0%: CPT | Mod: CPTII,95,, | Performed by: INTERNAL MEDICINE

## 2023-07-03 RX ORDER — LEFLUNOMIDE 10 MG/1
10 TABLET ORAL DAILY
Qty: 30 TABLET | Refills: 0 | Status: ACTIVE | OUTPATIENT
Start: 2023-07-03 | End: 2023-08-02 | Stop reason: SDUPTHER

## 2023-07-03 NOTE — PROGRESS NOTES
Rapid3 Question Responses and Scores 7/1/2023   MDHAQ Score 0.2   Psychologic Score 1.1   Pain Score 0   When you awakened in the morning OVER THE LAST WEEK, did you feel stiff? No   Fatigue Score 0   Global Health Score 0.5   RAPID3 Score 0.39     Answers submitted by the patient for this visit:  Rheumatology Questionnaire (Submitted on 7/1/2023)  fever: No  eye redness: No  mouth sores: No  headaches: No  shortness of breath: Yes  chest pain: No  trouble swallowing: No  diarrhea: No  constipation: No  unexpected weight change: No  genital sore: No  During the last 3 days, have you had a skin rash?: No  Bruises or bleeds easily: Yes  cough: Yes

## 2023-07-03 NOTE — PROGRESS NOTES
Subjective:       Patient ID: Fabiano Garvey is a 60 y.o. male.    Chief Complaint: OTHER and Swelling    HPI     61 yo male with PMH of basal cell cancer (around 2010), HL here for evaluation of join pain.  Reports pain in hands, elbows. and wrists.  Reports  swelling in hands, ankles, and feet.  Reports also has bilateral knee pain and shoulders.   Reports stiffness in morning for 30 minutes.  He has trouble swelling. Denies any rashes. No oral ulcers. No hair loss. Denies photosensitivity.  Denies any raynauds.Denies blood clots.  Denies dry eyes and dry mouth.  Reports symptoms for a year. In November, he noted the nodules in elbows.  Thinks he has swelling in lower neck.  He has trouble picking up things due to pain and swelling.  He reports that prednisone improves h is pain and swelling.  He has been off steroids.  No changes in weight.  Reports good appetite.      Interval history: he had bowel obstruction May 8 s/p repair.   He is on rinvoq. Denies any flares. On occasion, he has cough.Denies shortness of breath. He used to smoke. H. Denies any reflux. Denies any pain or swelling in joints.  Reports stiffness for 10 minutes.     Past Medical History   Diagnosis Date    Hyperlipidemia     Basal cell carcinoma      medial canthus       Review of Systems   Constitutional: Negative for fever, chills, appetite change and fatigue.   HENT: Negative for hearing loss, mouth sores, rhinorrhea, sinus pressure and trouble swallowing.    Eyes: Negative for photophobia, pain, discharge, itching and visual disturbance.   Respiratory:  Negative for cough, choking, chest tightness, wheezing and stridor.    Cardiovascular: Negative for chest pain and palpitations.   Gastrointestinal: Negative for blood in stool and abdominal distention.   Endocrine: Negative for cold intolerance and heat intolerance.   Genitourinary: Negative for dysuria, hematuria and flank pain.   Musculoskeletal: Positive for myalgias, back pain, joint  swelling, arthralgias.  Skin: Negative for color change, pallor and rash.   Neurological: Negative for dizziness, light-headedness, numbness and headaches.   Hematological: Negative for adenopathy. Does not bruise/bleed easily.   Psychiatric/Behavioral: Negative for decreased concentration and agitation. The patient is not nervous/anxious.            Objective:        Physical Exam   Constitutional: He is oriented to person, place, and time and well-developed, well-nourished, and in no distress. No distress.   HENT:   Head: Normocephalic and atraumatic.   Right Ear: External ear normal.   Left Ear: External ear normal.   Nose: Nose normal.   Mouth/Throat: Oropharynx is clear and moist. No oropharyngeal exudate.   Eyes: Conjunctivae and EOM are normal. Pupils are equal, round, and reactive to light. Right eye exhibits no discharge. Left eye exhibits no discharge. No scleral icterus.   Neck: Normal range of motion. Neck supple. No JVD present. No tracheal deviation present. No thyromegaly present.   Cardiovascular: Normal rate, regular rhythm and intact distal pulses.  Exam reveals no gallop and no friction rub.    No murmur heard.  Pulmonary/Chest: Effort normal and breath sounds normal. No stridor. No respiratory distress. He has no wheezes. He has no rales. He exhibits no tenderness.   Abdominal: Soft. Bowel sounds are normal. He exhibits no distension. There is no tenderness. There is no rebound.   Lymphadenopathy:     He has no cervical adenopathy.   Neurological: He is alert and oriented to person, place, and time.   Skin: Skin is warm. He is not diaphoretic.     Musculoskeletal:   Shoulders- decreased ROM of both shoulders to 130 degrees bilaterally (resoved)  Elbows- rheumatoid nodules in extensor surfaces bilaterally; mild restriction in extension of right elbow  Wrist- synovitis with decreased extension bilaterally (resolved)  Hands- synovitis of mcps on right side, worse on the right  Knees-bony hypertrophy  but no effusions or tenderness; crepitus  Ankles- no tenderness  Feet-bilaterally mtp tenderness resolved           Labs:  Robel: 1:320  dna-1:160<160  Esr-61<44   ccp-306  rf-876  Ro,la-negative  Nicole, rnp -negtayive   Hepatitis B-negative  Hepatitis C-negative  Urine-negative      Imaging:  I personally reviewed the xrays      CT chest (2020): I personally reviewed; Findings compatible with interstitial lung disease including UIP.  Findings appear grossly similar compared to prior examination dated 03/25/2020.     Stable pulmonary nodule within the inferior lingula measuring approximately 0.5 cm.  No new pulmonary nodule or mass.     Centrilobular and paraseptal emphysematous changes with an upper lobe predominance.     Coronary artery atherosclerosis.       Arthritis survey:      12/2015: hand x rays- no erosions  12/2015: feet xray: no erosions    Chest xray (2/2016)-negative      Assessment:     66 yo male with PMH of  Squamous cell carcinoma (2010, 2020), HL here for  Follow up of  Seropositive RA.  He has seropositive RA with nodules. He also had serologies consistent with early SLE given (+ROBEL and +DNA) with no other features of  SLE.  Tried plaquenil but reports it gave him dizziness after a few doses.  Regarding his arthritis, he was doing well on MTX 6 pills a week but developed cough. Had  CT chest concerning for UIP.  Given his severe RA,  I put him on rinvoq with improvement but he recently had SBO.  I told him given risk of intestinal perforation with rinvoq, I recommend switching him to Rituxan.  Patient not interested in starting infusions at this time, so will try ARAVA.  Plan:    -start Arava 10mg po qday (Risks and benefits of initiating MTX discussed. Patient aware that risks include and not limited to liver abnormalities, cell count abnormalities, malignancy, and allergic  reaction to medication. Patient agrees with starting medication.)  Labs in 4 weeks    * - follow up with  dermatology  "evaluation given history of skin cancer  --stop rinvoq 15 mg po q day  -continue folic acid 1 mg po qday  Off MTX given UIP  Labs xrays    given history of +DNA  Avoid anti tnf  Avoid orencia given emphysema on CT SCAN    # vitamin D deficiency: Continue vit D once a week   labs  Before next visit      # lower back pain: appears radicular. Discussed doing xray but he declines.    -continue baclofen 10mg  ( take one to two tablets at bedtime)      #UIP:  Had recent Findings compatible with interstitial lung disease including UIP.  Findings appear grossly similar compared to prior examination dated 03/25/2020.   "Stable pulmonary nodule within the inferior lingula measuring approximately 0.5 cm.  No new pulmonary nodule or mass.Centrilobular and paraseptal emphysematous changes with an upper lobe predominance.   Coronary artery atherosclerosis." I have asked patient that it is critical he     Needs to follow up with  Pulmonary and stressed importance again at last visit and today.    The patient location is: home  The chief complaint leading to consultation is: joint pain    Visit type: audiovisual    Face to Face time with patient: 30   minutes of total time spent on the encounter, which includes face to face time and non-face to face time preparing to see the patient (eg, review of tests), Obtaining and/or reviewing separately obtained history, Documenting clinical information in the electronic or other health record, Independently interpreting results (not separately reported) and communicating results to the patient/family/caregiver, or Care coordination (not separately reported).         Each patient to whom he or she provides medical services by telemedicine is:  (1) informed of the relationship between the physician and patient and the respective role of any other health care provider with respect to management of the patient; and (2) notified that he or she may decline to receive medical services by " telemedicine and may withdraw from such care at any time.    Notes:

## 2023-07-05 ENCOUNTER — PATIENT MESSAGE (OUTPATIENT)
Dept: FAMILY MEDICINE | Facility: CLINIC | Age: 68
End: 2023-07-05
Payer: COMMERCIAL

## 2023-07-07 ENCOUNTER — SPECIALTY PHARMACY (OUTPATIENT)
Dept: PHARMACY | Facility: CLINIC | Age: 68
End: 2023-07-07
Payer: COMMERCIAL

## 2023-07-07 ENCOUNTER — TELEPHONE (OUTPATIENT)
Dept: PHARMACY | Facility: CLINIC | Age: 68
End: 2023-07-07
Payer: COMMERCIAL

## 2023-07-07 NOTE — TELEPHONE ENCOUNTER
Marlene, this is Sam Caballero, clinical pharmacist with [facility] that is part of your care team.  We have begun working on your prescription that your doctor has sent us. Our next steps include:     Working with your insurance company to obtain approval for your medication  Working with you to ensure your medication is affordable     We will be calling you along the way with updates on your medication but if you have any concerns or receive information that you would like to discuss please reach us at [phone].    Welcome call outcome: Patient/caregiver reached

## 2023-07-07 NOTE — TELEPHONE ENCOUNTER
Specialty Pharmacy - Initial Clinical Assessment    Specialty Medication Orders Linked to Encounter      Flowsheet Row Most Recent Value   Medication #1 leflunomide (ARAVA) 10 MG Tab (Order#[id number], Rx#[id number])          Patient Diagnosis   M06.9 - Rheumatoid arthritis    Subjective    Fabiano Garvey is a 67 y.o. male, who is followed by the specialty pharmacy service for management and education.    Recent Encounters       Date Type Provider Description    07/07/2023 Specialty Pharmacy Nisreen Tapia PharmD Initial Clinical Assessment    07/07/2023 Specialty Pharmacy Nisreen Tapia PharmD Referral Authorization    06/15/2023 Specialty Pharmacy Aliya Garcia Refill Coordination    05/19/2023 Specialty Pharmacy Sam Caballero PharmD Refill Coordination    04/17/2023 Specialty Pharmacy Nisreen Tapia PharmD Referral Authorization; Follow-up Clinical Reassessment; Refill Coordination            Current Outpatient Medications   Medication Sig    albuterol (VENTOLIN HFA) 90 mcg/actuation inhaler Inhale 2 puffs into the lungs every 6 (six) hours as needed for Wheezing (cough). Rescue    atorvastatin (LIPITOR) 40 MG tablet Take 1 tablet (40 mg total) by mouth once daily.    celecoxib (CELEBREX) 200 MG capsule Take 1 capsule (200 mg total) by mouth 2 (two) times daily.    ergocalciferol (VITAMIN D2) 50,000 unit Cap TAKE 1 CAPSULE BY MOUTH ONE TIME PER WEEK    fluorouraciL (EFUDEX) 5 % cream Apply thin film to right cheek and temple 2times per day for 2-3 weeks; d/c if area bleeding or ulcerated; avoid eyes or mouth    gabapentin (NEURONTIN) 300 MG capsule Take 2 capsules (600 mg total) by mouth 3 (three) times daily.    imiquimod (ALDARA) 5 % cream Apply small amount to affected area on  right cheek at night  x 4 weeks; discontinue  if ulcerated or bleeding (Patient taking differently: Apply small amount to affected area on  right cheek at night  x 4 weeks; discontinue  if ulcerated or bleeding)    leflunomide  (ARAVA) 10 MG Tab Take 1 tablet (10 mg total) by mouth once daily.    mupirocin (BACTROBAN) 2 % ointment Apply to Affected Area as directed  twice daily (Patient taking differently: Apply to Affected Area as directed  twice daily)    oxyCODONE (ROXICODONE) 5 MG immediate release tablet Take 1 tablet (5 mg total) by mouth every 4 (four) hours as needed for Pain. (Patient not taking: Reported on 5/12/2023)    tiotropium (SPIRIVA) 18 mcg inhalation capsule Inhale 1 capsule (18 mcg total) into the lungs once daily. Controller   Last reviewed on 7/7/2023  3:31 PM by Nisreen Sanon, PharmD    Review of patient's allergies indicates:   Allergen Reactions    Plaquenil [hydroxychloroquine]      Lightheaded and muscle aches   Last reviewed on  7/7/2023 3:30 PM by Nisreen Sanon    Drug Interactions    Drug interactions evaluated: yes  Clinically relevant drug interactions identified: no  Provided the patient with educational material regarding drug interactions: not applicable           Assessment Questions - Documented Responses      Flowsheet Row Most Recent Value   Assessment    Medication Reconciliation completed for patient Yes   During the past 4 weeks, has patient missed any activities due to condition or medication? No   During the past 4 weeks, did patient have any of the following urgent care visits? None   Goals of Therapy Status Discussed (new start)   Status of the patients ability to self-administer: Is Able   All education points have been covered with patient? Yes, supplemental printed education provided   Welcome packet contents reviewed and discussed with patient? Yes   Assesment completed? Yes   Plan Therapy being initiated   Do you need to open a clinical intervention (i-vent)? No   Do you want to schedule first shipment? Yes   Medication #1 Assessment Info    Patient status New medication, Exisiting to [facility]   Is this medication appropriate for the patient? Yes   Is this medication effective? Not yet started  "         Refill Questions - Documented Responses      Flowsheet Row Most Recent Value   Patient Availability and HIPAA Verification    Does patient want to proceed with activity? Yes   HIPAA/medical authority confirmed? Yes   Relationship to patient of person spoken to? Spouse/Significant Other   Refill Screening Questions    When does the patient need to receive the medication? 07/07/23   Refill Delivery Questions    How will the patient receive the medication? Pickup   When does the patient need to receive the medication? 07/07/23   Expected Copay ($) 22.27   Is the patient able to afford the medication copay? Yes   Payment Method at pickup   Days supply of Refill 30   Supplies needed? No supplies needed   Refill activity completed? 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    Tried/failed medications:   Rinvoq- stopped due to bowel obstruction   MTX stopped due to concern for UIP  BP Readings from Last 4 Encounters:   06/20/23 105/72   05/22/23 108/74   05/11/23 109/71   03/15/23 124/87     Ht Readings from Last 4 Encounters:   06/20/23 5' 6" (1.676 m)   05/22/23 5' 6" (1.676 m)   05/09/23 5' 6" (1.676 m)   03/20/23 5' 6" (1.676 m)     Wt Readings from Last 4 Encounters:   06/20/23 72.6 kg (160 lb 0.9 oz)   05/22/23 72.6 kg (160 lb 0.9 oz)   05/11/23 73 kg (160 lb 14.4 oz)   03/20/23 73 kg (160 lb 15 oz)       The goals of rheumatoid arthritis treatment include:  Relieving pain and suppressing inflammation  Achieving remission and preventing joint and " organ damage  Increasing joint mobility and strength  Preventing infection and other complications of treatment  Reducing long term complications of rheumatoid arthritis  Improving or maintaining physical function and optimal well-being  Improving or maintaining quality of life  Maintaining optimal therapy adherence  Minimizing and managing side effects    Goals of Therapy Status: Discussed (new start)    Assessment/Plan  Patient plans to start therapy on 07/07/23  Pt's wife declined completing PROMIS-10 today.      Indication, dosage, appropriateness, effectiveness, safety and convenience of his specialty medication(s) were reviewed today.     Patient Education   Patient received education on the following:   Expectations and possible outcomes of therapy  Proper use, timely administration, and missed dose management  Duration of therapy  Side effects, including prevention, minimization, and management  Contraindications and safety precautions  New or changed medications, including prescribe and over the counter medications and supplements  Reviews recommended vaccinations, as appropriate  Storage, safe handling, and disposal      Tasks added this encounter   7/8/2023 - Pickup Reminder   Tasks due within next 3 months   No tasks due.     Nisreen Tapia, PharmD  [facility]  [address] [facility] [address]  Phone: [phone]  Fax: [phone]

## 2023-07-10 NOTE — TELEPHONE ENCOUNTER
Patient called and said that he will pickup the initial fill but would prefer delivery for future fills. Patient's wife thought he would be able to  from the [facility] pharmacy.

## 2023-07-19 RX ORDER — ERGOCALCIFEROL 1.25 MG/1
CAPSULE ORAL
Qty: 12 CAPSULE | Refills: 3 | Status: SHIPPED | OUTPATIENT
Start: 2023-07-19 | End: 2024-02-27

## 2023-08-04 DIAGNOSIS — M06.9 RHEUMATOID ARTHRITIS INVOLVING MULTIPLE JOINTS: Primary | ICD-10-CM

## 2023-08-04 RX ORDER — LEFLUNOMIDE 10 MG/1
10 TABLET ORAL DAILY
Qty: 30 TABLET | Refills: 0 | Status: ACTIVE | OUTPATIENT
Start: 2023-08-04 | End: 2023-09-05 | Stop reason: SDUPTHER

## 2023-08-07 ENCOUNTER — SPECIALTY PHARMACY (OUTPATIENT)
Dept: PHARMACY | Facility: CLINIC | Age: 68
End: 2023-08-07
Payer: COMMERCIAL

## 2023-08-07 NOTE — TELEPHONE ENCOUNTER
Specialty Pharmacy - Refill Coordination    Specialty Medication Orders Linked to Encounter      Flowsheet Row Most Recent Value   Medication #1 leflunomide (ARAVA) 10 MG Tab (Order#[id number], Rx#[id number])            Refill Questions - Documented Responses      Flowsheet Row Most Recent Value   Patient Availability and HIPAA Verification    Does patient want to proceed with activity? Yes   HIPAA/medical authority confirmed? Yes   Relationship to patient of person spoken to? Self   Refill Screening Questions    When does the patient need to receive the medication? 08/12/23   Refill Delivery Questions    How will the patient receive the medication? MED[facility]   When does the patient need to receive the medication? 08/12/23   Shipping Address Home   Address in [facility] confirmed and updated if neccessary? Yes   Expected Copay ($) 22.27   Is the patient able to afford the medication copay? Yes   Payment Method CC on file   Days supply of Refill 30   Supplies needed? No supplies needed   Refill activity completed? Yes   Refill activity plan Refill scheduled   Shipment/Pickup Date: 08/08/23            Current Outpatient Medications   Medication Sig    albuterol (VENTOLIN HFA) 90 mcg/actuation inhaler Inhale 2 puffs into the lungs every 6 (six) hours as needed for Wheezing (cough). Rescue    atorvastatin (LIPITOR) 40 MG tablet Take 1 tablet (40 mg total) by mouth once daily.    celecoxib (CELEBREX) 200 MG capsule Take 1 capsule (200 mg total) by mouth 2 (two) times daily.    ergocalciferol (VITAMIN D2) 50,000 unit Cap TAKE 1 CAPSULE BY MOUTH ONE TIME PER WEEK    fluorouraciL (EFUDEX) 5 % cream Apply thin film to right cheek and temple 2times per day for 2-3 weeks; d/c if area bleeding or ulcerated; avoid eyes or mouth    gabapentin (NEURONTIN) 300 MG capsule Take 2 capsules (600 mg total) by mouth 3 (three) times daily.    imiquimod (ALDARA) 5 % cream Apply small amount to affected area on  right cheek at night  x 4  weeks; discontinue  if ulcerated or bleeding (Patient taking differently: Apply small amount to affected area on  right cheek at night  x 4 weeks; discontinue  if ulcerated or bleeding)    leflunomide (ARAVA) 10 MG Tab Take 1 tablet (10 mg total) by mouth once daily.    mupirocin (BACTROBAN) 2 % ointment Apply to Affected Area as directed  twice daily (Patient taking differently: Apply to Affected Area as directed  twice daily)    oxyCODONE (ROXICODONE) 5 MG immediate release tablet Take 1 tablet (5 mg total) by mouth every 4 (four) hours as needed for Pain. (Patient not taking: Reported on 5/12/2023)    tiotropium (SPIRIVA) 18 mcg inhalation capsule Inhale 1 capsule (18 mcg total) into the lungs once daily. Controller   Last reviewed on 7/7/2023  3:31 PM by Nisreen Sanon, PharmD    Review of patient's allergies indicates:   Allergen Reactions    Plaquenil [hydroxychloroquine]      Lightheaded and muscle aches    Last reviewed on  7/7/2023 3:30 PM by Nisreen Sanon      Tasks added this encounter   No tasks added.   Tasks due within next 3 months   8/5/2023 - Refill Coordination Outreach (1 time occurrence)     [facility]  [address]  [facility] [address]  Phone: [phone]  Fax: [phone]

## 2023-08-11 ENCOUNTER — LAB VISIT (OUTPATIENT)
Dept: LAB | Facility: HOSPITAL | Age: 68
End: 2023-08-11
Attending: INTERNAL MEDICINE
Payer: COMMERCIAL

## 2023-08-11 DIAGNOSIS — M06.9 RHEUMATOID ARTHRITIS INVOLVING MULTIPLE JOINTS: ICD-10-CM

## 2023-08-11 LAB
BASOPHILS # BLD AUTO: 0.08 K/UL (ref 0–0.2)
BASOPHILS NFR BLD: 0.9 % (ref 0–1.9)
DIFFERENTIAL METHOD: ABNORMAL
EOSINOPHIL # BLD AUTO: 0.2 K/UL (ref 0–0.5)
EOSINOPHIL NFR BLD: 1.9 % (ref 0–8)
ERYTHROCYTE [DISTWIDTH] IN BLOOD BY AUTOMATED COUNT: 13.8 % (ref 11.5–14.5)
ERYTHROCYTE [SEDIMENTATION RATE] IN BLOOD BY PHOTOMETRIC METHOD: 76 MM/HR (ref 0–23)
HCT VFR BLD AUTO: 44.6 % (ref 40–54)
HGB BLD-MCNC: 14.1 G/DL (ref 14–18)
IMM GRANULOCYTES # BLD AUTO: 0.01 K/UL (ref 0–0.04)
IMM GRANULOCYTES NFR BLD AUTO: 0.1 % (ref 0–0.5)
LYMPHOCYTES # BLD AUTO: 1.9 K/UL (ref 1–4.8)
LYMPHOCYTES NFR BLD: 20.4 % (ref 18–48)
MCH RBC QN AUTO: 30.5 PG (ref 27–31)
MCHC RBC AUTO-ENTMCNC: 31.6 G/DL (ref 32–36)
MCV RBC AUTO: 97 FL (ref 82–98)
MONOCYTES # BLD AUTO: 0.7 K/UL (ref 0.3–1)
MONOCYTES NFR BLD: 8 % (ref 4–15)
NEUTROPHILS # BLD AUTO: 6.3 K/UL (ref 1.8–7.7)
NEUTROPHILS NFR BLD: 68.7 % (ref 38–73)
NRBC BLD-RTO: 0 /100 WBC
PLATELET # BLD AUTO: 207 K/UL (ref 150–450)
PMV BLD AUTO: 10.5 FL (ref 9.2–12.9)
RBC # BLD AUTO: 4.62 M/UL (ref 4.6–6.2)
WBC # BLD AUTO: 9.17 K/UL (ref 3.9–12.7)

## 2023-08-11 PROCEDURE — 85652 RBC SED RATE AUTOMATED: CPT | Performed by: INTERNAL MEDICINE

## 2023-08-11 PROCEDURE — 80053 COMPREHEN METABOLIC PANEL: CPT | Performed by: INTERNAL MEDICINE

## 2023-08-11 PROCEDURE — 36415 COLL VENOUS BLD VENIPUNCTURE: CPT | Mod: PO | Performed by: INTERNAL MEDICINE

## 2023-08-11 PROCEDURE — 85025 COMPLETE CBC W/AUTO DIFF WBC: CPT | Performed by: INTERNAL MEDICINE

## 2023-08-11 PROCEDURE — 86140 C-REACTIVE PROTEIN: CPT | Performed by: INTERNAL MEDICINE

## 2023-08-12 LAB
ALBUMIN SERPL BCP-MCNC: 3.3 G/DL (ref 3.5–5.2)
ALP SERPL-CCNC: 74 U/L (ref 55–135)
ALT SERPL W/O P-5'-P-CCNC: 22 U/L (ref 10–44)
ANION GAP SERPL CALC-SCNC: 9 MMOL/L (ref 8–16)
AST SERPL-CCNC: 24 U/L (ref 10–40)
BILIRUB SERPL-MCNC: 0.3 MG/DL (ref 0.1–1)
BUN SERPL-MCNC: 15 MG/DL (ref 8–23)
CALCIUM SERPL-MCNC: 9.4 MG/DL (ref 8.7–10.5)
CHLORIDE SERPL-SCNC: 109 MMOL/L (ref 95–110)
CO2 SERPL-SCNC: 24 MMOL/L (ref 23–29)
CREAT SERPL-MCNC: 0.9 MG/DL (ref 0.5–1.4)
CRP SERPL-MCNC: 3.3 MG/L (ref 0–8.2)
EST. GFR  (NO RACE VARIABLE): >60 ML/MIN/1.73 M^2
GLUCOSE SERPL-MCNC: 83 MG/DL (ref 70–110)
POTASSIUM SERPL-SCNC: 4.2 MMOL/L (ref 3.5–5.1)
PROT SERPL-MCNC: 7.7 G/DL (ref 6–8.4)
SODIUM SERPL-SCNC: 142 MMOL/L (ref 136–145)

## 2023-08-16 RX ORDER — GABAPENTIN 300 MG/1
600 CAPSULE ORAL 3 TIMES DAILY
Qty: 180 CAPSULE | Refills: 11 | Status: SHIPPED | OUTPATIENT
Start: 2023-08-16

## 2023-09-05 RX ORDER — TIOTROPIUM BROMIDE 18 UG/1
18 CAPSULE ORAL; RESPIRATORY (INHALATION) DAILY
Qty: 30 CAPSULE | Refills: 6 | Status: SHIPPED | OUTPATIENT
Start: 2023-09-05

## 2023-09-05 RX ORDER — LEFLUNOMIDE 10 MG/1
10 TABLET ORAL DAILY
Qty: 30 TABLET | Refills: 3 | Status: ACTIVE | OUTPATIENT
Start: 2023-09-05 | End: 2023-12-08 | Stop reason: SDUPTHER

## 2023-09-28 ENCOUNTER — OFFICE VISIT (OUTPATIENT)
Dept: DERMATOLOGY | Facility: CLINIC | Age: 68
End: 2023-09-28
Payer: COMMERCIAL

## 2023-09-28 DIAGNOSIS — Z85.828 PERSONAL HISTORY OF SKIN CANCER: ICD-10-CM

## 2023-09-28 DIAGNOSIS — L57.0 AK (ACTINIC KERATOSIS): ICD-10-CM

## 2023-09-28 DIAGNOSIS — L81.4 LENTIGO: ICD-10-CM

## 2023-09-28 DIAGNOSIS — D48.5 NEOPLASM OF UNCERTAIN BEHAVIOR OF SKIN: Primary | ICD-10-CM

## 2023-09-28 DIAGNOSIS — L82.1 SK (SEBORRHEIC KERATOSIS): ICD-10-CM

## 2023-09-28 PROCEDURE — 1160F PR REVIEW ALL MEDS BY PRESCRIBER/CLIN PHARMACIST DOCUMENTED: ICD-10-PCS | Mod: CPTII,S$GLB,, | Performed by: DERMATOLOGY

## 2023-09-28 PROCEDURE — 17003 DESTRUCT PREMALG LES 2-14: CPT | Mod: S$GLB,,, | Performed by: DERMATOLOGY

## 2023-09-28 PROCEDURE — 3044F HG A1C LEVEL LT 7.0%: CPT | Mod: CPTII,S$GLB,, | Performed by: DERMATOLOGY

## 2023-09-28 PROCEDURE — 3288F PR FALLS RISK ASSESSMENT DOCUMENTED: ICD-10-PCS | Mod: CPTII,S$GLB,, | Performed by: DERMATOLOGY

## 2023-09-28 PROCEDURE — 88305 TISSUE EXAM BY PATHOLOGIST: CPT | Performed by: PATHOLOGY

## 2023-09-28 PROCEDURE — 88342 IMHCHEM/IMCYTCHM 1ST ANTB: CPT | Mod: 26,,, | Performed by: PATHOLOGY

## 2023-09-28 PROCEDURE — 3288F FALL RISK ASSESSMENT DOCD: CPT | Mod: CPTII,S$GLB,, | Performed by: DERMATOLOGY

## 2023-09-28 PROCEDURE — 11103 TANGNTL BX SKIN EA SEP/ADDL: CPT | Mod: S$GLB,,, | Performed by: DERMATOLOGY

## 2023-09-28 PROCEDURE — 99213 OFFICE O/P EST LOW 20 MIN: CPT | Mod: 25,S$GLB,, | Performed by: DERMATOLOGY

## 2023-09-28 PROCEDURE — 1160F RVW MEDS BY RX/DR IN RCRD: CPT | Mod: CPTII,S$GLB,, | Performed by: DERMATOLOGY

## 2023-09-28 PROCEDURE — 11102 PR TANGENTIAL BIOPSY, SKIN, SINGLE LESION: ICD-10-PCS | Mod: S$GLB,,, | Performed by: DERMATOLOGY

## 2023-09-28 PROCEDURE — 88342 IMHCHEM/IMCYTCHM 1ST ANTB: CPT | Performed by: PATHOLOGY

## 2023-09-28 PROCEDURE — 1101F PT FALLS ASSESS-DOCD LE1/YR: CPT | Mod: CPTII,S$GLB,, | Performed by: DERMATOLOGY

## 2023-09-28 PROCEDURE — 88305 TISSUE EXAM BY PATHOLOGIST: ICD-10-PCS | Mod: 26,,, | Performed by: PATHOLOGY

## 2023-09-28 PROCEDURE — 1159F PR MEDICATION LIST DOCUMENTED IN MEDICAL RECORD: ICD-10-PCS | Mod: CPTII,S$GLB,, | Performed by: DERMATOLOGY

## 2023-09-28 PROCEDURE — 17000 PR DESTRUCTION(LASER SURGERY,CRYOSURGERY,CHEMOSURGERY),PREMALIGNANT LESIONS,FIRST LESION: ICD-10-PCS | Mod: XS,S$GLB,, | Performed by: DERMATOLOGY

## 2023-09-28 PROCEDURE — 99999 PR PBB SHADOW E&M-EST. PATIENT-LVL III: CPT | Mod: PBBFAC,,, | Performed by: DERMATOLOGY

## 2023-09-28 PROCEDURE — 1126F PR PAIN SEVERITY QUANTIFIED, NO PAIN PRESENT: ICD-10-PCS | Mod: CPTII,S$GLB,, | Performed by: DERMATOLOGY

## 2023-09-28 PROCEDURE — 17003 DESTRUCTION, PREMALIGNANT LESIONS; SECOND THROUGH 14 LESIONS: ICD-10-PCS | Mod: S$GLB,,, | Performed by: DERMATOLOGY

## 2023-09-28 PROCEDURE — 11102 TANGNTL BX SKIN SINGLE LES: CPT | Mod: S$GLB,,, | Performed by: DERMATOLOGY

## 2023-09-28 PROCEDURE — 88342 CHG IMMUNOCYTOCHEMISTRY: ICD-10-PCS | Mod: 26,,, | Performed by: PATHOLOGY

## 2023-09-28 PROCEDURE — 99999 PR PBB SHADOW E&M-EST. PATIENT-LVL III: ICD-10-PCS | Mod: PBBFAC,,, | Performed by: DERMATOLOGY

## 2023-09-28 PROCEDURE — 1101F PR PT FALLS ASSESS DOC 0-1 FALLS W/OUT INJ PAST YR: ICD-10-PCS | Mod: CPTII,S$GLB,, | Performed by: DERMATOLOGY

## 2023-09-28 PROCEDURE — 3044F PR MOST RECENT HEMOGLOBIN A1C LEVEL <7.0%: ICD-10-PCS | Mod: CPTII,S$GLB,, | Performed by: DERMATOLOGY

## 2023-09-28 PROCEDURE — 1126F AMNT PAIN NOTED NONE PRSNT: CPT | Mod: CPTII,S$GLB,, | Performed by: DERMATOLOGY

## 2023-09-28 PROCEDURE — 99213 PR OFFICE/OUTPT VISIT, EST, LEVL III, 20-29 MIN: ICD-10-PCS | Mod: 25,S$GLB,, | Performed by: DERMATOLOGY

## 2023-09-28 PROCEDURE — 11103 PR TANGENTIAL BIOPSY, SKIN, EA ADDTL LESION: ICD-10-PCS | Mod: S$GLB,,, | Performed by: DERMATOLOGY

## 2023-09-28 PROCEDURE — 1159F MED LIST DOCD IN RCRD: CPT | Mod: CPTII,S$GLB,, | Performed by: DERMATOLOGY

## 2023-09-28 PROCEDURE — 88305 TISSUE EXAM BY PATHOLOGIST: CPT | Mod: 26,,, | Performed by: PATHOLOGY

## 2023-09-28 PROCEDURE — 17000 DESTRUCT PREMALG LESION: CPT | Mod: XS,S$GLB,, | Performed by: DERMATOLOGY

## 2023-09-28 NOTE — PROGRESS NOTES
Subjective:      Patient ID:  Fabiano Garvey is a 68 y.o. male who presents for   Chief Complaint   Patient presents with    Skin Check     ubse    Warts     L leg  L arm   Chest      Pt here today for UBSE  This is a high risk patient here to check for the development of new lesions.      Pt  c/o wart to L leg, L arm, chest x 11/2022 lat OV. Come and go.       Warts      Review of Systems   Skin:  Positive for activity-related sunscreen use and wears hat.   Hematologic/Lymphatic: Bruises/bleeds easily.       Objective:   Physical Exam   Constitutional: He appears well-developed and well-nourished. No distress.   Neurological: He is alert and oriented to person, place, and time. He is not disoriented.   Psychiatric: He has a normal mood and affect.   Skin:   Areas Examined (abnormalities noted in diagram):   Scalp / Hair Palpated and Inspected  Head / Face Inspection Performed  Neck Inspection Performed  Chest / Axilla Inspection Performed  Abdomen Inspection Performed  Back Inspection Performed  RUE Inspected  LUE Inspection Performed  LLE Inspection Performed                           Diagram Legend     Erythematous scaling macule/papule c/w actinic keratosis       Vascular papule c/w angioma      Pigmented verrucoid papule/plaque c/w seborrheic keratosis      Yellow umbilicated papule c/w sebaceous hyperplasia      Irregularly shaped tan macule c/w lentigo     1-2 mm smooth white papules consistent with Milia      Movable subcutaneous cyst with punctum c/w epidermal inclusion cyst      Subcutaneous movable cyst c/w pilar cyst      Firm pink to brown papule c/w dermatofibroma      Pedunculated fleshy papule(s) c/w skin tag(s)      Evenly pigmented macule c/w junctional nevus     Mildly variegated pigmented, slightly irregular-bordered macule c/w mildly atypical nevus      Flesh colored to evenly pigmented papule c/w intradermal nevus       Pink pearly papule/plaque c/w basal cell carcinoma      Erythematous  hyperkeratotic cursted plaque c/w SCC      Surgical scar with no sign of skin cancer recurrence      Open and closed comedones      Inflammatory papules and pustules      Verrucoid papule consistent consistent with wart     Erythematous eczematous patches and plaques     Dystrophic onycholytic nail with subungual debris c/w onychomycosis     Umbilicated papule    Erythematous-base heme-crusted tan verrucoid plaque consistent with inflamed seborrheic keratosis     Erythematous Silvery Scaling Plaque c/w Psoriasis     See annotation      Assessment / Plan:      Pathology Orders:       Normal Orders This Visit    Specimen to Pathology, Dermatology     Questions:    Procedure Type: Dermatology and skin neoplasms    Number of Specimens: 2    ------------------------: -------------------------    Spec 1 Procedure: Biopsy    Spec 1 Clinical Impression: r/o isk v scc    Spec 1 Source: left lower lateral forearm    ------------------------: -------------------------    Spec 2 Procedure: Biopsy    Spec 2 Clinical Impression: r/o isk v scc    Spec 2 Source: right shoulder    Release to patient:           Neoplasm of uncertain behavior of skin x 2   Shave biopsy procedure note:    Shave biopsy performed after verbal consent including risk of infection, scar, recurrence, need for additional treatment of site. Area prepped with alcohol, anesthetized with approximately 1.0cc of 1% lidocaine with epinephrine. Lesional tissue shaved with razor blade. Hemostasis achieved with application of aluminum chloride followed by hyfrecation. No complications. Dressing applied. Wound care explained.    -     Specimen to Pathology, Dermatology    Lentigo  This is a benign hyperpigmented sun induced lesion. Recommend daily sun protection/avoidance and use of at least SPF 30, broad spectrum sunscreen (OTC drug) will reduce the number of new lesions. Treatment of these benign lesions are considered cosmetic.  The nature of sun-induced photo-aging  and skin cancers is discussed.  Sun avoidance, protective clothing, and the use of 30-SPF sunscreens is advised. Observe closely for skin damage/changes, and call if such occurs.    SK (seborrheic keratosis)  These are benign inherited growths without a malignant potential. Reassurance given to patient. No treatment is necessary.     AK (actinic keratosis)  Cryosurgery Procedure Note    Verbal consent from the patient is obtained including, but not limited to, risk of hypopigmentation/hyperpigmentation, scar, recurrence of lesion. The patient is aware of the precancerous quality and need for treatment of these lesions. Liquid nitrogen cryosurgery is applied to the 10 actinic keratoses, as detailed in the physical exam, to produce a freeze injury. The patient is aware that blisters may form and is instructed on wound care with gentle cleansing and use of vaseline ointment to keep moist until healed. The patient is supplied a handout on cryosurgery and is instructed to call if lesions do not completely resolve.    Personal history of skin cancer  Area(s) of previous NMSC evaluated with no signs of recurrence.    Upper body skin examination performed today including at least 6 points as noted in physical examination. Suspicious lesions noted.    Recommend daily sun protection/avoidance and use of at least SPF 30, broad spectrum sunscreen (OTC drug).              Follow up in 6 months (on 3/28/2024) for prn bx report, UBSE.

## 2023-09-28 NOTE — PATIENT INSTRUCTIONS
Shave Biopsy Wound Care    Your doctor has performed a shave biopsy today.  A band aid and vaseline ointment has been placed over the site.  This should remain in place for NO LONGER THAN 48 hours.  It is fine to remove the bandaid after 24 hours, if the area is no longer bleeding. It is recommended that you keep the area dry (do not wet)) for the first 24 hours.  After 24 hours, wash the area with warm soap and water and apply Vaseline jelly.  Many patients prefer to use Neosporin or Bacitracin ointment.  This is acceptable; however, know that you can develop an allergy to this medication even if you have used it safely for years.  It is important to keep the area moist.  Letting it dry out and get air slows healing time, and will worsen the scar.        If you notice increasing redness, tenderness, pain, or yellow drainage at the biopsy site, please notify your doctor.  These are signs of an infection.    If your biopsy site is bleeding, apply firm pressure for 15 minutes straight.  Repeat for another 15 minutes, if it is still bleeding.   If the surgical site continues to bleed, then please contact your doctor.      For [facility] users:   You will receive your biopsy results in [facility] as soon as they are available. Please be assured that your physician/provider will review your results and will then determine what further treatment, evaluation, or planning is required. You should be contacted by your physician's/provider's office within 5 business days of receiving your results; If not, please reach out to directly. This is one more way [facility] is putting you first.     [facility] [place] [address]/ [phone] FAX/ [email or web address]

## 2023-10-04 ENCOUNTER — OFFICE VISIT (OUTPATIENT)
Dept: FAMILY MEDICINE | Facility: CLINIC | Age: 68
End: 2023-10-04
Payer: COMMERCIAL

## 2023-10-04 VITALS
DIASTOLIC BLOOD PRESSURE: 76 MMHG | SYSTOLIC BLOOD PRESSURE: 104 MMHG | TEMPERATURE: 98 F | OXYGEN SATURATION: 95 % | HEIGHT: 66 IN | WEIGHT: 162.25 LBS | BODY MASS INDEX: 26.08 KG/M2 | HEART RATE: 98 BPM

## 2023-10-04 DIAGNOSIS — M06.9 RHEUMATOID ARTHRITIS, INVOLVING UNSPECIFIED SITE, UNSPECIFIED WHETHER RHEUMATOID FACTOR PRESENT: ICD-10-CM

## 2023-10-04 DIAGNOSIS — E78.5 HYPERLIPIDEMIA, UNSPECIFIED HYPERLIPIDEMIA TYPE: ICD-10-CM

## 2023-10-04 DIAGNOSIS — G47.33 OSA (OBSTRUCTIVE SLEEP APNEA): ICD-10-CM

## 2023-10-04 DIAGNOSIS — D04.62 SQUAMOUS CELL CARCINOMA IN SITU (SCCIS) OF SKIN OF LEFT FOREARM: ICD-10-CM

## 2023-10-04 DIAGNOSIS — L57.0 AK (ACTINIC KERATOSIS): Primary | ICD-10-CM

## 2023-10-04 DIAGNOSIS — Z79.899 OTHER LONG TERM (CURRENT) DRUG THERAPY: ICD-10-CM

## 2023-10-04 DIAGNOSIS — Z00.00 ROUTINE GENERAL MEDICAL EXAMINATION AT A HEALTH CARE FACILITY: Primary | ICD-10-CM

## 2023-10-04 DIAGNOSIS — J43.2 CENTRILOBULAR EMPHYSEMA: ICD-10-CM

## 2023-10-04 DIAGNOSIS — J84.10 PULMONARY FIBROSIS: ICD-10-CM

## 2023-10-04 LAB
FINAL PATHOLOGIC DIAGNOSIS: NORMAL
GROSS: NORMAL
Lab: NORMAL
MICROSCOPIC EXAM: NORMAL

## 2023-10-04 PROCEDURE — 3078F DIAST BP <80 MM HG: CPT | Mod: CPTII,S$GLB,, | Performed by: FAMILY MEDICINE

## 2023-10-04 PROCEDURE — 3044F HG A1C LEVEL LT 7.0%: CPT | Mod: CPTII,S$GLB,, | Performed by: FAMILY MEDICINE

## 2023-10-04 PROCEDURE — 3074F SYST BP LT 130 MM HG: CPT | Mod: CPTII,S$GLB,, | Performed by: FAMILY MEDICINE

## 2023-10-04 PROCEDURE — 99999 PR PBB SHADOW E&M-EST. PATIENT-LVL III: CPT | Mod: PBBFAC,,, | Performed by: FAMILY MEDICINE

## 2023-10-04 PROCEDURE — 1101F PR PT FALLS ASSESS DOC 0-1 FALLS W/OUT INJ PAST YR: ICD-10-PCS | Mod: CPTII,S$GLB,, | Performed by: FAMILY MEDICINE

## 2023-10-04 PROCEDURE — 3044F PR MOST RECENT HEMOGLOBIN A1C LEVEL <7.0%: ICD-10-PCS | Mod: CPTII,S$GLB,, | Performed by: FAMILY MEDICINE

## 2023-10-04 PROCEDURE — 3288F PR FALLS RISK ASSESSMENT DOCUMENTED: ICD-10-PCS | Mod: CPTII,S$GLB,, | Performed by: FAMILY MEDICINE

## 2023-10-04 PROCEDURE — 3078F PR MOST RECENT DIASTOLIC BLOOD PRESSURE < 80 MM HG: ICD-10-PCS | Mod: CPTII,S$GLB,, | Performed by: FAMILY MEDICINE

## 2023-10-04 PROCEDURE — 99397 PER PM REEVAL EST PAT 65+ YR: CPT | Mod: S$GLB,,, | Performed by: FAMILY MEDICINE

## 2023-10-04 PROCEDURE — 3008F PR BODY MASS INDEX (BMI) DOCUMENTED: ICD-10-PCS | Mod: CPTII,S$GLB,, | Performed by: FAMILY MEDICINE

## 2023-10-04 PROCEDURE — 1159F PR MEDICATION LIST DOCUMENTED IN MEDICAL RECORD: ICD-10-PCS | Mod: CPTII,S$GLB,, | Performed by: FAMILY MEDICINE

## 2023-10-04 PROCEDURE — 1101F PT FALLS ASSESS-DOCD LE1/YR: CPT | Mod: CPTII,S$GLB,, | Performed by: FAMILY MEDICINE

## 2023-10-04 PROCEDURE — 1160F PR REVIEW ALL MEDS BY PRESCRIBER/CLIN PHARMACIST DOCUMENTED: ICD-10-PCS | Mod: CPTII,S$GLB,, | Performed by: FAMILY MEDICINE

## 2023-10-04 PROCEDURE — 3008F BODY MASS INDEX DOCD: CPT | Mod: CPTII,S$GLB,, | Performed by: FAMILY MEDICINE

## 2023-10-04 PROCEDURE — 99999 PR PBB SHADOW E&M-EST. PATIENT-LVL III: ICD-10-PCS | Mod: PBBFAC,,, | Performed by: FAMILY MEDICINE

## 2023-10-04 PROCEDURE — 99397 PR PREVENTIVE VISIT,EST,65 & OVER: ICD-10-PCS | Mod: S$GLB,,, | Performed by: FAMILY MEDICINE

## 2023-10-04 PROCEDURE — 1160F RVW MEDS BY RX/DR IN RCRD: CPT | Mod: CPTII,S$GLB,, | Performed by: FAMILY MEDICINE

## 2023-10-04 PROCEDURE — 1159F MED LIST DOCD IN RCRD: CPT | Mod: CPTII,S$GLB,, | Performed by: FAMILY MEDICINE

## 2023-10-04 PROCEDURE — 3074F PR MOST RECENT SYSTOLIC BLOOD PRESSURE < 130 MM HG: ICD-10-PCS | Mod: CPTII,S$GLB,, | Performed by: FAMILY MEDICINE

## 2023-10-04 PROCEDURE — 3288F FALL RISK ASSESSMENT DOCD: CPT | Mod: CPTII,S$GLB,, | Performed by: FAMILY MEDICINE

## 2023-10-04 RX ORDER — FLUOROURACIL 50 MG/G
CREAM TOPICAL
Qty: 40 G | Refills: 1 | Status: SHIPPED | OUTPATIENT
Start: 2023-10-04

## 2023-10-04 NOTE — PROGRESS NOTES
(Portions of this note were dictated using voice recognition software and may contain dictation related errors in spelling/grammar/syntax not found on text review)    CC:   Chief Complaint   Patient presents with    Annual Exam       HPI: 68 y.o. male Here for annual exam    Hyperlipidemia: On Lipitor 40 mg daily      RA, followed by rheumatology.  Last visit from 07/03/2023. Currently on leflunomide. was on Rinvoq but bc of admission for SBO and concern for risk of intestinal perforation with Rinvoq was stopped  . Was on MTX in past but was off due to interstitial lung disease.  CT scan from 09/24/2020.  Similar findings from March 2020.  Has a stable pulmonary nodule inferior lingula 0.5 cm.  .  Lasts followed with pulmonology on November of 2021. Had spirometry that showed some mild obstruction, started Spiriva.   Did not tolerate Plaquenil.       Sleep apnea, currently using CPAP regularly. 5-6 hours night sleep, normal for him, no daytime drowsiness.    Mild elevation A1c 5.8      Lumbar radiculopathy,   Visit with pain management.  MRI showed multilevel degenerative change most significant L4-L5 where there is moderate to severe degree of spinal canal narrowing moderate right foraminal narrowing and mild left foraminal narrowing.  L3-L4 demonstrates right paracentral disc extrusion extending below the level of the disc plane resulting in some narrowing of the thecal sac..  Had JOSE through pain management in June and July of 2021 feels like injections didn't really help. PT helped for a while but sx came back. Saw NS, had lumbar surgery/fusion 12/2022. Last NS visit on 6/20/23    SCC in situ right lateral cheek, follows with dermatology.   Recent SCC in Situ of forearm removed, given 5 FU cream.  Has had cryotherapy  for AKs         Past Medical History:   Diagnosis Date    Degenerative disc disease, lumbar 6/16/2021    Hyperlipidemia     NU (obstructive sleep apnea)     Pulmonary fibrosis     Rheumatoid  arthritis     SCC (squamous cell carcinoma) 07/2020    SCC right medial canthus    SCC (squamous cell carcinoma) 11/2021    mid lower neck     SCC (squamous cell carcinoma) 01/2022    right lateral cheek- in situ    Squamous cell carcinoma 07/2019    SCC in situ left temple    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    R lateral cheek       Past Surgical History:   Procedure Laterality Date    COLONOSCOPY N/A 7/25/2017    Procedure: COLONOSCOPY;  Surgeon: Bill Nicole MD;  Location: [address]);  Service: Endoscopy;  Laterality: N/A;    DIAGNOSTIC LAPAROSCOPY N/A 5/10/2023    Procedure: LAPAROSCOPY, DIAGNOSTIC possible laparotomy other as indicated;  Surgeon: Eitan Palomares MD;  Location: [facility] OR;  Service: General;  Laterality: N/A;    EPIDURAL STEROID INJECTION INTO LUMBAR SPINE N/A 7/29/2021    Procedure: Injection-steroid-epidural-lumbar L5-S1;  Surgeon: Shiv Vernon Jr., MD;  Location: [facility] PAIN MGT;  Service: Pain Management;  Laterality: N/A;  oral sedation   no pacemaker     FUSION OF SPINE WITH INSTRUMENTATION Left 12/14/2022    Procedure: FUSION, SPINE, WITH INSTRUMENTATION Stg1: Left L3-5 OLiF conduit lateral cage BMP;  Surgeon: August Aguilar MD;  Location: [facility] OR;  Service: Neurosurgery;  Laterality: Left;  Procedure: Stg1: Left L3-5 OLiF conduit lateral cage BMP  Surgery Time: 2hrs  LOS: 3  Anesthesia: General  Blood: Type & Screen  Radiology: C-arm  Brace: LSO  Bed: Regular Bed  Position: Lateral Right Down  Equip    FUSION, SPINE, POSTERIOR APPROACH N/A 12/14/2022    Procedure: FUSION,SPINE,POSTERIOR APPROACH Stg 2: L3-5 posterior instrumentation viper prime, L3-5 posterolateral arthrodesis;  Surgeon: August Aguilar MD;  Location: [facility] OR;  Service: Neurosurgery;  Laterality: N/A;  Procedure: Stg 2: L3-5 posterior instrumentation viper prime, L3-5 posterolateral arthrodesis  Surgery Time: 3hrs  LOS: 3  Anesthesia: General  Blood: Type &  Screen  Radiology: Bra    LYSIS OF ADHESIONS N/A 5/10/2023    Procedure: LYSIS, ADHESIONS;  Surgeon: Eitan Palomares MD;  Location: [facility] OR;  Service: General;  Laterality: N/A;    NO PAST SURGERIES      TRANSFORAMINAL EPIDURAL INJECTION OF STEROID Right 2021    Procedure: Injection,steroid,epidural,transforaminal approach-RIGHT-- L4-5, L5-S1-- (IV Sedation);  Surgeon: Shiv Vernon Jr., MD;  Location: [facility] PAIN MGT;  Service: Pain Management;  Laterality: Right;    TRANSFORAMINAL EPIDURAL INJECTION OF STEROID Left 10/27/2022    Procedure: Injection,steroid,epidural,transforaminal left L4-5 and L5-S1;  Surgeon: Shiv Vernon Jr., MD;  Location: [facility] PAIN MGT;  Service: Pain Management;  Laterality: Left;       Family History   Problem Relation Age of Onset    Heart failure Mother         60s    Coronary artery disease Father         70s    Cancer Paternal Uncle         lymphoma??    Diabetes Neg Hx     Melanoma Neg Hx        Social History     Tobacco Use    Smoking status: Former     Current packs/day: 0.00     Types: Cigarettes     Quit date: 2012     Years since quittin.2     Passive exposure: Never    Smokeless tobacco: Never   Substance Use Topics    Alcohol use: Yes     Comment: occasional    Drug use: Never     Lab Results   Component Value Date    WBC 9.17 2023    HGB 14.1 2023    HCT 44.6 2023    MCV 97 2023     2023    CHOL 154 06/15/2023    TRIG 103 06/15/2023    HDL 48 06/15/2023    ALT 22 2023    AST 24 2023    BILITOT 0.3 2023    ALKPHOS 74 2023     2023    K 4.2 2023     2023    CREATININE 0.9 2023    CALCIUM 9.4 2023    ALBUMIN 3.3 (L) 2023    BUN 15 2023    CO2 24 2023    TSH 1.675 06/15/2023    PSA 0.85 06/15/2023    INR 1.0 2022    HGBA1C 5.8 (H) 06/15/2023    LDLCALC 85.4 06/15/2023    GLU 83 2023        Vital signs reviewed  PE:    APPEARANCE: Well nourished, well developed, in no acute distress.    HEAD: Normocephalic, atraumatic.  EYES: PERRL. EOMI.   Conjunctivae noninjected.  EARS: TM's intact. Light reflex normal. No retraction or perforation  NOSE: Mucosa pink. Airway clear.  MOUTH & THROAT: No tonsillar enlargement. No pharyngeal erythema or exudate.   NECK: Supple with no cervical lymphadenopathy.  No carotid bruits.  No thyromegaly  CHEST: Good inspiratory effort. Fine bibasilar crackles.   CARDIOVASCULAR: Normal S1, S2. No rubs, murmurs, or gallops.  ABDOMEN: Bowel sounds normal. Not distended. Soft. No tenderness or masses. No organomegaly.  EXTREMITIES: No edema      IMPRESSION  1. Routine general medical examination at a health care facility    2. Other long term (current) drug therapy    3. Hyperlipidemia, unspecified hyperlipidemia type    4. Rheumatoid arthritis, involving unspecified site, unspecified whether rheumatoid factor present    5. Pulmonary fibrosis    6. NU (obstructive sleep apnea)    7. Centrilobular emphysema            PLAN  No orders of the defined types were placed in this encounter.    HLD stable on lipitor 40 mg    Sleep apnea:  Continue CPAP    Rheumatoid arthritis:  Continue rheumatology follow-up    Interstitial pulmonary fibrosis with mild obstruction noted on spirometry.:  Continue pulmonology follow-up.  Continue Spiriva.      Lumbar radiculopathy: continue NS f/u as directed         HEALTH SCREENINGS  Immunizations:  Tdap 2022  PCV 5/12/17   PPSV 2019  COVID-19 vaccine booster eligible  Flu utd  Can get zoster at pharmacy    Age/Gender Appropriate screenings:  Colonoscopy July 2017: Single nonbleeding angiodysplastic lesion noted otherwise normal .  Repeat in 10 years  Prostate screening done  6/15/23 PSA          Answers submitted by the patient for this visit:  Review of Systems Questionnaire (Submitted on 9/28/2023)  activity change: No  unexpected weight change: No  neck pain: No  hearing loss:  No  rhinorrhea: No  trouble swallowing: No  eye discharge: No  visual disturbance: No  chest tightness: No  wheezing: No  chest pain: No  palpitations: No  blood in stool: No  constipation: No  vomiting: No  diarrhea: No  polydipsia: No  polyuria: No  difficulty urinating: No  urgency: No  hematuria: No  joint swelling: No  arthralgias: No  headaches: No  weakness: No  confusion: No  dysphoric mood: No

## 2023-10-04 NOTE — PROGRESS NOTES
Please schedule a follow up for AK/early scc in 3 months   Pt to use efudex     Efudex/Florouracil treatment: left forearm and right shoulder 2x per day for 21 days  Discussed to treat as directed and that area will be red, inflamed, and irritated which is a normal reaction.  Medication should be discontinued if area treated is ulcerated or bleeding.  Pt should wait 2 weeks to use medication if areas that are to be treated have also been treated with cryosurgery/freezing. Pt should avoid medication contacting eyes or mouth and wear sunscreen,  sun protective clothing, hat,  and sunglasses if going to be in the sun while undergoing treatment.  Pt can send photo or call if concerned about reaction.

## 2023-10-27 ENCOUNTER — PATIENT MESSAGE (OUTPATIENT)
Dept: ADMINISTRATIVE | Facility: OTHER | Age: 68
End: 2023-10-27
Payer: COMMERCIAL

## 2023-10-27 RX ORDER — CELECOXIB 200 MG/1
200 CAPSULE ORAL 2 TIMES DAILY
Qty: 60 CAPSULE | Refills: 2 | Status: SHIPPED | OUTPATIENT
Start: 2023-10-27

## 2023-12-11 ENCOUNTER — PATIENT MESSAGE (OUTPATIENT)
Dept: RHEUMATOLOGY | Facility: CLINIC | Age: 68
End: 2023-12-11
Payer: COMMERCIAL

## 2023-12-11 DIAGNOSIS — M06.9 RHEUMATOID ARTHRITIS INVOLVING MULTIPLE JOINTS: Primary | ICD-10-CM

## 2023-12-11 RX ORDER — LEFLUNOMIDE 10 MG/1
10 TABLET ORAL DAILY
Qty: 30 TABLET | Refills: 3 | Status: ACTIVE | OUTPATIENT
Start: 2023-12-11

## 2023-12-14 ENCOUNTER — LAB VISIT (OUTPATIENT)
Dept: LAB | Facility: HOSPITAL | Age: 68
End: 2023-12-14
Attending: INTERNAL MEDICINE
Payer: COMMERCIAL

## 2023-12-14 DIAGNOSIS — M06.9 RHEUMATOID ARTHRITIS INVOLVING MULTIPLE JOINTS: ICD-10-CM

## 2023-12-14 LAB
ALBUMIN SERPL BCP-MCNC: 3.2 G/DL (ref 3.5–5.2)
ALBUMIN SERPL BCP-MCNC: 3.2 G/DL (ref 3.5–5.2)
ALP SERPL-CCNC: 78 U/L (ref 55–135)
ALP SERPL-CCNC: 78 U/L (ref 55–135)
ALT SERPL W/O P-5'-P-CCNC: 20 U/L (ref 10–44)
ALT SERPL W/O P-5'-P-CCNC: 20 U/L (ref 10–44)
ANION GAP SERPL CALC-SCNC: 10 MMOL/L (ref 8–16)
ANION GAP SERPL CALC-SCNC: 10 MMOL/L (ref 8–16)
AST SERPL-CCNC: 23 U/L (ref 10–40)
AST SERPL-CCNC: 23 U/L (ref 10–40)
BASOPHILS # BLD AUTO: 0.06 K/UL (ref 0–0.2)
BASOPHILS # BLD AUTO: 0.06 K/UL (ref 0–0.2)
BASOPHILS NFR BLD: 0.8 % (ref 0–1.9)
BASOPHILS NFR BLD: 0.8 % (ref 0–1.9)
BILIRUB SERPL-MCNC: 0.3 MG/DL (ref 0.1–1)
BILIRUB SERPL-MCNC: 0.3 MG/DL (ref 0.1–1)
BUN SERPL-MCNC: 21 MG/DL (ref 8–23)
BUN SERPL-MCNC: 21 MG/DL (ref 8–23)
CALCIUM SERPL-MCNC: 9.7 MG/DL (ref 8.7–10.5)
CALCIUM SERPL-MCNC: 9.7 MG/DL (ref 8.7–10.5)
CHLORIDE SERPL-SCNC: 102 MMOL/L (ref 95–110)
CHLORIDE SERPL-SCNC: 102 MMOL/L (ref 95–110)
CO2 SERPL-SCNC: 25 MMOL/L (ref 23–29)
CO2 SERPL-SCNC: 25 MMOL/L (ref 23–29)
CREAT SERPL-MCNC: 1 MG/DL (ref 0.5–1.4)
CREAT SERPL-MCNC: 1 MG/DL (ref 0.5–1.4)
CRP SERPL-MCNC: 8.5 MG/L (ref 0–8.2)
CRP SERPL-MCNC: 8.5 MG/L (ref 0–8.2)
DIFFERENTIAL METHOD: ABNORMAL
DIFFERENTIAL METHOD: ABNORMAL
EOSINOPHIL # BLD AUTO: 0.3 K/UL (ref 0–0.5)
EOSINOPHIL # BLD AUTO: 0.3 K/UL (ref 0–0.5)
EOSINOPHIL NFR BLD: 3.7 % (ref 0–8)
EOSINOPHIL NFR BLD: 3.7 % (ref 0–8)
ERYTHROCYTE [DISTWIDTH] IN BLOOD BY AUTOMATED COUNT: 14.7 % (ref 11.5–14.5)
ERYTHROCYTE [DISTWIDTH] IN BLOOD BY AUTOMATED COUNT: 14.7 % (ref 11.5–14.5)
ERYTHROCYTE [SEDIMENTATION RATE] IN BLOOD BY PHOTOMETRIC METHOD: 20 MM/HR (ref 0–23)
ERYTHROCYTE [SEDIMENTATION RATE] IN BLOOD BY PHOTOMETRIC METHOD: 20 MM/HR (ref 0–23)
EST. GFR  (NO RACE VARIABLE): >60 ML/MIN/1.73 M^2
EST. GFR  (NO RACE VARIABLE): >60 ML/MIN/1.73 M^2
GLUCOSE SERPL-MCNC: 166 MG/DL (ref 70–110)
GLUCOSE SERPL-MCNC: 166 MG/DL (ref 70–110)
HBV CORE AB SERPL QL IA: NORMAL
HBV SURFACE AG SERPL QL IA: NORMAL
HCT VFR BLD AUTO: 48.4 % (ref 40–54)
HCT VFR BLD AUTO: 48.4 % (ref 40–54)
HGB BLD-MCNC: 15.7 G/DL (ref 14–18)
HGB BLD-MCNC: 15.7 G/DL (ref 14–18)
IMM GRANULOCYTES # BLD AUTO: 0.02 K/UL (ref 0–0.04)
IMM GRANULOCYTES # BLD AUTO: 0.02 K/UL (ref 0–0.04)
IMM GRANULOCYTES NFR BLD AUTO: 0.3 % (ref 0–0.5)
IMM GRANULOCYTES NFR BLD AUTO: 0.3 % (ref 0–0.5)
LYMPHOCYTES # BLD AUTO: 2.1 K/UL (ref 1–4.8)
LYMPHOCYTES # BLD AUTO: 2.1 K/UL (ref 1–4.8)
LYMPHOCYTES NFR BLD: 26.5 % (ref 18–48)
LYMPHOCYTES NFR BLD: 26.5 % (ref 18–48)
MCH RBC QN AUTO: 29.9 PG (ref 27–31)
MCH RBC QN AUTO: 29.9 PG (ref 27–31)
MCHC RBC AUTO-ENTMCNC: 32.4 G/DL (ref 32–36)
MCHC RBC AUTO-ENTMCNC: 32.4 G/DL (ref 32–36)
MCV RBC AUTO: 92 FL (ref 82–98)
MCV RBC AUTO: 92 FL (ref 82–98)
MONOCYTES # BLD AUTO: 0.7 K/UL (ref 0.3–1)
MONOCYTES # BLD AUTO: 0.7 K/UL (ref 0.3–1)
MONOCYTES NFR BLD: 9.2 % (ref 4–15)
MONOCYTES NFR BLD: 9.2 % (ref 4–15)
NEUTROPHILS # BLD AUTO: 4.7 K/UL (ref 1.8–7.7)
NEUTROPHILS # BLD AUTO: 4.7 K/UL (ref 1.8–7.7)
NEUTROPHILS NFR BLD: 59.5 % (ref 38–73)
NEUTROPHILS NFR BLD: 59.5 % (ref 38–73)
NRBC BLD-RTO: 0 /100 WBC
NRBC BLD-RTO: 0 /100 WBC
PLATELET # BLD AUTO: 204 K/UL (ref 150–450)
PLATELET # BLD AUTO: 204 K/UL (ref 150–450)
PMV BLD AUTO: 11.7 FL (ref 9.2–12.9)
PMV BLD AUTO: 11.7 FL (ref 9.2–12.9)
POTASSIUM SERPL-SCNC: 4.2 MMOL/L (ref 3.5–5.1)
POTASSIUM SERPL-SCNC: 4.2 MMOL/L (ref 3.5–5.1)
PROT SERPL-MCNC: 8 G/DL (ref 6–8.4)
PROT SERPL-MCNC: 8 G/DL (ref 6–8.4)
RBC # BLD AUTO: 5.25 M/UL (ref 4.6–6.2)
RBC # BLD AUTO: 5.25 M/UL (ref 4.6–6.2)
SODIUM SERPL-SCNC: 137 MMOL/L (ref 136–145)
SODIUM SERPL-SCNC: 137 MMOL/L (ref 136–145)
WBC # BLD AUTO: 7.81 K/UL (ref 3.9–12.7)
WBC # BLD AUTO: 7.81 K/UL (ref 3.9–12.7)

## 2023-12-14 PROCEDURE — 36415 COLL VENOUS BLD VENIPUNCTURE: CPT | Performed by: INTERNAL MEDICINE

## 2023-12-14 PROCEDURE — 87340 HEPATITIS B SURFACE AG IA: CPT | Performed by: INTERNAL MEDICINE

## 2023-12-14 PROCEDURE — 80053 COMPREHEN METABOLIC PANEL: CPT | Performed by: INTERNAL MEDICINE

## 2023-12-14 PROCEDURE — 85652 RBC SED RATE AUTOMATED: CPT | Performed by: INTERNAL MEDICINE

## 2023-12-14 PROCEDURE — 85025 COMPLETE CBC W/AUTO DIFF WBC: CPT | Performed by: INTERNAL MEDICINE

## 2023-12-14 PROCEDURE — 86704 HEP B CORE ANTIBODY TOTAL: CPT | Performed by: INTERNAL MEDICINE

## 2023-12-14 PROCEDURE — 86480 TB TEST CELL IMMUN MEASURE: CPT | Performed by: INTERNAL MEDICINE

## 2023-12-14 PROCEDURE — 86140 C-REACTIVE PROTEIN: CPT | Performed by: INTERNAL MEDICINE

## 2023-12-15 LAB
GAMMA INTERFERON BACKGROUND BLD IA-ACNC: 0.01 IU/ML
M TB IFN-G CD4+ BCKGRND COR BLD-ACNC: 0 IU/ML
M TB IFN-G CD4+ BCKGRND COR BLD-ACNC: 0.01 IU/ML
MITOGEN IGNF BCKGRD COR BLD-ACNC: 4.77 IU/ML
TB GOLD PLUS: NEGATIVE

## 2023-12-19 ENCOUNTER — HOSPITAL ENCOUNTER (OUTPATIENT)
Dept: RADIOLOGY | Facility: HOSPITAL | Age: 68
Discharge: HOME | End: 2023-12-19
Attending: PHYSICIAN ASSISTANT
Payer: COMMERCIAL

## 2023-12-19 DIAGNOSIS — Z98.1 S/P LUMBAR SPINAL FUSION: ICD-10-CM

## 2023-12-19 PROCEDURE — 72082 X-RAY EXAM ENTIRE SPI 2/3 VW: CPT | Mod: 26,,, | Performed by: RADIOLOGY

## 2023-12-19 PROCEDURE — 72082 XR SCOLIOSIS COMPLETE: ICD-10-PCS | Mod: 26,,, | Performed by: RADIOLOGY

## 2023-12-19 PROCEDURE — 72082 X-RAY EXAM ENTIRE SPI 2/3 VW: CPT | Mod: TC,FY

## 2023-12-20 ENCOUNTER — OFFICE VISIT (OUTPATIENT)
Dept: NEUROSURGERY | Facility: CLINIC | Age: 68
End: 2023-12-20
Payer: COMMERCIAL

## 2023-12-20 VITALS
SYSTOLIC BLOOD PRESSURE: 111 MMHG | DIASTOLIC BLOOD PRESSURE: 76 MMHG | BODY MASS INDEX: 26.08 KG/M2 | HEART RATE: 90 BPM | WEIGHT: 162.25 LBS | HEIGHT: 66 IN

## 2023-12-20 DIAGNOSIS — Z98.1 STATUS POST LUMBAR SPINAL FUSION: Primary | ICD-10-CM

## 2023-12-20 PROCEDURE — 3078F PR MOST RECENT DIASTOLIC BLOOD PRESSURE < 80 MM HG: ICD-10-PCS | Mod: CPTII,S$GLB,, | Performed by: NEUROLOGICAL SURGERY

## 2023-12-20 PROCEDURE — 3044F HG A1C LEVEL LT 7.0%: CPT | Mod: CPTII,S$GLB,, | Performed by: NEUROLOGICAL SURGERY

## 2023-12-20 PROCEDURE — 99213 OFFICE O/P EST LOW 20 MIN: CPT | Mod: S$GLB,,, | Performed by: NEUROLOGICAL SURGERY

## 2023-12-20 PROCEDURE — 3044F PR MOST RECENT HEMOGLOBIN A1C LEVEL <7.0%: ICD-10-PCS | Mod: CPTII,S$GLB,, | Performed by: NEUROLOGICAL SURGERY

## 2023-12-20 PROCEDURE — 1159F MED LIST DOCD IN RCRD: CPT | Mod: CPTII,S$GLB,, | Performed by: NEUROLOGICAL SURGERY

## 2023-12-20 PROCEDURE — 1126F AMNT PAIN NOTED NONE PRSNT: CPT | Mod: CPTII,S$GLB,, | Performed by: NEUROLOGICAL SURGERY

## 2023-12-20 PROCEDURE — 1101F PT FALLS ASSESS-DOCD LE1/YR: CPT | Mod: CPTII,S$GLB,, | Performed by: NEUROLOGICAL SURGERY

## 2023-12-20 PROCEDURE — 1126F PR PAIN SEVERITY QUANTIFIED, NO PAIN PRESENT: ICD-10-PCS | Mod: CPTII,S$GLB,, | Performed by: NEUROLOGICAL SURGERY

## 2023-12-20 PROCEDURE — 99999 PR PBB SHADOW E&M-EST. PATIENT-LVL III: ICD-10-PCS | Mod: PBBFAC,,, | Performed by: NEUROLOGICAL SURGERY

## 2023-12-20 PROCEDURE — 99999 PR PBB SHADOW E&M-EST. PATIENT-LVL III: CPT | Mod: PBBFAC,,, | Performed by: NEUROLOGICAL SURGERY

## 2023-12-20 PROCEDURE — 1159F PR MEDICATION LIST DOCUMENTED IN MEDICAL RECORD: ICD-10-PCS | Mod: CPTII,S$GLB,, | Performed by: NEUROLOGICAL SURGERY

## 2023-12-20 PROCEDURE — 3078F DIAST BP <80 MM HG: CPT | Mod: CPTII,S$GLB,, | Performed by: NEUROLOGICAL SURGERY

## 2023-12-20 PROCEDURE — 3074F PR MOST RECENT SYSTOLIC BLOOD PRESSURE < 130 MM HG: ICD-10-PCS | Mod: CPTII,S$GLB,, | Performed by: NEUROLOGICAL SURGERY

## 2023-12-20 PROCEDURE — 3008F BODY MASS INDEX DOCD: CPT | Mod: CPTII,S$GLB,, | Performed by: NEUROLOGICAL SURGERY

## 2023-12-20 PROCEDURE — 3074F SYST BP LT 130 MM HG: CPT | Mod: CPTII,S$GLB,, | Performed by: NEUROLOGICAL SURGERY

## 2023-12-20 PROCEDURE — 99213 PR OFFICE/OUTPT VISIT, EST, LEVL III, 20-29 MIN: ICD-10-PCS | Mod: S$GLB,,, | Performed by: NEUROLOGICAL SURGERY

## 2023-12-20 PROCEDURE — 3008F PR BODY MASS INDEX (BMI) DOCUMENTED: ICD-10-PCS | Mod: CPTII,S$GLB,, | Performed by: NEUROLOGICAL SURGERY

## 2023-12-20 PROCEDURE — 3288F PR FALLS RISK ASSESSMENT DOCUMENTED: ICD-10-PCS | Mod: CPTII,S$GLB,, | Performed by: NEUROLOGICAL SURGERY

## 2023-12-20 PROCEDURE — 3288F FALL RISK ASSESSMENT DOCD: CPT | Mod: CPTII,S$GLB,, | Performed by: NEUROLOGICAL SURGERY

## 2023-12-20 PROCEDURE — 1101F PR PT FALLS ASSESS DOC 0-1 FALLS W/OUT INJ PAST YR: ICD-10-PCS | Mod: CPTII,S$GLB,, | Performed by: NEUROLOGICAL SURGERY

## 2023-12-20 NOTE — PROGRESS NOTES
Oswestry Low Back Pain Disability Questionnaire    DATE OF SERVICE:  12/20/2023    ATTENDING PHYSICIAN:  August Aguilar MD    Instructions    This questionnaire has been designed to give us information as to how your back or leg pain is affecting your ability to manage in everyday life. Please answer by checking ONE box in each section for the statement which best applies to you. We realize you may consider that two or more statements in any one section apply but please just shade out the spot that indicates the statement which most clearly describes your problem.    Section 1 - Pain intensity    * I have no pain at the moment.  * The pain is very mild at the moment.  * The pain is moderate at the moment.  * The pain is fairly severe at the moment.  * The pain is very severe at the moment.  * The pain is the worst imaginable at the moment.    Score : 0    Section 2 - Personal care (washing, dressing etc)    * I can look after myself normally without causing extra pain.  * I can look after myself normally but it causes extra pain.  * It is painful to look after myself and I am slow and careful.  * I need some help but manage most of my personal care.  * I need help every day in most aspects of self-care.  * I do not get dressed, I wash with difficulty and stay in bed.    Score : 0    Section 3 - Lifting    * I can lift heavy weights without extra pain.  * I can lift heavy weights but it gives extra pain.  * Pain prevents me from lifting heavy weights off the floor, but I can manage if they are conveniently placed eg. on a table.  * Pain prevents me from lifting heavy weights, but I can manage light to medium weights if they are conveniently positioned.  * I can lift very light weights.  * I cannot lift or carry anything at all.    Score : 0    Section 4 - Walking    * Pain does not prevent me walking any distance.  * Pain prevents me from walking more than 1 mile.         * Pain prevents me from walking more than  1/2 mile.         * Pain prevents me from walking more than 100 yards.            * I can only walk using a stick or crutches.  * I am in bed most of the time.    Score : 0    Section 5 - Sitting    * I can sit in any chair as long as I like.  * I can only sit in my favourite chair as long as I like.  * Pain prevents me sitting more than one hour.  * Pain prevents me from sitting more than 30 minutes.  * Pain prevents me from sitting more than 10 minutes.  * Pain prevents me from sitting at all.    Score : 0    Section 6 - Standing    * I can stand as long as I want without extra pain.  * I can stand as long as I want but it gives me extra pain.  * Pain prevents me from standing for more than 1 hour  * Pain prevents me from standing for more than 30 minutes.  * Pain prevents me from standing for more than 10 minutes.  * Pain prevents me from standing at all.     Score : 0    Section 7 - Sleeping    * My sleep is never disturbed by pain.  * My sleep is occasionally disturbed by pain.  * Because of pain I have less than 6 hours sleep.   * Because of pain I have less than 4 hours sleep.   * Because of pain I have less than 2 hours sleep.  * Pain prevents me from sleeping at all.    Score : 0    Section 8 - Sex life (if applicable)    * My sex life is normal and causes no extra pain.  * My sex life is normal but causes some extra pain.  * My sex life is nearly normal but is very painful.   * My sex life is severely restricted by pain.  * My sex life is nearly absent because of pain.   * Pain prevents any sex life at all.    Score : 0    Section 9 - Social life    * My social life is normal and gives me no extra pain.  * My social life is normal but increases the degree of pain.  * Pain has no significant effect on my social life apart from limiting my more energetic interests eg, sport.  * Pain has restricted my social life and I do not go out as often.  * Pain has restricted my social life to my home.   * I have no  social life because of pain.     Score : 2    Section 10 - Travelling    * I can travel anywhere without pain.  * I can travel anywhere but it gives me extra pain.  * Pain is bad but I manage journeys over two hours.  * Pain restricts me to journeys of less than one hour.  * Pain restricts me to short necessary journeys under 30 minutes.  * Pain prevents me from travelling except to receive treatment.    Score : 0      TOTAL SCORE :  2 / 50 x 2 = 4 %      References  1. [place] MICK, Jt PB. The Oswestry Disability Index. Spine 2000 Nov 15;25(22):2945-52; discussion 52.  from standing for more than from standing for more than from standing for more than from standing at all

## 2023-12-20 NOTE — PROGRESS NOTES
NEUROSURGICAL PROGRESS NOTE    DATE OF SERVICE:  12/20/2023    ATTENDING PHYSICIAN:  August Aguilar MD    SUBJECTIVE:    INTERIM HISTORY:    GENE 4%    This is a very pleasant 68 y.o. male, who is status months L3-4, L4-5 oblique interbody fusion with posterior instrumentation.  Patient is doing well.  Reports around 2/10 of back pain.  He had few episodes of stabbing back pain exacerbation after pulling weeds.  No leg pain.  Still has left foot numbness.  Still takes gabapentin and Celebrex.  Overall very satisfied with his outcome.              PAST MEDICAL HISTORY:  Active Ambulatory Problems     Diagnosis Date Noted    Hyperlipidemia     Rheumatoid arthritis     Colon cancer screening 07/25/2017    Personal history of skin cancer 08/24/2017    Elevated LFTs 08/09/2019    Obstructive sleep apnea 11/21/2019    Interstitial pulmonary fibrosis     ILD (interstitial lung disease) 09/24/2020    Chronic bilateral low back pain with right-sided sciatica 05/24/2021    Decreased range of motion 05/24/2021    Muscle weakness 05/24/2021    Lumbar spondylosis 06/16/2021    Spinal stenosis of lumbar region with neurogenic claudication 06/16/2021    Degenerative disc disease, lumbar 06/16/2021    Lumbar radiculopathy 06/16/2021    Chronic pain 06/29/2021    Shortness of breath     Centrilobular emphysema 11/10/2021    Decreased functional mobility 10/31/2022    Small bowel obstruction due to adhesions 05/10/2023    Immunosuppressed status 05/22/2023    S/P lumbar spinal fusion 06/21/2023     Resolved Ambulatory Problems     Diagnosis Date Noted    Abdominal pain 05/09/2023     Past Medical History:   Diagnosis Date    NU (obstructive sleep apnea)     Pulmonary fibrosis     SCC (squamous cell carcinoma) 07/2020    SCC (squamous cell carcinoma) 11/2021    SCC (squamous cell carcinoma) 01/2022    Squamous cell carcinoma 07/2019    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       PAST SURGICAL  HISTORY:  Past Surgical History:   Procedure Laterality Date    COLONOSCOPY N/A 7/25/2017    Procedure: COLONOSCOPY;  Surgeon: Bill Nicole MD;  [facility]  Service: Endoscopy;  Laterality: N/A;    DIAGNOSTIC LAPAROSCOPY N/A 5/10/2023    Procedure: LAPAROSCOPY, DIAGNOSTIC possible laparotomy other as indicated;  Surgeon: Eitan Palomares MD;  Location: [facility] OR;  Service: General;  Laterality: N/A;    EPIDURAL STEROID INJECTION INTO LUMBAR SPINE N/A 7/29/2021    Procedure: Injection-steroid-epidural-lumbar L5-S1;  Surgeon: Shiv Vernon Jr., MD;  Location: [facility] PAIN MGT;  Service: Pain Management;  Laterality: N/A;  oral sedation   no pacemaker     FUSION OF SPINE WITH INSTRUMENTATION Left 12/14/2022    Procedure: FUSION, SPINE, WITH INSTRUMENTATION Stg1: Left L3-5 OLiF conduit lateral cage BMP;  Surgeon: August Aguilar MD;  Location: [facility] OR;  Service: Neurosurgery;  Laterality: Left;  Procedure: Stg1: Left L3-5 OLiF conduit lateral cage BMP  Surgery Time: 2hrs  LOS: 3  Anesthesia: General  Blood: Type & Screen  Radiology: C-arm  Brace: LSO  Bed: Regular Bed  Position: Lateral Right Down  Equip    FUSION, SPINE, POSTERIOR APPROACH N/A 12/14/2022    Procedure: FUSION,SPINE,POSTERIOR APPROACH Stg 2: L3-5 posterior instrumentation viper prime, L3-5 posterolateral arthrodesis;  Surgeon: August Aguilar MD;  Location: [facility] OR;  Service: Neurosurgery;  Laterality: N/A;  Procedure: Stg 2: L3-5 posterior instrumentation viper prime, L3-5 posterolateral arthrodesis  Surgery Time: 3hrs  LOS: 3  Anesthesia: General  Blood: Type & Screen  Radiology: Bra    LYSIS OF ADHESIONS N/A 5/10/2023    Procedure: LYSIS, ADHESIONS;  Surgeon: Eitan Palomares MD;  Location: [facility] OR;  Service: General;  Laterality: N/A;    NO PAST SURGERIES      TRANSFORAMINAL EPIDURAL INJECTION OF STEROID Right 6/29/2021    Procedure: Injection,steroid,epidural,transforaminal approach-RIGHT-- L4-5, L5-S1-- (IV Sedation);   Surgeon: Shiv Vernon Jr., MD;  Location: [facility] PAIN [facility];  Service: Pain Management;  Laterality: Right;    TRANSFORAMINAL EPIDURAL INJECTION OF STEROID Left 10/27/2022    Procedure: Injection,steroid,epidural,transforaminal left L4-5 and L5-S1;  Surgeon: Shiv Vernon Jr., MD;  Location: [facility] PAIN [facility];  Service: Pain Management;  Laterality: Left;       SOCIAL HISTORY:   Social History     Socioeconomic History    Marital status:    Occupational History     Employer: [employer]   Tobacco Use    Smoking status: Former     Current packs/day: 0.00     Types: Cigarettes     Quit date: 2012     Years since quittin.4     Passive exposure: Never    Smokeless tobacco: Never   Substance and Sexual Activity    Alcohol use: Yes     Comment: occasional    Drug use: Never    Sexual activity: Yes     Partners: Female     Birth control/protection: None   Social History Narrative    No aerobic exercise, walks a lot    No diet                                                                                                                                                                                         Social Determinants of Health     Financial Resource Strain: Low Risk  (2023)    Overall Financial Resource Strain (CARDIA)     Difficulty of Paying Living Expenses: Not hard at all   Food Insecurity: No Food Insecurity (2023)    Hunger Vital Sign     Worried About Running Out of Food in the Last Year: Never true     Ran Out of Food in the Last Year: Never true   Transportation Needs: No Transportation Needs (2023)    PRAPARE - Transportation     Lack of Transportation (Medical): No     Lack of Transportation (Non-Medical): No   Physical Activity: Inactive (2023)    Exercise Vital Sign     Days of Exercise per Week: 0 days     Minutes of Exercise per Session: 0 min   Stress: No Stress Concern Present (2023)    [employer] [place] [employer] -  Occupational Stress Questionnaire     Feeling of Stress : Not at all   Social Connections: Unknown (9/28/2023)    Social Connection and Isolation Panel [NHANES]     Frequency of Communication with Friends and Family: More than three times a week     Frequency of Social Gatherings with Friends and Family: Three times a week     Active Member of Clubs or Organizations: No     Attends Club or Organization Meetings: Never     Marital Status:    Housing Stability: Low Risk  (9/28/2023)    Housing Stability Vital Sign     Unable to Pay for Housing in the Last Year: No     Number of Places Lived in the Last Year: 1     Unstable Housing in the Last Year: No       FAMILY HISTORY:  Family History   Problem Relation Age of Onset    Heart failure Mother         60s    Coronary artery disease Father         70s    Cancer Paternal Uncle         lymphoma??    Diabetes Neg Hx     Melanoma Neg Hx        CURRENTS MEDICATIONS:  Current Outpatient Medications on File Prior to Visit   Medication Sig Dispense Refill    albuterol (VENTOLIN HFA) 90 mcg/actuation inhaler Inhale 2 puffs into the lungs every 6 (six) hours as needed for Wheezing (cough). Rescue 18 g 2    atorvastatin (LIPITOR) 40 MG tablet Take 1 tablet (40 mg total) by mouth once daily. 90 tablet 3    celecoxib (CELEBREX) 200 MG capsule Take 1 capsule (200 mg total) by mouth 2 (two) times daily. 60 capsule 2    COVID tkv50-25,12up,,andu,,PF, (SPIKEVAX 7328-8692,12Y UP,,PF,) 50 mcg/0.5 mL injection Inject into the muscle. 0.5 mL 0    ergocalciferol (VITAMIN D2) 50,000 unit Cap TAKE 1 CAPSULE BY MOUTH ONE TIME PER WEEK 12 capsule 3    fluorouraciL (EFUDEX) 5 % cream Apply thin film to right shoulder and left lower forearm 2times per day for 21 days; d/c if area bleeding or ulcerated; avoid eyes or mouth 40 g 1    gabapentin (NEURONTIN) 300 MG capsule Take 2 capsules (600 mg total) by mouth 3 (three) times daily. 180 capsule 11    leflunomide (ARAVA) 10 MG Tab Take 1  tablet (10 mg total) by mouth once daily. 30 tablet 3    tiotropium (SPIRIVA WITH HANDIHALER) 18 mcg inhalation capsule Inhale 1 capsule (18 mcg total) into the lungs once daily. Controller 30 capsule 6     No current facility-administered medications on file prior to visit.       ALLERGIES:  Review of patient's allergies indicates:   Allergen Reactions    Plaquenil [hydroxychloroquine]      Lightheaded and muscle aches       REVIEW OF SYSTEMS:  Review of Systems   Constitutional:  Negative for diaphoresis, fever and weight loss.   Respiratory:  Negative for shortness of breath.    Cardiovascular:  Negative for chest pain.   Gastrointestinal:  Negative for blood in stool.   Genitourinary:  Negative for hematuria.   Endo/Heme/Allergies:  Does not bruise/bleed easily.   All other systems reviewed and are negative.        OBJECTIVE:    PHYSICAL EXAMINATION:   Vitals:    12/20/23 1404   BP: 111/76   Pulse: 90       Physical Exam:  Vitals reviewed.    Constitutional: He appears well-developed and well-nourished.     Eyes: Pupils are equal, round, and reactive to light. Conjunctivae and EOM are normal.     Cardiovascular: Normal distal pulses and no edema.     Abdominal: Soft.     Skin: Skin displays no rash on trunk and no rash on extremities. Skin displays no lesions on trunk and no lesions on extremities.     Psych/Behavior: He is alert. He is oriented to person, place, and time. He has a normal mood and affect.     Musculoskeletal:        Neck: Range of motion is full.       Ortho Exam      Neurologic Exam     Mental Status   Oriented to person, place, and time.     Cranial Nerves     CN III, IV, VI   Pupils are equal, round, and reactive to light.  Extraocular motions are normal.     Motor Exam     Strength   Strength 5/5 throughout.         DIAGNOSTIC DATA:  I personally interpreted the following imaging:   Scoliosis film today shows good spinal alignment, no lucency around hardware    ASSESMENT:  This is a 68 y.o.  male with     Problem List Items Addressed This Visit    None  Visit Diagnoses       Status post lumbar spinal fusion    -  Primary              PLAN:  We will follow up as needed  All questions answered    More than 50% of the time was spent on discussing conservative management treatments (medication, physical therapy exercises) and possible interventions (spinal injections and surgical procedures). Care coordination was discussed.    20 minutes        August Aguilar MD  Cell:[phone]

## 2023-12-27 ENCOUNTER — PATIENT MESSAGE (OUTPATIENT)
Dept: ADMINISTRATIVE | Facility: OTHER | Age: 68
End: 2023-12-27
Payer: COMMERCIAL

## 2024-01-04 ENCOUNTER — OFFICE VISIT (OUTPATIENT)
Dept: RHEUMATOLOGY | Facility: CLINIC | Age: 69
End: 2024-01-04
Payer: MEDICARE

## 2024-01-04 VITALS
HEIGHT: 66 IN | WEIGHT: 163.38 LBS | BODY MASS INDEX: 26.26 KG/M2 | SYSTOLIC BLOOD PRESSURE: 102 MMHG | HEART RATE: 102 BPM | DIASTOLIC BLOOD PRESSURE: 75 MMHG

## 2024-01-04 DIAGNOSIS — M94.9 DISORDER OF CARTILAGE, UNSPECIFIED: ICD-10-CM

## 2024-01-04 DIAGNOSIS — M06.9 RHEUMATOID ARTHRITIS INVOLVING MULTIPLE JOINTS: Primary | ICD-10-CM

## 2024-01-04 PROCEDURE — 99214 OFFICE O/P EST MOD 30 MIN: CPT | Mod: S$GLB,,, | Performed by: INTERNAL MEDICINE

## 2024-01-04 PROCEDURE — 3008F BODY MASS INDEX DOCD: CPT | Mod: CPTII,S$GLB,, | Performed by: INTERNAL MEDICINE

## 2024-01-04 PROCEDURE — 3074F SYST BP LT 130 MM HG: CPT | Mod: CPTII,S$GLB,, | Performed by: INTERNAL MEDICINE

## 2024-01-04 PROCEDURE — 99999 PR PBB SHADOW E&M-EST. PATIENT-LVL III: CPT | Mod: PBBFAC,,, | Performed by: INTERNAL MEDICINE

## 2024-01-04 PROCEDURE — 1159F MED LIST DOCD IN RCRD: CPT | Mod: CPTII,S$GLB,, | Performed by: INTERNAL MEDICINE

## 2024-01-04 PROCEDURE — 3078F DIAST BP <80 MM HG: CPT | Mod: CPTII,S$GLB,, | Performed by: INTERNAL MEDICINE

## 2024-01-04 PROCEDURE — 1125F AMNT PAIN NOTED PAIN PRSNT: CPT | Mod: CPTII,S$GLB,, | Performed by: INTERNAL MEDICINE

## 2024-01-04 NOTE — PROGRESS NOTES
Subjective:       Patient ID: Fabiano Garvey is a 60 y.o. male.    Chief Complaint: OTHER and Swelling    HPI     59 yo male with PMH of basal cell cancer (around 2010), HL here for evaluation of join pain.  Reports pain in hands, elbows. and wrists.  Reports  swelling in hands, ankles, and feet.  Reports also has bilateral knee pain and shoulders.   Reports stiffness in morning for 30 minutes.  He has trouble swelling. Denies any rashes. No oral ulcers. No hair loss. Denies photosensitivity.  Denies any raynauds.Denies blood clots.  Denies dry eyes and dry mouth.  Reports symptoms for a year. In November, he noted the nodules in elbows.  Thinks he has swelling in lower neck.  He has trouble picking up things due to pain and swelling.  He reports that prednisone improves h is pain and swelling.  He has been off steroids.  No changes in weight.  Reports good appetite.      Interval history: He had bowel obstruction May 8 s/p repair.   Denies any flares. On occasion, he has cough.Denies shortness of breath. He used to smoke. Denies any reflux. Denies any pain or swelling in joints.  Reports stiffness for 10 minutes.     Past Medical History   Diagnosis Date    Hyperlipidemia     Basal cell carcinoma      medial canthus       Review of Systems   Constitutional: Negative for fever, chills, appetite change and fatigue.   HENT: Negative for hearing loss, mouth sores, rhinorrhea, sinus pressure and trouble swallowing.    Eyes: Negative for photophobia, pain, discharge, itching and visual disturbance.   Respiratory:  Negative for cough, choking, chest tightness, wheezing and stridor.    Cardiovascular: Negative for chest pain and palpitations.   Gastrointestinal: Negative for blood in stool and abdominal distention.   Endocrine: Negative for cold intolerance and heat intolerance.   Genitourinary: Negative for dysuria, hematuria and flank pain.   Musculoskeletal: Positive for myalgias, back pain, joint swelling,  arthralgias.  Skin: Negative for color change, pallor and rash.   Neurological: Negative for dizziness, light-headedness, numbness and headaches.   Hematological: Negative for adenopathy. Does not bruise/bleed easily.   Psychiatric/Behavioral: Negative for decreased concentration and agitation. The patient is not nervous/anxious.            Objective:        Physical Exam   Constitutional: He is oriented to person, place, and time and well-developed, well-nourished, and in no distress. No distress.   HENT:   Head: Normocephalic and atraumatic.   Right Ear: External ear normal.   Left Ear: External ear normal.   Nose: Nose normal.   Mouth/Throat: Oropharynx is clear and moist. No oropharyngeal exudate.   Eyes: Conjunctivae and EOM are normal. Pupils are equal, round, and reactive to light. Right eye exhibits no discharge. Left eye exhibits no discharge. No scleral icterus.   Neck: Normal range of motion. Neck supple. No JVD present. No tracheal deviation present. No thyromegaly present.   Cardiovascular: Normal rate, regular rhythm and intact distal pulses.  Exam reveals no gallop and no friction rub.    No murmur heard.  Pulmonary/Chest: Effort normal and breath sounds normal. No stridor. No respiratory distress. He has no wheezes. He has no rales. He exhibits no tenderness.   Abdominal: Soft. Bowel sounds are normal. He exhibits no distension. There is no tenderness. There is no rebound.   Lymphadenopathy:     He has no cervical adenopathy.   Neurological: He is alert and oriented to person, place, and time.   Skin: Skin is warm. He is not diaphoretic.     Musculoskeletal:   Shoulders- decreased ROM of both shoulders to 130 degrees bilaterally (resoved)  Elbows- rheumatoid nodules in extensor surfaces bilaterally; mild restriction in extension of right elbow  Wrist- synovitis with decreased extension bilaterally (resolved)  Hands- synovitis of mcps on right side, worse on the right  Knees-bony hypertrophy but no  effusions or tenderness; crepitus  Ankles- no tenderness  Feet-bilaterally mtp tenderness resolved           Labs:  Robel: 1:320  dna-1:160<160  Esr-61<44   ccp-306  rf-876  Ro,la-negative  Nicole, rnp -negtayive   Hepatitis B-negative  Hepatitis C-negative  Urine-negative      Imaging:  I personally reviewed the xrays      CT chest (2020): I personally reviewed; Findings compatible with interstitial lung disease including UIP.  Findings appear grossly similar compared to prior examination dated 03/25/2020.     Stable pulmonary nodule within the inferior lingula measuring approximately 0.5 cm.  No new pulmonary nodule or mass.     Centrilobular and paraseptal emphysematous changes with an upper lobe predominance.     Coronary artery atherosclerosis.       Arthritis survey:      12/2015: hand x rays- no erosions  12/2015: feet xray: no erosions    Chest xray (2/2016)-negative      Assessment:     66 yo male with PMH of  Squamous cell carcinoma (2010, 2020), HL here for  Follow up of  Seropositive RA.  He has seropositive RA with nodules. He also had serologies consistent with early SLE given (+ROBEL and +DNA) with no other features of  SLE.  Tried plaquenil but reports it gave him dizziness after a few doses.  Regarding his arthritis, he was doing well on MTX 6 pills a week but developed cough. Had  CT chest concerning for UIP.  Given his severe RA,  I put him on rinvoq with improvement but then had  SBO.  I told him given risk of intestinal perforation with rinvoq, I recommend switching him to Rituxan.  Patient not interested in starting infusions so put him on Arava which is controlling his symptoms.  Plan:    -continue  Arava 10mg po qday (Risks and benefits of initiating MTX discussed. Patient aware that risks include and not limited to liver abnormalities, cell count abnormalities, malignancy, and allergic  reaction to medication. Patient agrees with starting medication.)  Labs every 3 months   - follow up with   "dermatology evaluation given history of skin cancer  ---continue folic acid 1 mg po qday  Off MTX given UIP      given history of +DNA  Avoid anti tnf  Avoid orencia given emphysema on CT SCAN    # vitamin D deficiency: Continue vit D once a week   labs  Before next visit      # lower back pain: appears radicular. Discussed doing xray but he declines.    -continue baclofen 10mg  ( take one to two tablets at bedtime)      #UIP:  Had recent Findings compatible with interstitial lung disease including UIP.  Findings appear grossly similar compared to prior examination dated 03/25/2020.   "Stable pulmonary nodule within the inferior lingula measuring approximately 0.5 cm.  No new pulmonary nodule or mass.Centrilobular and paraseptal emphysematous changes with an upper lobe predominance.   Coronary artery atherosclerosis." I have asked patient that it is critical he     Needs to follow up with  Pulmonary and stressed importance again at last visit and today.    The patient location is: home  The chief complaint leading to consultation is: joint pain    Visit type: audiovisual    Face to Face time with patient: 30   minutes of total time spent on the encounter, which includes face to face time and non-face to face time preparing to see the patient (eg, review of tests), Obtaining and/or reviewing separately obtained history, Documenting clinical information in the electronic or other health record, Independently interpreting results (not separately reported) and communicating results to the patient/family/caregiver, or Care coordination (not separately reported).         Each patient to whom he or she provides medical services by telemedicine is:  (1) informed of the relationship between the physician and patient and the respective role of any other health care provider with respect to management of the patient; and (2) notified that he or she may decline to receive medical services by telemedicine and may withdraw from " such care at any time.    Notes:

## 2024-01-04 NOTE — PROGRESS NOTES
12/28/2023     4:45 AM   Rapid3 Question Responses and Scores   MDHAQ Score 0.3   Psychologic Score 0   Pain Score 0.5   When you awakened in the morning OVER THE LAST WEEK, did you feel stiff? No   Fatigue Score 0.5   Global Health Score 0.5   RAPID3 Score 0.67     Answers submitted by the patient for this visit:  Rheumatology Questionnaire (Submitted on 12/28/2023)  fever: No  eye redness: No  mouth sores: No  headaches: No  shortness of breath: Yes  chest pain: No  trouble swallowing: No  diarrhea: No  constipation: No  unexpected weight change: No  genital sore: No  During the last 3 days, have you had a skin rash?: No  Bruises or bleeds easily: No  cough: Yes

## 2024-01-04 NOTE — PROGRESS NOTES
Subjective:       Patient ID: Fabiano Garvey is a 60 y.o. male.    Chief Complaint: OTHER and Swelling    HPI     59 yo male with PMH of basal cell cancer (around 2010), HL here for evaluation of join pain.  Reports pain in hands, elbows. and wrists.  Reports  swelling in hands, ankles, and feet.  Reports also has bilateral knee pain and shoulders.   Reports stiffness in morning for 30 minutes.  He has trouble swelling. Denies any rashes. No oral ulcers. No hair loss. Denies photosensitivity.  Denies any raynauds.Denies blood clots.  Denies dry eyes and dry mouth.  Reports symptoms for a year. In November, he noted the nodules in elbows.  Thinks he has swelling in lower neck.  He has trouble picking up things due to pain and swelling.  He reports that prednisone improves h is pain and swelling.  He has been off steroids.  No changes in weight.  Reports good appetite.      Interval history: he had bowel obstruction May 8 s/p repair.   He is on rinvoq. Denies any flares. On occasion, he has cough.Denies shortness of breath. He used to smoke. H. Denies any reflux. Denies any pain or swelling in joints.  Reports stiffness for 10 minutes.     Past Medical History   Diagnosis Date    Hyperlipidemia     Basal cell carcinoma      medial canthus       Review of Systems   Constitutional: Negative for fever, chills, appetite change and fatigue.   HENT: Negative for hearing loss, mouth sores, rhinorrhea, sinus pressure and trouble swallowing.    Eyes: Negative for photophobia, pain, discharge, itching and visual disturbance.   Respiratory:  Negative for cough, choking, chest tightness, wheezing and stridor.    Cardiovascular: Negative for chest pain and palpitations.   Gastrointestinal: Negative for blood in stool and abdominal distention.   Endocrine: Negative for cold intolerance and heat intolerance.   Genitourinary: Negative for dysuria, hematuria and flank pain.   Musculoskeletal: Positive for myalgias, back pain, joint  swelling, arthralgias.  Skin: Negative for color change, pallor and rash.   Neurological: Negative for dizziness, light-headedness, numbness and headaches.   Hematological: Negative for adenopathy. Does not bruise/bleed easily.   Psychiatric/Behavioral: Negative for decreased concentration and agitation. The patient is not nervous/anxious.            Objective:        Physical Exam   Constitutional: He is oriented to person, place, and time and well-developed, well-nourished, and in no distress. No distress.   HENT:   Head: Normocephalic and atraumatic.   Right Ear: External ear normal.   Left Ear: External ear normal.   Nose: Nose normal.   Mouth/Throat: Oropharynx is clear and moist. No oropharyngeal exudate.   Eyes: Conjunctivae and EOM are normal. Pupils are equal, round, and reactive to light. Right eye exhibits no discharge. Left eye exhibits no discharge. No scleral icterus.   Neck: Normal range of motion. Neck supple. No JVD present. No tracheal deviation present. No thyromegaly present.   Cardiovascular: Normal rate, regular rhythm and intact distal pulses.  Exam reveals no gallop and no friction rub.    No murmur heard.  Pulmonary/Chest: Effort normal and breath sounds normal. No stridor. No respiratory distress. He has no wheezes. He has no rales. He exhibits no tenderness.   Abdominal: Soft. Bowel sounds are normal. He exhibits no distension. There is no tenderness. There is no rebound.   Lymphadenopathy:     He has no cervical adenopathy.   Neurological: He is alert and oriented to person, place, and time.   Skin: Skin is warm. He is not diaphoretic.     Musculoskeletal:   Shoulders- decreased ROM of both shoulders to 130 degrees bilaterally (resoved)  Elbows- rheumatoid nodules in extensor surfaces bilaterally; mild restriction in extension of right elbow  Wrist- synovitis with decreased extension bilaterally (resolved)  Hands- synovitis of mcps on right side, worse on the right  Knees-bony hypertrophy  but no effusions or tenderness; crepitus  Ankles- no tenderness  Feet-bilaterally mtp tenderness resolved           Labs:  Robel: 1:320  dna-1:160<160  Esr-61<44   ccp-306  rf-876  Ro,la-negative  Nicole, rnp -negtayive   Hepatitis B-negative  Hepatitis C-negative  Urine-negative      Imaging:  I personally reviewed the xrays      CT chest (2020): I personally reviewed; Findings compatible with interstitial lung disease including UIP.  Findings appear grossly similar compared to prior examination dated 03/25/2020.     Stable pulmonary nodule within the inferior lingula measuring approximately 0.5 cm.  No new pulmonary nodule or mass.     Centrilobular and paraseptal emphysematous changes with an upper lobe predominance.     Coronary artery atherosclerosis.       Arthritis survey:      12/2015: hand x rays- no erosions  12/2015: feet xray: no erosions    Chest xray (2/2016)-negative      Assessment:     66 yo male with PMH of  Squamous cell carcinoma (2010, 2020), HL here for  Follow up of  Seropositive RA.  He has seropositive RA with nodules. He also had serologies consistent with early SLE given (+ROBEL and +DNA) with no other features of  SLE.  Tried plaquenil but reports it gave him dizziness after a few doses.  Regarding his arthritis, he was doing well on MTX 6 pills a week but developed cough. Had  CT chest concerning for UIP.  Given his severe RA,  I put him on rinvoq with improvement but he recently had SBO.  I told him given risk of intestinal perforation with rinvoq, I recommend switching him to Rituxan.  Patient not interested in starting infusions at this time, so will try ARAVA.  Plan:    -start Arava 10mg po qday (Risks and benefits of initiating MTX discussed. Patient aware that risks include and not limited to liver abnormalities, cell count abnormalities, malignancy, and allergic  reaction to medication. Patient agrees with starting medication.)  Labs in 4 weeks    * - follow up with  dermatology  "evaluation given history of skin cancer  --stop rinvoq 15 mg po q day  -continue folic acid 1 mg po qday  Off MTX given UIP  Labs xrays    given history of +DNA  Avoid anti tnf  Avoid orencia given emphysema on CT SCAN    # vitamin D deficiency: Continue vit D once a week   labs  Before next visit      # lower back pain: appears radicular. Discussed doing xray but he declines.    -continue baclofen 10mg  ( take one to two tablets at bedtime)      #UIP:  Had recent Findings compatible with interstitial lung disease including UIP.  Findings appear grossly similar compared to prior examination dated 03/25/2020.   "Stable pulmonary nodule within the inferior lingula measuring approximately 0.5 cm.  No new pulmonary nodule or mass.Centrilobular and paraseptal emphysematous changes with an upper lobe predominance.   Coronary artery atherosclerosis." I have asked patient that it is critical he     Needs to follow up with  Pulmonary and stressed importance again at last visit and today.    The patient location is: home  The chief complaint leading to consultation is: joint pain    Visit type: audiovisual    Face to Face time with patient: 30   minutes of total time spent on the encounter, which includes face to face time and non-face to face time preparing to see the patient (eg, review of tests), Obtaining and/or reviewing separately obtained history, Documenting clinical information in the electronic or other health record, Independently interpreting results (not separately reported) and communicating results to the patient/family/caregiver, or Care coordination (not separately reported).         Each patient to whom he or she provides medical services by telemedicine is:  (1) informed of the relationship between the physician and patient and the respective role of any other health care provider with respect to management of the patient; and (2) notified that he or she may decline to receive medical services by " telemedicine and may withdraw from such care at any time.    Notes:

## 2024-01-21 ENCOUNTER — PATIENT MESSAGE (OUTPATIENT)
Dept: ADMINISTRATIVE | Facility: OTHER | Age: 69
End: 2024-01-21
Payer: MEDICARE

## 2024-01-29 ENCOUNTER — OFFICE VISIT (OUTPATIENT)
Dept: DERMATOLOGY | Facility: CLINIC | Age: 69
End: 2024-01-29
Payer: MEDICARE

## 2024-01-29 DIAGNOSIS — L57.0 AK (ACTINIC KERATOSIS): ICD-10-CM

## 2024-01-29 DIAGNOSIS — L81.4 LENTIGO: ICD-10-CM

## 2024-01-29 DIAGNOSIS — L82.1 SK (SEBORRHEIC KERATOSIS): ICD-10-CM

## 2024-01-29 DIAGNOSIS — Z85.828 PERSONAL HISTORY OF SKIN CANCER: Primary | ICD-10-CM

## 2024-01-29 DIAGNOSIS — D48.5 NEOPLASM OF UNCERTAIN BEHAVIOR OF SKIN: ICD-10-CM

## 2024-01-29 PROCEDURE — 3288F FALL RISK ASSESSMENT DOCD: CPT | Mod: CPTII,S$GLB,, | Performed by: DERMATOLOGY

## 2024-01-29 PROCEDURE — 17000 DESTRUCT PREMALG LESION: CPT | Mod: XS,S$GLB,, | Performed by: DERMATOLOGY

## 2024-01-29 PROCEDURE — 99213 OFFICE O/P EST LOW 20 MIN: CPT | Mod: 25,S$GLB,, | Performed by: DERMATOLOGY

## 2024-01-29 PROCEDURE — 1126F AMNT PAIN NOTED NONE PRSNT: CPT | Mod: CPTII,S$GLB,, | Performed by: DERMATOLOGY

## 2024-01-29 PROCEDURE — 11102 TANGNTL BX SKIN SINGLE LES: CPT | Mod: S$GLB,,, | Performed by: DERMATOLOGY

## 2024-01-29 PROCEDURE — 1160F RVW MEDS BY RX/DR IN RCRD: CPT | Mod: CPTII,S$GLB,, | Performed by: DERMATOLOGY

## 2024-01-29 PROCEDURE — 11103 TANGNTL BX SKIN EA SEP/ADDL: CPT | Mod: S$GLB,,, | Performed by: DERMATOLOGY

## 2024-01-29 PROCEDURE — 88305 TISSUE EXAM BY PATHOLOGIST: CPT | Mod: 26,,, | Performed by: PATHOLOGY

## 2024-01-29 PROCEDURE — 88305 TISSUE EXAM BY PATHOLOGIST: CPT | Performed by: PATHOLOGY

## 2024-01-29 PROCEDURE — 99999 PR PBB SHADOW E&M-EST. PATIENT-LVL III: CPT | Mod: PBBFAC,,, | Performed by: DERMATOLOGY

## 2024-01-29 PROCEDURE — 1101F PT FALLS ASSESS-DOCD LE1/YR: CPT | Mod: CPTII,S$GLB,, | Performed by: DERMATOLOGY

## 2024-01-29 PROCEDURE — 17003 DESTRUCT PREMALG LES 2-14: CPT | Mod: S$GLB,,, | Performed by: DERMATOLOGY

## 2024-01-29 PROCEDURE — 1159F MED LIST DOCD IN RCRD: CPT | Mod: CPTII,S$GLB,, | Performed by: DERMATOLOGY

## 2024-01-29 NOTE — PROGRESS NOTES
Subjective:      Patient ID:  Fabiano Garvey is a 68 y.o. male who presents for   Chief Complaint   Patient presents with    Skin Check     ubse    Lesion     L lower eyelid    Follow-up     S/p efudex     Pt here today for UBSE  This is a high risk patient here to check for the development of new lesions.    Pt here for follow up of Efudex/fluorouracil treatment   Pt used the medication on left lower lateral forearm- HAK, R shoulder  SCC in situ 2x per day for 3wks   Pt's states the reaction was mild     Final Pathologic Diagnosis       Date                     Value               Ref Range           Status                09/28/2023                                                       Final             1. Skin, left lower lateral forearm, shave biopsy: -SQUAMOUS CELL CARCINOMA IN-SITU (ARISING IN A BACKGROUND OF A HYPERTROPHIC ACTINIC KERATOSIS) -THE SQUAMOUS CELL CARCINOMA IN-SITU COMPONENT APPEARS EXCISED IN THE PLANES OF SECTION EXAMINED  This lesion is skin cancer. You will be contacted regarding treatment.  2. Skin, right shoulder, shave biopsy: -HYPERTROPHIC ACTINIC KERATOSIS, VERRUCOUS  This lesion is atypical and further treatment may be required. You will be contacted by your provider's office.      Comment:    Interp By Chip Styles M.D., Signed on 10/04/2023 at 12:47  ----------               Patient with new complaint of lesion(s)  Location: left lower eyelid  Duration: 1-2mos  Symptoms: none  Relieving factors/Previous treatments: none          Review of Systems   Skin:  Positive for activity-related sunscreen use and wears hat.   Hematologic/Lymphatic: Bruises/bleeds easily.       Objective:   Physical Exam   Constitutional: He appears well-developed and well-nourished. No distress.   Neurological: He is alert and oriented to person, place, and time. He is not disoriented.   Psychiatric: He has a normal mood and affect.   Skin:   Areas Examined (abnormalities noted in diagram):   Scalp / Hair  Palpated and Inspected  Head / Face Inspection Performed  Neck Inspection Performed  Chest / Axilla Inspection Performed  Abdomen Inspection Performed  Back Inspection Performed  RUE Inspected  LUE Inspection Performed  LLE Inspection Performed                                   Diagram Legend     Erythematous scaling macule/papule c/w actinic keratosis       Vascular papule c/w angioma      Pigmented verrucoid papule/plaque c/w seborrheic keratosis      Yellow umbilicated papule c/w sebaceous hyperplasia      Irregularly shaped tan macule c/w lentigo     1-2 mm smooth white papules consistent with Milia      Movable subcutaneous cyst with punctum c/w epidermal inclusion cyst      Subcutaneous movable cyst c/w pilar cyst      Firm pink to brown papule c/w dermatofibroma      Pedunculated fleshy papule(s) c/w skin tag(s)      Evenly pigmented macule c/w junctional nevus     Mildly variegated pigmented, slightly irregular-bordered macule c/w mildly atypical nevus      Flesh colored to evenly pigmented papule c/w intradermal nevus       Pink pearly papule/plaque c/w basal cell carcinoma      Erythematous hyperkeratotic cursted plaque c/w SCC      Surgical scar with no sign of skin cancer recurrence      Open and closed comedones      Inflammatory papules and pustules      Verrucoid papule consistent consistent with wart     Erythematous eczematous patches and plaques     Dystrophic onycholytic nail with subungual debris c/w onychomycosis     Umbilicated papule    Erythematous-base heme-crusted tan verrucoid plaque consistent with inflamed seborrheic keratosis     Erythematous Silvery Scaling Plaque c/w Psoriasis     See annotation      Assessment / Plan:      Pathology Orders:       Normal Orders This Visit    Specimen to Pathology, Dermatology     Questions:    Procedure Type: Dermatology and skin neoplasms    Number of Specimens: 2    ------------------------: -------------------------    Spec 1 Procedure: Biopsy     Spec 1 Clinical Impression: r/o inflamed keratosis v SCC In situ    Spec 1 Source: Left  posterior ear    ------------------------: -------------------------    Spec 2 Procedure: Biopsy    Spec 2 Clinical Impression: r/o pigmented BCC v other    Spec 2 Source: left upper chest    Release to patient:           Personal history of skin cancer  Area(s) of previous NMSC evaluated with no signs of recurrence.    Upper body skin examination performed today including at least 6 points as noted in physical examination. Suspicious lesions noted.    Recommend daily sun protection/avoidance and use of at least SPF 30, broad spectrum sunscreen (OTC drug).     AK (actinic keratosis)  Cryosurgery Procedure Note    Verbal consent from the patient is obtained including, but not limited to, risk of hypopigmentation/hyperpigmentation, scar, recurrence of lesion. The patient is aware of the precancerous quality and need for treatment of these lesions. Liquid nitrogen cryosurgery is applied to the 11 actinic keratoses, as detailed in the physical exam, to produce a freeze injury. The patient is aware that blisters may form and is instructed on wound care with gentle cleansing and use of vaseline ointment to keep moist until healed. The patient is supplied a handout on cryosurgery and is instructed to call if lesions do not completely resolve.    Neoplasm of uncertain behavior of skin x 2   Shave biopsy procedure note:    Shave biopsy performed after verbal consent including risk of infection, scar, recurrence, need for additional treatment of site. Area prepped with alcohol, anesthetized with approximately 1.0cc of 1% lidocaine with epinephrine. Lesional tissue shaved with razor blade. Hemostasis achieved with application of aluminum chloride followed by hyfrecation. No complications. Dressing applied. Wound care explained.    -     Specimen to Pathology, Dermatology  If biopsy positive, schedule procedure for definitive excision.  - L  upper chest  If biopsy positive for malignancy, refer to Dr. Graham for Mohs surgery consultation. - L post ear    SK (seborrheic keratosis)  These are benign inherited growths without a malignant potential. Reassurance given to patient. No treatment is necessary.     Lentigo  This is a benign hyperpigmented sun induced lesion. Recommend daily sun protection/avoidance and use of at least SPF 30, broad spectrum sunscreen (OTC drug) will reduce the number of new lesions. Treatment of these benign lesions are considered cosmetic.  The nature of sun-induced photo-aging and skin cancers is discussed.  Sun avoidance, protective clothing, and the use of 30-SPF sunscreens is advised. Observe closely for skin damage/changes, and call if such occurs.             Follow up in about 3 months (around 4/29/2024) for prn bx report.

## 2024-01-29 NOTE — PATIENT INSTRUCTIONS
Shave Biopsy Wound Care    Your doctor has performed a shave biopsy today.  A band aid and vaseline ointment has been placed over the site.  This should remain in place for NO LONGER THAN 48 hours.  It is fine to remove the bandaid after 24 hours, if the area is no longer bleeding. It is recommended that you keep the area dry (do not wet)) for the first 24 hours.  After 24 hours, wash the area with warm soap and water and apply Vaseline jelly.  Many patients prefer to use Neosporin or Bacitracin ointment.  This is acceptable; however, know that you can develop an allergy to this medication even if you have used it safely for years.  It is important to keep the area moist.  Letting it dry out and get air slows healing time, and will worsen the scar.        If you notice increasing redness, tenderness, pain, or yellow drainage at the biopsy site, please notify your doctor.  These are signs of an infection.    If your biopsy site is bleeding, apply firm pressure for 15 minutes straight.  Repeat for another 15 minutes, if it is still bleeding.   If the surgical site continues to bleed, then please contact your doctor.      For [facility] users:   You will receive your biopsy results in [facility] as soon as they are available. Please be assured that your physician/provider will review your results and will then determine what further treatment, evaluation, or planning is required. You should be contacted by your physician's/provider's office within 5 business days of receiving your results; If not, please reach out to directly. This is one more way [facility] is putting you first.     [facility] [place] [address]/ [phone] FAX/ [email or web address]

## 2024-01-30 DIAGNOSIS — Z00.00 ENCOUNTER FOR MEDICARE ANNUAL WELLNESS EXAM: ICD-10-CM

## 2024-02-01 LAB
FINAL PATHOLOGIC DIAGNOSIS: NORMAL
GROSS: NORMAL
Lab: NORMAL
MICROSCOPIC EXAM: NORMAL

## 2024-02-02 ENCOUNTER — TELEPHONE (OUTPATIENT)
Dept: DERMATOLOGY | Facility: CLINIC | Age: 69
End: 2024-02-02
Payer: MEDICARE

## 2024-02-12 ENCOUNTER — PATIENT MESSAGE (OUTPATIENT)
Dept: FAMILY MEDICINE | Facility: CLINIC | Age: 69
End: 2024-02-12
Payer: MEDICARE

## 2024-02-12 DIAGNOSIS — R05.9 COUGH, UNSPECIFIED TYPE: Primary | ICD-10-CM

## 2024-02-12 DIAGNOSIS — D84.9 IMMUNOSUPPRESSED STATUS: ICD-10-CM

## 2024-02-12 DIAGNOSIS — J84.10 PULMONARY FIBROSIS: ICD-10-CM

## 2024-02-12 DIAGNOSIS — M06.9 RHEUMATOID ARTHRITIS, INVOLVING UNSPECIFIED SITE, UNSPECIFIED WHETHER RHEUMATOID FACTOR PRESENT: ICD-10-CM

## 2024-02-14 ENCOUNTER — HOSPITAL ENCOUNTER (OUTPATIENT)
Dept: RADIOLOGY | Facility: HOSPITAL | Age: 69
Discharge: HOME | End: 2024-02-14
Attending: FAMILY MEDICINE
Payer: MEDICARE

## 2024-02-14 DIAGNOSIS — J84.10 PULMONARY FIBROSIS: ICD-10-CM

## 2024-02-14 DIAGNOSIS — R05.9 COUGH, UNSPECIFIED TYPE: ICD-10-CM

## 2024-02-14 DIAGNOSIS — D84.9 IMMUNOSUPPRESSED STATUS: ICD-10-CM

## 2024-02-14 DIAGNOSIS — M06.9 RHEUMATOID ARTHRITIS, INVOLVING UNSPECIFIED SITE, UNSPECIFIED WHETHER RHEUMATOID FACTOR PRESENT: ICD-10-CM

## 2024-02-14 PROCEDURE — 71046 X-RAY EXAM CHEST 2 VIEWS: CPT | Mod: 26,,, | Performed by: RADIOLOGY

## 2024-02-14 PROCEDURE — 71046 X-RAY EXAM CHEST 2 VIEWS: CPT | Mod: TC,FY

## 2024-02-15 ENCOUNTER — PATIENT MESSAGE (OUTPATIENT)
Dept: FAMILY MEDICINE | Facility: CLINIC | Age: 69
End: 2024-02-15
Payer: MEDICARE

## 2024-02-15 NOTE — TELEPHONE ENCOUNTER
Hi just a note that your labs were stable and chest x-ray thankfully did not show any sign of pneumonia.  If you are continuing to have symptoms we may need to further evaluate in the office.  You can send a message to my staff to help with getting an appointment if necessary.

## 2024-02-24 ENCOUNTER — PATIENT MESSAGE (OUTPATIENT)
Dept: ADMINISTRATIVE | Facility: OTHER | Age: 69
End: 2024-02-24
Payer: MEDICARE

## 2024-02-25 DIAGNOSIS — E78.2 MIXED HYPERLIPIDEMIA: ICD-10-CM

## 2024-02-25 RX ORDER — ATORVASTATIN CALCIUM 40 MG/1
40 TABLET, FILM COATED ORAL DAILY
Qty: 90 TABLET | Refills: 1 | Status: SHIPPED | OUTPATIENT
Start: 2024-02-25

## 2024-02-25 NOTE — TELEPHONE ENCOUNTER
No care due was identified.  [facility] Embedded Care Due Messages. Reference number: [id number].   2/25/2024 5:08:46 AM CST

## 2024-02-25 NOTE — TELEPHONE ENCOUNTER
Refill Decision Note   Fabiano Garvey  is requesting a refill authorization.  Brief Assessment and Rationale for Refill:  Approve     Medication Therapy Plan:         Comments:     Note composed:7:26 AM 02/25/2024

## 2024-02-26 PROBLEM — K56.50 SMALL BOWEL OBSTRUCTION DUE TO ADHESIONS: Status: RESOLVED | Noted: 2023-05-10 | Resolved: 2024-02-26

## 2024-02-26 NOTE — PROGRESS NOTES
Fabiano Staffordjoselito presented for a  Medicare AWV and comprehensive Health Risk Assessment today. The following components were reviewed and updated:    Medical history  Family History  Social history  Allergies and Current Medications  Health Risk Assessment  Health Maintenance  Care Team         ** See Completed Assessments for Annual Wellness Visit within the encounter summary.**         The following assessments were completed:  Living Situation  CAGE  Depression Screening  Timed Get Up and Go  Whisper Test  Cognitive Function Screening    Nutrition Screening  ADL Screening  PAQ Screening      Review for Opioid Screening: Pt does not have Rx for Opioids      Review for Substance Use Disorders: Patient does not use substance        Current Outpatient Medications:     albuterol (VENTOLIN HFA) 90 mcg/actuation inhaler, Inhale 2 puffs into the lungs every 6 (six) hours as needed for Wheezing (cough). Rescue, Disp: 18 g, Rfl: 2    atorvastatin (LIPITOR) 40 MG tablet, Take 1 tablet (40 mg total) by mouth once daily., Disp: 90 tablet, Rfl: 1    celecoxib (CELEBREX) 200 MG capsule, Take 1 capsule (200 mg total) by mouth 2 (two) times daily., Disp: 60 capsule, Rfl: 2    ergocalciferol, vitamin D2, (VITAMIN D ORAL), Take 2,000 Units by mouth once daily., Disp: , Rfl:     fluorouraciL (EFUDEX) 5 % cream, Apply thin film to right shoulder and left lower forearm 2times per day for 21 days; d/c if area bleeding or ulcerated; avoid eyes or mouth, Disp: 40 g, Rfl: 1    gabapentin (NEURONTIN) 300 MG capsule, Take 2 capsules (600 mg total) by mouth 3 (three) times daily., Disp: 180 capsule, Rfl: 11    leflunomide (ARAVA) 10 MG Tab, Take 1 tablet (10 mg total) by mouth once daily., Disp: 30 tablet, Rfl: 3    tiotropium (SPIRIVA WITH HANDIHALER) 18 mcg inhalation capsule, Inhale 1 capsule (18 mcg total) into the lungs once daily. Controller, Disp: 30 capsule, Rfl: 6       Vitals:    02/27/24 0850   BP: 110/62   Pulse: 100   Weight:  "68.1 kg (150 lb 2.1 oz)   Height: 5' 6" (1.676 m)   PainSc: 0-No pain      Physical Exam  Vitals and nursing note reviewed.   Constitutional:       General: He is not in acute distress.     Appearance: Normal appearance. He is not ill-appearing.   HENT:      Head: Normocephalic and atraumatic.      Mouth/Throat:      Mouth: Mucous membranes are moist.   Eyes:      General: No scleral icterus.        Right eye: No discharge.         Left eye: No discharge.      Extraocular Movements: Extraocular movements intact.      Conjunctiva/sclera: Conjunctivae normal.   Cardiovascular:      Rate and Rhythm: Normal rate.   Pulmonary:      Effort: Pulmonary effort is normal. No respiratory distress.   Musculoskeletal:      Cervical back: Normal range of motion.   Skin:     General: Skin is warm and dry.      Findings: No rash.   Neurological:      Mental Status: He is alert and oriented to person, place, and time.   Psychiatric:         Mood and Affect: Mood normal.         Behavior: Behavior normal. Behavior is cooperative.         Cognition and Memory: Cognition and memory normal.             Diagnoses and health risks identified today and associated recommendations/orders:    1. Encounter for preventive health examination  - Chart reviewed. Problem list updated. Discussed current medical diagnosis, current medications, medical/surgical/family/social history; updated provider list; documented vital signs; identified any cognitive impairment; and updated risk factor list. Addressed any outstanding health maintenance. Provided patient with personalized health advice. Continue to follow up with PCP and any specialists.     2. Centrilobular emphysema  Chronic; stable on current treatment plan; follow up with PCP  - using albuterol and spiriva  - follow up with pulmonology     3. ILD (interstitial lung disease)  Chronic; stable on current treatment plan; follow up with PCP  - follow up with pulmonology     4. Aorto-iliac " atherosclerosis - seen on Xray lumbar June 2023  Chronic; stable on current treatment plan; follow up with PCP  - taking statin    5. Rheumatoid arthritis of other site with positive rheumatoid factor  Chronic; stable on current treatment plan; follow up with PCP  - follow up with rheumatology  - taking arava    6. Immunosuppressed status  Chronic; stable on current treatment plan; follow up with PCP  - follow up with rheumatology  - taking arava    7. Other hyperlipidemia  Chronic; stable on current treatment plan; follow up with PCP  - taking statin    8. Spinal stenosis of lumbar region with neurogenic claudication  Chronic; stable on current treatment plan; follow up with PCP  - follow up with pain med     9. Obstructive sleep apnea  Chronic; stable on current treatment plan; follow up with PCP  - uses cPAP nightly     10. Personal history of tobacco use  - overdue for lung Cancer screening, order placed  - CT Chest Lung Screening Low Dose; Future    11. Weight loss, abnormal  - 10-12 lb loss over past 1 month per weights on chart; patient reports having sinus illness/sinus drip and he has significantly reduced the amount of food he has been eating.   - he is overdue for lung CT screening, order placed  - PSA due in June, last normal  - up to date with colonoscopy - no nausea, vomiting, diarrhea, or change in bowel habits; no abdominal pain  - appt scheduled with PCP for further evaluation    12. BMI 24.0-24.9, adult  - Recommendation for healthy diet and  exercise as tolerated with goal of 150min/week         Provided Fabiano with a 5-10 year written screening schedule and personal prevention plan. Recommendations were developed using the USPSTF age appropriate recommendations. Education, counseling, and referrals were provided as needed. After Visit Summary printed and given to patient which includes a list of additional screenings\tests needed.    Follow up in about 1 year (around 2/27/2025) for your next annual  wellness visit.    Esther Dickens, FNP-C    Advance Care Planning     I offered to discuss advanced care planning, including how to pick a person who would make decisions for you if you were unable to make them for yourself, called a health care power of , and what kind of decisions you might make such as use of life sustaining treatments such as ventilators and tube feeding when faced with a life limiting illness recorded on a living will that they will need to know. (How you want to be cared for as you near the end of your natural life)     X Patient is interested in learning more about how to make advanced directives.  I provided them paperwork and offered to discuss this with them.

## 2024-02-27 ENCOUNTER — OFFICE VISIT (OUTPATIENT)
Dept: FAMILY MEDICINE | Facility: CLINIC | Age: 69
End: 2024-02-27
Payer: MEDICARE

## 2024-02-27 VITALS
HEART RATE: 100 BPM | SYSTOLIC BLOOD PRESSURE: 110 MMHG | BODY MASS INDEX: 24.13 KG/M2 | WEIGHT: 150.13 LBS | HEIGHT: 66 IN | DIASTOLIC BLOOD PRESSURE: 62 MMHG

## 2024-02-27 DIAGNOSIS — G47.33 OBSTRUCTIVE SLEEP APNEA: ICD-10-CM

## 2024-02-27 DIAGNOSIS — J84.9 ILD (INTERSTITIAL LUNG DISEASE): ICD-10-CM

## 2024-02-27 DIAGNOSIS — J43.2 CENTRILOBULAR EMPHYSEMA: ICD-10-CM

## 2024-02-27 DIAGNOSIS — M05.7A RHEUMATOID ARTHRITIS OF OTHER SITE WITH POSITIVE RHEUMATOID FACTOR: ICD-10-CM

## 2024-02-27 DIAGNOSIS — Z00.00 ENCOUNTER FOR PREVENTIVE HEALTH EXAMINATION: Primary | ICD-10-CM

## 2024-02-27 DIAGNOSIS — I70.0 AORTO-ILIAC ATHEROSCLEROSIS: ICD-10-CM

## 2024-02-27 DIAGNOSIS — E78.49 OTHER HYPERLIPIDEMIA: ICD-10-CM

## 2024-02-27 DIAGNOSIS — R63.4 WEIGHT LOSS, ABNORMAL: ICD-10-CM

## 2024-02-27 DIAGNOSIS — M48.062 SPINAL STENOSIS OF LUMBAR REGION WITH NEUROGENIC CLAUDICATION: ICD-10-CM

## 2024-02-27 DIAGNOSIS — D84.9 IMMUNOSUPPRESSED STATUS: ICD-10-CM

## 2024-02-27 DIAGNOSIS — Z87.891 PERSONAL HISTORY OF TOBACCO USE: ICD-10-CM

## 2024-02-27 DIAGNOSIS — I70.8 AORTO-ILIAC ATHEROSCLEROSIS: ICD-10-CM

## 2024-02-27 PROCEDURE — 1101F PT FALLS ASSESS-DOCD LE1/YR: CPT | Mod: CPTII,S$GLB,, | Performed by: NURSE PRACTITIONER

## 2024-02-27 PROCEDURE — 1170F FXNL STATUS ASSESSED: CPT | Mod: CPTII,S$GLB,, | Performed by: NURSE PRACTITIONER

## 2024-02-27 PROCEDURE — 1126F AMNT PAIN NOTED NONE PRSNT: CPT | Mod: CPTII,S$GLB,, | Performed by: NURSE PRACTITIONER

## 2024-02-27 PROCEDURE — 1159F MED LIST DOCD IN RCRD: CPT | Mod: CPTII,S$GLB,, | Performed by: NURSE PRACTITIONER

## 2024-02-27 PROCEDURE — G0439 PPPS, SUBSEQ VISIT: HCPCS | Mod: S$GLB,,, | Performed by: NURSE PRACTITIONER

## 2024-02-27 PROCEDURE — 3078F DIAST BP <80 MM HG: CPT | Mod: CPTII,S$GLB,, | Performed by: NURSE PRACTITIONER

## 2024-02-27 PROCEDURE — 3074F SYST BP LT 130 MM HG: CPT | Mod: CPTII,S$GLB,, | Performed by: NURSE PRACTITIONER

## 2024-02-27 PROCEDURE — 99999 PR PBB SHADOW E&M-EST. PATIENT-LVL IV: CPT | Mod: PBBFAC,,, | Performed by: NURSE PRACTITIONER

## 2024-02-27 PROCEDURE — 1160F RVW MEDS BY RX/DR IN RCRD: CPT | Mod: CPTII,S$GLB,, | Performed by: NURSE PRACTITIONER

## 2024-02-27 PROCEDURE — 3288F FALL RISK ASSESSMENT DOCD: CPT | Mod: CPTII,S$GLB,, | Performed by: NURSE PRACTITIONER

## 2024-02-27 NOTE — PATIENT INSTRUCTIONS
Counseling and Referral of Other Preventative  (Italic type indicates deductible and co-insurance are waived)    Patient Name: Fabiano Garvey  Today's Date: 2/27/2024    Health Maintenance       Date Due Completion Date    LDCT Lung Screen 09/24/2021 9/24/2020    Shingles Vaccine (1 of 2) 03/01/2024 (Originally 7/19/1974) ---    COVID-19 Vaccine (7 - 2023-24 season) 03/01/2024 (Originally 12/22/2023) 10/27/2023    RSV Vaccine (Age 60+ and Pregnant patients) (1 - 1-dose 60+ series) 03/01/2024 (Originally 7/19/2014) ---    PROSTATE-SPECIFIC ANTIGEN 06/15/2024 6/15/2023    High Dose Statin 02/27/2025 2/27/2024    Colorectal Cancer Screening 07/25/2027 7/25/2017    Lipid Panel 06/15/2028 6/15/2023    TETANUS VACCINE 06/28/2032 6/28/2022        Orders Placed This Encounter   Procedures    CT Chest Lung Screening Low Dose       The following information is provided to all patients.  This information is to help you find resources for any of the problems found today that may be affecting your health:                  Living healthy guide: www.[facility].[place].gov      Understanding Diabetes: www.diabetes.org      Eating healthy: www.cdc.gov/healthyweight      CDC home safety checklist: www.cdc.gov/steadi/patient.html      Agency on Aging: www.goea.[place].[facility]      Alcoholics anonymous (AA): www.aa.org      Physical Activity: www.myah.nih.gov/he0fxmg      Tobacco use: www.quitwithusla.org

## 2024-02-28 ENCOUNTER — OFFICE VISIT (OUTPATIENT)
Dept: DERMATOLOGY | Facility: CLINIC | Age: 69
End: 2024-02-28
Payer: MEDICARE

## 2024-02-28 DIAGNOSIS — L82.1 SK (SEBORRHEIC KERATOSIS): ICD-10-CM

## 2024-02-28 DIAGNOSIS — L91.8 SKIN TAG: ICD-10-CM

## 2024-02-28 DIAGNOSIS — C44.519 BCC (BASAL CELL CARCINOMA), CHEST: Primary | ICD-10-CM

## 2024-02-28 PROCEDURE — 1159F MED LIST DOCD IN RCRD: CPT | Mod: CPTII,S$GLB,, | Performed by: DERMATOLOGY

## 2024-02-28 PROCEDURE — 1101F PT FALLS ASSESS-DOCD LE1/YR: CPT | Mod: CPTII,S$GLB,, | Performed by: DERMATOLOGY

## 2024-02-28 PROCEDURE — 17262 DSTRJ MAL LES T/A/L 1.1-2.0: CPT | Mod: S$GLB,,, | Performed by: DERMATOLOGY

## 2024-02-28 PROCEDURE — 99999 PR PBB SHADOW E&M-EST. PATIENT-LVL III: CPT | Mod: PBBFAC,,, | Performed by: DERMATOLOGY

## 2024-02-28 PROCEDURE — 1126F AMNT PAIN NOTED NONE PRSNT: CPT | Mod: CPTII,S$GLB,, | Performed by: DERMATOLOGY

## 2024-02-28 PROCEDURE — 3288F FALL RISK ASSESSMENT DOCD: CPT | Mod: CPTII,S$GLB,, | Performed by: DERMATOLOGY

## 2024-02-28 PROCEDURE — 99212 OFFICE O/P EST SF 10 MIN: CPT | Mod: 25,S$GLB,, | Performed by: DERMATOLOGY

## 2024-02-28 NOTE — PROGRESS NOTES
Subjective:      Patient ID:  Fabiano Garvey is a 68 y.o. male who presents for   Chief Complaint   Patient presents with    Skin Cancer     ED&C - L upper chest     Pt here today for ED&C - L upper chest; also here for recheck of sk with actinic change son left ear. Pt feels lesion has resolved  Also has skin tag under left eye. Getting larger. No tx.      Final Pathologic Diagnosis       1. Skin, left posterior ear, shave biopsy:   -SEBORRHEIC KERATOSIS WITH ACTINIC CHANGES, IRRITATED AND INFLAMED      2. Skin, left upper chest, shave biopsy:   -BASAL CELL CARCINOMA (PIGMENTED), NODULAR AND SUPERFICIAL TYPE, EXCISED IN THE PLANES OF SECTION EXAMINED  This lesion is skin cancer. You will be contacted regarding treatment.                 Review of Systems   Skin:  Positive for activity-related sunscreen use and wears hat.   Hematologic/Lymphatic: Bruises/bleeds easily.       Objective:   Physical Exam   Constitutional: He appears well-developed and well-nourished. No distress.   Neurological: He is alert and oriented to person, place, and time. He is not disoriented.   Psychiatric: He has a normal mood and affect.   Skin:   Areas Examined (abnormalities noted in diagram):   Chest / Axilla Inspection Performed                 Diagram Legend     Erythematous scaling macule/papule c/w actinic keratosis       Vascular papule c/w angioma      Pigmented verrucoid papule/plaque c/w seborrheic keratosis      Yellow umbilicated papule c/w sebaceous hyperplasia      Irregularly shaped tan macule c/w lentigo     1-2 mm smooth white papules consistent with Milia      Movable subcutaneous cyst with punctum c/w epidermal inclusion cyst      Subcutaneous movable cyst c/w pilar cyst      Firm pink to brown papule c/w dermatofibroma      Pedunculated fleshy papule(s) c/w skin tag(s)      Evenly pigmented macule c/w junctional nevus     Mildly variegated pigmented, slightly irregular-bordered macule c/w mildly atypical nevus       Flesh colored to evenly pigmented papule c/w intradermal nevus       Pink pearly papule/plaque c/w basal cell carcinoma      Erythematous hyperkeratotic cursted plaque c/w SCC      Surgical scar with no sign of skin cancer recurrence      Open and closed comedones      Inflammatory papules and pustules      Verrucoid papule consistent consistent with wart     Erythematous eczematous patches and plaques     Dystrophic onycholytic nail with subungual debris c/w onychomycosis     Umbilicated papule    Erythematous-base heme-crusted tan verrucoid plaque consistent with inflamed seborrheic keratosis     Erythematous Silvery Scaling Plaque c/w Psoriasis     See annotation      Assessment / Plan:        BCC (basal cell carcinoma), chest  Here for electrodesiccation and curettage of BCC on the left upper chest. bx done on 01/29/2024:      Electrodessication and Curettage Procedure note:    Verbal consent obtained. Lesional tissue marked and prepped with alcohol. Lesion anesthetized with 1% lidocaine with epinephrine. Curettage and Desiccation x 3 cycles to base. Aluminum chloride for hemostasis. Lesion size after primary curettage: 1.5 cm    Area bandaged and wound care explained.    SKIN TAG  Reassurance given to patient. No treatment is necessary.   Treatment of benign, asymptomatic lesions may be considered cosmetic.    SEBHORREIC KERATOSIS with actinic changes left posterior ear  S/p bx and not recurring            Follow up in about 6 months (around 8/28/2024) for UBSE.

## 2024-02-28 NOTE — PATIENT INSTRUCTIONS
We would like to see you back in the clinic in 6 months.  You will be able to schedule this appointment by calling or by using your [facility] portal 3 months before this time. Because our schedule fills so quickly, please set a reminder in your phone or on your calendar to schedule 3 months before you are due to come in so that we can see you in a timely fashion.  You should also receive a reminder from us in the mail. This will help us ensure we can continue to provide excellent healthcare for you. Thank you.      FIrst week of  May you can schedule your August appt

## 2024-03-05 ENCOUNTER — PATIENT MESSAGE (OUTPATIENT)
Dept: FAMILY MEDICINE | Facility: CLINIC | Age: 69
End: 2024-03-05
Payer: MEDICARE

## 2024-03-05 ENCOUNTER — HOSPITAL ENCOUNTER (OUTPATIENT)
Dept: RADIOLOGY | Facility: HOSPITAL | Age: 69
Discharge: HOME | End: 2024-03-05
Attending: NURSE PRACTITIONER
Payer: MEDICARE

## 2024-03-05 DIAGNOSIS — R91.1 PULMONARY NODULE, RIGHT: Primary | ICD-10-CM

## 2024-03-05 DIAGNOSIS — J84.9 ILD (INTERSTITIAL LUNG DISEASE): ICD-10-CM

## 2024-03-05 DIAGNOSIS — J43.2 CENTRILOBULAR EMPHYSEMA: ICD-10-CM

## 2024-03-05 DIAGNOSIS — Z87.891 PERSONAL HISTORY OF TOBACCO USE: ICD-10-CM

## 2024-03-05 PROCEDURE — 71271 CT THORAX LUNG CANCER SCR C-: CPT | Mod: TC

## 2024-03-05 PROCEDURE — 71271 CT THORAX LUNG CANCER SCR C-: CPT | Mod: 26,,, | Performed by: RADIOLOGY

## 2024-03-05 NOTE — TELEPHONE ENCOUNTER
New lung nodule noted on CT, needs pulmonology follow-up.  Had seen Dr. Singh in the past.  Referral placed.

## 2024-03-06 ENCOUNTER — TELEPHONE (OUTPATIENT)
Dept: ADMINISTRATIVE | Facility: OTHER | Age: 69
End: 2024-03-06
Payer: MEDICARE

## 2024-03-07 ENCOUNTER — TELEPHONE (OUTPATIENT)
Dept: PULMONOLOGY | Facility: CLINIC | Age: 69
End: 2024-03-07
Payer: MEDICARE

## 2024-03-07 ENCOUNTER — OFFICE VISIT (OUTPATIENT)
Dept: PULMONOLOGY | Facility: CLINIC | Age: 69
End: 2024-03-07
Payer: MEDICARE

## 2024-03-07 VITALS
BODY MASS INDEX: 24.83 KG/M2 | HEIGHT: 65 IN | SYSTOLIC BLOOD PRESSURE: 99 MMHG | WEIGHT: 149 LBS | DIASTOLIC BLOOD PRESSURE: 68 MMHG | HEART RATE: 98 BPM | OXYGEN SATURATION: 95 %

## 2024-03-07 DIAGNOSIS — J43.2 CENTRILOBULAR EMPHYSEMA: ICD-10-CM

## 2024-03-07 DIAGNOSIS — R91.1 PULMONARY NODULE, RIGHT: ICD-10-CM

## 2024-03-07 DIAGNOSIS — R91.1 LUNG NODULE: Primary | ICD-10-CM

## 2024-03-07 DIAGNOSIS — J84.9 ILD (INTERSTITIAL LUNG DISEASE): ICD-10-CM

## 2024-03-07 PROCEDURE — 1101F PT FALLS ASSESS-DOCD LE1/YR: CPT | Mod: CPTII,S$GLB,, | Performed by: INTERNAL MEDICINE

## 2024-03-07 PROCEDURE — 1159F MED LIST DOCD IN RCRD: CPT | Mod: CPTII,S$GLB,, | Performed by: INTERNAL MEDICINE

## 2024-03-07 PROCEDURE — 3288F FALL RISK ASSESSMENT DOCD: CPT | Mod: CPTII,S$GLB,, | Performed by: INTERNAL MEDICINE

## 2024-03-07 PROCEDURE — 99999 PR PBB SHADOW E&M-EST. PATIENT-LVL IV: CPT | Mod: PBBFAC,,, | Performed by: INTERNAL MEDICINE

## 2024-03-07 PROCEDURE — 3074F SYST BP LT 130 MM HG: CPT | Mod: CPTII,S$GLB,, | Performed by: INTERNAL MEDICINE

## 2024-03-07 PROCEDURE — 3008F BODY MASS INDEX DOCD: CPT | Mod: CPTII,S$GLB,, | Performed by: INTERNAL MEDICINE

## 2024-03-07 PROCEDURE — 1160F RVW MEDS BY RX/DR IN RCRD: CPT | Mod: CPTII,S$GLB,, | Performed by: INTERNAL MEDICINE

## 2024-03-07 PROCEDURE — 3078F DIAST BP <80 MM HG: CPT | Mod: CPTII,S$GLB,, | Performed by: INTERNAL MEDICINE

## 2024-03-07 PROCEDURE — 1126F AMNT PAIN NOTED NONE PRSNT: CPT | Mod: CPTII,S$GLB,, | Performed by: INTERNAL MEDICINE

## 2024-03-07 PROCEDURE — 99214 OFFICE O/P EST MOD 30 MIN: CPT | Mod: S$GLB,,, | Performed by: INTERNAL MEDICINE

## 2024-03-07 NOTE — ASSESSMENT & PLAN NOTE
No restriction on PFTs. Will reorder PFTs. Patient with rheumatoid arthritis and will consider referral to advance lung disease.

## 2024-03-07 NOTE — PROGRESS NOTES
"Subjective:      Patient ID: Fabiano Garvey is a 68 y.o. male.    Chief Complaint: No chief complaint on file.    65 year old male with RA on Rinvoq who presents for evaluation for abnormal CT chest.   Patient states that he went to his PCP earlier this year.   He was found to have ILD on CT chest. Concern that Methotrexate contributing to CT findings.   Former smoker. Quit in 2012.   Denies respiratory symptoms today.      Interval hx 11/10/2021: No acute issues.      Interval hx: 12/1/2022: No issues today. On Spiriva.     Interval hx 3/7/2024: CT chest ordered for yearly screening. The largest opacity in the right lung appears solid and measures 0.9 cm on series 2, image 51, new.  The largest opacity in the left lung appears solid and measures 0.6 cm on series 2, image 76.  Doing well on Spiriva.       Review of Systems   Respiratory:  Positive for cough, sputum production and dyspnea on extertion.      Objective:     Physical Exam   Constitutional: He is oriented to person, place, and time. He appears well-developed and well-nourished.   HENT:   Head: Normocephalic.   Pulmonary/Chest: Normal expansion and effort normal. He has no rhonchi. He has no rales.   Abdominal: Soft. Bowel sounds are normal.   Neurological: He is alert and oriented to person, place, and time.   Skin: Skin is warm and dry.   Psychiatric: He has a normal mood and affect. His behavior is normal.   Nursing note and vitals reviewed.    Personal Diagnostic Review  none pertinent      3/7/2024    12:45 PM 2/27/2024     8:50 AM 1/4/2024    11:11 AM 12/20/2023     2:04 PM 10/4/2023     2:57 PM 6/20/2023     2:13 PM 5/22/2023     1:36 PM   Pulmonary Function Tests   SpO2 95 %    95 %  94 %   Height 5' 5" (1.651 m) 5' 6" (1.676 m) 5' 6" (1.676 m) 5' 6" (1.676 m) 5' 6" (1.676 m) 5' 6" (1.676 m) 5' 6" (1.676 m)   Weight 67.6 kg (149 lb) 68.1 kg (150 lb 2.1 oz) 74.1 kg (163 lb 5.8 oz) 73.6 kg (162 lb 4.1 oz) 73.6 kg (162 lb 4.1 oz) 72.6 kg (160 lb " 0.9 oz) 72.6 kg (160 lb 0.9 oz)   BMI (Calculated) 24.8 24.2 26.4 26.2 26.2 25.8 25.8        Assessment:     1. Lung nodule    2. ILD (interstitial lung disease)    3. Centrilobular emphysema    4. Pulmonary nodule, right         Outpatient Encounter Medications as of 3/7/2024   Medication Sig Dispense Refill    albuterol (VENTOLIN HFA) 90 mcg/actuation inhaler Inhale 2 puffs into the lungs every 6 (six) hours as needed for Wheezing (cough). Rescue 18 g 2    atorvastatin (LIPITOR) 40 MG tablet Take 1 tablet (40 mg total) by mouth once daily. 90 tablet 1    celecoxib (CELEBREX) 200 MG capsule Take 1 capsule (200 mg total) by mouth 2 (two) times daily. 60 capsule 2    ergocalciferol, vitamin D2, (VITAMIN D ORAL) Take 2,000 Units by mouth once daily.      fluorouraciL (EFUDEX) 5 % cream Apply thin film to right shoulder and left lower forearm 2times per day for 21 days; d/c if area bleeding or ulcerated; avoid eyes or mouth 40 g 1    gabapentin (NEURONTIN) 300 MG capsule Take 2 capsules (600 mg total) by mouth 3 (three) times daily. 180 capsule 11    leflunomide (ARAVA) 10 MG Tab Take 1 tablet (10 mg total) by mouth once daily. 30 tablet 3    tiotropium (SPIRIVA WITH HANDIHALER) 18 mcg inhalation capsule Inhale 1 capsule (18 mcg total) into the lungs once daily. Controller 30 capsule 6     No facility-administered encounter medications on file as of 3/7/2024.     Orders Placed This Encounter   Procedures    Complete PFT with bronchodilator     Standing Status:   Future     Standing Expiration Date:   3/7/2025     Order Specific Question:   Release to patient     Answer:   Immediate       Plan:     1. Lung nodule  Assessment & Plan:  Multiple nodules. Largest 0.9mm. Will get follow up CT in 3 months.           2. ILD (interstitial lung disease)  Assessment & Plan:  No restriction on PFTs. Will reorder PFTs. Patient with rheumatoid arthritis and will consider referral to advance lung disease.     Orders:  -      Ambulatory referral/consult to Pulmonology    3. Centrilobular emphysema  Assessment & Plan:  Will increase to triple therapy if PFts not improved.     Orders:  -     Ambulatory referral/consult to Pulmonology  -     Complete PFT with bronchodilator; Future    4. Pulmonary nodule, right  -     Ambulatory referral/consult to Pulmonology

## 2024-03-07 NOTE — TELEPHONE ENCOUNTER
----- Message from Natasha Singh MD sent at 3/6/2024  9:50 AM CST -----  Thanks Thai Cordova I want see Mr. Garvey with next available appointment.    Thanks  Natasha  ----- Message -----  From: Esther Dickens, FNP-C  Sent: 3/6/2024   9:31 AM CST  To: Natasha Singh MD    Good morning, This patient had a repeat CT as routine screening and has new nodules with mediastinal lymphadenopathy. Can you please have your office reach out to him to schedule a follow up consultation with you? I am not sure if Dr Buchanan has already forwarded this to you. Thank you so much

## 2024-03-13 ENCOUNTER — LAB VISIT (OUTPATIENT)
Dept: LAB | Facility: HOSPITAL | Age: 69
End: 2024-03-13
Attending: INTERNAL MEDICINE
Payer: MEDICARE

## 2024-03-13 DIAGNOSIS — M06.9 RHEUMATOID ARTHRITIS INVOLVING MULTIPLE JOINTS: ICD-10-CM

## 2024-03-13 DIAGNOSIS — M94.9 DISORDER OF CARTILAGE, UNSPECIFIED: ICD-10-CM

## 2024-03-13 LAB
25(OH)D3+25(OH)D2 SERPL-MCNC: 32 NG/ML (ref 30–96)
ALBUMIN SERPL BCP-MCNC: 2.9 G/DL (ref 3.5–5.2)
ALP SERPL-CCNC: 82 U/L (ref 55–135)
ALT SERPL W/O P-5'-P-CCNC: 14 U/L (ref 10–44)
ANION GAP SERPL CALC-SCNC: 7 MMOL/L (ref 8–16)
AST SERPL-CCNC: 21 U/L (ref 10–40)
BASOPHILS # BLD AUTO: 0.06 K/UL (ref 0–0.2)
BASOPHILS NFR BLD: 0.6 % (ref 0–1.9)
BILIRUB SERPL-MCNC: 0.3 MG/DL (ref 0.1–1)
BUN SERPL-MCNC: 13 MG/DL (ref 8–23)
CALCIUM SERPL-MCNC: 9.3 MG/DL (ref 8.7–10.5)
CHLORIDE SERPL-SCNC: 106 MMOL/L (ref 95–110)
CO2 SERPL-SCNC: 26 MMOL/L (ref 23–29)
CREAT SERPL-MCNC: 0.9 MG/DL (ref 0.5–1.4)
CRP SERPL-MCNC: 13.1 MG/L (ref 0–8.2)
DIFFERENTIAL METHOD BLD: ABNORMAL
EOSINOPHIL # BLD AUTO: 0.4 K/UL (ref 0–0.5)
EOSINOPHIL NFR BLD: 3.7 % (ref 0–8)
ERYTHROCYTE [DISTWIDTH] IN BLOOD BY AUTOMATED COUNT: 15.3 % (ref 11.5–14.5)
ERYTHROCYTE [SEDIMENTATION RATE] IN BLOOD BY PHOTOMETRIC METHOD: 38 MM/HR (ref 0–23)
EST. GFR  (NO RACE VARIABLE): >60 ML/MIN/1.73 M^2
GLUCOSE SERPL-MCNC: 67 MG/DL (ref 70–110)
HCT VFR BLD AUTO: 44.3 % (ref 40–54)
HGB BLD-MCNC: 14.1 G/DL (ref 14–18)
IMM GRANULOCYTES # BLD AUTO: 0.02 K/UL (ref 0–0.04)
IMM GRANULOCYTES NFR BLD AUTO: 0.2 % (ref 0–0.5)
LYMPHOCYTES # BLD AUTO: 2.7 K/UL (ref 1–4.8)
LYMPHOCYTES NFR BLD: 27.7 % (ref 18–48)
MCH RBC QN AUTO: 29.7 PG (ref 27–31)
MCHC RBC AUTO-ENTMCNC: 31.8 G/DL (ref 32–36)
MCV RBC AUTO: 93 FL (ref 82–98)
MONOCYTES # BLD AUTO: 0.8 K/UL (ref 0.3–1)
MONOCYTES NFR BLD: 8.3 % (ref 4–15)
NEUTROPHILS # BLD AUTO: 5.7 K/UL (ref 1.8–7.7)
NEUTROPHILS NFR BLD: 59.5 % (ref 38–73)
NRBC BLD-RTO: 0 /100 WBC
PLATELET # BLD AUTO: 255 K/UL (ref 150–450)
PMV BLD AUTO: 10.9 FL (ref 9.2–12.9)
POTASSIUM SERPL-SCNC: 4.1 MMOL/L (ref 3.5–5.1)
PROT SERPL-MCNC: 7.9 G/DL (ref 6–8.4)
RBC # BLD AUTO: 4.75 M/UL (ref 4.6–6.2)
SODIUM SERPL-SCNC: 139 MMOL/L (ref 136–145)
WBC # BLD AUTO: 9.56 K/UL (ref 3.9–12.7)

## 2024-03-13 PROCEDURE — 36415 COLL VENOUS BLD VENIPUNCTURE: CPT | Mod: PO | Performed by: INTERNAL MEDICINE

## 2024-03-13 PROCEDURE — 86140 C-REACTIVE PROTEIN: CPT | Performed by: INTERNAL MEDICINE

## 2024-03-13 PROCEDURE — 85652 RBC SED RATE AUTOMATED: CPT | Performed by: INTERNAL MEDICINE

## 2024-03-13 PROCEDURE — 85025 COMPLETE CBC W/AUTO DIFF WBC: CPT | Performed by: INTERNAL MEDICINE

## 2024-03-13 PROCEDURE — 82306 VITAMIN D 25 HYDROXY: CPT | Performed by: INTERNAL MEDICINE

## 2024-03-13 PROCEDURE — 80053 COMPREHEN METABOLIC PANEL: CPT | Performed by: INTERNAL MEDICINE

## 2024-03-16 DIAGNOSIS — M06.9 RHEUMATOID ARTHRITIS FLARE: Primary | ICD-10-CM

## 2024-03-20 ENCOUNTER — HOSPITAL ENCOUNTER (OUTPATIENT)
Dept: PULMONOLOGY | Facility: HOSPITAL | Age: 69
Discharge: HOME | End: 2024-03-20
Attending: INTERNAL MEDICINE
Payer: MEDICARE

## 2024-03-20 DIAGNOSIS — J43.2 CENTRILOBULAR EMPHYSEMA: ICD-10-CM

## 2024-03-26 ENCOUNTER — OFFICE VISIT (OUTPATIENT)
Dept: FAMILY MEDICINE | Facility: CLINIC | Age: 69
End: 2024-03-26
Payer: MEDICARE

## 2024-03-26 VITALS
HEIGHT: 65 IN | DIASTOLIC BLOOD PRESSURE: 72 MMHG | HEART RATE: 97 BPM | BODY MASS INDEX: 25.08 KG/M2 | OXYGEN SATURATION: 97 % | SYSTOLIC BLOOD PRESSURE: 106 MMHG | WEIGHT: 150.56 LBS

## 2024-03-26 DIAGNOSIS — R63.4 WEIGHT LOSS: ICD-10-CM

## 2024-03-26 DIAGNOSIS — J84.9 ILD (INTERSTITIAL LUNG DISEASE): Primary | ICD-10-CM

## 2024-03-26 DIAGNOSIS — J43.2 CENTRILOBULAR EMPHYSEMA: ICD-10-CM

## 2024-03-26 DIAGNOSIS — R91.1 PULMONARY NODULE, RIGHT: ICD-10-CM

## 2024-03-26 PROCEDURE — 3074F SYST BP LT 130 MM HG: CPT | Mod: CPTII,S$GLB,, | Performed by: FAMILY MEDICINE

## 2024-03-26 PROCEDURE — 1101F PT FALLS ASSESS-DOCD LE1/YR: CPT | Mod: CPTII,S$GLB,, | Performed by: FAMILY MEDICINE

## 2024-03-26 PROCEDURE — 3078F DIAST BP <80 MM HG: CPT | Mod: CPTII,S$GLB,, | Performed by: FAMILY MEDICINE

## 2024-03-26 PROCEDURE — 99214 OFFICE O/P EST MOD 30 MIN: CPT | Mod: S$GLB,,, | Performed by: FAMILY MEDICINE

## 2024-03-26 PROCEDURE — G2211 COMPLEX E/M VISIT ADD ON: HCPCS | Mod: S$GLB,,, | Performed by: FAMILY MEDICINE

## 2024-03-26 PROCEDURE — 99999 PR PBB SHADOW E&M-EST. PATIENT-LVL III: CPT | Mod: PBBFAC,,, | Performed by: FAMILY MEDICINE

## 2024-03-26 PROCEDURE — 3008F BODY MASS INDEX DOCD: CPT | Mod: CPTII,S$GLB,, | Performed by: FAMILY MEDICINE

## 2024-03-26 PROCEDURE — 1126F AMNT PAIN NOTED NONE PRSNT: CPT | Mod: CPTII,S$GLB,, | Performed by: FAMILY MEDICINE

## 2024-03-26 PROCEDURE — 1159F MED LIST DOCD IN RCRD: CPT | Mod: CPTII,S$GLB,, | Performed by: FAMILY MEDICINE

## 2024-03-26 PROCEDURE — 3288F FALL RISK ASSESSMENT DOCD: CPT | Mod: CPTII,S$GLB,, | Performed by: FAMILY MEDICINE

## 2024-03-26 NOTE — PROGRESS NOTES
(Portions of this note were dictated using voice recognition software and may contain dictation related errors in spelling/grammar/syntax not found on text review)    CC:   Chief Complaint   Patient presents with    Follow-up       HPI: 68 y.o. male  annual 10/2023    Hyperlipidemia: On Lipitor 40 mg daily      RA, followed by rheumatology.  Last visit from 07/03/2023. Currently on leflunomide. was on Rinvoq but bc of admission for SBO and concern for risk of intestinal perforation with Rinvoq was stopped  . Was on MTX in past but was off due to interstitial lung disease. Did not tolerate Plaquenil.     COPD/ILD:  CT scan from 09/24/2020.  Similar findings from March 2020.  Has a stable pulmonary nodule inferior lingula 0.5 cm.  . Most recent CT low-dose screening shows new 9 mm pulmonary nodule, mediastinal lymphadenopathy, extensive pulmonary emphysema, findings of ILD.  Planning on serial CT follow up through pulmonology. followed with pulmonology on 03/07/2024 .  Currently on Spiriva.  Planning on possible triple therapy after updated PFTs           Sleep apnea, currently using CPAP regularly. 5-6 hours night sleep, normal for him, no daytime drowsiness..  Does have a full CT chest without contrast scheduled    Mild elevation A1c 5.8      Lumbar radiculopathy,   Visit with pain management.  MRI showed multilevel degenerative change most significant L4-L5 where there is moderate to severe degree of spinal canal narrowing moderate right foraminal narrowing and mild left foraminal narrowing.  L3-L4 demonstrates right paracentral disc extrusion extending below the level of the disc plane resulting in some narrowing of the thecal sac..  Had JOSE through pain management in June and July of 2021 feels like injections didn't really help. PT helped for a while but sx came back. Saw NS, had lumbar surgery/fusion 12/2022. Last NS visit on 6/20/23    SCC in situ right lateral cheek, follows with dermatology.   Recent SCC in  Situ of forearm removed, given 5 FU cream.  Has had cryotherapy  for AKs     Had lost weight recently but attributes this to significant respiratory infection during which time he really did not have much of an appetite and was not eating much.  He feels like weight is stabilized however.  P.o. intake improve back to his baseline.    Past Medical History:   Diagnosis Date    Basal cell carcinoma 01/2024    left upper chest    Degenerative disc disease, lumbar 06/16/2021    Hyperlipidemia     NU (obstructive sleep apnea)     Pulmonary fibrosis     Rheumatoid arthritis     SCC (squamous cell carcinoma) 07/2020    SCC right medial canthus    SCC (squamous cell carcinoma) 11/2021    mid lower neck     SCC (squamous cell carcinoma) 01/2022    right lateral cheek- in situ    SCC (squamous cell carcinoma) 09/2023    in situ L lower forearm and HAK R shoulder    Small bowel obstruction due to adhesions 05/10/2023    Squamous cell carcinoma 07/2019    SCC in situ left temple    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    R lateral cheek       Past Surgical History:   Procedure Laterality Date    COLONOSCOPY N/A 7/25/2017    Procedure: COLONOSCOPY;  Surgeon: Bill Nicole MD;  Location: [facility] ENDO [address]);  Service: Endoscopy;  Laterality: N/A;    DIAGNOSTIC LAPAROSCOPY N/A 5/10/2023    Procedure: LAPAROSCOPY, DIAGNOSTIC possible laparotomy other as indicated;  Surgeon: Eitan Palomares MD;  Location: [facility] OR;  Service: General;  Laterality: N/A;    EPIDURAL STEROID INJECTION INTO LUMBAR SPINE N/A 7/29/2021    Procedure: Injection-steroid-epidural-lumbar L5-S1;  Surgeon: Shiv Vernon Jr., MD;  Location: [facility] PAIN MGT;  Service: Pain Management;  Laterality: N/A;  oral sedation   no pacemaker     FUSION OF SPINE WITH INSTRUMENTATION Left 12/14/2022    Procedure: FUSION, SPINE, WITH INSTRUMENTATION Stg1: Left L3-5 OLiF conduit lateral cage BMP;  Surgeon: August Aguilar MD;  Location:  [facility] OR;  Service: Neurosurgery;  Laterality: Left;  Procedure: Stg1: Left L3-5 OLiF conduit lateral cage BMP  Surgery Time: 2hrs  LOS: 3  Anesthesia: General  Blood: Type & Screen  Radiology: C-arm  Brace: LSO  Bed: Regular Bed  Position: Lateral Right Down  Equip    FUSION, SPINE, POSTERIOR APPROACH N/A 12/14/2022    Procedure: FUSION,SPINE,POSTERIOR APPROACH Stg 2: L3-5 posterior instrumentation viper prime, L3-5 posterolateral arthrodesis;  Surgeon: August Aguilar MD;  Location: [facility] OR;  Service: Neurosurgery;  Laterality: N/A;  Procedure: Stg 2: L3-5 posterior instrumentation viper prime, L3-5 posterolateral arthrodesis  Surgery Time: 3hrs  LOS: 3  Anesthesia: General  Blood: Type & Screen  Radiology: Bra    LYSIS OF ADHESIONS N/A 5/10/2023    Procedure: LYSIS, ADHESIONS;  Surgeon: Eitan Palomares MD;  Location: [facility] OR;  Service: General;  Laterality: N/A;    NO PAST SURGERIES      TRANSFORAMINAL EPIDURAL INJECTION OF STEROID Right 6/29/2021    Procedure: Injection,steroid,epidural,transforaminal approach-RIGHT-- L4-5, L5-S1-- (IV Sedation);  Surgeon: Shiv Vernon Jr., MD;  Location: [facility] PAIN MGT;  Service: Pain Management;  Laterality: Right;    TRANSFORAMINAL EPIDURAL INJECTION OF STEROID Left 10/27/2022    Procedure: Injection,steroid,epidural,transforaminal left L4-5 and L5-S1;  Surgeon: Shiv Vernon Jr., MD;  Location: [facility] PAIN MGT;  Service: Pain Management;  Laterality: Left;       Family History   Problem Relation Age of Onset    Heart failure Mother         60s    Coronary artery disease Father         70s    Heart attack Father     Arthritis Sister     Arthritis Brother     Asthma Daughter     Gallbladder disease Daughter     Seizures Daughter     Cancer Paternal Uncle         lymphoma??    Diabetes Neg Hx     Melanoma Neg Hx        Social History     Tobacco Use    Smoking status: Former     Current packs/day: 0.00     Average packs/day: 0.8 packs/day for 37.5 years (28.1 ttl pk-yrs)      Types: Cigarettes     Start date:      Quit date: 2012     Years since quittin.7     Passive exposure: Never    Smokeless tobacco: Never   Substance Use Topics    Alcohol use: Yes     Comment: occasional    Drug use: Never     Lab Results   Component Value Date    WBC 9.56 2024    HGB 14.1 2024    HCT 44.3 2024    MCV 93 2024     2024    CHOL 154 06/15/2023    TRIG 103 06/15/2023    HDL 48 06/15/2023    ALT 14 2024    AST 21 2024    BILITOT 0.3 2024    ALKPHOS 82 2024     2024    K 4.1 2024     2024    CREATININE 0.9 2024    CALCIUM 9.3 2024    ALBUMIN 2.9 (L) 2024    BUN 13 2024    CO2 26 2024    TSH 1.675 06/15/2023    PSA 0.85 06/15/2023    INR 1.0 2022    HGBA1C 5.8 (H) 06/15/2023    LDLCALC 85.4 06/15/2023    GLU 67 (L) 2024        Vital signs reviewed  PE:   APPEARANCE: Well nourished, well developed, in no acute distress.    HEAD: Normocephalic, atraumatic.  NECK: Supple with no cervical lymphadenopathy.  No carotid bruits.  No thyromegaly  CHEST: Good inspiratory effort. Fine bibasilar crackles.   CARDIOVASCULAR: Normal S1, S2. No rubs, murmurs, or gallops.  ABDOMEN: Bowel sounds normal. Not distended. Soft. No tenderness or masses. No organomegaly.  EXTREMITIES: No edema      IMPRESSION  1. ILD (interstitial lung disease)    2. Centrilobular emphysema    3. Pulmonary nodule, right    4. Weight loss              PLAN  No orders of the defined types were placed in this encounter.    HLD stable on lipitor 40 mg    Sleep apnea:  Continue CPAP    Rheumatoid arthritis:  Continue rheumatology follow-up    Interstitial pulmonary fibrosis with mild obstruction noted on spirometry.:  Continue pulmonology follow-up.  Continue Spiriva, planning on possible upgrade to triple therapy depending on PFT results.      Lumbar radiculopathy: continue NS f/u as directed      Weight loss: Attributed to poor diet during recent respiratory infection.  Advised on continued monitoring which may need further workup verses continued wait surveillance if stabilized.    HEALTH SCREENINGS  Immunizations:  Tdap 2022  PCV 5/12/17   PPSV 2019  COVID-19 vaccine booster eligible  Flu utd  Can get zoster at pharmacy    Age/Gender Appropriate screenings:  Colonoscopy July 2017: Single nonbleeding angiodysplastic lesion noted otherwise normal .  Repeat in 10 years  Prostate screening done  6/15/23 PSA

## 2024-04-27 ENCOUNTER — PATIENT MESSAGE (OUTPATIENT)
Dept: ADMINISTRATIVE | Facility: OTHER | Age: 69
End: 2024-04-27
Payer: MEDICARE

## 2024-04-27 RX ORDER — CELECOXIB 200 MG/1
200 CAPSULE ORAL 2 TIMES DAILY
Qty: 60 CAPSULE | Refills: 2 | Status: SHIPPED | OUTPATIENT
Start: 2024-04-27

## 2024-04-29 RX ORDER — LEFLUNOMIDE 10 MG/1
10 TABLET ORAL DAILY
Qty: 30 TABLET | Refills: 3 | Status: ACTIVE | OUTPATIENT
Start: 2024-04-29

## 2024-05-08 ENCOUNTER — PATIENT MESSAGE (OUTPATIENT)
Dept: FAMILY MEDICINE | Facility: CLINIC | Age: 69
End: 2024-05-08
Payer: MEDICARE

## 2024-06-06 ENCOUNTER — HOSPITAL ENCOUNTER (OUTPATIENT)
Dept: RADIOLOGY | Facility: HOSPITAL | Age: 69
Discharge: HOME | End: 2024-06-06
Attending: INTERNAL MEDICINE
Payer: MEDICARE

## 2024-06-06 DIAGNOSIS — R91.1 LUNG NODULE: ICD-10-CM

## 2024-06-06 PROCEDURE — 71250 CT THORAX DX C-: CPT | Mod: 26,,, | Performed by: STUDENT IN AN ORGANIZED HEALTH CARE EDUCATION/TRAINING PROGRAM

## 2024-06-06 PROCEDURE — 71250 CT THORAX DX C-: CPT | Mod: TC

## 2024-06-14 ENCOUNTER — LAB VISIT (OUTPATIENT)
Dept: LAB | Facility: HOSPITAL | Age: 69
End: 2024-06-14
Attending: INTERNAL MEDICINE
Payer: MEDICARE

## 2024-06-14 DIAGNOSIS — M06.9 RHEUMATOID ARTHRITIS INVOLVING MULTIPLE JOINTS: ICD-10-CM

## 2024-06-14 LAB
ALBUMIN SERPL BCP-MCNC: 2.8 G/DL (ref 3.5–5.2)
ALP SERPL-CCNC: 88 U/L (ref 55–135)
ALT SERPL W/O P-5'-P-CCNC: 12 U/L (ref 10–44)
ANION GAP SERPL CALC-SCNC: 7 MMOL/L (ref 8–16)
AST SERPL-CCNC: 21 U/L (ref 10–40)
BASOPHILS # BLD AUTO: 0.08 K/UL (ref 0–0.2)
BASOPHILS NFR BLD: 0.7 % (ref 0–1.9)
BILIRUB SERPL-MCNC: 0.3 MG/DL (ref 0.1–1)
BUN SERPL-MCNC: 14 MG/DL (ref 8–23)
CALCIUM SERPL-MCNC: 9.4 MG/DL (ref 8.7–10.5)
CHLORIDE SERPL-SCNC: 106 MMOL/L (ref 95–110)
CO2 SERPL-SCNC: 24 MMOL/L (ref 23–29)
CREAT SERPL-MCNC: 0.9 MG/DL (ref 0.5–1.4)
CRP SERPL-MCNC: 26.8 MG/L (ref 0–8.2)
DIFFERENTIAL METHOD BLD: ABNORMAL
EOSINOPHIL # BLD AUTO: 0.3 K/UL (ref 0–0.5)
EOSINOPHIL NFR BLD: 2.2 % (ref 0–8)
ERYTHROCYTE [DISTWIDTH] IN BLOOD BY AUTOMATED COUNT: 15.3 % (ref 11.5–14.5)
ERYTHROCYTE [SEDIMENTATION RATE] IN BLOOD BY PHOTOMETRIC METHOD: 57 MM/HR (ref 0–23)
EST. GFR  (NO RACE VARIABLE): >60 ML/MIN/1.73 M^2
GLUCOSE SERPL-MCNC: 79 MG/DL (ref 70–110)
HCT VFR BLD AUTO: 45.2 % (ref 40–54)
HGB BLD-MCNC: 14.4 G/DL (ref 14–18)
IMM GRANULOCYTES # BLD AUTO: 0.03 K/UL (ref 0–0.04)
IMM GRANULOCYTES NFR BLD AUTO: 0.3 % (ref 0–0.5)
LYMPHOCYTES # BLD AUTO: 2.5 K/UL (ref 1–4.8)
LYMPHOCYTES NFR BLD: 21 % (ref 18–48)
MCH RBC QN AUTO: 29.8 PG (ref 27–31)
MCHC RBC AUTO-ENTMCNC: 31.9 G/DL (ref 32–36)
MCV RBC AUTO: 93 FL (ref 82–98)
MONOCYTES # BLD AUTO: 0.9 K/UL (ref 0.3–1)
MONOCYTES NFR BLD: 7.8 % (ref 4–15)
NEUTROPHILS # BLD AUTO: 8 K/UL (ref 1.8–7.7)
NEUTROPHILS NFR BLD: 68 % (ref 38–73)
NRBC BLD-RTO: 0 /100 WBC
PLATELET # BLD AUTO: 294 K/UL (ref 150–450)
PMV BLD AUTO: 11.3 FL (ref 9.2–12.9)
POTASSIUM SERPL-SCNC: 4.5 MMOL/L (ref 3.5–5.1)
PROT SERPL-MCNC: 7.9 G/DL (ref 6–8.4)
RBC # BLD AUTO: 4.84 M/UL (ref 4.6–6.2)
SODIUM SERPL-SCNC: 137 MMOL/L (ref 136–145)
WBC # BLD AUTO: 11.76 K/UL (ref 3.9–12.7)

## 2024-06-14 PROCEDURE — 80053 COMPREHEN METABOLIC PANEL: CPT | Performed by: INTERNAL MEDICINE

## 2024-06-14 PROCEDURE — 36415 COLL VENOUS BLD VENIPUNCTURE: CPT | Mod: PO | Performed by: INTERNAL MEDICINE

## 2024-06-14 PROCEDURE — 85025 COMPLETE CBC W/AUTO DIFF WBC: CPT | Performed by: INTERNAL MEDICINE

## 2024-06-14 PROCEDURE — 85652 RBC SED RATE AUTOMATED: CPT | Performed by: INTERNAL MEDICINE

## 2024-06-14 PROCEDURE — 86140 C-REACTIVE PROTEIN: CPT | Performed by: INTERNAL MEDICINE

## 2024-06-26 ENCOUNTER — PATIENT MESSAGE (OUTPATIENT)
Dept: FAMILY MEDICINE | Facility: CLINIC | Age: 69
End: 2024-06-26
Payer: MEDICARE

## 2024-07-01 ENCOUNTER — PATIENT MESSAGE (OUTPATIENT)
Dept: FAMILY MEDICINE | Facility: CLINIC | Age: 69
End: 2024-07-01
Payer: MEDICARE

## 2024-07-11 ENCOUNTER — OFFICE VISIT (OUTPATIENT)
Dept: RHEUMATOLOGY | Facility: CLINIC | Age: 69
End: 2024-07-11
Payer: MEDICARE

## 2024-07-11 VITALS
DIASTOLIC BLOOD PRESSURE: 71 MMHG | BODY MASS INDEX: 24.21 KG/M2 | HEART RATE: 84 BPM | SYSTOLIC BLOOD PRESSURE: 100 MMHG | WEIGHT: 145.5 LBS

## 2024-07-11 DIAGNOSIS — Z79.899 IMMUNODEFICIENCY DUE TO DRUG THERAPY: ICD-10-CM

## 2024-07-11 DIAGNOSIS — R10.13 EPIGASTRIC PAIN: Primary | ICD-10-CM

## 2024-07-11 DIAGNOSIS — R63.4 UNINTENTIONAL WEIGHT LOSS: ICD-10-CM

## 2024-07-11 DIAGNOSIS — D84.821 IMMUNODEFICIENCY DUE TO DRUG THERAPY: ICD-10-CM

## 2024-07-11 DIAGNOSIS — M06.9 RHEUMATOID ARTHRITIS INVOLVING MULTIPLE JOINTS: ICD-10-CM

## 2024-07-11 PROCEDURE — 99999 PR PBB SHADOW E&M-EST. PATIENT-LVL III: CPT | Mod: PBBFAC,,, | Performed by: INTERNAL MEDICINE

## 2024-07-11 PROCEDURE — 1101F PT FALLS ASSESS-DOCD LE1/YR: CPT | Mod: CPTII,S$GLB,, | Performed by: INTERNAL MEDICINE

## 2024-07-11 PROCEDURE — 3008F BODY MASS INDEX DOCD: CPT | Mod: CPTII,S$GLB,, | Performed by: INTERNAL MEDICINE

## 2024-07-11 PROCEDURE — 99215 OFFICE O/P EST HI 40 MIN: CPT | Mod: S$GLB,,, | Performed by: INTERNAL MEDICINE

## 2024-07-11 PROCEDURE — 1159F MED LIST DOCD IN RCRD: CPT | Mod: CPTII,S$GLB,, | Performed by: INTERNAL MEDICINE

## 2024-07-11 PROCEDURE — 3288F FALL RISK ASSESSMENT DOCD: CPT | Mod: CPTII,S$GLB,, | Performed by: INTERNAL MEDICINE

## 2024-07-11 PROCEDURE — 3074F SYST BP LT 130 MM HG: CPT | Mod: CPTII,S$GLB,, | Performed by: INTERNAL MEDICINE

## 2024-07-11 PROCEDURE — 3078F DIAST BP <80 MM HG: CPT | Mod: CPTII,S$GLB,, | Performed by: INTERNAL MEDICINE

## 2024-07-11 PROCEDURE — 1126F AMNT PAIN NOTED NONE PRSNT: CPT | Mod: CPTII,S$GLB,, | Performed by: INTERNAL MEDICINE

## 2024-07-11 NOTE — PROGRESS NOTES
Subjective:       Patient ID: Fabiano Garvey is a 60 y.o. male.    Chief Complaint: OTHER and Swelling    HPI     59 yo male with PMH of basal cell cancer (around 2010), HL here for evaluation of join pain.  Reports pain in hands, elbows. and wrists.  Reports  swelling in hands, ankles, and feet.  Reports also has bilateral knee pain and shoulders.   Reports stiffness in morning for 30 minutes.  He has trouble swelling. Denies any rashes. No oral ulcers. No hair loss. Denies photosensitivity.  Denies any raynauds.Denies blood clots.  Denies dry eyes and dry mouth.  Reports symptoms for a year. In November, he noted the nodules in elbows.  Thinks he has swelling in lower neck.  He has trouble picking up things due to pain and swelling.  He reports that prednisone improves h is pain and swelling.  He has been off steroids.  No changes in weight.  Reports good appetite.      Interval history: He had bowel obstruction May 8 2023 s/p repair.   Denies any flares. On occasion, he has cough.Denies shortness of breath. He used to smoke. Denies any reflux. Denies any pain or swelling in joints.  Reports stiffness for 10 minutes. He has lost about 30 pounds.     Past Medical History   Diagnosis Date    Hyperlipidemia     Basal cell carcinoma      medial canthus       Review of Systems   Constitutional: Negative for fever, chills, appetite change and fatigue.   HENT: Negative for hearing loss, mouth sores, rhinorrhea, sinus pressure and trouble swallowing.    Eyes: Negative for photophobia, pain, discharge, itching and visual disturbance.   Respiratory:  Negative for cough, choking, chest tightness, wheezing and stridor.    Cardiovascular: Negative for chest pain and palpitations.   Gastrointestinal: Negative for blood in stool and abdominal distention.   Endocrine: Negative for cold intolerance and heat intolerance.   Genitourinary: Negative for dysuria, hematuria and flank pain.   Musculoskeletal: Positive for myalgias,  back pain, joint swelling, arthralgias.  Skin: Negative for color change, pallor and rash.   Neurological: Negative for dizziness, light-headedness, numbness and headaches.   Hematological: Negative for adenopathy. Does not bruise/bleed easily.   Psychiatric/Behavioral: Negative for decreased concentration and agitation. The patient is not nervous/anxious.            Objective:        Physical Exam   Constitutional: He is oriented to person, place, and time and well-developed, well-nourished, and in no distress. No distress.   HENT:   Head: Normocephalic and atraumatic.   Right Ear: External ear normal.   Left Ear: External ear normal.   Nose: Nose normal.   Mouth/Throat: Oropharynx is clear and moist. No oropharyngeal exudate.   Eyes: Conjunctivae and EOM are normal. Pupils are equal, round, and reactive to light. Right eye exhibits no discharge. Left eye exhibits no discharge. No scleral icterus.   Neck: Normal range of motion. Neck supple. No JVD present. No tracheal deviation present. No thyromegaly present.   Cardiovascular: Normal rate, regular rhythm and intact distal pulses.  Exam reveals no gallop and no friction rub.    No murmur heard.  Pulmonary/Chest: Effort normal and breath sounds normal. No stridor. No respiratory distress. He has no wheezes. He has no rales. He exhibits no tenderness.   Abdominal: Soft. Bowel sounds are normal. He exhibits no distension. There is no tenderness. There is no rebound.   Lymphadenopathy:     He has no cervical adenopathy.   Neurological: He is alert and oriented to person, place, and time.   Skin: Skin is warm. He is not diaphoretic.     Musculoskeletal:   Shoulders- decreased ROM of both shoulders to 130 degrees bilaterally (resoved)  Elbows- rheumatoid nodules in extensor surfaces bilaterally; mild restriction in extension of right elbow  Wrist- synovitis with decreased extension bilaterally (resolved)  Hands- synovitis of mcps on right side, worse on the  right  Knees-bony hypertrophy but no effusions or tenderness; crepitus  Ankles- no tenderness  Feet-bilaterally mtp tenderness resolved           Labs:  Robel: 1:320  dna-1:160<160  Esr-61<44   ccp-306  rf-876  Ro,la-negative  Nicole, rnp -negtayive   Hepatitis B-negative  Hepatitis C-negative  Urine-negative      Imaging:  I personally reviewed the xrays      CT chest (2020): I personally reviewed; Findings compatible with interstitial lung disease including UIP.  Findings appear grossly similar compared to prior examination dated 03/25/2020.     Stable pulmonary nodule within the inferior lingula measuring approximately 0.5 cm.  No new pulmonary nodule or mass.     Centrilobular and paraseptal emphysematous changes with an upper lobe predominance.     Coronary artery atherosclerosis.       Arthritis survey:      12/2015: hand x rays- no erosions  12/2015: feet xray: no erosions    Chest xray (2/2016)-negative      Assessment:     69yo male with PMH of  Squamous cell carcinoma (2010, 2020), HL here for  Follow up of  Seropositive RA.  He has seropositive RA with nodules. He also had serologies consistent with early SLE given (+ROBEL and +DNA) with no other features of  SLE.  Tried plaquenil but reports it gave him dizziness after a few doses.  Regarding his arthritis, he was doing well on MTX 6 pills a week but developed cough. Had  CT chest concerning for UIP.  Given his severe RA,  I put him on rinvoq with improvement but then had  SBO.  I told him given risk of intestinal perforation with rinvoq, I recommend switching him to Rituxan.  Patient not interested in starting infusions so put him on Arava which is controlling his symptoms.      Plan:    # seronegative RA:last labs showed increase in inflammation but he is doing well and no synovitis on exam.  Will continue to monitor   -continue  Arava 10mg po qday (Risks and benefits of initiating MTX discussed. Patient aware that risks include and not limited to liver  "abnormalities, cell count abnormalities, malignancy, and allergic  reaction to medication. Patient agrees with starting medication.)  Labs every 3 months   - follow up with  dermatology evaluation given history of skin cancer  ---continue folic acid 1 mg po qday  Off MTX given UIP  No IL- 6 given history of SBO  Given history of +DNA  Avoid anti tnf  Avoid orencia given emphysema on CT SCAN    # weight loss: he has had significant weight loss and there was lymph node noted on CT chest.  I discussed this biopsy with his pulm doctor.  Plan will be to get CT a/p first and then consider lymph node bx.    # vitamin D deficiency: Continue vit D once a week   labs  Before next visit      # lower back pain: appears radicular. Discussed doing xray but he declines.    -continue baclofen 10mg  ( take one to two tablets at bedtime)      #UIP:  Had recent Findings compatible with interstitial lung disease including UIP.  Findings appear grossly similar compared to prior examination dated 03/25/2020.   "Stable pulmonary nodule within the inferior lingula measuring approximately 0.5 cm.  No new pulmonary nodule or mass.Centrilobular and paraseptal emphysematous changes with an upper lobe predominance.   Coronary artery atherosclerosis."   Follow up with pulm    45 * minutes of total time spent on the encounter, which includes face to face time and non-face to face time preparing to see the patient (eg, review of tests), Obtaining and/or reviewing separately obtained history, Documenting clinical information in the electronic or other health record, Independently interpreting results (not separately reported) and communicating results to the patient/family/caregiver, or Care coordination (not separately reported).           "

## 2024-07-16 ENCOUNTER — LAB VISIT (OUTPATIENT)
Dept: LAB | Facility: HOSPITAL | Age: 69
End: 2024-07-16
Attending: INTERNAL MEDICINE
Payer: MEDICARE

## 2024-07-16 DIAGNOSIS — R63.4 UNINTENTIONAL WEIGHT LOSS: ICD-10-CM

## 2024-07-16 DIAGNOSIS — R10.13 EPIGASTRIC PAIN: ICD-10-CM

## 2024-07-16 DIAGNOSIS — M06.9 RHEUMATOID ARTHRITIS INVOLVING MULTIPLE JOINTS: ICD-10-CM

## 2024-07-16 LAB
ALBUMIN SERPL BCP-MCNC: 3 G/DL (ref 3.5–5.2)
ALP SERPL-CCNC: 84 U/L (ref 55–135)
ALT SERPL W/O P-5'-P-CCNC: 14 U/L (ref 10–44)
ANION GAP SERPL CALC-SCNC: 8 MMOL/L (ref 8–16)
AST SERPL-CCNC: 20 U/L (ref 10–40)
BASOPHILS # BLD AUTO: 0.09 K/UL (ref 0–0.2)
BASOPHILS NFR BLD: 0.9 % (ref 0–1.9)
BILIRUB SERPL-MCNC: 0.3 MG/DL (ref 0.1–1)
BILIRUB UR QL STRIP: NEGATIVE
BUN SERPL-MCNC: 14 MG/DL (ref 8–23)
CALCIUM SERPL-MCNC: 9.7 MG/DL (ref 8.7–10.5)
CHLORIDE SERPL-SCNC: 106 MMOL/L (ref 95–110)
CLARITY UR REFRACT.AUTO: CLEAR
CO2 SERPL-SCNC: 25 MMOL/L (ref 23–29)
COLOR UR AUTO: YELLOW
CREAT SERPL-MCNC: 0.9 MG/DL (ref 0.5–1.4)
CRP SERPL-MCNC: 9.5 MG/L (ref 0–8.2)
CRP SERPL-MCNC: 9.5 MG/L (ref 0–8.2)
DIFFERENTIAL METHOD BLD: ABNORMAL
EOSINOPHIL # BLD AUTO: 0.3 K/UL (ref 0–0.5)
EOSINOPHIL NFR BLD: 2.5 % (ref 0–8)
ERYTHROCYTE [DISTWIDTH] IN BLOOD BY AUTOMATED COUNT: 15.7 % (ref 11.5–14.5)
ERYTHROCYTE [SEDIMENTATION RATE] IN BLOOD BY PHOTOMETRIC METHOD: 65 MM/HR (ref 0–23)
ERYTHROCYTE [SEDIMENTATION RATE] IN BLOOD BY PHOTOMETRIC METHOD: 65 MM/HR (ref 0–23)
EST. GFR  (NO RACE VARIABLE): >60 ML/MIN/1.73 M^2
GLUCOSE SERPL-MCNC: 87 MG/DL (ref 70–110)
GLUCOSE UR QL STRIP: NEGATIVE
HCT VFR BLD AUTO: 45.8 % (ref 40–54)
HGB BLD-MCNC: 14.5 G/DL (ref 14–18)
HGB UR QL STRIP: NEGATIVE
IMM GRANULOCYTES # BLD AUTO: 0.03 K/UL (ref 0–0.04)
IMM GRANULOCYTES NFR BLD AUTO: 0.3 % (ref 0–0.5)
KETONES UR QL STRIP: NEGATIVE
LEUKOCYTE ESTERASE UR QL STRIP: NEGATIVE
LYMPHOCYTES # BLD AUTO: 2.5 K/UL (ref 1–4.8)
LYMPHOCYTES NFR BLD: 24.3 % (ref 18–48)
MCH RBC QN AUTO: 30.1 PG (ref 27–31)
MCHC RBC AUTO-ENTMCNC: 31.7 G/DL (ref 32–36)
MCV RBC AUTO: 95 FL (ref 82–98)
MONOCYTES # BLD AUTO: 0.9 K/UL (ref 0.3–1)
MONOCYTES NFR BLD: 8.3 % (ref 4–15)
NEUTROPHILS # BLD AUTO: 6.6 K/UL (ref 1.8–7.7)
NEUTROPHILS NFR BLD: 63.7 % (ref 38–73)
NITRITE UR QL STRIP: NEGATIVE
NRBC BLD-RTO: 0 /100 WBC
PH UR STRIP: 6 [PH] (ref 5–8)
PLATELET # BLD AUTO: 241 K/UL (ref 150–450)
PMV BLD AUTO: 11 FL (ref 9.2–12.9)
POTASSIUM SERPL-SCNC: 4.3 MMOL/L (ref 3.5–5.1)
PROT SERPL-MCNC: 8.2 G/DL (ref 6–8.4)
PROT UR QL STRIP: ABNORMAL
RBC # BLD AUTO: 4.81 M/UL (ref 4.6–6.2)
SODIUM SERPL-SCNC: 139 MMOL/L (ref 136–145)
SP GR UR STRIP: 1.03 (ref 1–1.03)
URN SPEC COLLECT METH UR: ABNORMAL
WBC # BLD AUTO: 10.3 K/UL (ref 3.9–12.7)

## 2024-07-16 PROCEDURE — 80053 COMPREHEN METABOLIC PANEL: CPT | Performed by: INTERNAL MEDICINE

## 2024-07-16 PROCEDURE — 82164 ANGIOTENSIN I ENZYME TEST: CPT | Performed by: INTERNAL MEDICINE

## 2024-07-16 PROCEDURE — 86334 IMMUNOFIX E-PHORESIS SERUM: CPT | Performed by: INTERNAL MEDICINE

## 2024-07-16 PROCEDURE — 86038 ANTINUCLEAR ANTIBODIES: CPT | Performed by: INTERNAL MEDICINE

## 2024-07-16 PROCEDURE — 85025 COMPLETE CBC W/AUTO DIFF WBC: CPT | Performed by: INTERNAL MEDICINE

## 2024-07-16 PROCEDURE — 84165 PROTEIN E-PHORESIS SERUM: CPT | Performed by: INTERNAL MEDICINE

## 2024-07-16 PROCEDURE — 84165 PROTEIN E-PHORESIS SERUM: CPT | Mod: 26,,, | Performed by: PATHOLOGY

## 2024-07-16 PROCEDURE — 86140 C-REACTIVE PROTEIN: CPT | Performed by: INTERNAL MEDICINE

## 2024-07-16 PROCEDURE — 81003 URINALYSIS AUTO W/O SCOPE: CPT | Performed by: INTERNAL MEDICINE

## 2024-07-16 PROCEDURE — 85652 RBC SED RATE AUTOMATED: CPT | Performed by: INTERNAL MEDICINE

## 2024-07-16 PROCEDURE — 86334 IMMUNOFIX E-PHORESIS SERUM: CPT | Mod: 26,,, | Performed by: PATHOLOGY

## 2024-07-17 LAB
ACE SERPL-CCNC: 33 U/L (ref 16–85)
ALBUMIN SERPL ELPH-MCNC: 3.22 G/DL (ref 3.35–5.55)
ALPHA1 GLOB SERPL ELPH-MCNC: 0.36 G/DL (ref 0.17–0.41)
ALPHA2 GLOB SERPL ELPH-MCNC: 1.3 G/DL (ref 0.43–0.99)
ANA SER QL IF: NORMAL
B-GLOBULIN SERPL ELPH-MCNC: 1.08 G/DL (ref 0.5–1.1)
GAMMA GLOB SERPL ELPH-MCNC: 1.74 G/DL (ref 0.67–1.58)
PROT SERPL-MCNC: 7.7 G/DL (ref 6–8.4)

## 2024-07-18 ENCOUNTER — OFFICE VISIT (OUTPATIENT)
Dept: PULMONOLOGY | Facility: CLINIC | Age: 69
End: 2024-07-18
Payer: MEDICARE

## 2024-07-18 VITALS
DIASTOLIC BLOOD PRESSURE: 74 MMHG | WEIGHT: 143 LBS | HEIGHT: 65 IN | SYSTOLIC BLOOD PRESSURE: 103 MMHG | BODY MASS INDEX: 23.82 KG/M2 | HEART RATE: 106 BPM | RESPIRATION RATE: 17 BRPM | OXYGEN SATURATION: 85 %

## 2024-07-18 DIAGNOSIS — J43.2 CENTRILOBULAR EMPHYSEMA: ICD-10-CM

## 2024-07-18 DIAGNOSIS — J84.9 ILD (INTERSTITIAL LUNG DISEASE): Primary | ICD-10-CM

## 2024-07-18 LAB — INTERPRETATION SERPL IFE-IMP: NORMAL

## 2024-07-18 PROCEDURE — 99999 PR PBB SHADOW E&M-EST. PATIENT-LVL III: CPT | Mod: PBBFAC,,, | Performed by: INTERNAL MEDICINE

## 2024-07-18 NOTE — PROGRESS NOTES
"Subjective:      Patient ID: Fabiano Garvey is a 68 y.o. male.    Chief Complaint: No chief complaint on file.    65 year old male with RA on Rinvoq who presents for evaluation for abnormal CT chest.   Patient states that he went to his PCP earlier this year.   He was found to have ILD on CT chest. Concern that Methotrexate contributing to CT findings.   Former smoker. Quit in 2012.   Denies respiratory symptoms today.      Interval hx 11/10/2021: No acute issues.      Interval hx: 12/1/2022: No issues today. On Spiriva.      Interval hx 3/7/2024: CT chest ordered for yearly screening. The largest opacity in the right lung appears solid and measures 0.9 cm on series 2, image 51, new.  The largest opacity in the left lung appears solid and measures 0.6 cm on series 2, image 76.  Doing well on Spiriva.         Interval hx 7/18/2024: She continues to have SOB. Patient has lost some weight. Some desaturation at times to the 80's.         Review of Systems   Respiratory:  Positive for shortness of breath and dyspnea on extertion.      Objective:     Physical Exam   Constitutional: He is oriented to person, place, and time. He appears well-developed and well-nourished.   Cardiovascular: Normal rate.   Pulmonary/Chest: Normal expansion and symmetric chest wall expansion. He has no rhonchi. He has no rales.   Abdominal: Soft. Bowel sounds are normal.   Neurological: He is alert and oriented to person, place, and time.   Skin: Skin is warm and dry.   Psychiatric: He has a normal mood and affect. His behavior is normal.   Nursing note and vitals reviewed.    Personal Diagnostic Review  none pertinent      7/18/2024     1:39 PM 7/11/2024    10:44 AM 3/26/2024    10:48 AM 3/7/2024    12:45 PM 2/27/2024     8:50 AM 1/4/2024    11:11 AM 12/20/2023     2:04 PM   Pulmonary Function Tests   SpO2 85 %  97 % 95 %      Height 5' 5" (1.651 m)  5' 5" (1.651 m) 5' 5" (1.651 m) 5' 6" (1.676 m) 5' 6" (1.676 m) 5' 6" (1.676 m)   Weight " 64.9 kg (143 lb) 66 kg (145 lb 8.1 oz) 68.3 kg (150 lb 9.2 oz) 67.6 kg (149 lb) 68.1 kg (150 lb 2.1 oz) 74.1 kg (163 lb 5.8 oz) 73.6 kg (162 lb 4.1 oz)   BMI (Calculated) 23.8  25.1 24.8 24.2 26.4 26.2        Assessment:     1. ILD (interstitial lung disease)    2. Centrilobular emphysema         Outpatient Encounter Medications as of 7/18/2024   Medication Sig Dispense Refill    albuterol (VENTOLIN HFA) 90 mcg/actuation inhaler Inhale 2 puffs into the lungs every 6 (six) hours as needed for Wheezing (cough). Rescue 18 g 2    atorvastatin (LIPITOR) 40 MG tablet Take 1 tablet (40 mg total) by mouth once daily. 90 tablet 1    celecoxib (CELEBREX) 200 MG capsule Take 1 capsule (200 mg total) by mouth 2 (two) times daily. 60 capsule 2    gabapentin (NEURONTIN) 300 MG capsule Take 2 capsules (600 mg total) by mouth 3 (three) times daily. 180 capsule 11    leflunomide (ARAVA) 10 MG Tab Take 1 tablet (10 mg total) by mouth once daily. 30 tablet 3    tiotropium (SPIRIVA WITH HANDIHALER) 18 mcg inhalation capsule Inhale 1 capsule (18 mcg total) into the lungs once daily. Controller 30 capsule 6    ergocalciferol, vitamin D2, (VITAMIN D ORAL) Take 2,000 Units by mouth once daily. (Patient not taking: Reported on 7/11/2024)      fluorouraciL (EFUDEX) 5 % cream Apply thin film to right shoulder and left lower forearm 2times per day for 21 days; d/c if area bleeding or ulcerated; avoid eyes or mouth (Patient not taking: Reported on 7/11/2024) 40 g 1     No facility-administered encounter medications on file as of 7/18/2024.     No orders of the defined types were placed in this encounter.      Plan:      1. ILD (interstitial lung disease)  Assessment & Plan:  PFT and 6 mintue walk.       2. Centrilobular emphysema  Assessment & Plan:  Spiriva

## 2024-07-19 LAB
PATHOLOGIST INTERPRETATION IFE: NORMAL
PATHOLOGIST INTERPRETATION SPE: NORMAL

## 2024-07-23 ENCOUNTER — HOSPITAL ENCOUNTER (OUTPATIENT)
Dept: RADIOLOGY | Facility: HOSPITAL | Age: 69
Discharge: HOME | End: 2024-07-23
Attending: INTERNAL MEDICINE
Payer: MEDICARE

## 2024-07-23 DIAGNOSIS — R10.13 EPIGASTRIC PAIN: ICD-10-CM

## 2024-07-23 DIAGNOSIS — R63.4 UNINTENTIONAL WEIGHT LOSS: ICD-10-CM

## 2024-07-23 PROCEDURE — A9698 NON-RAD CONTRAST MATERIALNOC: HCPCS | Performed by: INTERNAL MEDICINE

## 2024-07-23 PROCEDURE — 25500020 PHARM REV CODE 255: Performed by: INTERNAL MEDICINE

## 2024-07-23 PROCEDURE — 74177 CT ABD & PELVIS W/CONTRAST: CPT | Mod: 26,,, | Performed by: RADIOLOGY

## 2024-07-23 PROCEDURE — 74177 CT ABD & PELVIS W/CONTRAST: CPT | Mod: TC

## 2024-07-23 RX ADMIN — IOHEXOL 1000 ML: 9 SOLUTION ORAL at 11:07

## 2024-07-23 RX ADMIN — IOHEXOL 75 ML: 350 INJECTION, SOLUTION INTRAVENOUS at 12:07

## 2024-07-25 DIAGNOSIS — K86.89 PANCREATIC DUCT DILATED: Primary | ICD-10-CM

## 2024-07-26 ENCOUNTER — PATIENT MESSAGE (OUTPATIENT)
Dept: RHEUMATOLOGY | Facility: CLINIC | Age: 69
End: 2024-07-26
Payer: MEDICARE

## 2024-08-07 ENCOUNTER — TELEPHONE (OUTPATIENT)
Dept: OPTOMETRY | Facility: CLINIC | Age: 69
End: 2024-08-07
Payer: MEDICARE

## 2024-08-08 ENCOUNTER — OFFICE VISIT (OUTPATIENT)
Dept: DERMATOLOGY | Facility: CLINIC | Age: 69
End: 2024-08-08
Payer: MEDICARE

## 2024-08-08 DIAGNOSIS — Z85.828 PERSONAL HISTORY OF SKIN CANCER: ICD-10-CM

## 2024-08-08 DIAGNOSIS — D48.5 NEOPLASM OF UNCERTAIN BEHAVIOR OF SKIN: ICD-10-CM

## 2024-08-08 DIAGNOSIS — L82.1 SK (SEBORRHEIC KERATOSIS): ICD-10-CM

## 2024-08-08 DIAGNOSIS — L81.4 LENTIGO: ICD-10-CM

## 2024-08-08 DIAGNOSIS — L57.0 AK (ACTINIC KERATOSIS): Primary | ICD-10-CM

## 2024-08-08 DIAGNOSIS — L91.8 SKIN TAG: ICD-10-CM

## 2024-08-08 PROCEDURE — 99999 PR PBB SHADOW E&M-EST. PATIENT-LVL III: CPT | Mod: PBBFAC,,, | Performed by: DERMATOLOGY

## 2024-08-08 RX ORDER — FLUOROURACIL 50 MG/G
CREAM TOPICAL
Qty: 40 G | Refills: 1 | Status: SHIPPED | OUTPATIENT
Start: 2024-08-08

## 2024-08-13 ENCOUNTER — HOSPITAL ENCOUNTER (OUTPATIENT)
Dept: PULMONOLOGY | Facility: HOSPITAL | Age: 69
Discharge: HOME | End: 2024-08-13
Attending: INTERNAL MEDICINE
Payer: MEDICARE

## 2024-08-13 DIAGNOSIS — J84.9 ILD (INTERSTITIAL LUNG DISEASE): ICD-10-CM

## 2024-08-13 LAB
DLCO ADJ PRE: 5.89 ML/(MIN*MMHG) (ref 16.04–29.9)
DLCO SINGLE BREATH LLN: 16.04
DLCO SINGLE BREATH PRE REF: 25.6 %
DLCO SINGLE BREATH REF: 22.97
DLCOC SBVA LLN: 2.42
DLCOC SBVA PRE REF: 29.7 %
DLCOC SBVA REF: 3.76
DLCOC SINGLE BREATH LLN: 16.04
DLCOC SINGLE BREATH PRE REF: 25.6 %
DLCOC SINGLE BREATH REF: 22.97
DLCOVA LLN: 2.42
DLCOVA PRE REF: 29.7 %
DLCOVA PRE: 1.11 ML/(MIN*MMHG*L) (ref 2.42–5.1)
DLCOVA REF: 3.76
DLVAADJ PRE: 1.12 ML/(MIN*MMHG*L) (ref 2.42–5.1)
FEF 25 75 CHG: -18.5 %
FEF 25 75 LLN: 1.18
FEF 25 75 POST REF: 30 %
FEF 25 75 PRE REF: 36.7 %
FEF 25 75 REF: 2.89
FET100 CHG: 10.9 %
FEV1 CHG: -7.5 %
FEV1 FVC CHG: -9.1 %
FEV1 FVC LLN: 63
FEV1 FVC POST REF: 70.1 %
FEV1 FVC PRE REF: 77.2 %
FEV1 FVC REF: 77
FEV1 LLN: 1.97
FEV1 POST REF: 69.7 %
FEV1 PRE REF: 75.4 %
FEV1 REF: 2.73
FVC CHG: 1.8 %
FVC LLN: 2.64
FVC POST REF: 99.2 %
FVC PRE REF: 97.5 %
FVC REF: 3.55
IVC PRE: 3.62 L (ref 2.64–4.48)
IVC SINGLE BREATH LLN: 2.64
IVC SINGLE BREATH PRE REF: 101.9 %
IVC SINGLE BREATH REF: 3.55
PEF CHG: 10.1 %
PEF LLN: 5.27
PEF POST REF: 74.3 %
PEF PRE REF: 67.5 %
PEF REF: 7.27
POST FEF 25 75: 0.87 L/S (ref 1.18–4.6)
POST FET 100: 8.06 SEC
POST FEV1 FVC: 53.92 % (ref 63.32–88.87)
POST FEV1: 1.9 L (ref 1.97–3.43)
POST FVC: 3.52 L (ref 2.64–4.48)
POST PEF: 5.4 L/S (ref 5.27–9.27)
PRE DLCO: 5.87 ML/(MIN*MMHG) (ref 16.04–29.9)
PRE FEF 25 75: 1.06 L/S (ref 1.18–4.6)
PRE FET 100: 7.27 SEC
PRE FEV1 FVC: 59.33 % (ref 63.32–88.87)
PRE FEV1: 2.06 L (ref 1.97–3.43)
PRE FVC: 3.46 L (ref 2.64–4.48)
PRE PEF: 4.91 L/S (ref 5.27–9.27)
VA PRE: 5.26 L (ref 5.96–5.96)
VA SINGLE BREATH LLN: 5.96
VA SINGLE BREATH PRE REF: 88.3 %
VA SINGLE BREATH REF: 5.96

## 2024-08-13 PROCEDURE — 94618 PULMONARY STRESS TESTING: CPT

## 2024-08-13 PROCEDURE — 94010 BREATHING CAPACITY TEST: CPT

## 2024-08-13 PROCEDURE — 94640 AIRWAY INHALATION TREATMENT: CPT

## 2024-08-13 PROCEDURE — 94729 DIFFUSING CAPACITY: CPT

## 2024-08-13 NOTE — PROCEDURES
Fabiano Garvey is a 69 y.o.   male patient, who presents for a 6 minute walk test ordered by Dr Singh .  The diagnosis is COPD .  The patient's BMI is 23.80 kg/m2.    Predicted distance (lower limit of normal) is   322 meters.      Test Results:    The test was  completed.  The patient stopped 0  times for a total of 0 seconds.  The total time walked was 360 seconds.  During walking, the patient reported:  SOB .    08/13/2024---------Distance:   (515m )     O2 Sat % Supplemental Oxygen Heart Rate Blood Pressure Stephen Scale   Pre-exercise  (Resting)  93  RA  101 na  2    During Exercise  83/89 RA/4l   119 na   5   Post-exercise  (Recovery)  91     na  2      Recovery Time: 3 minutes     Performing nurse/tech:  Juan J CHAU      SUMMARY/INTERPRETATION:    Total distance walked: 515  meters  Predicted distance: 322/475  meters  Percentage predicted:108  %      The patient has not had a previous study.      CLINICAL INTERPRETATION:  Six minute walk distance is   ( ) with somewhat heavy dyspnea.  During exercise, there was significant desaturation while breathing supplemental oxygen.

## 2024-08-14 ENCOUNTER — PATIENT MESSAGE (OUTPATIENT)
Dept: DERMATOLOGY | Facility: CLINIC | Age: 69
End: 2024-08-14
Payer: MEDICARE

## 2024-08-20 ENCOUNTER — TELEPHONE (OUTPATIENT)
Dept: GASTROENTEROLOGY | Facility: CLINIC | Age: 69
End: 2024-08-20
Payer: MEDICARE

## 2024-08-20 NOTE — TELEPHONE ENCOUNTER
----- Message from Brittaney Gomez RN sent at 8/20/2024  9:34 AM CDT -----  [facility] MD only  ----- Message -----  From: Juan Chen MD  Sent: 8/15/2024   3:53 PM CDT  To: Brittaney Gomez RN    Clinic visit for mild prominence of the pancreas duct seen on a CT without an obvious mass lesion; can be in 4-8 weeks.  ----- Message -----  From: Brittaney Gomez RN  Sent: 8/14/2024   4:10 PM CDT  To: Juan Chen MD    Please review referral and advise  [place]    Recent ct:  There is some prominence of the pancreatic duct at the body and head of the pancreas without an identifiable underlying lesion as source of this mild dilatation.  This is relatively similar appearance to the 2023 exam.  Correlation with pancreatic enzyme levels may be beneficial.

## 2024-08-26 RX ORDER — LEFLUNOMIDE 10 MG/1
10 TABLET ORAL DAILY
Qty: 30 TABLET | Refills: 3 | Status: ACTIVE | OUTPATIENT
Start: 2024-08-26

## 2024-08-26 RX ORDER — GABAPENTIN 300 MG/1
600 CAPSULE ORAL 3 TIMES DAILY
Qty: 180 CAPSULE | Refills: 11 | Status: SHIPPED | OUTPATIENT
Start: 2024-08-26

## 2024-08-26 NOTE — TELEPHONE ENCOUNTER
Care Due:                  Date            Visit Type   Department     Provider  --------------------------------------------------------------------------------                                EP -                              [facility]  Last Visit: 03-      CARE ([facility])   [facility]       Calin Buchanan                              [facility]  Next Visit: 10-      CARE ([facility])   [facility]                                                            Last  Test          Frequency    Reason                     Performed    Due Date  --------------------------------------------------------------------------------    Lipid Panel.  12 months..  atorvastatin.............  06-   06-    Health [facility] Embedded Care Due Messages. Reference number: [id number].   8/26/2024 5:08:35 AM CDT

## 2024-08-29 DIAGNOSIS — E78.2 MIXED HYPERLIPIDEMIA: ICD-10-CM

## 2024-08-29 RX ORDER — ATORVASTATIN CALCIUM 40 MG/1
40 TABLET, FILM COATED ORAL DAILY
Qty: 90 TABLET | Refills: 1 | Status: CANCELLED | OUTPATIENT
Start: 2024-08-29

## 2024-08-29 RX ORDER — ATORVASTATIN CALCIUM 40 MG/1
40 TABLET, FILM COATED ORAL DAILY
Qty: 90 TABLET | Refills: 3 | Status: SHIPPED | OUTPATIENT
Start: 2024-08-29

## 2024-08-29 NOTE — TELEPHONE ENCOUNTER
No care due was identified.  [facility] Embedded Care Due Messages. Reference number: [id number].   8/29/2024 5:08:38 AM CDT   Other Fair/Other

## 2024-08-29 NOTE — TELEPHONE ENCOUNTER
No care due was identified.  [facility] Embedded Care Due Messages. Reference number: [id number].   8/29/2024 8:44:23 AM CDT

## 2024-09-09 ENCOUNTER — PATIENT MESSAGE (OUTPATIENT)
Dept: FAMILY MEDICINE | Facility: CLINIC | Age: 69
End: 2024-09-09
Payer: MEDICARE

## 2024-09-16 ENCOUNTER — LAB VISIT (OUTPATIENT)
Dept: LAB | Facility: HOSPITAL | Age: 69
End: 2024-09-16
Attending: INTERNAL MEDICINE
Payer: MEDICARE

## 2024-09-16 DIAGNOSIS — M06.9 RHEUMATOID ARTHRITIS FLARE: ICD-10-CM

## 2024-09-16 LAB
BILIRUB UR QL STRIP: NEGATIVE
CLARITY UR REFRACT.AUTO: CLEAR
COLOR UR AUTO: YELLOW
CREAT UR-MCNC: 30 MG/DL (ref 23–375)
GLUCOSE UR QL STRIP: NEGATIVE
HGB UR QL STRIP: NEGATIVE
KETONES UR QL STRIP: NEGATIVE
LEUKOCYTE ESTERASE UR QL STRIP: NEGATIVE
NITRITE UR QL STRIP: NEGATIVE
PH UR STRIP: 6 [PH] (ref 5–8)
PROT UR QL STRIP: NEGATIVE
PROT UR-MCNC: <7 MG/DL (ref 0–15)
PROT/CREAT UR: NORMAL MG/G{CREAT} (ref 0–0.2)
SP GR UR STRIP: 1 (ref 1–1.03)
URN SPEC COLLECT METH UR: NORMAL

## 2024-09-16 PROCEDURE — 81003 URINALYSIS AUTO W/O SCOPE: CPT | Performed by: INTERNAL MEDICINE

## 2024-09-16 PROCEDURE — 82570 ASSAY OF URINE CREATININE: CPT | Performed by: INTERNAL MEDICINE

## 2024-09-16 PROCEDURE — 84156 ASSAY OF PROTEIN URINE: CPT | Performed by: INTERNAL MEDICINE

## 2024-10-03 NOTE — PROGRESS NOTES
(Portions of this note were dictated using voice recognition software and may contain dictation related errors in spelling/grammar/syntax not found on text review)    CC:   Chief Complaint   Patient presents with    Annual Exam       HPI: 69 y.o. male       Hyperlipidemia: On Lipitor 40 mg daily      RA, followed by rheumatology.  Last visit from 07/11/2024 Currently on leflunomide. was on Rinvoq but bc of admission for SBO and concern for risk of intestinal perforation with Rinvoq was stopped  . Was on MTX in past but was off due to interstitial lung disease. Did not tolerate Plaquenil.     COPD/ILD:  Follows with pulmonology, last visit 07/18/2024 CT scan from 03/07/2024 diffuse chronic changes keeping with chronic interstitial lung disease, superimposed severe emphysematous change, multiple irregular appearing pulmonary opacities stable from comparison CT 03/05/2024, no new or enlarging pulmonary nodules, multiple enlarged mediastinal lymph nodes.  Currently on Spiriva. .  Does feel over the past 6-8 months his breathing has gotten a little bit worse.  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  Denies any chest pain.  Denies any fevers or chills.    PFT 08/27/2024:  FEV1 his 75.4% predicted pre, 69.7% predicted post.  FVC is 97.5 predicted pre, 99.2 predicted post, ratio is 59 pre, 54 post.  DLCO is 25% predicted    6 minute walk test:  08/13/2024---------Distance:   (515m )       O2 Sat % Supplemental Oxygen Heart Rate Blood Pressure Stephen Scale   Pre-exercise  (Resting)  93  RA  101 na  2    During Exercise  83/89 RA/4l   119 na   5   Post-exercise  (Recovery)  91     na  2        Sleep apnea, currently using CPAP regularly. 5-6 hours night sleep, normal for him, no daytime drowsiness..      Mild elevation A1c 5.8      Lumbar radiculopathy,   Visit with pain management.  MRI showed multilevel degenerative change most significant L4-L5  where there is moderate to severe degree of spinal canal narrowing moderate right foraminal narrowing and mild left foraminal narrowing.  L3-L4 demonstrates right paracentral disc extrusion extending below the level of the disc plane resulting in some narrowing of the thecal sac..  Had JOSE through pain management in June and July of 2021 feels like injections didn't really help. PT helped for a while but sx came back. Saw NS, had lumbar surgery/fusion 12/2022. Last NS visit on 6/20/23    SCC in situ right lateral cheek, follows with dermatology (last office visit was 08/08/2024).   SCC in Situ of forearm removed, given 5 FU cream.  Has had cryotherapy  for AKs       Past Medical History:   Diagnosis Date    Basal cell carcinoma 01/2024    left upper chest    Degenerative disc disease, lumbar 06/16/2021    Hyperlipidemia     NU (obstructive sleep apnea)     Pulmonary fibrosis     Rheumatoid arthritis     SCC (squamous cell carcinoma) 07/2020    SCC right medial canthus    SCC (squamous cell carcinoma) 11/2021    mid lower neck     SCC (squamous cell carcinoma) 01/2022    right lateral cheek- in situ    SCC (squamous cell carcinoma) 09/2023    in situ L lower forearm and HAK R shoulder    Small bowel obstruction due to adhesions 05/10/2023    Squamous cell carcinoma 07/2019    SCC in situ left temple    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    R lateral cheek       Past Surgical History:   Procedure Laterality Date    COLONOSCOPY N/A 7/25/2017    Procedure: COLONOSCOPY;  Surgeon: Bill Nicole MD;  Location: [address]);  Service: Endoscopy;  Laterality: N/A;    DIAGNOSTIC LAPAROSCOPY N/A 5/10/2023    Procedure: LAPAROSCOPY, DIAGNOSTIC possible laparotomy other as indicated;  Surgeon: Eitan Palomares MD;  Location: [facility];  Service: General;  Laterality: N/A;    EPIDURAL STEROID INJECTION INTO LUMBAR SPINE N/A 7/29/2021    Procedure: Injection-steroid-epidural-lumbar  L5-S1;  Surgeon: Shiv Vernon Jr., MD;  Location: [facility];  Service: Pain Management;  Laterality: N/A;  oral sedation   no pacemaker     FUSION OF SPINE WITH INSTRUMENTATION Left 12/14/2022    Procedure: FUSION, SPINE, WITH INSTRUMENTATION Stg1: Left L3-5 OLiF conduit lateral cage BMP;  Surgeon: August Aguilar MD;  Location: [facility] OR;  Service: Neurosurgery;  Laterality: Left;  Procedure: Stg1: Left L3-5 OLiF conduit lateral cage BMP  Surgery Time: 2hrs  LOS: 3  Anesthesia: General  Blood: Type & Screen  Radiology: C-arm  Brace: LSO  Bed: Regular Bed  Position: Lateral Right Down  Equip    FUSION, SPINE, POSTERIOR APPROACH N/A 12/14/2022    Procedure: FUSION,SPINE,POSTERIOR APPROACH Stg 2: L3-5 posterior instrumentation viper prime, L3-5 posterolateral arthrodesis;  Surgeon: August Aguilar MD;  Location: [facility] OR;  Service: Neurosurgery;  Laterality: N/A;  Procedure: Stg 2: L3-5 posterior instrumentation viper prime, L3-5 posterolateral arthrodesis  Surgery Time: 3hrs  LOS: 3  Anesthesia: General  Blood: Type & Screen  Radiology: Bra    LYSIS OF ADHESIONS N/A 5/10/2023    Procedure: LYSIS, ADHESIONS;  Surgeon: Eitan Palomares MD;  Location: [facility] OR;  Service: General;  Laterality: N/A;    NO PAST SURGERIES      TRANSFORAMINAL EPIDURAL INJECTION OF STEROID Right 6/29/2021    Procedure: Injection,steroid,epidural,transforaminal approach-RIGHT-- L4-5, L5-S1-- (IV Sedation);  Surgeon: Shiv Vernon Jr., MD;  Location: [facility];  Service: Pain Management;  Laterality: Right;    TRANSFORAMINAL EPIDURAL INJECTION OF STEROID Left 10/27/2022    Procedure: Injection,steroid,epidural,transforaminal left L4-5 and L5-S1;  Surgeon: Shiv Vernon Jr., MD;  Location: [facility];  Service: Pain Management;  Laterality: Left;       Family History   Problem Relation Name Age of Onset    Heart failure Mother          60s    Coronary artery disease Father          70s    Heart attack Father      Arthritis  Sister x2     Arthritis Brother x1     Asthma Daughter x2     Gallbladder disease Daughter x2     Seizures Daughter x2     Cancer Paternal Uncle          lymphoma??    Diabetes Neg Hx      Melanoma Neg Hx         Social History     Tobacco Use    Smoking status: Former     Current packs/day: 0.00     Average packs/day: 0.8 packs/day for 37.5 years (28.1 ttl pk-yrs)     Types: Cigarettes     Start date:      Quit date: 2012     Years since quittin.2     Passive exposure: Never    Smokeless tobacco: Never   Substance Use Topics    Alcohol use: Yes     Comment: occasional    Drug use: Never     Lab Results   Component Value Date    WBC 9.27 2024    HGB 15.7 2024    HCT 49.5 2024    MCV 95 2024     2024    CHOL 154 06/15/2023    TRIG 103 06/15/2023    HDL 48 06/15/2023    ALT 20 2024    AST 25 2024    BILITOT 0.6 2024    ALKPHOS 92 2024     2024    K 3.9 2024     2024    CREATININE 0.9 2024    CALCIUM 9.8 2024    ALBUMIN 3.3 (L) 2024    BUN 10 2024    CO2 24 2024    TSH 1.675 06/15/2023    PSA 0.85 06/15/2023    INR 1.0 2022    HGBA1C 5.8 (H) 06/15/2023    LDLCALC 85.4 06/15/2023     2024        Vital signs reviewed  PE:   APPEARANCE: Well nourished, well developed, in no acute distress.    HEAD: Normocephalic, atraumatic.  NECK: Supple with no cervical lymphadenopathy.  No carotid bruits.  No thyromegaly  CHEST: Good inspiratory effort. Fine bibasilar crackles.   CARDIOVASCULAR: Normal S1, S2. No rubs, murmurs, or gallops.  ABDOMEN: Bowel sounds normal. Not distended. Soft. No tenderness or masses. No organomegaly.  EXTREMITIES: No edema      IMPRESSION  1. Encounter for preventive health examination    2. ILD (interstitial lung disease)    3. Mixed hyperlipidemia    4. Centrilobular emphysema    5. Aorto-iliac atherosclerosis    6. Rheumatoid arthritis of other site  with positive rheumatoid factor    7. Immunosuppressed status    8. Other hyperlipidemia    9. Spinal stenosis of lumbar region with neurogenic claudication    10. Obstructive sleep apnea    11. Pulmonary fibrosis    12. Screening for malignant neoplasm of prostate    13. Other long term (current) drug therapy                PLAN  Orders Placed This Encounter   Procedures    CBC Auto Differential    Comprehensive Metabolic Panel    Lipid Panel    TSH    Hemoglobin A1C    PSA, Screening     HLD stable on lipitor 40 mg    Sleep apnea:  Continue CPAP    Rheumatoid arthritis:  Continue rheumatology follow-up    Interstitial pulmonary fibrosis with obstruction noted on spirometry.  Continue pulmonology follow-up.  Continue Spiriva, there was some discussion in the past about possibly upgrading to triple therapy.  He is still on the Spiriva for now.  He will talk to his pulmonologist when he sees him next.  I can send a message as well so just to get opinion on whether triple therapy might be helpful especially since he subjectively notices some worsening of his breathing over the last 6-8 months.      Lumbar radiculopathy: continue NS f/u as directed     Weight loss: , fairly stable at this time.  Denies any systemic effects and otherwise feels well except for just some slow progression his respiratory symptoms.  Continue to monitor, up-to-date on cancer screenings, update PSA with labs above in 3 months    HEALTH SCREENINGS  Immunizations:  Tdap 2022  PCV 5/12/17   PPSV 2019  COVID-19 vaccine booster eligible, plans to get next week at pharmacy  Flu, plans to get next week at pharmacy  Can get zoster at pharmacy    Age/Gender Appropriate screenings:  Colonoscopy July 2017: Single nonbleeding angiodysplastic lesion noted otherwise normal .  Repeat in 10 years  Prostate screening done  6/15/23 PSA

## 2024-10-04 ENCOUNTER — OFFICE VISIT (OUTPATIENT)
Dept: FAMILY MEDICINE | Facility: CLINIC | Age: 69
End: 2024-10-04
Payer: MEDICARE

## 2024-10-04 VITALS
WEIGHT: 146.63 LBS | BODY MASS INDEX: 24.43 KG/M2 | OXYGEN SATURATION: 96 % | HEART RATE: 94 BPM | SYSTOLIC BLOOD PRESSURE: 100 MMHG | DIASTOLIC BLOOD PRESSURE: 68 MMHG | TEMPERATURE: 98 F | HEIGHT: 65 IN

## 2024-10-04 DIAGNOSIS — I70.0 AORTO-ILIAC ATHEROSCLEROSIS: ICD-10-CM

## 2024-10-04 DIAGNOSIS — I70.8 AORTO-ILIAC ATHEROSCLEROSIS: ICD-10-CM

## 2024-10-04 DIAGNOSIS — M05.7A RHEUMATOID ARTHRITIS OF OTHER SITE WITH POSITIVE RHEUMATOID FACTOR: ICD-10-CM

## 2024-10-04 DIAGNOSIS — E78.2 MIXED HYPERLIPIDEMIA: ICD-10-CM

## 2024-10-04 DIAGNOSIS — G47.33 OBSTRUCTIVE SLEEP APNEA: ICD-10-CM

## 2024-10-04 DIAGNOSIS — Z12.5 SCREENING FOR MALIGNANT NEOPLASM OF PROSTATE: ICD-10-CM

## 2024-10-04 DIAGNOSIS — Z79.899 OTHER LONG TERM (CURRENT) DRUG THERAPY: ICD-10-CM

## 2024-10-04 DIAGNOSIS — M48.062 SPINAL STENOSIS OF LUMBAR REGION WITH NEUROGENIC CLAUDICATION: ICD-10-CM

## 2024-10-04 DIAGNOSIS — D84.9 IMMUNOSUPPRESSED STATUS: ICD-10-CM

## 2024-10-04 DIAGNOSIS — J43.2 CENTRILOBULAR EMPHYSEMA: ICD-10-CM

## 2024-10-04 DIAGNOSIS — J84.9 ILD (INTERSTITIAL LUNG DISEASE): ICD-10-CM

## 2024-10-04 DIAGNOSIS — E78.49 OTHER HYPERLIPIDEMIA: ICD-10-CM

## 2024-10-04 DIAGNOSIS — J84.10 PULMONARY FIBROSIS: ICD-10-CM

## 2024-10-04 DIAGNOSIS — Z00.00 ENCOUNTER FOR PREVENTIVE HEALTH EXAMINATION: Primary | ICD-10-CM

## 2024-10-04 PROCEDURE — 99999 PR PBB SHADOW E&M-EST. PATIENT-LVL IV: CPT | Mod: PBBFAC,,, | Performed by: FAMILY MEDICINE

## 2024-10-04 NOTE — PATIENT INSTRUCTIONS
Labs 3 months  Orders Placed This Encounter   Procedures    CBC Auto Differential    Comprehensive Metabolic Panel    Lipid Panel    TSH    Hemoglobin A1C    PSA, Screening       Look into getting the Shingles vaccine (SHINGRIX) at your local pharmacy (2 part series, done once at/after age 50)    Flu and covid shots at pharmacy.

## 2024-10-07 ENCOUNTER — TELEPHONE (OUTPATIENT)
Dept: FAMILY MEDICINE | Facility: CLINIC | Age: 69
End: 2024-10-07
Payer: MEDICARE

## 2024-10-07 DIAGNOSIS — J84.9 ILD (INTERSTITIAL LUNG DISEASE): Primary | ICD-10-CM

## 2024-10-07 RX ORDER — FLUTICASONE FUROATE, UMECLIDINIUM BROMIDE AND VILANTEROL TRIFENATATE 200; 62.5; 25 UG/1; UG/1; UG/1
1 POWDER RESPIRATORY (INHALATION) DAILY
Qty: 60 EACH | Refills: 11 | Status: SHIPPED | OUTPATIENT
Start: 2024-10-07

## 2024-10-07 RX ORDER — TIOTROPIUM BROMIDE 18 UG/1
18 CAPSULE ORAL; RESPIRATORY (INHALATION) DAILY
Qty: 30 CAPSULE | Refills: 6 | Status: SHIPPED | OUTPATIENT
Start: 2024-10-07 | End: 2024-10-07

## 2024-10-07 NOTE — TELEPHONE ENCOUNTER
----- Message from Natasha Singh MD sent at 10/7/2024  3:09 PM CDT -----  Yes that would be good  ----- Message -----  From: Calin Buchanan MD  Sent: 10/4/2024   2:26 PM CDT  To: Natasha Singh MD    Hi Dr. Singh,    I saw Mr Garvey today for medical follow-up visit.  Generally is doing well but did state that over last 6-8 months he has noticed some worse spring of his breathing overall.  I know he has the COPD and ILD picture.  He is on Spiriva right now, he has was not sure if it was really doing much.  Would upgrading to Trelegy be a possibility for him?  Just wanted to get your thoughts. Thanks!    Calin Buchanan

## 2024-10-24 ENCOUNTER — OFFICE VISIT (OUTPATIENT)
Dept: GASTROENTEROLOGY | Facility: CLINIC | Age: 69
End: 2024-10-24
Payer: MEDICARE

## 2024-10-24 VITALS
HEART RATE: 87 BPM | SYSTOLIC BLOOD PRESSURE: 117 MMHG | DIASTOLIC BLOOD PRESSURE: 81 MMHG | WEIGHT: 149.25 LBS | HEIGHT: 65 IN | BODY MASS INDEX: 24.87 KG/M2

## 2024-10-24 DIAGNOSIS — K86.89 PANCREATIC DUCT DILATED: ICD-10-CM

## 2024-10-24 PROCEDURE — 3074F SYST BP LT 130 MM HG: CPT | Mod: CPTII,S$GLB,, | Performed by: INTERNAL MEDICINE

## 2024-10-24 PROCEDURE — 1101F PT FALLS ASSESS-DOCD LE1/YR: CPT | Mod: CPTII,S$GLB,, | Performed by: INTERNAL MEDICINE

## 2024-10-24 PROCEDURE — 1126F AMNT PAIN NOTED NONE PRSNT: CPT | Mod: CPTII,S$GLB,, | Performed by: INTERNAL MEDICINE

## 2024-10-24 PROCEDURE — 1160F RVW MEDS BY RX/DR IN RCRD: CPT | Mod: CPTII,S$GLB,, | Performed by: INTERNAL MEDICINE

## 2024-10-24 PROCEDURE — 3079F DIAST BP 80-89 MM HG: CPT | Mod: CPTII,S$GLB,, | Performed by: INTERNAL MEDICINE

## 2024-10-24 PROCEDURE — 99204 OFFICE O/P NEW MOD 45 MIN: CPT | Mod: S$GLB,,, | Performed by: INTERNAL MEDICINE

## 2024-10-24 PROCEDURE — 1159F MED LIST DOCD IN RCRD: CPT | Mod: CPTII,S$GLB,, | Performed by: INTERNAL MEDICINE

## 2024-10-24 PROCEDURE — 3288F FALL RISK ASSESSMENT DOCD: CPT | Mod: CPTII,S$GLB,, | Performed by: INTERNAL MEDICINE

## 2024-10-24 PROCEDURE — 3008F BODY MASS INDEX DOCD: CPT | Mod: CPTII,S$GLB,, | Performed by: INTERNAL MEDICINE

## 2024-10-24 PROCEDURE — 99999 PR PBB SHADOW E&M-EST. PATIENT-LVL III: CPT | Mod: PBBFAC,,, | Performed by: INTERNAL MEDICINE

## 2024-10-24 NOTE — PROGRESS NOTES
SUBJECTIVE:         Chief Complaint:   Chief Complaint   Patient presents with    GI Problem     Dilated bile duct       History of Present Illness:  Patient is a 69 y.o. male presents with an incidentally noted dilated PD.  He has been a fairly good state of health but has lost a bit of weight over the last few months.  He had a CT scan done of the abdomen that was notable for a dilated pancreatic duct.  I reviewed the image with him and compared it to a CT scan done in 2023 and although the report states it is stable it appears to me that his duct is currently a bit more prominent.  He has not had pancreatitis he has not had gallbladder surgery.  He did have an episode of a small-bowel obstruction in 2023 that required a laparoscopy  that was not recurred.    OBJECTIVE:     Vital Signs (Most Recent)  Pulse: 87 (10/24/24 0739)  BP: 117/81 (10/24/24 0739)    General: well developed, well nourished    Diagnostic Results:  CT: Reviewed      ASSESSMENT/PLAN:   Given the increased prominence of his pancreatic duct I feel it is prudent to proceed with an endoscopic ultrasound.  We can schedule this at his convenience in the next couple of months depending on what we find we may enroll him in a pancreatic duct surveillance should he have a concern for a main duct IPMN

## 2024-10-25 ENCOUNTER — TELEPHONE (OUTPATIENT)
Dept: ENDOSCOPY | Facility: HOSPITAL | Age: 69
End: 2024-10-25
Payer: MEDICARE

## 2024-10-25 DIAGNOSIS — K83.8 DILATED BILE DUCT: Primary | ICD-10-CM

## 2024-10-25 NOTE — TELEPHONE ENCOUNTER
Spoke to patient to schedule procedure(s) Upper Endoscopy Ultrasound (EUS)       Physician to perform procedure(s) Dr. MAIKEL Calderon  Date of Procedure (s) 11/25/2024  Arrival Time 12:15 PM  Time of Procedure(s) 1:15 PM   Location of Procedure(s) [facility]  Type of Rx Prep sent to patient: Other  Instructions provided to patient via [facility]    Patient was informed on the following information and verbalized understanding. Screening questionnaire reviewed with patient and complete. If procedure requires anesthesia, a responsible adult needs to be present to accompany the patient home, patient cannot drive after receiving anesthesia. Appointment details are tentative, especially check-in time. Patient will receive a prep-op call 7 days prior to confirm check-in time for procedure. If applicable the patient should contact their pharmacy to verify Rx for procedure prep is ready for pick-up. Patient was advised to call the scheduling department at [phone] if pharmacy states no Rx is available. Patient was advised to call the endoscopy scheduling department if any questions or concerns arise.      [facility] Scheduling Department

## 2024-10-31 ENCOUNTER — TELEPHONE (OUTPATIENT)
Dept: ENDOSCOPY | Facility: HOSPITAL | Age: 69
End: 2024-10-31
Payer: MEDICARE

## 2024-11-01 ENCOUNTER — TELEPHONE (OUTPATIENT)
Dept: PULMONOLOGY | Facility: CLINIC | Age: 69
End: 2024-11-01
Payer: MEDICARE

## 2024-11-11 ENCOUNTER — OFFICE VISIT (OUTPATIENT)
Dept: PULMONOLOGY | Facility: CLINIC | Age: 69
End: 2024-11-11
Payer: MEDICARE

## 2024-11-11 VITALS
HEART RATE: 95 BPM | DIASTOLIC BLOOD PRESSURE: 82 MMHG | BODY MASS INDEX: 24.61 KG/M2 | SYSTOLIC BLOOD PRESSURE: 113 MMHG | OXYGEN SATURATION: 90 % | WEIGHT: 147.69 LBS | HEIGHT: 65 IN

## 2024-11-11 DIAGNOSIS — J43.2 CENTRILOBULAR EMPHYSEMA: ICD-10-CM

## 2024-11-11 DIAGNOSIS — J84.9 ILD (INTERSTITIAL LUNG DISEASE): Primary | ICD-10-CM

## 2024-11-11 PROCEDURE — 99999 PR PBB SHADOW E&M-EST. PATIENT-LVL III: CPT | Mod: PBBFAC,,, | Performed by: INTERNAL MEDICINE

## 2024-11-11 RX ORDER — BUDESONIDE, GLYCOPYRROLATE, AND FORMOTEROL FUMARATE 160; 9; 4.8 UG/1; UG/1; UG/1
2 AEROSOL, METERED RESPIRATORY (INHALATION) 2 TIMES DAILY
Qty: 10.7 G | Refills: 6 | Status: SHIPPED | OUTPATIENT
Start: 2024-11-11

## 2024-11-11 NOTE — PROGRESS NOTES
"Subjective:      Patient ID: Fabiano Garvey is a 69 y.o. male.    Chief Complaint: No chief complaint on file.    65 year old male with RA on Rinvoq who presents for evaluation for abnormal CT chest.   Patient states that he went to his PCP earlier this year.   He was found to have ILD on CT chest. Concern that Methotrexate contributing to CT findings.   Former smoker. Quit in 2012.   Denies respiratory symptoms today.      Interval hx 11/10/2021: No acute issues.      Interval hx: 12/1/2022: No issues today. On Spiriva.      Interval hx 3/7/2024: CT chest ordered for yearly screening. The largest opacity in the right lung appears solid and measures 0.9 cm on series 2, image 51, new.  The largest opacity in the left lung appears solid and measures 0.6 cm on series 2, image 76.  Doing well on Spiriva.         Interval hx 7/18/2024: She continues to have SOB. Patient has lost some weight. Some desaturation at times to the 80's.         Interval hx 11/11/2024: No improvement with Trelegy. Asking for Trelegy.       Review of Systems   Respiratory:  Positive for dyspnea on extertion. Negative for shortness of breath.      Objective:     Physical Exam   Constitutional: He is oriented to person, place, and time. He appears well-developed and well-nourished.   Cardiovascular: Normal rate and normal heart sounds.   Pulmonary/Chest: Normal expansion and symmetric chest wall expansion. He has no rhonchi. He has no rales.   Neurological: He is alert and oriented to person, place, and time.   Skin: Skin is warm and dry.   Nursing note and vitals reviewed.    Personal Diagnostic Review  none pertinent      11/11/2024     2:05 PM 10/24/2024     7:39 AM 10/4/2024     1:54 PM 7/18/2024     1:39 PM 7/11/2024    10:44 AM 3/26/2024    10:48 AM 3/7/2024    12:45 PM   Pulmonary Function Tests   SpO2 90 %  96 % 85 %  97 % 95 %   Height 5' 5" (1.651 m) 5' 5" (1.651 m) 5' 4.96" (1.65 m) 5' 5" (1.651 m)  5' 5" (1.651 m) 5' 5" (1.651 m) "   Weight 67 kg (147 lb 11.3 oz) 67.7 kg (149 lb 4 oz) 66.5 kg (146 lb 9.7 oz) 64.9 kg (143 lb) 66 kg (145 lb 8.1 oz) 68.3 kg (150 lb 9.2 oz) 67.6 kg (149 lb)   BMI (Calculated) 24.6 24.8 24.4 23.8  25.1 24.8        Assessment:     No diagnosis found.     Outpatient Encounter Medications as of 11/11/2024   Medication Sig Dispense Refill    albuterol (VENTOLIN HFA) 90 mcg/actuation inhaler Inhale 2 puffs into the lungs every 6 (six) hours as needed for Wheezing (cough). Rescue 18 g 2    atorvastatin (LIPITOR) 40 MG tablet Take 1 tablet (40 mg total) by mouth once daily. 90 tablet 3    celecoxib (CELEBREX) 200 MG capsule Take 1 capsule (200 mg total) by mouth 2 (two) times daily. 60 capsule 2    ergocalciferol, vitamin D2, (VITAMIN D ORAL) Take 2,000 Units by mouth once daily.      fluticasone-umeclidin-vilanter (TRELEGY ELLIPTA) 200-62.5-25 mcg inhaler Inhale 1 puff into the lungs once daily. 60 each 11    gabapentin (NEURONTIN) 300 MG capsule Take 2 capsules (600 mg total) by mouth 3 (three) times daily. 180 capsule 11    leflunomide (ARAVA) 10 MG Tab Take 1 tablet (10 mg total) by mouth once daily. 30 tablet 3    fluorouraciL (EFUDEX) 5 % cream Apply thin film to right shoulder and left lower forearm 2times per day for 21 days; d/c if area bleeding or ulcerated; avoid eyes or mouth (Patient not taking: Reported on 7/11/2024) 40 g 1    fluorouraciL (EFUDEX) 5 % cream Mix with calcipotriene 0.005% cream  and Apply thin film to scalp 2x per day for 14 days and right cheek   2 times per day for 7 days; d/c if area bleeding or ulcerated; avoid eyes or mouth 40 g 1     No facility-administered encounter medications on file as of 11/11/2024.     No orders of the defined types were placed in this encounter.      Plan:   1. ILD (interstitial lung disease)  Assessment & Plan:  Mild obstruction on PFT.  Stable.       2. Centrilobular emphysema  Assessment & Plan:  Mild obstruction. Will switch Trelegy to Breztri.       Other  orders  -     budesonide-glycopyr-formoterol (BREZTRI AEROSPHERE) 160-9-4.8 mcg/actuation HFAA; Inhale 2 puffs into the lungs 2 (two) times a day.  Dispense: 10.7 g; Refill: 6

## 2024-11-13 ENCOUNTER — OFFICE VISIT (OUTPATIENT)
Dept: DERMATOLOGY | Facility: CLINIC | Age: 69
End: 2024-11-13
Payer: MEDICARE

## 2024-11-13 DIAGNOSIS — L90.5 SCAR: ICD-10-CM

## 2024-11-13 DIAGNOSIS — Z85.828 PERSONAL HISTORY OF SKIN CANCER: ICD-10-CM

## 2024-11-13 DIAGNOSIS — D48.5 NEOPLASM OF UNCERTAIN BEHAVIOR OF SKIN: ICD-10-CM

## 2024-11-13 DIAGNOSIS — L82.1 SK (SEBORRHEIC KERATOSIS): ICD-10-CM

## 2024-11-13 DIAGNOSIS — L57.0 AK (ACTINIC KERATOSIS): Primary | ICD-10-CM

## 2024-11-13 PROCEDURE — 1101F PT FALLS ASSESS-DOCD LE1/YR: CPT | Mod: CPTII,S$GLB,, | Performed by: DERMATOLOGY

## 2024-11-13 PROCEDURE — 1159F MED LIST DOCD IN RCRD: CPT | Mod: CPTII,S$GLB,, | Performed by: DERMATOLOGY

## 2024-11-13 PROCEDURE — 1126F AMNT PAIN NOTED NONE PRSNT: CPT | Mod: CPTII,S$GLB,, | Performed by: DERMATOLOGY

## 2024-11-13 PROCEDURE — 99213 OFFICE O/P EST LOW 20 MIN: CPT | Mod: 25,S$GLB,, | Performed by: DERMATOLOGY

## 2024-11-13 PROCEDURE — 11102 TANGNTL BX SKIN SINGLE LES: CPT | Mod: S$GLB,,, | Performed by: DERMATOLOGY

## 2024-11-13 PROCEDURE — 11103 TANGNTL BX SKIN EA SEP/ADDL: CPT | Mod: S$GLB,,, | Performed by: DERMATOLOGY

## 2024-11-13 PROCEDURE — 99999 PR PBB SHADOW E&M-EST. PATIENT-LVL III: CPT | Mod: PBBFAC,,, | Performed by: DERMATOLOGY

## 2024-11-13 PROCEDURE — 3288F FALL RISK ASSESSMENT DOCD: CPT | Mod: CPTII,S$GLB,, | Performed by: DERMATOLOGY

## 2024-11-13 PROCEDURE — 17003 DESTRUCT PREMALG LES 2-14: CPT | Mod: S$GLB,,, | Performed by: DERMATOLOGY

## 2024-11-13 PROCEDURE — 1160F RVW MEDS BY RX/DR IN RCRD: CPT | Mod: CPTII,S$GLB,, | Performed by: DERMATOLOGY

## 2024-11-13 PROCEDURE — 17000 DESTRUCT PREMALG LESION: CPT | Mod: XS,S$GLB,, | Performed by: DERMATOLOGY

## 2024-11-13 NOTE — PROGRESS NOTES
Subjective:      Patient ID:  Fabiano Garvey is a 69 y.o. male who presents for   Chief Complaint   Patient presents with    Follow-up     S/p efudex/dovonex      Pt here for follow up of Efudex/dovonex treatment   Pt used the medication on scalp  2x per day for 14 days  Pt's states the reaction was mild but he did not use it consistently     Pt here for follow up of Efudex/dovonex treatment   Pt used the medication on right cheek  2x per day for 7 days  Pt's states the reaction was mild- pt used consistently for duration of tx.     Also has several other concerns.  Has 2 lesions on right arm and left shin that are growing carrie tender. Feels they are boils but not resolving.                     Follow-up      Review of Systems   Skin:  Positive for activity-related sunscreen use and wears hat. Negative for daily sunscreen use and recent sunburn.   Hematologic/Lymphatic: Bruises/bleeds easily.       Objective:   Physical Exam   Constitutional: He appears well-developed and well-nourished. No distress.   Neurological: He is alert and oriented to person, place, and time. He is not disoriented.   Psychiatric: He has a normal mood and affect.   Skin:   Areas Examined (abnormalities noted in diagram):   Scalp / Hair Palpated and Inspected  Head / Face Inspection Performed  RUE Inspected  LLE Inspection Performed                               Diagram Legend     Erythematous scaling macule/papule c/w actinic keratosis       Vascular papule c/w angioma      Pigmented verrucoid papule/plaque c/w seborrheic keratosis      Yellow umbilicated papule c/w sebaceous hyperplasia      Irregularly shaped tan macule c/w lentigo     1-2 mm smooth white papules consistent with Milia      Movable subcutaneous cyst with punctum c/w epidermal inclusion cyst      Subcutaneous movable cyst c/w pilar cyst      Firm pink to brown papule c/w dermatofibroma      Pedunculated fleshy papule(s) c/w skin tag(s)      Evenly pigmented macule c/w  junctional nevus     Mildly variegated pigmented, slightly irregular-bordered macule c/w mildly atypical nevus      Flesh colored to evenly pigmented papule c/w intradermal nevus       Pink pearly papule/plaque c/w basal cell carcinoma      Erythematous hyperkeratotic cursted plaque c/w SCC      Surgical scar with no sign of skin cancer recurrence      Open and closed comedones      Inflammatory papules and pustules      Verrucoid papule consistent consistent with wart     Erythematous eczematous patches and plaques     Dystrophic onycholytic nail with subungual debris c/w onychomycosis     Umbilicated papule    Erythematous-base heme-crusted tan verrucoid plaque consistent with inflamed seborrheic keratosis     Erythematous Silvery Scaling Plaque c/w Psoriasis     See annotation      Assessment / Plan:      Pathology Orders:       Normal Orders This Visit    Specimen to Pathology, Dermatology     Comments:    Number of Specimens:->2  ------------------------->-------------------------  Spec 1 Procedure:->Biopsy  Spec 1 Clinical Impression:->r/o KA type SCC  Spec 1 Source:->right lower forearm  ------------------------->-------------------------  Spec 2 Procedure:->Biopsy  Spec 2 Clinical Impression:->r/o KA type SCC  Spec 2 Source:->left upper shin    Questions:    Procedure Type: Dermatology and skin neoplasms    Number of Specimens: 2    ------------------------: -------------------------    Spec 1 Procedure: Biopsy    Spec 1 Clinical Impression: r/o KA type SCC    Spec 1 Source: right lower forearm    ------------------------: -------------------------    Spec 2 Procedure: Biopsy    Spec 2 Clinical Impression: r/o KA type SCC    Spec 2 Source: left upper shin    Release to patient:           AK (actinic keratosis)  Cryosurgery Procedure Note    Verbal consent from the patient is obtained including, but not limited to, risk of hypopigmentation/hyperpigmentation, scar, recurrence of lesion. The patient is aware of  the precancerous quality and need for treatment of these lesions. Liquid nitrogen cryosurgery is applied to the 6 actinic keratoses, as detailed in the physical exam, to produce a freeze injury. The patient is aware that blisters may form and is instructed on wound care with gentle cleansing and use of vaseline ointment to keep moist until healed. The patient is supplied a handout on cryosurgery and is instructed to call if lesions do not completely resolve.    Neoplasm of uncertain behavior of skin x 2   Shave biopsy procedure note:    Shave biopsy performed after verbal consent including risk of infection, scar, recurrence, need for additional treatment of site. Area prepped with alcohol, anesthetized with approximately 1.0cc of 1% lidocaine with epinephrine. Lesional tissue shaved with razor blade. Hemostasis achieved with application of aluminum chloride followed by hyfrecation. No complications. Dressing applied. Wound care explained.    -     Specimen to Pathology, Dermatology    SK (seborrheic keratosis)  These are benign inherited growths without a malignant potential. Reassurance given to patient. No treatment is necessary.     Personal history of skin cancer  Scar  Scar no signs of recurrence R cheek  but 2 lesions concerning for SCC           Follow up in about 3 months (around 2/13/2025) for prn bx report.

## 2024-11-13 NOTE — PATIENT INSTRUCTIONS
Your doctor performed a skin biopsy today as she is concerned the lesion may be a skin cancer. Skin cancer is different than other cancers in that if it is addressed early, it can be cured with skin surgery.  As discussed at your appointment, if the lesion is a skin cancer your will be referred for Mohs Surgery.  Your pathology report, photos, and note will be messaged to your Mohs Department. Mohs surgery is a dermatologic subspecialty.  The surgeon is specially trained to remove the cancerous tissue, process it under the microscope while you wait, and ensure the skin cancer is removed with clear margins. The surgeon is then trained to repair the defect in a cosmetically acceptable way.  This is the standard of care for many skin cancers and [facility] has an excellent team that works with our dermatologists to offer this service.

## 2024-12-06 ENCOUNTER — OFFICE VISIT (OUTPATIENT)
Dept: OPTOMETRY | Facility: CLINIC | Age: 69
End: 2024-12-06
Payer: MEDICARE

## 2024-12-06 DIAGNOSIS — H52.4 PRESBYOPIA: ICD-10-CM

## 2024-12-06 DIAGNOSIS — H04.123 DRY EYE SYNDROME OF BOTH EYES: Primary | ICD-10-CM

## 2024-12-06 DIAGNOSIS — H25.13 NUCLEAR SCLEROSIS OF BOTH EYES: ICD-10-CM

## 2024-12-06 PROCEDURE — 99999 PR PBB SHADOW E&M-EST. PATIENT-LVL II: CPT | Mod: PBBFAC,,, | Performed by: OPTOMETRIST

## 2024-12-06 NOTE — PROGRESS NOTES
HPI    CC: Routine    YANE: 7 Years    (-) Changes in vision   (-) Pain  (-) Irritation   (-) Itching   (-) Flashes  (-) Floaters  (+) Glasses wearer: OTC Readers  (-) CL wearer  (-) Uses eye gtts    Does patient want a refraction today? no    (-) Eye injury  (-) Eye surgery   (-)POHx  (-)FOHx    (-)DM         Last edited by Katlin Redmond, OD on 12/6/2024  1:19 PM.            Assessment /Plan     For exam results, see Encounter Report.    Dry eye syndrome of both eyes    Nuclear sclerosis of both eyes    Presbyopia      Educated pt on findings. Recommended ATs TID-QID + stefanie/gel QHS for added lubrication and comfort. If symptoms worsen or dont improve, RTC. Monitor.      2. Educated pt on findings. Not visually significant. No need for removal at this time. Monitor yearly.      3. Continue use of OTC reading glasses prn. Monitor yearly.        RTC in 1 year for annual eye exam or sooner if needed.

## 2024-12-12 ENCOUNTER — TELEPHONE (OUTPATIENT)
Dept: ENDOSCOPY | Facility: HOSPITAL | Age: 69
End: 2024-12-12
Payer: MEDICARE

## 2024-12-12 ENCOUNTER — ANESTHESIA EVENT (OUTPATIENT)
Dept: ENDOSCOPY | Facility: HOSPITAL | Age: 69
End: 2024-12-12
Payer: MEDICARE

## 2024-12-12 NOTE — TELEPHONE ENCOUNTER
Spoke with patient about arrival time @ *. [phone]      NPO status reviewed: Patient may eat until 7:00pm.  After 7pm, pt may have CLEAR liquids ONLY until 3 hrs prior to arrival, then completely NPO..    Medications: Do not take Insulin or oral diabetic medications the day of the procedure.    Take as prescribed: heart, seizure and blood pressure medication in the morning with a sip of water (less than an ounce).  Take any breathing medications and bring inhalers to hospital with you.     Leave all valuables and jewelry at home. Wear comfortable clothes to procedure to change into hospital gown.   You cannot drive for 24 hours after your procedure because you will receive sedation for your procedure to make you comfortable.    A ride must be provided at discharge.

## 2024-12-13 ENCOUNTER — ANESTHESIA (OUTPATIENT)
Dept: ENDOSCOPY | Facility: HOSPITAL | Age: 69
End: 2024-12-13
Payer: MEDICARE

## 2024-12-13 ENCOUNTER — HOSPITAL ENCOUNTER (OUTPATIENT)
Facility: HOSPITAL | Age: 69
Discharge: HOME | End: 2024-12-13
Attending: INTERNAL MEDICINE | Admitting: INTERNAL MEDICINE
Payer: MEDICARE

## 2024-12-13 VITALS
SYSTOLIC BLOOD PRESSURE: 96 MMHG | OXYGEN SATURATION: 92 % | DIASTOLIC BLOOD PRESSURE: 63 MMHG | TEMPERATURE: 98 F | RESPIRATION RATE: 15 BRPM | BODY MASS INDEX: 24.99 KG/M2 | HEART RATE: 83 BPM | WEIGHT: 150 LBS | HEIGHT: 65 IN

## 2024-12-13 DIAGNOSIS — K83.8 DILATED BILE DUCT: ICD-10-CM

## 2024-12-13 PROCEDURE — 25000003 PHARM REV CODE 250: Performed by: NURSE ANESTHETIST, CERTIFIED REGISTERED

## 2024-12-13 PROCEDURE — 37000008 HC ANESTHESIA 1ST 15 MINUTES: Performed by: INTERNAL MEDICINE

## 2024-12-13 PROCEDURE — 43259 EGD US EXAM DUODENUM/JEJUNUM: CPT | Mod: ,,, | Performed by: INTERNAL MEDICINE

## 2024-12-13 PROCEDURE — 43259 EGD US EXAM DUODENUM/JEJUNUM: CPT | Performed by: INTERNAL MEDICINE

## 2024-12-13 PROCEDURE — 63600175 PHARM REV CODE 636 W HCPCS: Performed by: NURSE ANESTHETIST, CERTIFIED REGISTERED

## 2024-12-13 PROCEDURE — 37000009 HC ANESTHESIA EA ADD 15 MINS: Performed by: INTERNAL MEDICINE

## 2024-12-13 RX ORDER — PROPOFOL 10 MG/ML
VIAL (ML) INTRAVENOUS CONTINUOUS PRN
Status: DISCONTINUED | OUTPATIENT
Start: 2024-12-13 | End: 2024-12-13

## 2024-12-13 RX ORDER — TOPICAL ANESTHETIC 200 MG/ML
SPRAY DENTAL; PERIODONTAL
Status: DISCONTINUED | OUTPATIENT
Start: 2024-12-13 | End: 2024-12-13

## 2024-12-13 RX ORDER — SODIUM CHLORIDE 0.9 % (FLUSH) 0.9 %
10 SYRINGE (ML) INJECTION
Status: DISCONTINUED | OUTPATIENT
Start: 2024-12-13 | End: 2024-12-13 | Stop reason: HOSPADM

## 2024-12-13 RX ORDER — LIDOCAINE HYDROCHLORIDE 20 MG/ML
INJECTION INTRAVENOUS
Status: DISCONTINUED | OUTPATIENT
Start: 2024-12-13 | End: 2024-12-13

## 2024-12-13 RX ORDER — PROPOFOL 10 MG/ML
VIAL (ML) INTRAVENOUS
Status: DISCONTINUED | OUTPATIENT
Start: 2024-12-13 | End: 2024-12-13

## 2024-12-13 RX ADMIN — PROPOFOL 150 MCG/KG/MIN: 10 INJECTION, EMULSION INTRAVENOUS at 08:12

## 2024-12-13 RX ADMIN — TOPICAL ANESTHETIC 1 EACH: 200 SPRAY DENTAL; PERIODONTAL at 08:12

## 2024-12-13 RX ADMIN — LIDOCAINE HYDROCHLORIDE 75 MG: 20 INJECTION, SOLUTION INTRAVENOUS at 08:12

## 2024-12-13 RX ADMIN — PROPOFOL 60 MG: 10 INJECTION, EMULSION INTRAVENOUS at 08:12

## 2024-12-13 RX ADMIN — PROPOFOL 20 MG: 10 INJECTION, EMULSION INTRAVENOUS at 08:12

## 2024-12-13 NOTE — TRANSFER OF CARE
"Anesthesia Transfer of Care Note    Patient: Fabiano Garvey    Procedure(s) Performed: Procedure(s) (LRB):  ULTRASOUND, UPPER GI TRACT, ENDOSCOPIC (N/A)    Patient location: GI    Anesthesia Type: general    Transport from OR: Transported from OR on room air with adequate spontaneous ventilation    Post pain: adequate analgesia    Post assessment: no apparent anesthetic complications and tolerated procedure well    Post vital signs: stable    Level of consciousness: awake    Nausea/Vomiting: no nausea/vomiting    Complications: none    Transfer of care protocol was followed      Last vitals: Visit Vitals  /73 (BP Location: Left arm, Patient Position: Lying)   Pulse 93   Temp 36.4 °C (97.5 °F) (Temporal)   Resp 18   Ht 5' 5" (1.651 m)   Wt 68 kg (150 lb)   SpO2 (!) 89%   BMI 24.96 kg/m²     "

## 2024-12-13 NOTE — ANESTHESIA POSTPROCEDURE EVALUATION
Anesthesia Post Evaluation    Patient: Fabiano Garvey    Procedure(s) Performed: Procedure(s) (LRB):  ULTRASOUND, UPPER GI TRACT, ENDOSCOPIC (N/A)    Final Anesthesia Type: general      Patient location during evaluation: PACU  Patient participation: Yes- Able to Participate  Level of consciousness: awake  Post-procedure vital signs: reviewed and stable  Pain management: adequate  Airway patency: patent    PONV status at discharge: No PONV  Anesthetic complications: no      Cardiovascular status: blood pressure returned to baseline  Respiratory status: unassisted  Hydration status: euvolemic  Follow-up not needed.              Vitals Value Taken Time   BP 96/63 12/13/24 0845   Temp 36.6 °C (97.9 °F) 12/13/24 0815   Pulse 83 12/13/24 0845   Resp 15 12/13/24 0845   SpO2 92 % 12/13/24 0845         Event Time   Out of Recovery 08:46:33         Pain/Briana Score: Briana Score: 10 (12/13/2024  8:45 AM)

## 2024-12-13 NOTE — ANESTHESIA PREPROCEDURE EVALUATION
12/13/2024  Fabiano Garvey is a 69 y.o., male scheduled for ultrasound, upper GI tract for ductal dilation.       Past Medical History:   Diagnosis Date    Basal cell carcinoma 01/2024    left upper chest    COPD (chronic obstructive pulmonary disease)     Degenerative disc disease, lumbar 06/16/2021    Hyperlipidemia     NU (obstructive sleep apnea)     Pulmonary fibrosis     Rheumatoid arthritis     SCC (squamous cell carcinoma) 07/2020    SCC right medial canthus    SCC (squamous cell carcinoma) 11/2021    mid lower neck     SCC (squamous cell carcinoma) 01/2022    right lateral cheek- in situ    SCC (squamous cell carcinoma) 09/2023    in situ L lower forearm and HAK R shoulder    Small bowel obstruction due to adhesions 05/10/2023    Squamous cell carcinoma 07/2019    SCC in situ left temple    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    R lateral cheek     Past Surgical History:   Procedure Laterality Date    COLONOSCOPY N/A 7/25/2017    Procedure: COLONOSCOPY;  Surgeon: Bill Nicole MD;  Location: [address];  Service: Endoscopy;  Laterality: N/A;    DIAGNOSTIC LAPAROSCOPY N/A 5/10/2023    Procedure: LAPAROSCOPY, DIAGNOSTIC possible laparotomy other as indicated;  Surgeon: Eitan Palomares MD;  Location: [facility] OR;  Service: General;  Laterality: N/A;    EPIDURAL STEROID INJECTION INTO LUMBAR SPINE N/A 7/29/2021    Procedure: Injection-steroid-epidural-lumbar L5-S1;  Surgeon: Shiv Vernon Jr., MD;  Location: [facility] PAIN MGT;  Service: Pain Management;  Laterality: N/A;  oral sedation   no pacemaker     FUSION OF SPINE WITH INSTRUMENTATION Left 12/14/2022    Procedure: FUSION, SPINE, WITH INSTRUMENTATION Stg1: Left L3-5 OLiF conduit lateral cage BMP;  Surgeon: August Aguilar MD;  Location: [facility] OR;  Service: Neurosurgery;  Laterality: Left;  Procedure:  Stg1: Left L3-5 OLiF conduit lateral cage BMP  Surgery Time: 2hrs  LOS: 3  Anesthesia: General  Blood: Type & Screen  Radiology: C-arm  Brace: LSO  Bed: Regular Bed  Position: Lateral Right Down  Equip    FUSION, SPINE, POSTERIOR APPROACH N/A 12/14/2022    Procedure: FUSION,SPINE,POSTERIOR APPROACH Stg 2: L3-5 posterior instrumentation viper prime, L3-5 posterolateral arthrodesis;  Surgeon: August Aguilar MD;  Location: [facility] OR;  Service: Neurosurgery;  Laterality: N/A;  Procedure: Stg 2: L3-5 posterior instrumentation viper prime, L3-5 posterolateral arthrodesis  Surgery Time: 3hrs  LOS: 3  Anesthesia: General  Blood: Type & Screen  Radiology: Bra    LYSIS OF ADHESIONS N/A 5/10/2023    Procedure: LYSIS, ADHESIONS;  Surgeon: Eitan Palomares MD;  Location: [facility] OR;  Service: General;  Laterality: N/A;    NO PAST SURGERIES      TRANSFORAMINAL EPIDURAL INJECTION OF STEROID Right 6/29/2021    Procedure: Injection,steroid,epidural,transforaminal approach-RIGHT-- L4-5, L5-S1-- (IV Sedation);  Surgeon: Shiv Vernon Jr., MD;  Location: [facility] PAIN MGT;  Service: Pain Management;  Laterality: Right;    TRANSFORAMINAL EPIDURAL INJECTION OF STEROID Left 10/27/2022    Procedure: Injection,steroid,epidural,transforaminal left L4-5 and L5-S1;  Surgeon: Shiv Vernon Jr., MD;  Location: [facility] PAIN MGT;  Service: Pain Management;  Laterality: Left;       Pre-op Assessment    I have reviewed the Patient Summary Reports.     I have reviewed the Nursing Notes. I have reviewed the NPO Status.   I have reviewed the Medications.     Review of Systems  Anesthesia Hx:  No problems with previous Anesthesia   History of prior surgery of interest to airway management or planning:          Denies Family Hx of Anesthesia complications.    Denies Personal Hx of Anesthesia complications.                    Social:  Former Smoker, Social Alcohol Use       Hematology/Oncology:                        --  Cancer in past history:                      Cardiovascular:  Exercise tolerance: good   Denies Pacemaker.        Denies Angina.     hyperlipidemia                               Pulmonary:   COPD, moderate   Shortness of breath  Sleep Apnea, CPAP No home oxygen.     Recently change [facility] from [facility].           Education provided regarding risk of obstructive sleep apnea            Renal/:  Renal/ Normal                 Hepatic/GI:  Hepatic/GI Normal     Denies GERD.                Musculoskeletal:  Arthritis (rheumatoid)          Spine Disorders: lumbar Chronic Pain, Disc disease and Degenerative disease           Neurological:  Denies TIA.  Denies CVA. Neuromuscular Disease,   Denies Seizures.                                Endocrine:  Endocrine Normal Denies Diabetes. Denies Hypothyroidism.  Denies Hyperthyroidism.         Psych:  Psychiatric Normal                    Physical Exam  General: Well nourished, Cooperative, Alert and Oriented    Airway:  Mallampati: IV / III  Mouth Opening: Small, but > 3cm  TM Distance: Normal  Tongue: Normal  Neck ROM: Normal ROM    Dental:  Partial Dentures  Lower partial dentures    Lab Results   Component Value Date    WBC 9.27 09/16/2024    HGB 15.7 09/16/2024    HCT 49.5 09/16/2024     09/16/2024    CHOL 154 06/15/2023    TRIG 103 06/15/2023    HDL 48 06/15/2023    ALT 20 09/16/2024    AST 25 09/16/2024     09/16/2024    K 3.9 09/16/2024     09/16/2024    CREATININE 0.9 09/16/2024    BUN 10 09/16/2024    CO2 24 09/16/2024    TSH 1.675 06/15/2023    PSA 0.85 06/15/2023    INR 1.0 11/22/2022    HGBA1C 5.8 (H) 06/15/2023     Results for orders placed or performed in visit on 11/22/22   SCHEDULED EKG 12-LEAD (to Muse)    Collection Time: 11/22/22  9:36 AM    Narrative    Test Reason : Z01.818,Z79.899,E78.5,M06.9,J84.10,M54.16,D64.9,    Vent. Rate : 089 BPM     Atrial Rate : 089 BPM     P-R Int : 138 ms          QRS Dur : 090 ms      QT Int : 350 ms       P-R-T Axes : 056 070 072 degrees      QTc Int : 425 ms    Normal sinus rhythm  Normal ECG  When compared with ECG of 22-NOV-2022 09:35,  No significant change was found  Confirmed by Chip Chavira MD ([id number]) on 11/22/2022 10:16:37 AM    Referred By: OTILIA CAGLE           Confirmed By:Chip Chavira MD           Anesthesia Plan  Type of Anesthesia, risks & benefits discussed:    Anesthesia Type: Gen Natural Airway  Intra-op Monitoring Plan: Standard ASA Monitors  Post Op Pain Control Plan: multimodal analgesia  Induction:  IV  Informed Consent: Informed consent signed with the Patient and all parties understand the risks and agree with anesthesia plan.  All questions answered.   ASA Score: 3  Day of Surgery Review of History & Physical: H&P Update referred to the surgeon/provider.  Anesthesia Plan Notes:   SpO2 87-90% in pre-op.  No home O2.  Discussed needing oxygen for longer until SpO2 normalizes ~ 91-92% for discharge.     Will have albuterol for rescue.     Ready For Surgery From Anesthesia Perspective.     .

## 2024-12-13 NOTE — PROVATION PATIENT INSTRUCTIONS
Discharge Summary/Instructions after an Endoscopic Procedure  Patient Name: Fabiano Garvey  Patient MRN: [id number]  Patient YOB: 1955 Friday, December 13, 2024  Harrison Calderon MD  Dear patient,  As a result of recent federal legislation (The Federal Cures Act), you may   receive lab or pathology results from your procedure in your [facility]   account before your physician is able to contact you. Your physician or   their representative will relay the results to you with their   recommendations at their soonest availability.  Thank you,  Your health is very important to us during the Covid Crisis. Following your   procedure today, you will receive a daily text for 2 weeks asking about   signs or symptoms of Covid 19.  Please respond to this text when you   receive it so we can follow up and keep you as safe as possible.   RESTRICTIONS:  During your procedure today, you received medications for sedation.  These   medications may affect your judgment, balance and coordination.  Therefore,   for 24 hours, you have the following restrictions:   - DO NOT drive a car, operate machinery, make legal/financial decisions,   sign important papers or drink alcohol.    ACTIVITY:  Today: no heavy lifting, straining or running due to procedural   sedation/anesthesia.  The following day: return to full activity including work.  DIET:  Eat and drink normally unless instructed otherwise.     TREATMENT FOR COMMON SIDE EFFECTS:  - Mild abdominal pain, nausea, belching, bloating or excessive gas:  rest,   eat lightly and use a heating pad.  - Sore Throat: treat with throat lozenges and/or gargle with warm salt   water.  - Because air was used during the procedure, expelling large amounts of air   from your rectum or belching is normal.  - If a bowel prep was taken, you may not have a bowel movement for 1-3 days.    This is normal.  SYMPTOMS TO WATCH FOR AND REPORT TO YOUR PHYSICIAN:  1. Abdominal pain or bloating, other than  gas cramps.  2. Chest pain.  3. Back pain.  4. Signs of infection such as: chills or fever occurring within 24 hours   after the procedure.  5. Rectal bleeding, which would show as bright red, maroon, or black stools.   (A tablespoon of blood from the rectum is not serious, especially if   hemorrhoids are present.)  6. Vomiting.  7. Weakness or dizziness.  GO DIRECTLY TO THE NEAREST EMERGENCY ROOM IF YOU HAVE ANY OF THE FOLLOWING:      Difficulty breathing              Chills and/or fever over 101 F   Persistent vomiting and/or vomiting blood   Severe abdominal pain   Severe chest pain   Black, tarry stools   Bleeding- more than one tablespoon   Any other symptom or condition that you feel may need urgent attention  Your doctor recommends these additional instructions:  If any biopsies were taken, your doctors clinic will contact you in 1 to 2   weeks with any results.  - Discharge patient to home.   - Resume previous diet.   - Continue present medications.   - Return to primary care physician at appointment to be scheduled.  For questions, problems or results please call your physician - Harrison Calderon MD.  EMERGENCY PHONE NUMBER: [phone],  LAB RESULTS: [phone]  IF A COMPLICATION OR EMERGENCY SITUATION ARISES AND YOU ARE UNABLE TO REACH   YOUR PHYSICIAN - GO DIRECTLY TO THE EMERGENCY ROOM.  Harrison Calderon MD  12/13/2024 8:20:37 AM  This report has been verified and signed electronically.  Dear patient,  As a result of recent federal legislation (The Federal Cures Act), you may   receive lab or pathology results from your procedure in your [facility]   account before your physician is able to contact you. Your physician or   their representative will relay the results to you with their   recommendations at their soonest availability.  Thank you,  PROVATION

## 2024-12-13 NOTE — H&P
Short Stay Endoscopy History and Physical    PCP - Calin Buchanan MD  Referring Physician - Harrison Calderon MD  [address]  [address]  [place] [address]    Procedure - eus  ASA - per anesthesia  Mallampati - per anesthesia  History of Anesthesia problems - no  Family history Anesthesia problems -  no   Plan of anesthesia - General    HPI:  This is a 69 y.o. male here for evaluation of: dilated ducts    Reflux - no  Dysphagia - no  Abdominal pain - no  Diarrhea - no    ROS:  Constitutional: No fevers, chills, No weight loss  CV: No chest pain  Pulm: No cough, No shortness of breath  Ophtho: No vision changes  GI: see HPI  Derm: No rash    Medical History:  has a past medical history of Basal cell carcinoma (01/2024), COPD (chronic obstructive pulmonary disease), Degenerative disc disease, lumbar (06/16/2021), Hyperlipidemia, NU (obstructive sleep apnea), Pulmonary fibrosis, Rheumatoid arthritis, SCC (squamous cell carcinoma) (07/2020), SCC (squamous cell carcinoma) (11/2021), SCC (squamous cell carcinoma) (01/2022), SCC (squamous cell carcinoma) (09/2023), Small bowel obstruction due to adhesions (05/10/2023), Squamous cell carcinoma (07/2019), and 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    Surgical History:  has a past surgical history that includes No past surgeries; Colonoscopy (N/A, 7/25/2017); Transforaminal epidural injection of steroid (Right, 6/29/2021); Epidural steroid injection into lumbar spine (N/A, 7/29/2021); Transforaminal epidural injection of steroid (Left, 10/27/2022); Fusion of spine with instrumentation (Left, 12/14/2022); fusion, spine, posterior approach (N/A, 12/14/2022); Diagnostic laparoscopy (N/A, 5/10/2023); and Lysis of adhesions (N/A, 5/10/2023).    Family History: family history includes Arthritis in his brother and sister; Asthma in his daughter; Cancer in his paternal uncle; Coronary artery disease in his father; Gallbladder  disease in his daughter; Heart attack in his father; Heart failure in his mother; Seizures in his daughter..    Social History:  reports that he quit smoking about 12 years ago. His smoking use included cigarettes. He started smoking about 49 years ago. He has a 28.1 pack-year smoking history. He has never been exposed to tobacco smoke. He has never used smokeless tobacco. He reports current alcohol use. He reports that he does not use drugs.    Review of patient's allergies indicates:   Allergen Reactions    Plaquenil [hydroxychloroquine]      Lightheaded and muscle aches       Medications:   Medications Prior to Admission   Medication Sig Dispense Refill Last Dose/Taking    atorvastatin (LIPITOR) 40 MG tablet Take 1 tablet (40 mg total) by mouth once daily. 90 tablet 3 12/12/2024    budesonide-glycopyr-formoterol (BREZTRI AEROSPHERE) 160-9-4.8 mcg/actuation HFAA Inhale 2 puffs into the lungs 2 (two) times a day. 10.7 g 6 12/12/2024    celecoxib (CELEBREX) 200 MG capsule Take 1 capsule (200 mg total) by mouth 2 (two) times daily. 60 capsule 2 12/12/2024    gabapentin (NEURONTIN) 300 MG capsule Take 2 capsules (600 mg total) by mouth 3 (three) times daily. 180 capsule 11 12/12/2024    leflunomide (ARAVA) 10 MG Tab Take 1 tablet (10 mg total) by mouth once daily. 30 tablet 3 12/12/2024    albuterol (VENTOLIN HFA) 90 mcg/actuation inhaler Inhale 2 puffs into the lungs every 6 (six) hours as needed for Wheezing (cough). Rescue 18 g 2 More than a month    ergocalciferol, vitamin D2, (VITAMIN D ORAL) Take 2,000 Units by mouth once daily.       fluorouraciL (EFUDEX) 5 % cream Apply thin film to right shoulder and left lower forearm 2times per day for 21 days; d/c if area bleeding or ulcerated; avoid eyes or mouth (Patient not taking: Reported on 7/11/2024) 40 g 1     fluorouraciL (EFUDEX) 5 % cream Mix with calcipotriene 0.005% cream  and Apply thin film to scalp 2x per day for 14 days and right cheek   2 times per day  for 7 days; d/c if area bleeding or ulcerated; avoid eyes or mouth 40 g 1        Physical Exam:    Vital Signs:   Vitals:    12/13/24 0720   BP: 104/73   Pulse: 93   Resp: 18   Temp: 97.5 °F (36.4 °C)       General Appearance: Well appearing in no acute distress    Labs:  Lab Results   Component Value Date    WBC 9.27 09/16/2024    HGB 15.7 09/16/2024    HCT 49.5 09/16/2024     09/16/2024    CHOL 154 06/15/2023    TRIG 103 06/15/2023    HDL 48 06/15/2023    ALT 20 09/16/2024    AST 25 09/16/2024     09/16/2024    K 3.9 09/16/2024     09/16/2024    CREATININE 0.9 09/16/2024    BUN 10 09/16/2024    CO2 24 09/16/2024    TSH 1.675 06/15/2023    PSA 0.85 06/15/2023    INR 1.0 11/22/2022    HGBA1C 5.8 (H) 06/15/2023       I have explained the risks and benefits of this endoscopic procedure to the patient including but not limited to bleeding, inflammation, infection, perforation, and death.      Harrison Calderon MD

## 2024-12-27 DIAGNOSIS — Z79.899 IMMUNODEFICIENCY DUE TO DRUG THERAPY: Primary | ICD-10-CM

## 2024-12-27 DIAGNOSIS — D84.821 IMMUNODEFICIENCY DUE TO DRUG THERAPY: Primary | ICD-10-CM

## 2024-12-27 RX ORDER — LEFLUNOMIDE 10 MG/1
10 TABLET ORAL DAILY
Qty: 30 TABLET | Refills: 3 | Status: ACTIVE | OUTPATIENT
Start: 2024-12-27

## 2025-01-07 ENCOUNTER — LAB VISIT (OUTPATIENT)
Dept: LAB | Facility: HOSPITAL | Age: 70
End: 2025-01-07
Attending: FAMILY MEDICINE
Payer: MEDICARE

## 2025-01-07 DIAGNOSIS — J84.9 ILD (INTERSTITIAL LUNG DISEASE): ICD-10-CM

## 2025-01-07 DIAGNOSIS — M48.062 SPINAL STENOSIS OF LUMBAR REGION WITH NEUROGENIC CLAUDICATION: ICD-10-CM

## 2025-01-07 DIAGNOSIS — Z12.5 SCREENING FOR MALIGNANT NEOPLASM OF PROSTATE: ICD-10-CM

## 2025-01-07 DIAGNOSIS — E78.2 MIXED HYPERLIPIDEMIA: ICD-10-CM

## 2025-01-07 DIAGNOSIS — G47.33 OBSTRUCTIVE SLEEP APNEA: ICD-10-CM

## 2025-01-07 DIAGNOSIS — I70.8 AORTO-ILIAC ATHEROSCLEROSIS: ICD-10-CM

## 2025-01-07 DIAGNOSIS — E78.49 OTHER HYPERLIPIDEMIA: ICD-10-CM

## 2025-01-07 DIAGNOSIS — J84.10 PULMONARY FIBROSIS: ICD-10-CM

## 2025-01-07 DIAGNOSIS — M06.9 RHEUMATOID ARTHRITIS INVOLVING MULTIPLE JOINTS: ICD-10-CM

## 2025-01-07 DIAGNOSIS — I70.0 AORTO-ILIAC ATHEROSCLEROSIS: ICD-10-CM

## 2025-01-07 DIAGNOSIS — D84.9 IMMUNOSUPPRESSED STATUS: ICD-10-CM

## 2025-01-07 DIAGNOSIS — M05.7A RHEUMATOID ARTHRITIS OF OTHER SITE WITH POSITIVE RHEUMATOID FACTOR: ICD-10-CM

## 2025-01-07 DIAGNOSIS — J43.2 CENTRILOBULAR EMPHYSEMA: ICD-10-CM

## 2025-01-07 DIAGNOSIS — Z79.899 OTHER LONG TERM (CURRENT) DRUG THERAPY: ICD-10-CM

## 2025-01-07 DIAGNOSIS — Z00.00 ENCOUNTER FOR PREVENTIVE HEALTH EXAMINATION: ICD-10-CM

## 2025-01-07 LAB
ALBUMIN SERPL BCP-MCNC: 3.5 G/DL (ref 3.5–5.2)
ALP SERPL-CCNC: 102 U/L (ref 40–150)
ALT SERPL W/O P-5'-P-CCNC: 30 U/L (ref 10–44)
ANION GAP SERPL CALC-SCNC: 11 MMOL/L (ref 8–16)
AST SERPL-CCNC: 35 U/L (ref 10–40)
BASOPHILS # BLD AUTO: 0.08 K/UL (ref 0–0.2)
BASOPHILS NFR BLD: 0.9 % (ref 0–1.9)
BILIRUB SERPL-MCNC: 0.6 MG/DL (ref 0.1–1)
BUN SERPL-MCNC: 15 MG/DL (ref 8–23)
CALCIUM SERPL-MCNC: 10.2 MG/DL (ref 8.7–10.5)
CHLORIDE SERPL-SCNC: 104 MMOL/L (ref 95–110)
CHOLEST SERPL-MCNC: 139 MG/DL (ref 120–199)
CHOLEST/HDLC SERPL: 3.1 {RATIO} (ref 2–5)
CO2 SERPL-SCNC: 24 MMOL/L (ref 23–29)
COMPLEXED PSA SERPL-MCNC: 0.74 NG/ML (ref 0–4)
CREAT SERPL-MCNC: 1 MG/DL (ref 0.5–1.4)
CRP SERPL-MCNC: 5 MG/L (ref 0–8.2)
DIFFERENTIAL METHOD BLD: ABNORMAL
EOSINOPHIL # BLD AUTO: 0.3 K/UL (ref 0–0.5)
EOSINOPHIL NFR BLD: 3 % (ref 0–8)
ERYTHROCYTE [DISTWIDTH] IN BLOOD BY AUTOMATED COUNT: 15.3 % (ref 11.5–14.5)
ERYTHROCYTE [SEDIMENTATION RATE] IN BLOOD BY PHOTOMETRIC METHOD: 21 MM/HR (ref 0–23)
EST. GFR  (NO RACE VARIABLE): >60 ML/MIN/1.73 M^2
ESTIMATED AVG GLUCOSE: 123 MG/DL (ref 68–131)
GLUCOSE SERPL-MCNC: 93 MG/DL (ref 70–110)
HBA1C MFR BLD: 5.9 % (ref 4–5.6)
HCT VFR BLD AUTO: 52.3 % (ref 40–54)
HDLC SERPL-MCNC: 45 MG/DL (ref 40–75)
HDLC SERPL: 32.4 % (ref 20–50)
HGB BLD-MCNC: 17.2 G/DL (ref 14–18)
IMM GRANULOCYTES # BLD AUTO: 0.04 K/UL (ref 0–0.04)
IMM GRANULOCYTES NFR BLD AUTO: 0.5 % (ref 0–0.5)
LDLC SERPL CALC-MCNC: 77.6 MG/DL (ref 63–159)
LYMPHOCYTES # BLD AUTO: 2.2 K/UL (ref 1–4.8)
LYMPHOCYTES NFR BLD: 25.7 % (ref 18–48)
MCH RBC QN AUTO: 30.6 PG (ref 27–31)
MCHC RBC AUTO-ENTMCNC: 32.9 G/DL (ref 32–36)
MCV RBC AUTO: 93 FL (ref 82–98)
MONOCYTES # BLD AUTO: 0.7 K/UL (ref 0.3–1)
MONOCYTES NFR BLD: 8.1 % (ref 4–15)
NEUTROPHILS # BLD AUTO: 5.3 K/UL (ref 1.8–7.7)
NEUTROPHILS NFR BLD: 61.8 % (ref 38–73)
NONHDLC SERPL-MCNC: 94 MG/DL
NRBC BLD-RTO: 0 /100 WBC
PLATELET # BLD AUTO: 200 K/UL (ref 150–450)
PMV BLD AUTO: 10.9 FL (ref 9.2–12.9)
POTASSIUM SERPL-SCNC: 4.6 MMOL/L (ref 3.5–5.1)
PROT SERPL-MCNC: 8.9 G/DL (ref 6–8.4)
RBC # BLD AUTO: 5.62 M/UL (ref 4.6–6.2)
SODIUM SERPL-SCNC: 139 MMOL/L (ref 136–145)
TRIGL SERPL-MCNC: 82 MG/DL (ref 30–150)
TSH SERPL DL<=0.005 MIU/L-ACNC: 1.72 UIU/ML (ref 0.4–4)
WBC # BLD AUTO: 8.57 K/UL (ref 3.9–12.7)

## 2025-01-07 PROCEDURE — 80061 LIPID PANEL: CPT | Performed by: FAMILY MEDICINE

## 2025-01-07 PROCEDURE — 83036 HEMOGLOBIN GLYCOSYLATED A1C: CPT | Performed by: FAMILY MEDICINE

## 2025-01-07 PROCEDURE — 86140 C-REACTIVE PROTEIN: CPT | Performed by: INTERNAL MEDICINE

## 2025-01-07 PROCEDURE — 80053 COMPREHEN METABOLIC PANEL: CPT | Performed by: FAMILY MEDICINE

## 2025-01-07 PROCEDURE — 85652 RBC SED RATE AUTOMATED: CPT | Performed by: INTERNAL MEDICINE

## 2025-01-07 PROCEDURE — 84153 ASSAY OF PSA TOTAL: CPT | Performed by: FAMILY MEDICINE

## 2025-01-07 PROCEDURE — 85025 COMPLETE CBC W/AUTO DIFF WBC: CPT | Performed by: FAMILY MEDICINE

## 2025-01-07 PROCEDURE — 84443 ASSAY THYROID STIM HORMONE: CPT | Performed by: FAMILY MEDICINE

## 2025-01-07 PROCEDURE — 36415 COLL VENOUS BLD VENIPUNCTURE: CPT | Performed by: FAMILY MEDICINE

## 2025-01-09 ENCOUNTER — PROCEDURE VISIT (OUTPATIENT)
Dept: DERMATOLOGY | Facility: CLINIC | Age: 70
End: 2025-01-09
Payer: MEDICARE

## 2025-01-09 VITALS — SYSTOLIC BLOOD PRESSURE: 113 MMHG | HEART RATE: 106 BPM | DIASTOLIC BLOOD PRESSURE: 76 MMHG

## 2025-01-09 DIAGNOSIS — C44.729 SQUAMOUS CELL CARCINOMA OF LEFT LOWER LEG: ICD-10-CM

## 2025-01-09 DIAGNOSIS — C44.622 SQUAMOUS CELL CARCINOMA OF SKIN OF RIGHT ARM, INCLUDING SHOULDER: Primary | ICD-10-CM

## 2025-01-09 RX ORDER — CEPHALEXIN 500 MG/1
500 CAPSULE ORAL 3 TIMES DAILY
Qty: 30 CAPSULE | Refills: 0 | Status: SHIPPED | OUTPATIENT
Start: 2025-01-09 | End: 2025-01-19

## 2025-01-09 NOTE — PROGRESS NOTES
PROCEDURE: Mohs' Micrographic Surgery    INDICATION: Tumors with aggressive clinical behavior (rapidly growing, greater than 1 cm in diameter). Tumor with ill-defined borders.    REFERRING PROVIDER: Lucie Judge M.D.    CASE NUMBER:     ANESTHETIC: 4 cc 0.5% Lidocaine with Epi 1:200,000 mixed 1:1 with 0.5% Bupivacaine    SURGICAL PREP: Hibiclens    SURGEON: Laverne Graham MD    ASSISTANTS: Michelle Hill PA-C and Denise Ramirez MA    PREOPERATIVE DIAGNOSIS: squamous cell carcinoma- keratoacanthoma    POSTOPERATIVE DIAGNOSIS: squamous cell carcinoma- superficial    PATHOLOGIC DIAGNOSIS: squamous cell carcinoma- keratoacanthoma, superficial    HISTOLOGY OF SPECIMENS IN FIRST STAGE:   Tumor Type: Tumor seen. Superficial squamous cell carcinoma: Proliferation of squamous cells exhibiting atypia and infiltrating within the superficial papillary dermis.   Depth of Invasion: epidermis and dermis  Perineural Invasion: No    HISTOLOGY OF SPECIMENS IN SUBSEQUENT STAGES:  Tumor Type:  No tumor seen.    STAGES OF MOHS' SURGERY PERFORMED: 2    TUMOR-FREE PLANE ACHIEVED: Yes    HEMOSTASIS: electrocoagulation     SPECIMENS: 3 (2 in stage A and 1 in stage B)    LOCATION: right lower forearm. Location verified with Dr. Judge's clinical photograph. Patient also verified location.    INITIAL LESION SIZE: 0.8 x 1.0 cm    FINAL DEFECT SIZE: 1.5 x 2.2 cm    WOUND REPAIR/DISPOSITION: The patient tolerated Mohs' Micrographic Surgery for a squamous cell carcinoma very well. When the tumor was completely removed, a repair of the surgical defect was undertaken.    PROCEDURE: Complex Linear Repair    INDICATION: Status post Mohs' Micrographic Surgery for squamous cell carcinoma.    CASE NUMBER:     SURGEON: Laverne Graham MD    ASSISTANTS: Michelle Hill PA-C and Denise Ramirez MA    ANESTHETIC: 1 cc 1% Lidocaine with Epinephrine 1:100,000    SURGICAL PREP: Hibiclens, prepped by Denise Ramirez MA    LOCATION: right lower  forearm    DEFECT SIZE: 1.5 x 2.2 cm    WOUND REPAIR/DISPOSITION:  After the patient's carcinoma had been completely removed with Mohs' Micrographic Surgery, a repair of the surgical defect was undertaken. The patient was returned to the operating suite where the area of right lower forearm was prepped, draped, and anesthetized in the usual sterile fashion. The wound was widely undermined in all directions. The wound was undermined to a distance at least the maximum width of the defect as measured perpendicular to the closure line along at least one entire edge of the defect, in this case 2 cm. Then, electrocoagulation was used to obtain meticulous hemostasis. 4-0 Vicryl buried vertical mattress sutures were placed into the subcutaneous and dermal plane to close the wound and basil the cutaneous wound edge. Bilateral dog ears were identified and were removed by a standard Burow's triangle technique. The cutaneous wound edges were closed using interrupted 4-0 Prolene suture.    The patient tolerated the procedure well.    The area was cleaned and dressed appropriately and the patient was given wound care instructions, as well as appointment for follow-up evaluation and suture removal in 14 days. Patient was placed on Keflex 500 mg TID x 10 days.    LENGTH OF REPAIR: 4.5 cm    Vitals:    01/09/25 1254   BP: 113/76   BP Location: Left arm   Patient Position: Sitting   Pulse: 106       PROCEDURE: Mohs' Micrographic Surgery    INDICATION: Biopsy-proven skin cancer of cosmetically and functionally important areas, including head, neck, genital, hand, foot, or areas known for having difficulty in healing, such as the lower anterior legs. Tumors with aggressive clinical behavior (rapidly growing, greater than 1 cm in diameter). Tumor with ill-defined borders.    REFERRING PROVIDER: Lucie Judge M.D.    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 Invasive squamous cell carcinoma: Proliferation of squamous cells exhibiting atypia and infiltrating within the dermis.   Depth of Invasion: epidermis and dermis  Perineural Invasion: No    HISTOLOGY OF SPECIMENS IN SUBSEQUENT STAGES:  Tumor Type:  No tumor seen.    STAGES OF MOHS' SURGERY PERFORMED: 2    TUMOR-FREE PLANE ACHIEVED: Yes    HEMOSTASIS: electrocoagulation     SPECIMENS: 4 (3 in stage A and 1 in stage B)    LOCATION: left upper shin. Location verified with Dr. Judge's clinical photograph. Patient also verified location.    INITIAL LESION SIZE: 1.2 x 1.5 cm    FINAL DEFECT SIZE: 1.6 x 2.0 cm    WOUND REPAIR/DISPOSITION: The patient tolerated Mohs' Micrographic Surgery for a squamous cell carcinoma very well. When the tumor was completely removed, a repair of the surgical defect was undertaken.    PROCEDURE: Complex Linear Repair    INDICATION: Status post Mohs' Micrographic Surgery for squamous cell carcinoma.    CASE NUMBER:     SURGEON: Laverne Graham MD    ASSISTANTS: Michelle Hill PA-C and Denise Ramirez MA    ANESTHETIC: 2 cc 1% Lidocaine with Epinephrine 1:100,000    SURGICAL PREP: Hibiclens, prepped by Denise Ramirez MA    LOCATION: left upper shin    DEFECT SIZE: 1.6 x 2.0 cm    WOUND REPAIR/DISPOSITION:  After the patient's carcinoma had been completely removed with Mohs' Micrographic Surgery, a repair of the surgical defect was undertaken. The patient was returned to the operating suite where the area of left upper shin was prepped, draped, and anesthetized in the usual sterile fashion. The wound was widely undermined in all directions. The  wound was undermined to a distance at least the maximum width of the defect as measured perpendicular to the closure line along at least one entire edge of the defect, in this case 2 cm. Then, electrocoagulation was used to obtain meticulous hemostasis. 3-0 Vicryl and 4-0 Vicryl buried vertical mattress sutures were placed into the subcutaneous and dermal plane to close the wound and basil the cutaneous wound edge. Bilateral dog ears were identified and were removed by a standard Burow's triangle technique. The cutaneous wound edges were closed using interrupted 3-0 Prolene suture.    The patient tolerated the procedure well.    The area was cleaned and dressed appropriately and the patient was given wound care instructions, as well as appointment for follow-up evaluation and suture removal in 14 days. Patient was placed on Keflex 500 mg TID x 10 days.    LENGTH OF REPAIR: 3.8 cm    Vitals:    01/09/25 1254   BP: 113/76   BP Location: Left arm   Patient Position: Sitting   Pulse: 106

## 2025-01-22 ENCOUNTER — TELEPHONE (OUTPATIENT)
Dept: DERMATOLOGY | Facility: CLINIC | Age: 70
End: 2025-01-22
Payer: MEDICARE

## 2025-01-22 NOTE — TELEPHONE ENCOUNTER
----- Message from Vivek Arriaga sent at 1/22/2025 10:04 AM CST -----  Regarding: reschedule request  PATIENT CALL    Pt called regarding tomorrow's post-op, would like to r/s to next soonest available due to weather conditions. Please call back at [phone]

## 2025-01-24 ENCOUNTER — OFFICE VISIT (OUTPATIENT)
Dept: DERMATOLOGY | Facility: CLINIC | Age: 70
End: 2025-01-24
Payer: MEDICARE

## 2025-01-24 DIAGNOSIS — Z09 POSTOP CHECK: Primary | ICD-10-CM

## 2025-01-24 PROCEDURE — 1159F MED LIST DOCD IN RCRD: CPT | Mod: CPTII,S$GLB,, | Performed by: DERMATOLOGY

## 2025-01-24 PROCEDURE — 1126F AMNT PAIN NOTED NONE PRSNT: CPT | Mod: CPTII,S$GLB,, | Performed by: DERMATOLOGY

## 2025-01-24 PROCEDURE — 99024 POSTOP FOLLOW-UP VISIT: CPT | Mod: S$GLB,,, | Performed by: DERMATOLOGY

## 2025-01-24 PROCEDURE — 1160F RVW MEDS BY RX/DR IN RCRD: CPT | Mod: CPTII,S$GLB,, | Performed by: DERMATOLOGY

## 2025-01-24 PROCEDURE — 99999 PR PBB SHADOW E&M-EST. PATIENT-LVL II: CPT | Mod: PBBFAC,,, | Performed by: DERMATOLOGY

## 2025-01-24 PROCEDURE — 3288F FALL RISK ASSESSMENT DOCD: CPT | Mod: CPTII,S$GLB,, | Performed by: DERMATOLOGY

## 2025-01-24 PROCEDURE — 1101F PT FALLS ASSESS-DOCD LE1/YR: CPT | Mod: CPTII,S$GLB,, | Performed by: DERMATOLOGY

## 2025-01-24 PROCEDURE — 3044F HG A1C LEVEL LT 7.0%: CPT | Mod: CPTII,S$GLB,, | Performed by: DERMATOLOGY

## 2025-01-24 NOTE — PROGRESS NOTES
69 y.o. male patient is here for suture removal following Mohs' surgery.    Patient reports no problems right lower forearm or left upper shin.    WOUND PE:  The right lower forearm and left upper shin sutures intact. Wound healing well. Good skin edges. No signs or symptoms of infection.    IMPRESSION:  Healing operative site from Mohs' surgery SCC, right lower forearm and left upper shin s/p Mohs with CLC, postop day # 15.    PLAN:  Sutures removed today by Debby Segura, Surg Tech. Steri-strips applied.  Continue wound care.  Keep moist with Aquaphor.  Call if any issues arise    RTC:  In 3-6 months with Lucie Judge M.D. for skin check or sooner if new concern arises.

## 2025-01-30 ENCOUNTER — PATIENT MESSAGE (OUTPATIENT)
Dept: DERMATOLOGY | Facility: CLINIC | Age: 70
End: 2025-01-30
Payer: MEDICARE

## 2025-01-30 DIAGNOSIS — Z00.00 ENCOUNTER FOR MEDICARE ANNUAL WELLNESS EXAM: ICD-10-CM

## 2025-03-10 PROBLEM — Z12.11 COLON CANCER SCREENING: Status: RESOLVED | Noted: 2017-07-25 | Resolved: 2025-03-10

## 2025-03-11 ENCOUNTER — OFFICE VISIT (OUTPATIENT)
Dept: FAMILY MEDICINE | Facility: CLINIC | Age: 70
End: 2025-03-11
Payer: MEDICARE

## 2025-03-11 VITALS
HEIGHT: 65 IN | SYSTOLIC BLOOD PRESSURE: 112 MMHG | BODY MASS INDEX: 24.36 KG/M2 | OXYGEN SATURATION: 91 % | HEART RATE: 96 BPM | DIASTOLIC BLOOD PRESSURE: 80 MMHG | WEIGHT: 146.19 LBS

## 2025-03-11 DIAGNOSIS — G89.29 CHRONIC BILATERAL LOW BACK PAIN WITH RIGHT-SIDED SCIATICA: ICD-10-CM

## 2025-03-11 DIAGNOSIS — M48.062 SPINAL STENOSIS OF LUMBAR REGION WITH NEUROGENIC CLAUDICATION: ICD-10-CM

## 2025-03-11 DIAGNOSIS — M20.10 VALGUS DEFORMITY OF GREAT TOE, UNSPECIFIED LATERALITY: ICD-10-CM

## 2025-03-11 DIAGNOSIS — I70.0 AORTO-ILIAC ATHEROSCLEROSIS: ICD-10-CM

## 2025-03-11 DIAGNOSIS — G47.33 OBSTRUCTIVE SLEEP APNEA: ICD-10-CM

## 2025-03-11 DIAGNOSIS — M05.7A RHEUMATOID ARTHRITIS OF OTHER SITE WITH POSITIVE RHEUMATOID FACTOR: ICD-10-CM

## 2025-03-11 DIAGNOSIS — I70.8 AORTO-ILIAC ATHEROSCLEROSIS: ICD-10-CM

## 2025-03-11 DIAGNOSIS — M54.41 CHRONIC BILATERAL LOW BACK PAIN WITH RIGHT-SIDED SCIATICA: ICD-10-CM

## 2025-03-11 DIAGNOSIS — M20.5X9 HALLUX LIMITUS, UNSPECIFIED LATERALITY: ICD-10-CM

## 2025-03-11 DIAGNOSIS — J84.10 INTERSTITIAL PULMONARY FIBROSIS: ICD-10-CM

## 2025-03-11 DIAGNOSIS — Z00.00 ENCOUNTER FOR MEDICARE ANNUAL WELLNESS EXAM: Primary | ICD-10-CM

## 2025-03-11 DIAGNOSIS — J43.2 CENTRILOBULAR EMPHYSEMA: ICD-10-CM

## 2025-03-11 DIAGNOSIS — E78.49 OTHER HYPERLIPIDEMIA: ICD-10-CM

## 2025-03-11 DIAGNOSIS — D84.9 IMMUNOSUPPRESSED STATUS: ICD-10-CM

## 2025-03-11 PROCEDURE — 3288F FALL RISK ASSESSMENT DOCD: CPT | Mod: CPTII,S$GLB,, | Performed by: NURSE PRACTITIONER

## 2025-03-11 PROCEDURE — 1101F PT FALLS ASSESS-DOCD LE1/YR: CPT | Mod: CPTII,S$GLB,, | Performed by: NURSE PRACTITIONER

## 2025-03-11 PROCEDURE — 1170F FXNL STATUS ASSESSED: CPT | Mod: CPTII,S$GLB,, | Performed by: NURSE PRACTITIONER

## 2025-03-11 PROCEDURE — 99999 PR PBB SHADOW E&M-EST. PATIENT-LVL V: CPT | Mod: PBBFAC,,, | Performed by: NURSE PRACTITIONER

## 2025-03-11 PROCEDURE — 1159F MED LIST DOCD IN RCRD: CPT | Mod: CPTII,S$GLB,, | Performed by: NURSE PRACTITIONER

## 2025-03-11 PROCEDURE — 3044F HG A1C LEVEL LT 7.0%: CPT | Mod: CPTII,S$GLB,, | Performed by: NURSE PRACTITIONER

## 2025-03-11 PROCEDURE — 1160F RVW MEDS BY RX/DR IN RCRD: CPT | Mod: CPTII,S$GLB,, | Performed by: NURSE PRACTITIONER

## 2025-03-11 PROCEDURE — 3079F DIAST BP 80-89 MM HG: CPT | Mod: CPTII,S$GLB,, | Performed by: NURSE PRACTITIONER

## 2025-03-11 PROCEDURE — 3074F SYST BP LT 130 MM HG: CPT | Mod: CPTII,S$GLB,, | Performed by: NURSE PRACTITIONER

## 2025-03-11 PROCEDURE — G0439 PPPS, SUBSEQ VISIT: HCPCS | Mod: S$GLB,,, | Performed by: NURSE PRACTITIONER

## 2025-03-11 PROCEDURE — 1158F ADVNC CARE PLAN TLK DOCD: CPT | Mod: CPTII,S$GLB,, | Performed by: NURSE PRACTITIONER

## 2025-03-11 PROCEDURE — 1125F AMNT PAIN NOTED PAIN PRSNT: CPT | Mod: CPTII,S$GLB,, | Performed by: NURSE PRACTITIONER

## 2025-03-11 RX ORDER — IBUPROFEN 800 MG/1
800 TABLET ORAL EVERY 6 HOURS PRN
COMMUNITY

## 2025-03-11 NOTE — PATIENT INSTRUCTIONS
Counseling and Referral of Other Preventative  (Italic type indicates deductible and co-insurance are waived)    Patient Name: Fabiano Garvey  Today's Date: 3/11/2025    Health Maintenance       Date Due Completion Date    Shingles Vaccine (1 of 2) 03/14/2025 (Originally 7/19/1974) ---    COVID-19 Vaccine (7 - 2024-25 season) 03/14/2025 (Originally 9/1/2024) 10/27/2023    RSV Vaccine (Age 60+ and Pregnant patients) (1 - Risk 60-74 years 1-dose series) 03/20/2025 (Originally 7/19/2014) ---    LDCT Lung Screen 06/06/2025 6/6/2024    PROSTATE-SPECIFIC ANTIGEN 01/07/2026 1/7/2025    Hemoglobin A1c (Prediabetes) 01/07/2026 1/7/2025    Colorectal Cancer Screening 07/25/2027 7/25/2017    Lipid Panel 01/07/2030 1/7/2025    TETANUS VACCINE 06/28/2032 6/28/2022        No orders of the defined types were placed in this encounter.      The following information is provided to all patients.  This information is to help you find resources for any of the problems found today that may be affecting your health:                  Living healthy guide: www.[facility].[place].gov      Understanding Diabetes: www.diabetes.org      Eating healthy: www.cdc.gov/healthyweight      CDC home safety checklist: www.cdc.gov/steadi/patient.html      Agency on Aging: www.goea.[place].gov      Alcoholics anonymous (AA): www.aa.org      Physical Activity: www.myah.nih.gov/qw4lczr      Tobacco use: www.quitwithusla.org

## 2025-03-11 NOTE — PROGRESS NOTES
"  Fabiano Garvey presented for a  Medicare AWV and comprehensive Health Risk Assessment today. The following components were reviewed and updated:    Medical history  Family History  Social history  Allergies and Current Medications  Health Risk Assessment  Health Maintenance  Care Team         ** See Completed Assessments for Annual Wellness Visit within the encounter summary.**         The following assessments were completed:  Living Situation  CAGE  Depression Screening  Timed Get Up and Go  Whisper Test  Cognitive Function Screening - patient refused to complete today   Nutrition Screening  ADL Screening  PAQ Screening      Opioid documentation for eAWV      Patient does not have a current opioid prescription.        Review for Substance Use Disorders: Patient does not use substance      Current Medications[1]       Vitals:    03/11/25 1311   BP: 112/80   Pulse: 96   SpO2: (!) 91%   Weight: 66.3 kg (146 lb 2.6 oz)   Height: 5' 5" (1.651 m)   PainSc:   1   PainLoc: Shoulder      Physical Exam  Vitals and nursing note reviewed.   Constitutional:       General: He is not in acute distress.     Appearance: Normal appearance. He is not ill-appearing.   HENT:      Head: Normocephalic and atraumatic.      Mouth/Throat:      Mouth: Mucous membranes are moist.   Eyes:      General: No scleral icterus.        Right eye: No discharge.         Left eye: No discharge.      Extraocular Movements: Extraocular movements intact.      Conjunctiva/sclera: Conjunctivae normal.   Cardiovascular:      Rate and Rhythm: Normal rate.   Pulmonary:      Effort: Pulmonary effort is normal. No respiratory distress.   Musculoskeletal:      Cervical back: Normal range of motion.   Skin:     General: Skin is warm and dry.      Findings: No rash.   Neurological:      Mental Status: He is alert and oriented to person, place, and time.   Psychiatric:         Mood and Affect: Mood normal.         Behavior: Behavior normal. Behavior is " cooperative.         Cognition and Memory: Cognition normal.               Diagnoses and health risks identified today and associated recommendations/orders:    1. Encounter for Medicare annual wellness exam  - Chart reviewed. Problem list updated. Discussed current medical diagnosis, current medications, medical/surgical/family/social history; updated provider list; documented vital signs; identified any cognitive impairment; and updated risk factor list. Addressed any outstanding health maintenance. Provided patient with personalized health advice. Continue to follow up with PCP and any specialists.   - Referral to Enhanced Annual Wellness Visit (eAWV) W+1    2. Rheumatoid arthritis of other site with positive rheumatoid factor  Chronic; stable on current treatment plan; follow up with PCP  - continue taking arava    3. Centrilobular emphysema  Chronic; stable on current treatment plan; follow up with PCP  - continue taking breztri    4. Interstitial pulmonary fibrosis  Chronic; stable on current treatment plan; follow up with PCP  - continue taking breztri    5. Immunosuppressed status  Chronic; stable; follow up with PCP  - follow up with rheumatology   - continue current meds     6. Aorto-iliac atherosclerosis  Chronic; stable on current treatment plan; follow up with PCP  - continue taking statin     7. Spinal stenosis of lumbar region with neurogenic claudication  Chronic; stable on current treatment plan; follow up with PCP  - continue taking celebrex and gabapentin     8. Other hyperlipidemia  Chronic; stable on current treatment plan; follow up with PCP  - continue taking statin     9. Obstructive sleep apnea  Chronic; stable on current treatment plan; follow up with PCP  Uses CPAP    10. Chronic bilateral low back pain with right-sided sciatica  Chronic; stable on current treatment plan; follow up with PCP  - continue taking gabapentin     11. Hallux limitus, unspecified laterality  Chronic; stable on  current treatment plan; follow up with PCP  - referral to podiatry     12. Valgus deformity of great toe, unspecified laterality  - patient requesting referral to podiatry   - Ambulatory referral/consult to Podiatry; Future    13. BMI 24.0-24.9, adult  - Recommendation for healthy diet and increasing exercise as tolerated with goal of 150min/week         Provided Fabiano with a 5-10 year written screening schedule and personal prevention plan. Recommendations were developed using the USPSTF age appropriate recommendations. Education, counseling, and referrals were provided as needed. After Visit Summary printed and given to patient which includes a list of additional screenings\tests needed.    Follow up in about 1 year (around 3/11/2026) for your next medicare wellness visit.    Esther Dickens, GENNARO-NEELA    Advance Care Planning       I offered to discuss advanced care planning, including how to pick a person who would make decisions for you if you were unable to make them for yourself, called a health care power of , and what kind of decisions you might make such as use of life sustaining treatments such as ventilators and tube feeding when faced with a life limiting illness recorded on a living will that they will need to know. (How you want to be cared for as you near the end of your natural life)     X Patient is interested in learning more about how to make advanced directives.  I provided them paperwork and offered to discuss this with them.       [1]   Current Outpatient Medications:     albuterol (VENTOLIN HFA) 90 mcg/actuation inhaler, Inhale 2 puffs into the lungs every 6 (six) hours as needed for Wheezing (cough). Rescue, Disp: 18 g, Rfl: 2    atorvastatin (LIPITOR) 40 MG tablet, Take 1 tablet (40 mg total) by mouth once daily., Disp: 90 tablet, Rfl: 3    budesonide-glycopyr-formoterol (BREZTRI AEROSPHERE) 160-9-4.8 mcg/actuation HFAA, Inhale 2 puffs into the lungs 2 (two) times a day., Disp: 10.7 g,  Rfl: 6    gabapentin (NEURONTIN) 300 MG capsule, Take 2 capsules (600 mg total) by mouth 3 (three) times daily., Disp: 180 capsule, Rfl: 11    ibuprofen (ADVIL,MOTRIN) 800 MG tablet, Take 800 mg by mouth every 6 (six) hours as needed for Pain., Disp: , Rfl:     leflunomide (ARAVA) 10 MG Tab, Take 1 tablet (10 mg total) by mouth once daily., Disp: 30 tablet, Rfl: 3    celecoxib (CELEBREX) 200 MG capsule, Take 1 capsule (200 mg total) by mouth 2 (two) times daily. (Patient not taking: Reported on 3/11/2025), Disp: 60 capsule, Rfl: 2

## 2025-03-16 ENCOUNTER — PATIENT MESSAGE (OUTPATIENT)
Dept: RHEUMATOLOGY | Facility: CLINIC | Age: 70
End: 2025-03-16
Payer: MEDICARE

## 2025-03-18 ENCOUNTER — HOSPITAL ENCOUNTER (OUTPATIENT)
Dept: RADIOLOGY | Facility: HOSPITAL | Age: 70
Discharge: HOME | End: 2025-03-18
Attending: FAMILY MEDICINE
Payer: MEDICARE

## 2025-03-18 ENCOUNTER — OFFICE VISIT (OUTPATIENT)
Dept: FAMILY MEDICINE | Facility: CLINIC | Age: 70
End: 2025-03-18
Payer: MEDICARE

## 2025-03-18 VITALS
WEIGHT: 147.25 LBS | DIASTOLIC BLOOD PRESSURE: 82 MMHG | HEART RATE: 83 BPM | OXYGEN SATURATION: 95 % | HEIGHT: 65 IN | SYSTOLIC BLOOD PRESSURE: 110 MMHG | BODY MASS INDEX: 24.53 KG/M2

## 2025-03-18 DIAGNOSIS — G89.29 CHRONIC RIGHT SHOULDER PAIN: Primary | ICD-10-CM

## 2025-03-18 DIAGNOSIS — M25.511 CHRONIC RIGHT SHOULDER PAIN: ICD-10-CM

## 2025-03-18 DIAGNOSIS — M25.511 CHRONIC RIGHT SHOULDER PAIN: Primary | ICD-10-CM

## 2025-03-18 DIAGNOSIS — G89.29 CHRONIC RIGHT SHOULDER PAIN: ICD-10-CM

## 2025-03-18 DIAGNOSIS — M05.7A RHEUMATOID ARTHRITIS OF OTHER SITE WITH POSITIVE RHEUMATOID FACTOR: ICD-10-CM

## 2025-03-18 DIAGNOSIS — M06.9 RHEUMATOID ARTHRITIS FLARE: Primary | ICD-10-CM

## 2025-03-18 PROCEDURE — 3044F HG A1C LEVEL LT 7.0%: CPT | Mod: CPTII,S$GLB,, | Performed by: FAMILY MEDICINE

## 2025-03-18 PROCEDURE — 3079F DIAST BP 80-89 MM HG: CPT | Mod: CPTII,S$GLB,, | Performed by: FAMILY MEDICINE

## 2025-03-18 PROCEDURE — 73030 X-RAY EXAM OF SHOULDER: CPT | Mod: 26,RT,, | Performed by: RADIOLOGY

## 2025-03-18 PROCEDURE — 3288F FALL RISK ASSESSMENT DOCD: CPT | Mod: CPTII,S$GLB,, | Performed by: FAMILY MEDICINE

## 2025-03-18 PROCEDURE — 3074F SYST BP LT 130 MM HG: CPT | Mod: CPTII,S$GLB,, | Performed by: FAMILY MEDICINE

## 2025-03-18 PROCEDURE — 1101F PT FALLS ASSESS-DOCD LE1/YR: CPT | Mod: CPTII,S$GLB,, | Performed by: FAMILY MEDICINE

## 2025-03-18 PROCEDURE — 99214 OFFICE O/P EST MOD 30 MIN: CPT | Mod: S$GLB,,, | Performed by: FAMILY MEDICINE

## 2025-03-18 PROCEDURE — 99999 PR PBB SHADOW E&M-EST. PATIENT-LVL IV: CPT | Mod: PBBFAC,,, | Performed by: FAMILY MEDICINE

## 2025-03-18 PROCEDURE — 73030 X-RAY EXAM OF SHOULDER: CPT | Mod: TC,FY,RT

## 2025-03-18 PROCEDURE — 1125F AMNT PAIN NOTED PAIN PRSNT: CPT | Mod: CPTII,S$GLB,, | Performed by: FAMILY MEDICINE

## 2025-03-18 PROCEDURE — 3008F BODY MASS INDEX DOCD: CPT | Mod: CPTII,S$GLB,, | Performed by: FAMILY MEDICINE

## 2025-03-18 PROCEDURE — 1159F MED LIST DOCD IN RCRD: CPT | Mod: CPTII,S$GLB,, | Performed by: FAMILY MEDICINE

## 2025-03-18 PROCEDURE — G2211 COMPLEX E/M VISIT ADD ON: HCPCS | Mod: S$GLB,,, | Performed by: FAMILY MEDICINE

## 2025-03-18 RX ORDER — LEFLUNOMIDE 20 MG/1
20 TABLET ORAL DAILY
Qty: 30 TABLET | Refills: 3 | Status: SHIPPED | OUTPATIENT
Start: 2025-03-18 | End: 2025-03-18 | Stop reason: SDUPTHER

## 2025-03-18 RX ORDER — LEFLUNOMIDE 20 MG/1
20 TABLET ORAL DAILY
Qty: 30 TABLET | Refills: 1 | Status: SHIPPED | OUTPATIENT
Start: 2025-03-18 | End: 2025-04-23

## 2025-03-18 RX ORDER — PREDNISONE 20 MG/1
20 TABLET ORAL DAILY
Qty: 10 TABLET | Refills: 0 | Status: SHIPPED | OUTPATIENT
Start: 2025-03-18

## 2025-03-18 NOTE — PROGRESS NOTES
(Portions of this note were dictated using voice recognition software and may contain dictation related errors in spelling/grammar/syntax not found on text review)    CC:   Chief Complaint   Patient presents with    Shoulder Pain       HPI: 69 y.o. male right-sided shoulder pain for 4 months, feels like it is progressive.  No trauma.  Pain with overhead lifting.  Works a lot in his yd.  If he has to bend over to reach to grab something it will hurt.  Does have RA, followed by Rheumatology.  Could not take methotrexate after interstitial lung disease. Was on Rinvoq which really helped but had admission for SBO and Rinvoq was stopped because of this.  Currently on leflunomide which he feels works okay but does not really feel like it has helpful.  In communication with his rheumatologist on whether proceeding with DMARD infusion therapy maybe helpful.    For the right shoulder he will take Advil p.r.n..  He states that pain is variable.  If he is not doing much he is not in much pain.  Sometimes he will use a heating pack which helps after he is really exertional in the yd.    Past Medical History:   Diagnosis Date    Basal cell carcinoma 01/2024    left upper chest    COPD (chronic obstructive pulmonary disease)     Degenerative disc disease, lumbar 06/16/2021    Hyperlipidemia     Interstitial pulmonary fibrosis     NU (obstructive sleep apnea)     Pulmonary fibrosis     Rheumatoid arthritis     SCC (squamous cell carcinoma) 07/2020    SCC right medial canthus    SCC (squamous cell carcinoma) 11/2021    mid lower neck     SCC (squamous cell carcinoma) 01/2022    right lateral cheek- in situ    SCC (squamous cell carcinoma) 09/2023    in situ L lower forearm and HAK R shoulder    SCC (squamous cell carcinoma) 01/09/2025    R lower forearm    Small bowel obstruction due to adhesions 05/10/2023    Squamous cell carcinoma 07/2019    SCC in situ left temple    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 01/30/2023    R lateral cheek       Past Surgical History:   Procedure Laterality Date    COLONOSCOPY N/A 7/25/2017    Procedure: COLONOSCOPY;  Surgeon: Bill Nicole MD;  [facility]  Service: Endoscopy;  Laterality: N/A;    DIAGNOSTIC LAPAROSCOPY N/A 5/10/2023    Procedure: LAPAROSCOPY, DIAGNOSTIC possible laparotomy other as indicated;  Surgeon: Eitan Palomares MD;  Location: [facility] OR;  Service: General;  Laterality: N/A;    ENDOSCOPIC ULTRASOUND OF UPPER GASTROINTESTINAL TRACT N/A 12/13/2024    Procedure: ULTRASOUND, UPPER GI TRACT, ENDOSCOPIC;  Surgeon: Harrison Calderon MD;  Location: [facility] ENDO;  Service: Endoscopy;  Laterality: N/A;  10/25 portal-EUS at [place] for dilated PD and weight loss. calderon -tt  10/31 r/s updated portal instr-tt  12/6 PRECALL ATTEMPTED-NA/RT  12/9 SWP PRECALL COMPLETE-RT    EPIDURAL STEROID INJECTION INTO LUMBAR SPINE N/A 7/29/2021    Procedure: Injection-steroid-epidural-lumbar L5-S1;  Surgeon: Shiv Vernon Jr., MD;  Location: [facility] PAIN MGT;  Service: Pain Management;  Laterality: N/A;  oral sedation   no pacemaker     FUSION OF SPINE WITH INSTRUMENTATION Left 12/14/2022    Procedure: FUSION, SPINE, WITH INSTRUMENTATION Stg1: Left L3-5 OLiF conduit lateral cage BMP;  Surgeon: August Aguilar MD;  Location: [facility] OR;  Service: Neurosurgery;  Laterality: Left;  Procedure: Stg1: Left L3-5 OLiF conduit lateral cage BMP  Surgery Time: 2hrs  LOS: 3  Anesthesia: General  Blood: Type & Screen  Radiology: C-arm  Brace: LSO  Bed: Regular Bed  Position: Lateral Right Down  Equip    FUSION, SPINE, POSTERIOR APPROACH N/A 12/14/2022    Procedure: FUSION,SPINE,POSTERIOR APPROACH Stg 2: L3-5 posterior instrumentation viper prime, L3-5 posterolateral arthrodesis;  Surgeon: August Aguilar MD;  Location: [facility] OR;  Service: Neurosurgery;  Laterality: N/A;  Procedure: Stg 2: L3-5 posterior instrumentation viper prime, L3-5 posterolateral arthrodesis  Surgery Time: 3hrs  LOS:  3  Anesthesia: General  Blood: Type & Screen  Radiology: Bra    LYSIS OF ADHESIONS N/A 5/10/2023    Procedure: LYSIS, ADHESIONS;  Surgeon: Eitan Palomares MD;  Location: [facility] OR;  Service: General;  Laterality: N/A;    NO PAST SURGERIES      TRANSFORAMINAL EPIDURAL INJECTION OF STEROID Right 6/29/2021    Procedure: Injection,steroid,epidural,transforaminal approach-RIGHT-- L4-5, L5-S1-- (IV Sedation);  Surgeon: Shiv Vernon Jr., MD;  Location: [facility] PAIN MGT;  Service: Pain Management;  Laterality: Right;    TRANSFORAMINAL EPIDURAL INJECTION OF STEROID Left 10/27/2022    Procedure: Injection,steroid,epidural,transforaminal left L4-5 and L5-S1;  Surgeon: Shiv Vernon Jr., MD;  Location: [facility] PAIN MGT;  Service: Pain Management;  Laterality: Left;       Family History   Problem Relation Name Age of Onset    Heart failure Mother          60s    Coronary artery disease Father          70s    Heart attack Father      Arthritis Sister x2     Arthritis Brother x1     Asthma Daughter x2     Gallbladder disease Daughter x2     Seizures Daughter x2     Cancer Paternal Uncle          lymphoma??    Diabetes Neg Hx      Melanoma Neg Hx         Social History[1]    Lab Results   Component Value Date    WBC 8.57 01/07/2025    HGB 17.2 01/07/2025    HCT 52.3 01/07/2025    MCV 93 01/07/2025     01/07/2025    CHOL 139 01/07/2025    TRIG 82 01/07/2025    HDL 45 01/07/2025    ALT 30 01/07/2025    AST 35 01/07/2025    BILITOT 0.6 01/07/2025    ALKPHOS 102 01/07/2025     01/07/2025    K 4.6 01/07/2025     01/07/2025    CREATININE 1.0 01/07/2025    ESTGFRAFRICA >60 04/29/2022    EGFRNONAA >60 04/29/2022    EGFRNORACEVR >60 01/07/2025    CALCIUM 10.2 01/07/2025    ALBUMIN 3.5 01/07/2025    BUN 15 01/07/2025    CO2 24 01/07/2025    TSH 1.716 01/07/2025    PSA 0.74 01/07/2025    INR 1.0 11/22/2022    HGBA1C 5.9 (H) 01/07/2025    LDLCALC 77.6 01/07/2025    GLU 93 01/07/2025    [id number] 32 03/13/2024      "            Vital signs reviewed  Vitals:    03/18/25 0956   BP: 110/82   BP Location: Left arm   Patient Position: Sitting   Pulse: 83   TempSrc: Oral   SpO2: 95%   Weight: 66.8 kg (147 lb 4.3 oz)   Height: 5' 5" (1.651 m)       Wt Readings from Last 4 Encounters:   03/18/25 66.8 kg (147 lb 4.3 oz)   03/11/25 66.3 kg (146 lb 2.6 oz)   12/13/24 68 kg (150 lb)   11/11/24 67 kg (147 lb 11.3 oz)       PE:   APPEARANCE: Well nourished, well developed, in no acute distress.    MSK:  Right shoulder No AC joint tenderness.  No biceps long head tenderness.  Positive empty can test for pain and weakness.  Positive external rotation test for pain and weakness.  Positive resisted internal rotation test for pain and weakness.  Positive Neer's and Mueller impingement test.  Painful arc at around 80-90 degrees.  Pain and weakness with speed's test.      IMPRESSION  1. Chronic right shoulder pain    2. Rheumatoid arthritis of other site with positive rheumatoid factor            PLAN  Orders Placed This Encounter   Procedures    X-Ray Shoulder Trauma 3 view Right    Ambulatory referral/consult to Physical/Occupational Therapy         Suspect rotator cuff syndrome.  Baseline x-ray.  Discussed NSAIDs, physical therapy, consideration of subacromial corticosteroid injection for symptomatic relief.  States that at this point his pain is not significantly debilitating, feels like he is okay with pain control with NSAIDs and heat pack when necessary.  Will therefore pursue physical therapy 1st, was given home exercise program as well.  If symptoms persist despite PT, can return for subacromial injection for symptomatic relief while doing physical therapy     RA with pulmonary fibrosis.: Feels like it is not as well-controlled on the leflunomide verses the control he had on the Rinvoq.  He will talk to his rheumatologist about next steps and treatment                          [1]   Social History  Tobacco Use    Smoking status: Former     " Current packs/day: 0.00     Average packs/day: 0.8 packs/day for 37.5 years (28.1 ttl pk-yrs)     Types: Cigarettes     Start date:      Quit date: 2012     Years since quittin.6     Passive exposure: Never    Smokeless tobacco: Never   Substance Use Topics    Alcohol use: Yes     Comment: rarely    Drug use: Never

## 2025-03-19 ENCOUNTER — RESULTS FOLLOW-UP (OUTPATIENT)
Dept: FAMILY MEDICINE | Facility: CLINIC | Age: 70
End: 2025-03-19

## 2025-03-19 ENCOUNTER — PATIENT MESSAGE (OUTPATIENT)
Dept: RHEUMATOLOGY | Facility: CLINIC | Age: 70
End: 2025-03-19
Payer: MEDICARE

## 2025-03-27 ENCOUNTER — CLINICAL SUPPORT (OUTPATIENT)
Dept: REHABILITATION | Facility: HOSPITAL | Age: 70
End: 2025-03-27
Attending: FAMILY MEDICINE
Payer: MEDICARE

## 2025-03-27 DIAGNOSIS — Z74.09 DECREASED STRENGTH, ENDURANCE, AND MOBILITY: ICD-10-CM

## 2025-03-27 DIAGNOSIS — R53.1 DECREASED STRENGTH, ENDURANCE, AND MOBILITY: ICD-10-CM

## 2025-03-27 DIAGNOSIS — M25.511 CHRONIC RIGHT SHOULDER PAIN: ICD-10-CM

## 2025-03-27 DIAGNOSIS — R68.89 DECREASED STRENGTH, ENDURANCE, AND MOBILITY: ICD-10-CM

## 2025-03-27 DIAGNOSIS — G89.29 CHRONIC RIGHT SHOULDER PAIN: ICD-10-CM

## 2025-03-27 DIAGNOSIS — M25.611 DECREASED ROM OF RIGHT SHOULDER: Primary | ICD-10-CM

## 2025-03-27 PROCEDURE — 97530 THERAPEUTIC ACTIVITIES: CPT | Mod: PN

## 2025-03-27 PROCEDURE — 97162 PT EVAL MOD COMPLEX 30 MIN: CPT | Mod: PN

## 2025-03-28 ENCOUNTER — PATIENT MESSAGE (OUTPATIENT)
Dept: FAMILY MEDICINE | Facility: CLINIC | Age: 70
End: 2025-03-28
Payer: MEDICARE

## 2025-03-28 PROBLEM — R68.89 DECREASED STRENGTH, ENDURANCE, AND MOBILITY: Status: ACTIVE | Noted: 2025-03-28

## 2025-03-28 PROBLEM — R53.1 DECREASED STRENGTH, ENDURANCE, AND MOBILITY: Status: ACTIVE | Noted: 2025-03-28

## 2025-03-28 PROBLEM — M25.611 DECREASED ROM OF RIGHT SHOULDER: Status: ACTIVE | Noted: 2025-03-28

## 2025-03-28 PROBLEM — Z74.09 DECREASED STRENGTH, ENDURANCE, AND MOBILITY: Status: ACTIVE | Noted: 2025-03-28

## 2025-03-28 RX ORDER — IBUPROFEN 800 MG/1
800 TABLET ORAL 3 TIMES DAILY PRN
Qty: 60 TABLET | Refills: 5 | Status: SHIPPED | OUTPATIENT
Start: 2025-03-28

## 2025-03-28 NOTE — PROGRESS NOTES
Outpatient Rehab    Physical Therapy Evaluation    Patient Name: Fabiano Garvey  MRN: [id number]  YOB: 1955  Encounter Date: 3/27/2025    Therapy Diagnosis:   Encounter Diagnoses   Name Primary?    Chronic right shoulder pain     Decreased ROM of right shoulder Yes    Decreased strength, endurance, and mobility      Physician: Calin Buchanan MD    Physician Orders: Eval and Treat  Medical Diagnosis: Chronic right shoulder pain    Visit # / Visits Authorized:  1 / 1  Insurance Authorization Period: 3/18/2025 to 3/18/2026  Date of Evaluation: 3/27/2025  Plan of Care Certification: 3/27/2025 to 5/25/2025     Time In: 0710   Time Out: 0800  Total Time: 50   Total Billable Time: 50    Intake Outcome Measure for FOTO Survey    Therapist reviewed FOTO scores for Fabiano Garvey on 3/27/2025.   FOTO report - see Media section or FOTO account episode details.     Intake Score: 63%         Subjective   History of Present Illness  Fabiano is a 69 y.o. male who reports to physical therapy with a chief concern of R shoulder pain.     The patient reports a medical diagnosis of M25.511,G89.29 (ICD-10-CM) - Chronic right shoulder pain.    Diagnostic tests related to this condition: X-ray.   X-Ray Details: FINDINGS:  Mild glenohumeral joint space narrowing.  Normal right humeral head contour.  No acute fracture, no osseous lesions, no advanced degenerative changes such as large osteophyte or geodes.  No significant sclerosis.  Mild osteophyte at the acromioclavicular joint.  The right upper thoracic region demonstrate chronic lung findings,    Dominant Hand: Right  History of Present Condition/Illness: He went to the doctor because his Right shoulder has been bothering him. It depends on what position he is in. If he tries to reach forward, across, or over head he has pain. This has been going for about 3 to 6 months. There was not CHRISTIANO but slowly happened over time. He denies N/T in his arm. He has a history of  RA and he is taking steroids for that. He also takes advil for the pain for his shoulder. The increase dose of his steroid for his RA has made little change in his Right shoulder pain. It really depends on what positions that his arm is in. He sleeps on his back mostly but even when he sleeps on his side he does not have much pain. Sometimes he will wake up with some pain. When he really has a lot of pain in his arm it will take about 15-20 minutes for the pain to decrease.     Pain     Patient reports a current pain level of 0/10. Pain at best is reported as 0/10. Pain at worst is reported as 9/10.   Location: R shoulder in superior and anterior  Clinical Progression (since onset): Stable  Pain Qualities: Aching, Knife-like, Squeezing  Pain-Relieving Factors: Heat, Other (Comment)  Other Pain-Relieving Factors: Advil  Pain-Aggravating Factors: Holding objects, Reaching, Other (Comment)  Other Pain-Aggravating Factors: reaching forward or lifting anything above his head         Employment  Employment Status: Retired          Past Medical History/Physical Systems Review:   Fabiano Garvey  has a past medical history of Basal cell carcinoma, COPD (chronic obstructive pulmonary disease), Degenerative disc disease, lumbar, Hyperlipidemia, Interstitial pulmonary fibrosis, NU (obstructive sleep apnea), Pulmonary fibrosis, Rheumatoid arthritis, SCC (squamous cell carcinoma), SCC (squamous cell carcinoma), SCC (squamous cell carcinoma), SCC (squamous cell carcinoma), SCC (squamous cell carcinoma), Small bowel obstruction due to adhesions, Squamous cell carcinoma, and 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    Fabiano Garvey  has a past surgical history that includes No past surgeries; Colonoscopy (N/A, 7/25/2017); Transforaminal epidural injection of steroid (Right, 6/29/2021); Epidural steroid injection into lumbar spine (N/A, 7/29/2021); Transforaminal epidural injection of steroid (Left,  10/27/2022); Fusion of spine with instrumentation (Left, 12/14/2022); fusion, spine, posterior approach (N/A, 12/14/2022); Diagnostic laparoscopy (N/A, 5/10/2023); Lysis of adhesions (N/A, 5/10/2023); and Endoscopic ultrasound of upper gastrointestinal tract (N/A, 12/13/2024).    Fabiano has a current medication list which includes the following prescription(s): albuterol, atorvastatin, breztri aerosphere, celecoxib, gabapentin, ibuprofen, leflunomide, and prednisone.    Review of patient's allergies indicates:   Allergen Reactions    Plaquenil [hydroxychloroquine]      Lightheaded and muscle aches        Objective   Posture  Patient presents with a Forward head position.                   Subcranial Range of Motion   Active Restricted? Passive Restricted? Pain   Flexion         Protraction         Retraction           WFL in all directions without pain     Shoulder Range of Motion  Right Shoulder   Active (deg) Passive (deg) Pain   Flexion 140 (pain between 80-90 deg then no pain at end range)   Yes   Extension         Scaption         ABduction 140 (pain between 80-90 deg then no pain at end range)       ADduction         Horizontal ABduction         Horizontal ADduction         External Rotation (Shoulder ABducted 0 degrees)         External Rotation (Shoulder ABducted 45 degrees)         External Rotation (Shoulder ABducted 90 degrees)         Internal Rotation (Shoulder ABducted 0 degrees)         Internal Rotation (Shoulder ABducted 45 degrees)         Internal Rotation (Shoulder ABducted 90 degrees)           Left Shoulder   Active (deg) Passive (deg) Pain   Flexion 140       Extension         Scaption         ABduction 135       ADduction         Horizontal ABduction         Horizontal ADduction         External Rotation (Shoulder ABducted 0 degrees)         External Rotation (Shoulder ABducted 45 degrees)         External Rotation (Shoulder ABducted 90 degrees)         Internal Rotation (Shoulder ABducted 0  "degrees)         Internal Rotation (Shoulder ABducted 45 degrees)         Internal Rotation (Shoulder ABducted 90 degrees)             Shoulder, Elbow, or Forearm Range of Motion Details: Left shoulder internal rotation: T10 Left shoulder external rotation T7.  Right shoulder external rotation to occiput with pain Right shoulder internal rotation to L3 with pain          Shoulder Strength - Planes of Motion   Right Strength Right Pain Left Strength Left  Pain   Flexion 3 Yes 4-     Extension           ABduction 3+ Yes 4-     ADduction           Horizontal ABduction           Horizontal ADduction           Internal Rotation 0° 4- Yes 4-     Internal Rotation 90°           External Rotation 0° 4   4     External Rotation 90°                         Shoulder Special Tests  Rotator Cuff Tests  Positive: Right Empty Can  Negative: Right Belly Press  Positive: Right Full Can and Right Internal Rotation Resistance Strength  Impingement Tests  Positive: Right Cross Body ADduction, Right Mueller-Yosef, Right Infraspinatus Muscle, and Right Painful Arc              Treatment:  Therapeutic Activity  TA 1: prone extension 2" 2x10  TA 2: prone rows 2" 2x10  TA 3: supine serratus punch 2" 2x10  TA 4: seated thoracic extension 10"x10    Time Entry(in minutes):  PT Evaluation (Moderate) Time Entry: 35  Therapeutic Activity Time Entry: 15    Assessment & Plan   Assessment  Fabiano presents with a condition of Moderate complexity.   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    Functional Limitations: Activity tolerance, Carrying objects, Completing self-care activities, Driving, Disrupted sleep pattern, Pain with ADLs/IADLs, Pain when reaching, Performing household chores, Range of motion, Reaching  Impairments: Impaired physical strength, Lack of appropriate home exercise program, Pain with functional activity    Patient Goal for Therapy (PT): To be able to use his arm without pain  Prognosis: Fair  Assessment Details: Patient is a 69 y.o. male with medical diagnosis of chronic Right shoulder pain. He presented to today's initial evaluation with 0/10 but with moderate irritability of Right shoulder. Signs and symptoms consistent with possible rotator cuff pathology. He has most difficulty with reaching above head or holding objects in his right upper extremity. At this time it is limiting he ability to perform ADLs and everyday household tasks. He would benefit from skilled PT to address deficits and to improve quality of life.     Plan  From a physical therapy perspective, the patient would benefit from: Skilled Rehab Services    Planned therapy interventions include: Therapeutic exercise, Therapeutic activities, Neuromuscular re-education, Manual therapy, and ADLs/IADLs.            Visit Frequency: 2 times Per Week for 8 Weeks.       This plan was discussed with Patient.              Patient's spiritual, cultural, and educational needs considered and patient agreeable to plan of care and goals.           Goals:   Active       long term goals        patient will improve FOTO to 72 to improve self perceived overall function        Start:  03/30/25    Expected End:  05/25/25            Patient will be able to reach a shelf above his head without difficulty to improve ability to perform functional tasks        Start:  03/30/25    Expected End:  05/25/25            patient will improve strength to 4+/5 to  improve ability to perform functional tasks        Start:  03/30/25    Expected End:  05/25/25            patient will be able to lift and carry 10-15lb without difficulty to improve ability to perform functional tasks        Start:  03/30/25    Expected End:  05/25/25               short term goals        patient will be independent with Home exercise program to supplement therapy        Start:  03/30/25    Expected End:  04/27/25            Patient will report 2/10 pain with active range of motion to improve tolerance to functional tasks        Start:  03/30/25    Expected End:  04/27/25            Patient will report being able to push open a heavy door without difficulty to improve overall function        Start:  03/30/25    Expected End:  04/27/25            patient will report being able to get dressed without difficulty to improve ability to perform ADLs       Start:  03/30/25    Expected End:  04/27/25                Cathie Majano, PT ,DPT,OCS  03/30/2025

## 2025-04-01 ENCOUNTER — CLINICAL SUPPORT (OUTPATIENT)
Dept: REHABILITATION | Facility: HOSPITAL | Age: 70
End: 2025-04-01
Payer: MEDICARE

## 2025-04-01 DIAGNOSIS — Z74.09 DECREASED STRENGTH, ENDURANCE, AND MOBILITY: ICD-10-CM

## 2025-04-01 DIAGNOSIS — R68.89 DECREASED STRENGTH, ENDURANCE, AND MOBILITY: ICD-10-CM

## 2025-04-01 DIAGNOSIS — M25.611 DECREASED ROM OF RIGHT SHOULDER: Primary | ICD-10-CM

## 2025-04-01 DIAGNOSIS — R53.1 DECREASED STRENGTH, ENDURANCE, AND MOBILITY: ICD-10-CM

## 2025-04-01 PROCEDURE — 97530 THERAPEUTIC ACTIVITIES: CPT | Mod: PN

## 2025-04-01 PROCEDURE — 97112 NEUROMUSCULAR REEDUCATION: CPT | Mod: PN

## 2025-04-01 NOTE — PROGRESS NOTES
"  Outpatient Rehab    Physical Therapy Visit    Patient Name: Fabiano Garvey  MRN: [id number]  YOB: 1955  Encounter Date: 4/1/2025    Therapy Diagnosis:   Encounter Diagnoses   Name Primary?    Decreased ROM of right shoulder Yes    Decreased strength, endurance, and mobility      Physician: Calin Buchanan MD    Physician Orders: Eval and Treat  Medical Diagnosis: Chronic right shoulder pain    Visit # / Visits Authorized:  1 / 16  Insurance Authorization Period: 3/27/2025 to 5/31/2025  Date of Evaluation:  3/27/2025   Plan of Care Certification: 3/27/2025 to 5/25/2025      PT/PTA:     Number of PTA visits since last PT visit:   Time In: 0800   Time Out: 0845  Total Time: 45   Total Billable Time: 25    FOTO:  Intake Score:  %  Survey Score 1:  %  Survey Score 2:  %         Subjective   His arm has been feeling good. He has done his exercises at that has seemed to helped and he does not have pain while doing it. He has noticed that he is able to lift his arm a little better. He has not had to put the heating pad on his arm..  Pain reported as 0/10.      Objective            Treatment:  Balance/Neuromuscular Re-Education  NMR 1: side lying ER 3x10 R  NMR 2: prone row 2" 3x10  NMR 3: prone extension 2" 3x10  NMR 4: supine serratus punch 1lb 2" 3x10  NMR 5: shoulder extension GTB 3x10  NMR 6: standing rows GTB 3x10  Therapeutic Activity  TA 1: supine wand flexion 1lb 5" hold 3x10  TA 2: standing wall flexion 5" 3x10  TA 3: seated thoracic rotation with feet on 6" step 5" hold x 20 ea  TA 4: seated thoracic extension 10"x20    Time Entry(in minutes):  Neuromuscular Re-Education Time Entry: 30  Therapeutic Activity Time Entry: 15    Assessment & Plan   Assessment: Mr. Mario was seen for his first follow up visit since his initial evaluation. He arrived to today's session with improvement in pain in R shoulder. PT continued with ROM and strengthening exercises for R shoulder. PT educated patient as to " not push through pain with exercises. He required some cues throughout session for proper body mechanics with exercises.       Patient will continue to benefit from skilled outpatient physical therapy to address the deficits listed in the problem list box on initial evaluation, provide pt/family education and to maximize pt's level of independence in the home and community environment.     Patient's spiritual, cultural, and educational needs considered and patient agreeable to plan of care and goals.           Plan: Improve RUE ROM and strength    Goals:   Active       long term goals        patient will improve FOTO to 72 to improve self perceived overall function  (Progressing)       Start:  03/30/25    Expected End:  05/25/25            Patient will be able to reach a shelf above his head without difficulty to improve ability to perform functional tasks  (Progressing)       Start:  03/30/25    Expected End:  05/25/25            patient will improve strength to 4+/5 to improve ability to perform functional tasks  (Progressing)       Start:  03/30/25    Expected End:  05/25/25            patient will be able to lift and carry 10-15lb without difficulty to improve ability to perform functional tasks  (Progressing)       Start:  03/30/25    Expected End:  05/25/25               short term goals        patient will be independent with Home exercise program to supplement therapy  (Progressing)       Start:  03/30/25    Expected End:  04/27/25            Patient will report 2/10 pain with active range of motion to improve tolerance to functional tasks  (Progressing)       Start:  03/30/25    Expected End:  04/27/25            Patient will report being able to push open a heavy door without difficulty to improve overall function  (Progressing)       Start:  03/30/25    Expected End:  04/27/25            patient will report being able to get dressed without difficulty to improve ability to perform ADLs (Progressing)        Start:  03/30/25    Expected End:  04/27/25                Cathie Majano, PT ,DPT,OCS  04/01/2025

## 2025-04-03 ENCOUNTER — CLINICAL SUPPORT (OUTPATIENT)
Dept: REHABILITATION | Facility: HOSPITAL | Age: 70
End: 2025-04-03
Payer: MEDICARE

## 2025-04-03 DIAGNOSIS — R68.89 DECREASED STRENGTH, ENDURANCE, AND MOBILITY: ICD-10-CM

## 2025-04-03 DIAGNOSIS — R53.1 DECREASED STRENGTH, ENDURANCE, AND MOBILITY: ICD-10-CM

## 2025-04-03 DIAGNOSIS — Z74.09 DECREASED STRENGTH, ENDURANCE, AND MOBILITY: ICD-10-CM

## 2025-04-03 DIAGNOSIS — M25.611 DECREASED ROM OF RIGHT SHOULDER: Primary | ICD-10-CM

## 2025-04-03 PROCEDURE — 97530 THERAPEUTIC ACTIVITIES: CPT | Mod: PN,CQ

## 2025-04-03 PROCEDURE — 97112 NEUROMUSCULAR REEDUCATION: CPT | Mod: PN,CQ

## 2025-04-03 NOTE — PROGRESS NOTES
"  Outpatient Rehab    Physical Therapy Visit    Patient Name: Fabiano Garvey  MRN: [id number]  YOB: 1955  Encounter Date: 4/3/2025    Therapy Diagnosis:   Encounter Diagnoses   Name Primary?    Decreased ROM of right shoulder Yes    Decreased strength, endurance, and mobility      Physician: Calin Buchanan MD    Physician Orders: Eval and Treat  Medical Diagnosis: Chronic right shoulder pain    Visit # / Visits Authorized:  2 / 16  Insurance Authorization Period: 3/27/2025 to 5/31/2025  Date of Evaluation: 3/27/25  Plan of Care Certification: 3/27/2025 to 5/25/2025      PT/PTA: PTA   Number of PTA visits since last PT visit:1  Time In: 0800   Time Out: 0900  Total Time: 60   Total Billable Time: 55    FOTO:  Intake Score:  %  Survey Score 1:  %  Survey Score 2:  %         Subjective   "I think I slept on it wrong, so I'm sore today". pt also reports using an electric chainsaw to cut down branches on his palm tree, but no did not report increased pain since. Pt agreeable to PT session.  Pain reported as 4/10. anterior R shoulder    Objective            Treatment:  Balance/Neuromuscular Re-Education  NMR 1: side lying ER 3x10 R  NMR 2: prone row 2" 3x10  NMR 3: prone extension 2" 3x10  NMR 4: supine serratus punch 1lb 2" 3x10  NMR 5: shoulder extension GTB 3x10  NMR 6: standing rows GTB 3x10  Therapeutic Activity  TA 1: supine wand flexion 1lb 5" hold 3x10  TA 2: standing wall flexion 5" 3x10  TA 3: seated thoracic rotation with feet on 6" step 5" hold x 20 ea  TA 4: seated thoracic extension 10"x20  TA 5: Pulleys flexion 3 min    Time Entry(in minutes):  Neuromuscular Re-Education Time Entry: 30  Therapeutic Activity Time Entry: 25    Assessment & Plan   Assessment: Pt completed today's session focusing on periscapular strengthening and AROM within tolerance. Added pulleys today for continued ROM activity with no adverse response. He is performing small chores at home, yard work but emphasis on " safety and avoid irritable actvities to prevent shoulder pain.       Patient will continue to benefit from skilled outpatient physical therapy to address the deficits listed in the problem list box on initial evaluation, provide pt/family education and to maximize pt's level of independence in the home and community environment.     Patient's spiritual, cultural, and educational needs considered and patient agreeable to plan of care and goals.           Plan: Improve RUE ROM and strength, Progress as tolerated.    Goals:   Active       long term goals        patient will improve FOTO to 72 to improve self perceived overall function  (Progressing)       Start:  03/30/25    Expected End:  05/25/25            Patient will be able to reach a shelf above his head without difficulty to improve ability to perform functional tasks  (Progressing)       Start:  03/30/25    Expected End:  05/25/25            patient will improve strength to 4+/5 to improve ability to perform functional tasks  (Progressing)       Start:  03/30/25    Expected End:  05/25/25            patient will be able to lift and carry 10-15lb without difficulty to improve ability to perform functional tasks  (Progressing)       Start:  03/30/25    Expected End:  05/25/25               short term goals        patient will be independent with Home exercise program to supplement therapy  (Progressing)       Start:  03/30/25    Expected End:  04/27/25            Patient will report 2/10 pain with active range of motion to improve tolerance to functional tasks  (Progressing)       Start:  03/30/25    Expected End:  04/27/25            Patient will report being able to push open a heavy door without difficulty to improve overall function  (Progressing)       Start:  03/30/25    Expected End:  04/27/25            patient will report being able to get dressed without difficulty to improve ability to perform ADLs (Progressing)       Start:  03/30/25    Expected End:   04/27/25                Wan Goodman PTA

## 2025-04-08 ENCOUNTER — CLINICAL SUPPORT (OUTPATIENT)
Dept: REHABILITATION | Facility: HOSPITAL | Age: 70
End: 2025-04-08
Payer: MEDICARE

## 2025-04-08 DIAGNOSIS — Z74.09 DECREASED STRENGTH, ENDURANCE, AND MOBILITY: ICD-10-CM

## 2025-04-08 DIAGNOSIS — M25.611 DECREASED ROM OF RIGHT SHOULDER: Primary | ICD-10-CM

## 2025-04-08 DIAGNOSIS — R53.1 DECREASED STRENGTH, ENDURANCE, AND MOBILITY: ICD-10-CM

## 2025-04-08 DIAGNOSIS — R68.89 DECREASED STRENGTH, ENDURANCE, AND MOBILITY: ICD-10-CM

## 2025-04-08 PROCEDURE — 97530 THERAPEUTIC ACTIVITIES: CPT | Mod: PN

## 2025-04-08 PROCEDURE — 97112 NEUROMUSCULAR REEDUCATION: CPT | Mod: PN

## 2025-04-08 NOTE — PROGRESS NOTES
"  Outpatient Rehab    Physical Therapy Visit    Patient Name: Fabiano Garvey  MRN: [id number]  YOB: 1955  Encounter Date: 4/8/2025    Therapy Diagnosis:   Encounter Diagnoses   Name Primary?    Decreased ROM of right shoulder Yes    Decreased strength, endurance, and mobility      Physician: Calin Buchanan MD    Physician Orders: Eval and Treat  Medical Diagnosis: Chronic right shoulder pain    Visit # / Visits Authorized:  3 / 16  Insurance Authorization Period: 3/27/2025 to 5/31/2025  Date of Evaluation: 3/27/25  Plan of Care Certification: 3/27/2025 to 5/25/2025      PT/PTA:     Number of PTA visits since last PT visit:   Time In: 0752   Time Out: 0850  Total Time: 58   Total Billable Time: 58    FOTO:  Intake Score:  %  Survey Score 1:  %  Survey Score 2:  %         Subjective   He is feeling a little achy. Yesterday he was helping his daughter feed her child but he thinks maybe holding the baby caused him to ache a little bit more. The left arm is bothering him a little more too. He denies any increae in pain after his last PT visit..  Pain reported as 3/10. anterior R shoulder    Objective            Treatment:  Balance/Neuromuscular Re-Education  NMR 1: side lying ER 3x10 R 1lb (increase in pain and dropped down to body weight)  NMR 2: prone row 2" 3x10  NMR 3: prone extension 2" 3x10  NMR 4: supine serratus punch 1lb 2" 3x10  NMR 5: shoulder extension GTB 3x10  NMR 6: standing rows GTB 3x10  NMR 7: seated middle trap 5"x20  Therapeutic Activity  TA 1: supine wand flexion 1lb 5" hold 3x10  TA 2: standing wall flexion 5" 3x10  TA 3: seated thoracic rotation with feet on 6" step 5" hold x 20 ea  TA 4: seated thoracic extension with towel roll  10"x20  TA 5: Pulleys flexion 3 min    Time Entry(in minutes):  Neuromuscular Re-Education Time Entry: 33  Therapeutic Activity Time Entry: 25    Assessment & Plan   Assessment: Patient arrived to today's session with minor reports of aching. PT " continued with mobility and postural strengthening. PT attempted to progress side lying ER with resistance. However, patient began to perform with compensation of upper trap and reported increase in pain and therefore the weight was removed and patient performed exercise with body weight. PT also progressed with middle trap strengthening. Patient required cues to decrease upper trap compensation.       Patient will continue to benefit from skilled outpatient physical therapy to address the deficits listed in the problem list box on initial evaluation, provide pt/family education and to maximize pt's level of independence in the home and community environment.     Patient's spiritual, cultural, and educational needs considered and patient agreeable to plan of care and goals.           Plan: Improve RUE ROM and strength, Progress as tolerated.    Goals:   Active       long term goals        patient will improve FOTO to 72 to improve self perceived overall function  (Progressing)       Start:  03/30/25    Expected End:  05/25/25            Patient will be able to reach a shelf above his head without difficulty to improve ability to perform functional tasks  (Progressing)       Start:  03/30/25    Expected End:  05/25/25            patient will improve strength to 4+/5 to improve ability to perform functional tasks  (Progressing)       Start:  03/30/25    Expected End:  05/25/25            patient will be able to lift and carry 10-15lb without difficulty to improve ability to perform functional tasks  (Progressing)       Start:  03/30/25    Expected End:  05/25/25               short term goals        patient will be independent with Home exercise program to supplement therapy  (Met)       Start:  03/30/25    Expected End:  04/27/25    Resolved:  04/08/25         Patient will report 2/10 pain with active range of motion to improve tolerance to functional tasks  (Progressing)       Start:  03/30/25    Expected End:   04/27/25            Patient will report being able to push open a heavy door without difficulty to improve overall function  (Progressing)       Start:  03/30/25    Expected End:  04/27/25            patient will report being able to get dressed without difficulty to improve ability to perform ADLs (Progressing)       Start:  03/30/25    Expected End:  04/27/25                Cathie Majano, PT ,DPT,OCS  04/08/2025

## 2025-04-10 ENCOUNTER — CLINICAL SUPPORT (OUTPATIENT)
Dept: REHABILITATION | Facility: HOSPITAL | Age: 70
End: 2025-04-10
Payer: MEDICARE

## 2025-04-10 DIAGNOSIS — Z74.09 DECREASED STRENGTH, ENDURANCE, AND MOBILITY: ICD-10-CM

## 2025-04-10 DIAGNOSIS — R68.89 DECREASED STRENGTH, ENDURANCE, AND MOBILITY: ICD-10-CM

## 2025-04-10 DIAGNOSIS — R53.1 DECREASED STRENGTH, ENDURANCE, AND MOBILITY: ICD-10-CM

## 2025-04-10 DIAGNOSIS — M25.611 DECREASED ROM OF RIGHT SHOULDER: Primary | ICD-10-CM

## 2025-04-10 PROCEDURE — 97530 THERAPEUTIC ACTIVITIES: CPT | Mod: PN,CQ

## 2025-04-10 PROCEDURE — 97112 NEUROMUSCULAR REEDUCATION: CPT | Mod: PN,CQ

## 2025-04-10 NOTE — PROGRESS NOTES
"  Outpatient Rehab    Physical Therapy Visit    Patient Name: Fabiano Garvey  MRN: [id number]  YOB: 1955  Encounter Date: 4/10/2025    Therapy Diagnosis:   Encounter Diagnoses   Name Primary?    Decreased ROM of right shoulder Yes    Decreased strength, endurance, and mobility      Physician: aClin Buchanan MD    Physician Orders: Eval and Treat  Medical Diagnosis: Chronic right shoulder pain    Visit # / Visits Authorized:  4 / 16  Insurance Authorization Period: 3/27/2025 to 5/31/2025  Date of Evaluation: 3/27/25  Plan of Care Certification: 3/27/2025 to 5/25/2025      PT/PTA: PTA   Number of PTA visits since last PT visit:1  Time In: 0705   Time Out: 0800  Total Time: 55   Total Billable Time: 55    FOTO:  Intake Score:  %  Survey Score 1:  %  Survey Score 2:  %         Subjective   "Im feeling a little better today, I cut my grass yesterday and was a little sore". pt agreeable to PT session.  Pain reported as 3/10. anterior R shoulder    Objective            Treatment:  Balance/Neuromuscular Re-Education  NMR 1: side lying ER 3x10 R 1lb (increase in pain and dropped down to body weight)  NMR 2: prone row 2" 3x10  NMR 3: prone extension 2" 3x10  NMR 4: supine serratus punch 1lb 2" 3x10  NMR 5: shoulder extension GTB 3x10  NMR 6: standing rows GTB 3x10  NMR 7: seated middle trap 5"x20  Therapeutic Activity  TA 1: supine wand flexion 1lb 5" hold 3x10  TA 2: standing wall flexion 5" 3x10  TA 3: seated thoracic rotation with feet on 6" step 5" hold x 20 ea  TA 4: seated thoracic extension with towel roll  10"x20  TA 5: Pulleys flexion 3 min    Time Entry(in minutes):  Neuromuscular Re-Education Time Entry: 30  Therapeutic Activity Time Entry: 25    Assessment & Plan   Assessment: Patient arrived to today's session with mild shoulder soreness, he reporetd cutting grass yesterday (backyard). pt able to complete session focusing on shoulder strengthening and postural correction. No reports of " increased shoulder pain (encouraged proper performance of sidelying shoulder ER)  Evaluation/Treatment Tolerance: Patient tolerated treatment well    Patient will continue to benefit from skilled outpatient physical therapy to address the deficits listed in the problem list box on initial evaluation, provide pt/family education and to maximize pt's level of independence in the home and community environment.     Patient's spiritual, cultural, and educational needs considered and patient agreeable to plan of care and goals.           Plan: Improve RUE ROM and strength, Progress as tolerated.    Goals:   Active       long term goals        patient will improve FOTO to 72 to improve self perceived overall function  (Progressing)       Start:  03/30/25    Expected End:  05/25/25            Patient will be able to reach a shelf above his head without difficulty to improve ability to perform functional tasks  (Progressing)       Start:  03/30/25    Expected End:  05/25/25            patient will improve strength to 4+/5 to improve ability to perform functional tasks  (Progressing)       Start:  03/30/25    Expected End:  05/25/25            patient will be able to lift and carry 10-15lb without difficulty to improve ability to perform functional tasks  (Progressing)       Start:  03/30/25    Expected End:  05/25/25               short term goals        patient will be independent with Home exercise program to supplement therapy  (Met)       Start:  03/30/25    Expected End:  04/27/25    Resolved:  04/08/25         Patient will report 2/10 pain with active range of motion to improve tolerance to functional tasks  (Progressing)       Start:  03/30/25    Expected End:  04/27/25            Patient will report being able to push open a heavy door without difficulty to improve overall function  (Progressing)       Start:  03/30/25    Expected End:  04/27/25            patient will report being able to get dressed without  difficulty to improve ability to perform ADLs (Progressing)       Start:  03/30/25    Expected End:  04/27/25                Wan Goodman PTA

## 2025-04-14 ENCOUNTER — OFFICE VISIT (OUTPATIENT)
Dept: DERMATOLOGY | Facility: CLINIC | Age: 70
End: 2025-04-14
Payer: MEDICARE

## 2025-04-14 ENCOUNTER — LAB VISIT (OUTPATIENT)
Dept: LAB | Facility: HOSPITAL | Age: 70
End: 2025-04-14
Attending: INTERNAL MEDICINE
Payer: MEDICARE

## 2025-04-14 DIAGNOSIS — Z79.899 IMMUNODEFICIENCY DUE TO DRUG THERAPY: ICD-10-CM

## 2025-04-14 DIAGNOSIS — L82.1 SK (SEBORRHEIC KERATOSIS): ICD-10-CM

## 2025-04-14 DIAGNOSIS — L81.4 LENTIGO: ICD-10-CM

## 2025-04-14 DIAGNOSIS — Z85.828 PERSONAL HISTORY OF SKIN CANCER: ICD-10-CM

## 2025-04-14 DIAGNOSIS — L57.0 AK (ACTINIC KERATOSIS): Primary | ICD-10-CM

## 2025-04-14 DIAGNOSIS — M06.9 RHEUMATOID ARTHRITIS FLARE: ICD-10-CM

## 2025-04-14 DIAGNOSIS — D84.821 IMMUNODEFICIENCY DUE TO DRUG THERAPY: ICD-10-CM

## 2025-04-14 DIAGNOSIS — D48.5 NEOPLASM OF UNCERTAIN BEHAVIOR OF SKIN: ICD-10-CM

## 2025-04-14 PROCEDURE — 3044F HG A1C LEVEL LT 7.0%: CPT | Mod: CPTII,S$GLB,, | Performed by: DERMATOLOGY

## 2025-04-14 PROCEDURE — 17003 DESTRUCT PREMALG LES 2-14: CPT | Mod: S$GLB,,, | Performed by: DERMATOLOGY

## 2025-04-14 PROCEDURE — 99213 OFFICE O/P EST LOW 20 MIN: CPT | Mod: 25,S$GLB,, | Performed by: DERMATOLOGY

## 2025-04-14 PROCEDURE — 86480 TB TEST CELL IMMUN MEASURE: CPT

## 2025-04-14 PROCEDURE — 11102 TANGNTL BX SKIN SINGLE LES: CPT | Mod: S$GLB,,, | Performed by: DERMATOLOGY

## 2025-04-14 PROCEDURE — 85652 RBC SED RATE AUTOMATED: CPT

## 2025-04-14 PROCEDURE — 1126F AMNT PAIN NOTED NONE PRSNT: CPT | Mod: CPTII,S$GLB,, | Performed by: DERMATOLOGY

## 2025-04-14 PROCEDURE — 3288F FALL RISK ASSESSMENT DOCD: CPT | Mod: CPTII,S$GLB,, | Performed by: DERMATOLOGY

## 2025-04-14 PROCEDURE — 86704 HEP B CORE ANTIBODY TOTAL: CPT

## 2025-04-14 PROCEDURE — 36415 COLL VENOUS BLD VENIPUNCTURE: CPT | Mod: PO

## 2025-04-14 PROCEDURE — 11103 TANGNTL BX SKIN EA SEP/ADDL: CPT | Mod: S$GLB,,, | Performed by: DERMATOLOGY

## 2025-04-14 PROCEDURE — 99999 PR PBB SHADOW E&M-EST. PATIENT-LVL III: CPT | Mod: PBBFAC,,, | Performed by: DERMATOLOGY

## 2025-04-14 PROCEDURE — 1101F PT FALLS ASSESS-DOCD LE1/YR: CPT | Mod: CPTII,S$GLB,, | Performed by: DERMATOLOGY

## 2025-04-14 PROCEDURE — 86140 C-REACTIVE PROTEIN: CPT

## 2025-04-14 PROCEDURE — 80053 COMPREHEN METABOLIC PANEL: CPT

## 2025-04-14 PROCEDURE — 82784 ASSAY IGA/IGD/IGG/IGM EACH: CPT

## 2025-04-14 PROCEDURE — 17000 DESTRUCT PREMALG LESION: CPT | Mod: XS,S$GLB,, | Performed by: DERMATOLOGY

## 2025-04-14 PROCEDURE — 85025 COMPLETE CBC W/AUTO DIFF WBC: CPT

## 2025-04-14 PROCEDURE — 87340 HEPATITIS B SURFACE AG IA: CPT

## 2025-04-14 PROCEDURE — 1160F RVW MEDS BY RX/DR IN RCRD: CPT | Mod: CPTII,S$GLB,, | Performed by: DERMATOLOGY

## 2025-04-14 PROCEDURE — 1159F MED LIST DOCD IN RCRD: CPT | Mod: CPTII,S$GLB,, | Performed by: DERMATOLOGY

## 2025-04-14 NOTE — PROGRESS NOTES
Subjective:      Patient ID:  Fabiano Garvey is a 69 y.o. male who presents for   Chief Complaint   Patient presents with    Skin Check     ubse     Pt present today for UBSE.    This is a high risk patient here to check for the development of new lesions.    Pt has h/o NMSC     Patient with new area of concern:   Location: R HAND - bump; tender.   Previous treatments: none    Patient with new area of concern:   Location: L leg  Previous treatments: none            Review of Systems   Skin:  Positive for activity-related sunscreen use and wears hat. Negative for daily sunscreen use and recent sunburn.   Hematologic/Lymphatic: Bruises/bleeds easily.       Objective:   Physical Exam   Constitutional: He appears well-developed and well-nourished. No distress.   Neurological: He is alert and oriented to person, place, and time. He is not disoriented.   Psychiatric: He has a normal mood and affect.   Skin:   Areas Examined (abnormalities noted in diagram):   Scalp / Hair Palpated and Inspected  Head / Face Inspection Performed  Neck Inspection Performed  Chest / Axilla Inspection Performed  Abdomen Inspection Performed  Back Inspection Performed  RUE Inspected  LUE Inspection Performed  LLE Inspection Performed                                       Diagram Legend     Erythematous scaling macule/papule c/w actinic keratosis       Vascular papule c/w angioma      Pigmented verrucoid papule/plaque c/w seborrheic keratosis      Yellow umbilicated papule c/w sebaceous hyperplasia      Irregularly shaped tan macule c/w lentigo     1-2 mm smooth white papules consistent with Milia      Movable subcutaneous cyst with punctum c/w epidermal inclusion cyst      Subcutaneous movable cyst c/w pilar cyst      Firm pink to brown papule c/w dermatofibroma      Pedunculated fleshy papule(s) c/w skin tag(s)      Evenly pigmented macule c/w junctional nevus     Mildly variegated pigmented, slightly irregular-bordered macule c/w mildly  atypical nevus      Flesh colored to evenly pigmented papule c/w intradermal nevus       Pink pearly papule/plaque c/w basal cell carcinoma      Erythematous hyperkeratotic cursted plaque c/w SCC      Surgical scar with no sign of skin cancer recurrence      Open and closed comedones      Inflammatory papules and pustules      Verrucoid papule consistent consistent with wart     Erythematous eczematous patches and plaques     Dystrophic onycholytic nail with subungual debris c/w onychomycosis     Umbilicated papule    Erythematous-base heme-crusted tan verrucoid plaque consistent with inflamed seborrheic keratosis     Erythematous Silvery Scaling Plaque c/w Psoriasis     See annotation      Assessment / Plan:      Pathology Orders:       Normal Orders This Visit    Specimen to Pathology, Dermatology     Questions:    Procedure Type: Dermatology and skin neoplasms    Number of Specimens: 2    ------------------------: -------------------------    Spec 1 Procedure: Shave Biopsy    Spec 1 Clinical Impression: r/o inflamed sk v atypical nevus v other    Spec 1 Source: right crown of the scalp    ------------------------: -------------------------    Spec 2 Procedure: Shave Biopsy    Spec 2 Clinical Impression: r/o hypertrophic ak v scc    Spec 2 Source: right hand    Clinical Information: see EPIC    Clinical History: see EPIC    Specimen Source: Skin    Release to patient: Immediate    Send normal result to authorizing provider's In Basket if patient is active on MyChart: Yes          AK (actinic keratosis)  Cryosurgery Procedure Note    Verbal consent from the patient is obtained including, but not limited to, risk of hypopigmentation/hyperpigmentation, scar, recurrence of lesion. The patient is aware of the precancerous quality and need for treatment of these lesions. Liquid nitrogen cryosurgery is applied to the 9 actinic keratoses, as detailed in the physical exam, to produce a freeze injury. The patient is aware  that blisters may form and is instructed on wound care with gentle cleansing and use of vaseline ointment to keep moist until healed. The patient is supplied a handout on cryosurgery and is instructed to call if lesions do not completely resolve.    Neoplasm of uncertain behavior of skin x 2   Shave biopsy procedure note:    Shave biopsy performed after verbal consent including risk of infection, scar, recurrence, need for additional treatment of site. Area prepped with alcohol, anesthetized with approximately 1.0cc of 1% lidocaine with epinephrine. Lesional tissue shaved with razor blade. Hemostasis achieved with application of aluminum chloride followed by hyfrecation. No complications. Dressing applied. Wound care explained.    -     Specimen to Pathology, Dermatology    Lentigo  This is a benign hyperpigmented sun induced lesion. Recommend daily sun protection/avoidance and use of at least SPF 30, broad spectrum sunscreen (OTC drug) will reduce the number of new lesions. Treatment of these benign lesions are considered cosmetic.  The nature of sun-induced photo-aging and skin cancers is discussed.  Sun avoidance, protective clothing, and the use of 30-SPF sunscreens is advised. Observe closely for skin damage/changes, and call if such occurs.    SK (seborrheic keratosis)  These are benign inherited growths without a malignant potential. Reassurance given to patient. No treatment is necessary.     Personal history of skin cancer  Area(s) of previous NMSC evaluated with no signs of recurrence.    Upper body skin examination performed today including at least 6 points as noted in physical examination. Suspicious lesions noted.    Recommend daily sun protection/avoidance and use of at least SPF 30, broad spectrum sunscreen (OTC drug).              Follow up in 6 months (on 10/14/2025) for prn bx report.

## 2025-04-14 NOTE — PATIENT INSTRUCTIONS
Shave Biopsy Wound Care    Your doctor has performed a shave biopsy today.  A band aid and vaseline ointment has been placed over the site.  This should remain in place for NO LONGER THAN 48 hours.  It is fine to remove the bandaid after 24 hours, if the area is no longer bleeding. It is recommended that you keep the area dry (do not wet)) for the first 24 hours.  After 24 hours, wash the area with warm soap and water and apply Vaseline jelly.  Many patients prefer to use Neosporin or Bacitracin ointment.  This is acceptable; however, know that you can develop an allergy to this medication even if you have used it safely for years.  It is important to keep the area moist.  Letting it dry out and get air slows healing time, and will worsen the scar.        If you notice increasing redness, tenderness, pain, or yellow drainage at the biopsy site, please notify your doctor.  These are signs of an infection.    If your biopsy site is bleeding, apply firm pressure for 15 minutes straight.  Repeat for another 15 minutes, if it is still bleeding.   If the surgical site continues to bleed, then please contact your doctor.      For [facility] users:   You will receive your biopsy results in [facility] as soon as they are available. Please be assured that your physician/provider will review your results and will then determine what further treatment, evaluation, or planning is required. You should be contacted by your physician's/provider's office within 5 business days of receiving your results; If not, please reach out to directly. This is one more way [facility] is putting you first.     [facility] [place] [address]/ [phone] FAX/ [email or web address]

## 2025-04-15 ENCOUNTER — OFFICE VISIT (OUTPATIENT)
Dept: RHEUMATOLOGY | Facility: CLINIC | Age: 70
End: 2025-04-15
Payer: MEDICARE

## 2025-04-15 VITALS
SYSTOLIC BLOOD PRESSURE: 125 MMHG | HEART RATE: 99 BPM | DIASTOLIC BLOOD PRESSURE: 87 MMHG | BODY MASS INDEX: 23.98 KG/M2 | HEIGHT: 65 IN | WEIGHT: 143.94 LBS

## 2025-04-15 DIAGNOSIS — M06.9 RHEUMATOID ARTHRITIS FLARE: Primary | ICD-10-CM

## 2025-04-15 DIAGNOSIS — J84.9 ILD (INTERSTITIAL LUNG DISEASE): ICD-10-CM

## 2025-04-15 LAB
ABSOLUTE EOSINOPHIL (OHS): 0.18 K/UL
ABSOLUTE MONOCYTE (OHS): 0.54 K/UL (ref 0.3–1)
ABSOLUTE NEUTROPHIL COUNT (OHS): 5.23 K/UL (ref 1.8–7.7)
ALBUMIN SERPL BCP-MCNC: 3.3 G/DL (ref 3.5–5.2)
ALP SERPL-CCNC: 106 UNIT/L (ref 40–150)
ALT SERPL W/O P-5'-P-CCNC: 23 UNIT/L (ref 10–44)
ANION GAP (OHS): 12 MMOL/L (ref 8–16)
AST SERPL-CCNC: 30 UNIT/L (ref 11–45)
BASOPHILS # BLD AUTO: 0.06 K/UL
BASOPHILS NFR BLD AUTO: 0.7 %
BILIRUB SERPL-MCNC: 0.6 MG/DL (ref 0.1–1)
BUN SERPL-MCNC: 23 MG/DL (ref 8–23)
CALCIUM SERPL-MCNC: 9.1 MG/DL (ref 8.7–10.5)
CHLORIDE SERPL-SCNC: 107 MMOL/L (ref 95–110)
CO2 SERPL-SCNC: 22 MMOL/L (ref 23–29)
CREAT SERPL-MCNC: 0.9 MG/DL (ref 0.5–1.4)
CRP SERPL-MCNC: 6.1 MG/L
ERYTHROCYTE [DISTWIDTH] IN BLOOD BY AUTOMATED COUNT: 15.8 % (ref 11.5–14.5)
ERYTHROCYTE [SEDIMENTATION RATE] IN BLOOD BY PHOTOMETRIC METHOD: 8 MM/HR
GFR SERPLBLD CREATININE-BSD FMLA CKD-EPI: >60 ML/MIN/1.73/M2
GLUCOSE SERPL-MCNC: 144 MG/DL (ref 70–110)
HBV CORE AB SERPL QL IA: NORMAL
HBV SURFACE AG SERPL QL IA: NORMAL
HCT VFR BLD AUTO: 55.8 % (ref 40–54)
HGB BLD-MCNC: 17.6 GM/DL (ref 14–18)
IGA SERPL-MCNC: 361 MG/DL (ref 40–350)
IGG SERPL-MCNC: 1208 MG/DL (ref 650–1600)
IGM SERPL-MCNC: 264 MG/DL (ref 50–300)
IMM GRANULOCYTES # BLD AUTO: 0.01 K/UL (ref 0–0.04)
IMM GRANULOCYTES NFR BLD AUTO: 0.1 % (ref 0–0.5)
LYMPHOCYTES # BLD AUTO: 2.37 K/UL (ref 1–4.8)
MCH RBC QN AUTO: 30.1 PG (ref 27–31)
MCHC RBC AUTO-ENTMCNC: 31.5 G/DL (ref 32–36)
MCV RBC AUTO: 96 FL (ref 82–98)
NUCLEATED RBC (/100WBC) (OHS): 0 /100 WBC
PLATELET # BLD AUTO: 188 K/UL (ref 150–450)
PMV BLD AUTO: 12.3 FL (ref 9.2–12.9)
POTASSIUM SERPL-SCNC: 4.5 MMOL/L (ref 3.5–5.1)
PROT SERPL-MCNC: 7.6 GM/DL (ref 6–8.4)
RBC # BLD AUTO: 5.84 M/UL (ref 4.6–6.2)
RELATIVE EOSINOPHIL (OHS): 2.1 %
RELATIVE LYMPHOCYTE (OHS): 28.2 % (ref 18–48)
RELATIVE MONOCYTE (OHS): 6.4 % (ref 4–15)
RELATIVE NEUTROPHIL (OHS): 62.5 % (ref 38–73)
SODIUM SERPL-SCNC: 141 MMOL/L (ref 136–145)
WBC # BLD AUTO: 8.39 K/UL (ref 3.9–12.7)

## 2025-04-15 PROCEDURE — 3074F SYST BP LT 130 MM HG: CPT | Mod: CPTII,S$GLB,, | Performed by: INTERNAL MEDICINE

## 2025-04-15 PROCEDURE — 1125F AMNT PAIN NOTED PAIN PRSNT: CPT | Mod: CPTII,S$GLB,, | Performed by: INTERNAL MEDICINE

## 2025-04-15 PROCEDURE — 3288F FALL RISK ASSESSMENT DOCD: CPT | Mod: CPTII,S$GLB,, | Performed by: INTERNAL MEDICINE

## 2025-04-15 PROCEDURE — 1159F MED LIST DOCD IN RCRD: CPT | Mod: CPTII,S$GLB,, | Performed by: INTERNAL MEDICINE

## 2025-04-15 PROCEDURE — 3044F HG A1C LEVEL LT 7.0%: CPT | Mod: CPTII,S$GLB,, | Performed by: INTERNAL MEDICINE

## 2025-04-15 PROCEDURE — 3008F BODY MASS INDEX DOCD: CPT | Mod: CPTII,S$GLB,, | Performed by: INTERNAL MEDICINE

## 2025-04-15 PROCEDURE — 99215 OFFICE O/P EST HI 40 MIN: CPT | Mod: S$GLB,,, | Performed by: INTERNAL MEDICINE

## 2025-04-15 PROCEDURE — 3079F DIAST BP 80-89 MM HG: CPT | Mod: CPTII,S$GLB,, | Performed by: INTERNAL MEDICINE

## 2025-04-15 PROCEDURE — 1101F PT FALLS ASSESS-DOCD LE1/YR: CPT | Mod: CPTII,S$GLB,, | Performed by: INTERNAL MEDICINE

## 2025-04-15 PROCEDURE — 99999 PR PBB SHADOW E&M-EST. PATIENT-LVL III: CPT | Mod: PBBFAC,,, | Performed by: INTERNAL MEDICINE

## 2025-04-15 RX ORDER — SODIUM CHLORIDE 0.9 % (FLUSH) 0.9 %
10 SYRINGE (ML) INJECTION
OUTPATIENT
Start: 2025-04-15

## 2025-04-15 RX ORDER — FAMOTIDINE 10 MG/ML
20 INJECTION, SOLUTION INTRAVENOUS
OUTPATIENT
Start: 2025-04-15

## 2025-04-15 RX ORDER — METHYLPREDNISOLONE SOD SUCC 125 MG
100 VIAL (EA) INJECTION
OUTPATIENT
Start: 2025-04-15

## 2025-04-15 RX ORDER — EPINEPHRINE 0.3 MG/.3ML
0.3 INJECTION SUBCUTANEOUS ONCE AS NEEDED
OUTPATIENT
Start: 2025-04-15

## 2025-04-15 RX ORDER — DIPHENHYDRAMINE HYDROCHLORIDE 50 MG/ML
50 INJECTION, SOLUTION INTRAMUSCULAR; INTRAVENOUS ONCE AS NEEDED
OUTPATIENT
Start: 2025-04-15

## 2025-04-15 RX ORDER — MEPERIDINE HYDROCHLORIDE 50 MG/ML
25 INJECTION INTRAMUSCULAR; INTRAVENOUS; SUBCUTANEOUS
OUTPATIENT
Start: 2025-04-15

## 2025-04-15 RX ORDER — ACETAMINOPHEN 325 MG/1
650 TABLET ORAL
OUTPATIENT
Start: 2025-04-15

## 2025-04-15 RX ORDER — HEPARIN 100 UNIT/ML
500 SYRINGE INTRAVENOUS
OUTPATIENT
Start: 2025-04-15

## 2025-04-15 NOTE — PROGRESS NOTES
4/8/2025     9:36 AM   Rapid3 Question Responses and Scores   MDHAQ Score 0.3   Psychologic Score 0   Pain Score 0   When you awakened in the morning OVER THE LAST WEEK, did you feel stiff? No   Fatigue Score 3   Global Health Score 0.5   RAPID3 Score 0.5     Answers submitted by the patient for this visit:  Rheumatology Questionnaire (Submitted on 4/8/2025)  fever: No  eye redness: No  mouth sores: No  headaches: No  shortness of breath: Yes  chest pain: No  trouble swallowing: No  diarrhea: No  constipation: No  unexpected weight change: No  genital sore: No  During the last 3 days, have you had a skin rash?: No  Bruises or bleeds easily: No  cough: No

## 2025-04-15 NOTE — PROGRESS NOTES
Subjective:       Patient ID: Fabiano Garvey is a 60 y.o. male.    Chief Complaint: OTHER and Swelling    HPI     59 yo male with PMH of basal cell cancer (around 2010), HL here for evaluation of join pain.  Reports pain in hands, elbows. and wrists.  Reports  swelling in hands, ankles, and feet.  Reports also has bilateral knee pain and shoulders.   Reports stiffness in morning for 30 minutes.  He has trouble swelling. Denies any rashes. No oral ulcers. No hair loss. Denies photosensitivity.  Denies any raynauds.Denies blood clots.  Denies dry eyes and dry mouth.  Reports symptoms for a year. In November, he noted the nodules in elbows.  Thinks he has swelling in lower neck.  He has trouble picking up things due to pain and swelling.  He reports that prednisone improves h is pain and swelling.  He has been off steroids.  No changes in weight.  Reports good appetite.      Interval history: He had bowel obstruction May 8 2023 s/p repair.  He had recent flare involving left hand with swelling and pain.  He is getting nodules in left elbow.    Past Medical History   Diagnosis Date    Hyperlipidemia     Basal cell carcinoma      medial canthus       Review of Systems   Constitutional: Negative for fever, chills, appetite change and fatigue.   HENT: Negative for hearing loss, mouth sores, rhinorrhea, sinus pressure and trouble swallowing.    Eyes: Negative for photophobia, pain, discharge, itching and visual disturbance.   Respiratory:  Negative for cough, choking, chest tightness, wheezing and stridor.    Cardiovascular: Negative for chest pain and palpitations.   Gastrointestinal: Negative for blood in stool and abdominal distention.   Endocrine: Negative for cold intolerance and heat intolerance.   Genitourinary: Negative for dysuria, hematuria and flank pain.   Musculoskeletal: Positive for myalgias, back pain, joint swelling, arthralgias.  Skin: Negative for color change, pallor and rash.   Neurological: Negative  for dizziness, light-headedness, numbness and headaches.   Hematological: Negative for adenopathy. Does not bruise/bleed easily.   Psychiatric/Behavioral: Negative for decreased concentration and agitation. The patient is not nervous/anxious.            Objective:        Physical Exam   Constitutional: He is oriented to person, place, and time and well-developed, well-nourished, and in no distress. No distress.   HENT:   Head: Normocephalic and atraumatic.   Right Ear: External ear normal.   Left Ear: External ear normal.   Nose: Nose normal.   Mouth/Throat: Oropharynx is clear and moist. No oropharyngeal exudate.   Eyes: Conjunctivae and EOM are normal. Pupils are equal, round, and reactive to light. Right eye exhibits no discharge. Left eye exhibits no discharge. No scleral icterus.   Neck: Normal range of motion. Neck supple. No JVD present. No tracheal deviation present. No thyromegaly present.   Cardiovascular: Normal rate, regular rhythm and intact distal pulses.  Exam reveals no gallop and no friction rub.    No murmur heard.  Pulmonary/Chest: Effort normal and breath sounds normal. No stridor. No respiratory distress. He has no wheezes. He has no rales. He exhibits no tenderness.   Abdominal: Soft. Bowel sounds are normal. He exhibits no distension. There is no tenderness. There is no rebound.   Lymphadenopathy:     He has no cervical adenopathy.   Neurological: He is alert and oriented to person, place, and time.   Skin: Skin is warm. He is not diaphoretic.     Musculoskeletal:   Shoulders- decreased ROM of both shoulders to 130 degrees bilaterally (resoved)  Elbows- rheumatoid nodules in extensor surfaces bilaterally; mild restriction in extension of right elbow  Wrist- synovitis with decreased extension bilaterally (resolved)  Hands- synovitis of mcps on right side, worse on the right  Knees-bony hypertrophy but no effusions or tenderness; crepitus  Ankles- no tenderness  Feet-bilaterally mtp tenderness  resolved           Labs:  Robel: 1:320  dna-1:160<160  Esr-61<44   ccp-306  rf-876  Ro,la-negative  Nicole, rnp -negtayive   Hepatitis B-negative  Hepatitis C-negative  Urine-negative      Imaging:  I personally reviewed the xrays      CT chest (2020): I personally reviewed; Findings compatible with interstitial lung disease including UIP.  Findings appear grossly similar compared to prior examination dated 03/25/2020.     Stable pulmonary nodule within the inferior lingula measuring approximately 0.5 cm.  No new pulmonary nodule or mass.     Centrilobular and paraseptal emphysematous changes with an upper lobe predominance.     Coronary artery atherosclerosis.       Arthritis survey:      12/2015: hand x rays- no erosions  12/2015: feet xray: no erosions    Chest xray (2/2016)-negative      Assessment:   68 yo male with PMH of  Squamous cell carcinoma (2010, 2020), HL here for  Follow up of  Seropositive RA.  He has seropositive RA with nodules. He also had serologies consistent with early SLE given (+ROBEL and +DNA) with no other features of  SLE.  Tried plaquenil but reports it gave him dizziness after a few doses.  Regarding his arthritis, he was doing well on MTX 6 pills a week but developed cough. Had  CT chest concerning for UIP.  Given his severe RA,  I put him on rinvoq with improvement but then had  SBO.  I told him given risk of intestinal perforation with rinvoq, I recommend switching him to Rituxan.  Patient not interested in starting infusions so put him on Arava which is controlling his symptoms.      Plan:    # seropositive RA:flaring on Arava 20mg a day.   Given severe seropositive disease with ILD, will add Rituxan.  (Risks discussed with patient and not limited to cell count abnormalities, malignancy, allergic  reaction to medication, and increased risk of infection. Patient agrees with starting medication.)  Consent signed  Therapy plan signed    -continue  Arava 20mg po qday   Labs  in 2 months   -  "follow up with  dermatology evaluation given history of skin cancer  ---continue folic acid 1 mg po qday  Off MTX given UIP  No IL- 6 given history of SBO  Given history of +DNA  Avoid anti tnf  Avoid orencia given emphysema on CT SCAN  # vitamin D deficiency: Continue vit D once a week   labs  Before next visit      # lower back pain: appears radicular. Discussed doing xray but he declines.    -continue baclofen 10mg  ( take one to two tablets at bedtime)      #UIP:  Had recent Findings compatible with interstitial lung disease including UIP.  Findings appear grossly similar compared to prior examination dated 03/25/2020.   "Stable pulmonary nodule within the inferior lingula measuring approximately 0.5 cm.  No new pulmonary nodule or mass.Centrilobular and paraseptal emphysematous changes with an upper lobe predominance.   Coronary artery atherosclerosis."   Follow up with pulm    45 * minutes of total time spent on the encounter, which includes face to face time and non-face to face time preparing to see the patient (eg, review of tests), Obtaining and/or reviewing separately obtained history, Documenting clinical information in the electronic or other health record, Independently interpreting results (not separately reported) and communicating results to the patient/family/caregiver, or Care coordination (not separately reported).           "

## 2025-04-16 ENCOUNTER — RESULTS FOLLOW-UP (OUTPATIENT)
Dept: RHEUMATOLOGY | Facility: CLINIC | Age: 70
End: 2025-04-16

## 2025-04-16 ENCOUNTER — CLINICAL SUPPORT (OUTPATIENT)
Dept: REHABILITATION | Facility: HOSPITAL | Age: 70
End: 2025-04-16
Payer: MEDICARE

## 2025-04-16 DIAGNOSIS — M25.611 DECREASED ROM OF RIGHT SHOULDER: Primary | ICD-10-CM

## 2025-04-16 DIAGNOSIS — R68.89 DECREASED STRENGTH, ENDURANCE, AND MOBILITY: ICD-10-CM

## 2025-04-16 DIAGNOSIS — Z74.09 DECREASED STRENGTH, ENDURANCE, AND MOBILITY: ICD-10-CM

## 2025-04-16 DIAGNOSIS — R53.1 DECREASED STRENGTH, ENDURANCE, AND MOBILITY: ICD-10-CM

## 2025-04-16 LAB
MITOGEN MINUS NIL (OHS): 7.99
NIL TB SYNCED (OHS): 0
QUANTIFERON GOLD INTERP (OHS): NEGATIVE
TB1 AG MINUS NIL (OHS): 0
TB2 AG MINUS NIL (OHS): 0.01

## 2025-04-16 PROCEDURE — 97140 MANUAL THERAPY 1/> REGIONS: CPT | Mod: PN,CQ

## 2025-04-16 PROCEDURE — 97530 THERAPEUTIC ACTIVITIES: CPT | Mod: PN,CQ

## 2025-04-16 PROCEDURE — 97112 NEUROMUSCULAR REEDUCATION: CPT | Mod: PN,CQ

## 2025-04-16 NOTE — PROGRESS NOTES
"  Outpatient Rehab    Physical Therapy Visit    Patient Name: Fabiano Garvey  MRN: [id number]  YOB: 1955  Encounter Date: 4/16/2025    Therapy Diagnosis:   Encounter Diagnoses   Name Primary?    Decreased ROM of right shoulder Yes    Decreased strength, endurance, and mobility      Physician: Calin Buchanan MD    Physician Orders: Eval and Treat  Medical Diagnosis: Chronic right shoulder pain    Visit # / Visits Authorized:  5 / 16  Insurance Authorization Period: 3/27/2025 to 5/31/2025  Date of Evaluation: 3/27/25  Plan of Care Certification: 3/27/2025 to 5/25/2025      PT/PTA: PTA   Number of PTA visits since last PT visit:2  Time In: 0800   Time Out: 0850  Total Time: 50   Total Billable Time: 38    FOTO:  Intake Score:  %  Survey Score 1:  %  Survey Score 2:  %         Subjective   "I have my twin grandaughters at home so i'll be busy this week". pt agreeable to PT session..  Pain reported as 3/10. anterior R shoulder (Improved pain)    Objective            Treatment:  Manual Therapy  MT 1: R GHJ post/ lat mobs II-III  MT 2: PROM all planes available  Balance/Neuromuscular Re-Education  NMR 1: side lying ER 3x10 R 1lb (increase in pain and dropped down to body weight)  NMR 2: prone row 2" 3x10  NMR 3: prone extension 2" 3x10  NMR 4: supine serratus punch 1lb 2" 3x10  NMR 5: shoulder extension GTB 3x10  NMR 6: standing rows GTB 3x10  NMR 7: seated middle trap 5"x20  Therapeutic Activity  TA 1: supine wand flexion 1lb 5" hold 3x10  TA 2: standing wall flexion 5" 3x10  TA 3: seated thoracic rotation with feet on 6" step 5" hold x 20 ea  TA 4: seated thoracic extension with towel roll  10"x20  TA 5: Pulleys flexion 3 min    Time Entry(in minutes):  Manual Therapy Time Entry: 8  Neuromuscular Re-Education Time Entry: 15  Therapeutic Activity Time Entry: 15    Assessment & Plan   Assessment: Pt arrived to session with reports of decreased R shoulder pain. He has been helping care for his twin " rodger this week and has been picking them up, noted no new pain in shoulder. He completed today's session focusing on shoulder strengthening and ROM within tolerance.  Evaluation/Treatment Tolerance: Patient tolerated treatment well    Patient will continue to benefit from skilled outpatient physical therapy to address the deficits listed in the problem list box on initial evaluation, provide pt/family education and to maximize pt's level of independence in the home and community environment.     Patient's spiritual, cultural, and educational needs considered and patient agreeable to plan of care and goals.           Plan:      Goals:   Active       long term goals        patient will improve FOTO to 72 to improve self perceived overall function  (Progressing)       Start:  03/30/25    Expected End:  05/25/25            Patient will be able to reach a shelf above his head without difficulty to improve ability to perform functional tasks  (Progressing)       Start:  03/30/25    Expected End:  05/25/25            patient will improve strength to 4+/5 to improve ability to perform functional tasks  (Progressing)       Start:  03/30/25    Expected End:  05/25/25            patient will be able to lift and carry 10-15lb without difficulty to improve ability to perform functional tasks  (Progressing)       Start:  03/30/25    Expected End:  05/25/25               short term goals        patient will be independent with Home exercise program to supplement therapy  (Met)       Start:  03/30/25    Expected End:  04/27/25    Resolved:  04/08/25         Patient will report 2/10 pain with active range of motion to improve tolerance to functional tasks  (Progressing)       Start:  03/30/25    Expected End:  04/27/25            Patient will report being able to push open a heavy door without difficulty to improve overall function  (Progressing)       Start:  03/30/25    Expected End:  04/27/25            patient will  report being able to get dressed without difficulty to improve ability to perform ADLs (Progressing)       Start:  03/30/25    Expected End:  04/27/25                Wan Godoman, SAMARA

## 2025-04-22 ENCOUNTER — RESULTS FOLLOW-UP (OUTPATIENT)
Dept: DERMATOLOGY | Facility: CLINIC | Age: 70
End: 2025-04-22

## 2025-04-24 ENCOUNTER — CLINICAL SUPPORT (OUTPATIENT)
Dept: REHABILITATION | Facility: HOSPITAL | Age: 70
End: 2025-04-24
Payer: MEDICARE

## 2025-04-24 DIAGNOSIS — R53.1 DECREASED STRENGTH, ENDURANCE, AND MOBILITY: ICD-10-CM

## 2025-04-24 DIAGNOSIS — R68.89 DECREASED STRENGTH, ENDURANCE, AND MOBILITY: ICD-10-CM

## 2025-04-24 DIAGNOSIS — Z74.09 DECREASED STRENGTH, ENDURANCE, AND MOBILITY: ICD-10-CM

## 2025-04-24 DIAGNOSIS — M25.611 DECREASED ROM OF RIGHT SHOULDER: Primary | ICD-10-CM

## 2025-04-24 PROCEDURE — 97530 THERAPEUTIC ACTIVITIES: CPT | Mod: PN

## 2025-04-24 PROCEDURE — 97112 NEUROMUSCULAR REEDUCATION: CPT | Mod: PN

## 2025-04-24 NOTE — PROGRESS NOTES
Outpatient Rehab    Physical Therapy Progress Note    Patient Name: Fabiano Garvey  MRN: [id number]  YOB: 1955  Encounter Date: 4/24/2025    Therapy Diagnosis:   Encounter Diagnoses   Name Primary?    Decreased ROM of right shoulder Yes    Decreased strength, endurance, and mobility      Physician: Calin Buchanan MD    Physician Orders: Eval and Treat  Medical Diagnosis: Chronic right shoulder pain    Visit # / Visits Authorized:  6 / 16  Insurance Authorization Period: 3/27/2025 to 5/31/2025  Date of Evaluation: 3/27/25  Plan of Care Certification: 3/27/2025 to 5/25/2025      PT/PTA:     Number of PTA visits since last PT visit:   Time In: 0900   Time Out: 1002  Total Time: 62   Total Billable Time: 32    FOTO:  Intake Score: 63%  Survey Score 1: 79%  Survey Score 2:  %         Subjective   The shoulder has been doing well. He is not really having pain. Since starting therapy he has been feeling 100% better..  Pain reported as 0/10.      Objective      Shoulder Range of Motion  Right Shoulder   Active (deg) Passive (deg) Pain   Flexion 152       Extension         Scaption         ABduction 140       ADduction         Horizontal ABduction         Horizontal ADduction         External Rotation (Shoulder ABducted 0 degrees)         External Rotation (Shoulder ABducted 45 degrees)         External Rotation (Shoulder ABducted 90 degrees)         Internal Rotation (Shoulder ABducted 0 degrees)         Internal Rotation (Shoulder ABducted 45 degrees)         Internal Rotation (Shoulder ABducted 90 degrees)                       Shoulder Strength - Planes of Motion   Right Strength Right Pain Left Strength Left  Pain   Flexion 4-   4-     Extension           ABduction 4-   4-     ADduction           Horizontal ABduction           Horizontal ADduction           Internal Rotation 0° 4+   4+     Internal Rotation 90°           External Rotation 0° 4-   4-     External Rotation 90°                      "      Treatment:  Balance/Neuromuscular Re-Education  NMR 1: side lying ER 3x10 R 1lb  NMR 2: prone row 2" 3x10 1lb  NMR 3: prone extension 2" 3x10 1lb  NMR 4: supine chest press into serratus punch 3lb 2" 3x10  NMR 5: shoulder extension GTB 3x10  NMR 6: standing rows GTB 3x10  NMR 7: Prone T's 5" 3x10  NMR 8: side lying shoulder abduction 1lb 3x10  NMR 9: seated bilateral ER 5" 3x10  Therapeutic Activity  TA 1: supine wand flexion 3lb 5" hold 3x10  TA 6: reassessment    Time Entry(in minutes):  Neuromuscular Re-Education Time Entry: 52  Therapeutic Activity Time Entry: 10    Assessment & Plan   Assessment: Patient was reassessed today and displayed improvements in shoulder AROM without pain. He also displayed significant improvement in FOTO score indicating improvement in self perceived function. However, he continues to have limitation due to decrease in strength in BUE. He would continue to benefit from skilled PT to improve overall strength and function to improve ability to perform functional tasks.       Patient will continue to benefit from skilled outpatient physical therapy to address the deficits listed in the problem list box on initial evaluation, provide pt/family education and to maximize pt's level of independence in the home and community environment.     Patient's spiritual, cultural, and educational needs considered and patient agreeable to plan of care and goals.           Plan: Improve RUE RTC and scapular stabilizer strength    Goals:   Active       long term goals        patient will improve FOTO to 72 to improve self perceived overall function  (Met)       Start:  03/30/25    Expected End:  05/25/25    Resolved:  04/24/25         Patient will be able to reach a shelf above his head without difficulty to improve ability to perform functional tasks  (Progressing)       Start:  03/30/25    Expected End:  05/25/25            patient will improve strength to 4+/5 to improve ability to perform " functional tasks  (Progressing)       Start:  03/30/25    Expected End:  05/25/25            patient will be able to lift and carry 10-15lb without difficulty to improve ability to perform functional tasks  (Progressing)       Start:  03/30/25    Expected End:  05/25/25              Resolved       short term goals        patient will be independent with Home exercise program to supplement therapy  (Met)       Start:  03/30/25    Expected End:  04/27/25    Resolved:  04/08/25         Patient will report 2/10 pain with active range of motion to improve tolerance to functional tasks  (Met)       Start:  03/30/25    Expected End:  04/27/25    Resolved:  04/24/25         Patient will report being able to push open a heavy door without difficulty to improve overall function  (Met)       Start:  03/30/25    Expected End:  04/27/25    Resolved:  04/24/25         patient will report being able to get dressed without difficulty to improve ability to perform ADLs (Met)       Start:  03/30/25    Expected End:  04/27/25    Resolved:  04/24/25             Cathie Majano, PT ,DPT,OCS  04/24/2025

## 2025-04-28 ENCOUNTER — PATIENT MESSAGE (OUTPATIENT)
Dept: RHEUMATOLOGY | Facility: CLINIC | Age: 70
End: 2025-04-28
Payer: MEDICARE

## 2025-04-28 ENCOUNTER — TELEPHONE (OUTPATIENT)
Dept: DERMATOLOGY | Facility: CLINIC | Age: 70
End: 2025-04-28
Payer: MEDICARE

## 2025-04-28 DIAGNOSIS — M06.9 RHEUMATOID ARTHRITIS FLARE: Primary | ICD-10-CM

## 2025-04-28 DIAGNOSIS — Z79.899 HIGH RISK MEDICATION USE: Primary | ICD-10-CM

## 2025-04-28 RX ORDER — SULFAMETHOXAZOLE AND TRIMETHOPRIM 800; 160 MG/1; MG/1
TABLET ORAL
Qty: 12 TABLET | Refills: 6 | Status: SHIPPED | OUTPATIENT
Start: 2025-04-28 | End: 2025-04-28 | Stop reason: ALTCHOICE

## 2025-04-28 RX ORDER — PREDNISONE 5 MG/1
5 TABLET ORAL DAILY
Qty: 90 TABLET | Refills: 2 | Status: SHIPPED | OUTPATIENT
Start: 2025-04-28 | End: 2025-07-29

## 2025-04-28 RX ORDER — PNEUMOCOCCAL 20-VALENT CONJUGATE VACCINE 2.2; 2.2; 2.2; 2.2; 2.2; 2.2; 2.2; 2.2; 2.2; 2.2; 2.2; 2.2; 2.2; 2.2; 2.2; 2.2; 4.4; 2.2; 2.2; 2.2 UG/.5ML; UG/.5ML; UG/.5ML; UG/.5ML; UG/.5ML; UG/.5ML; UG/.5ML; UG/.5ML; UG/.5ML; UG/.5ML; UG/.5ML; UG/.5ML; UG/.5ML; UG/.5ML; UG/.5ML; UG/.5ML; UG/.5ML; UG/.5ML; UG/.5ML; UG/.5ML
0.5 INJECTION, SUSPENSION INTRAMUSCULAR ONCE
Qty: 0.5 ML | Refills: 0 | Status: SHIPPED | OUTPATIENT
Start: 2025-04-28 | End: 2025-04-28

## 2025-04-28 RX ORDER — RESPIRATORY SYNCYTIAL VISUS VACCINE RECOMBINANT, ADJUVANTED 120MCG/0.5
0.5 KIT INTRAMUSCULAR ONCE
Qty: 0.5 ML | Refills: 0 | Status: SHIPPED | OUTPATIENT
Start: 2025-04-28 | End: 2025-04-28

## 2025-04-28 NOTE — TELEPHONE ENCOUNTER
Spoke to pt. Pt verbally agreed and confirmed date and time given. Pt thanked me.     ----- Message from Nurse Nielsen sent at 4/25/2025  9:41 AM CDT -----  Regarding: FW: Consult/Advisory  Contact: pt @ [phone] (home)    ----- Message -----  From: Arpil Burch  Sent: 4/25/2025   8:59 AM CDT  To: [facility] Staff  Subject: Consult/Advisory                                 Consult/Advisory Name Of Caller: Fabiano Garvey Contact Preference: [phone] (home)  Nature of call: pt missed call from office, asking for a call back

## 2025-04-29 ENCOUNTER — CLINICAL SUPPORT (OUTPATIENT)
Dept: REHABILITATION | Facility: HOSPITAL | Age: 70
End: 2025-04-29
Payer: MEDICARE

## 2025-04-29 DIAGNOSIS — M25.611 DECREASED ROM OF RIGHT SHOULDER: Primary | ICD-10-CM

## 2025-04-29 DIAGNOSIS — R53.1 DECREASED STRENGTH, ENDURANCE, AND MOBILITY: ICD-10-CM

## 2025-04-29 DIAGNOSIS — R68.89 DECREASED STRENGTH, ENDURANCE, AND MOBILITY: ICD-10-CM

## 2025-04-29 DIAGNOSIS — Z74.09 DECREASED STRENGTH, ENDURANCE, AND MOBILITY: ICD-10-CM

## 2025-04-29 PROCEDURE — 97112 NEUROMUSCULAR REEDUCATION: CPT | Mod: PN

## 2025-04-29 NOTE — PROGRESS NOTES
"  Outpatient Rehab    Physical Therapy Visit    Patient Name: Fabiano Garvey  MRN: [id number]  YOB: 1955  Encounter Date: 4/29/2025    Therapy Diagnosis:   Encounter Diagnoses   Name Primary?    Decreased ROM of right shoulder Yes    Decreased strength, endurance, and mobility      Physician: Calin Buchanan MD    Physician Orders: Eval and Treat  Medical Diagnosis: Chronic right shoulder pain    Visit # / Visits Authorized:  7 / 16  Insurance Authorization Period: 3/27/2025 to 5/31/2025  Date of Evaluation: 3/27/25  Plan of Care Certification: 3/27/2025 to 5/25/2025      PT/PTA:     Number of PTA visits since last PT visit:   Time In: 0800   Time Out: 0852  Total Time: 52   Total Billable Time: 30    FOTO:  Intake Score:  %  Survey Score 1:  %  Survey Score 2:  %         Subjective   He is doing good. He is getting better and better..         Objective            Treatment:  Balance/Neuromuscular Re-Education  NMR 1: side lying ER 3x10 R 2lb  NMR 2: prone row 2" 3x10 2lb  NMR 3: prone extension 2" 3x10 2lb  NMR 4: supine chest press into serratus punch 3lb 2" 3x10  NMR 5: shoulder extension GTB 3x10  NMR 6: standing rows GTB 3x10  NMR 7: Prone T's 5" 3x10  NMR 8: side lying shoulder abduction  3x10  NMR 9: seated bilateral ER 5" 3x10 no resistance  NMR 10: shoulder wall ball stabilization 30x2 CW/CCW  Therapeutic Activity  TA 1: supine wand flexion 3lb 5" hold 3x10  TA 2: supine D2 shoulder flexion YTB 3x10 R    Time Entry(in minutes):  Neuromuscular Re-Education Time Entry: 42  Therapeutic Activity Time Entry: 10    Assessment & Plan   Assessment: Patient arrived to today's session without reports of pain. PT progressed strengthening exercises today with supine D2 shoulder flexion, shoulder wall stabilization, and IR/ER strengthening. Patient was appropriately challenged and fatigue with all progressions without reports of increase in pain. He requires some cues throughout session for proper " body mechanics with exercises. He continues to have greatest difficulty with performed side lying abduction with resistance.       Patient will continue to benefit from skilled outpatient physical therapy to address the deficits listed in the problem list box on initial evaluation, provide pt/family education and to maximize pt's level of independence in the home and community environment.     Patient's spiritual, cultural, and educational needs considered and patient agreeable to plan of care and goals.           Plan: Improve RUE RTC and scapular stabilizer strength    Goals:   Active       long term goals        patient will improve FOTO to 72 to improve self perceived overall function  (Met)       Start:  03/30/25    Expected End:  05/25/25    Resolved:  04/24/25         Patient will be able to reach a shelf above his head without difficulty to improve ability to perform functional tasks  (Progressing)       Start:  03/30/25    Expected End:  05/25/25            patient will improve strength to 4+/5 to improve ability to perform functional tasks  (Progressing)       Start:  03/30/25    Expected End:  05/25/25            patient will be able to lift and carry 10-15lb without difficulty to improve ability to perform functional tasks  (Progressing)       Start:  03/30/25    Expected End:  05/25/25              Resolved       short term goals        patient will be independent with Home exercise program to supplement therapy  (Met)       Start:  03/30/25    Expected End:  04/27/25    Resolved:  04/08/25         Patient will report 2/10 pain with active range of motion to improve tolerance to functional tasks  (Met)       Start:  03/30/25    Expected End:  04/27/25    Resolved:  04/24/25         Patient will report being able to push open a heavy door without difficulty to improve overall function  (Met)       Start:  03/30/25    Expected End:  04/27/25    Resolved:  04/24/25         patient will report being able to  get dressed without difficulty to improve ability to perform ADLs (Met)       Start:  03/30/25    Expected End:  04/27/25    Resolved:  04/24/25             Cathie Majano, PT ,DPT,OCS  04/29/2025

## 2025-05-01 ENCOUNTER — CLINICAL SUPPORT (OUTPATIENT)
Dept: REHABILITATION | Facility: HOSPITAL | Age: 70
End: 2025-05-01
Payer: MEDICARE

## 2025-05-01 DIAGNOSIS — R68.89 DECREASED STRENGTH, ENDURANCE, AND MOBILITY: ICD-10-CM

## 2025-05-01 DIAGNOSIS — R53.1 DECREASED STRENGTH, ENDURANCE, AND MOBILITY: ICD-10-CM

## 2025-05-01 DIAGNOSIS — Z74.09 DECREASED STRENGTH, ENDURANCE, AND MOBILITY: ICD-10-CM

## 2025-05-01 DIAGNOSIS — M25.611 DECREASED ROM OF RIGHT SHOULDER: Primary | ICD-10-CM

## 2025-05-01 PROCEDURE — 97112 NEUROMUSCULAR REEDUCATION: CPT | Mod: PN,CQ

## 2025-05-01 NOTE — PROGRESS NOTES
"  Outpatient Rehab    Physical Therapy Visit    Patient Name: Fabiano Garvey  MRN: [id number]  YOB: 1955  Encounter Date: 5/1/2025    Therapy Diagnosis:   Encounter Diagnoses   Name Primary?    Decreased ROM of right shoulder Yes    Decreased strength, endurance, and mobility      Physician: Calin Buchanan MD    Physician Orders: Eval and Treat  Medical Diagnosis: Chronic right shoulder pain    Visit # / Visits Authorized:  8 / 16  Insurance Authorization Period: 3/27/2025 to 5/31/2025  Date of Evaluation: 3/27/25  Plan of Care Certification: 3/27/2025 to 5/25/2025      PT/PTA: PTA   Number of PTA visits since last PT visit:1  Time In: 0800   Time Out: 0838  Total Time: 38   Total Billable Time: 38    FOTO:  Intake Score:  %  Survey Score 1:  %  Survey Score 2:  %         Subjective   "I feel like its getting better".  pt agreeable to PT session..    anterior R shoulder soreness    Objective            Treatment:  Balance/Neuromuscular Re-Education  NMR 1: side lying ER 3x10 R 2lb  NMR 2: prone row 2" 3x10 2lb  NMR 3: prone extension 2" 3x10 2lb  NMR 4: supine chest press into serratus punch 4lb 2" 3x10  NMR 7: Prone T's 5" 3x10  NMR 8: side lying shoulder abduction  3x10  Therapeutic Activity  TA 1: supine wand flexion 3lb 5" hold 3x10  TA 2: supine D2 shoulder flexion YTB 3x10 R    Time Entry(in minutes):  Neuromuscular Re-Education Time Entry: 38    Assessment & Plan   Assessment: Pt progressing well overall was far as pain, he was able to complete session with request end treatment early to accompany his daughter for home cleaning. He did report difficutly with shoulder abduction in sidelying stating mild soreness, but able to perform.  Evaluation/Treatment Tolerance: Patient tolerated treatment well    Patient will continue to benefit from skilled outpatient physical therapy to address the deficits listed in the problem list box on initial evaluation, provide pt/family education and to " maximize pt's level of independence in the home and community environment.     Patient's spiritual, cultural, and educational needs considered and patient agreeable to plan of care and goals.           Plan:      Goals:   Active       long term goals        patient will improve FOTO to 72 to improve self perceived overall function  (Met)       Start:  03/30/25    Expected End:  05/25/25    Resolved:  04/24/25         Patient will be able to reach a shelf above his head without difficulty to improve ability to perform functional tasks  (Progressing)       Start:  03/30/25    Expected End:  05/25/25            patient will improve strength to 4+/5 to improve ability to perform functional tasks  (Progressing)       Start:  03/30/25    Expected End:  05/25/25            patient will be able to lift and carry 10-15lb without difficulty to improve ability to perform functional tasks  (Progressing)       Start:  03/30/25    Expected End:  05/25/25              Resolved       short term goals        patient will be independent with Home exercise program to supplement therapy  (Met)       Start:  03/30/25    Expected End:  04/27/25    Resolved:  04/08/25         Patient will report 2/10 pain with active range of motion to improve tolerance to functional tasks  (Met)       Start:  03/30/25    Expected End:  04/27/25    Resolved:  04/24/25         Patient will report being able to push open a heavy door without difficulty to improve overall function  (Met)       Start:  03/30/25    Expected End:  04/27/25    Resolved:  04/24/25         patient will report being able to get dressed without difficulty to improve ability to perform ADLs (Met)       Start:  03/30/25    Expected End:  04/27/25    Resolved:  04/24/25             Wan Goodman PTA

## 2025-05-05 ENCOUNTER — LAB VISIT (OUTPATIENT)
Dept: LAB | Facility: HOSPITAL | Age: 70
End: 2025-05-05
Attending: INTERNAL MEDICINE
Payer: MEDICARE

## 2025-05-05 DIAGNOSIS — M06.9 RHEUMATOID ARTHRITIS FLARE: ICD-10-CM

## 2025-05-05 LAB
HBV CORE AB SERPL QL IA: NORMAL
HBV SURFACE AB SER-ACNC: <3 MIU/ML
HBV SURFACE AB SERPL IA-ACNC: NORMAL M[IU]/ML
HBV SURFACE AG SERPL QL IA: NORMAL

## 2025-05-05 PROCEDURE — 86704 HEP B CORE ANTIBODY TOTAL: CPT

## 2025-05-05 PROCEDURE — 36415 COLL VENOUS BLD VENIPUNCTURE: CPT | Mod: PO

## 2025-05-05 PROCEDURE — 87340 HEPATITIS B SURFACE AG IA: CPT

## 2025-05-05 PROCEDURE — 86706 HEP B SURFACE ANTIBODY: CPT | Mod: 59

## 2025-05-06 ENCOUNTER — CLINICAL SUPPORT (OUTPATIENT)
Dept: REHABILITATION | Facility: HOSPITAL | Age: 70
End: 2025-05-06
Payer: MEDICARE

## 2025-05-06 DIAGNOSIS — M25.611 DECREASED ROM OF RIGHT SHOULDER: Primary | ICD-10-CM

## 2025-05-06 DIAGNOSIS — R68.89 DECREASED STRENGTH, ENDURANCE, AND MOBILITY: ICD-10-CM

## 2025-05-06 DIAGNOSIS — R53.1 DECREASED STRENGTH, ENDURANCE, AND MOBILITY: ICD-10-CM

## 2025-05-06 DIAGNOSIS — Z74.09 DECREASED STRENGTH, ENDURANCE, AND MOBILITY: ICD-10-CM

## 2025-05-06 PROCEDURE — 97530 THERAPEUTIC ACTIVITIES: CPT | Mod: PN

## 2025-05-06 PROCEDURE — 97112 NEUROMUSCULAR REEDUCATION: CPT | Mod: PN

## 2025-05-06 NOTE — PROGRESS NOTES
"  Outpatient Rehab    Physical Therapy Visit    Patient Name: Fabiano Garvey  MRN: [id number]  YOB: 1955  Encounter Date: 5/6/2025    Therapy Diagnosis:   Encounter Diagnoses   Name Primary?    Decreased ROM of right shoulder Yes    Decreased strength, endurance, and mobility      Physician: Calin Buchanan MD    Physician Orders: Eval and Treat  Medical Diagnosis: Chronic right shoulder pain    Visit # / Visits Authorized:  9 / 16  Insurance Authorization Period: 3/27/2025 to 5/31/2025  Date of Evaluation: 3/27/25  Plan of Care Certification: 3/27/2025 to 5/25/2025      PT/PTA: PT   Number of PTA visits since last PT visit:0  Time In: 0757   Time Out: 0850  Total Time: 53   Total Billable Time: 30    FOTO:  Intake Score:  %  Survey Score 1:  %  Survey Score 2:  %         Subjective   The arm is pretty good. He has not been doing too much with it. He has to cut the grass today. Yesterday he wasn't feeling good. He was just feeling weak..  Pain reported as 0/10.      Objective            Treatment:  Balance/Neuromuscular Re-Education  NMR 1: side lying ER 3x10 R 2lb  NMR 2: prone row 2" 3x10 2lb  NMR 3: prone extension 2" 3x10 2lb  NMR 4: supine chest press into serratus punch 4lb 2" 3x10  NMR 5: shoulder extension GTB 3x10  NMR 6: standing rows GTB 3x10  NMR 7: Prone T's 5" 3x10  NMR 8: side lying shoulder abduction  3x10  NMR 9: seated bilateral ER 5" 3x10 no resistance  NMR 10: shoulder wall ball stabilization 30x2 CW/CCW  Therapeutic Activity  TA 1: supine wand flexion 3lb 5" hold 3x10  TA 2: supine D2 shoulder flexion YTB 3x10 R  TA 3: landmine press 5lb 3x10  TA 4: sled push/pull 30lb 2 laps    Time Entry(in minutes):  Neuromuscular Re-Education Time Entry: 38  Therapeutic Activity Time Entry: 15    Assessment & Plan   Assessment: Mr. Mario arrived to today's session without reports of pain. PT progressed with functional strengthening with sled pushes/pulls and landmine press today. He was " most limited with sled pushes/pulls due to fatigue and his breathing. He required a long sitting rest break after the sled. PT to continue to focus on strengthening to allow patient to return to functional activities without compensation.       Patient will continue to benefit from skilled outpatient physical therapy to address the deficits listed in the problem list box on initial evaluation, provide pt/family education and to maximize pt's level of independence in the home and community environment.     Patient's spiritual, cultural, and educational needs considered and patient agreeable to plan of care and goals.           Plan: Improve RUE RTC and scapular stabilizer strength    Goals:   Active       long term goals        patient will improve FOTO to 72 to improve self perceived overall function  (Met)       Start:  03/30/25    Expected End:  05/25/25    Resolved:  04/24/25         Patient will be able to reach a shelf above his head without difficulty to improve ability to perform functional tasks  (Progressing)       Start:  03/30/25    Expected End:  05/25/25            patient will improve strength to 4+/5 to improve ability to perform functional tasks  (Progressing)       Start:  03/30/25    Expected End:  05/25/25            patient will be able to lift and carry 10-15lb without difficulty to improve ability to perform functional tasks  (Progressing)       Start:  03/30/25    Expected End:  05/25/25              Resolved       short term goals        patient will be independent with Home exercise program to supplement therapy  (Met)       Start:  03/30/25    Expected End:  04/27/25    Resolved:  04/08/25         Patient will report 2/10 pain with active range of motion to improve tolerance to functional tasks  (Met)       Start:  03/30/25    Expected End:  04/27/25    Resolved:  04/24/25         Patient will report being able to push open a heavy door without difficulty to improve overall function  (Met)        Start:  03/30/25    Expected End:  04/27/25    Resolved:  04/24/25         patient will report being able to get dressed without difficulty to improve ability to perform ADLs (Met)       Start:  03/30/25    Expected End:  04/27/25    Resolved:  04/24/25             Cathie Majano, PT ,DPT,OCS  05/06/2025

## 2025-05-07 ENCOUNTER — RESULTS FOLLOW-UP (OUTPATIENT)
Dept: RHEUMATOLOGY | Facility: CLINIC | Age: 70
End: 2025-05-07

## 2025-05-08 ENCOUNTER — CLINICAL SUPPORT (OUTPATIENT)
Dept: REHABILITATION | Facility: HOSPITAL | Age: 70
End: 2025-05-08
Payer: MEDICARE

## 2025-05-08 DIAGNOSIS — R68.89 DECREASED STRENGTH, ENDURANCE, AND MOBILITY: ICD-10-CM

## 2025-05-08 DIAGNOSIS — Z74.09 DECREASED STRENGTH, ENDURANCE, AND MOBILITY: ICD-10-CM

## 2025-05-08 DIAGNOSIS — M25.611 DECREASED ROM OF RIGHT SHOULDER: Primary | ICD-10-CM

## 2025-05-08 DIAGNOSIS — R53.1 DECREASED STRENGTH, ENDURANCE, AND MOBILITY: ICD-10-CM

## 2025-05-08 PROCEDURE — 97110 THERAPEUTIC EXERCISES: CPT | Mod: PN

## 2025-05-08 PROCEDURE — 97530 THERAPEUTIC ACTIVITIES: CPT | Mod: PN

## 2025-05-08 PROCEDURE — 97112 NEUROMUSCULAR REEDUCATION: CPT | Mod: PN

## 2025-05-08 NOTE — PROGRESS NOTES
"  Outpatient Rehab    Physical Therapy Visit    Patient Name: Fabiano Garvey  MRN: [id number]  YOB: 1955  Encounter Date: 5/8/2025    Therapy Diagnosis:   Encounter Diagnoses   Name Primary?    Decreased ROM of right shoulder Yes    Decreased strength, endurance, and mobility      Physician: Calin Buchanan MD    Physician Orders: Eval and Treat  Medical Diagnosis: Chronic right shoulder pain    Visit # / Visits Authorized:  10 / 16  Insurance Authorization Period: 3/27/2025 to 5/31/2025  Date of Evaluation: 3/27/25  Plan of Care Certification: 3/27/2025 to 5/25/2025      PT/PTA:     Number of PTA visits since last PT visit:   Time In: 0800   Time Out: 0858  Total Time: 58   Total Billable Time: 45    FOTO:  Intake Score:  %  Survey Score 1:  %  Survey Score 2:  %         Subjective   He was tired after pushing the sled at his last visit. He did cut about half of his yard the other day. The arm will feel achy every now and then but overall he is doing good..  Pain reported as 0/10.      Objective            Treatment:  Therapeutic Exercise  TE 1: UBE 4/4 lvl 2  Balance/Neuromuscular Re-Education  NMR 1: side lying ER 3x10 R 2lb  NMR 2: prone row 2" 3x10 2lb  NMR 3: prone extension 2" 3x10 2lb  NMR 4: supine chest press into serratus punch 4lb 2" 3x10  NMR 5: shoulder extension GTB 3x10  NMR 7: Prone T's 5" 3x10  NMR 10: shoulder wall ball stabilization 30x2 CW/CCW  Therapeutic Activity  TA 1: supine wand flexion 3lb 5" hold 3x10  TA 2: supine D2 shoulder flexion YTB 3x10 R  TA 3: landmine press 5lb 3x10    Time Entry(in minutes):  Neuromuscular Re-Education Time Entry: 35  Therapeutic Activity Time Entry: 15  Therapeutic Exercise Time Entry: 8    Assessment & Plan   Assessment: Patient arrived to today's session without complaints of pain. PT continued with RTC and scapular stabilizer strengthening. Patient was appropriately fatigued with all strengthening exercises. PT and patient discussed " dropping down to 1 time a week for the next 6 weeks to continue to improve strength and mobility while slowly transitioning to discharge.       Patient will continue to benefit from skilled outpatient physical therapy to address the deficits listed in the problem list box on initial evaluation, provide pt/family education and to maximize pt's level of independence in the home and community environment.     Patient's spiritual, cultural, and educational needs considered and patient agreeable to plan of care and goals.           Plan: Improve RUE RTC and scapular stabilizer strength    Goals:   Active       long term goals        patient will improve FOTO to 72 to improve self perceived overall function  (Met)       Start:  03/30/25    Expected End:  05/25/25    Resolved:  04/24/25         Patient will be able to reach a shelf above his head without difficulty to improve ability to perform functional tasks  (Progressing)       Start:  03/30/25    Expected End:  05/25/25            patient will improve strength to 4+/5 to improve ability to perform functional tasks  (Progressing)       Start:  03/30/25    Expected End:  05/25/25            patient will be able to lift and carry 10-15lb without difficulty to improve ability to perform functional tasks  (Progressing)       Start:  03/30/25    Expected End:  05/25/25              Resolved       short term goals        patient will be independent with Home exercise program to supplement therapy  (Met)       Start:  03/30/25    Expected End:  04/27/25    Resolved:  04/08/25         Patient will report 2/10 pain with active range of motion to improve tolerance to functional tasks  (Met)       Start:  03/30/25    Expected End:  04/27/25    Resolved:  04/24/25         Patient will report being able to push open a heavy door without difficulty to improve overall function  (Met)       Start:  03/30/25    Expected End:  04/27/25    Resolved:  04/24/25         patient will report  being able to get dressed without difficulty to improve ability to perform ADLs (Met)       Start:  03/30/25    Expected End:  04/27/25    Resolved:  04/24/25             Cathie Majano, PT ,DPT,OCS  05/08/2025

## 2025-05-13 ENCOUNTER — HOSPITAL ENCOUNTER (OUTPATIENT)
Facility: HOSPITAL | Age: 70
Discharge: HOME | End: 2025-05-13
Attending: STUDENT IN AN ORGANIZED HEALTH CARE EDUCATION/TRAINING PROGRAM
Payer: MEDICARE

## 2025-05-13 ENCOUNTER — OFFICE VISIT (OUTPATIENT)
Dept: PODIATRY | Facility: CLINIC | Age: 70
End: 2025-05-13
Payer: MEDICARE

## 2025-05-13 VITALS — DIASTOLIC BLOOD PRESSURE: 99 MMHG | SYSTOLIC BLOOD PRESSURE: 133 MMHG | HEART RATE: 94 BPM

## 2025-05-13 DIAGNOSIS — M20.10 VALGUS DEFORMITY OF GREAT TOE, UNSPECIFIED LATERALITY: ICD-10-CM

## 2025-05-13 DIAGNOSIS — M20.10 VALGUS DEFORMITY OF GREAT TOE, UNSPECIFIED LATERALITY: Primary | ICD-10-CM

## 2025-05-13 DIAGNOSIS — M20.40 HAMMER TOE, UNSPECIFIED LATERALITY: ICD-10-CM

## 2025-05-13 PROCEDURE — 3044F HG A1C LEVEL LT 7.0%: CPT | Mod: CPTII,S$GLB,, | Performed by: STUDENT IN AN ORGANIZED HEALTH CARE EDUCATION/TRAINING PROGRAM

## 2025-05-13 PROCEDURE — 73630 X-RAY EXAM OF FOOT: CPT | Mod: 26,50,, | Performed by: INTERNAL MEDICINE

## 2025-05-13 PROCEDURE — 1126F AMNT PAIN NOTED NONE PRSNT: CPT | Mod: CPTII,S$GLB,, | Performed by: STUDENT IN AN ORGANIZED HEALTH CARE EDUCATION/TRAINING PROGRAM

## 2025-05-13 PROCEDURE — 99999 PR PBB SHADOW E&M-EST. PATIENT-LVL III: CPT | Mod: PBBFAC,,, | Performed by: STUDENT IN AN ORGANIZED HEALTH CARE EDUCATION/TRAINING PROGRAM

## 2025-05-13 PROCEDURE — 3075F SYST BP GE 130 - 139MM HG: CPT | Mod: CPTII,S$GLB,, | Performed by: STUDENT IN AN ORGANIZED HEALTH CARE EDUCATION/TRAINING PROGRAM

## 2025-05-13 PROCEDURE — 1101F PT FALLS ASSESS-DOCD LE1/YR: CPT | Mod: CPTII,S$GLB,, | Performed by: STUDENT IN AN ORGANIZED HEALTH CARE EDUCATION/TRAINING PROGRAM

## 2025-05-13 PROCEDURE — 99203 OFFICE O/P NEW LOW 30 MIN: CPT | Mod: S$GLB,,, | Performed by: STUDENT IN AN ORGANIZED HEALTH CARE EDUCATION/TRAINING PROGRAM

## 2025-05-13 PROCEDURE — 1159F MED LIST DOCD IN RCRD: CPT | Mod: CPTII,S$GLB,, | Performed by: STUDENT IN AN ORGANIZED HEALTH CARE EDUCATION/TRAINING PROGRAM

## 2025-05-13 PROCEDURE — 3080F DIAST BP >= 90 MM HG: CPT | Mod: CPTII,S$GLB,, | Performed by: STUDENT IN AN ORGANIZED HEALTH CARE EDUCATION/TRAINING PROGRAM

## 2025-05-13 PROCEDURE — 3288F FALL RISK ASSESSMENT DOCD: CPT | Mod: CPTII,S$GLB,, | Performed by: STUDENT IN AN ORGANIZED HEALTH CARE EDUCATION/TRAINING PROGRAM

## 2025-05-13 PROCEDURE — 73630 X-RAY EXAM OF FOOT: CPT | Mod: TC,50,PN

## 2025-05-13 NOTE — PROGRESS NOTES
Subjective:     Patient ID: Fabiano Garvey is a 69 y.o. male.    Chief Complaint: Foot Pain (Bilateral foot pain; with the right foot hurting more)    Fabiano is a 69 y.o. male who presents to the podiatry clinic  with complaint of  right foot pain. Onset of the symptoms was several years ago. Precipitating event: none known. Current symptoms include: painful bunion and hammertoe of right foot, left foot is also painful but doesn't bother him as much. Aggravating factors: any weight bearing. Symptoms have gradually worsened. Patient has had no prior foot problems. Evaluation to date: none. Treatment to date: none. Patients rates pain 0/10 on pain scale. He relates to history of Rheumatoid arthritis, states does not have any contractures in his hands from this. No further pedal complaints.     Review of Systems   Constitutional: Negative for chills, decreased appetite, diaphoresis and fever.   HENT:  Negative for congestion and hearing loss.    Cardiovascular:  Negative for chest pain, claudication, leg swelling and syncope.   Respiratory:  Negative for cough and shortness of breath.    Skin:  Negative for color change, flushing, itching, poor wound healing and rash.   Musculoskeletal:  Positive for arthritis, back pain and joint pain.   Gastrointestinal:  Negative for nausea and vomiting.   Neurological:  Negative for focal weakness, paresthesias and weakness.   Psychiatric/Behavioral:  Negative for altered mental status. The patient is not nervous/anxious.         Objective:     Physical Exam  Constitutional:       General: He is not in acute distress.     Appearance: Normal appearance. He is well-developed. He is not diaphoretic.   Cardiovascular:      Comments: Dorsalis pedis and posterior tibial pulses are within normal limits. Skin temperature is within normal limits. Toes are cool to touch and feet are warm proximally. Hair growth is within normal limits. Skin is normotrophic and without hyperpigmentation. No  edema noted. No varicosities or spider veins noted, bilaterally.   Musculoskeletal:         General: No tenderness.      Comments: Adequate joint range of motion without pain, limitation, nor crepitation to foot and ankle joints. Muscle strength is 5/5 in all groups bilaterally.    Bilateral HAV with bunion deformity to 1st MTP. Semi reducible hammertoes to 2-3, bilaterally      Feet:      Right foot:      Skin integrity: Dry skin present. No ulcer, blister, erythema or warmth.      Left foot:      Skin integrity: Dry skin present. No ulcer, blister, erythema or warmth.   Skin:     General: Skin is warm and dry.      Capillary Refill: Capillary refill takes less than 2 seconds.      Comments: Skin is warm and dry, no acute signs of infection noted bilaterally      Toenails well trimmed and of normal morphology    Otherwise, no open wounds, macerations or hyperkeratotic lesions, bilaterally            Neurological:      Mental Status: He is alert and oriented to person, place, and time.      Motor: No abnormal muscle tone.      Comments: Light touch within normal limits.    Psychiatric:         Mood and Affect: Mood normal.         Behavior: Behavior normal.         Thought Content: Thought content normal.           Assessment:      Encounter Diagnoses   Name Primary?    Valgus deformity of great toe, unspecified laterality Yes    Hammer toe, unspecified laterality      Plan:     Fabiano was seen today for foot pain.    Diagnoses and all orders for this visit:    Valgus deformity of great toe, unspecified laterality  -     Ambulatory referral/consult to Podiatry  -     X-Ray Foot Complete Bilateral; Future    Hammer toe, unspecified laterality  -     X-Ray Foot Complete Bilateral; Future      I counseled the patient on his conditions, their implications and medical management.  Baseline xray ordered  RICE, NSAIDs PRN pain  Supportive tennis shoes with wide toe box/soft upper mesh while ambulating to reduce pressure.    Discussed surgical vs conservative treatment options. He is interested in surgery at this time. Referral to Dr. Gunter.   OTC toe sleeve dispensed to reduce pressure.   Return to clinic PRN

## 2025-05-13 NOTE — PATIENT INSTRUCTIONS
[employer], arch supports. [facility] [employer], [employer].     [employer] [address] [facility]:  [address], [place] [address]

## 2025-05-14 ENCOUNTER — CLINICAL SUPPORT (OUTPATIENT)
Dept: REHABILITATION | Facility: HOSPITAL | Age: 70
End: 2025-05-14
Payer: MEDICARE

## 2025-05-14 ENCOUNTER — PATIENT MESSAGE (OUTPATIENT)
Dept: PULMONOLOGY | Facility: CLINIC | Age: 70
End: 2025-05-14
Payer: MEDICARE

## 2025-05-14 DIAGNOSIS — R53.1 DECREASED STRENGTH, ENDURANCE, AND MOBILITY: ICD-10-CM

## 2025-05-14 DIAGNOSIS — R68.89 DECREASED STRENGTH, ENDURANCE, AND MOBILITY: ICD-10-CM

## 2025-05-14 DIAGNOSIS — M25.611 DECREASED ROM OF RIGHT SHOULDER: Primary | ICD-10-CM

## 2025-05-14 DIAGNOSIS — Z74.09 DECREASED STRENGTH, ENDURANCE, AND MOBILITY: ICD-10-CM

## 2025-05-14 PROCEDURE — 97530 THERAPEUTIC ACTIVITIES: CPT | Mod: PN,CQ

## 2025-05-14 PROCEDURE — 97112 NEUROMUSCULAR REEDUCATION: CPT | Mod: PN,CQ

## 2025-05-14 NOTE — PROGRESS NOTES
"  Outpatient Rehab    Physical Therapy Visit    Patient Name: Fabiano Garvey  MRN: [id number]  YOB: 1955  Encounter Date: 5/14/2025    Therapy Diagnosis:   Encounter Diagnoses   Name Primary?    Decreased ROM of right shoulder Yes    Decreased strength, endurance, and mobility      Physician: Calin Buchanan MD    Physician Orders: Eval and Treat  Medical Diagnosis: Chronic right shoulder pain    Visit # / Visits Authorized:  11 / 16  Insurance Authorization Period: 3/27/2025 to 5/31/2025  Date of Evaluation: 3/18/2025  Plan of Care Certification: 3/30/2025 to 5/25/2025      PT/PTA: PTA   Number of PTA visits since last PT visit:1  Time In: 0800   Time Out: 0900  Total Time (in minutes): 60   Total Billable Time (in minutes): 38    FOTO:  Intake Score:  %  Survey Score 2:  %  Survey Score 3:  %    Precautions:       Subjective   "I'm doing ok today, it's the same old pain in the shoulder". pt agreeable to PT session.    anterior R shoulder soreness    Objective            Treatment:  Therapeutic Exercise  TE 1: UBE 4/4 lvl 2  Balance/Neuromuscular Re-Education  NMR 1: side lying ER 3x10 R 2lb  NMR 2: prone row 2" 3x10 2lb  NMR 3: prone extension 2" 3x10 2lb  NMR 4: supine chest press into serratus punch 4lb 2" 3x10  NMR 5: shoulder extension GTB 3x10  NMR 6: standing rows GTB 3x10  NMR 7: Prone T's 5" 3x10  NMR 8: side lying shoulder abduction  3x10  NMR 9: seated bilateral ER 5" 3x10 no resistance  NMR 10: shoulder wall ball stabilization 30x2 CW/CCW  Therapeutic Activity  TA 1: supine wand flexion 3lb 5" hold 3x10  TA 2: supine D2 shoulder flexion YTB 3x10 R  TA 3: landmine press 5lb 3x10  TA 4: sled push/pull 30lb 2 laps    Time Entry(in minutes):  Neuromuscular Re-Education Time Entry: 28  Therapeutic Activity Time Entry: 10    Assessment & Plan   Assessment: Pt completed today's session with no reports of increased shoulder pain. Pt reports he has been doing small projects around his house " but is mindful of not overdoing it. Reports soreness difficulty with Shoulder ER in sidelying (mainly at end range), but able to perform within pain parameters.  Evaluation/Treatment Tolerance: Patient tolerated treatment well    Patient will continue to benefit from skilled outpatient physical therapy to address the deficits listed in the problem list box on initial evaluation, provide pt/family education and to maximize pt's level of independence in the home and community environment.     Patient's spiritual, cultural, and educational needs considered and patient agreeable to plan of care and goals.           Plan: Improve RUE RTC and scapular stabilizer strength    Goals:   Active       long term goals        patient will improve FOTO to 72 to improve self perceived overall function  (Met)       Start:  03/30/25    Expected End:  05/25/25    Resolved:  04/24/25         Patient will be able to reach a shelf above his head without difficulty to improve ability to perform functional tasks  (Progressing)       Start:  03/30/25    Expected End:  05/25/25            patient will improve strength to 4+/5 to improve ability to perform functional tasks  (Progressing)       Start:  03/30/25    Expected End:  05/25/25            patient will be able to lift and carry 10-15lb without difficulty to improve ability to perform functional tasks  (Progressing)       Start:  03/30/25    Expected End:  05/25/25              Resolved       short term goals        patient will be independent with Home exercise program to supplement therapy  (Met)       Start:  03/30/25    Expected End:  04/27/25    Resolved:  04/08/25         Patient will report 2/10 pain with active range of motion to improve tolerance to functional tasks  (Met)       Start:  03/30/25    Expected End:  04/27/25    Resolved:  04/24/25         Patient will report being able to push open a heavy door without difficulty to improve overall function  (Met)       Start:   03/30/25    Expected End:  04/27/25    Resolved:  04/24/25         patient will report being able to get dressed without difficulty to improve ability to perform ADLs (Met)       Start:  03/30/25    Expected End:  04/27/25    Resolved:  04/24/25             Wan Goodman PTA

## 2025-05-15 ENCOUNTER — PATIENT MESSAGE (OUTPATIENT)
Dept: FAMILY MEDICINE | Facility: CLINIC | Age: 70
End: 2025-05-15
Payer: MEDICARE

## 2025-05-16 RX ORDER — LEFLUNOMIDE 20 MG/1
20 TABLET ORAL DAILY
Qty: 30 TABLET | Refills: 6 | Status: ACTIVE | OUTPATIENT
Start: 2025-05-16 | End: 2025-06-15

## 2025-05-20 DIAGNOSIS — J84.9 ILD (INTERSTITIAL LUNG DISEASE): Primary | ICD-10-CM

## 2025-05-23 NOTE — TELEPHONE ENCOUNTER
Needs office visit, please set up for sometime next week, can override any of the hospital follow up if necessary

## 2025-05-26 ENCOUNTER — HOSPITAL ENCOUNTER (OUTPATIENT)
Dept: PULMONOLOGY | Facility: HOSPITAL | Age: 70
Discharge: HOME | End: 2025-05-26
Attending: INTERNAL MEDICINE
Payer: MEDICARE

## 2025-05-26 VITALS — HEIGHT: 65 IN | BODY MASS INDEX: 25.49 KG/M2 | WEIGHT: 153 LBS

## 2025-05-26 DIAGNOSIS — J84.9 ILD (INTERSTITIAL LUNG DISEASE): ICD-10-CM

## 2025-05-26 PROCEDURE — 94618 PULMONARY STRESS TESTING: CPT | Performed by: INTERNAL MEDICINE

## 2025-05-26 NOTE — PROCEDURES
Fabiano Garvey is a 69 y.o.   male patient, who presents for a 6 minute walk test ordered by MD Francisco.  The diagnosis is Interstitial Lung Disease.  The patient's BMI is 25.5 kg/m2. Predicted distance (lower limit of normal) is 358.15 meters.    Test Results:    The test was completed with stops.  The patient stopped 2 times.  The total time walked was 439 seconds.  During walking, the patient reported:  Leg/hip pain, Dyspnea. The patient used supplemental oxygen during testing.     05/26/2025---------Distance: 182.88 meters (600 feet)     O2 Sat % Supplemental Oxygen Heart Rate Blood Pressure Stephen Scale   Pre-exercise  (Resting) 94 % 3 L/M 96 bpm 138/69  mmHg 2   During Exercise 89 % 3 L/M 130 bpm 143/87  mmHg 4   Post-exercise   91 % 3 L/M  110 bpm    mmHg       Recovery Time: 167 seconds    Oxygen Qualification:     O2 Sat % Supplemental Oxygen Heart Rate Blood Pressure Setphen Scale   Pre-exercise  (Resting) 88 % Room Air  120 bpm  133/89    mmHg 3    During Exercise              mmHg      Post-exercise                mmHg         Recovery Time:      Performing nurse/tech: Mario VELA    PREVIOUS STUDY:   The patient had a previous study.  08/13/2024---------Distance:   (515m )       O2 Sat % Supplemental Oxygen Heart Rate Blood Pressure Stephen Scale   Pre-exercise  (Resting)  93  RA  101 na  2    During Exercise  83/89 RA/4l   119 na   5   Post-exercise  (Recovery)  91     na  2         CLINICAL INTERPRETATION:  Six minute walk distance is 182.88 meters (600 feet) with moderate dyspnea.  During exercise, there was no significant desaturation while breathing supplemental oxygen.  The patient reported non-pulmonary symptoms during exercise.  The patient may benefit from using supplemental oxygen.  Based upon age and body mass index, exercise capacity is less than predicted.   Oxygen saturation did improve while breathing supplemental oxygen.

## 2025-05-27 ENCOUNTER — CLINICAL SUPPORT (OUTPATIENT)
Dept: REHABILITATION | Facility: HOSPITAL | Age: 70
End: 2025-05-27
Payer: MEDICARE

## 2025-05-27 ENCOUNTER — OFFICE VISIT (OUTPATIENT)
Dept: FAMILY MEDICINE | Facility: CLINIC | Age: 70
End: 2025-05-27
Payer: MEDICARE

## 2025-05-27 VITALS
WEIGHT: 156.06 LBS | HEART RATE: 114 BPM | OXYGEN SATURATION: 84 % | HEIGHT: 65 IN | DIASTOLIC BLOOD PRESSURE: 90 MMHG | BODY MASS INDEX: 26 KG/M2 | SYSTOLIC BLOOD PRESSURE: 120 MMHG

## 2025-05-27 DIAGNOSIS — R53.1 DECREASED STRENGTH, ENDURANCE, AND MOBILITY: ICD-10-CM

## 2025-05-27 DIAGNOSIS — Z74.09 DECREASED STRENGTH, ENDURANCE, AND MOBILITY: ICD-10-CM

## 2025-05-27 DIAGNOSIS — M25.611 DECREASED ROM OF RIGHT SHOULDER: Primary | ICD-10-CM

## 2025-05-27 DIAGNOSIS — R06.02 SOB (SHORTNESS OF BREATH): ICD-10-CM

## 2025-05-27 DIAGNOSIS — J84.10 INTERSTITIAL PULMONARY FIBROSIS: Primary | ICD-10-CM

## 2025-05-27 DIAGNOSIS — J43.2 CENTRILOBULAR EMPHYSEMA: ICD-10-CM

## 2025-05-27 DIAGNOSIS — R09.02 HYPOXIA: ICD-10-CM

## 2025-05-27 DIAGNOSIS — R68.89 DECREASED STRENGTH, ENDURANCE, AND MOBILITY: ICD-10-CM

## 2025-05-27 PROCEDURE — 97112 NEUROMUSCULAR REEDUCATION: CPT | Mod: PN,CQ

## 2025-05-27 PROCEDURE — 99999 PR PBB SHADOW E&M-EST. PATIENT-LVL IV: CPT | Mod: PBBFAC,,, | Performed by: FAMILY MEDICINE

## 2025-05-27 PROCEDURE — 93010 ELECTROCARDIOGRAM REPORT: CPT | Mod: S$GLB,,, | Performed by: INTERNAL MEDICINE

## 2025-05-27 PROCEDURE — 97530 THERAPEUTIC ACTIVITIES: CPT | Mod: PN,CQ

## 2025-05-27 PROCEDURE — 93005 ELECTROCARDIOGRAM TRACING: CPT | Mod: S$GLB,,, | Performed by: FAMILY MEDICINE

## 2025-05-27 RX ORDER — FUROSEMIDE 20 MG/1
40 TABLET ORAL DAILY
Qty: 30 TABLET | Refills: 0 | Status: SHIPPED | OUTPATIENT
Start: 2025-05-27

## 2025-05-27 RX ORDER — POTASSIUM CHLORIDE 750 MG/1
10 TABLET, EXTENDED RELEASE ORAL DAILY PRN
Qty: 30 TABLET | Refills: 0 | Status: SHIPPED | OUTPATIENT
Start: 2025-05-27

## 2025-05-27 NOTE — PROGRESS NOTES
(Portions of this note were dictated using voice recognition software and may contain dictation related errors in spelling/grammar/syntax not found on text review)    CC:   Chief Complaint   Patient presents with    Leg Swelling    Shortness of Breath       HPI: 69 y.o. male   RA and interstitial lung disease, followed by  rheumatology.  Off of methotrexate because of his interstitial lung disease.  Continues on leflunomide , prednisone 5 mg daily although not currently taking, rheumatology added Rituxan at last visit but could not take because of price.     chronic interstitial lung disease, CT chest 06/06/2024 superimposed severe emphysematous change, multiple irregular appearing pulmonary opacities stable from comparison CT 03/05/2024, no new or enlarging pulmonary nodules, multiple enlarged mediastinal lymph nodes.     Currently on Breztri triple therapy through pulmonology with albuterol as needed for breakthrough shortness and breath issues.     Was having increasing dyspnea issues, had met with pulmonology in the past, felt like he maybe a good candidate for oxygen therapy.  Had held off on oxygen therapy but has not noticed worsening respiratory status.  Discussed with pulmonology, updated 6 minute walk test was done.  Results as below    Also concerned about leg swelling.  Wanted to make sure it was not relating to his heart.  No echo on file.  Does have an EKG from 2022 which was NSR.  Denies any chest pain.  Does sleep on 2 pillows at night.  Has not experienced leg swelling prior.  Has gained weight about 13 lb up from last month.  Not taking prednisone              Test Results:     The test was completed with stops.  The patient stopped 2 times.  The total time walked was 439 seconds.  During walking, the patient reported:  Leg/hip pain, Dyspnea. The patient used supplemental oxygen during testing.      05/26/2025---------Distance: 182.88 meters (600 feet)       O2 Sat % Supplemental Oxygen Heart Rate  Blood Pressure Stephen Scale   Pre-exercise  (Resting) 94 % 3 L/M 96 bpm 138/69  mmHg 2   During Exercise 89 % 3 L/M 130 bpm 143/87  mmHg 4   Post-exercise    91 % 3 L/M  110 bpm    mmHg       Recovery Time: 167 seconds     Oxygen Qualification:       O2 Sat % Supplemental Oxygen Heart Rate Blood Pressure Stephen Scale   Pre-exercise  (Resting) 88 % Room Air  120 bpm  133/89    mmHg 3    During Exercise              mmHg      Post-exercise                 mmHg          Recovery Time:       Performing nurse/tech: Mario VELA     PREVIOUS STUDY:   The patient had a previous study.  08/13/2024---------Distance:   (515m )       O2 Sat % Supplemental Oxygen Heart Rate Blood Pressure Stephen Scale   Pre-exercise  (Resting)  93  RA  101 na  2    During Exercise  83/89 RA/4l   119 na   5   Post-exercise  (Recovery)  91     na  2          CLINICAL INTERPRETATION:  Six minute walk distance is 182.88 meters (600 feet) with moderate dyspnea.  During exercise, there was no significant desaturation while breathing supplemental oxygen.  The patient reported non-pulmonary symptoms during exercise.  The patient may benefit from using supplemental oxygen.  Based upon age and body mass index, exercise capacity is less than predicted.   Oxygen saturation did improve while breathing supplemental oxygen.         Past Medical History:   Diagnosis Date    Basal cell carcinoma 01/2024    left upper chest    COPD (chronic obstructive pulmonary disease)     Degenerative disc disease, lumbar 06/16/2021    Hyperlipidemia     Interstitial pulmonary fibrosis     NU (obstructive sleep apnea)     Pulmonary fibrosis     Rheumatoid arthritis     SCC (squamous cell carcinoma) 07/2020    SCC right medial canthus    SCC (squamous cell carcinoma) 11/2021    mid lower neck     SCC (squamous cell carcinoma) 01/2022    right lateral cheek- in situ    SCC (squamous cell carcinoma) 09/2023    in situ L lower forearm and HAK R shoulder    SCC (squamous cell  carcinoma) 01/09/2025    R lower forearm    SCC (squamous cell carcinoma) 11/2024    left upper shin    Small bowel obstruction due to adhesions 05/10/2023    Squamous cell carcinoma 07/2019    SCC in situ left temple    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    R lateral cheek       Past Surgical History:   Procedure Laterality Date    COLONOSCOPY N/A 7/25/2017    Procedure: COLONOSCOPY;  Surgeon: Bill Nicole MD;  Location: [facility] ENDO ([facility] FLR);  Service: Endoscopy;  Laterality: N/A;    DIAGNOSTIC LAPAROSCOPY N/A 5/10/2023    Procedure: LAPAROSCOPY, DIAGNOSTIC possible laparotomy other as indicated;  Surgeon: Eitan Palomares MD;  Location: [facility] OR;  Service: General;  Laterality: N/A;    ENDOSCOPIC ULTRASOUND OF UPPER GASTROINTESTINAL TRACT N/A 12/13/2024    Procedure: ULTRASOUND, UPPER GI TRACT, ENDOSCOPIC;  Surgeon: Harrison Calderon MD;  Location: [facility] ENDO;  Service: Endoscopy;  Laterality: N/A;  10/25 portal-EUS at [place] for dilated PD and weight loss. calderon -tt  10/31 r/s updated portal instr-tt  12/6 PRECALL ATTEMPTED-NA/RT  12/9 SWP PRECALL COMPLETE-RT    EPIDURAL STEROID INJECTION INTO LUMBAR SPINE N/A 7/29/2021    Procedure: Injection-steroid-epidural-lumbar L5-S1;  Surgeon: Shiv Vernon Jr., MD;  Location: [facility] PAIN MGT;  Service: Pain Management;  Laterality: N/A;  oral sedation   no pacemaker     FUSION OF SPINE WITH INSTRUMENTATION Left 12/14/2022    Procedure: FUSION, SPINE, WITH INSTRUMENTATION Stg1: Left L3-5 OLiF conduit lateral cage BMP;  Surgeon: August Aguilar MD;  Location: [facility] OR;  Service: Neurosurgery;  Laterality: Left;  Procedure: Stg1: Left L3-5 OLiF conduit lateral cage BMP  Surgery Time: 2hrs  LOS: 3  Anesthesia: General  Blood: Type & Screen  Radiology: C-arm  Brace: LSO  Bed: Regular Bed  Position: Lateral Right Down  Equip    FUSION, SPINE, POSTERIOR APPROACH N/A 12/14/2022    Procedure: FUSION,SPINE,POSTERIOR APPROACH Stg 2: L3-5  posterior instrumentation viper prime, L3-5 posterolateral arthrodesis;  Surgeon: August Aguilar MD;  Location: [facility] OR;  Service: Neurosurgery;  Laterality: N/A;  Procedure: Stg 2: L3-5 posterior instrumentation viper prime, L3-5 posterolateral arthrodesis  Surgery Time: 3hrs  LOS: 3  Anesthesia: General  Blood: Type & Screen  Radiology: Bra    LYSIS OF ADHESIONS N/A 5/10/2023    Procedure: LYSIS, ADHESIONS;  Surgeon: Eitan Palomares MD;  Location: [facility] OR;  Service: General;  Laterality: N/A;    NO PAST SURGERIES      TRANSFORAMINAL EPIDURAL INJECTION OF STEROID Right 6/29/2021    Procedure: Injection,steroid,epidural,transforaminal approach-RIGHT-- L4-5, L5-S1-- (IV Sedation);  Surgeon: Shiv Vernon Jr., MD;  Location: [facility] PAIN MGT;  Service: Pain Management;  Laterality: Right;    TRANSFORAMINAL EPIDURAL INJECTION OF STEROID Left 10/27/2022    Procedure: Injection,steroid,epidural,transforaminal left L4-5 and L5-S1;  Surgeon: Shiv Vernon Jr., MD;  Location: [facility] PAIN MGT;  Service: Pain Management;  Laterality: Left;       Family History   Problem Relation Name Age of Onset    Heart failure Mother          60s    Coronary artery disease Father          70s    Heart attack Father      Arthritis Sister x2     Arthritis Brother x1     Asthma Daughter x2     Gallbladder disease Daughter x2     Seizures Daughter x2     Cancer Paternal Uncle          lymphoma??    Diabetes Neg Hx      Melanoma Neg Hx         Social History[1]    Lab Results   Component Value Date    WBC 8.39 04/14/2025    HGB 17.6 04/14/2025    HCT 55.8 (H) 04/14/2025    MCV 96 04/14/2025     04/14/2025    CHOL 139 01/07/2025    TRIG 82 01/07/2025    HDL 45 01/07/2025    ALT 23 04/14/2025    AST 30 04/14/2025    BILITOT 0.6 04/14/2025    ALKPHOS 106 04/14/2025     04/14/2025    K 4.5 04/14/2025     04/14/2025    CREATININE 0.9 04/14/2025    ESTGFRAFRICA >60 04/29/2022    EGFRNONAA >60 04/29/2022    EGFRNORACEVR >60  "04/14/2025    CALCIUM 9.1 04/14/2025    ALBUMIN 3.3 (L) 04/14/2025    BUN 23 04/14/2025    CO2 22 (L) 04/14/2025    TSH 1.716 01/07/2025    PSA 0.74 01/07/2025    INR 1.0 11/22/2022    HGBA1C 5.9 (H) 01/07/2025    [id number] 32 03/13/2024       No results found for: "BNP"]          Vital signs reviewed  Vitals:    05/27/25 1315   BP: (!) 120/90   BP Location: Left arm   Patient Position: Sitting   Pulse: (!) 114   TempSrc: Oral   SpO2: (!) 84%   Weight: 70.8 kg (156 lb 1.4 oz)   Height: 5' 5" (1.651 m)       Wt Readings from Last 4 Encounters:   05/27/25 70.8 kg (156 lb 1.4 oz)   05/26/25 69.4 kg (153 lb)   04/15/25 65.3 kg (143 lb 15.4 oz)   03/18/25 66.8 kg (147 lb 4.3 oz)       PE:   APPEARANCE: Well nourished, well developed, in no acute distress.    HEAD: Normocephalic, atraumatic.  EYES: PERRL. EOMI.   Conjunctivae noninjected.  NECK: Supple with no cervical lymphadenopathy.  + JVD  CHEST: Good inspiratory effort.  Decreased breath sounds globally, basilar crackles bilaterally  CARDIOVASCULAR: Normal S1, S2. No rubs, murmurs, or gallops.  ABDOMEN: Bowel sounds normal. Not distended. Soft. No tenderness or masses. No organomegaly.  EXTREMITIES:  2+ bilateral pitting edema      IMPRESSION  1. Interstitial pulmonary fibrosis    2. Centrilobular emphysema    3. SOB (shortness of breath)    4. Hypoxia            PLAN  Orders Placed This Encounter   Procedures    OXYGEN FOR HOME USE    EKG 12-lead    Echo     Medications Ordered This Encounter   Medications    furosemide (LASIX) 20 MG tablet     Sig: Take 2 tablets (40 mg total) by mouth once daily.     Dispense:  30 tablet     Refill:  0    potassium chloride SA (K-DUR,KLOR-CON M) 10 MEQ tablet     Sig: Take 1 tablet (10 mEq total) by mouth daily as needed (take wtih furosemide).     Dispense:  30 tablet     Refill:  0      Reviewed 6 minute walk test.  O2 is 84% on room air here.  Does not not have home O2.  Will send orders for home O2 below, will notify his " "pulmonologist as well.    Also concerned about fluid overload and possible CHF contribution.  EKG was done in the office, personally reviewed showing:  Normal sinus rhythm, normal axis.  Incomplete right bundle-branch block.   Will set up for echo  Temporary Lasix 40 mg daily for 5 days then stop.  Take potassium with the Lasix only.  Reviewed labs as above, set up to get labs in the next week or so    Does state that he gets some leg cramps with exertion not at rest.  Discussed potential effect of his hypoxemia on this.  No arterial evaluation noted in chart.  Would recommend if symptoms do not improve with home O2, we can get NERY/arterial ultrasound to further investigate for vascular claudication        Orders Placed This Encounter   Procedures    OXYGEN FOR HOME USE     Liter Flow:   3     Duration:   Continuous     Qualifying Test Performed at::   [facility]     Oxygen saturation::   88     Portable mode::   pulse dose acceptable     Mode::   Portable concentrator     Route:   nasal cannula     Device::   home concentrator with portable concentrator     Length of need (in months)::   99 mos     Patient condition with qualifying saturation:   COPD     Height::   5' 5" (1.651 m)     Weight::   70.8 kg (156 lb 1.4 oz)     Alternative treatment measures have been tried or considered and deemed clinically ineffective.:   Yes    EKG 12-lead    Echo     Standing Status:   Future     Expiration Date:   2026     Release to patient:   Immediate                             [1]   Social History  Tobacco Use    Smoking status: Former     Current packs/day: 0.00     Average packs/day: 0.8 packs/day for 37.5 years (28.1 ttl pk-yrs)     Types: Cigarettes     Start date:      Quit date: 2012     Years since quittin.8     Passive exposure: Never    Smokeless tobacco: Never   Substance Use Topics    Alcohol use: Yes     Comment: rarely    Drug use: Never     "

## 2025-05-27 NOTE — Clinical Note
Jaqcuelyn Singh I saw our mutual patient today, I saw that he had a 6 minute walk test yesterday, was hypoxic in the office 84%, I went ahead and ordered home O2 3 Lpm as that is what they used during the 6 minute walk.  I am also setting up for echo as he has some fluid retention, peripheral edema and I was concerned about potential heart failure whether it is primary cardiogenic or more of a right-sided failure secondary to his pulmonary issues.  I put him on Lasix just for few days to get the fluid off.  Please let me know if you had any other suggestions on the O2 order or any other workup.  Thanks! Calin

## 2025-05-28 ENCOUNTER — HOSPITAL ENCOUNTER (OUTPATIENT)
Dept: CARDIOLOGY | Facility: HOSPITAL | Age: 70
Discharge: HOME | End: 2025-05-28
Attending: FAMILY MEDICINE
Payer: MEDICARE

## 2025-05-28 VITALS
HEIGHT: 65 IN | HEART RATE: 93 BPM | DIASTOLIC BLOOD PRESSURE: 92 MMHG | WEIGHT: 156 LBS | SYSTOLIC BLOOD PRESSURE: 137 MMHG | BODY MASS INDEX: 25.99 KG/M2

## 2025-05-28 DIAGNOSIS — R06.02 SOB (SHORTNESS OF BREATH): ICD-10-CM

## 2025-05-28 LAB
AORTIC SIZE INDEX (SOV): 2 CM/M2
AORTIC SIZE INDEX: 1.9 CM/M2
ASCENDING AORTA: 3.3 CM
AV AREA BY CONTINUOUS VTI: 2.4 CM2
AV INDEX (PROSTH): 0.62
AV LVOT MEAN GRADIENT: 1 MMHG
AV LVOT PEAK GRADIENT: 1 MMHG
AV MEAN GRADIENT: 1 MMHG
AV PEAK GRADIENT: 3 MMHG
AV VALVE AREA BY VELOCITY RATIO: 2.4 CM²
AV VALVE AREA: 2.4 CM2
AV VELOCITY RATIO: 0.63
BSA FOR ECHO PROCEDURE: 1.8 M2
CV ECHO LV RWT: 0.43 CM
DOP CALC AO PEAK VEL: 0.8 M/S
DOP CALC AO VTI: 12.1 CM
DOP CALC LVOT AREA: 3.8 CM2
DOP CALC LVOT DIAMETER: 2.2 CM
DOP CALC LVOT PEAK VEL: 0.5 M/S
DOP CALC LVOT STROKE VOLUME: 28.5 CM3
DOP CALC RVOT AREA: 5.72 CM2
DOP CALC RVOT DIAMETER: 2.7 CM
DOP CALCLVOT PEAK VEL VTI: 7.5 CM
E WAVE DECELERATION TIME: 93 MS
E/A RATIO: 2.4
E/E' RATIO: 10 M/S
ECHO EF ESTIMATED: 42 %
ECHO LV POSTERIOR WALL: 0.9 CM (ref 0.6–1.1)
FRACTIONAL SHORTENING: 19 % (ref 28–44)
HR MV ECHO: 93 BPM
INTERVENTRICULAR SEPTUM: 0.8 CM (ref 0.6–1.1)
IVC DIAMETER: 2.44 CM
IVRT: 186 MS
LA MAJOR: 5.1 CM
LA MINOR: 5.1 CM
LA WIDTH: 2.9 CM
LEFT ATRIUM SIZE: 2.9 CM
LEFT ATRIUM VOLUME INDEX MOD: 15 ML/M2
LEFT ATRIUM VOLUME INDEX: 20 ML/M2
LEFT ATRIUM VOLUME MOD: 27 ML
LEFT ATRIUM VOLUME: 36 CM3
LEFT INTERNAL DIMENSION IN SYSTOLE: 3.4 CM (ref 2.1–4)
LEFT VENTRICLE DIASTOLIC VOLUME INDEX: 44.94 ML/M2
LEFT VENTRICLE DIASTOLIC VOLUME: 80 ML
LEFT VENTRICLE MASS INDEX: 61.7 G/M2
LEFT VENTRICLE SYSTOLIC VOLUME INDEX: 26.4 ML/M2
LEFT VENTRICLE SYSTOLIC VOLUME: 47 ML
LEFT VENTRICULAR INTERNAL DIMENSION IN DIASTOLE: 4.2 CM (ref 3.5–6)
LEFT VENTRICULAR MASS: 109.8 G
LV LATERAL E/E' RATIO: 8.6
LV SEPTAL E/E' RATIO: 12
MV A" WAVE DURATION": 83.73 MS
MV MEAN GRADIENT: 1 MMHG
MV PEAK A VEL: 0.25 M/S
MV PEAK E VEL: 0.6 M/S
MV PEAK GRADIENT: 3 MMHG
OHS CV RV/LV RATIO: 1.29 CM
OHS LV EJECTION FRACTION SIMPSONS BIPLANE MOD: 50 %
OHS QRS DURATION: 84 MS
OHS QTC CALCULATION: 474 MS
PISA TR MAX VEL: 4.1 M/S
PULM VEIN A" WAVE DURATION": 83.73 MS
PULM VEIN S/D RATIO: 2.46
PULMONIC VEIN PEAK A VELOCITY: 0.3 M/S
PV PEAK D VEL: 0.24 M/S
PV PEAK S VEL: 0.59 M/S
RA MAJOR: 5.81 CM
RA PRESSURE ESTIMATED: 15 MMHG
RA WIDTH: 5.13 CM
RIGHT ATRIAL AREA: 24.5 CM2
RIGHT VENTRICLE DIASTOLIC BASEL DIMENSION: 5.4 CM
RV TB RVSP: 19 MMHG
RV TISSUE DOPPLER FREE WALL SYSTOLIC VELOCITY 1 (APICAL 4 CHAMBER VIEW): 7.49 CM/S
SINUS: 3.57 CM
STJ: 2.9 CM
TDI LATERAL: 0.07 M/S
TDI SEPTAL: 0.05 M/S
TDI: 0.06 M/S
TRICUSPID ANNULAR PLANE SYSTOLIC EXCURSION: 1 CM
TV PEAK GRADIENT: 68 MMHG
TV REST PULMONARY ARTERY PRESSURE: 82 MMHG
Z-SCORE OF LEFT VENTRICULAR DIMENSION IN END DIASTOLE: -1.58
Z-SCORE OF LEFT VENTRICULAR DIMENSION IN END SYSTOLE: 0.88

## 2025-05-28 PROCEDURE — 93306 TTE W/DOPPLER COMPLETE: CPT | Mod: 26,,, | Performed by: INTERNAL MEDICINE

## 2025-05-28 PROCEDURE — 93306 TTE W/DOPPLER COMPLETE: CPT

## 2025-05-29 ENCOUNTER — PATIENT MESSAGE (OUTPATIENT)
Dept: FAMILY MEDICINE | Facility: CLINIC | Age: 70
End: 2025-05-29
Payer: MEDICARE

## 2025-05-29 DIAGNOSIS — I50.810 RIGHT-SIDED HEART FAILURE, UNSPECIFIED HF CHRONICITY: ICD-10-CM

## 2025-05-29 DIAGNOSIS — I50.9 CONGESTIVE HEART FAILURE, UNSPECIFIED HF CHRONICITY, UNSPECIFIED HEART FAILURE TYPE: Primary | ICD-10-CM

## 2025-05-29 DIAGNOSIS — I27.20 PULMONARY HYPERTENSION: ICD-10-CM

## 2025-05-29 NOTE — TELEPHONE ENCOUNTER
Portal message sent, place a referral to Cardiology given his CHF, likely pulmonary related although may need ischemic testing as well   Take the oral steroids as prescribed.    Also start using the topical steroids as prescribed for up to 2 weeks.    If you develop any fevers, chills, or have other concerns, return to the ER for further care.

## 2025-05-30 ENCOUNTER — LAB VISIT (OUTPATIENT)
Dept: LAB | Facility: HOSPITAL | Age: 70
End: 2025-05-30
Attending: STUDENT IN AN ORGANIZED HEALTH CARE EDUCATION/TRAINING PROGRAM
Payer: MEDICARE

## 2025-05-30 ENCOUNTER — CLINICAL SUPPORT (OUTPATIENT)
Dept: INFECTIOUS DISEASES | Facility: CLINIC | Age: 70
End: 2025-05-30
Payer: MEDICARE

## 2025-05-30 ENCOUNTER — OFFICE VISIT (OUTPATIENT)
Dept: INFECTIOUS DISEASES | Facility: CLINIC | Age: 70
End: 2025-05-30
Payer: MEDICARE

## 2025-05-30 VITALS
DIASTOLIC BLOOD PRESSURE: 93 MMHG | HEIGHT: 65 IN | HEART RATE: 116 BPM | TEMPERATURE: 98 F | WEIGHT: 151.25 LBS | SYSTOLIC BLOOD PRESSURE: 134 MMHG | BODY MASS INDEX: 25.2 KG/M2

## 2025-05-30 DIAGNOSIS — Z71.85 VACCINE COUNSELING: Primary | ICD-10-CM

## 2025-05-30 DIAGNOSIS — D84.9 IMMUNOSUPPRESSION: ICD-10-CM

## 2025-05-30 DIAGNOSIS — Z00.00 HEALTHCARE MAINTENANCE: Primary | ICD-10-CM

## 2025-05-30 DIAGNOSIS — Z72.89 OTHER PROBLEMS RELATED TO LIFESTYLE: ICD-10-CM

## 2025-05-30 DIAGNOSIS — M06.9 RHEUMATOID ARTHRITIS FLARE: ICD-10-CM

## 2025-05-30 LAB
HAV AB SER QL IA: REACTIVE
HIV 1+2 AB+HIV1 P24 AG SERPL QL IA: NORMAL

## 2025-05-30 PROCEDURE — 36415 COLL VENOUS BLD VENIPUNCTURE: CPT

## 2025-05-30 PROCEDURE — 86780 TREPONEMA PALLIDUM: CPT

## 2025-05-30 PROCEDURE — 87389 HIV-1 AG W/HIV-1&-2 AB AG IA: CPT

## 2025-05-30 PROCEDURE — 86682 HELMINTH ANTIBODY: CPT

## 2025-05-30 PROCEDURE — 99999 PR PBB SHADOW E&M-EST. PATIENT-LVL IV: CPT | Mod: PBBFAC,,, | Performed by: STUDENT IN AN ORGANIZED HEALTH CARE EDUCATION/TRAINING PROGRAM

## 2025-05-30 PROCEDURE — 86790 VIRUS ANTIBODY NOS: CPT

## 2025-05-30 NOTE — PROGRESS NOTES
Pt received Heplisav-b to left deltoid. Pt tolerated injection well and departed from clinic in [facility].

## 2025-05-30 NOTE — PROGRESS NOTES
Pre Biologic Response Modifier Therapy Consult  BMR Recipient Evaluation    Requesting Physician: Jordin    Reason for Visit: Vaccine counseling    History of Present Illness  69 y.o. male with ILD and RA presents for vaccine counseling. Currently on leflunomide. Previously on rinvoq. Thinking of potential rituximab infusion in the future (on hold per pt). Doing well on arava. Has prednisone PRN.     Patient denies any recent fever, chills, or infective infective illnesses.    Review of Systems   Constitutional:  Negative for chills and fever.        Past Medical History:   Diagnosis Date    Basal cell carcinoma 01/2024    left upper chest    COPD (chronic obstructive pulmonary disease)     Degenerative disc disease, lumbar 06/16/2021    Hyperlipidemia     Interstitial pulmonary fibrosis     NU (obstructive sleep apnea)     Pulmonary fibrosis     Rheumatoid arthritis     SCC (squamous cell carcinoma) 07/2020    SCC right medial canthus    SCC (squamous cell carcinoma) 11/2021    mid lower neck     SCC (squamous cell carcinoma) 01/2022    right lateral cheek- in situ    SCC (squamous cell carcinoma) 09/2023    in situ L lower forearm and HAK R shoulder    SCC (squamous cell carcinoma) 01/09/2025    R lower forearm    SCC (squamous cell carcinoma) 11/2024    left upper shin    Small bowel obstruction due to adhesions 05/10/2023    Squamous cell carcinoma 07/2019    SCC in situ left temple    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    R lateral cheek       Past Surgical History:   Procedure Laterality Date    COLONOSCOPY N/A 7/25/2017    Procedure: COLONOSCOPY;  Surgeon: Bill Nicole MD;  Location: [address]);  Service: Endoscopy;  Laterality: N/A;    DIAGNOSTIC LAPAROSCOPY N/A 5/10/2023    Procedure: LAPAROSCOPY, DIAGNOSTIC possible laparotomy other as indicated;  Surgeon: Eitan Palomares MD;  Location: [facility];  Service: General;  Laterality: N/A;    ENDOSCOPIC  ULTRASOUND OF UPPER GASTROINTESTINAL TRACT N/A 12/13/2024    Procedure: ULTRASOUND, UPPER GI TRACT, ENDOSCOPIC;  Surgeon: Harrison Calderon MD;  Location: [facility] ENDO;  Service: Endoscopy;  Laterality: N/A;  10/25 portal-EUS at [place] for dilated PD and weight loss. calderon -tt  10/31 r/s updated portal instr-tt  12/6 PRECALL ATTEMPTED-NA/RT  12/9 SWP PRECALL COMPLETE-RT    EPIDURAL STEROID INJECTION INTO LUMBAR SPINE N/A 7/29/2021    Procedure: Injection-steroid-epidural-lumbar L5-S1;  Surgeon: Shiv Vernon Jr., MD;  Location: [facility] PAIN MGT;  Service: Pain Management;  Laterality: N/A;  oral sedation   no pacemaker     FUSION OF SPINE WITH INSTRUMENTATION Left 12/14/2022    Procedure: FUSION, SPINE, WITH INSTRUMENTATION Stg1: Left L3-5 OLiF conduit lateral cage BMP;  Surgeon: August Aguilar MD;  Location: [facility] OR;  Service: Neurosurgery;  Laterality: Left;  Procedure: Stg1: Left L3-5 OLiF conduit lateral cage BMP  Surgery Time: 2hrs  LOS: 3  Anesthesia: General  Blood: Type & Screen  Radiology: C-arm  Brace: LSO  Bed: Regular Bed  Position: Lateral Right Down  Equip    FUSION, SPINE, POSTERIOR APPROACH N/A 12/14/2022    Procedure: FUSION,SPINE,POSTERIOR APPROACH Stg 2: L3-5 posterior instrumentation viper prime, L3-5 posterolateral arthrodesis;  Surgeon: August Aguilar MD;  Location: [facility] OR;  Service: Neurosurgery;  Laterality: N/A;  Procedure: Stg 2: L3-5 posterior instrumentation viper prime, L3-5 posterolateral arthrodesis  Surgery Time: 3hrs  LOS: 3  Anesthesia: General  Blood: Type & Screen  Radiology: Bra    LYSIS OF ADHESIONS N/A 5/10/2023    Procedure: LYSIS, ADHESIONS;  Surgeon: Eitan Palomares MD;  Location: [facility] OR;  Service: General;  Laterality: N/A;    NO PAST SURGERIES      TRANSFORAMINAL EPIDURAL INJECTION OF STEROID Right 6/29/2021    Procedure: Injection,steroid,epidural,transforaminal approach-RIGHT-- L4-5, L5-S1-- (IV Sedation);  Surgeon: Shiv Vernon Jr., MD;  Location: [facility] PAIN [facility]GT;  " Service: Pain Management;  Laterality: Right;    TRANSFORAMINAL EPIDURAL INJECTION OF STEROID Left 10/27/2022    Procedure: Injection,steroid,epidural,transforaminal left L4-5 and L5-S1;  Surgeon: Shiv Vernon Jr., MD;  Location: [facility];  Service: Pain Management;  Laterality: Left;       Family History   Problem Relation Name Age of Onset    Heart failure Mother          60s    Coronary artery disease Father          70s    Heart attack Father      Arthritis Sister x2     Arthritis Brother x1     Asthma Daughter x2     Gallbladder disease Daughter x2     Seizures Daughter x2     Cancer Paternal Uncle          lymphoma??    Diabetes Neg Hx      Melanoma Neg Hx         Social History[1]    Review of patient's allergies indicates:   Allergen Reactions    Plaquenil [hydroxychloroquine]      Lightheaded and muscle aches       Medications:  Medications Ordered Prior to Encounter[2]    Objective:   BP (!) 134/93   Pulse (!) 116   Temp 97.8 °F (36.6 °C) (Oral)   Ht 5' 5" (1.651 m)   Wt 68.6 kg (151 lb 3.8 oz)   BMI 25.17 kg/m²   General: Afebrile, alert, comfortable, no acute distress.   Pulmonary: Non labored  Skin: No jaundice, rashes, or visible lesions.   Neurological:  Alert and oriented x 4.      1) Do you have a history of:         YES NO   Diabetes   []        [x]     Autoimmune disease  [x]        []   Cancer               []        [x]   Surgical Removal of Spleen []        [x]       2) Have you had recurrent infections:             YES NO  Sinus infections  []        [x]   Lung infections  []        [x]              Urinary Tract Infections []        [x]                                              Intestinal Infections  []        [x]      Skin Infections   []        [x]       Musculoskeletal Infections    []        [x]   Reproductive Infections []        [x]   Periodontal Disease  []        [x]        3)Have you ever had: YES     NO       Chicken Pox   [x]         []          Shingles   []     "     [x]            Orolabial Herpes             []         [x]          Genital Herpes  []         [x]           Genital Warts   []         [x]             Cytomegalovirus  []         [x]          Aarti-Barr Virus  []         [x]              Hepatitis A   []         [x]          Hepatitis B   []         [x]          Hepatitis C   []         [x]            Syphilis   []         [x]          Gonorrhea   []         [x]         Chlamydia    []         [x]           Parasites / worms  []         [x]         Fungal Infections  []         [x]         Bloodstream Infections []         [x]             4) Tuberculosis             YES NO  Exposure to person with active TB?  []         [x]   H/o homeless?    []         [x]   H/o imprisonment?    []         [x]            5) Travel    What states have you lived in? [place]    What countries have you visited for more than 2 weeks?    none                               YES     NO    Are you planning to travel outside of [place]?    []          [x]     6) Animal Exposure                  YES NO  Do you have pets living in your house?   [x]         []   If yes, describe: cat (outdoor)    Do you spend time or live on a farm?    []         [x]   If yes, which ones:    Do you have a fish tank?         []   [x]    Do you have a litter box?     []         [x]     Do you fish or hunt?      []         [x]   Do you clean or skin fish or animals?    []         [x]     Consume raw or undercooked meat, fish, shellfish?  []         [x]       7) What occupations have you had?     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    Reading             YES     NO  Do you garden or otherwise work in the soil?  [x]         []   Do you hike, camp, or spend time in wooded areas?  []         [x]       9) The patient's immunization history was reviewed.     Have you ever received:  YES NO   Routine Childhood vaccines  [x]         []        Influenza vaccine   [x]         []     Prevnar    [x]         []     Pneumovax    [x]         []     Tetanus-diptheria -pertussis  [x]         []     Hepatitis A vaccine series       []         []     Hepatitis B vaccine series         []         []     Meningitis vaccine   []         []     Zoster vaccine    []         []        Significant labs reviewed:      HepBs Ab neg  HepBs Ag neg  HepBc Ab neg  HepC Ab neg    Quant gold neg          Microbiology x 7d:   Microbiology Results (last 7 days)       ** No results found for the last 168 hours. **            Immunization History   Administered Date(s) Administered    COVID-19, MRNA, LN-S, PF (Pfizer) (Gray Cap) 04/21/2022    COVID-19, MRNA, LN-S, PF (Pfizer) (Purple Cap) 12/23/2020, 01/13/2021, 08/19/2021    COVID-19, mRNA, LNP-S, PF (Moderna) Ages 12+ 10/27/2023    COVID-19, mRNA, LNP-S, bivalent booster, PF (PFIZER OMICRON) 09/22/2022    Hepatitis B (recombinant) Adjuvanted, 2 dose 05/30/2025    Influenza (FLUAD) - Quadrivalent - Adjuvanted - PF *Preferred* (65+) 10/09/2020, 10/26/2022, 09/29/2023    Influenza - Quadrivalent - PF *Preferred* (6 months and older) 10/07/2016, 10/06/2017, 10/10/2018, 09/06/2019    Influenza - Trivalent - Fluad - Adjuvanted - PF (65 years and older 10/14/2021, 10/09/2024    Pneumococcal Conjugate - 13 Valent 05/12/2017    Pneumococcal Polysaccharide - 23 Valent 09/13/2019    Tdap 04/16/2009, 06/28/2022       Assessment:     Vaccine Counseling  Immunosuppression    Vaccine counseling  -     hepatitis B vacc-CpG 1018 (PF) 20 mcg/0.5 mL Syrg 20 mcg    Rheumatoid arthritis flare  -     Ambulatory referral/consult to Infectious Disease  -     Ambulatory referral/consult to Infectious Disease  -     HIV 1/2 Ag/Ab (4th Gen); Future; Expected date: 05/30/2025  -     TPPA for Treponema Pallidum Antibody; Future; Expected date: 05/30/2025  -     Strongyloides IgG Antibodies; Future; Expected date: 05/30/2025  -     Hepatitis A antibody,  IgG; Future; Expected date: 05/30/2025  -     hepatitis B vacc-CpG 1018 (PF) 20 mcg/0.5 mL Syrg 20 mcg    Immunosuppression  -     TPPA for Treponema Pallidum Antibody; Future; Expected date: 05/30/2025    Other problems related to lifestyle  -     TPPA for Treponema Pallidum Antibody; Future; Expected date: 05/30/2025         Plan:       Biologic Response Modifier Candidacy:   Based on available information, there are no identified significant barriers to BRMs from an infectious disease standpoint pending acceptable serologies.  Final determination of BRM candidacy will be made once evaluation is complete and reviewed.    Except for inactivated influenza vaccine, vaccination during immune suppression might be suboptimal. Patients vaccinated within a 14-day period before starting immunosuppressive therapy should be revaccination at least 3 months after immune suppressive therapy is discontinued (and if on rituximab wait 6 months after stopping).     Immune suppression should be delayed 4 weeks after a live virus vaccine.     Counseling:  - I discussed with the patient the risk for increased susceptibility to infections following BRM therapy including increased risk for infection.    - Specific guidance has been provided to the patient regarding the patients occupation, hobbies and activities to avoid future infectious complications including but not limited to avoiding undercooked meats and seafood, proper hygiene, and contact with animals.  - The patients has been counseled on the importance of vaccinations including but not limited to a yearly flu vaccine.  -check hiv, tppa, hep A igg, and strongyloides    Immunizations:  Based on the patients immunization history and serologies, immunizations were ordered:     - Hepatitis A 0, 6 months - pending lab work, if not immune, will send a rx to patient to get a local pharmacy  - Hepatitis B 0, 1, months today  - Shingrix 0, 2 months written rx given to patient               The patient was encouraged to contact us about any problems that may develop after immunization and possible side effects were reviewed.     The patient is aware that people with suppressed immune systems are more likely to have complications from shingles (such as more severe rash that lasts longer and have increased risk of developing disseminated disease). Shingrex is an inactivated vaccine, so it is safe in people with suppressed immune systems. It is recommended in all adults over the age of 50 years old regardless of their immune system and may not be covered by insurance until that age.    These recommendations have been sent to and/or discussed with the following providers:   - Dr. Margaret Buchanan, MD Judith Alexander MD  Infectious Disease        30 minutes of total time spent on the encounter, which includes face to face time and non-face to face time preparing to see the patient (eg, review of tests), obtaining and/or reviewing separately obtained history, documenting clinical information in the electronic or other health record, independently interpreting results (not separately reported) and communicating results to the patient/family/caregiver, or care coordination (not separately reported).            [1]   Social History  Socioeconomic History    Marital status:    Occupational History     Employer: [employer]   Tobacco Use    Smoking status: Former     Current packs/day: 0.00     Average packs/day: 0.8 packs/day for 37.5 years (28.1 ttl pk-yrs)     Types: Cigarettes     Start date:      Quit date: 2012     Years since quittin.8     Passive exposure: Never    Smokeless tobacco: Never   Substance and Sexual Activity    Alcohol use: Yes     Comment: rarely    Drug use: Never    Sexual activity: Yes     Partners: Female     Birth control/protection: None   Social History Narrative    No aerobic exercise, walks a lot    No diet                                                                                                                                                                                          Social Drivers of Health     Financial Resource Strain: Low Risk  (3/11/2025)    Overall Financial Resource Strain (CARDIA)     Difficulty of Paying Living Expenses: Not hard at all   Food Insecurity: No Food Insecurity (3/11/2025)    Hunger Vital Sign     Worried About Running Out of Food in the Last Year: Never true     Ran Out of Food in the Last Year: Never true   Transportation Needs: No Transportation Needs (3/11/2025)    PRAPARE - Transportation     Lack of Transportation (Medical): No     Lack of Transportation (Non-Medical): No   Physical Activity: Sufficiently Active (3/11/2025)    Exercise Vital Sign     Days of Exercise per Week: 3 days     Minutes of Exercise per Session: 60 min   Stress: No Stress Concern Present (3/11/2025)    [facility] - Occupational Stress Questionnaire     Feeling of Stress : Not at all   Housing Stability: Low Risk  (3/11/2025)    Housing Stability Vital Sign     Unable to Pay for Housing in the Last Year: No     Number of Times Moved in the Last Year: 1     Homeless in the Last Year: No   [2]   Current Outpatient Medications on File Prior to Visit   Medication Sig Dispense Refill    albuterol (VENTOLIN HFA) 90 mcg/actuation inhaler Inhale 2 puffs into the lungs every 6 (six) hours as needed for Wheezing (cough). Rescue 18 g 2    atorvastatin (LIPITOR) 40 MG tablet Take 1 tablet (40 mg total) by mouth once daily. 90 tablet 3    budesonide-glycopyr-formoterol (BREZTRI AEROSPHERE) 160-9-4.8 mcg/actuation HFAA Inhale 2 puffs into the lungs 2 (two) times a day. 10.7 g 6    celecoxib (CELEBREX) 200 MG capsule Take 1 capsule (200 mg total) by mouth 2 (two) times daily. 60 capsule 2    furosemide (LASIX) 20 MG tablet Take 2 tablets (40 mg total) by mouth once daily. 30 tablet 0     gabapentin (NEURONTIN) 300 MG capsule Take 2 capsules (600 mg total) by mouth 3 (three) times daily. 180 capsule 11    hepatitis A virus vaccine (Adult 19YRS up)(PF) (HAVRIX) 1440 Unit/mL syringe Inject 1 mL (1,440 Units total) into the muscle As instructed (1 mL IM once then repeat in 6-12 months later). 1 mL 1    ibuprofen (ADVIL,MOTRIN) 800 MG tablet Take 1 tablet (800 mg total) by mouth 3 (three) times daily as needed for Pain. 60 tablet 5    leflunomide (ARAVA) 20 MG Tab Take 1 tablet (20 mg total) by mouth once daily. 30 tablet 6    potassium chloride SA (K-DUR,KLOR-CON M) 10 MEQ tablet Take 1 tablet (10 mEq total) by mouth daily as needed (take wtih furosemide). 30 tablet 0    predniSONE (DELTASONE) 20 MG tablet Take 1 tablet (20 mg total) by mouth once daily. (Patient not taking: Reported on 5/30/2025) 10 tablet 0    predniSONE (DELTASONE) 5 MG tablet Take 1 tablet (5 mg total) by mouth once daily. (Patient not taking: Reported on 5/30/2025) 90 tablet 2     No current facility-administered medications on file prior to visit.

## 2025-06-02 ENCOUNTER — TELEPHONE (OUTPATIENT)
Dept: FAMILY MEDICINE | Facility: CLINIC | Age: 70
End: 2025-06-02
Payer: MEDICARE

## 2025-06-02 LAB — W TREPONEMA PALLIDUM AB TP-PA: NONREACTIVE

## 2025-06-03 PROBLEM — I27.20 PULMONARY HYPERTENSION: Status: ACTIVE | Noted: 2025-06-03

## 2025-06-03 LAB — STRONGYLOIDES IGG SER QL IA: NEGATIVE

## 2025-06-04 ENCOUNTER — RESULTS FOLLOW-UP (OUTPATIENT)
Dept: INFECTIOUS DISEASES | Facility: CLINIC | Age: 70
End: 2025-06-04

## 2025-06-04 ENCOUNTER — PATIENT MESSAGE (OUTPATIENT)
Dept: RHEUMATOLOGY | Facility: CLINIC | Age: 70
End: 2025-06-04
Payer: MEDICARE

## 2025-06-05 ENCOUNTER — CLINICAL SUPPORT (OUTPATIENT)
Dept: REHABILITATION | Facility: HOSPITAL | Age: 70
End: 2025-06-05
Payer: MEDICARE

## 2025-06-05 ENCOUNTER — OFFICE VISIT (OUTPATIENT)
Dept: CARDIOLOGY | Facility: CLINIC | Age: 70
End: 2025-06-05
Payer: MEDICARE

## 2025-06-05 VITALS
OXYGEN SATURATION: 96 % | DIASTOLIC BLOOD PRESSURE: 89 MMHG | WEIGHT: 149 LBS | HEIGHT: 65 IN | HEART RATE: 91 BPM | SYSTOLIC BLOOD PRESSURE: 133 MMHG | BODY MASS INDEX: 24.83 KG/M2

## 2025-06-05 DIAGNOSIS — R06.02 SHORTNESS OF BREATH: ICD-10-CM

## 2025-06-05 DIAGNOSIS — R53.1 DECREASED STRENGTH, ENDURANCE, AND MOBILITY: ICD-10-CM

## 2025-06-05 DIAGNOSIS — R07.9 CHEST PAIN, UNSPECIFIED TYPE: Primary | ICD-10-CM

## 2025-06-05 DIAGNOSIS — R68.89 DECREASED STRENGTH, ENDURANCE, AND MOBILITY: ICD-10-CM

## 2025-06-05 DIAGNOSIS — I70.8 AORTO-ILIAC ATHEROSCLEROSIS: ICD-10-CM

## 2025-06-05 DIAGNOSIS — I50.810 RIGHT-SIDED HEART FAILURE, UNSPECIFIED HF CHRONICITY: ICD-10-CM

## 2025-06-05 DIAGNOSIS — I27.20 PULMONARY HYPERTENSION: Primary | ICD-10-CM

## 2025-06-05 DIAGNOSIS — Z74.09 DECREASED STRENGTH, ENDURANCE, AND MOBILITY: ICD-10-CM

## 2025-06-05 DIAGNOSIS — E78.5 HYPERLIPIDEMIA, UNSPECIFIED HYPERLIPIDEMIA TYPE: ICD-10-CM

## 2025-06-05 DIAGNOSIS — M25.611 DECREASED ROM OF RIGHT SHOULDER: Primary | ICD-10-CM

## 2025-06-05 DIAGNOSIS — I50.42 CHRONIC COMBINED SYSTOLIC AND DIASTOLIC HEART FAILURE: ICD-10-CM

## 2025-06-05 DIAGNOSIS — I70.0 AORTO-ILIAC ATHEROSCLEROSIS: ICD-10-CM

## 2025-06-05 DIAGNOSIS — I50.9 CONGESTIVE HEART FAILURE, UNSPECIFIED HF CHRONICITY, UNSPECIFIED HEART FAILURE TYPE: ICD-10-CM

## 2025-06-05 DIAGNOSIS — I42.8 OTHER CARDIOMYOPATHY: ICD-10-CM

## 2025-06-05 PROCEDURE — 97530 THERAPEUTIC ACTIVITIES: CPT | Mod: PN

## 2025-06-05 PROCEDURE — 99999 PR PBB SHADOW E&M-EST. PATIENT-LVL III: CPT | Mod: PBBFAC,,, | Performed by: INTERNAL MEDICINE

## 2025-06-05 RX ORDER — SODIUM CHLORIDE 9 MG/ML
INJECTION, SOLUTION INTRAVENOUS CONTINUOUS
OUTPATIENT
Start: 2025-06-05 | End: 2025-06-05

## 2025-06-05 RX ORDER — ASPIRIN 81 MG/1
81 TABLET ORAL DAILY
COMMUNITY
Start: 2025-06-05 | End: 2026-06-05

## 2025-06-05 RX ORDER — DIPHENHYDRAMINE HCL 50 MG
50 CAPSULE ORAL ONCE
OUTPATIENT
Start: 2025-06-05 | End: 2025-06-05

## 2025-06-05 RX ORDER — SPIRONOLACTONE 25 MG/1
25 TABLET ORAL DAILY
Qty: 30 TABLET | Refills: 11 | Status: SHIPPED | OUTPATIENT
Start: 2025-06-05 | End: 2026-06-05

## 2025-06-05 RX ORDER — LOSARTAN POTASSIUM 25 MG/1
25 TABLET ORAL DAILY
Qty: 30 TABLET | Refills: 11 | Status: SHIPPED | OUTPATIENT
Start: 2025-06-05 | End: 2026-06-05

## 2025-06-07 NOTE — TELEPHONE ENCOUNTER
Unable to retrieve patient chart and identify care due.  [facility] Embedded Care Due Messages. Reference number: [id number].   6/07/2025 5:08:25 AM CDT

## 2025-06-08 NOTE — TELEPHONE ENCOUNTER
Refill Routing Note   Medication(s) are not appropriate for processing by [facility] for the following reason(s):        New or recently adjusted medication    [facility] action(s):  Defer               Appointments  past 12m or future 3m with PCP    Date Provider   Last Visit   5/27/2025 Calin Buchanan MD   Next Visit   Visit date not found Calin Buchanan MD   ED visits in past 90 days: 0        Note composed:7:23 AM 06/08/2025

## 2025-06-09 ENCOUNTER — LAB VISIT (OUTPATIENT)
Dept: LAB | Facility: HOSPITAL | Age: 70
End: 2025-06-09
Attending: INTERNAL MEDICINE
Payer: MEDICARE

## 2025-06-09 DIAGNOSIS — M06.9 RHEUMATOID ARTHRITIS FLARE: ICD-10-CM

## 2025-06-09 PROCEDURE — 85025 COMPLETE CBC W/AUTO DIFF WBC: CPT

## 2025-06-09 PROCEDURE — 86140 C-REACTIVE PROTEIN: CPT

## 2025-06-09 PROCEDURE — 85652 RBC SED RATE AUTOMATED: CPT

## 2025-06-09 PROCEDURE — 36415 COLL VENOUS BLD VENIPUNCTURE: CPT | Mod: PO

## 2025-06-09 PROCEDURE — 80053 COMPREHEN METABOLIC PANEL: CPT

## 2025-06-09 RX ORDER — FUROSEMIDE 20 MG/1
40 TABLET ORAL DAILY
Qty: 30 TABLET | Refills: 11 | Status: SHIPPED | OUTPATIENT
Start: 2025-06-09

## 2025-06-10 ENCOUNTER — PATIENT MESSAGE (OUTPATIENT)
Dept: CARDIOLOGY | Facility: CLINIC | Age: 70
End: 2025-06-10
Payer: MEDICARE

## 2025-06-10 ENCOUNTER — RESULTS FOLLOW-UP (OUTPATIENT)
Dept: RHEUMATOLOGY | Facility: CLINIC | Age: 70
End: 2025-06-10

## 2025-06-10 DIAGNOSIS — Z79.899 HIGH RISK MEDICATION USE: Primary | ICD-10-CM

## 2025-06-10 LAB
ABSOLUTE EOSINOPHIL (OHS): 0.21 K/UL
ABSOLUTE MONOCYTE (OHS): 0.8 K/UL (ref 0.3–1)
ABSOLUTE NEUTROPHIL COUNT (OHS): 4.89 K/UL (ref 1.8–7.7)
ALBUMIN SERPL BCP-MCNC: 3.1 G/DL (ref 3.5–5.2)
ALP SERPL-CCNC: 125 UNIT/L (ref 40–150)
ALT SERPL W/O P-5'-P-CCNC: 23 UNIT/L (ref 10–44)
ANION GAP (OHS): 12 MMOL/L (ref 8–16)
AST SERPL-CCNC: 45 UNIT/L (ref 11–45)
BASOPHILS # BLD AUTO: 0.11 K/UL
BASOPHILS NFR BLD AUTO: 1.3 %
BILIRUB SERPL-MCNC: 1.1 MG/DL (ref 0.1–1)
BUN SERPL-MCNC: 35 MG/DL (ref 8–23)
CALCIUM SERPL-MCNC: 8.5 MG/DL (ref 8.7–10.5)
CHLORIDE SERPL-SCNC: 109 MMOL/L (ref 95–110)
CO2 SERPL-SCNC: 22 MMOL/L (ref 23–29)
CREAT SERPL-MCNC: 1.3 MG/DL (ref 0.5–1.4)
CRP SERPL-MCNC: 9.4 MG/L
ERYTHROCYTE [DISTWIDTH] IN BLOOD BY AUTOMATED COUNT: 18.6 % (ref 11.5–14.5)
ERYTHROCYTE [SEDIMENTATION RATE] IN BLOOD BY PHOTOMETRIC METHOD: 8 MM/HR
GFR SERPLBLD CREATININE-BSD FMLA CKD-EPI: 59 ML/MIN/1.73/M2
GLUCOSE SERPL-MCNC: 84 MG/DL (ref 70–110)
HCT VFR BLD AUTO: 49.3 % (ref 40–54)
HGB BLD-MCNC: 15.2 GM/DL (ref 14–18)
IMM GRANULOCYTES # BLD AUTO: 0.02 K/UL (ref 0–0.04)
IMM GRANULOCYTES NFR BLD AUTO: 0.2 % (ref 0–0.5)
LYMPHOCYTES # BLD AUTO: 2.3 K/UL (ref 1–4.8)
MCH RBC QN AUTO: 30.4 PG (ref 27–31)
MCHC RBC AUTO-ENTMCNC: 30.8 G/DL (ref 32–36)
MCV RBC AUTO: 99 FL (ref 82–98)
NUCLEATED RBC (/100WBC) (OHS): 0 /100 WBC
PLATELET # BLD AUTO: 168 K/UL (ref 150–450)
PMV BLD AUTO: 12.5 FL (ref 9.2–12.9)
POTASSIUM SERPL-SCNC: 4.5 MMOL/L (ref 3.5–5.1)
PROT SERPL-MCNC: 6.8 GM/DL (ref 6–8.4)
RBC # BLD AUTO: 5 M/UL (ref 4.6–6.2)
RELATIVE EOSINOPHIL (OHS): 2.5 %
RELATIVE LYMPHOCYTE (OHS): 27.6 % (ref 18–48)
RELATIVE MONOCYTE (OHS): 9.6 % (ref 4–15)
RELATIVE NEUTROPHIL (OHS): 58.8 % (ref 38–73)
SODIUM SERPL-SCNC: 143 MMOL/L (ref 136–145)
WBC # BLD AUTO: 8.33 K/UL (ref 3.9–12.7)

## 2025-06-17 ENCOUNTER — PATIENT MESSAGE (OUTPATIENT)
Dept: INFECTIOUS DISEASES | Facility: CLINIC | Age: 70
End: 2025-06-17
Payer: MEDICARE

## 2025-06-18 ENCOUNTER — PATIENT MESSAGE (OUTPATIENT)
Dept: FAMILY MEDICINE | Facility: CLINIC | Age: 70
End: 2025-06-18
Payer: MEDICARE

## 2025-06-24 ENCOUNTER — HOSPITAL ENCOUNTER (INPATIENT)
Facility: HOSPITAL | Age: 70
LOS: 4 days | Discharge: HOME | DRG: 286 | End: 2025-06-28
Attending: EMERGENCY MEDICINE | Admitting: STUDENT IN AN ORGANIZED HEALTH CARE EDUCATION/TRAINING PROGRAM
Payer: MEDICARE

## 2025-06-24 ENCOUNTER — PATIENT MESSAGE (OUTPATIENT)
Dept: FAMILY MEDICINE | Facility: CLINIC | Age: 70
End: 2025-06-24
Payer: MEDICARE

## 2025-06-24 DIAGNOSIS — I50.9 ACUTE ON CHRONIC CONGESTIVE HEART FAILURE, UNSPECIFIED HEART FAILURE TYPE: ICD-10-CM

## 2025-06-24 DIAGNOSIS — R06.02 SOB (SHORTNESS OF BREATH): ICD-10-CM

## 2025-06-24 DIAGNOSIS — R60.0 BILATERAL LOWER EXTREMITY EDEMA: Primary | ICD-10-CM

## 2025-06-24 DIAGNOSIS — I50.9 HEART FAILURE: ICD-10-CM

## 2025-06-24 DIAGNOSIS — R06.02 SHORTNESS OF BREATH: ICD-10-CM

## 2025-06-24 DIAGNOSIS — R07.9 CHEST PAIN: ICD-10-CM

## 2025-06-24 PROBLEM — I50.813 ACUTE ON CHRONIC RIGHT-SIDED HEART FAILURE: Status: ACTIVE | Noted: 2025-06-24

## 2025-06-24 LAB
ABSOLUTE EOSINOPHIL (OHS): 0.08 K/UL
ABSOLUTE MONOCYTE (OHS): 0.82 K/UL (ref 0.3–1)
ABSOLUTE NEUTROPHIL COUNT (OHS): 6.59 K/UL (ref 1.8–7.7)
ALBUMIN SERPL BCP-MCNC: 3 G/DL (ref 3.5–5.2)
ALP SERPL-CCNC: 143 UNIT/L (ref 40–150)
ALT SERPL W/O P-5'-P-CCNC: 21 UNIT/L (ref 10–44)
ANION GAP (OHS): 8 MMOL/L (ref 8–16)
AST SERPL-CCNC: 35 UNIT/L (ref 11–45)
BASOPHILS # BLD AUTO: 0.1 K/UL
BASOPHILS NFR BLD AUTO: 1.1 %
BILIRUB SERPL-MCNC: 1.7 MG/DL (ref 0.1–1)
BNP SERPL-MCNC: 2003 PG/ML (ref 0–99)
BUN SERPL-MCNC: 32 MG/DL (ref 8–23)
CALCIUM SERPL-MCNC: 9 MG/DL (ref 8.7–10.5)
CHLORIDE SERPL-SCNC: 109 MMOL/L (ref 95–110)
CO2 SERPL-SCNC: 23 MMOL/L (ref 23–29)
CREAT SERPL-MCNC: 1.1 MG/DL (ref 0.5–1.4)
EAG (OHS): 137 MG/DL (ref 68–131)
ERYTHROCYTE [DISTWIDTH] IN BLOOD BY AUTOMATED COUNT: 18.2 % (ref 11.5–14.5)
GFR SERPLBLD CREATININE-BSD FMLA CKD-EPI: >60 ML/MIN/1.73/M2
GLUCOSE SERPL-MCNC: 84 MG/DL (ref 70–110)
HBA1C MFR BLD: 6.4 % (ref 4–5.6)
HCT VFR BLD AUTO: 46 % (ref 40–54)
HGB BLD-MCNC: 15.2 GM/DL (ref 14–18)
IMM GRANULOCYTES # BLD AUTO: 0.03 K/UL (ref 0–0.04)
IMM GRANULOCYTES NFR BLD AUTO: 0.3 % (ref 0–0.5)
LYMPHOCYTES # BLD AUTO: 1.76 K/UL (ref 1–4.8)
MAGNESIUM SERPL-MCNC: 1.6 MG/DL (ref 1.6–2.6)
MCH RBC QN AUTO: 31.1 PG (ref 27–31)
MCHC RBC AUTO-ENTMCNC: 33 G/DL (ref 32–36)
MCV RBC AUTO: 94 FL (ref 82–98)
NUCLEATED RBC (/100WBC) (OHS): 0 /100 WBC
OHS QRS DURATION: 80 MS
OHS QTC CALCULATION: 436 MS
PLATELET # BLD AUTO: 159 K/UL (ref 150–450)
PMV BLD AUTO: 11.8 FL (ref 9.2–12.9)
POCT GLUCOSE: 115 MG/DL (ref 70–110)
POCT GLUCOSE: 98 MG/DL (ref 70–110)
POTASSIUM SERPL-SCNC: 4.7 MMOL/L (ref 3.5–5.1)
PROT SERPL-MCNC: 7.3 GM/DL (ref 6–8.4)
RBC # BLD AUTO: 4.89 M/UL (ref 4.6–6.2)
RELATIVE EOSINOPHIL (OHS): 0.9 %
RELATIVE LYMPHOCYTE (OHS): 18.8 % (ref 18–48)
RELATIVE MONOCYTE (OHS): 8.7 % (ref 4–15)
RELATIVE NEUTROPHIL (OHS): 70.2 % (ref 38–73)
SODIUM SERPL-SCNC: 140 MMOL/L (ref 136–145)
TROPONIN I SERPL DL<=0.01 NG/ML-MCNC: 0.02 NG/ML
TROPONIN I SERPL DL<=0.01 NG/ML-MCNC: 0.03 NG/ML
TROPONIN I SERPL DL<=0.01 NG/ML-MCNC: 0.03 NG/ML
TROPONIN I SERPL DL<=0.01 NG/ML-MCNC: 0.04 NG/ML
WBC # BLD AUTO: 9.38 K/UL (ref 3.9–12.7)

## 2025-06-24 PROCEDURE — A4216 STERILE WATER/SALINE, 10 ML: HCPCS | Performed by: NURSE PRACTITIONER

## 2025-06-24 PROCEDURE — 11000001 HC ACUTE MED/SURG PRIVATE ROOM

## 2025-06-24 PROCEDURE — 63600175 PHARM REV CODE 636 W HCPCS

## 2025-06-24 PROCEDURE — 96372 THER/PROPH/DIAG INJ SC/IM: CPT | Performed by: NURSE PRACTITIONER

## 2025-06-24 PROCEDURE — 84484 ASSAY OF TROPONIN QUANT: CPT | Mod: 91 | Performed by: NURSE PRACTITIONER

## 2025-06-24 PROCEDURE — 63600175 PHARM REV CODE 636 W HCPCS: Performed by: NURSE PRACTITIONER

## 2025-06-24 PROCEDURE — 96374 THER/PROPH/DIAG INJ IV PUSH: CPT

## 2025-06-24 PROCEDURE — 36415 COLL VENOUS BLD VENIPUNCTURE: CPT | Performed by: NURSE PRACTITIONER

## 2025-06-24 PROCEDURE — 93010 ELECTROCARDIOGRAM REPORT: CPT | Mod: ,,, | Performed by: INTERNAL MEDICINE

## 2025-06-24 PROCEDURE — 93005 ELECTROCARDIOGRAM TRACING: CPT

## 2025-06-24 PROCEDURE — 83036 HEMOGLOBIN GLYCOSYLATED A1C: CPT | Performed by: NURSE PRACTITIONER

## 2025-06-24 PROCEDURE — 25000003 PHARM REV CODE 250: Performed by: NURSE PRACTITIONER

## 2025-06-24 PROCEDURE — 83735 ASSAY OF MAGNESIUM: CPT | Performed by: EMERGENCY MEDICINE

## 2025-06-24 PROCEDURE — 84484 ASSAY OF TROPONIN QUANT: CPT

## 2025-06-24 PROCEDURE — 99285 EMERGENCY DEPT VISIT HI MDM: CPT | Mod: 25

## 2025-06-24 PROCEDURE — 83880 ASSAY OF NATRIURETIC PEPTIDE: CPT

## 2025-06-24 PROCEDURE — 85025 COMPLETE CBC W/AUTO DIFF WBC: CPT

## 2025-06-24 PROCEDURE — 80053 COMPREHEN METABOLIC PANEL: CPT

## 2025-06-24 RX ORDER — GABAPENTIN 300 MG/1
600 CAPSULE ORAL 3 TIMES DAILY
Status: DISCONTINUED | OUTPATIENT
Start: 2025-06-24 | End: 2025-06-26 | Stop reason: DRUGHIGH

## 2025-06-24 RX ORDER — GLUCAGON 1 MG
1 KIT INJECTION
Status: DISCONTINUED | OUTPATIENT
Start: 2025-06-24 | End: 2025-06-28 | Stop reason: HOSPADM

## 2025-06-24 RX ORDER — SIMETHICONE 80 MG
1 TABLET,CHEWABLE ORAL 4 TIMES DAILY PRN
Status: DISCONTINUED | OUTPATIENT
Start: 2025-06-24 | End: 2025-06-28 | Stop reason: HOSPADM

## 2025-06-24 RX ORDER — LOSARTAN POTASSIUM 25 MG/1
25 TABLET ORAL DAILY
Status: DISCONTINUED | OUTPATIENT
Start: 2025-06-24 | End: 2025-06-24

## 2025-06-24 RX ORDER — ONDANSETRON 8 MG/1
8 TABLET, ORALLY DISINTEGRATING ORAL EVERY 8 HOURS PRN
Status: DISCONTINUED | OUTPATIENT
Start: 2025-06-24 | End: 2025-06-28 | Stop reason: HOSPADM

## 2025-06-24 RX ORDER — ACETAMINOPHEN 325 MG/1
650 TABLET ORAL EVERY 4 HOURS PRN
Status: DISCONTINUED | OUTPATIENT
Start: 2025-06-24 | End: 2025-06-28 | Stop reason: HOSPADM

## 2025-06-24 RX ORDER — IBUPROFEN 200 MG
16 TABLET ORAL
Status: DISCONTINUED | OUTPATIENT
Start: 2025-06-24 | End: 2025-06-28 | Stop reason: HOSPADM

## 2025-06-24 RX ORDER — ENOXAPARIN SODIUM 100 MG/ML
40 INJECTION SUBCUTANEOUS EVERY 24 HOURS
Status: DISCONTINUED | OUTPATIENT
Start: 2025-06-24 | End: 2025-06-28 | Stop reason: HOSPADM

## 2025-06-24 RX ORDER — FUROSEMIDE 10 MG/ML
40 INJECTION INTRAMUSCULAR; INTRAVENOUS EVERY 12 HOURS
Status: DISCONTINUED | OUTPATIENT
Start: 2025-06-24 | End: 2025-06-25

## 2025-06-24 RX ORDER — ALUMINUM HYDROXIDE, MAGNESIUM HYDROXIDE, AND SIMETHICONE 1200; 120; 1200 MG/30ML; MG/30ML; MG/30ML
30 SUSPENSION ORAL 4 TIMES DAILY PRN
Status: DISCONTINUED | OUTPATIENT
Start: 2025-06-24 | End: 2025-06-28 | Stop reason: HOSPADM

## 2025-06-24 RX ORDER — FUROSEMIDE 10 MG/ML
40 INJECTION INTRAMUSCULAR; INTRAVENOUS
Status: COMPLETED | OUTPATIENT
Start: 2025-06-24 | End: 2025-06-24

## 2025-06-24 RX ORDER — INSULIN ASPART 100 [IU]/ML
0-5 INJECTION, SOLUTION INTRAVENOUS; SUBCUTANEOUS
Status: DISCONTINUED | OUTPATIENT
Start: 2025-06-24 | End: 2025-06-28 | Stop reason: HOSPADM

## 2025-06-24 RX ORDER — TALC
6 POWDER (GRAM) TOPICAL NIGHTLY PRN
Status: DISCONTINUED | OUTPATIENT
Start: 2025-06-24 | End: 2025-06-28 | Stop reason: HOSPADM

## 2025-06-24 RX ORDER — IPRATROPIUM BROMIDE AND ALBUTEROL SULFATE 2.5; .5 MG/3ML; MG/3ML
3 SOLUTION RESPIRATORY (INHALATION) EVERY 4 HOURS PRN
Status: DISCONTINUED | OUTPATIENT
Start: 2025-06-24 | End: 2025-06-25

## 2025-06-24 RX ORDER — SPIRONOLACTONE 25 MG/1
25 TABLET ORAL DAILY
Status: DISCONTINUED | OUTPATIENT
Start: 2025-06-24 | End: 2025-06-28 | Stop reason: HOSPADM

## 2025-06-24 RX ORDER — ATORVASTATIN CALCIUM 40 MG/1
40 TABLET, FILM COATED ORAL DAILY
Status: DISCONTINUED | OUTPATIENT
Start: 2025-06-24 | End: 2025-06-28 | Stop reason: HOSPADM

## 2025-06-24 RX ORDER — IBUPROFEN 200 MG
24 TABLET ORAL
Status: DISCONTINUED | OUTPATIENT
Start: 2025-06-24 | End: 2025-06-28 | Stop reason: HOSPADM

## 2025-06-24 RX ORDER — ONDANSETRON HYDROCHLORIDE 2 MG/ML
4 INJECTION, SOLUTION INTRAVENOUS EVERY 8 HOURS PRN
Status: DISCONTINUED | OUTPATIENT
Start: 2025-06-24 | End: 2025-06-28 | Stop reason: HOSPADM

## 2025-06-24 RX ORDER — AMOXICILLIN 250 MG
1 CAPSULE ORAL 2 TIMES DAILY
Status: DISCONTINUED | OUTPATIENT
Start: 2025-06-24 | End: 2025-06-28 | Stop reason: HOSPADM

## 2025-06-24 RX ORDER — FUROSEMIDE 10 MG/ML
40 INJECTION INTRAMUSCULAR; INTRAVENOUS DAILY
Status: DISCONTINUED | OUTPATIENT
Start: 2025-06-25 | End: 2025-06-24

## 2025-06-24 RX ORDER — SODIUM CHLORIDE 0.9 % (FLUSH) 0.9 %
10 SYRINGE (ML) INJECTION EVERY 8 HOURS
Status: DISCONTINUED | OUTPATIENT
Start: 2025-06-24 | End: 2025-06-28 | Stop reason: HOSPADM

## 2025-06-24 RX ADMIN — ATORVASTATIN CALCIUM 40 MG: 40 TABLET, FILM COATED ORAL at 03:06

## 2025-06-24 RX ADMIN — ENOXAPARIN SODIUM 40 MG: 40 INJECTION SUBCUTANEOUS at 04:06

## 2025-06-24 RX ADMIN — Medication 10 ML: at 09:06

## 2025-06-24 RX ADMIN — GABAPENTIN 600 MG: 300 CAPSULE ORAL at 03:06

## 2025-06-24 RX ADMIN — FUROSEMIDE 40 MG: 10 INJECTION, SOLUTION INTRAVENOUS at 09:06

## 2025-06-24 RX ADMIN — Medication 10 ML: at 04:06

## 2025-06-24 RX ADMIN — FUROSEMIDE 40 MG: 10 INJECTION, SOLUTION INTRAVENOUS at 11:06

## 2025-06-24 NOTE — CONSULTS
[place] - Emergency Dept  Cardiology  Consult Note    Patient Name: Fabiano Garvey  MRN: [id number]  Admission Date: 6/24/2025  Hospital Length of Stay: 0 days  Code Status: Prior   Attending Provider: Gerry Sue MD   Consulting Provider: VIELKA Acharya ANP  Primary Care Physician: Calin Buchanan MD  Principal Problem:<principal problem not specified>    Patient information was obtained from patient, past medical records, and ER records.     Inpatient consult to Cardiology  Consult performed by: Ashley Dela Cruz APRN, TOY  Consult ordered by: Aimee Gregory MD  Reason for consult: ADHF        Subjective:     Chief Complaint:  SOB and BLE edema      HPI:   68yo male with HLP, interstitial pulmonary fibrosis, COPD on home O2 3LPM, DDD, RA, aorto iliac atherosclerosis, emphysema, PHTN who presented to the ER with complaints of SOB and BLE edema. He reports SOB for the past month with progressive worsening for the past week. He also noted LE edema as well as abdominal bloating. He reports recently being diagnosed with CHF and was seen by Dr. Chavira earlier this month with changing of his medications (Lasix d/c'd and Aldactone started). He reports noting decreased urine output at that time and feels his SOB may have worsened. He is scheduled for outpatient LHC and RHC on Friday but since his symptoms were worsening he presented to the ER. Labs CBC with H&H 15.2&46.0 BMP with K+ 4.7 BUN 32 creatinine 1.1 total bili 1.7 BNP 2003 Troponin 0.031 EKG NSR with RAD no STTWC. Given IV Lasix 40mg and admitted to [facility]. Cardiology consulted for ADHF management     Past Medical History:   Diagnosis Date    Basal cell carcinoma 01/2024    left upper chest    COPD (chronic obstructive pulmonary disease)     Degenerative disc disease, lumbar 06/16/2021    Hyperlipidemia     Interstitial pulmonary fibrosis     NU (obstructive sleep apnea)     Pulmonary fibrosis     Rheumatoid  arthritis     SCC (squamous cell carcinoma) 07/2020    SCC right medial canthus    SCC (squamous cell carcinoma) 11/2021    mid lower neck     SCC (squamous cell carcinoma) 01/2022    right lateral cheek- in situ    SCC (squamous cell carcinoma) 09/2023    in situ L lower forearm and HAK R shoulder    SCC (squamous cell carcinoma) 01/09/2025    R lower forearm    SCC (squamous cell carcinoma) 11/2024    left upper shin    Small bowel obstruction due to adhesions 05/10/2023    Squamous cell carcinoma 07/2019    SCC in situ left temple    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    R lateral cheek       Past Surgical History:   Procedure Laterality Date    COLONOSCOPY N/A 7/25/2017    Procedure: COLONOSCOPY;  Surgeon: Bill Nicole MD;  [facility]  Service: Endoscopy;  Laterality: N/A;    DIAGNOSTIC LAPAROSCOPY N/A 5/10/2023    Procedure: LAPAROSCOPY, DIAGNOSTIC possible laparotomy other as indicated;  Surgeon: Eitan Palomares MD;  Location: [facility] OR;  Service: General;  Laterality: N/A;    ENDOSCOPIC ULTRASOUND OF UPPER GASTROINTESTINAL TRACT N/A 12/13/2024    Procedure: ULTRASOUND, UPPER GI TRACT, ENDOSCOPIC;  Surgeon: Harrison Calderon MD;  Location: [facility] ENDO;  Service: Endoscopy;  Laterality: N/A;  10/25 portal-EUS at [place] for dilated PD and weight loss. calderon -tt  10/31 r/s updated portal instr-tt  12/6 PRECALL ATTEMPTED-NA/RT  12/9 SWP PRECALL COMPLETE-RT    EPIDURAL STEROID INJECTION INTO LUMBAR SPINE N/A 7/29/2021    Procedure: Injection-steroid-epidural-lumbar L5-S1;  Surgeon: Shiv Vernon Jr., MD;  Location: [facility] PAIN MGT;  Service: Pain Management;  Laterality: N/A;  oral sedation   no pacemaker     FUSION OF SPINE WITH INSTRUMENTATION Left 12/14/2022    Procedure: FUSION, SPINE, WITH INSTRUMENTATION Stg1: Left L3-5 OLiF conduit lateral cage BMP;  Surgeon: August Aguilar MD;  Location: [facility] OR;  Service: Neurosurgery;  Laterality: Left;   Procedure: Stg1: Left L3-5 OLiF conduit lateral cage BMP  Surgery Time: 2hrs  LOS: 3  Anesthesia: General  Blood: Type & Screen  Radiology: C-arm  Brace: LSO  Bed: Regular Bed  Position: Lateral Right Down  Equip    FUSION, SPINE, POSTERIOR APPROACH N/A 12/14/2022    Procedure: FUSION,SPINE,POSTERIOR APPROACH Stg 2: L3-5 posterior instrumentation viper prime, L3-5 posterolateral arthrodesis;  Surgeon: August Aguilar MD;  Location: [facility] OR;  Service: Neurosurgery;  Laterality: N/A;  Procedure: Stg 2: L3-5 posterior instrumentation viper prime, L3-5 posterolateral arthrodesis  Surgery Time: 3hrs  LOS: 3  Anesthesia: General  Blood: Type & Screen  Radiology: Bra    LYSIS OF ADHESIONS N/A 5/10/2023    Procedure: LYSIS, ADHESIONS;  Surgeon: Eitan Palomares MD;  Location: [facility] OR;  Service: General;  Laterality: N/A;    NO PAST SURGERIES      TRANSFORAMINAL EPIDURAL INJECTION OF STEROID Right 6/29/2021    Procedure: Injection,steroid,epidural,transforaminal approach-RIGHT-- L4-5, L5-S1-- (IV Sedation);  Surgeon: Shiv Vernon Jr., MD;  Location: [facility] PAIN MGT;  Service: Pain Management;  Laterality: Right;    TRANSFORAMINAL EPIDURAL INJECTION OF STEROID Left 10/27/2022    Procedure: Injection,steroid,epidural,transforaminal left L4-5 and L5-S1;  Surgeon: Shiv Vernon Jr., MD;  Location: [facility] PAIN MGT;  Service: Pain Management;  Laterality: Left;       Review of patient's allergies indicates:   Allergen Reactions    Plaquenil [hydroxychloroquine]      Lightheaded and muscle aches       Current Facility-Administered Medications on File Prior to Encounter   Medication    hepatitis B vacc-CpG 1018 (PF) 20 mcg/0.5 mL Syrg 20 mcg     Current Outpatient Medications on File Prior to Encounter   Medication Sig    albuterol (VENTOLIN HFA) 90 mcg/actuation inhaler Inhale 2 puffs into the lungs every 6 (six) hours as needed for Wheezing (cough). Rescue    atorvastatin (LIPITOR) 40 MG tablet Take 1 tablet (40 mg total) by  mouth once daily.    budesonide-glycopyr-formoterol (BREZTRI AEROSPHERE) 160-9-4.8 mcg/actuation HFAA Inhale 2 puffs into the lungs 2 (two) times a day.    gabapentin (NEURONTIN) 300 MG capsule Take 2 capsules (600 mg total) by mouth 3 (three) times daily.    ibuprofen (ADVIL,MOTRIN) 800 MG tablet Take 1 tablet (800 mg total) by mouth 3 (three) times daily as needed for Pain.    leflunomide (ARAVA) 20 MG Tab Take 1 tablet (20 mg total) by mouth once daily.    potassium chloride SA (K-DUR,KLOR-CON M) 10 MEQ tablet Take 1 tablet (10 mEq total) by mouth daily as needed (take wtih furosemide).    spironolactone (ALDACTONE) 25 MG tablet Take 1 tablet (25 mg total) by mouth once daily.    celecoxib (CELEBREX) 200 MG capsule Take 1 capsule (200 mg total) by mouth 2 (two) times daily. (Patient not taking: Reported on 6/5/2025)    hepatitis A virus vaccine (Adult 19YRS up)(PF) (HAVRIX) 1440 Unit/mL syringe Inject 1 mL (1,440 Units total) into the muscle As instructed (1 mL IM once then repeat in 6-12 months later).    furosemide (LASIX) 20 MG tablet Take 2 tablets (40 mg total) by mouth once daily. (Patient not taking: Reported on 6/24/2025)    losartan (COZAAR) 25 MG tablet Take 1 tablet (25 mg total) by mouth once daily.    predniSONE (DELTASONE) 5 MG tablet Take 1 tablet (5 mg total) by mouth once daily. (Patient not taking: Reported on 5/30/2025)    [DISCONTINUED] aspirin (ECOTRIN) 81 MG EC tablet Take 1 tablet (81 mg total) by mouth once daily.    [DISCONTINUED] predniSONE (DELTASONE) 20 MG tablet Take 1 tablet (20 mg total) by mouth once daily. (Patient not taking: Reported on 6/5/2025)     Family History       Problem Relation (Age of Onset)    Arthritis Sister, Brother    Asthma Daughter    Cancer Paternal Uncle    Coronary artery disease Father    Gallbladder disease Daughter    Heart attack Father    Heart failure Mother    Seizures Daughter          Tobacco Use    Smoking status: Former     Current packs/day: 0.00      Average packs/day: 0.8 packs/day for 37.5 years (28.1 ttl pk-yrs)     Types: Cigarettes     Start date:      Quit date: 2012     Years since quittin.9     Passive exposure: Never    Smokeless tobacco: Never   Substance and Sexual Activity    Alcohol use: Yes     Comment: rarely    Drug use: Never    Sexual activity: Yes     Partners: Female     Birth control/protection: None     Review of Systems   Constitutional: Negative for chills, decreased appetite, diaphoresis and fever.   Cardiovascular:  Positive for dyspnea on exertion, leg swelling and orthopnea. Negative for chest pain, claudication, cyanosis, irregular heartbeat, near-syncope, palpitations, paroxysmal nocturnal dyspnea and syncope.   Respiratory:  Negative for cough, hemoptysis, shortness of breath and wheezing.    Gastrointestinal:  Negative for bloating, abdominal pain, constipation, diarrhea, melena, nausea and vomiting.   Neurological:  Negative for dizziness and weakness.     Objective:     Vital Signs (Most Recent):  Temp: 98.4 °F (36.9 °C) (25 1042)  Pulse: 95 (25 1202)  Resp: (!) 25 (25 1202)  BP: (!) 141/95 (25 1202)  SpO2: (!) 93 % (25 1202) Vital Signs (24h Range):  Temp:  [98.4 °F (36.9 °C)] 98.4 °F (36.9 °C)  Pulse:  [90-96] 95  Resp:  [15-25] 25  SpO2:  [93 %-96 %] 93 %  BP: (141-148)/() 141/95     Weight: 65.8 kg (145 lb)  Body mass index is 24.13 kg/m².    SpO2: (!) 93 %         Intake/Output Summary (Last 24 hours) at 2025 1355  Last data filed at 2025 1306  Gross per 24 hour   Intake --   Output 850 ml   Net -850 ml       Lines/Drains/Airways       None                    Physical Exam  Constitutional:       General: He is not in acute distress.     Appearance: He is well-developed.   Cardiovascular:      Rate and Rhythm: Normal rate and regular rhythm.      Heart sounds: No murmur heard.     No gallop.   Pulmonary:      Effort: Pulmonary effort is normal. No respiratory  distress.      Breath sounds: Examination of the right-middle field reveals rales. Examination of the left-middle field reveals rales. Examination of the right-lower field reveals rales. Examination of the left-lower field reveals rales. Rales present. No wheezing.   Abdominal:      General: Bowel sounds are normal. There is no distension.      Palpations: Abdomen is soft.      Tenderness: There is no abdominal tenderness.   Musculoskeletal:      Right lower leg: Edema (2+) present.      Left lower leg: Edema (2+) present.   Skin:     General: Skin is warm and dry.   Neurological:      Mental Status: He is alert and oriented to person, place, and time.              Significant Imaging: Echocardiogram: Transthoracic echo (TTE) complete (Cupid Only):   Results for orders placed or performed during the hospital encounter of 05/28/25   Echo   Result Value Ref Range    LV Diastolic Volume 80 mL    Echo EF Estimated 42 %    LV Systolic Volume 47 mL    IVS 0.8 0.6 - 1.1 cm    LVIDd 4.2 3.5 - 6.0 cm    LVIDs 3.4 2.1 - 4.0 cm    LVOT diameter 2.2 cm    PW 0.9 0.6 - 1.1 cm    IVRT 186 ms    AV LVOT peak gradient 1 mmHg    LVOT mn grad 1 mmHg    LVOT peak jose 0.5 m/s    LVOT peak VTI 7.5 cm    RV- leung basal diam 5.4 cm    RVOT prox diameter 2.70 cm    RV S' 7.49 cm/s    LA size 2.9 cm    Left Atrium Major Axis 5.1 cm    Left Atrium Minor Axis 5.1 cm    LA Vol (MOD) 27 mL    RA Major Axis 5.81 cm    RA Area 24.5 cm2    AV valve area 2.4 cm2    AV area by cont VTI 2.4 cm2    AV peak gradient 3 mmHg    AV mean gradient 1 mmHg    Ao peak jose 0.8 m/s    Ao VTI 12.1 cm    MV Peak A Jose 0.25 m/s    E wave deceleration time 93 ms    MV Peak E Jose 0.60 m/s    E/A ratio 2.40     LV LATERAL E/E' RATIO 8.6     LV SEPTAL E/E' RATIO 12.0     TDI LATERAL 0.07 m/s    TDI SEPTAL 0.05 m/s    MV peak gradient 3 mmHg    MV mean gradient 1 mmHg    TV peak gradient 68 mmHg    TR Max Jose 4.1 m/s    Ascending aorta 3.3 cm    STJ 2.9 cm    P vein A  "0.3 m/s    MV "A" wave duration 83.73 ms    Pulm vein "A" wave 83.73 ms    PV Peak D Jose 0.24 m/s    PV Peak S Jose 0.59 m/s    IVC diameter 2.44 cm    Sinus 3.57 cm    RA Width 5.13 cm    TAPSE 1.0 cm    LA WIDTH 2.9 cm    BSA 1.8 m2    LVOT stroke volume 28.5 cm3    LV Systolic Volume Index 26.4 mL/m2    LV Diastolic Volume Index 44.94 mL/m2    LVOT area 3.8 cm2    FS 19.0 (A) 28 - 44 %    Left Ventricle Relative Wall Thickness 0.43 cm    LV mass 109.8 g    LV Mass Index 61.7 g/m2    E/E' ratio 10 m/s    TENNILLE 20 mL/m2    LA Vol 36 cm3    Pulm vein S/D ratio 2.46     RVOT area 5.72 cm2    RV/LV Ratio 1.29 cm    TENNILLE (MOD) 15 mL/m2    AV Velocity Ratio 0.63     AV index (prosthetic) 0.62     ROMY by Velocity Ratio 2.4 cm²    ASI 2.0 cm/m2    ASI 1.9 cm/m2    Mean e' 0.06 m/s    ZLVIDS 0.88     ZLVIDD -1.58     Mitral Valve Heart Rate 93 bpm    TV resting pulmonary artery pressure 82 mmHg    RV TB RVSP 19 mmHg    Est. RA pres 15 mmHg    Pineda's Biplane MOD Ejection Fraction 50 %    Narrative      Left Ventricle: The left ventricle is smaller than normal. Normal wall   thickness. Mild global hypokinesis present. Septal flattening in diastole   and systole consistent with right ventricular volume and pressure   overload. There is mildly reduced systolic function with a visually   estimated ejection fraction of 40 - 45%. Quantitated ejection fraction is   50%.  LVEF may be underestimated due to decreased filling in the setting   of right ventricular failure.    Right Ventricle: The right ventricle is severely dilated. Systolic   function is severely reduced.    Right Atrium: The right atrium is dilated.    Tricuspid Valve: There is moderate to severe regurgitation.    Pulmonary Artery: The estimated pulmonary artery systolic pressure is   82 mmHg.    IVC/SVC: Elevated venous pressure at 15 mmHg.       Assessment and Plan:     Acute on chronic right-sided heart failure  - echo May 2025 with LVEF 40-45% severe dilation of " RV with reduced function; moderate to severe TR PA pressure 82mmhg  - admitted with LEE, orthopnea and BLE edema; recently diagnosed and diuretics changed from Lasix to Aldactone  - volume overloaded on exam; BNP 2003; given IV Lasix 40mg in the ER with approximately 1L off so far; continued SOB; needs continued volume removal and anticipate Lasix 40mg IV BID  - scheduled for RHC and LHC 6/27 and hopeful to proceed this week as long as euvolemia achieved     Pulmonary hypertension  - estimated PA pressure 87mmHg on echo in May 2025  - long history of tobacco abuse with ILD and emphysema  - IV diuresis in process; plan for RHC with LHC hopeful to proceed later this week     ILD (interstitial lung disease)  - history of tobacco abuse and emphysema  - on home O2      Elevated LFTs  - total bili 1.7 upon admission  - felt to be related to hepatic congestion in setting of ADHF  - monitor closely     Hyperlipidemia  - on Lipitor 40 as an outpatient  - recent FLP with LDL 77  - cholesterol at goal; continue statin therapy; cholesterol well controlled           VTE Risk Mitigation (From admission, onward)      None            Thank you for your consult. I will follow-up with patient. Please contact us if you have any additional questions.    VIELKA Acharya, ANP  Cardiology   [place] - Emergency Dept

## 2025-06-24 NOTE — ASSESSMENT & PLAN NOTE
- total bili 1.7 upon admission  - felt to be related to hepatic congestion in setting of ADHF  - monitor closely

## 2025-06-24 NOTE — ED PROVIDER NOTES
Encounter Date: 6/24/2025       History     Chief Complaint   Patient presents with    Shortness of Breath     C/o worsening SOB, productive cough w/ clear sputum, chest tightness, and increased fluid retention. Hx CHF, COPD. Crackles bilaterally in bases.      69-year-old gentleman with past medical history of basal cell carcinoma, COPD, pulmonary fibrosis, pulmonary hypertension, and CHF who presents to the emergency department for shortness of breath.  Patient reports dyspnea with exertion for the past month that has progressed within the past week.  He has noticed worsening bilateral lower extremity swelling and abdominal swelling.  Reports being compliant with spironolactone was initiated a month ago (previously on Lasix but discontinue per Cardiology).  Denies chest pain, abdominal pain, nausea, vomiting, fever, URI symptoms, and UTI symptoms.    The history is provided by the patient.     Review of patient's allergies indicates:   Allergen Reactions    Plaquenil [hydroxychloroquine]      Lightheaded and muscle aches     Past Medical History:   Diagnosis Date    Basal cell carcinoma 01/2024    left upper chest    COPD (chronic obstructive pulmonary disease)     Degenerative disc disease, lumbar 06/16/2021    Hyperlipidemia     Interstitial pulmonary fibrosis     NU (obstructive sleep apnea)     Pulmonary fibrosis     Rheumatoid arthritis     SCC (squamous cell carcinoma) 07/2020    SCC right medial canthus    SCC (squamous cell carcinoma) 11/2021    mid lower neck     SCC (squamous cell carcinoma) 01/2022    right lateral cheek- in situ    SCC (squamous cell carcinoma) 09/2023    in situ L lower forearm and HAK R shoulder    SCC (squamous cell carcinoma) 01/09/2025    R lower forearm    SCC (squamous cell carcinoma) 11/2024    left upper shin    Small bowel obstruction due to adhesions 05/10/2023    Squamous cell carcinoma 07/2019    SCC in situ left temple    Squamous cell carcinoma in situ (SCCIS) of skin  of right cheek 01/30/2023    R lateral cheek     Past Surgical History:   Procedure Laterality Date    COLONOSCOPY N/A 7/25/2017    Procedure: COLONOSCOPY;  Surgeon: Bill Nicole MD;  [facility]  Service: Endoscopy;  Laterality: N/A;    DIAGNOSTIC LAPAROSCOPY N/A 5/10/2023    Procedure: LAPAROSCOPY, DIAGNOSTIC possible laparotomy other as indicated;  Surgeon: Eitan Palomares MD;  Location: [facility] OR;  Service: General;  Laterality: N/A;    ENDOSCOPIC ULTRASOUND OF UPPER GASTROINTESTINAL TRACT N/A 12/13/2024    Procedure: ULTRASOUND, UPPER GI TRACT, ENDOSCOPIC;  Surgeon: Harrison Calderon MD;  Location: [facility] ENDO;  Service: Endoscopy;  Laterality: N/A;  10/25 portal-EUS at [place] for dilated PD and weight loss. calderon -tt  10/31 r/s updated portal instr-tt  12/6 PRECALL ATTEMPTED-NA/RT  12/9 SWP PRECALL COMPLETE-RT    EPIDURAL STEROID INJECTION INTO LUMBAR SPINE N/A 7/29/2021    Procedure: Injection-steroid-epidural-lumbar L5-S1;  Surgeon: Shiv Vernon Jr., MD;  Location: [facility] PAIN MGT;  Service: Pain Management;  Laterality: N/A;  oral sedation   no pacemaker     FUSION OF SPINE WITH INSTRUMENTATION Left 12/14/2022    Procedure: FUSION, SPINE, WITH INSTRUMENTATION Stg1: Left L3-5 OLiF conduit lateral cage BMP;  Surgeon: August Aguilar MD;  Location: [facility] OR;  Service: Neurosurgery;  Laterality: Left;  Procedure: Stg1: Left L3-5 OLiF conduit lateral cage BMP  Surgery Time: 2hrs  LOS: 3  Anesthesia: General  Blood: Type & Screen  Radiology: C-arm  Brace: LSO  Bed: Regular Bed  Position: Lateral Right Down  Equip    FUSION, SPINE, POSTERIOR APPROACH N/A 12/14/2022    Procedure: FUSION,SPINE,POSTERIOR APPROACH Stg 2: L3-5 posterior instrumentation viper prime, L3-5 posterolateral arthrodesis;  Surgeon: August Aguilar MD;  Location: [facility] OR;  Service: Neurosurgery;  Laterality: N/A;  Procedure: Stg 2: L3-5 posterior instrumentation viper prime, L3-5 posterolateral arthrodesis  Surgery Time:  3hrs  LOS: 3  Anesthesia: General  Blood: Type & Screen  Radiology: Bra    LYSIS OF ADHESIONS N/A 5/10/2023    Procedure: LYSIS, ADHESIONS;  Surgeon: Eitan Palomares MD;  Location: [facility] OR;  Service: General;  Laterality: N/A;    NO PAST SURGERIES      TRANSFORAMINAL EPIDURAL INJECTION OF STEROID Right 6/29/2021    Procedure: Injection,steroid,epidural,transforaminal approach-RIGHT-- L4-5, L5-S1-- (IV Sedation);  Surgeon: Shiv Vernon Jr., MD;  Location: [facility] PAIN MGT;  Service: Pain Management;  Laterality: Right;    TRANSFORAMINAL EPIDURAL INJECTION OF STEROID Left 10/27/2022    Procedure: Injection,steroid,epidural,transforaminal left L4-5 and L5-S1;  Surgeon: Shiv Vernon Jr., MD;  Location: [facility] PAIN MGT;  Service: Pain Management;  Laterality: Left;     Family History   Problem Relation Name Age of Onset    Heart failure Mother          60s    Coronary artery disease Father          70s    Heart attack Father      Arthritis Sister x2     Arthritis Brother x1     Asthma Daughter x2     Gallbladder disease Daughter x2     Seizures Daughter x2     Cancer Paternal Uncle          lymphoma??    Diabetes Neg Hx      Melanoma Neg Hx       Social History[1]  Review of Systems   Reason unable to perform ROS: As per history.       Physical Exam     Initial Vitals [06/24/25 1042]   BP Pulse Resp Temp SpO2   (!) 148/106 96 (!) 24 98.4 °F (36.9 °C) 96 %      MAP       --         Physical Exam    Vitals reviewed.  Constitutional: He appears well-developed and well-nourished. He is not diaphoretic. No distress.   HENT:   Head: Normocephalic and atraumatic.   Cardiovascular:  Normal rate, regular rhythm and intact distal pulses.           Pulmonary/Chest: No respiratory distress. He has rales (bilateral). He exhibits no tenderness.   Abdominal: Abdomen is soft. There is no abdominal tenderness. There is no rebound and no guarding.   Musculoskeletal:         General: Edema (pitting edema bilateral lower extremity  public 3+) present. No tenderness.           ED Course   Procedures  Labs Reviewed   CBC WITH DIFFERENTIAL - Abnormal       Result Value    WBC 9.38      RBC 4.89      HGB 15.2      HCT 46.0      MCV 94      MCH 31.1 (*)     MCHC 33.0      RDW 18.2 (*)     Platelet Count 159      MPV 11.8      Nucleated RBC 0      Neut % 70.2      Lymph % 18.8      Mono % 8.7      Eos % 0.9      Basophil % 1.1      Imm Grans % 0.3      Neut # 6.59      Lymph # 1.76      Mono # 0.82      Eos # 0.08      Baso # 0.10      Imm Grans # 0.03     CBC W/ AUTO DIFFERENTIAL    Narrative:     The following orders were created for panel order CBC auto differential.  Procedure                               Abnormality         Status                     ---------                               -----------         ------                     CBC with Differential[[id number]]       Abnormal            Final result                 Please view results for these tests on the individual orders.   B-TYPE NATRIURETIC PEPTIDE   COMPREHENSIVE METABOLIC PANEL   TROPONIN I   MAGNESIUM          Imaging Results    None          Medications   furosemide injection 40 mg (40 mg Intravenous Given 6/24/25 1106)     Medical Decision Making  Differential diagnosis including but not limited to pneumonia, ACS, pulmonary embolism, CHF exacerbation, and COPD exacerbation.    On initial evaluation patient sitting up comfortably in bed on home 3 L nasal cannula in no acute respiratory distress.  Exam remarkable for pitting edema to bilateral lower extremity.  Patient was given 40 mg IV Lasix in the ED for concerns of CHF exacerbation.  Workup concerning for fluid overload contributing to CHF therefore patient was admitted to Hospital Medicine for CHF exacerbation.    Amount and/or Complexity of Data Reviewed  External Data Reviewed: notes.     Details: Reviewed 05/2025 echo which was significant for pulmonary hypertension and EF of 40-45%.  Labs: ordered. Decision-making  details documented in ED Course.  Radiology: ordered.    Risk  OTC drugs.  Prescription drug management.               ED Course as of 25 1420   Tue 2025   1141 Comprehensive metabolic panel(!)  Unremarkable []   1141 CBC auto differential(!)  Unremarkable [AH]   1144 Troponin I(!): 0.031 [AH]   1218 BNP(!): 2,003 []   1223 On re-evaluation patient noted to become tachycardic and oxygen saturation mid 80s upon standing and ambulating 2ft. []      ED Course User Index  [AH] Aimee Gregory MD                           Clinical Impression:  Final diagnoses:  [R06.02] SOB (shortness of breath)  [R06.02] Shortness of breath  [R60.0] Bilateral lower extremity edema (Primary)                       [1]   Social History  Tobacco Use    Smoking status: Former     Current packs/day: 0.00     Average packs/day: 0.8 packs/day for 37.5 years (28.1 ttl pk-yrs)     Types: Cigarettes     Start date:      Quit date: 2012     Years since quittin.9     Passive exposure: Never    Smokeless tobacco: Never   Substance Use Topics    Alcohol use: Yes     Comment: rarely    Drug use: Never        Aimee Gregory MD  Resident  25 1421

## 2025-06-24 NOTE — Clinical Note
The PA catheter was repositioned to the right ventricle. Hemodynamics were performed. O2 saturation was measured at 58%.

## 2025-06-24 NOTE — ASSESSMENT & PLAN NOTE
Pulmonary hypertension     Patient is identified as having Systolic (HFrEF) heart failure that is Acute on Chronic. CHF is currently uncontrolled due to volume overload due to: Continued edema of extremities and Dyspnea not returned to baseline after 1 doses of IV diuretic. Latest ECHO performed and demonstrates- Results for orders placed during the hospital encounter of 05/28/25    Echo    Interpretation Summary    Left Ventricle: The left ventricle is smaller than normal. Normal wall thickness. Mild global hypokinesis present. Septal flattening in diastole and systole consistent with right ventricular volume and pressure overload. There is mildly reduced systolic function with a visually estimated ejection fraction of 40 - 45%. Quantitated ejection fraction is 50%.  LVEF may be underestimated due to decreased filling in the setting of right ventricular failure.    Right Ventricle: The right ventricle is severely dilated. Systolic function is severely reduced.    Right Atrium: The right atrium is dilated.    Tricuspid Valve: There is moderate to severe regurgitation.    Pulmonary Artery: The estimated pulmonary artery systolic pressure is 82 mmHg.    IVC/SVC: Elevated venous pressure at 15 mmHg.  . Continue ACE/ARB Aldactone and monitor clinical status closely. Monitor on telemetry. Patient is on CHF pathway.  Monitor strict Is&Os and daily weights.  Place on fluid restriction of 1.5 L. Cardiology is consulted. Continue to stress to patient importance of self efficacy and  on diet for CHF. Last BNP reviewed- and noted below   Recent Labs   Lab 06/24/25  1106   BNP 2,003*   -IV lasix 40 mg BID  -Cont strict I's and O's   -Cont daily weights  -Cont low Na diet with 1.5 fluid restriction  -spironolactone cont at home dose  -appreciate cardiology

## 2025-06-24 NOTE — ASSESSMENT & PLAN NOTE
- estimated PA pressure 87mmHg on echo in May 2025  - long history of tobacco abuse with ILD and emphysema  - IV diuresis in process; plan for RHC with [facility] hopeful to proceed later this week

## 2025-06-24 NOTE — SUBJECTIVE & OBJECTIVE
Past Medical History:   Diagnosis Date    Basal cell carcinoma 01/2024    left upper chest    COPD (chronic obstructive pulmonary disease)     Degenerative disc disease, lumbar 06/16/2021    Hyperlipidemia     Interstitial pulmonary fibrosis     NU (obstructive sleep apnea)     Pulmonary fibrosis     Rheumatoid arthritis     SCC (squamous cell carcinoma) 07/2020    SCC right medial canthus    SCC (squamous cell carcinoma) 11/2021    mid lower neck     SCC (squamous cell carcinoma) 01/2022    right lateral cheek- in situ    SCC (squamous cell carcinoma) 09/2023    in situ L lower forearm and HAK R shoulder    SCC (squamous cell carcinoma) 01/09/2025    R lower forearm    SCC (squamous cell carcinoma) 11/2024    left upper shin    Small bowel obstruction due to adhesions 05/10/2023    Squamous cell carcinoma 07/2019    SCC in situ left temple    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    R lateral cheek       Past Surgical History:   Procedure Laterality Date    COLONOSCOPY N/A 7/25/2017    Procedure: COLONOSCOPY;  Surgeon: Bill Nicole MD;  Location: [facility] ([address] [place] [address]);  Service: Endoscopy;  Laterality: N/A;    DIAGNOSTIC LAPAROSCOPY N/A 5/10/2023    Procedure: LAPAROSCOPY, DIAGNOSTIC possible laparotomy other as indicated;  Surgeon: Eitan Palomares MD;  Location: [facility] OR;  Service: General;  Laterality: N/A;    ENDOSCOPIC ULTRASOUND OF UPPER GASTROINTESTINAL TRACT N/A 12/13/2024    Procedure: ULTRASOUND, UPPER GI TRACT, ENDOSCOPIC;  Surgeon: Harrison Calderon MD;  Location: [facility] ENDO;  Service: Endoscopy;  Laterality: N/A;  10/25 portal-EUS at [place] for dilated PD and weight loss. calderon -tt  10/31 r/s updated portal instr-tt  12/6 PRECALL ATTEMPTED-NA/RT  12/9 SWP PRECALL COMPLETE-RT    EPIDURAL STEROID INJECTION INTO LUMBAR SPINE N/A 7/29/2021    Procedure: Injection-steroid-epidural-lumbar L5-S1;  Surgeon: Shiv Vernon Jr., MD;  Location: [facility] PAIN MGT;  Service: Pain  Management;  Laterality: N/A;  oral sedation   no pacemaker     FUSION OF SPINE WITH INSTRUMENTATION Left 12/14/2022    Procedure: FUSION, SPINE, WITH INSTRUMENTATION Stg1: Left L3-5 OLiF conduit lateral cage BMP;  Surgeon: August Aguilar MD;  Location: [facility] OR;  Service: Neurosurgery;  Laterality: Left;  Procedure: Stg1: Left L3-5 OLiF conduit lateral cage BMP  Surgery Time: 2hrs  LOS: 3  Anesthesia: General  Blood: Type & Screen  Radiology: C-arm  Brace: LSO  Bed: Regular Bed  Position: Lateral Right Down  Equip    FUSION, SPINE, POSTERIOR APPROACH N/A 12/14/2022    Procedure: FUSION,SPINE,POSTERIOR APPROACH Stg 2: L3-5 posterior instrumentation viper prime, L3-5 posterolateral arthrodesis;  Surgeon: August Aguilar MD;  Location: [facility] OR;  Service: Neurosurgery;  Laterality: N/A;  Procedure: Stg 2: L3-5 posterior instrumentation viper prime, L3-5 posterolateral arthrodesis  Surgery Time: 3hrs  LOS: 3  Anesthesia: General  Blood: Type & Screen  Radiology: Bra    LYSIS OF ADHESIONS N/A 5/10/2023    Procedure: LYSIS, ADHESIONS;  Surgeon: Eitan Palomares MD;  Location: [facility] OR;  Service: General;  Laterality: N/A;    NO PAST SURGERIES      TRANSFORAMINAL EPIDURAL INJECTION OF STEROID Right 6/29/2021    Procedure: Injection,steroid,epidural,transforaminal approach-RIGHT-- L4-5, L5-S1-- (IV Sedation);  Surgeon: Shiv Vernon Jr., MD;  Location: [facility] PAIN MGT;  Service: Pain Management;  Laterality: Right;    TRANSFORAMINAL EPIDURAL INJECTION OF STEROID Left 10/27/2022    Procedure: Injection,steroid,epidural,transforaminal left L4-5 and L5-S1;  Surgeon: Shiv Vernon Jr., MD;  Location: [facility] PAIN MGT;  Service: Pain Management;  Laterality: Left;       Review of patient's allergies indicates:   Allergen Reactions    Plaquenil [hydroxychloroquine]      Lightheaded and muscle aches       Current Facility-Administered Medications on File Prior to Encounter   Medication    hepatitis B vacc-CpG 101PF 20  mcg/0.5 mL Syrg 20 mcg     Current Outpatient Medications on File Prior to Encounter   Medication Sig    albuterol (VENTOLIN HFA) 90 mcg/actuation inhaler Inhale 2 puffs into the lungs every 6 (six) hours as needed for Wheezing (cough). Rescue    atorvastatin (LIPITOR) 40 MG tablet Take 1 tablet (40 mg total) by mouth once daily.    budesonide-glycopyr-formoterol (BREZTRI AEROSPHERE) 160-9-4.8 mcg/actuation HFAA Inhale 2 puffs into the lungs 2 (two) times a day.    gabapentin (NEURONTIN) 300 MG capsule Take 2 capsules (600 mg total) by mouth 3 (three) times daily.    ibuprofen (ADVIL,MOTRIN) 800 MG tablet Take 1 tablet (800 mg total) by mouth 3 (three) times daily as needed for Pain.    leflunomide (ARAVA) 20 MG Tab Take 1 tablet (20 mg total) by mouth once daily.    potassium chloride SA (K-DUR,KLOR-CON M) 10 MEQ tablet Take 1 tablet (10 mEq total) by mouth daily as needed (take wtih furosemide).    spironolactone (ALDACTONE) 25 MG tablet Take 1 tablet (25 mg total) by mouth once daily.    celecoxib (CELEBREX) 200 MG capsule Take 1 capsule (200 mg total) by mouth 2 (two) times daily. (Patient not taking: Reported on 6/5/2025)    hepatitis A virus vaccine (Adult 19YRS up)(PF) (HAVRIX) 1440 Unit/mL syringe Inject 1 mL (1,440 Units total) into the muscle As instructed (1 mL IM once then repeat in 6-12 months later).    furosemide (LASIX) 20 MG tablet Take 2 tablets (40 mg total) by mouth once daily. (Patient not taking: Reported on 6/24/2025)    losartan (COZAAR) 25 MG tablet Take 1 tablet (25 mg total) by mouth once daily.    predniSONE (DELTASONE) 5 MG tablet Take 1 tablet (5 mg total) by mouth once daily. (Patient not taking: Reported on 5/30/2025)    [DISCONTINUED] aspirin (ECOTRIN) 81 MG EC tablet Take 1 tablet (81 mg total) by mouth once daily.    [DISCONTINUED] predniSONE (DELTASONE) 20 MG tablet Take 1 tablet (20 mg total) by mouth once daily. (Patient not taking: Reported on 6/5/2025)     Family History        Problem Relation (Age of Onset)    Arthritis Sister, Brother    Asthma Daughter    Cancer Paternal Uncle    Coronary artery disease Father    Gallbladder disease Daughter    Heart attack Father    Heart failure Mother    Seizures Daughter          Tobacco Use    Smoking status: Former     Current packs/day: 0.00     Average packs/day: 0.8 packs/day for 37.5 years (28.1 ttl pk-yrs)     Types: Cigarettes     Start date:      Quit date: 2012     Years since quittin.9     Passive exposure: Never    Smokeless tobacco: Never   Substance and Sexual Activity    Alcohol use: Yes     Comment: rarely    Drug use: Never    Sexual activity: Yes     Partners: Female     Birth control/protection: None     Review of Systems   Constitutional: Negative for chills, decreased appetite, diaphoresis and fever.   Cardiovascular:  Positive for dyspnea on exertion, leg swelling and orthopnea. Negative for chest pain, claudication, cyanosis, irregular heartbeat, near-syncope, palpitations, paroxysmal nocturnal dyspnea and syncope.   Respiratory:  Negative for cough, hemoptysis, shortness of breath and wheezing.    Gastrointestinal:  Negative for bloating, abdominal pain, constipation, diarrhea, melena, nausea and vomiting.   Neurological:  Negative for dizziness and weakness.     Objective:     Vital Signs (Most Recent):  Temp: 98.4 °F (36.9 °C) (25 1042)  Pulse: 95 (25 1202)  Resp: (!) 25 (25 1202)  BP: (!) 141/95 (25 1202)  SpO2: (!) 93 % (25 1202) Vital Signs (24h Range):  Temp:  [98.4 °F (36.9 °C)] 98.4 °F (36.9 °C)  Pulse:  [90-96] 95  Resp:  [15-25] 25  SpO2:  [93 %-96 %] 93 %  BP: (141-148)/() 141/95     Weight: 65.8 kg (145 lb)  Body mass index is 24.13 kg/m².    SpO2: (!) 93 %         Intake/Output Summary (Last 24 hours) at 2025 1355  Last data filed at 2025 1306  Gross per 24 hour   Intake --   Output 850 ml   Net -850 ml       Lines/Drains/Airways       None                     Physical Exam  Constitutional:       General: He is not in acute distress.     Appearance: He is well-developed.   Cardiovascular:      Rate and Rhythm: Normal rate and regular rhythm.      Heart sounds: No murmur heard.     No gallop.   Pulmonary:      Effort: Pulmonary effort is normal. No respiratory distress.      Breath sounds: Examination of the right-middle field reveals rales. Examination of the left-middle field reveals rales. Examination of the right-lower field reveals rales. Examination of the left-lower field reveals rales. Rales present. No wheezing.   Abdominal:      General: Bowel sounds are normal. There is no distension.      Palpations: Abdomen is soft.      Tenderness: There is no abdominal tenderness.   Musculoskeletal:      Right lower leg: Edema (2+) present.      Left lower leg: Edema (2+) present.   Skin:     General: Skin is warm and dry.   Neurological:      Mental Status: He is alert and oriented to person, place, and time.              Significant Imaging: Echocardiogram: Transthoracic echo (TTE) complete (Cupid Only):   Results for orders placed or performed during the hospital encounter of 05/28/25   Echo   Result Value Ref Range    LV Diastolic Volume 80 mL    Echo EF Estimated 42 %    LV Systolic Volume 47 mL    IVS 0.8 0.6 - 1.1 cm    LVIDd 4.2 3.5 - 6.0 cm    LVIDs 3.4 2.1 - 4.0 cm    LVOT diameter 2.2 cm    PW 0.9 0.6 - 1.1 cm    IVRT 186 ms    AV LVOT peak gradient 1 mmHg    LVOT mn grad 1 mmHg    LVOT peak jose 0.5 m/s    LVOT peak VTI 7.5 cm    RV- leung basal diam 5.4 cm    RVOT prox diameter 2.70 cm    RV S' 7.49 cm/s    LA size 2.9 cm    Left Atrium Major Axis 5.1 cm    Left Atrium Minor Axis 5.1 cm    LA Vol (MOD) 27 mL    RA Major Axis 5.81 cm    RA Area 24.5 cm2    AV valve area 2.4 cm2    AV area by cont VTI 2.4 cm2    AV peak gradient 3 mmHg    AV mean gradient 1 mmHg    Ao peak jose 0.8 m/s    Ao VTI 12.1 cm    MV Peak A Jose 0.25 m/s    E wave deceleration time 93  "ms    MV Peak E Jose 0.60 m/s    E/A ratio 2.40     LV LATERAL E/E' RATIO 8.6     LV SEPTAL E/E' RATIO 12.0     TDI LATERAL 0.07 m/s    TDI SEPTAL 0.05 m/s    MV peak gradient 3 mmHg    MV mean gradient 1 mmHg    TV peak gradient 68 mmHg    TR Max Jose 4.1 m/s    Ascending aorta 3.3 cm    STJ 2.9 cm    P vein A 0.3 m/s    MV "A" wave duration 83.73 ms    Pulm vein "A" wave 83.73 ms    PV Peak D Jose 0.24 m/s    PV Peak S Jose 0.59 m/s    IVC diameter 2.44 cm    Sinus 3.57 cm    RA Width 5.13 cm    TAPSE 1.0 cm    LA WIDTH 2.9 cm    BSA 1.8 m2    LVOT stroke volume 28.5 cm3    LV Systolic Volume Index 26.4 mL/m2    LV Diastolic Volume Index 44.94 mL/m2    LVOT area 3.8 cm2    FS 19.0 (A) 28 - 44 %    Left Ventricle Relative Wall Thickness 0.43 cm    LV mass 109.8 g    LV Mass Index 61.7 g/m2    E/E' ratio 10 m/s    TENNILLE 20 mL/m2    LA Vol 36 cm3    Pulm vein S/D ratio 2.46     RVOT area 5.72 cm2    RV/LV Ratio 1.29 cm    TENNILLE (MOD) 15 mL/m2    AV Velocity Ratio 0.63     AV index (prosthetic) 0.62     ROMY by Velocity Ratio 2.4 cm²    ASI 2.0 cm/m2    ASI 1.9 cm/m2    Mean e' 0.06 m/s    ZLVIDS 0.88     ZLVIDD -1.58     Mitral Valve Heart Rate 93 bpm    TV resting pulmonary artery pressure 82 mmHg    RV TB RVSP 19 mmHg    Est. RA pres 15 mmHg    Pineda's Biplane MOD Ejection Fraction 50 %    Narrative      Left Ventricle: The left ventricle is smaller than normal. Normal wall   thickness. Mild global hypokinesis present. Septal flattening in diastole   and systole consistent with right ventricular volume and pressure   overload. There is mildly reduced systolic function with a visually   estimated ejection fraction of 40 - 45%. Quantitated ejection fraction is   50%.  LVEF may be underestimated due to decreased filling in the setting   of right ventricular failure.    Right Ventricle: The right ventricle is severely dilated. Systolic   function is severely reduced.    Right Atrium: The right atrium is dilated.    " Tricuspid Valve: There is moderate to severe regurgitation.    Pulmonary Artery: The estimated pulmonary artery systolic pressure is   82 mmHg.    IVC/SVC: Elevated venous pressure at 15 mmHg.

## 2025-06-24 NOTE — Clinical Note
The PA catheter was repositioned to the main pulmonary artery. Hemodynamics were performed. O2 saturation was measured at 58%.

## 2025-06-24 NOTE — ASSESSMENT & PLAN NOTE
Patient is currently off COPD Pathway. Continue scheduled inhalers Supplemental oxygen and monitor respiratory status closely.  Respiratory distress thought to be more related to fluid.  Continue to monitor.

## 2025-06-24 NOTE — PHARMACY MED REC
"    [facility]           Pharmacy  Admission Medication History     The home medication history was taken by Ludivina Sales.      Medication history obtained from Medications listed below were obtained from: Patient/family.    Based on information gathered for medication list, you may go to "Admission" then "Reconcile Home Medications" tabs to review and/or act upon those items.     The home medication list has been updated by the Pharmacy department.   Please read ALL comments highlighted in yellow.   Please address this information as you see fit.    Feel free to contact us if you have any questions or require assistance.    The medications listed below were removed from the home medication list.  Please reorder if appropriate:    Patient reports NOT TAKING the following medication(s):  Aspirin 81 mg tab  Prednisone 20 mg tab    Current Facility-Administered Medications on File Prior to Encounter   Medication Dose Route Frequency Provider Last Rate Last Admin    hepatitis B vacc-CpG 1018 (PF) 20 mcg/0.5 mL Syrg 20 mcg  0.5 mL Intramuscular Q30 Days    20 mcg at 05/30/25 1025     Current Outpatient Medications on File Prior to Encounter   Medication Sig Dispense Refill    albuterol (VENTOLIN HFA) 90 mcg/actuation inhaler Inhale 2 puffs into the lungs every 6 (six) hours as needed for Wheezing (cough). Rescue 18 g 2    atorvastatin (LIPITOR) 40 MG tablet Take 1 tablet (40 mg total) by mouth once daily. 90 tablet 3    budesonide-glycopyr-formoterol (BREZTRI AEROSPHERE) 160-9-4.8 mcg/actuation HFAA Inhale 2 puffs into the lungs 2 (two) times a day. 10.7 g 6    gabapentin (NEURONTIN) 300 MG capsule Take 2 capsules (600 mg total) by mouth 3 (three) times daily. 180 capsule 11    ibuprofen (ADVIL,MOTRIN) 800 MG tablet Take 1 tablet (800 mg total) by mouth 3 (three) times daily as needed for Pain. 60 tablet 5    leflunomide (ARAVA) 20 MG Tab Take 1 tablet (20 mg total) by mouth once daily. 30 tablet 6 "    potassium chloride SA (K-DUR,KLOR-CON M) 10 MEQ tablet Take 1 tablet (10 mEq total) by mouth daily as needed (take wtih furosemide). 30 tablet 0    spironolactone (ALDACTONE) 25 MG tablet Take 1 tablet (25 mg total) by mouth once daily. 30 tablet 11    losartan (COZAAR) 25 MG tablet Take 1 tablet (25 mg total) by mouth once daily. 30 tablet 11       Please address this information as you see fit.  Feel free to contact us if you have any questions or require assistance.    Ludivina Sales  [phone]              .

## 2025-06-24 NOTE — Clinical Note
A percutaneous stick to the right radial and right brachial artery and vein was performed. Ultrasound guidance was used to obtain access.

## 2025-06-24 NOTE — H&P
[facility] Emergency [facility] Medicine  History & Physical    Patient Name: Fabiano Garvey  MRN: [id number]  Patient Class: OP- Observation  Admission Date: 6/24/2025  Attending Physician: Gerry Sue MD   Primary Care Provider: Calin Buchanan MD         Patient information was obtained from patient, past medical records, and ER records.     Subjective:     Principal Problem:Acute on chronic right-sided heart failure    Chief Complaint:   Chief Complaint   Patient presents with    Shortness of Breath     C/o worsening SOB, productive cough w/ clear sputum, chest tightness, and increased fluid retention. Hx CHF, COPD. Crackles bilaterally in bases.         HPI: Fabiano Garvey this is a 69-year-old male with a past medical history of basal cell carcinoma, COPD, pulmonary fibrosis, pulmonary hypertension, and CHF.  Patient presented to the emergency department with complaints of worsening shortness of breath.  Patient reported a productive cough with clear sputum.  He also reported chest tightness and increased fluid retention.  Patient reports being compliant with his medication.  He denies any shortness of chest pain, palpitations, nausea, vomiting, abdominal pain, or any trauma.  His ED workup is notable for an elevated BNP and troponin.  His EKG with normal sinus rhythm.  Heart rate 98.  His chest x-ray is with a normal heart size, lungs demonstrated some mild chronic changes.  No acute process or worrisome change from his last chest x-ray.  He was given IV Lasix in the ED. Cardiology was consulted.  He will be admitted to the hospital medicine service for further workup.    No new subjective & objective note has been filed under this hospital service since the last note was generated.    Assessment/Plan:     Assessment & Plan  Acute on chronic right-sided heart failure  Pulmonary hypertension     Patient is identified as having Systolic (HFrEF) heart failure that is Acute on Chronic. CHF is  currently uncontrolled due to volume overload due to: Continued edema of extremities and Dyspnea not returned to baseline after 1 doses of IV diuretic. Latest ECHO performed and demonstrates- Results for orders placed during the hospital encounter of 05/28/25    Echo    Interpretation Summary    Left Ventricle: The left ventricle is smaller than normal. Normal wall thickness. Mild global hypokinesis present. Septal flattening in diastole and systole consistent with right ventricular volume and pressure overload. There is mildly reduced systolic function with a visually estimated ejection fraction of 40 - 45%. Quantitated ejection fraction is 50%.  LVEF may be underestimated due to decreased filling in the setting of right ventricular failure.    Right Ventricle: The right ventricle is severely dilated. Systolic function is severely reduced.    Right Atrium: The right atrium is dilated.    Tricuspid Valve: There is moderate to severe regurgitation.    Pulmonary Artery: The estimated pulmonary artery systolic pressure is 82 mmHg.    IVC/SVC: Elevated venous pressure at 15 mmHg.  . Continue ACE/ARB Aldactone and monitor clinical status closely. Monitor on telemetry. Patient is on CHF pathway.  Monitor strict Is&Os and daily weights.  Place on fluid restriction of 1.5 L. Cardiology is consulted. Continue to stress to patient importance of self efficacy and  on diet for CHF. Last BNP reviewed- and noted below   Recent Labs   Lab 06/24/25  1106   BNP 2,003*   -IV lasix 40 mg BID  -Cont strict I's and O's   -Cont daily weights  -Cont low Na diet with 1.5 fluid restriction  -spironolactone cont at home dose  -appreciate cardiology           Hyperlipidemia  -continue statin at home dose  Obstructive sleep apnea  -chronic problem    ILD (interstitial lung disease)  -chronic problem.  We will continue inhalers at home dose    Centrilobular emphysema   Patient is currently off COPD Pathway. Continue scheduled inhalers  Supplemental oxygen and monitor respiratory status closely.  Respiratory distress thought to be more related to fluid.  Continue to monitor.  Pulmonary hypertension      VTE Risk Mitigation (From admission, onward)           Ordered     enoxaparin injection 40 mg  Daily         06/24/25 1419     IP VTE HIGH RISK PATIENT  Once         06/24/25 1419     Place sequential compression device  Until discontinued         06/24/25 1419                                     On 06/24/2025, patient should be placed in hospital observation services under my care in collaboration with Dr. Gerry Sue.           Natasha Deleon NP  Department of Hospital Medicine  [place] - Emergency Dept

## 2025-06-24 NOTE — Clinical Note
The PA catheter was repositioned to the right atrium. Hemodynamics were performed. O2 saturation was measured at 63%.

## 2025-06-24 NOTE — ASSESSMENT & PLAN NOTE
- on Lipitor 40 as an outpatient  - recent FLP with LDL 77  - cholesterol at goal; continue statin therapy; cholesterol well controlled

## 2025-06-24 NOTE — ASSESSMENT & PLAN NOTE
- echo May 2025 with LVEF 40-45% severe dilation of RV with reduced function; moderate to severe TR PA pressure 82mmhg  - admitted with LEE, orthopnea and BLE edema; recently diagnosed and diuretics changed from Lasix to Aldactone  - volume overloaded on exam; BNP 2003; given IV Lasix 40mg in the ER with approximately 1L off so far; continued SOB; needs continued volume removal and anticipate Lasix 40mg IV BID  - scheduled for RHC and LHC 6/27 and hopeful to proceed this week as long as euvolemia achieved

## 2025-06-24 NOTE — H&P (VIEW-ONLY)
[place] - Emergency Dept  Cardiology  Consult Note    Patient Name: Fabiano Garvey  MRN: [id number]  Admission Date: 6/24/2025  Hospital Length of Stay: 0 days  Code Status: Prior   Attending Provider: Gerry Sue MD   Consulting Provider: VIELKA Acharya ANP  Primary Care Physician: Calin Buchanan MD  Principal Problem:<principal problem not specified>    Patient information was obtained from patient, past medical records, and ER records.     Inpatient consult to Cardiology  Consult performed by: Ashley Dela Cruz APRN, TOY  Consult ordered by: Aimee Gregory MD  Reason for consult: ADHF        Subjective:     Chief Complaint:  SOB and BLE edema      HPI:   68yo male with HLP, interstitial pulmonary fibrosis, COPD on home O2 3LPM, DDD, RA, aorto iliac atherosclerosis, emphysema, PHTN who presented to the ER with complaints of SOB and BLE edema. He reports SOB for the past month with progressive worsening for the past week. He also noted LE edema as well as abdominal bloating. He reports recently being diagnosed with CHF and was seen by Dr. Chavira earlier this month with changing of his medications (Lasix d/c'd and Aldactone started). He reports noting decreased urine output at that time and feels his SOB may have worsened. He is scheduled for outpatient LHC and RHC on Friday but since his symptoms were worsening he presented to the ER. Labs CBC with H&H 15.2&46.0 BMP with K+ 4.7 BUN 32 creatinine 1.1 total bili 1.7 BNP 2003 Troponin 0.031 EKG NSR with RAD no STTWC. Given IV Lasix 40mg and admitted to [facility]. Cardiology consulted for ADHF management     Past Medical History:   Diagnosis Date    Basal cell carcinoma 01/2024    left upper chest    COPD (chronic obstructive pulmonary disease)     Degenerative disc disease, lumbar 06/16/2021    Hyperlipidemia     Interstitial pulmonary fibrosis     NU (obstructive sleep apnea)     Pulmonary fibrosis     Rheumatoid  arthritis     SCC (squamous cell carcinoma) 07/2020    SCC right medial canthus    SCC (squamous cell carcinoma) 11/2021    mid lower neck     SCC (squamous cell carcinoma) 01/2022    right lateral cheek- in situ    SCC (squamous cell carcinoma) 09/2023    in situ L lower forearm and HAK R shoulder    SCC (squamous cell carcinoma) 01/09/2025    R lower forearm    SCC (squamous cell carcinoma) 11/2024    left upper shin    Small bowel obstruction due to adhesions 05/10/2023    Squamous cell carcinoma 07/2019    SCC in situ left temple    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    R lateral cheek       Past Surgical History:   Procedure Laterality Date    COLONOSCOPY N/A 7/25/2017    Procedure: COLONOSCOPY;  Surgeon: Bill Nicole MD;  [facility]  Service: Endoscopy;  Laterality: N/A;    DIAGNOSTIC LAPAROSCOPY N/A 5/10/2023    Procedure: LAPAROSCOPY, DIAGNOSTIC possible laparotomy other as indicated;  Surgeon: Eitan Palomares MD;  Location: [facility] OR;  Service: General;  Laterality: N/A;    ENDOSCOPIC ULTRASOUND OF UPPER GASTROINTESTINAL TRACT N/A 12/13/2024    Procedure: ULTRASOUND, UPPER GI TRACT, ENDOSCOPIC;  Surgeon: Harrison Calderon MD;  Location: [facility] ENDO;  Service: Endoscopy;  Laterality: N/A;  10/25 portal-EUS at [facility] for dilated PD and weight loss. calderon -tt  10/31 r/s updated portal instr-tt  12/6 PRECALL ATTEMPTED-NA/RT  12/9 SWP PRECALL COMPLETE-RT    EPIDURAL STEROID INJECTION INTO LUMBAR SPINE N/A 7/29/2021    Procedure: Injection-steroid-epidural-lumbar L5-S1;  Surgeon: Shiv Vernon Jr., MD;  Location: [facility] PAIN MGT;  Service: Pain Management;  Laterality: N/A;  oral sedation   no pacemaker     FUSION OF SPINE WITH INSTRUMENTATION Left 12/14/2022    Procedure: FUSION, SPINE, WITH INSTRUMENTATION Stg1: Left L3-5 OLiF conduit lateral cage BMP;  Surgeon: August Aguilar MD;  Location: [facility] OR;  Service: Neurosurgery;  Laterality: Left;   Procedure: Stg1: Left L3-5 OLiF conduit lateral cage BMP  Surgery Time: 2hrs  LOS: 3  Anesthesia: General  Blood: Type & Screen  Radiology: C-arm  Brace: LSO  Bed: Regular Bed  Position: Lateral Right Down  Equip    FUSION, SPINE, POSTERIOR APPROACH N/A 12/14/2022    Procedure: FUSION,SPINE,POSTERIOR APPROACH Stg 2: L3-5 posterior instrumentation viper prime, L3-5 posterolateral arthrodesis;  Surgeon: August Aguilar MD;  Location: [facility] OR;  Service: Neurosurgery;  Laterality: N/A;  Procedure: Stg 2: L3-5 posterior instrumentation viper prime, L3-5 posterolateral arthrodesis  Surgery Time: 3hrs  LOS: 3  Anesthesia: General  Blood: Type & Screen  Radiology: Bra    LYSIS OF ADHESIONS N/A 5/10/2023    Procedure: LYSIS, ADHESIONS;  Surgeon: Eitan Palomares MD;  Location: [facility] OR;  Service: General;  Laterality: N/A;    NO PAST SURGERIES      TRANSFORAMINAL EPIDURAL INJECTION OF STEROID Right 6/29/2021    Procedure: Injection,steroid,epidural,transforaminal approach-RIGHT-- L4-5, L5-S1-- (IV Sedation);  Surgeon: Shiv Vernon Jr., MD;  Location: [facility] PAIN MGT;  Service: Pain Management;  Laterality: Right;    TRANSFORAMINAL EPIDURAL INJECTION OF STEROID Left 10/27/2022    Procedure: Injection,steroid,epidural,transforaminal left L4-5 and L5-S1;  Surgeon: Shiv Vernon Jr., MD;  Location: [facility] PAIN MGT;  Service: Pain Management;  Laterality: Left;       Review of patient's allergies indicates:   Allergen Reactions    Plaquenil [hydroxychloroquine]      Lightheaded and muscle aches       Current Facility-Administered Medications on File Prior to Encounter   Medication    hepatitis B vacc-CpG 1018 (PF) 20 mcg/0.5 mL Syrg 20 mcg     Current Outpatient Medications on File Prior to Encounter   Medication Sig    albuterol (VENTOLIN HFA) 90 mcg/actuation inhaler Inhale 2 puffs into the lungs every 6 (six) hours as needed for Wheezing (cough). Rescue    atorvastatin (LIPITOR) 40 MG tablet Take 1 tablet (40 mg total) by  mouth once daily.    budesonide-glycopyr-formoterol (BREZTRI AEROSPHERE) 160-9-4.8 mcg/actuation HFAA Inhale 2 puffs into the lungs 2 (two) times a day.    gabapentin (NEURONTIN) 300 MG capsule Take 2 capsules (600 mg total) by mouth 3 (three) times daily.    ibuprofen (ADVIL,MOTRIN) 800 MG tablet Take 1 tablet (800 mg total) by mouth 3 (three) times daily as needed for Pain.    leflunomide (ARAVA) 20 MG Tab Take 1 tablet (20 mg total) by mouth once daily.    potassium chloride SA (K-DUR,KLOR-CON M) 10 MEQ tablet Take 1 tablet (10 mEq total) by mouth daily as needed (take wtih furosemide).    spironolactone (ALDACTONE) 25 MG tablet Take 1 tablet (25 mg total) by mouth once daily.    celecoxib (CELEBREX) 200 MG capsule Take 1 capsule (200 mg total) by mouth 2 (two) times daily. (Patient not taking: Reported on 6/5/2025)    hepatitis A virus vaccine (Adult 19YRS up)(PF) (HAVRIX) 1440 Unit/mL syringe Inject 1 mL (1,440 Units total) into the muscle As instructed (1 mL IM once then repeat in 6-12 months later).    furosemide (LASIX) 20 MG tablet Take 2 tablets (40 mg total) by mouth once daily. (Patient not taking: Reported on 6/24/2025)    losartan (COZAAR) 25 MG tablet Take 1 tablet (25 mg total) by mouth once daily.    predniSONE (DELTASONE) 5 MG tablet Take 1 tablet (5 mg total) by mouth once daily. (Patient not taking: Reported on 5/30/2025)    [DISCONTINUED] aspirin (ECOTRIN) 81 MG EC tablet Take 1 tablet (81 mg total) by mouth once daily.    [DISCONTINUED] predniSONE (DELTASONE) 20 MG tablet Take 1 tablet (20 mg total) by mouth once daily. (Patient not taking: Reported on 6/5/2025)     Family History       Problem Relation (Age of Onset)    Arthritis Sister, Brother    Asthma Daughter    Cancer Paternal Uncle    Coronary artery disease Father    Gallbladder disease Daughter    Heart attack Father    Heart failure Mother    Seizures Daughter          Tobacco Use    Smoking status: Former     Current packs/day: 0.00      Average packs/day: 0.8 packs/day for 37.5 years (28.1 ttl pk-yrs)     Types: Cigarettes     Start date:      Quit date: 2012     Years since quittin.9     Passive exposure: Never    Smokeless tobacco: Never   Substance and Sexual Activity    Alcohol use: Yes     Comment: rarely    Drug use: Never    Sexual activity: Yes     Partners: Female     Birth control/protection: None     Review of Systems   Constitutional: Negative for chills, decreased appetite, diaphoresis and fever.   Cardiovascular:  Positive for dyspnea on exertion, leg swelling and orthopnea. Negative for chest pain, claudication, cyanosis, irregular heartbeat, near-syncope, palpitations, paroxysmal nocturnal dyspnea and syncope.   Respiratory:  Negative for cough, hemoptysis, shortness of breath and wheezing.    Gastrointestinal:  Negative for bloating, abdominal pain, constipation, diarrhea, melena, nausea and vomiting.   Neurological:  Negative for dizziness and weakness.     Objective:     Vital Signs (Most Recent):  Temp: 98.4 °F (36.9 °C) (25 1042)  Pulse: 95 (25 1202)  Resp: (!) 25 (25 1202)  BP: (!) 141/95 (25 1202)  SpO2: (!) 93 % (25 1202) Vital Signs (24h Range):  Temp:  [98.4 °F (36.9 °C)] 98.4 °F (36.9 °C)  Pulse:  [90-96] 95  Resp:  [15-25] 25  SpO2:  [93 %-96 %] 93 %  BP: (141-148)/() 141/95     Weight: 65.8 kg (145 lb)  Body mass index is 24.13 kg/m².    SpO2: (!) 93 %         Intake/Output Summary (Last 24 hours) at 2025 1355  Last data filed at 2025 1306  Gross per 24 hour   Intake --   Output 850 ml   Net -850 ml       Lines/Drains/Airways       None                    Physical Exam  Constitutional:       General: He is not in acute distress.     Appearance: He is well-developed.   Cardiovascular:      Rate and Rhythm: Normal rate and regular rhythm.      Heart sounds: No murmur heard.     No gallop.   Pulmonary:      Effort: Pulmonary effort is normal. No respiratory  distress.      Breath sounds: Examination of the right-middle field reveals rales. Examination of the left-middle field reveals rales. Examination of the right-lower field reveals rales. Examination of the left-lower field reveals rales. Rales present. No wheezing.   Abdominal:      General: Bowel sounds are normal. There is no distension.      Palpations: Abdomen is soft.      Tenderness: There is no abdominal tenderness.   Musculoskeletal:      Right lower leg: Edema (2+) present.      Left lower leg: Edema (2+) present.   Skin:     General: Skin is warm and dry.   Neurological:      Mental Status: He is alert and oriented to person, place, and time.              Significant Imaging: Echocardiogram: Transthoracic echo (TTE) complete (Cupid Only):   Results for orders placed or performed during the hospital encounter of 05/28/25   Echo   Result Value Ref Range    LV Diastolic Volume 80 mL    Echo EF Estimated 42 %    LV Systolic Volume 47 mL    IVS 0.8 0.6 - 1.1 cm    LVIDd 4.2 3.5 - 6.0 cm    LVIDs 3.4 2.1 - 4.0 cm    LVOT diameter 2.2 cm    PW 0.9 0.6 - 1.1 cm    IVRT 186 ms    AV LVOT peak gradient 1 mmHg    LVOT mn grad 1 mmHg    LVOT peak jose 0.5 m/s    LVOT peak VTI 7.5 cm    RV- leung basal diam 5.4 cm    RVOT prox diameter 2.70 cm    RV S' 7.49 cm/s    LA size 2.9 cm    Left Atrium Major Axis 5.1 cm    Left Atrium Minor Axis 5.1 cm    LA Vol (MOD) 27 mL    RA Major Axis 5.81 cm    RA Area 24.5 cm2    AV valve area 2.4 cm2    AV area by cont VTI 2.4 cm2    AV peak gradient 3 mmHg    AV mean gradient 1 mmHg    Ao peak jose 0.8 m/s    Ao VTI 12.1 cm    MV Peak A Jose 0.25 m/s    E wave deceleration time 93 ms    MV Peak E Jose 0.60 m/s    E/A ratio 2.40     LV LATERAL E/E' RATIO 8.6     LV SEPTAL E/E' RATIO 12.0     TDI LATERAL 0.07 m/s    TDI SEPTAL 0.05 m/s    MV peak gradient 3 mmHg    MV mean gradient 1 mmHg    TV peak gradient 68 mmHg    TR Max Jose 4.1 m/s    Ascending aorta 3.3 cm    STJ 2.9 cm    P vein A  "0.3 m/s    MV "A" wave duration 83.73 ms    Pulm vein "A" wave 83.73 ms    PV Peak D Jose 0.24 m/s    PV Peak S Jose 0.59 m/s    IVC diameter 2.44 cm    Sinus 3.57 cm    RA Width 5.13 cm    TAPSE 1.0 cm    LA WIDTH 2.9 cm    BSA 1.8 m2    LVOT stroke volume 28.5 cm3    LV Systolic Volume Index 26.4 mL/m2    LV Diastolic Volume Index 44.94 mL/m2    LVOT area 3.8 cm2    FS 19.0 (A) 28 - 44 %    Left Ventricle Relative Wall Thickness 0.43 cm    LV mass 109.8 g    LV Mass Index 61.7 g/m2    E/E' ratio 10 m/s    TENNILLE 20 mL/m2    LA Vol 36 cm3    Pulm vein S/D ratio 2.46     RVOT area 5.72 cm2    RV/LV Ratio 1.29 cm    TENNILLE (MOD) 15 mL/m2    AV Velocity Ratio 0.63     AV index (prosthetic) 0.62     ROMY by Velocity Ratio 2.4 cm²    ASI 2.0 cm/m2    ASI 1.9 cm/m2    Mean e' 0.06 m/s    ZLVIDS 0.88     ZLVIDD -1.58     Mitral Valve Heart Rate 93 bpm    TV resting pulmonary artery pressure 82 mmHg    RV TB RVSP 19 mmHg    Est. RA pres 15 mmHg    Pineda's Biplane MOD Ejection Fraction 50 %    Narrative      Left Ventricle: The left ventricle is smaller than normal. Normal wall   thickness. Mild global hypokinesis present. Septal flattening in diastole   and systole consistent with right ventricular volume and pressure   overload. There is mildly reduced systolic function with a visually   estimated ejection fraction of 40 - 45%. Quantitated ejection fraction is   50%.  LVEF may be underestimated due to decreased filling in the setting   of right ventricular failure.    Right Ventricle: The right ventricle is severely dilated. Systolic   function is severely reduced.    Right Atrium: The right atrium is dilated.    Tricuspid Valve: There is moderate to severe regurgitation.    Pulmonary Artery: The estimated pulmonary artery systolic pressure is   82 mmHg.    IVC/SVC: Elevated venous pressure at 15 mmHg.       Assessment and Plan:     Acute on chronic right-sided heart failure  - echo May 2025 with LVEF 40-45% severe dilation of " RV with reduced function; moderate to severe TR PA pressure 82mmhg  - admitted with LEE, orthopnea and BLE edema; recently diagnosed and diuretics changed from Lasix to Aldactone  - volume overloaded on exam; BNP 2003; given IV Lasix 40mg in the ER with approximately 1L off so far; continued SOB; needs continued volume removal and anticipate Lasix 40mg IV BID  - scheduled for RHC and LHC 6/27 and hopeful to proceed this week as long as euvolemia achieved     Pulmonary hypertension  - estimated PA pressure 87mmHg on echo in May 2025  - long history of tobacco abuse with ILD and emphysema  - IV diuresis in process; plan for RHC with LHC hopeful to proceed later this week     ILD (interstitial lung disease)  - history of tobacco abuse and emphysema  - on home O2      Elevated LFTs  - total bili 1.7 upon admission  - felt to be related to hepatic congestion in setting of ADHF  - monitor closely     Hyperlipidemia  - on Lipitor 40 as an outpatient  - recent FLP with LDL 77  - cholesterol at goal; continue statin therapy; cholesterol well controlled           VTE Risk Mitigation (From admission, onward)      None            Thank you for your consult. I will follow-up with patient. Please contact us if you have any additional questions.    VIELKA Acharya, ANP  Cardiology   [place] - Emergency Dept

## 2025-06-24 NOTE — HPI
Fabiano Garvey this is a 69-year-old male with a past medical history of basal cell carcinoma, COPD, pulmonary fibrosis, pulmonary hypertension, and CHF.  Patient presented to the emergency department with complaints of worsening shortness of breath.  Patient reported a productive cough with clear sputum.  He also reported chest tightness and increased fluid retention.  Patient reports being compliant with his medication.  He denies any shortness of chest pain, palpitations, nausea, vomiting, abdominal pain, or any trauma.  His ED workup is notable for an elevated BNP and troponin.  His EKG with normal sinus rhythm.  Heart rate 98.  His chest x-ray is with a normal heart size, lungs demonstrated some mild chronic changes.  No acute process or worrisome change from his last chest x-ray.  He was given IV Lasix in the ED. Cardiology was consulted.  He will be admitted to the hospital medicine service for further workup.

## 2025-06-24 NOTE — HPI
68yo male with HLP, interstitial pulmonary fibrosis, COPD on home O2 3LPM, DDD, RA, aorto iliac atherosclerosis, emphysema, PHTN who presented to the ER with complaints of SOB and BLE edema. He reports SOB for the past month with progressive worsening for the past week. He also noted LE edema as well as abdominal bloating. He reports recently being diagnosed with CHF and was seen by Dr. Chavira earlier this month with changing of his medications (Lasix d/c'd and Aldactone started). He reports noting decreased urine output at that time and feels his SOB may have worsened. He is scheduled for outpatient LHC and RHC on Friday but since his symptoms were worsening he presented to the ER. Labs CBC with H&H 15.2&46.0 BMP with K+ 4.7 BUN 32 creatinine 1.1 total bili 1.7 BNP 2003 Troponin 0.031 EKG NSR with RAD no STTWC. Given IV Lasix 40mg and admitted to [facility]. Cardiology consulted for ADHF management

## 2025-06-24 NOTE — ED NOTES
Assumed care from primary Rn for lunch. Rounding on pt: pt is laying in bed with head elevated at a 45  degree angle and a family member at bedside. Pt is resting restfully and in no acute distress noted. Pt states that he would like something to drink, told him will check chart and ask provider

## 2025-06-25 LAB
ABSOLUTE EOSINOPHIL (OHS): 0.19 K/UL
ABSOLUTE MONOCYTE (OHS): 0.78 K/UL (ref 0.3–1)
ABSOLUTE NEUTROPHIL COUNT (OHS): 5 K/UL (ref 1.8–7.7)
ANION GAP (OHS): 9 MMOL/L (ref 8–16)
BASOPHILS # BLD AUTO: 0.1 K/UL
BASOPHILS NFR BLD AUTO: 1.3 %
BUN SERPL-MCNC: 30 MG/DL (ref 8–23)
CALCIUM SERPL-MCNC: 9.1 MG/DL (ref 8.7–10.5)
CHLORIDE SERPL-SCNC: 101 MMOL/L (ref 95–110)
CO2 SERPL-SCNC: 30 MMOL/L (ref 23–29)
CREAT SERPL-MCNC: 1.2 MG/DL (ref 0.5–1.4)
ERYTHROCYTE [DISTWIDTH] IN BLOOD BY AUTOMATED COUNT: 18 % (ref 11.5–14.5)
GFR SERPLBLD CREATININE-BSD FMLA CKD-EPI: >60 ML/MIN/1.73/M2
GLUCOSE SERPL-MCNC: 77 MG/DL (ref 70–110)
HCT VFR BLD AUTO: 46.9 % (ref 40–54)
HGB BLD-MCNC: 15.5 GM/DL (ref 14–18)
IMM GRANULOCYTES # BLD AUTO: 0.02 K/UL (ref 0–0.04)
IMM GRANULOCYTES NFR BLD AUTO: 0.3 % (ref 0–0.5)
LYMPHOCYTES # BLD AUTO: 1.87 K/UL (ref 1–4.8)
MAGNESIUM SERPL-MCNC: 1.5 MG/DL (ref 1.6–2.6)
MCH RBC QN AUTO: 30.9 PG (ref 27–31)
MCHC RBC AUTO-ENTMCNC: 33 G/DL (ref 32–36)
MCV RBC AUTO: 93 FL (ref 82–98)
NUCLEATED RBC (/100WBC) (OHS): 0 /100 WBC
PLATELET # BLD AUTO: 164 K/UL (ref 150–450)
PMV BLD AUTO: 12 FL (ref 9.2–12.9)
POCT GLUCOSE: 105 MG/DL (ref 70–110)
POCT GLUCOSE: 79 MG/DL (ref 70–110)
POCT GLUCOSE: 81 MG/DL (ref 70–110)
POCT GLUCOSE: 90 MG/DL (ref 70–110)
POTASSIUM SERPL-SCNC: 4.1 MMOL/L (ref 3.5–5.1)
RBC # BLD AUTO: 5.02 M/UL (ref 4.6–6.2)
RELATIVE EOSINOPHIL (OHS): 2.4 %
RELATIVE LYMPHOCYTE (OHS): 23.5 % (ref 18–48)
RELATIVE MONOCYTE (OHS): 9.8 % (ref 4–15)
RELATIVE NEUTROPHIL (OHS): 62.7 % (ref 38–73)
SODIUM SERPL-SCNC: 140 MMOL/L (ref 136–145)
WBC # BLD AUTO: 7.96 K/UL (ref 3.9–12.7)

## 2025-06-25 PROCEDURE — 99900035 HC TECH TIME PER 15 MIN (STAT)

## 2025-06-25 PROCEDURE — A4216 STERILE WATER/SALINE, 10 ML: HCPCS | Performed by: NURSE PRACTITIONER

## 2025-06-25 PROCEDURE — 63600175 PHARM REV CODE 636 W HCPCS: Performed by: NURSE PRACTITIONER

## 2025-06-25 PROCEDURE — 11000001 HC ACUTE MED/SURG PRIVATE ROOM

## 2025-06-25 PROCEDURE — 94640 AIRWAY INHALATION TREATMENT: CPT

## 2025-06-25 PROCEDURE — 94760 N-INVAS EAR/PLS OXIMETRY 1: CPT

## 2025-06-25 PROCEDURE — 36415 COLL VENOUS BLD VENIPUNCTURE: CPT | Performed by: NURSE PRACTITIONER

## 2025-06-25 PROCEDURE — 27000221 HC OXYGEN, UP TO 24 HOURS

## 2025-06-25 PROCEDURE — 25000242 PHARM REV CODE 250 ALT 637 W/ HCPCS: Performed by: NURSE PRACTITIONER

## 2025-06-25 PROCEDURE — 85025 COMPLETE CBC W/AUTO DIFF WBC: CPT | Performed by: NURSE PRACTITIONER

## 2025-06-25 PROCEDURE — 94761 N-INVAS EAR/PLS OXIMETRY MLT: CPT

## 2025-06-25 PROCEDURE — 99233 SBSQ HOSP IP/OBS HIGH 50: CPT | Mod: ,,, | Performed by: NURSE PRACTITIONER

## 2025-06-25 PROCEDURE — 83735 ASSAY OF MAGNESIUM: CPT | Performed by: NURSE PRACTITIONER

## 2025-06-25 PROCEDURE — 25000003 PHARM REV CODE 250: Performed by: NURSE PRACTITIONER

## 2025-06-25 PROCEDURE — 80048 BASIC METABOLIC PNL TOTAL CA: CPT | Performed by: NURSE PRACTITIONER

## 2025-06-25 RX ORDER — FUROSEMIDE 10 MG/ML
40 INJECTION INTRAMUSCULAR; INTRAVENOUS
Status: DISCONTINUED | OUTPATIENT
Start: 2025-06-25 | End: 2025-06-27

## 2025-06-25 RX ORDER — BUDESONIDE 0.5 MG/2ML
0.5 INHALANT ORAL EVERY 12 HOURS
Status: DISCONTINUED | OUTPATIENT
Start: 2025-06-25 | End: 2025-06-28 | Stop reason: HOSPADM

## 2025-06-25 RX ORDER — MAGNESIUM SULFATE HEPTAHYDRATE 40 MG/ML
2 INJECTION, SOLUTION INTRAVENOUS ONCE
Status: COMPLETED | OUTPATIENT
Start: 2025-06-25 | End: 2025-06-25

## 2025-06-25 RX ORDER — IPRATROPIUM BROMIDE AND ALBUTEROL SULFATE 2.5; .5 MG/3ML; MG/3ML
3 SOLUTION RESPIRATORY (INHALATION)
Status: DISCONTINUED | OUTPATIENT
Start: 2025-06-25 | End: 2025-06-25

## 2025-06-25 RX ORDER — LEFLUNOMIDE 20 MG/1
20 TABLET ORAL DAILY
Status: DISCONTINUED | OUTPATIENT
Start: 2025-06-26 | End: 2025-06-28 | Stop reason: HOSPADM

## 2025-06-25 RX ORDER — LEFLUNOMIDE 20 MG/1
20 TABLET ORAL DAILY
Status: DISCONTINUED | OUTPATIENT
Start: 2025-06-25 | End: 2025-06-25

## 2025-06-25 RX ADMIN — Medication 10 ML: at 02:06

## 2025-06-25 RX ADMIN — ATORVASTATIN CALCIUM 40 MG: 40 TABLET, FILM COATED ORAL at 09:06

## 2025-06-25 RX ADMIN — BUDESONIDE 0.5 MG: 0.5 INHALANT RESPIRATORY (INHALATION) at 07:06

## 2025-06-25 RX ADMIN — SPIRONOLACTONE 25 MG: 25 TABLET ORAL at 09:06

## 2025-06-25 RX ADMIN — MAGNESIUM SULFATE HEPTAHYDRATE 2 G: 40 INJECTION, SOLUTION INTRAVENOUS at 12:06

## 2025-06-25 RX ADMIN — Medication 10 ML: at 11:06

## 2025-06-25 RX ADMIN — GABAPENTIN 600 MG: 300 CAPSULE ORAL at 09:06

## 2025-06-25 RX ADMIN — FUROSEMIDE 40 MG: 10 INJECTION, SOLUTION INTRAVENOUS at 09:06

## 2025-06-25 RX ADMIN — GABAPENTIN 600 MG: 300 CAPSULE ORAL at 02:06

## 2025-06-25 RX ADMIN — GABAPENTIN 600 MG: 300 CAPSULE ORAL at 11:06

## 2025-06-25 RX ADMIN — ENOXAPARIN SODIUM 40 MG: 40 INJECTION SUBCUTANEOUS at 06:06

## 2025-06-25 RX ADMIN — LEFLUNOMIDE 20 MG: 20 TABLET, FILM COATED ORAL at 03:06

## 2025-06-25 RX ADMIN — FUROSEMIDE 40 MG: 10 INJECTION, SOLUTION INTRAVENOUS at 04:06

## 2025-06-25 NOTE — ASSESSMENT & PLAN NOTE
Pulmonary hypertension     Patient is identified as having Systolic (HFrEF) heart failure that is Acute on Chronic. CHF is currently uncontrolled due to volume overload due to: Continued edema of extremities and Dyspnea not returned to baseline after 1 doses of IV diuretic. Latest ECHO performed and demonstrates- Results for orders placed during the hospital encounter of 05/28/25    Echo    Interpretation Summary    Left Ventricle: The left ventricle is smaller than normal. Normal wall thickness. Mild global hypokinesis present. Septal flattening in diastole and systole consistent with right ventricular volume and pressure overload. There is mildly reduced systolic function with a visually estimated ejection fraction of 40 - 45%. Quantitated ejection fraction is 50%.  LVEF may be underestimated due to decreased filling in the setting of right ventricular failure.    Right Ventricle: The right ventricle is severely dilated. Systolic function is severely reduced.    Right Atrium: The right atrium is dilated.    Tricuspid Valve: There is moderate to severe regurgitation.    Pulmonary Artery: The estimated pulmonary artery systolic pressure is 82 mmHg.    IVC/SVC: Elevated venous pressure at 15 mmHg.  . Continue ACE/ARB Aldactone and monitor clinical status closely. Monitor on telemetry. Patient is on CHF pathway.  Monitor strict Is&Os and daily weights.  Place on fluid restriction of 1.5 L. Cardiology is consulted. Continue to stress to patient importance of self efficacy and  on diet for CHF. Last BNP reviewed- and noted below   Recent Labs   Lab 06/24/25  1106   BNP 2,003*   -IV lasix 40 mg BID  -Cont strict I's and O's   -Cont daily weights  -Cont low Na diet with 1.5 fluid restriction  -spironolactone cont at home dose  -appreciate cardiology   6/25-he is net -7.1 L since admission

## 2025-06-25 NOTE — CONSULTS
Diet Education: Low Sodium, Fluid Restriction Education  Time Spent: RD remote  Learners: Patient       Nutrition Education provided with handouts:   + Clinical Reference attachments to d/c documents  DASH diet   Low Sodium diet   Fluid restriction diet    Nutrition Related Social Determinants of Health: SDOH: Unable to assess at this time.       Comments:  Patient is NPO at this time.   Labs reviewed.  Allergies: NKFA  I/O since admit: -6050mL.  Wounds: abscess to right medial buttock region.  Edema present.  Weight loss: no significant weight loss found in medical chart weight history.  Patient admitted with heart failure diagnosis and is on lasix.  Education handouts attached to discharge paperwork as patient remains NPO at this time.  RD to continue to monitor po intake and encourage diet education compliance at follow up visits.  RD recommends to consider addition of Roderick BID to promote wound healing once NPO status advances.      Patient Active Problem List   Diagnosis    Hyperlipidemia    Rheumatoid arthritis with positive rheumatoid factor    Personal history of skin cancer    Elevated LFTs    Obstructive sleep apnea    Interstitial pulmonary fibrosis    ILD (interstitial lung disease)    Chronic bilateral low back pain with right-sided sciatica    Decreased range of motion    Muscle weakness    Lumbar spondylosis    Spinal stenosis of lumbar region with neurogenic claudication    Degenerative disc disease, lumbar    Lumbar radiculopathy    Chronic pain    Shortness of breath    Centrilobular emphysema    Decreased functional mobility    Immunosuppressed status    S/P lumbar spinal fusion    Aorto-iliac atherosclerosis    Lung nodule    Decreased ROM of right shoulder    Decreased strength, endurance, and mobility    Pulmonary hypertension    Acute on chronic right-sided heart failure     Past Medical History:   Diagnosis Date    Basal cell carcinoma 01/2024    left upper chest    COPD (chronic obstructive  pulmonary disease)     Degenerative disc disease, lumbar 06/16/2021    Hyperlipidemia     Interstitial pulmonary fibrosis     NU (obstructive sleep apnea)     Pulmonary fibrosis     Rheumatoid arthritis     SCC (squamous cell carcinoma) 07/2020    SCC right medial canthus    SCC (squamous cell carcinoma) 11/2021    mid lower neck     SCC (squamous cell carcinoma) 01/2022    right lateral cheek- in situ    SCC (squamous cell carcinoma) 09/2023    in situ L lower forearm and HAK R shoulder    SCC (squamous cell carcinoma) 01/09/2025    R lower forearm    SCC (squamous cell carcinoma) 11/2024    left upper shin    Small bowel obstruction due to adhesions 05/10/2023    Squamous cell carcinoma 07/2019    SCC in situ left temple    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    R lateral cheek     BMP  Lab Results   Component Value Date     06/25/2025    K 4.1 06/25/2025     06/25/2025    CO2 30 (H) 06/25/2025    BUN 30 (H) 06/25/2025    CREATININE 1.2 06/25/2025    CALCIUM 9.1 06/25/2025    ANIONGAP 9 06/25/2025    EGFRNORACEVR >60 06/25/2025     Lab Results   Component Value Date    HGBA1C 6.4 (H) 06/24/2025     Lab Results   Component Value Date    CALCIUM 9.1 06/25/2025    PHOS 3.7 05/09/2023     Glucose   Date Value Ref Range Status   06/25/2025 77 70 - 110 mg/dL Final   01/07/2025 93 70 - 110 mg/dL Final     Scheduled Meds:   atorvastatin  40 mg Oral Daily    budesonide-glycopyr-formoterol  2 puff Inhalation BID    enoxparin  40 mg Subcutaneous Daily    furosemide (LASIX) injection  40 mg Intravenous Q12H    gabapentin  600 mg Oral TID    senna-docusate  1 tablet Oral BID    sodium chloride 0.9%  10 mL Intravenous Q8H    spironolactone  25 mg Oral Daily     Continuous Infusions:  PRN Meds:.  Current Facility-Administered Medications:     acetaminophen, 650 mg, Oral, Q4H PRN    albuterol-ipratropium, 3 mL, Nebulization, Q4H PRN    aluminum-magnesium hydroxide-simethicone, 30  mL, Oral, QID PRN    dextrose 50%, 12.5 g, Intravenous, PRN    dextrose 50%, 25 g, Intravenous, PRN    glucagon (human recombinant), 1 mg, Intramuscular, PRN    glucose, 16 g, Oral, PRN    glucose, 24 g, Oral, PRN    insulin aspart U-100, 0-5 Units, Subcutaneous, QID (AC + HS) PRN    melatonin, 6 mg, Oral, Nightly PRN    ondansetron, 8 mg, Oral, Q8H PRN    ondansetron, 4 mg, Intravenous, Q8H PRN    simethicone, 1 tablet, Oral, QID PRN      Wt Readings from Last 15 Encounters:   06/24/25 68.8 kg (151 lb 10.8 oz)   06/05/25 67.6 kg (149 lb)   05/30/25 68.6 kg (151 lb 3.8 oz)   05/28/25 70.8 kg (156 lb)   05/27/25 70.8 kg (156 lb 1.4 oz)   05/26/25 69.4 kg (153 lb)   04/15/25 65.3 kg (143 lb 15.4 oz)   03/18/25 66.8 kg (147 lb 4.3 oz)   03/11/25 66.3 kg (146 lb 2.6 oz)   12/13/24 68 kg (150 lb)   11/11/24 67 kg (147 lb 11.3 oz)   10/24/24 67.7 kg (149 lb 4 oz)   10/04/24 66.5 kg (146 lb 9.7 oz)   07/18/24 64.9 kg (143 lb)   07/11/24 66 kg (145 lb 8.1 oz)           All questions and concerns answered. Dietitian's contact information provided.       Follow-Up:Yes     Please Re-consult as needed        Thanks,  Amisha Thomas, MS, RDN, LDN

## 2025-06-25 NOTE — PLAN OF CARE
Problem: Adult Inpatient Plan of Care  Goal: Plan of Care Review  Outcome: Progressing  Flowsheets (Taken 6/25/2025 0035)  Plan of Care Reviewed With: patient     Problem: Heart Failure  Goal: Optimal Coping  Outcome: Progressing  Intervention: Support Psychosocial Response  Flowsheets (Taken 6/25/2025 0035)  Supportive Measures:   active listening utilized   positive reinforcement provided   self-responsibility promoted   verbalization of feelings encouraged   relaxation techniques promoted   self-care encouraged   self-reflection promoted     Problem: Fall Injury Risk  Goal: Absence of Fall and Fall-Related Injury  Outcome: Progressing  Intervention: Identify and Manage Contributors  Flowsheets (Taken 6/25/2025 0035)  Self-Care Promotion:   independence encouraged   BADL personal objects within reach   BADL personal routines maintained  Medication Review/Management:   medications reviewed   high-risk medications identified     Problem: Comorbidity Management  Goal: Maintenance of COPD Symptom Control  Outcome: Progressing  Intervention: Maintain COPD Symptom Control  Flowsheets (Taken 6/25/2025 0035)  Supportive Measures:   active listening utilized   positive reinforcement provided   self-responsibility promoted   verbalization of feelings encouraged   relaxation techniques promoted   self-care encouraged   self-reflection promoted  Medication Review/Management:   medications reviewed   high-risk medications identified   Plan of care progressing.

## 2025-06-25 NOTE — ASSESSMENT & PLAN NOTE
- echo May 2025 with LVEF 40-45% severe dilation of RV with reduced function; moderate to severe TR PA pressure 82mmhg  - admitted with LEE, orthopnea and BLE edema; Lasix 40mg IV BID  - scheduled for RHC and LHC 6/27 and hopeful to proceed this week as long as euvolemia achieved   -Net -7.1L from admission  -NPO p MN for possible R&LHC on 06/26/2025

## 2025-06-25 NOTE — PLAN OF CARE
TN met with pt at bedside to discuss discharge planning. Pt lives with Rhona Garvey (spouse) [phone]. Pt is independent with ADL's. Pt has Oxygen, Straight cane and Shower chair at home. Not current with HH. Upon discharge, pts family member Rhona Garvey (spouse) [phone]  will provide transport home. Pt made aware to contact TN with any questions or concerns. TN added name and number to white board and will continue to follow and assist with any discharge needs. Pt scheduled for Angiogram this Friday. THELMA this weekend.    Future Appointments   Date Time Provider Department Center   7/7/2025  8:30 AM Kailee Valentin MD [facility] IMPRI [place] [facility]   7/21/2025 11:20 AM Lucie Judge MD [facility] DERM [place]   8/5/2025 10:30 AM LAB, [place] [facility] [place]   8/5/2025 10:45 AM LAB, [place] [facility] [place]   8/13/2025 11:00 AM Raza Gunter DPM [facility] POD [place] [facility]   10/16/2025  1:20 PM Chip Chavira MD [facility] CARDIO [place] [facility] - Telemetry  Initial Discharge Assessment       Primary Care Provider: Calin Buchanan MD    Admission Diagnosis: Shortness of breath [R06.02]  SOB (shortness of breath) [R06.02]  Chest pain [R07.9]  Bilateral lower extremity edema [R60.0]  Acute on chronic congestive heart failure, unspecified heart failure type [I50.9]  Heart failure [I50.9]    Admission Date: 6/24/2025  Expected Discharge Date: 6/26/2025    Transition of Care Barriers: (P) None    Payor: [employer] MED[employer]ARE ADVANTAGE / Plan: [employer] MCARE ADV / Product Type: Medicare Advantage /     Extended Emergency Contact Information  Primary Emergency Contact: Rhona Garvey  Address: [address] [place] [address] [place] [address] [place]  Home Phone: [phone]  Mobile Phone: [phone]  Relation: Spouse    Discharge Plan A: (P) Home, Home with family         [facility] [place]  [address]  [place] [address]  Phone: [phone]  Fax: [phone]    [facility] [place] - [address]  [address]  [facility] [address]  Phone: [phone] Fax: [phone]      Initial Assessment (most recent)       Adult Discharge Assessment - 06/25/25 1523          Discharge Assessment    Assessment Type Discharge Planning Assessment     Confirmed/corrected address, phone number and insurance Yes     Confirmed Demographics Correct on Facesheet     Source of Information patient     Communicated THELMA with patient/caregiver Yes     People in Home spouse     Name(s) of People in Home Rhona Garvey (spouse) [phone]     Do you expect to return to your current living situation? Yes     Do you have help at home or someone to help you manage your care at home? Yes     Who are your caregiver(s) and their phone number(s)? Rhona Garvey (spouse) [phone]     Prior to hospitilization cognitive status: Alert/Oriented     Current cognitive status: Alert/Oriented     Walking or Climbing Stairs Difficulty yes     Walking or Climbing Stairs ambulation difficulty, requires equipment     Mobility Management Straight cane     Dressing/Bathing Difficulty no     Dressing/Bathing bathing difficulty, requires equipment     Dressing/Bathing Management Shower chair     Equipment Currently Used at Home oxygen;cane, straight;shower chair     Readmission within 30 days? No     Patient currently being followed by outpatient case management? No     Do you currently have service(s) that help you manage your care at home? No     Do you take prescription medications? Yes     Do you have prescription coverage? Yes (P)      Do you have any problems affording any of your prescribed medications? No (P)      Is the patient taking medications as prescribed? yes (P)      Who is going to help you get home at discharge? Rhona Garvey (spouse) [phone] (P)      How do you get to doctors appointments? car, drives self (P)      Are you on  dialysis? No (P)      Do you take coumadin? No (P)      Discharge Plan A Home;Home with family (P)      DME Needed Upon Discharge  none (P)      Discharge Plan discussed with: Patient (P)      Transition of Care Barriers None (P)         Physical Activity    On average, how many days per week do you engage in moderate to strenuous exercise (like a brisk walk)? 0 days (P)      On average, how many minutes do you engage in exercise at this level? 0 min (P)         Financial Resource Strain    How hard is it for you to pay for the very basics like food, housing, medical care, and heating? Not hard at all (P)         Housing Stability    In the last 12 months, was there a time when you were not able to pay the mortgage or rent on time? No (P)      At any time in the past 12 months, were you homeless or living in a shelter (including now)? No (P)         Transportation Needs    In the past 12 months, has lack of transportation kept you from medical appointments or from getting medications? No (P)      In the past 12 months, has lack of transportation kept you from meetings, work, or from getting things needed for daily living? No (P)         Food Insecurity    Within the past 12 months, you worried that your food would run out before you got the money to buy more. Never true (P)      Within the past 12 months, the food you bought just didn't last and you didn't have money to get more. Never true (P)         Stress    Do you feel stress - tense, restless, nervous, or anxious, or unable to sleep at night because your mind is troubled all the time - these days? Not at all (P)         Social Isolation    How often do you feel lonely or isolated from those around you?  Never (P)         Alcohol Use    Q1: How often do you have a drink containing alcohol? Never (P)      Q2: How many drinks containing alcohol do you have on a typical day when you are drinking? Patient does not drink (P)      Q3: How often do you have six or  more drinks on one occasion? Never (P)         Utilities    In the past 12 months has the electric, gas, oil, or water company threatened to shut off services in your home? No (P)         Health Literacy    How often do you need to have someone help you when you read instructions, pamphlets, or other written material from your doctor or pharmacy? Never (P)

## 2025-06-25 NOTE — HOSPITAL COURSE
06/25/2025 Remains overloaded on exam. Diuresing well. Net -7L from admission. NPO p MN for potential LHC pending improvement in volume status.     06/26/2025 Mildly overloaded on exam, feeling markedly better. Continues to have 1-2+ edema to BLE. L&RHC deferred to tomorrow. On 3L NC at baseline. Cr 1.2. Net -9L from admission.

## 2025-06-25 NOTE — PROGRESS NOTES
[facility] Medicine  Progress Note    Patient Name: Fabiano Garvey  MRN: [id number]  Patient Class: IP- Inpatient   Admission Date: 6/24/2025  Length of Stay: 1 days  Attending Physician: Gerry Sue MD  Primary Care Provider: Calin Buchanan MD        Subjective     Principal Problem:Acute on chronic right-sided heart failure        HPI:  Fabiano Garvey this is a 69-year-old male with a past medical history of basal cell carcinoma, COPD, pulmonary fibrosis, pulmonary hypertension, and CHF.  Patient presented to the emergency department with complaints of worsening shortness of breath.  Patient reported a productive cough with clear sputum.  He also reported chest tightness and increased fluid retention.  Patient reports being compliant with his medication.  He denies any shortness of chest pain, palpitations, nausea, vomiting, abdominal pain, or any trauma.  His ED workup is notable for an elevated BNP and troponin.  His EKG with normal sinus rhythm.  Heart rate 98.  His chest x-ray is with a normal heart size, lungs demonstrated some mild chronic changes.  No acute process or worrisome change from his last chest x-ray.  He was given IV Lasix in the ED. Cardiology was consulted.  He will be admitted to the hospital medicine service for further workup.    Overview/Hospital Course:  No notes on file    Interval History:  Seen at the bedside.  No distress noted.  We will continue diuresis.  He is -7.1 L since admission this morning.  Appreciate Cardiology.  We will follow.    Review of Systems   Constitutional:  Negative for activity change and appetite change.   HENT:  Negative for trouble swallowing.    Respiratory:  Negative for shortness of breath.    Cardiovascular:  Positive for leg swelling. Negative for chest pain.   Gastrointestinal:  Negative for diarrhea, nausea and vomiting.   Genitourinary:  Negative for hematuria.   Psychiatric/Behavioral:  Negative for confusion.       Objective:     Vital Signs (Most Recent):  Temp: 97.3 °F (36.3 °C) (06/25/25 1128)  Pulse: 110 (06/25/25 1205)  Resp: 18 (06/25/25 1128)  BP: 126/80 (06/25/25 1128)  SpO2: 96 % (06/25/25 1128) Vital Signs (24h Range):  Temp:  [97.3 °F (36.3 °C)-97.8 °F (36.6 °C)] 97.3 °F (36.3 °C)  Pulse:  [] 110  Resp:  [16-19] 18  SpO2:  [94 %-97 %] 96 %  BP: (126-142)/() 126/80     Weight: 68.8 kg (151 lb 10.8 oz)  Body mass index is 25.24 kg/m².    Intake/Output Summary (Last 24 hours) at 6/25/2025 1505  Last data filed at 6/25/2025 1345  Gross per 24 hour   Intake --   Output 5775 ml   Net -5775 ml         Physical Exam  Vitals and nursing note reviewed.   HENT:      Mouth/Throat:      Mouth: Mucous membranes are moist.   Eyes:      Extraocular Movements: Extraocular movements intact.   Cardiovascular:      Rate and Rhythm: Regular rhythm. Tachycardia present.   Pulmonary:      Effort: Pulmonary effort is normal.   Abdominal:      General: Bowel sounds are normal. There is no distension.      Palpations: Abdomen is soft.      Tenderness: There is no abdominal tenderness. There is no guarding.   Musculoskeletal:         General: Swelling present.      Cervical back: Normal range of motion and neck supple.      Right lower leg: Edema present.      Left lower leg: Edema present.   Skin:     General: Skin is warm and dry.      Capillary Refill: Capillary refill takes 2 to 3 seconds.   Neurological:      Mental Status: He is alert and oriented to person, place, and time.   Psychiatric:         Mood and Affect: Mood normal.         Behavior: Behavior normal.               Significant Labs: All pertinent labs within the past 24 hours have been reviewed.    Significant Imaging: I have reviewed all pertinent imaging results/findings within the past 24 hours.      Assessment & Plan  Acute on chronic right-sided heart failure  Pulmonary hypertension     Patient is identified as having Systolic (HFrEF) heart failure that is  Acute on Chronic. CHF is currently uncontrolled due to volume overload due to: Continued edema of extremities and Dyspnea not returned to baseline after 1 doses of IV diuretic. Latest ECHO performed and demonstrates- Results for orders placed during the hospital encounter of 05/28/25    Echo    Interpretation Summary    Left Ventricle: The left ventricle is smaller than normal. Normal wall thickness. Mild global hypokinesis present. Septal flattening in diastole and systole consistent with right ventricular volume and pressure overload. There is mildly reduced systolic function with a visually estimated ejection fraction of 40 - 45%. Quantitated ejection fraction is 50%.  LVEF may be underestimated due to decreased filling in the setting of right ventricular failure.    Right Ventricle: The right ventricle is severely dilated. Systolic function is severely reduced.    Right Atrium: The right atrium is dilated.    Tricuspid Valve: There is moderate to severe regurgitation.    Pulmonary Artery: The estimated pulmonary artery systolic pressure is 82 mmHg.    IVC/SVC: Elevated venous pressure at 15 mmHg.  . Continue ACE/ARB Aldactone and monitor clinical status closely. Monitor on telemetry. Patient is on CHF pathway.  Monitor strict Is&Os and daily weights.  Place on fluid restriction of 1.5 L. Cardiology is consulted. Continue to stress to patient importance of self efficacy and  on diet for CHF. Last BNP reviewed- and noted below   Recent Labs   Lab 06/24/25  1106   BNP 2,003*   -IV lasix 40 mg BID  -Cont strict I's and O's   -Cont daily weights  -Cont low Na diet with 1.5 fluid restriction  -spironolactone cont at home dose  -appreciate cardiology   6/25-he is net -7.1 L since admission        Hyperlipidemia  -continue statin at home dose  Obstructive sleep apnea  -chronic problem    ILD (interstitial lung disease)  -chronic problem.  We will continue inhalers at home dose    Centrilobular emphysema   Patient  is currently off COPD Pathway. Continue scheduled inhalers Supplemental oxygen and monitor respiratory status closely.  Respiratory distress thought to be more related to fluid.  Continue to monitor.  Pulmonary hypertension      VTE Risk Mitigation (From admission, onward)           Ordered     enoxaparin injection 40 mg  Daily         06/24/25 1419     IP VTE HIGH RISK PATIENT  Once         06/24/25 1419     Place sequential compression device  Until discontinued         06/24/25 1419                    Discharge Planning   THELMA: 6/26/2025     Code Status: Full Code   Medical Readiness for Discharge Date:                            Natasha Deleon NP  Department of Hospital Medicine   [place] [facility]

## 2025-06-25 NOTE — PLAN OF CARE
Problem: Adult Inpatient Plan of Care  Goal: Plan of Care Review  6/25/2025 1844 by Jaye Finley RN  Outcome: Progressing  6/25/2025 1239 by Jaye Finley RN  Outcome: Progressing  Goal: Patient-Specific Goal (Individualized)  6/25/2025 1844 by Jaye Finley RN  Outcome: Progressing  6/25/2025 1239 by Jaye Finley RN  Outcome: Progressing  Goal: Absence of Hospital-Acquired Illness or Injury  6/25/2025 1844 by Jaye Finley RN  Outcome: Progressing  6/25/2025 1239 by Jaye Finley RN  Outcome: Progressing  Goal: Optimal Comfort and Wellbeing  6/25/2025 1844 by Jaye Finley RN  Outcome: Progressing  6/25/2025 1239 by Jaye Finley RN  Outcome: Progressing  Goal: Readiness for Transition of Care  6/25/2025 1844 by Jaye Finley RN  Outcome: Progressing  6/25/2025 1239 by Jaye Finley RN  Outcome: Progressing     Problem: Heart Failure  Goal: Optimal Coping  6/25/2025 1844 by Jaye Finley RN  Outcome: Progressing  6/25/2025 1239 by Jaye Finley RN  Outcome: Progressing  Goal: Optimal Cardiac Output  Outcome: Progressing

## 2025-06-25 NOTE — PROGRESS NOTES
[facility]  [facility] Note    Patient Name: Fabiano Garvey  MRN: [id number]  Admission Date: 6/24/2025  Hospital Length of Stay: 1 days  Code Status: Full Code   Attending Physician: Gerry Sue MD   Primary Care Physician: Calin Buchanan MD  Expected Discharge Date: 6/26/2025  Principal Problem:Acute on chronic right-sided heart failure    Subjective:     Hospital Course:   06/25/2025 Remains overloaded on exam. Diuresing well. Net -7L from admission. NPO p MN for potential LHC pending improvement in volume status.     Past Medical History:   Diagnosis Date    Basal cell carcinoma 01/2024    left upper chest    COPD (chronic obstructive pulmonary disease)     Degenerative disc disease, lumbar 06/16/2021    Hyperlipidemia     Interstitial pulmonary fibrosis     NU (obstructive sleep apnea)     Pulmonary fibrosis     Rheumatoid arthritis     SCC (squamous cell carcinoma) 07/2020    SCC right medial canthus    SCC (squamous cell carcinoma) 11/2021    mid lower neck     SCC (squamous cell carcinoma) 01/2022    right lateral cheek- in situ    SCC (squamous cell carcinoma) 09/2023    in situ L lower forearm and HAK R shoulder    SCC (squamous cell carcinoma) 01/09/2025    R lower forearm    SCC (squamous cell carcinoma) 11/2024    left upper shin    Small bowel obstruction due to adhesions 05/10/2023    Squamous cell carcinoma 07/2019    SCC in situ left temple    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    R lateral cheek       Past Surgical History:   Procedure Laterality Date    COLONOSCOPY N/A 7/25/2017    Procedure: COLONOSCOPY;  Surgeon: Bill Nicole MD;  Location: [address];  Service: Endoscopy;  Laterality: N/A;    DIAGNOSTIC LAPAROSCOPY N/A 5/10/2023    Procedure: LAPAROSCOPY, DIAGNOSTIC possible laparotomy other as indicated;  Surgeon: Eitan Palomares MD;  Location: [facility];  Service: General;  Laterality: N/A;    ENDOSCOPIC  ULTRASOUND OF UPPER GASTROINTESTINAL TRACT N/A 12/13/2024    Procedure: ULTRASOUND, UPPER GI TRACT, ENDOSCOPIC;  Surgeon: Harrison Calderon MD;  Location: [facility] ENDO;  Service: Endoscopy;  Laterality: N/A;  10/25 portal-EUS at [place] for dilated PD and weight loss. calderon -tt  10/31 r/s updated portal instr-tt  12/6 PRECALL ATTEMPTED-NA/RT  12/9 SWP PRECALL COMPLETE-RT    EPIDURAL STEROID INJECTION INTO LUMBAR SPINE N/A 7/29/2021    Procedure: Injection-steroid-epidural-lumbar L5-S1;  Surgeon: Shiv Vernon Jr., MD;  Location: [facility] PAIN MGT;  Service: Pain Management;  Laterality: N/A;  oral sedation   no pacemaker     FUSION OF SPINE WITH INSTRUMENTATION Left 12/14/2022    Procedure: FUSION, SPINE, WITH INSTRUMENTATION Stg1: Left L3-5 OLiF conduit lateral cage BMP;  Surgeon: August Aguilar MD;  Location: [facility] OR;  Service: Neurosurgery;  Laterality: Left;  Procedure: Stg1: Left L3-5 OLiF conduit lateral cage BMP  Surgery Time: 2hrs  LOS: 3  Anesthesia: General  Blood: Type & Screen  Radiology: C-arm  Brace: LSO  Bed: Regular Bed  Position: Lateral Right Down  Equip    FUSION, SPINE, POSTERIOR APPROACH N/A 12/14/2022    Procedure: FUSION,SPINE,POSTERIOR APPROACH Stg 2: L3-5 posterior instrumentation viper prime, L3-5 posterolateral arthrodesis;  Surgeon: August Aguilar MD;  Location: [facility] OR;  Service: Neurosurgery;  Laterality: N/A;  Procedure: Stg 2: L3-5 posterior instrumentation viper prime, L3-5 posterolateral arthrodesis  Surgery Time: 3hrs  LOS: 3  Anesthesia: General  Blood: Type & Screen  Radiology: Bra    LYSIS OF ADHESIONS N/A 5/10/2023    Procedure: LYSIS, ADHESIONS;  Surgeon: Eitan Palomares MD;  Location: [facility] OR;  Service: General;  Laterality: N/A;    NO PAST SURGERIES      TRANSFORAMINAL EPIDURAL INJECTION OF STEROID Right 6/29/2021    Procedure: Injection,steroid,epidural,transforaminal approach-RIGHT-- L4-5, L5-S1-- (IV Sedation);  Surgeon: Shiv Vernon Jr., MD;  Location: [facility] PAIN MGT;   Service: Pain Management;  Laterality: Right;    TRANSFORAMINAL EPIDURAL INJECTION OF STEROID Left 10/27/2022    Procedure: Injection,steroid,epidural,transforaminal left L4-5 and L5-S1;  Surgeon: Shiv Vernon Jr., MD;  [facility];  Service: Pain Management;  Laterality: Left;       Review of patient's allergies indicates:   Allergen Reactions    Plaquenil [hydroxychloroquine]      Lightheaded and muscle aches       No current facility-administered medications on file prior to encounter.     Current Outpatient Medications on File Prior to Encounter   Medication Sig    albuterol (VENTOLIN HFA) 90 mcg/actuation inhaler Inhale 2 puffs into the lungs every 6 (six) hours as needed for Wheezing (cough). Rescue    atorvastatin (LIPITOR) 40 MG tablet Take 1 tablet (40 mg total) by mouth once daily.    budesonide-glycopyr-formoterol (BREZTRI AEROSPHERE) 160-9-4.8 mcg/actuation HFAA Inhale 2 puffs into the lungs 2 (two) times a day.    gabapentin (NEURONTIN) 300 MG capsule Take 2 capsules (600 mg total) by mouth 3 (three) times daily.    ibuprofen (ADVIL,MOTRIN) 800 MG tablet Take 1 tablet (800 mg total) by mouth 3 (three) times daily as needed for Pain.    leflunomide (ARAVA) 20 MG Tab Take 1 tablet (20 mg total) by mouth once daily.    potassium chloride SA (K-DUR,KLOR-CON M) 10 MEQ tablet Take 1 tablet (10 mEq total) by mouth daily as needed (take wtih furosemide).    spironolactone (ALDACTONE) 25 MG tablet Take 1 tablet (25 mg total) by mouth once daily.    celecoxib (CELEBREX) 200 MG capsule Take 1 capsule (200 mg total) by mouth 2 (two) times daily. (Patient not taking: Reported on 6/5/2025)    hepatitis A virus vaccine (Adult 19YRS up)(PF) (HAVRIX) 1440 Unit/mL syringe Inject 1 mL (1,440 Units total) into the muscle As instructed (1 mL IM once then repeat in 6-12 months later).    furosemide (LASIX) 20 MG tablet Take 2 tablets (40 mg total) by mouth once daily. (Patient not taking: Reported on  2025)    losartan (COZAAR) 25 MG tablet Take 1 tablet (25 mg total) by mouth once daily.    predniSONE (DELTASONE) 5 MG tablet Take 1 tablet (5 mg total) by mouth once daily. (Patient not taking: Reported on 2025)     Family History       Problem Relation (Age of Onset)    Arthritis Sister, Brother    Asthma Daughter    Cancer Paternal Uncle    Coronary artery disease Father    Gallbladder disease Daughter    Heart attack Father    Heart failure Mother    Seizures Daughter          Tobacco Use    Smoking status: Former     Current packs/day: 0.00     Average packs/day: 0.8 packs/day for 37.5 years (28.1 ttl pk-yrs)     Types: Cigarettes     Start date:      Quit date: 2012     Years since quittin.9     Passive exposure: Never    Smokeless tobacco: Never   Substance and Sexual Activity    Alcohol use: Yes     Comment: rarely    Drug use: Never    Sexual activity: Yes     Partners: Female     Birth control/protection: None     Review of Systems   Constitutional: Negative for chills, decreased appetite, diaphoresis and fever.   Cardiovascular:  Positive for dyspnea on exertion, leg swelling and orthopnea. Negative for chest pain, claudication, cyanosis, irregular heartbeat, near-syncope, palpitations, paroxysmal nocturnal dyspnea and syncope.   Respiratory:  Negative for cough, hemoptysis, shortness of breath and wheezing.    Gastrointestinal:  Negative for bloating, abdominal pain, constipation, diarrhea, melena, nausea and vomiting.   Neurological:  Negative for dizziness and weakness.     Objective:     Vital Signs (Most Recent):  Temp: 97.3 °F (36.3 °C) (25 1128)  Pulse: 110 (25 1205)  Resp: 18 (25 1128)  BP: 126/80 (25 1128)  SpO2: 96 % (25 1128) Vital Signs (24h Range):  Temp:  [97.3 °F (36.3 °C)-97.8 °F (36.6 °C)] 97.3 °F (36.3 °C)  Pulse:  [] 110  Resp:  [15-19] 18  SpO2:  [94 %-97 %] 96 %  BP: (126-142)/() 126/80     Weight: 68.8 kg (151 lb 10.8  oz)  Body mass index is 25.24 kg/m².    SpO2: 96 %         Intake/Output Summary (Last 24 hours) at 6/25/2025 1220  Last data filed at 6/25/2025 1132  Gross per 24 hour   Intake --   Output 7125 ml   Net -7125 ml       Lines/Drains/Airways       Peripheral Intravenous Line  Duration             Peripheral IV Single Lumen 06/24/25 20 G Left Antecubital 1 day                     Physical Exam  Constitutional:       General: He is not in acute distress.     Appearance: He is well-developed.   Cardiovascular:      Rate and Rhythm: Normal rate and regular rhythm.      Heart sounds: No murmur heard.     No gallop.   Pulmonary:      Effort: Pulmonary effort is normal. No respiratory distress.      Breath sounds: Examination of the right-middle field reveals rales. Examination of the left-middle field reveals rales. Examination of the right-lower field reveals rales. Examination of the left-lower field reveals rales. Rales present. No wheezing.   Abdominal:      General: Bowel sounds are normal. There is no distension.      Palpations: Abdomen is soft.      Tenderness: There is no abdominal tenderness.   Musculoskeletal:      Right lower leg: Edema (2+) present.      Left lower leg: Edema (2+) present.   Skin:     General: Skin is warm and dry.   Neurological:      Mental Status: He is alert and oriented to person, place, and time.              Significant Imaging: Echocardiogram: Transthoracic echo (TTE) complete ([facility]upid Only):   Results for orders placed or performed during the hospital encounter of 05/28/25   Echo   Result Value Ref Range    LV Diastolic Volume 80 mL    Echo EF Estimated 42 %    LV Systolic Volume 47 mL    IVS 0.8 0.6 - 1.1 cm    LVIDd 4.2 3.5 - 6.0 cm    LVIDs 3.4 2.1 - 4.0 cm    LVOT diameter 2.2 cm    PW 0.9 0.6 - 1.1 cm    IVRT 186 ms    AV LVOT peak gradient 1 mmHg    LVOT mn grad 1 mmHg    LVOT peak aram 0.5 m/s    LVOT peak VTI 7.5 cm    RV- leung basal diam 5.4 cm    RVOT prox diameter 2.70 cm    RV  "S' 7.49 cm/s    LA size 2.9 cm    Left Atrium Major Axis 5.1 cm    Left Atrium Minor Axis 5.1 cm    LA Vol (MOD) 27 mL    RA Major Axis 5.81 cm    RA Area 24.5 cm2    AV valve area 2.4 cm2    AV area by cont VTI 2.4 cm2    AV peak gradient 3 mmHg    AV mean gradient 1 mmHg    Ao peak jose 0.8 m/s    Ao VTI 12.1 cm    MV Peak A Jose 0.25 m/s    E wave deceleration time 93 ms    MV Peak E Jose 0.60 m/s    E/A ratio 2.40     LV LATERAL E/E' RATIO 8.6     LV SEPTAL E/E' RATIO 12.0     TDI LATERAL 0.07 m/s    TDI SEPTAL 0.05 m/s    MV peak gradient 3 mmHg    MV mean gradient 1 mmHg    TV peak gradient 68 mmHg    TR Max Jose 4.1 m/s    Ascending aorta 3.3 cm    STJ 2.9 cm    P vein A 0.3 m/s    MV "A" wave duration 83.73 ms    Pulm vein "A" wave 83.73 ms    PV Peak D Jose 0.24 m/s    PV Peak S Jose 0.59 m/s    IVC diameter 2.44 cm    Sinus 3.57 cm    RA Width 5.13 cm    TAPSE 1.0 cm    LA WIDTH 2.9 cm    BSA 1.8 m2    LVOT stroke volume 28.5 cm3    LV Systolic Volume Index 26.4 mL/m2    LV Diastolic Volume Index 44.94 mL/m2    LVOT area 3.8 cm2    FS 19.0 (A) 28 - 44 %    Left Ventricle Relative Wall Thickness 0.43 cm    LV mass 109.8 g    LV Mass Index 61.7 g/m2    E/E' ratio 10 m/s    TENNILLE 20 mL/m2    LA Vol 36 cm3    Pulm vein S/D ratio 2.46     RVOT area 5.72 cm2    RV/LV Ratio 1.29 cm    TENNILLE (MOD) 15 mL/m2    AV Velocity Ratio 0.63     AV index (prosthetic) 0.62     ROMY by Velocity Ratio 2.4 cm²    ASI 2.0 cm/m2    ASI 1.9 cm/m2    Mean e' 0.06 m/s    ZLVIDS 0.88     ZLVIDD -1.58     Mitral Valve Heart Rate 93 bpm    TV resting pulmonary artery pressure 82 mmHg    RV TB RVSP 19 mmHg    Est. RA pres 15 mmHg    Pineda's Biplane MOD Ejection Fraction 50 %    Narrative      Left Ventricle: The left ventricle is smaller than normal. Normal wall   thickness. Mild global hypokinesis present. Septal flattening in diastole   and systole consistent with right ventricular volume and pressure   overload. There is mildly reduced " systolic function with a visually   estimated ejection fraction of 40 - 45%. Quantitated ejection fraction is   50%.  LVEF may be underestimated due to decreased filling in the setting   of right ventricular failure.    Right Ventricle: The right ventricle is severely dilated. Systolic   function is severely reduced.    Right Atrium: The right atrium is dilated.    Tricuspid Valve: There is moderate to severe regurgitation.    Pulmonary Artery: The estimated pulmonary artery systolic pressure is   82 mmHg.    IVC/SVC: Elevated venous pressure at 15 mmHg.       Assessment and Plan:     Brief HPI: Patient seen this morning on rounds with reports of improvement but not back to baseline. Remains overloaded on exam, IV diuresis continued.     * Acute on chronic right-sided heart failure  - echo May 2025 with LVEF 40-45% severe dilation of RV with reduced function; moderate to severe TR PA pressure 82mmhg  - admitted with LEE, orthopnea and BLE edema; Lasix 40mg IV BID  - scheduled for RHC and LHC 6/27 and hopeful to proceed this week as long as euvolemia achieved   -Net -7.1L from admission  -NPO p MN for possible R&LHC on 06/26/2025     Pulmonary hypertension  - estimated PA pressure 87mmHg on echo in May 2025  - long history of tobacco abuse with ILD and emphysema  - IV diuresis in process; plan for RHC with LHC hopeful to proceed later this week- NPO p MN and will re-evaluate volume status on 06/26/2025 AM    ILD (interstitial lung disease)  - history of tobacco abuse and emphysema  - on home O2      Elevated LFTs  - total bili 1.7 upon admission  - felt to be related to hepatic congestion in setting of ADHF  - monitor closely     Hyperlipidemia  - on Lipitor 40 as an outpatient  - recent FLP with LDL 77  - cholesterol at goal; continue statin therapy; cholesterol well controlled           VTE Risk Mitigation (From admission, onward)           Ordered     enoxaparin injection 40 mg  Daily         06/24/25 1419     IP  VTE HIGH RISK PATIENT  Once         06/24/25 1419     Place sequential compression device  Until discontinued         06/24/25 1419                    Onofre Perla NP  Cardiology  [place] - Telemetry

## 2025-06-25 NOTE — PLAN OF CARE
Problem: Adult Inpatient Plan of Care  Goal: Plan of Care Review  Outcome: Progressing  Goal: Patient-Specific Goal (Individualized)  Outcome: Progressing  Goal: Absence of Hospital-Acquired Illness or Injury  Outcome: Progressing  Goal: Optimal Comfort and Wellbeing  Outcome: Progressing  Goal: Readiness for Transition of Care  Outcome: Progressing     Problem: Heart Failure  Goal: Optimal Coping  Outcome: Progressing

## 2025-06-25 NOTE — SUBJECTIVE & OBJECTIVE
Interval History:  Seen at the bedside.  No distress noted.  We will continue diuresis.  He is -7.1 L since admission this morning.  Appreciate Cardiology.  We will follow.    Review of Systems   Constitutional:  Negative for activity change and appetite change.   HENT:  Negative for trouble swallowing.    Respiratory:  Negative for shortness of breath.    Cardiovascular:  Positive for leg swelling. Negative for chest pain.   Gastrointestinal:  Negative for diarrhea, nausea and vomiting.   Genitourinary:  Negative for hematuria.   Psychiatric/Behavioral:  Negative for confusion.      Objective:     Vital Signs (Most Recent):  Temp: 97.3 °F (36.3 °C) (06/25/25 1128)  Pulse: 110 (06/25/25 1205)  Resp: 18 (06/25/25 1128)  BP: 126/80 (06/25/25 1128)  SpO2: 96 % (06/25/25 1128) Vital Signs (24h Range):  Temp:  [97.3 °F (36.3 °C)-97.8 °F (36.6 °C)] 97.3 °F (36.3 °C)  Pulse:  [] 110  Resp:  [16-19] 18  SpO2:  [94 %-97 %] 96 %  BP: (126-142)/() 126/80     Weight: 68.8 kg (151 lb 10.8 oz)  Body mass index is 25.24 kg/m².    Intake/Output Summary (Last 24 hours) at 6/25/2025 1505  Last data filed at 6/25/2025 1345  Gross per 24 hour   Intake --   Output 5775 ml   Net -5775 ml         Physical Exam  Vitals and nursing note reviewed.   HENT:      Mouth/Throat:      Mouth: Mucous membranes are moist.   Eyes:      Extraocular Movements: Extraocular movements intact.   Cardiovascular:      Rate and Rhythm: Regular rhythm. Tachycardia present.   Pulmonary:      Effort: Pulmonary effort is normal.   Abdominal:      General: Bowel sounds are normal. There is no distension.      Palpations: Abdomen is soft.      Tenderness: There is no abdominal tenderness. There is no guarding.   Musculoskeletal:         General: Swelling present.      Cervical back: Normal range of motion and neck supple.      Right lower leg: Edema present.      Left lower leg: Edema present.   Skin:     General: Skin is warm and dry.      Capillary  Refill: Capillary refill takes 2 to 3 seconds.   Neurological:      Mental Status: He is alert and oriented to person, place, and time.   Psychiatric:         Mood and Affect: Mood normal.         Behavior: Behavior normal.               Significant Labs: All pertinent labs within the past 24 hours have been reviewed.    Significant Imaging: I have reviewed all pertinent imaging results/findings within the past 24 hours.

## 2025-06-26 LAB
ABSOLUTE EOSINOPHIL (OHS): 0.23 K/UL
ABSOLUTE MONOCYTE (OHS): 0.8 K/UL (ref 0.3–1)
ABSOLUTE NEUTROPHIL COUNT (OHS): 4.66 K/UL (ref 1.8–7.7)
ANION GAP (OHS): 11 MMOL/L (ref 8–16)
BASOPHILS # BLD AUTO: 0.08 K/UL
BASOPHILS NFR BLD AUTO: 1.1 %
BUN SERPL-MCNC: 31 MG/DL (ref 8–23)
CALCIUM SERPL-MCNC: 8.9 MG/DL (ref 8.7–10.5)
CHLORIDE SERPL-SCNC: 97 MMOL/L (ref 95–110)
CO2 SERPL-SCNC: 34 MMOL/L (ref 23–29)
CREAT SERPL-MCNC: 1.3 MG/DL (ref 0.5–1.4)
ERYTHROCYTE [DISTWIDTH] IN BLOOD BY AUTOMATED COUNT: 17.9 % (ref 11.5–14.5)
GFR SERPLBLD CREATININE-BSD FMLA CKD-EPI: 59 ML/MIN/1.73/M2
GLUCOSE SERPL-MCNC: 87 MG/DL (ref 70–110)
HCT VFR BLD AUTO: 47.7 % (ref 40–54)
HGB BLD-MCNC: 15.7 GM/DL (ref 14–18)
IMM GRANULOCYTES # BLD AUTO: 0.04 K/UL (ref 0–0.04)
IMM GRANULOCYTES NFR BLD AUTO: 0.5 % (ref 0–0.5)
LYMPHOCYTES # BLD AUTO: 1.71 K/UL (ref 1–4.8)
MAGNESIUM SERPL-MCNC: 1.8 MG/DL (ref 1.6–2.6)
MCH RBC QN AUTO: 30.8 PG (ref 27–31)
MCHC RBC AUTO-ENTMCNC: 32.9 G/DL (ref 32–36)
MCV RBC AUTO: 94 FL (ref 82–98)
NUCLEATED RBC (/100WBC) (OHS): 0 /100 WBC
PLATELET # BLD AUTO: 173 K/UL (ref 150–450)
PMV BLD AUTO: 12.1 FL (ref 9.2–12.9)
POCT GLUCOSE: 89 MG/DL (ref 70–110)
POTASSIUM SERPL-SCNC: 4.5 MMOL/L (ref 3.5–5.1)
RBC # BLD AUTO: 5.1 M/UL (ref 4.6–6.2)
RELATIVE EOSINOPHIL (OHS): 3.1 %
RELATIVE LYMPHOCYTE (OHS): 22.7 % (ref 18–48)
RELATIVE MONOCYTE (OHS): 10.6 % (ref 4–15)
RELATIVE NEUTROPHIL (OHS): 62 % (ref 38–73)
SODIUM SERPL-SCNC: 142 MMOL/L (ref 136–145)
WBC # BLD AUTO: 7.52 K/UL (ref 3.9–12.7)

## 2025-06-26 PROCEDURE — 25000003 PHARM REV CODE 250: Performed by: STUDENT IN AN ORGANIZED HEALTH CARE EDUCATION/TRAINING PROGRAM

## 2025-06-26 PROCEDURE — 94640 AIRWAY INHALATION TREATMENT: CPT

## 2025-06-26 PROCEDURE — 25000003 PHARM REV CODE 250: Performed by: FAMILY MEDICINE

## 2025-06-26 PROCEDURE — 99233 SBSQ HOSP IP/OBS HIGH 50: CPT | Mod: ,,, | Performed by: NURSE PRACTITIONER

## 2025-06-26 PROCEDURE — 11000001 HC ACUTE MED/SURG PRIVATE ROOM

## 2025-06-26 PROCEDURE — 63600175 PHARM REV CODE 636 W HCPCS: Performed by: NURSE PRACTITIONER

## 2025-06-26 PROCEDURE — 85025 COMPLETE CBC W/AUTO DIFF WBC: CPT | Performed by: NURSE PRACTITIONER

## 2025-06-26 PROCEDURE — 82435 ASSAY OF BLOOD CHLORIDE: CPT | Performed by: NURSE PRACTITIONER

## 2025-06-26 PROCEDURE — 94761 N-INVAS EAR/PLS OXIMETRY MLT: CPT

## 2025-06-26 PROCEDURE — 27000221 HC OXYGEN, UP TO 24 HOURS

## 2025-06-26 PROCEDURE — 25000003 PHARM REV CODE 250: Performed by: NURSE PRACTITIONER

## 2025-06-26 PROCEDURE — 99900035 HC TECH TIME PER 15 MIN (STAT)

## 2025-06-26 PROCEDURE — 36415 COLL VENOUS BLD VENIPUNCTURE: CPT | Performed by: NURSE PRACTITIONER

## 2025-06-26 PROCEDURE — 25000242 PHARM REV CODE 250 ALT 637 W/ HCPCS: Performed by: NURSE PRACTITIONER

## 2025-06-26 PROCEDURE — 83735 ASSAY OF MAGNESIUM: CPT | Performed by: NURSE PRACTITIONER

## 2025-06-26 PROCEDURE — A4216 STERILE WATER/SALINE, 10 ML: HCPCS | Performed by: NURSE PRACTITIONER

## 2025-06-26 RX ORDER — GABAPENTIN 300 MG/1
300 CAPSULE ORAL 2 TIMES DAILY
Status: DISCONTINUED | OUTPATIENT
Start: 2025-06-26 | End: 2025-06-28 | Stop reason: HOSPADM

## 2025-06-26 RX ADMIN — FUROSEMIDE 40 MG: 10 INJECTION, SOLUTION INTRAVENOUS at 06:06

## 2025-06-26 RX ADMIN — BUDESONIDE 0.5 MG: 0.5 INHALANT RESPIRATORY (INHALATION) at 07:06

## 2025-06-26 RX ADMIN — ENOXAPARIN SODIUM 40 MG: 40 INJECTION SUBCUTANEOUS at 04:06

## 2025-06-26 RX ADMIN — LEFLUNOMIDE 20 MG: 20 TABLET, FILM COATED ORAL at 11:06

## 2025-06-26 RX ADMIN — GABAPENTIN 300 MG: 300 CAPSULE ORAL at 11:06

## 2025-06-26 RX ADMIN — GABAPENTIN 300 MG: 300 CAPSULE ORAL at 09:06

## 2025-06-26 RX ADMIN — FUROSEMIDE 40 MG: 10 INJECTION, SOLUTION INTRAVENOUS at 04:06

## 2025-06-26 RX ADMIN — Medication 10 ML: at 06:06

## 2025-06-26 RX ADMIN — ATORVASTATIN CALCIUM 40 MG: 40 TABLET, FILM COATED ORAL at 11:06

## 2025-06-26 RX ADMIN — Medication 10 ML: at 04:06

## 2025-06-26 RX ADMIN — SPIRONOLACTONE 25 MG: 25 TABLET ORAL at 11:06

## 2025-06-26 RX ADMIN — Medication 10 ML: at 09:06

## 2025-06-26 RX ADMIN — SENNOSIDES AND DOCUSATE SODIUM 1 TABLET: 50; 8.6 TABLET ORAL at 11:06

## 2025-06-26 NOTE — PROGRESS NOTES
[facility] Medicine  Progress Note    Patient Name: Fabiano Garvey  MRN: [id number]  Patient Class: IP- Inpatient   Admission Date: 6/24/2025  Length of Stay: 2 days  Attending Physician: Chantelle Barcenas*  Primary Care Provider: Calin Buchanan MD        Subjective     Principal Problem:Acute on chronic right-sided heart failure        HPI:  Fabiano Garvey this is a 69-year-old male with a past medical history of basal cell carcinoma, COPD, pulmonary fibrosis, pulmonary hypertension, and CHF.  Patient presented to the emergency department with complaints of worsening shortness of breath.  Patient reported a productive cough with clear sputum.  He also reported chest tightness and increased fluid retention.  Patient reports being compliant with his medication.  He denies any shortness of chest pain, palpitations, nausea, vomiting, abdominal pain, or any trauma.  His ED workup is notable for an elevated BNP and troponin.  His EKG with normal sinus rhythm.  Heart rate 98.  His chest x-ray is with a normal heart size, lungs demonstrated some mild chronic changes.  No acute process or worrisome change from his last chest x-ray.  He was given IV Lasix in the ED. Cardiology was consulted.  He will be admitted to the hospital medicine service for further workup.    Overview/Hospital Course:  No notes on file    Interval History:  Seen at the bedside.  No distress noted.  We will continue diuresis.  He is -9.0 L since admission this morning.  Appreciate Cardiology.  We will follow.  Planning for left heart catheterization in the morning.  NPO after midnight ordered.    Review of Systems   Constitutional:  Negative for activity change and appetite change.   HENT:  Negative for trouble swallowing.    Respiratory:  Negative for shortness of breath.    Cardiovascular:  Positive for leg swelling (Improving). Negative for chest pain.   Gastrointestinal:  Negative for diarrhea, nausea and vomiting.    Genitourinary:  Negative for hematuria.   Psychiatric/Behavioral:  Negative for confusion.      Objective:     Vital Signs (Most Recent):  Temp: 97.4 °F (36.3 °C) (06/26/25 1132)  Pulse: 88 (06/26/25 1153)  Resp: 18 (06/26/25 1132)  BP: 102/71 (06/26/25 1132)  SpO2: (!) 93 % (06/26/25 1132) Vital Signs (24h Range):  Temp:  [97.4 °F (36.3 °C)-97.5 °F (36.4 °C)] 97.4 °F (36.3 °C)  Pulse:  [] 88  Resp:  [17-20] 18  SpO2:  [90 %-95 %] 93 %  BP: ()/(65-86) 102/71     Weight: 63.4 kg (139 lb 12.4 oz)  Body mass index is 23.26 kg/m².    Intake/Output Summary (Last 24 hours) at 6/26/2025 1243  Last data filed at 6/26/2025 1138  Gross per 24 hour   Intake 200 ml   Output 2525 ml   Net -2325 ml         Physical Exam  Vitals and nursing note reviewed.   HENT:      Mouth/Throat:      Mouth: Mucous membranes are moist.   Eyes:      Extraocular Movements: Extraocular movements intact.   Cardiovascular:      Rate and Rhythm: Regular rhythm. Tachycardia present.   Pulmonary:      Effort: Pulmonary effort is normal.   Abdominal:      General: Bowel sounds are normal. There is no distension.      Palpations: Abdomen is soft.      Tenderness: There is no abdominal tenderness. There is no guarding.   Musculoskeletal:         General: Swelling present.      Cervical back: Normal range of motion and neck supple.      Right lower leg: Edema (Improving) present.      Left lower leg: Edema (Improving) present.   Skin:     General: Skin is warm and dry.      Capillary Refill: Capillary refill takes 2 to 3 seconds.   Neurological:      Mental Status: He is alert and oriented to person, place, and time.   Psychiatric:         Mood and Affect: Mood normal.         Behavior: Behavior normal.               Significant Labs: All pertinent labs within the past 24 hours have been reviewed.    Significant Imaging: I have reviewed all pertinent imaging results/findings within the past 24 hours.      Assessment & Plan  Acute on chronic  right-sided heart failure  Pulmonary hypertension     Patient is identified as having Systolic (HFrEF) heart failure that is Acute on Chronic. CHF is currently uncontrolled due to volume overload due to: Continued edema of extremities and Dyspnea not returned to baseline after 1 doses of IV diuretic. Latest ECHO performed and demonstrates- Results for orders placed during the hospital encounter of 05/28/25    Echo    Interpretation Summary    Left Ventricle: The left ventricle is smaller than normal. Normal wall thickness. Mild global hypokinesis present. Septal flattening in diastole and systole consistent with right ventricular volume and pressure overload. There is mildly reduced systolic function with a visually estimated ejection fraction of 40 - 45%. Quantitated ejection fraction is 50%.  LVEF may be underestimated due to decreased filling in the setting of right ventricular failure.    Right Ventricle: The right ventricle is severely dilated. Systolic function is severely reduced.    Right Atrium: The right atrium is dilated.    Tricuspid Valve: There is moderate to severe regurgitation.    Pulmonary Artery: The estimated pulmonary artery systolic pressure is 82 mmHg.    IVC/SVC: Elevated venous pressure at 15 mmHg.  . Continue ACE/ARB Aldactone and monitor clinical status closely. Monitor on telemetry. Patient is on CHF pathway.  Monitor strict Is&Os and daily weights.  Place on fluid restriction of 1.5 L. Cardiology is consulted. Continue to stress to patient importance of self efficacy and  on diet for CHF. Last BNP reviewed- and noted below   Recent Labs   Lab 06/24/25  1106   BNP 2,003*   -IV lasix 40 mg BID  -Cont strict I's and O's   -Cont daily weights  -Cont low Na diet with 1.5 fluid restriction  -spironolactone cont at home dose  -appreciate cardiology   6/25-he is net -7.1 L since admission  6/26-he is -9 L since admission.  We will continue IV diuresis.  Cardiology is planning left heart  catheterization on tomorrow morning.  NPO after midnight.        Hyperlipidemia  -continue statin at home dose  Obstructive sleep apnea  -chronic problem  -CPAP at bedtime  ILD (interstitial lung disease)  -chronic problem.  We will continue inhalers at home dose    Centrilobular emphysema   Patient is currently off COPD Pathway. Continue scheduled inhalers Supplemental oxygen and monitor respiratory status closely.  Respiratory distress thought to be more related to fluid.  Continue to monitor.  Pulmonary hypertension      VTE Risk Mitigation (From admission, onward)           Ordered     enoxaparin injection 40 mg  Daily         06/24/25 1419     IP VTE HIGH RISK PATIENT  Once         06/24/25 1419     Place sequential compression device  Until discontinued         06/24/25 1419                    Discharge Planning   THELMA: 6/29/2025     Code Status: Full Code   Medical Readiness for Discharge Date:   Discharge Plan A: Home, Home with family                        Natasha Deleon NP  Department of Hospital Medicine   [place] [facility]

## 2025-06-26 NOTE — SUBJECTIVE & OBJECTIVE
Interval History:  Seen at the bedside.  No distress noted.  We will continue diuresis.  He is -9.0 L since admission this morning.  Appreciate Cardiology.  We will follow.  Planning for left heart catheterization in the morning.  NPO after midnight ordered.    Review of Systems   Constitutional:  Negative for activity change and appetite change.   HENT:  Negative for trouble swallowing.    Respiratory:  Negative for shortness of breath.    Cardiovascular:  Positive for leg swelling (Improving). Negative for chest pain.   Gastrointestinal:  Negative for diarrhea, nausea and vomiting.   Genitourinary:  Negative for hematuria.   Psychiatric/Behavioral:  Negative for confusion.      Objective:     Vital Signs (Most Recent):  Temp: 97.4 °F (36.3 °C) (06/26/25 1132)  Pulse: 88 (06/26/25 1153)  Resp: 18 (06/26/25 1132)  BP: 102/71 (06/26/25 1132)  SpO2: (!) 93 % (06/26/25 1132) Vital Signs (24h Range):  Temp:  [97.4 °F (36.3 °C)-97.5 °F (36.4 °C)] 97.4 °F (36.3 °C)  Pulse:  [] 88  Resp:  [17-20] 18  SpO2:  [90 %-95 %] 93 %  BP: ()/(65-86) 102/71     Weight: 63.4 kg (139 lb 12.4 oz)  Body mass index is 23.26 kg/m².    Intake/Output Summary (Last 24 hours) at 6/26/2025 1243  Last data filed at 6/26/2025 1138  Gross per 24 hour   Intake 200 ml   Output 2525 ml   Net -2325 ml         Physical Exam  Vitals and nursing note reviewed.   HENT:      Mouth/Throat:      Mouth: Mucous membranes are moist.   Eyes:      Extraocular Movements: Extraocular movements intact.   Cardiovascular:      Rate and Rhythm: Regular rhythm. Tachycardia present.   Pulmonary:      Effort: Pulmonary effort is normal.   Abdominal:      General: Bowel sounds are normal. There is no distension.      Palpations: Abdomen is soft.      Tenderness: There is no abdominal tenderness. There is no guarding.   Musculoskeletal:         General: Swelling present.      Cervical back: Normal range of motion and neck supple.      Right lower leg: Edema  (Improving) present.      Left lower leg: Edema (Improving) present.   Skin:     General: Skin is warm and dry.      Capillary Refill: Capillary refill takes 2 to 3 seconds.   Neurological:      Mental Status: He is alert and oriented to person, place, and time.   Psychiatric:         Mood and Affect: Mood normal.         Behavior: Behavior normal.               Significant Labs: All pertinent labs within the past 24 hours have been reviewed.    Significant Imaging: I have reviewed all pertinent imaging results/findings within the past 24 hours.

## 2025-06-26 NOTE — ASSESSMENT & PLAN NOTE
- estimated PA pressure 87mmHg on echo in May 2025  - long history of tobacco abuse with ILD and emphysema  - IV diuresis in process; plan for RHC with [facility] hopeful to proceed Friday- NPO p MN and will re-evaluate volume status on 06/27/2025 AM

## 2025-06-26 NOTE — ASSESSMENT & PLAN NOTE
Pulmonary hypertension     Patient is identified as having Systolic (HFrEF) heart failure that is Acute on Chronic. CHF is currently uncontrolled due to volume overload due to: Continued edema of extremities and Dyspnea not returned to baseline after 1 doses of IV diuretic. Latest ECHO performed and demonstrates- Results for orders placed during the hospital encounter of 05/28/25    Echo    Interpretation Summary    Left Ventricle: The left ventricle is smaller than normal. Normal wall thickness. Mild global hypokinesis present. Septal flattening in diastole and systole consistent with right ventricular volume and pressure overload. There is mildly reduced systolic function with a visually estimated ejection fraction of 40 - 45%. Quantitated ejection fraction is 50%.  LVEF may be underestimated due to decreased filling in the setting of right ventricular failure.    Right Ventricle: The right ventricle is severely dilated. Systolic function is severely reduced.    Right Atrium: The right atrium is dilated.    Tricuspid Valve: There is moderate to severe regurgitation.    Pulmonary Artery: The estimated pulmonary artery systolic pressure is 82 mmHg.    IVC/SVC: Elevated venous pressure at 15 mmHg.  . Continue ACE/ARB Aldactone and monitor clinical status closely. Monitor on telemetry. Patient is on CHF pathway.  Monitor strict Is&Os and daily weights.  Place on fluid restriction of 1.5 L. Cardiology is consulted. Continue to stress to patient importance of self efficacy and  on diet for CHF. Last BNP reviewed- and noted below   Recent Labs   Lab 06/24/25  1106   BNP 2,003*   -IV lasix 40 mg BID  -Cont strict I's and O's   -Cont daily weights  -Cont low Na diet with 1.5 fluid restriction  -spironolactone cont at home dose  -appreciate cardiology   6/25-he is net -7.1 L since admission  6/26-he is -9 L since admission.  We will continue IV diuresis.  Cardiology is planning left heart catheterization on tomorrow  morning.  NPO after midnight.

## 2025-06-26 NOTE — PLAN OF CARE
Rounded at bedside. Not medically ready for discharge. Plans are for Angiogram tomorrow. Continue to be on IV diuretics. Plans are to dc to home this weekend with family. Pt has home 02. No other DME or HH needs.    Future Appointments   Date Time Provider Department Center   7/7/2025  8:30 AM Kailee Valentin MD [facility] IMPRI [place] [facility]   7/21/2025 11:20 AM Lucie Judge MD [facility] DERM [place]   8/5/2025 10:30 AM LAB, [facility] [place]   8/5/2025 10:45 AM LAB, [facility] [place]   8/13/2025 11:00 AM Raza Gunter DPM [facility] POD [facility]   10/16/2025  1:20 PM Chip Chavira MD [facility] CARDIO [place] [facility]      06/26/25 1018   Rounds   Attendance Nurse    Discharge Plan A Home;Home with family   Why the patient remains in the hospital Requires continued medical care   Transition of Care Barriers None

## 2025-06-26 NOTE — ASSESSMENT & PLAN NOTE
- echo May 2025 with LVEF 40-45% severe dilation of RV with reduced function; moderate to severe TR PA pressure 82mmhg  - admitted with LEE, orthopnea and BLE edema; Lasix 40mg IV BID  - scheduled for RHC and [facility] 6/27 and hopeful to proceed tomorrow as long as euvolemia achieved   -Net -9L from admission  -NPO p MN for possible R&LHC on 06/27/2025

## 2025-06-26 NOTE — PLAN OF CARE
Problem: Adult Inpatient Plan of Care  Goal: Plan of Care Review  6/26/2025 0830 by Jaye Finley RN  Outcome: Progressing  6/25/2025 1844 by Jaye Finley RN  Outcome: Progressing  Goal: Patient-Specific Goal (Individualized)  6/26/2025 0830 by Jaye Finley RN  Outcome: Progressing  6/25/2025 1844 by Jaye Finley RN  Outcome: Progressing  Goal: Absence of Hospital-Acquired Illness or Injury  6/26/2025 0830 by Jaye Finley RN  Outcome: Progressing  6/25/2025 1844 by Jaye Finley RN  Outcome: Progressing  Goal: Optimal Comfort and Wellbeing  6/26/2025 0830 by Jaye Finley RN  Outcome: Progressing  6/25/2025 1844 by Jaye Finley RN  Outcome: Progressing  Goal: Readiness for Transition of Care  6/26/2025 0830 by Jaye Finley RN  Outcome: Progressing  6/25/2025 1844 by Jaye Finley RN  Outcome: Progressing     Problem: Heart Failure  Goal: Optimal Coping  Outcome: Progressing  Goal: Optimal Cardiac Output  6/26/2025 0830 by Jaye Finley RN  Outcome: Progressing  6/25/2025 1844 by Jaye Finley RN  Outcome: Progressing  Goal: Stable Heart Rate and Rhythm  Outcome: Progressing  Goal: Optimal Functional Ability  Outcome: Progressing  Goal: Fluid and Electrolyte Balance  Outcome: Progressing  Goal: Effective Oxygenation and Ventilation  Outcome: Progressing  Goal: Effective Breathing Pattern During Sleep  Outcome: Progressing

## 2025-06-26 NOTE — SUBJECTIVE & OBJECTIVE
Past Medical History:   Diagnosis Date    Basal cell carcinoma 01/2024    left upper chest    COPD (chronic obstructive pulmonary disease)     Degenerative disc disease, lumbar 06/16/2021    Hyperlipidemia     Interstitial pulmonary fibrosis     NU (obstructive sleep apnea)     Pulmonary fibrosis     Rheumatoid arthritis     SCC (squamous cell carcinoma) 07/2020    SCC right medial canthus    SCC (squamous cell carcinoma) 11/2021    mid lower neck     SCC (squamous cell carcinoma) 01/2022    right lateral cheek- in situ    SCC (squamous cell carcinoma) 09/2023    in situ L lower forearm and HAK R shoulder    SCC (squamous cell carcinoma) 01/09/2025    R lower forearm    SCC (squamous cell carcinoma) 11/2024    left upper shin    Small bowel obstruction due to adhesions 05/10/2023    Squamous cell carcinoma 07/2019    SCC in situ left temple    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    R lateral cheek       Past Surgical History:   Procedure Laterality Date    COLONOSCOPY N/A 7/25/2017    Procedure: COLONOSCOPY;  Surgeon: Bill Nicole MD;  Location: [facility] ([address] [place] [address]);  Service: Endoscopy;  Laterality: N/A;    DIAGNOSTIC LAPAROSCOPY N/A 5/10/2023    Procedure: LAPAROSCOPY, DIAGNOSTIC possible laparotomy other as indicated;  Surgeon: Eitan Palomares MD;  Location: [facility] OR;  Service: General;  Laterality: N/A;    ENDOSCOPIC ULTRASOUND OF UPPER GASTROINTESTINAL TRACT N/A 12/13/2024    Procedure: ULTRASOUND, UPPER GI TRACT, ENDOSCOPIC;  Surgeon: Harrison Calderon MD;  Location: [facility] ENDO;  Service: Endoscopy;  Laterality: N/A;  10/25 portal-EUS at [place] for dilated PD and weight loss. calderon -tt  10/31 r/s updated portal instr-tt  12/6 PRECALL ATTEMPTED-NA/RT  12/9 SWP PRECALL COMPLETE-RT    EPIDURAL STEROID INJECTION INTO LUMBAR SPINE N/A 7/29/2021    Procedure: Injection-steroid-epidural-lumbar L5-S1;  Surgeon: Shiv Vernon Jr., MD;  Location: [facility] PAIN MGT;  Service: Pain  Management;  Laterality: N/A;  oral sedation   no pacemaker     FUSION OF SPINE WITH INSTRUMENTATION Left 12/14/2022    Procedure: FUSION, SPINE, WITH INSTRUMENTATION Stg1: Left L3-5 OLiF conduit lateral cage BMP;  Surgeon: August Aguilar MD;  Location: [facility] OR;  Service: Neurosurgery;  Laterality: Left;  Procedure: Stg1: Left L3-5 OLiF conduit lateral cage BMP  Surgery Time: 2hrs  LOS: 3  Anesthesia: General  Blood: Type & Screen  Radiology: C-arm  Brace: LSO  Bed: Regular Bed  Position: Lateral Right Down  Equip    FUSION, SPINE, POSTERIOR APPROACH N/A 12/14/2022    Procedure: FUSION,SPINE,POSTERIOR APPROACH Stg 2: L3-5 posterior instrumentation viper prime, L3-5 posterolateral arthrodesis;  Surgeon: August Aguilar MD;  Location: [facility] OR;  Service: Neurosurgery;  Laterality: N/A;  Procedure: Stg 2: L3-5 posterior instrumentation viper prime, L3-5 posterolateral arthrodesis  Surgery Time: 3hrs  LOS: 3  Anesthesia: General  Blood: Type & Screen  Radiology: Bra    LYSIS OF ADHESIONS N/A 5/10/2023    Procedure: LYSIS, ADHESIONS;  Surgeon: Eitan Palomares MD;  Location: [facility] OR;  Service: General;  Laterality: N/A;    NO PAST SURGERIES      TRANSFORAMINAL EPIDURAL INJECTION OF STEROID Right 6/29/2021    Procedure: Injection,steroid,epidural,transforaminal approach-RIGHT-- L4-5, L5-S1-- (IV Sedation);  Surgeon: Shiv Vernon Jr., MD;  Location: [facility] PAIN MGT;  Service: Pain Management;  Laterality: Right;    TRANSFORAMINAL EPIDURAL INJECTION OF STEROID Left 10/27/2022    Procedure: Injection,steroid,epidural,transforaminal left L4-5 and L5-S1;  Surgeon: Shiv Vernon Jr., MD;  Location: [facility] PAIN MGT;  Service: Pain Management;  Laterality: Left;       Review of patient's allergies indicates:   Allergen Reactions    Plaquenil [hydroxychloroquine]      Lightheaded and muscle aches       No current facility-administered medications on file prior to encounter.     Current Outpatient Medications on File Prior  to Encounter   Medication Sig    albuterol (VENTOLIN HFA) 90 mcg/actuation inhaler Inhale 2 puffs into the lungs every 6 (six) hours as needed for Wheezing (cough). Rescue    atorvastatin (LIPITOR) 40 MG tablet Take 1 tablet (40 mg total) by mouth once daily.    budesonide-glycopyr-formoterol (BREZTRI AEROSPHERE) 160-9-4.8 mcg/actuation HFAA Inhale 2 puffs into the lungs 2 (two) times a day.    gabapentin (NEURONTIN) 300 MG capsule Take 2 capsules (600 mg total) by mouth 3 (three) times daily.    ibuprofen (ADVIL,MOTRIN) 800 MG tablet Take 1 tablet (800 mg total) by mouth 3 (three) times daily as needed for Pain.    leflunomide (ARAVA) 20 MG Tab Take 1 tablet (20 mg total) by mouth once daily.    potassium chloride SA (K-DUR,KLOR-CON M) 10 MEQ tablet Take 1 tablet (10 mEq total) by mouth daily as needed (take wtih furosemide).    spironolactone (ALDACTONE) 25 MG tablet Take 1 tablet (25 mg total) by mouth once daily.    celecoxib (CELEBREX) 200 MG capsule Take 1 capsule (200 mg total) by mouth 2 (two) times daily. (Patient not taking: Reported on 6/5/2025)    hepatitis A virus vaccine (Adult 19YRS up)(PF) (HAVRIX) 1440 Unit/mL syringe Inject 1 mL (1,440 Units total) into the muscle As instructed (1 mL IM once then repeat in 6-12 months later).    furosemide (LASIX) 20 MG tablet Take 2 tablets (40 mg total) by mouth once daily. (Patient not taking: Reported on 6/24/2025)    losartan (COZAAR) 25 MG tablet Take 1 tablet (25 mg total) by mouth once daily.    predniSONE (DELTASONE) 5 MG tablet Take 1 tablet (5 mg total) by mouth once daily. (Patient not taking: Reported on 5/30/2025)     Family History       Problem Relation (Age of Onset)    Arthritis Sister, Brother    Asthma Daughter    Cancer Paternal Uncle    Coronary artery disease Father    Gallbladder disease Daughter    Heart attack Father    Heart failure Mother    Seizures Daughter          Tobacco Use    Smoking status: Former     Current packs/day: 0.00      Average packs/day: 0.8 packs/day for 37.5 years (28.1 ttl pk-yrs)     Types: Cigarettes     Start date:      Quit date: 2012     Years since quittin.9     Passive exposure: Never    Smokeless tobacco: Never   Substance and Sexual Activity    Alcohol use: Yes     Comment: rarely    Drug use: Never    Sexual activity: Yes     Partners: Female     Birth control/protection: None     Review of Systems   Constitutional: Negative for chills, decreased appetite, diaphoresis and fever.   Cardiovascular:  Positive for dyspnea on exertion and leg swelling. Negative for chest pain, claudication, cyanosis, irregular heartbeat, near-syncope, orthopnea, palpitations, paroxysmal nocturnal dyspnea and syncope.   Respiratory:  Negative for cough, hemoptysis, shortness of breath and wheezing.    Gastrointestinal:  Negative for bloating, abdominal pain, constipation, diarrhea, melena, nausea and vomiting.   Neurological:  Negative for dizziness and weakness.     Objective:     Vital Signs (Most Recent):  Temp: 97.5 °F (36.4 °C) (25 0820)  Pulse: 91 (25 0820)  Resp: 18 (25 0820)  BP: 108/76 (25 0820)  SpO2: (!) 91 % (25 08) Vital Signs (24h Range):  Temp:  [97.3 °F (36.3 °C)-97.5 °F (36.4 °C)] 97.5 °F (36.4 °C)  Pulse:  [] 91  Resp:  [17-20] 18  SpO2:  [90 %-96 %] 91 %  BP: ()/(65-86) 108/76     Weight: 63.4 kg (139 lb 12.4 oz)  Body mass index is 23.26 kg/m².    SpO2: (!) 91 %         Intake/Output Summary (Last 24 hours) at 2025 0959  Last data filed at 2025 0904  Gross per 24 hour   Intake 200 ml   Output 3100 ml   Net -2900 ml       Lines/Drains/Airways       Peripheral Intravenous Line  Duration             Peripheral IV Single Lumen 25 20 G Left Antecubital 2 days                     Physical Exam  Constitutional:       General: He is not in acute distress.     Appearance: He is well-developed.   Cardiovascular:      Rate and Rhythm: Normal rate and regular  "rhythm.      Heart sounds: No murmur heard.     No gallop.   Pulmonary:      Effort: Pulmonary effort is normal. No respiratory distress.      Breath sounds: No wheezing or rales.   Abdominal:      General: Bowel sounds are normal. There is no distension.      Palpations: Abdomen is soft.      Tenderness: There is no abdominal tenderness.   Musculoskeletal:      Right lower leg: Edema (2+) present.      Left lower leg: Edema (2+) present.   Skin:     General: Skin is warm and dry.   Neurological:      Mental Status: He is alert and oriented to person, place, and time.              Significant Imaging: Echocardiogram: Transthoracic echo (TTE) complete (Cupid Only):   Results for orders placed or performed during the hospital encounter of 05/28/25   Echo   Result Value Ref Range    LV Diastolic Volume 80 mL    Echo EF Estimated 42 %    LV Systolic Volume 47 mL    IVS 0.8 0.6 - 1.1 cm    LVIDd 4.2 3.5 - 6.0 cm    LVIDs 3.4 2.1 - 4.0 cm    LVOT diameter 2.2 cm    PW 0.9 0.6 - 1.1 cm    IVRT 186 ms    AV LVOT peak gradient 1 mmHg    LVOT mn grad 1 mmHg    LVOT peak jose 0.5 m/s    LVOT peak VTI 7.5 cm    RV- leung basal diam 5.4 cm    RVOT prox diameter 2.70 cm    RV S' 7.49 cm/s    LA size 2.9 cm    Left Atrium Major Axis 5.1 cm    Left Atrium Minor Axis 5.1 cm    LA Vol (MOD) 27 mL    RA Major Axis 5.81 cm    RA Area 24.5 cm2    AV valve area 2.4 cm2    AV area by cont VTI 2.4 cm2    AV peak gradient 3 mmHg    AV mean gradient 1 mmHg    Ao peak jose 0.8 m/s    Ao VTI 12.1 cm    MV Peak A Jose 0.25 m/s    E wave deceleration time 93 ms    MV Peak E Jose 0.60 m/s    E/A ratio 2.40     LV LATERAL E/E' RATIO 8.6     LV SEPTAL E/E' RATIO 12.0     TDI LATERAL 0.07 m/s    TDI SEPTAL 0.05 m/s    MV peak gradient 3 mmHg    MV mean gradient 1 mmHg    TV peak gradient 68 mmHg    TR Max Jose 4.1 m/s    Ascending aorta 3.3 cm    STJ 2.9 cm    P vein A 0.3 m/s    MV "A" wave duration 83.73 ms    Pulm vein "A" wave 83.73 ms    PV Peak D " Jose 0.24 m/s    PV Peak S Jose 0.59 m/s    IVC diameter 2.44 cm    Sinus 3.57 cm    RA Width 5.13 cm    TAPSE 1.0 cm    LA WIDTH 2.9 cm    BSA 1.8 m2    LVOT stroke volume 28.5 cm3    LV Systolic Volume Index 26.4 mL/m2    LV Diastolic Volume Index 44.94 mL/m2    LVOT area 3.8 cm2    FS 19.0 (A) 28 - 44 %    Left Ventricle Relative Wall Thickness 0.43 cm    LV mass 109.8 g    LV Mass Index 61.7 g/m2    E/E' ratio 10 m/s    TENNILLE 20 mL/m2    LA Vol 36 cm3    Pulm vein S/D ratio 2.46     RVOT area 5.72 cm2    RV/LV Ratio 1.29 cm    TENNILLE (MOD) 15 mL/m2    AV Velocity Ratio 0.63     AV index (prosthetic) 0.62     ROMY by Velocity Ratio 2.4 cm²    ASI 2.0 cm/m2    ASI 1.9 cm/m2    Mean e' 0.06 m/s    ZLVIDS 0.88     ZLVIDD -1.58     Mitral Valve Heart Rate 93 bpm    TV resting pulmonary artery pressure 82 mmHg    RV TB RVSP 19 mmHg    Est. RA pres 15 mmHg    Pineda's Biplane MOD Ejection Fraction 50 %    Narrative      Left Ventricle: The left ventricle is smaller than normal. Normal wall   thickness. Mild global hypokinesis present. Septal flattening in diastole   and systole consistent with right ventricular volume and pressure   overload. There is mildly reduced systolic function with a visually   estimated ejection fraction of 40 - 45%. Quantitated ejection fraction is   50%.  LVEF may be underestimated due to decreased filling in the setting   of right ventricular failure.    Right Ventricle: The right ventricle is severely dilated. Systolic   function is severely reduced.    Right Atrium: The right atrium is dilated.    Tricuspid Valve: There is moderate to severe regurgitation.    Pulmonary Artery: The estimated pulmonary artery systolic pressure is   82 mmHg.    IVC/SVC: Elevated venous pressure at 15 mmHg.

## 2025-06-26 NOTE — PROGRESS NOTES
[facility] Telemetry  Cardiology  Progress Note    Patient Name: Fabiano Garvey  MRN: [id number]  Admission Date: 6/24/2025  Hospital Length of Stay: 2 days  Code Status: Full Code   Attending Physician: Gerry Sue MD   Primary Care Physician: Calin Buchanan MD  Expected Discharge Date: 6/29/2025  Principal Problem:Acute on chronic right-sided heart failure    Subjective:     Hospital Course:   06/25/2025 Remains overloaded on exam. Diuresing well. Net -7L from admission. NPO p MN for potential LHC pending improvement in volume status.     06/26/2025 Mildly overloaded on exam, feeling markedly better. Continues to have 1-2+ edema to BLE. L&RHC deferred to tomorrow. On 3L NC at baseline. Cr 1.2. Net -9L from admission.     Past Medical History:   Diagnosis Date    Basal cell carcinoma 01/2024    left upper chest    COPD (chronic obstructive pulmonary disease)     Degenerative disc disease, lumbar 06/16/2021    Hyperlipidemia     Interstitial pulmonary fibrosis     NU (obstructive sleep apnea)     Pulmonary fibrosis     Rheumatoid arthritis     SCC (squamous cell carcinoma) 07/2020    SCC right medial canthus    SCC (squamous cell carcinoma) 11/2021    mid lower neck     SCC (squamous cell carcinoma) 01/2022    right lateral cheek- in situ    SCC (squamous cell carcinoma) 09/2023    in situ L lower forearm and HAK R shoulder    SCC (squamous cell carcinoma) 01/09/2025    R lower forearm    SCC (squamous cell carcinoma) 11/2024    left upper shin    Small bowel obstruction due to adhesions 05/10/2023    Squamous cell carcinoma 07/2019    SCC in situ left temple    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    R lateral cheek       Past Surgical History:   Procedure Laterality Date    COLONOSCOPY N/A 7/25/2017    Procedure: COLONOSCOPY;  Surgeon: Bill Nicole MD;  Location: [facility] ([address] [place] [address]);  Service: Endoscopy;  Laterality: N/A;    DIAGNOSTIC LAPAROSCOPY N/A 5/10/2023     Procedure: LAPAROSCOPY, DIAGNOSTIC possible laparotomy other as indicated;  Surgeon: Eitan Palomares MD;  Location: [facility] OR;  Service: General;  Laterality: N/A;    ENDOSCOPIC ULTRASOUND OF UPPER GASTROINTESTINAL TRACT N/A 12/13/2024    Procedure: ULTRASOUND, UPPER GI TRACT, ENDOSCOPIC;  Surgeon: [place] Calderon MD;  Location: [facility] ENDO;  Service: Endoscopy;  Laterality: N/A;  10/25 portal-EUS at [facility] for dilated PD and weight loss. calderon -tt  10/31 r/s updated portal instr-tt  12/6 PRECALL ATTEMPTED-NA/RT  12/9 SWP PRECALL COMPLETE-RT    EPIDURAL STEROID INJECTION INTO LUMBAR SPINE N/A 7/29/2021    Procedure: Injection-steroid-epidural-lumbar L5-S1;  Surgeon: Shiv Vernon Jr., MD;  Location: [facility] PAIN MGT;  Service: Pain Management;  Laterality: N/A;  oral sedation   no pacemaker     FUSION OF SPINE WITH INSTRUMENTATION Left 12/14/2022    Procedure: FUSION, SPINE, WITH INSTRUMENTATION Stg1: Left L3-5 OLiF conduit lateral cage BMP;  Surgeon: August Aguilar MD;  Location: [facility] OR;  Service: Neurosurgery;  Laterality: Left;  Procedure: Stg1: Left L3-5 OLiF conduit lateral cage BMP  Surgery Time: 2hrs  LOS: 3  Anesthesia: General  Blood: Type & Screen  Radiology: C-arm  Brace: LSO  Bed: Regular Bed  Position: Lateral Right Down  Equip    FUSION, SPINE, POSTERIOR APPROACH N/A 12/14/2022    Procedure: FUSION,SPINE,POSTERIOR APPROACH Stg 2: L3-5 posterior instrumentation viper prime, L3-5 posterolateral arthrodesis;  Surgeon: August Aguilar MD;  Location: [facility] OR;  Service: Neurosurgery;  Laterality: N/A;  Procedure: Stg 2: L3-5 posterior instrumentation viper prime, L3-5 posterolateral arthrodesis  Surgery Time: 3hrs  LOS: 3  Anesthesia: General  Blood: Type & Screen  Radiology: Bra    LYSIS OF ADHESIONS N/A 5/10/2023    Procedure: LYSIS, ADHESIONS;  Surgeon: Eitan Palomares MD;  Location: [facility] OR;  Service: General;  Laterality: N/A;    NO PAST SURGERIES      TRANSFORAMINAL EPIDURAL INJECTION OF STEROID  Right 6/29/2021    Procedure: Injection,steroid,epidural,transforaminal approach-RIGHT-- L4-5, L5-S1-- (IV Sedation);  Surgeon: Shiv Vernon Jr., MD;  Location: [facility] PAIN MGT;  Service: Pain Management;  Laterality: Right;    TRANSFORAMINAL EPIDURAL INJECTION OF STEROID Left 10/27/2022    Procedure: Injection,steroid,epidural,transforaminal left L4-5 and L5-S1;  Surgeon: Shiv Vernon Jr., MD;  Location: [facility] PAIN MGT;  Service: Pain Management;  Laterality: Left;       Review of patient's allergies indicates:   Allergen Reactions    Plaquenil [hydroxychloroquine]      Lightheaded and muscle aches       No current facility-administered medications on file prior to encounter.     Current Outpatient Medications on File Prior to Encounter   Medication Sig    albuterol (VENTOLIN HFA) 90 mcg/actuation inhaler Inhale 2 puffs into the lungs every 6 (six) hours as needed for Wheezing (cough). Rescue    atorvastatin (LIPITOR) 40 MG tablet Take 1 tablet (40 mg total) by mouth once daily.    budesonide-glycopyr-formoterol (BREZTRI AEROSPHERE) 160-9-4.8 mcg/actuation HFAA Inhale 2 puffs into the lungs 2 (two) times a day.    gabapentin (NEURONTIN) 300 MG capsule Take 2 capsules (600 mg total) by mouth 3 (three) times daily.    ibuprofen (ADVIL,MOTRIN) 800 MG tablet Take 1 tablet (800 mg total) by mouth 3 (three) times daily as needed for Pain.    leflunomide (ARAVA) 20 MG Tab Take 1 tablet (20 mg total) by mouth once daily.    potassium chloride SA (K-DUR,KLOR-CON M) 10 MEQ tablet Take 1 tablet (10 mEq total) by mouth daily as needed (take wtih furosemide).    spironolactone (ALDACTONE) 25 MG tablet Take 1 tablet (25 mg total) by mouth once daily.    celecoxib (CELEBREX) 200 MG capsule Take 1 capsule (200 mg total) by mouth 2 (two) times daily. (Patient not taking: Reported on 6/5/2025)    hepatitis A virus vaccine (Adult 19YRS up)(PF) (HAVRIX) 1440 Unit/mL syringe Inject 1 mL (1,440 Units total) into the muscle As  instructed (1 mL IM once then repeat in 6-12 months later).    furosemide (LASIX) 20 MG tablet Take 2 tablets (40 mg total) by mouth once daily. (Patient not taking: Reported on 2025)    losartan (COZAAR) 25 MG tablet Take 1 tablet (25 mg total) by mouth once daily.    predniSONE (DELTASONE) 5 MG tablet Take 1 tablet (5 mg total) by mouth once daily. (Patient not taking: Reported on 2025)     Family History       Problem Relation (Age of Onset)    Arthritis Sister, Brother    Asthma Daughter    Cancer Paternal Uncle    Coronary artery disease Father    Gallbladder disease Daughter    Heart attack Father    Heart failure Mother    Seizures Daughter          Tobacco Use    Smoking status: Former     Current packs/day: 0.00     Average packs/day: 0.8 packs/day for 37.5 years (28.1 ttl pk-yrs)     Types: Cigarettes     Start date:      Quit date: 2012     Years since quittin.9     Passive exposure: Never    Smokeless tobacco: Never   Substance and Sexual Activity    Alcohol use: Yes     Comment: rarely    Drug use: Never    Sexual activity: Yes     Partners: Female     Birth control/protection: None     Review of Systems   Constitutional: Negative for chills, decreased appetite, diaphoresis and fever.   Cardiovascular:  Positive for dyspnea on exertion and leg swelling. Negative for chest pain, claudication, cyanosis, irregular heartbeat, near-syncope, orthopnea, palpitations, paroxysmal nocturnal dyspnea and syncope.   Respiratory:  Negative for cough, hemoptysis, shortness of breath and wheezing.    Gastrointestinal:  Negative for bloating, abdominal pain, constipation, diarrhea, melena, nausea and vomiting.   Neurological:  Negative for dizziness and weakness.     Objective:     Vital Signs (Most Recent):  Temp: 97.5 °F (36.4 °C) (25)  Pulse: 91 (25)  Resp: 18 (25)  BP: 108/76 (25)  SpO2: (!) 91 % (25) Vital Signs (24h Range):  Temp:   [97.3 °F (36.3 °C)-97.5 °F (36.4 °C)] 97.5 °F (36.4 °C)  Pulse:  [] 91  Resp:  [17-20] 18  SpO2:  [90 %-96 %] 91 %  BP: ()/(65-86) 108/76     Weight: 63.4 kg (139 lb 12.4 oz)  Body mass index is 23.26 kg/m².    SpO2: (!) 91 %         Intake/Output Summary (Last 24 hours) at 6/26/2025 0959  Last data filed at 6/26/2025 0904  Gross per 24 hour   Intake 200 ml   Output 3100 ml   Net -2900 ml       Lines/Drains/Airways       Peripheral Intravenous Line  Duration             Peripheral IV Single Lumen 06/24/25 20 G Left Antecubital 2 days                     Physical Exam  Constitutional:       General: He is not in acute distress.     Appearance: He is well-developed.   Cardiovascular:      Rate and Rhythm: Normal rate and regular rhythm.      Heart sounds: No murmur heard.     No gallop.   Pulmonary:      Effort: Pulmonary effort is normal. No respiratory distress.      Breath sounds: No wheezing or rales.   Abdominal:      General: Bowel sounds are normal. There is no distension.      Palpations: Abdomen is soft.      Tenderness: There is no abdominal tenderness.   Musculoskeletal:      Right lower leg: Edema (2+) present.      Left lower leg: Edema (2+) present.   Skin:     General: Skin is warm and dry.   Neurological:      Mental Status: He is alert and oriented to person, place, and time.              Significant Imaging: Echocardiogram: Transthoracic echo (TTE) complete (Cupid Only):   Results for orders placed or performed during the hospital encounter of 05/28/25   Echo   Result Value Ref Range    LV Diastolic Volume 80 mL    Echo EF Estimated 42 %    LV Systolic Volume 47 mL    IVS 0.8 0.6 - 1.1 cm    LVIDd 4.2 3.5 - 6.0 cm    LVIDs 3.4 2.1 - 4.0 cm    LVOT diameter 2.2 cm    PW 0.9 0.6 - 1.1 cm    IVRT 186 ms    AV LVOT peak gradient 1 mmHg    LVOT mn grad 1 mmHg    LVOT peak aram 0.5 m/s    LVOT peak VTI 7.5 cm    RV- leung basal diam 5.4 cm    RVOT prox diameter 2.70 cm    RV S' 7.49 cm/s    LA  "size 2.9 cm    Left Atrium Major Axis 5.1 cm    Left Atrium Minor Axis 5.1 cm    LA Vol (MOD) 27 mL    RA Major Axis 5.81 cm    RA Area 24.5 cm2    AV valve area 2.4 cm2    AV area by cont VTI 2.4 cm2    AV peak gradient 3 mmHg    AV mean gradient 1 mmHg    Ao peak jose 0.8 m/s    Ao VTI 12.1 cm    MV Peak A Jose 0.25 m/s    E wave deceleration time 93 ms    MV Peak E Jose 0.60 m/s    E/A ratio 2.40     LV LATERAL E/E' RATIO 8.6     LV SEPTAL E/E' RATIO 12.0     TDI LATERAL 0.07 m/s    TDI SEPTAL 0.05 m/s    MV peak gradient 3 mmHg    MV mean gradient 1 mmHg    TV peak gradient 68 mmHg    TR Max Jose 4.1 m/s    Ascending aorta 3.3 cm    STJ 2.9 cm    P vein A 0.3 m/s    MV "A" wave duration 83.73 ms    Pulm vein "A" wave 83.73 ms    PV Peak D Jose 0.24 m/s    PV Peak S Jose 0.59 m/s    IVC diameter 2.44 cm    Sinus 3.57 cm    RA Width 5.13 cm    TAPSE 1.0 cm    LA WIDTH 2.9 cm    BSA 1.8 m2    LVOT stroke volume 28.5 cm3    LV Systolic Volume Index 26.4 mL/m2    LV Diastolic Volume Index 44.94 mL/m2    LVOT area 3.8 cm2    FS 19.0 (A) 28 - 44 %    Left Ventricle Relative Wall Thickness 0.43 cm    LV mass 109.8 g    LV Mass Index 61.7 g/m2    E/E' ratio 10 m/s    TENNILLE 20 mL/m2    LA Vol 36 cm3    Pulm vein S/D ratio 2.46     RVOT area 5.72 cm2    RV/LV Ratio 1.29 cm    TENNILLE (MOD) 15 mL/m2    AV Velocity Ratio 0.63     AV index (prosthetic) 0.62     ROMY by Velocity Ratio 2.4 cm²    ASI 2.0 cm/m2    ASI 1.9 cm/m2    Mean e' 0.06 m/s    ZLVIDS 0.88     ZLVIDD -1.58     Mitral Valve Heart Rate 93 bpm    TV resting pulmonary artery pressure 82 mmHg    RV TB RVSP 19 mmHg    Est. RA pres 15 mmHg    Pineda's Biplane MOD Ejection Fraction 50 %    Narrative      Left Ventricle: The left ventricle is smaller than normal. Normal wall   thickness. Mild global hypokinesis present. Septal flattening in diastole   and systole consistent with right ventricular volume and pressure   overload. There is mildly reduced systolic function with " a visually   estimated ejection fraction of 40 - 45%. Quantitated ejection fraction is   50%.  LVEF may be underestimated due to decreased filling in the setting   of right ventricular failure.    Right Ventricle: The right ventricle is severely dilated. Systolic   function is severely reduced.    Right Atrium: The right atrium is dilated.    Tricuspid Valve: There is moderate to severe regurgitation.    Pulmonary Artery: The estimated pulmonary artery systolic pressure is   82 mmHg.    IVC/SVC: Elevated venous pressure at 15 mmHg.       Assessment and Plan:     Brief HPI: Patient seen this morning on rounds with improvement in volume status. Edema to BLE improved to 1-2+ below the knee. POC discussed with patient and wife at bedside for potential R&LHC tomorrow.     * Acute on chronic right-sided heart failure  - echo May 2025 with LVEF 40-45% severe dilation of RV with reduced function; moderate to severe TR PA pressure 82mmhg  - admitted with LEE, orthopnea and BLE edema; Lasix 40mg IV BID  - scheduled for RHC and LHC 6/27 and hopeful to proceed tomorrow as long as euvolemia achieved   -Net -9L from admission  -NPO p MN for possible R&LHC on 06/27/2025     Pulmonary hypertension  - estimated PA pressure 87mmHg on echo in May 2025  - long history of tobacco abuse with ILD and emphysema  - IV diuresis in process; plan for RHC with LHC hopeful to proceed Friday- NPO p MN and will re-evaluate volume status on 06/27/2025 AM    ILD (interstitial lung disease)  - history of tobacco abuse and emphysema  - on home O2, 3L NC      Elevated LFTs  - total bili 1.7 upon admission  - felt to be related to hepatic congestion in setting of ADHF      Hyperlipidemia  - on Lipitor 40 as an outpatient  - recent FLP with LDL 77  - cholesterol at goal; continue statin therapy; cholesterol well controlled           VTE Risk Mitigation (From admission, onward)           Ordered     enoxaparin injection 40 mg  Daily         06/24/25 2432      IP VTE HIGH RISK PATIENT  Once         06/24/25 1419     Place sequential compression device  Until discontinued         06/24/25 1419                    Onofre Perla NP  Cardiology  [facility] - Telemetry

## 2025-06-26 NOTE — PLAN OF CARE
Problem: Adult Inpatient Plan of Care  Goal: Plan of Care Review  Outcome: Progressing  Goal: Optimal Comfort and Wellbeing  Outcome: Progressing     Problem: Heart Failure  Goal: Stable Heart Rate and Rhythm  Outcome: Progressing  Goal: Fluid and Electrolyte Balance  Outcome: Progressing  Goal: Effective Oxygenation and Ventilation  Outcome: Progressing     Problem: Skin Injury Risk Increased  Goal: Skin Health and Integrity  Outcome: Progressing      Pt clamped own IV that PCA pump medication is going through

## 2025-06-27 DIAGNOSIS — I27.20 PULMONARY HYPERTENSION: Primary | ICD-10-CM

## 2025-06-27 LAB
ABSOLUTE EOSINOPHIL (OHS): 0.28 K/UL
ABSOLUTE MONOCYTE (OHS): 0.94 K/UL (ref 0.3–1)
ABSOLUTE NEUTROPHIL COUNT (OHS): 4.55 K/UL (ref 1.8–7.7)
ALLENS TEST: YES
ANION GAP (OHS): 10 MMOL/L (ref 8–16)
BASOPHILS # BLD AUTO: 0.08 K/UL
BASOPHILS NFR BLD AUTO: 1 %
BUN SERPL-MCNC: 32 MG/DL (ref 8–23)
CALCIUM SERPL-MCNC: 8.5 MG/DL (ref 8.7–10.5)
CHLORIDE SERPL-SCNC: 96 MMOL/L (ref 95–110)
CO2 SERPL-SCNC: 32 MMOL/L (ref 23–29)
CREAT SERPL-MCNC: 1.1 MG/DL (ref 0.5–1.4)
ERYTHROCYTE [DISTWIDTH] IN BLOOD BY AUTOMATED COUNT: 17.6 % (ref 11.5–14.5)
FIO2: 21 %
GFR SERPLBLD CREATININE-BSD FMLA CKD-EPI: >60 ML/MIN/1.73/M2
GLUCOSE SERPL-MCNC: 102 MG/DL (ref 70–110)
HCT VFR BLD AUTO: 47.2 % (ref 40–54)
HGB BLD-MCNC: 15.4 GM/DL (ref 14–18)
IMM GRANULOCYTES # BLD AUTO: 0.03 K/UL (ref 0–0.04)
IMM GRANULOCYTES NFR BLD AUTO: 0.4 % (ref 0–0.5)
LYMPHOCYTES # BLD AUTO: 2.18 K/UL (ref 1–4.8)
MAGNESIUM SERPL-MCNC: 1.7 MG/DL (ref 1.6–2.6)
MCH RBC QN AUTO: 30.3 PG (ref 27–31)
MCHC RBC AUTO-ENTMCNC: 32.6 G/DL (ref 32–36)
MCV RBC AUTO: 93 FL (ref 82–98)
NUCLEATED RBC (/100WBC) (OHS): 0 /100 WBC
PCO2 BLDA: 42.9 MMHG (ref 35–45)
PCO2 BLDA: 52 MMHG (ref 35–45)
PCO2 BLDA: 55.6 MMHG (ref 35–45)
PCO2 BLDA: 56.5 MMHG (ref 35–45)
PH SMN: 7.37 [PH] (ref 7.35–7.45)
PH SMN: 7.42 [PH] (ref 7.35–7.45)
PLATELET # BLD AUTO: 166 K/UL (ref 150–450)
PMV BLD AUTO: 11.6 FL (ref 9.2–12.9)
PO2 BLDA: 34.4 MMHG (ref 40–60)
PO2 BLDA: 34.4 MMHG (ref 80–100)
PO2 BLDA: 37.7 MMHG (ref 80–100)
PO2 BLDA: 72.8 MMHG (ref 80–100)
POC BASE DEFICIT: -0.4 MMOL/L (ref -2–2)
POC BASE DEFICIT: 7.8 MMOL/L (ref -2–2)
POC BASE DEFICIT: 9.3 MMOL/L (ref -2–2)
POC BASE DEFICIT: 9.6 MMOL/L (ref -2–2)
POC HCO3: 25 MMOL/L (ref 24–28)
POC HCO3: 34.1 MMOL/L (ref 24–28)
POC HCO3: 36.1 MMOL/L (ref 24–28)
POC HCO3: 36.7 MMOL/L (ref 24–28)
POC PERFORMED BY: ABNORMAL
POC SATURATED O2: 58.3 % (ref 95–100)
POC SATURATED O2: 58.7 % (ref 95–100)
POC SATURATED O2: 63.2 % (ref 95–100)
POC SATURATED O2: 93.9 % (ref 95–100)
POCT GLUCOSE: 98 MG/DL (ref 70–110)
POTASSIUM SERPL-SCNC: 3.5 MMOL/L (ref 3.5–5.1)
RBC # BLD AUTO: 5.09 M/UL (ref 4.6–6.2)
RELATIVE EOSINOPHIL (OHS): 3.5 %
RELATIVE LYMPHOCYTE (OHS): 27 % (ref 18–48)
RELATIVE MONOCYTE (OHS): 11.7 % (ref 4–15)
RELATIVE NEUTROPHIL (OHS): 56.4 % (ref 38–73)
SODIUM SERPL-SCNC: 138 MMOL/L (ref 136–145)
SPECIMEN SOURCE: ABNORMAL
WBC # BLD AUTO: 8.06 K/UL (ref 3.9–12.7)

## 2025-06-27 PROCEDURE — A4216 STERILE WATER/SALINE, 10 ML: HCPCS | Performed by: NURSE PRACTITIONER

## 2025-06-27 PROCEDURE — 93005 ELECTROCARDIOGRAM TRACING: CPT

## 2025-06-27 PROCEDURE — 82803 BLOOD GASES ANY COMBINATION: CPT

## 2025-06-27 PROCEDURE — 99900035 HC TECH TIME PER 15 MIN (STAT)

## 2025-06-27 PROCEDURE — 4A023N8 MEASUREMENT OF CARDIAC SAMPLING AND PRESSURE, BILATERAL, PERCUTANEOUS APPROACH: ICD-10-PCS | Performed by: INTERNAL MEDICINE

## 2025-06-27 PROCEDURE — 25000003 PHARM REV CODE 250: Performed by: NURSE PRACTITIONER

## 2025-06-27 PROCEDURE — 11000001 HC ACUTE MED/SURG PRIVATE ROOM

## 2025-06-27 PROCEDURE — 36415 COLL VENOUS BLD VENIPUNCTURE: CPT | Performed by: NURSE PRACTITIONER

## 2025-06-27 PROCEDURE — 27000221 HC OXYGEN, UP TO 24 HOURS

## 2025-06-27 PROCEDURE — 25000003 PHARM REV CODE 250: Performed by: INTERNAL MEDICINE

## 2025-06-27 PROCEDURE — 83735 ASSAY OF MAGNESIUM: CPT | Performed by: NURSE PRACTITIONER

## 2025-06-27 PROCEDURE — 85025 COMPLETE CBC W/AUTO DIFF WBC: CPT | Performed by: NURSE PRACTITIONER

## 2025-06-27 PROCEDURE — C1887 CATHETER, GUIDING: HCPCS | Performed by: INTERNAL MEDICINE

## 2025-06-27 PROCEDURE — 99152 MOD SED SAME PHYS/QHP 5/>YRS: CPT | Performed by: INTERNAL MEDICINE

## 2025-06-27 PROCEDURE — B211YZZ FLUOROSCOPY OF MULTIPLE CORONARY ARTERIES USING OTHER CONTRAST: ICD-10-PCS | Performed by: INTERNAL MEDICINE

## 2025-06-27 PROCEDURE — 25000003 PHARM REV CODE 250: Performed by: FAMILY MEDICINE

## 2025-06-27 PROCEDURE — 25500020 PHARM REV CODE 255: Performed by: INTERNAL MEDICINE

## 2025-06-27 PROCEDURE — 25000242 PHARM REV CODE 250 ALT 637 W/ HCPCS: Performed by: NURSE PRACTITIONER

## 2025-06-27 PROCEDURE — 99153 MOD SED SAME PHYS/QHP EA: CPT | Performed by: INTERNAL MEDICINE

## 2025-06-27 PROCEDURE — 93460 R&L HRT ART/VENTRICLE ANGIO: CPT | Mod: 26,,, | Performed by: INTERNAL MEDICINE

## 2025-06-27 PROCEDURE — 94761 N-INVAS EAR/PLS OXIMETRY MLT: CPT

## 2025-06-27 PROCEDURE — 63600175 PHARM REV CODE 636 W HCPCS: Performed by: NURSE PRACTITIONER

## 2025-06-27 PROCEDURE — 94640 AIRWAY INHALATION TREATMENT: CPT

## 2025-06-27 PROCEDURE — C1769 GUIDE WIRE: HCPCS | Performed by: INTERNAL MEDICINE

## 2025-06-27 PROCEDURE — 25000003 PHARM REV CODE 250: Performed by: STUDENT IN AN ORGANIZED HEALTH CARE EDUCATION/TRAINING PROGRAM

## 2025-06-27 PROCEDURE — 93460 R&L HRT ART/VENTRICLE ANGIO: CPT | Performed by: INTERNAL MEDICINE

## 2025-06-27 PROCEDURE — C1894 INTRO/SHEATH, NON-LASER: HCPCS | Performed by: INTERNAL MEDICINE

## 2025-06-27 PROCEDURE — 80048 BASIC METABOLIC PNL TOTAL CA: CPT | Performed by: NURSE PRACTITIONER

## 2025-06-27 PROCEDURE — 63600175 PHARM REV CODE 636 W HCPCS: Performed by: INTERNAL MEDICINE

## 2025-06-27 PROCEDURE — 99152 MOD SED SAME PHYS/QHP 5/>YRS: CPT | Mod: ,,, | Performed by: INTERNAL MEDICINE

## 2025-06-27 RX ORDER — IODIXANOL 320 MG/ML
INJECTION, SOLUTION INTRAVASCULAR
Status: DISCONTINUED | OUTPATIENT
Start: 2025-06-27 | End: 2025-06-27 | Stop reason: HOSPADM

## 2025-06-27 RX ORDER — HEPARIN SODIUM 1000 [USP'U]/ML
INJECTION, SOLUTION INTRAVENOUS; SUBCUTANEOUS
Status: DISCONTINUED | OUTPATIENT
Start: 2025-06-27 | End: 2025-06-27 | Stop reason: HOSPADM

## 2025-06-27 RX ORDER — LIDOCAINE HYDROCHLORIDE 10 MG/ML
INJECTION, SOLUTION EPIDURAL; INFILTRATION; INTRACAUDAL; PERINEURAL
Status: DISCONTINUED | OUTPATIENT
Start: 2025-06-27 | End: 2025-06-27 | Stop reason: HOSPADM

## 2025-06-27 RX ORDER — FUROSEMIDE 40 MG/1
40 TABLET ORAL DAILY
Status: DISCONTINUED | OUTPATIENT
Start: 2025-06-28 | End: 2025-06-28 | Stop reason: HOSPADM

## 2025-06-27 RX ORDER — SODIUM CHLORIDE 9 MG/ML
INJECTION, SOLUTION INTRAVENOUS CONTINUOUS
Status: ACTIVE | OUTPATIENT
Start: 2025-06-27 | End: 2025-06-27

## 2025-06-27 RX ORDER — VERAPAMIL HYDROCHLORIDE 2.5 MG/ML
INJECTION INTRAVENOUS
Status: DISCONTINUED | OUTPATIENT
Start: 2025-06-27 | End: 2025-06-27 | Stop reason: HOSPADM

## 2025-06-27 RX ORDER — POTASSIUM CHLORIDE 20 MEQ/1
40 TABLET, EXTENDED RELEASE ORAL ONCE
Status: COMPLETED | OUTPATIENT
Start: 2025-06-27 | End: 2025-06-27

## 2025-06-27 RX ORDER — FENTANYL CITRATE 50 UG/ML
INJECTION, SOLUTION INTRAMUSCULAR; INTRAVENOUS
Status: DISCONTINUED | OUTPATIENT
Start: 2025-06-27 | End: 2025-06-27 | Stop reason: HOSPADM

## 2025-06-27 RX ORDER — HEPARIN SODIUM 200 [USP'U]/100ML
INJECTION, SOLUTION INTRAVENOUS
Status: DISCONTINUED | OUTPATIENT
Start: 2025-06-27 | End: 2025-06-28 | Stop reason: HOSPADM

## 2025-06-27 RX ORDER — MIDAZOLAM HYDROCHLORIDE 1 MG/ML
INJECTION, SOLUTION INTRAMUSCULAR; INTRAVENOUS
Status: DISCONTINUED | OUTPATIENT
Start: 2025-06-27 | End: 2025-06-27 | Stop reason: HOSPADM

## 2025-06-27 RX ADMIN — BUDESONIDE 0.5 MG: 0.5 INHALANT RESPIRATORY (INHALATION) at 08:06

## 2025-06-27 RX ADMIN — POTASSIUM CHLORIDE 40 MEQ: 1500 TABLET, EXTENDED RELEASE ORAL at 08:06

## 2025-06-27 RX ADMIN — ATORVASTATIN CALCIUM 40 MG: 40 TABLET, FILM COATED ORAL at 08:06

## 2025-06-27 RX ADMIN — SPIRONOLACTONE 25 MG: 25 TABLET ORAL at 08:06

## 2025-06-27 RX ADMIN — SODIUM CHLORIDE: 0.9 INJECTION, SOLUTION INTRAVENOUS at 03:06

## 2025-06-27 RX ADMIN — Medication 10 ML: at 06:06

## 2025-06-27 RX ADMIN — BUDESONIDE 0.5 MG: 0.5 INHALANT RESPIRATORY (INHALATION) at 07:06

## 2025-06-27 RX ADMIN — FUROSEMIDE 40 MG: 10 INJECTION, SOLUTION INTRAVENOUS at 06:06

## 2025-06-27 RX ADMIN — LEFLUNOMIDE 20 MG: 20 TABLET, FILM COATED ORAL at 08:06

## 2025-06-27 RX ADMIN — GABAPENTIN 300 MG: 300 CAPSULE ORAL at 09:06

## 2025-06-27 RX ADMIN — GABAPENTIN 300 MG: 300 CAPSULE ORAL at 08:06

## 2025-06-27 NOTE — PLAN OF CARE
Nutrition Plan of Care:    Recommendations     1. Advanced oral diet when appropriate to a Heart Healthy, decreased sodium diet     2. Recommend Roderick BID to promote wound healing once NPO status advances    3. Monitor labs and weights     Goals: Patient to consume >75% of EEN prior to RD follow up  Nutrition Goal Status: new  Communication of RD Recs: other (comment) (POC)     Nutrition Discharge Planning      Nutrition Discharge Planning: Too early to determine, pending clinical course, Therapeutic diet (comments)  Therapeutic diet (comments): Heart Healthy     Assessment and Plan     PES Statement  Inadequate protein energy intake related to Wound healing as evidenced by Wounds, NPO/CL status due to medical condition  Status: New     Interventions(treatment strategy):  Decreased sodium modified diet   Amino acid modified beverage medical food supplement therapy      Nutrition Diagnosis Status:   New      Amisha Thomas, MS, RDN, LDN

## 2025-06-27 NOTE — PLAN OF CARE
2 cc of air removed from Right radial vasc band. No hematoma or bleeding noted. +2 levi radial pulses palpated. Skin is normal in color, warm to touch, < 3 sec cap refill. VSS. Will continue to monitor pt for safety and needs.

## 2025-06-27 NOTE — PLAN OF CARE
Remaining air removed from R radial vasc band. Gauze and tegaderm dressing applied. Site is CDI. No bleeding or hematoma noted around site. Site and surrounding areas soft. +2 levi radial pulses palpated. Skin normal in color, warm to touch, < 3 sec cap refill. Will continue to monitor pt.

## 2025-06-27 NOTE — PLAN OF CARE
Pt on documented O2. Patient given aerosol treatment with no adverse reactions noted. Patient instructed on proper use. No apparent respiratory distress noted. Will continue to monitor.

## 2025-06-27 NOTE — BRIEF OP NOTE
Procedure:  Right heart catheterization  Left heart catheterization    Indications:  Severe pulmonary hypertension/congestive heart failure    Access:  Right cephalic vein, right radial artery    Closure:  Manual pull and Vasc band    Right heart catheterization pressures in mmHg    PCWP:  13 / 14/ 8     PA:  63   /  29  /40  RV:  58  /   1 /7  RA:  9   / 6   /5    Oxygen saturations in%    PA:  58%  RV:  58%  RA:  63%  AO: 93%      Cardiac output:  3.6      l/min     Maggy  Cardiac index :  2.12       l/min    PVR:      11     WANG ( N:0.5-1.1)    711       Dynes.sec.cm-5  (N: 30-90)               Left heart catheterization finding    LM:  BERT III flow 0% stenosis  LAD: BERT III flow.  Prox/mid 20% stenosis.  D1 small caliber vessel with 30% stenosis  LCx:  BERT III flow.  0% stenosis  RCA: BERT III flow.  Distally 10% stenosis.  Distal PDA 20-30% stenosis  Dominance right    LVgram: LVEDP 3 mm Hg    Intervention:  None    Patient tolerated procedure well, no complications    Assessment and plan:     Severe pre capillary pulmonary hypertension  Mild nonobstructive CAD  Euvolemic status    Plan  He is euvolemic  Switch Lasix to p.o. 40 mg daily and extra p.r.n.  We will refer to hypertension Clinic for isolated pre capillary pulmonary hypertension management for advanced pulmonary hypertension treatment medications

## 2025-06-27 NOTE — INTERVAL H&P NOTE
The patient has been examined and the H&P has been reviewed:    I concur with the findings and changes have been noted since the H&P was written:  Patient responded well to IV diuresis.  Plan for right heart catheterization and left heart catheterization today    Anesthesia/Surgery risks, benefits and alternative options discussed and understood by patient/family.          Active Hospital Problems    Diagnosis  POA    *Acute on chronic right-sided heart failure [I50.813]  Yes    Pulmonary hypertension [I27.20]  Yes    Centrilobular emphysema [J43.2]  Yes    ILD (interstitial lung disease) [J84.9]  Yes    Obstructive sleep apnea [G47.33]  Yes    Hyperlipidemia [E78.5]  Yes      Resolved Hospital Problems   No resolved problems to display.

## 2025-06-27 NOTE — PLAN OF CARE
Patient transfered to cath lab bay # 7 via stretcher with side rails up x2. Pt  AAOx4 and able to follow commands. Pt is stable when connecting to cardiac monitors. VSS.   Right radial vasc band in place with 12cc of air in band per report. Site is CDI. No redness, bruising, or hematoma noted around site. +2 levi radial pulses palpated. Palpated levi pedal pulses. Skin normal in color and warm to touch, <3 sec cap refill. Fall risk precautions given and patient acknowledges.  AIDET completed to pt. Will continue to monitor patient.

## 2025-06-27 NOTE — PLAN OF CARE
Patient transferred via wheelchair to Room 420 4th floor tele on assigned cardiac tele monitor. VSS, AAOx4. No c/o pain.     Right radial and brachial gauze/tegaderm site CDI. No bleeding no hematoma noted. Site and surrounding area soft.  Assessed by RAVI Whittaker at bedside. +2 levi radial pulses.  All questions answered.

## 2025-06-27 NOTE — PLAN OF CARE
Problem: Adult Inpatient Plan of Care  Goal: Plan of Care Review  Outcome: Progressing  Goal: Patient-Specific Goal (Individualized)  Outcome: Progressing  Goal: Absence of Hospital-Acquired Illness or Injury  Outcome: Progressing  Goal: Optimal Comfort and Wellbeing  Outcome: Progressing  Goal: Readiness for Transition of Care  Outcome: Progressing     Problem: Heart Failure  Goal: Optimal Coping  Outcome: Progressing  Goal: Optimal Cardiac Output  Outcome: Progressing  Goal: Stable Heart Rate and Rhythm  Outcome: Progressing  Goal: Optimal Functional Ability  Outcome: Progressing  Goal: Effective Oxygenation and Ventilation  Outcome: Progressing

## 2025-06-27 NOTE — PLAN OF CARE
Pt on documented O2, no respiratory distress noted. Will continue to monitor. Breathing tx. Given, KYLEN

## 2025-06-27 NOTE — PROGRESS NOTES
[place] - Telemetry  Adult Nutrition  Consult Note    SUMMARY     Recommendations    1. Advanced oral diet when appropriate to a Heart Healthy, decreased sodium diet     2. Recommend Roderick BID to promote wound healing once NPO status advances    3. Monitor labs and weights    Goals: Patient to consume >75% of EEN prior to RD follow up  Nutrition Goal Status: new  Communication of RD Recs: other (comment) (POC)    Nutrition Discharge Planning     Nutrition Discharge Planning: Too early to determine, pending clinical course, Therapeutic diet (comments)  Therapeutic diet (comments): Heart Healthy    Assessment and Plan    PES Statement  Inadequate protein energy intake related to Wound healing as evidenced by Wounds, NPO/CL status due to medical condition  Status: New    Interventions(treatment strategy):  Decreased sodium modified diet   Amino acid modified beverage medical food supplement therapy     Nutrition Diagnosis Status:   New         Malnutrition Assessment     Skin (Micronutrient): wounds unhealed                                 Reason for Assessment    Reason For Assessment: RD follow-up  Diagnosis: cardiac disease  General Information Comments: Patient is NPO today for procedure.  Diet was advanced between the last RD assessment and follow up.  Patient ate 100% of meal when diet was advanced.  Heart Cath procedure today pending. Patient with 9.0L diuresed since admit.  Noted weight change since admit. Labs reviewed.  NKFA.  LBM: 6/23/25.  Abscess to the right buttock region.  RD to continue to monitor diet advancement, po intake tolerance and recommend oral supplements.    Patient Active Problem List   Diagnosis    Hyperlipidemia    Rheumatoid arthritis with positive rheumatoid factor    Personal history of skin cancer    Elevated LFTs    Obstructive sleep apnea    Interstitial pulmonary fibrosis    ILD (interstitial lung disease)    Chronic bilateral low back pain with right-sided sciatica    Decreased  "range of motion    Muscle weakness    Lumbar spondylosis    Spinal stenosis of lumbar region with neurogenic claudication    Degenerative disc disease, lumbar    Lumbar radiculopathy    Chronic pain    Shortness of breath    Centrilobular emphysema    Decreased functional mobility    Immunosuppressed status    S/P lumbar spinal fusion    Aorto-iliac atherosclerosis    Lung nodule    Decreased ROM of right shoulder    Decreased strength, endurance, and mobility    Pulmonary hypertension    Acute on chronic right-sided heart failure     Past Medical History:   Diagnosis Date    Basal cell carcinoma 01/2024    left upper chest    COPD (chronic obstructive pulmonary disease)     Degenerative disc disease, lumbar 06/16/2021    Hyperlipidemia     Interstitial pulmonary fibrosis     NU (obstructive sleep apnea)     Pulmonary fibrosis     Rheumatoid arthritis     SCC (squamous cell carcinoma) 07/2020    SCC right medial canthus    SCC (squamous cell carcinoma) 11/2021    mid lower neck     SCC (squamous cell carcinoma) 01/2022    right lateral cheek- in situ    SCC (squamous cell carcinoma) 09/2023    in situ L lower forearm and HAK R shoulder    SCC (squamous cell carcinoma) 01/09/2025    R lower forearm    SCC (squamous cell carcinoma) 11/2024    left upper shin    Small bowel obstruction due to adhesions 05/10/2023    Squamous cell carcinoma 07/2019    SCC in situ left temple    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    R lateral cheek       Nutrition/Diet History    Spiritual, Cultural Beliefs, Confucianism Practices, Values that Affect Care: no  Food Allergies: NKFA  Factors Affecting Nutritional Intake: NPO  Nutrition-related SDOH: None Identified    Anthropometrics    Height: 5' 5" (165.1 cm)  Height (inches): 65 in  Height Method: Stated  Weight: 63.4 kg (139 lb 12.4 oz)  Weight (lb): 139.77 lb  Weight Method: Bed Scale  Ideal Body Weight (IBW), Male: 136 lb  % Ideal Body Weight, " Male (lb): 102.77 %  BMI (Calculated): 23.3  BMI Grade: less than 23 (older than 65 years) - underweight     Wt Readings from Last 10 Encounters:   06/27/25 63.4 kg (139 lb 12.4 oz)   06/05/25 67.6 kg (149 lb)   05/30/25 68.6 kg (151 lb 3.8 oz)   05/28/25 70.8 kg (156 lb)   05/27/25 70.8 kg (156 lb 1.4 oz)   05/26/25 69.4 kg (153 lb)   04/15/25 65.3 kg (143 lb 15.4 oz)   03/18/25 66.8 kg (147 lb 4.3 oz)   03/11/25 66.3 kg (146 lb 2.6 oz)   12/13/24 68 kg (150 lb)       Lab/Procedures/Meds    Pertinent Labs Reviewed: reviewed  Pertinent Medications Reviewed: reviewed  BMP  Lab Results   Component Value Date     06/27/2025    K 3.5 06/27/2025    CL 96 06/27/2025    CO2 32 (H) 06/27/2025    BUN 32 (H) 06/27/2025    CREATININE 1.1 06/27/2025    CALCIUM 8.5 (L) 06/27/2025    ANIONGAP 10 06/27/2025    EGFRNORACEVR >60 06/27/2025     Lab Results   Component Value Date    HGBA1C 6.4 (H) 06/24/2025     Lab Results   Component Value Date    CALCIUM 8.5 (L) 06/27/2025    PHOS 3.7 05/09/2023     Glucose   Date Value Ref Range Status   06/27/2025 102 70 - 110 mg/dL Final   01/07/2025 93 70 - 110 mg/dL Final     Scheduled Meds:   atorvastatin  40 mg Oral Daily    budesonide  0.5 mg Nebulization Q12H    enoxparin  40 mg Subcutaneous Daily    furosemide (LASIX) injection  40 mg Intravenous BID AC    gabapentin  300 mg Oral BID    leflunomide  20 mg Oral Daily    senna-docusate  1 tablet Oral BID    sodium chloride 0.9%  10 mL Intravenous Q8H    spironolactone  25 mg Oral Daily     Continuous Infusions:  PRN Meds:.  Current Facility-Administered Medications:     acetaminophen, 650 mg, Oral, Q4H PRN    aluminum-magnesium hydroxide-simethicone, 30 mL, Oral, QID PRN    dextrose 50%, 12.5 g, Intravenous, PRN    dextrose 50%, 25 g, Intravenous, PRN    glucagon (human recombinant), 1 mg, Intramuscular, PRN    glucose, 16 g, Oral, PRN    glucose, 24 g, Oral, PRN    insulin aspart U-100, 0-5 Units, Subcutaneous, QID (AC + HS) PRN     melatonin, 6 mg, Oral, Nightly PRN    ondansetron, 8 mg, Oral, Q8H PRN    ondansetron, 4 mg, Intravenous, Q8H PRN    simethicone, 1 tablet, Oral, QID PRN    Estimated/Assessed Needs    Weight Used For Calorie Calculations: 63.4 kg (139 lb 12.4 oz)  Energy Calorie Requirements (kcal): 25-35kcals/kg (1585-2219kcals/day)  Energy Need Method: Kcal/kg  Protein Requirements: 1.2-1.5g/kg (76-95g/day)  Weight Used For Protein Calculations: 63.4 kg (139 lb 12.4 oz)  Fluid Requirements (mL): per md order     RDA Method (mL): 25  CHO Requirement: 250      Nutrition Prescription Ordered    Current Diet Order: NPO  Oral Nutrition Supplement: Roderick, Boost when NPO status advances    Evaluation of Received Nutrient/Fluid Intake    % Kcal Needs: NPO  % Protein Needs: NPO  I/O: 6/27/25: -6050mL since admit  Energy Calories Required: not meeting needs  Protein Required: not meeting needs  Fluid Required: not meeting needs  Comments: LBM: 6/23/25  Tolerance: other (see comments) (NPO)  % Intake of Estimated Energy Needs: 0 - 25 %  % Meal Intake: NPO    PES Statement  Inadequate protein energy intake related to Wound healing as evidenced by Wounds, NPO/CL status due to medical condition  Status: New    Nutrition Risk    Level of Risk/Frequency of Follow-up: moderate (Follow up: 1-2x per week)       Monitor and Evaluation    Monitor and Evaluation: Energy intake, Protein intake, Diet order, Weight, Food and nutrition knowledge, Beliefs and attitudes, Electrolyte and renal panel, Gastrointestinal profile, Glucose/endocrine profile, Inflammatory profile, Lipid profile, Skin       Nutrition Follow-Up    RD Follow-up?: Yes  Amisha Thomas, MS, RDN, LDN

## 2025-06-28 VITALS
BODY MASS INDEX: 23.28 KG/M2 | OXYGEN SATURATION: 94 % | RESPIRATION RATE: 19 BRPM | HEART RATE: 90 BPM | WEIGHT: 139.75 LBS | DIASTOLIC BLOOD PRESSURE: 79 MMHG | HEIGHT: 65 IN | SYSTOLIC BLOOD PRESSURE: 117 MMHG | TEMPERATURE: 98 F

## 2025-06-28 PROCEDURE — 25000003 PHARM REV CODE 250: Performed by: NURSE PRACTITIONER

## 2025-06-28 PROCEDURE — 25000003 PHARM REV CODE 250: Performed by: FAMILY MEDICINE

## 2025-06-28 PROCEDURE — 99900035 HC TECH TIME PER 15 MIN (STAT)

## 2025-06-28 PROCEDURE — 25000003 PHARM REV CODE 250: Performed by: INTERNAL MEDICINE

## 2025-06-28 PROCEDURE — 94640 AIRWAY INHALATION TREATMENT: CPT

## 2025-06-28 PROCEDURE — 27000221 HC OXYGEN, UP TO 24 HOURS

## 2025-06-28 PROCEDURE — 25000003 PHARM REV CODE 250: Performed by: STUDENT IN AN ORGANIZED HEALTH CARE EDUCATION/TRAINING PROGRAM

## 2025-06-28 PROCEDURE — 25000242 PHARM REV CODE 250 ALT 637 W/ HCPCS: Performed by: NURSE PRACTITIONER

## 2025-06-28 RX ORDER — ASPIRIN 81 MG/1
81 TABLET ORAL DAILY
Qty: 30 TABLET | Refills: 11 | Status: SHIPPED | OUTPATIENT
Start: 2025-06-29 | End: 2026-06-29

## 2025-06-28 RX ORDER — GABAPENTIN 300 MG/1
300 CAPSULE ORAL 2 TIMES DAILY
Start: 2025-06-28

## 2025-06-28 RX ORDER — ASPIRIN 81 MG/1
81 TABLET ORAL DAILY
Status: DISCONTINUED | OUTPATIENT
Start: 2025-06-28 | End: 2025-06-28 | Stop reason: HOSPADM

## 2025-06-28 RX ORDER — FUROSEMIDE 40 MG/1
40 TABLET ORAL DAILY
Qty: 30 TABLET | Refills: 3 | Status: SHIPPED | OUTPATIENT
Start: 2025-06-28

## 2025-06-28 RX ADMIN — GABAPENTIN 300 MG: 300 CAPSULE ORAL at 09:06

## 2025-06-28 RX ADMIN — ATORVASTATIN CALCIUM 40 MG: 40 TABLET, FILM COATED ORAL at 09:06

## 2025-06-28 RX ADMIN — SPIRONOLACTONE 25 MG: 25 TABLET ORAL at 09:06

## 2025-06-28 RX ADMIN — ASPIRIN 81 MG: 81 TABLET, COATED ORAL at 09:06

## 2025-06-28 RX ADMIN — LEFLUNOMIDE 20 MG: 20 TABLET, FILM COATED ORAL at 09:06

## 2025-06-28 RX ADMIN — BUDESONIDE 0.5 MG: 0.5 INHALANT RESPIRATORY (INHALATION) at 07:06

## 2025-06-28 RX ADMIN — FUROSEMIDE 40 MG: 40 TABLET ORAL at 09:06

## 2025-06-28 NOTE — NURSING
Discharge instruction and education packet provided. VN notified. IV site removed cath tip intact. Telemetry discontinued without adverse reaction. Patient shows no acute distress. Wife will transport pt home

## 2025-06-28 NOTE — PLAN OF CARE
Pt on documented O2, no respiratory distress noted. Will continue to monitor. Breeathing tx. Given, TOÑO

## 2025-06-28 NOTE — ASSESSMENT & PLAN NOTE
Pulmonary hypertension     Patient is identified as having Systolic (HFrEF) heart failure that is Acute on Chronic. CHF is currently uncontrolled due to volume overload due to: Continued edema of extremities and Dyspnea not returned to baseline after 1 doses of IV diuretic. Latest ECHO performed and demonstrates- Results for orders placed during the hospital encounter of 05/28/25    Echo    Interpretation Summary    Left Ventricle: The left ventricle is smaller than normal. Normal wall thickness. Mild global hypokinesis present. Septal flattening in diastole and systole consistent with right ventricular volume and pressure overload. There is mildly reduced systolic function with a visually estimated ejection fraction of 40 - 45%. Quantitated ejection fraction is 50%.  LVEF may be underestimated due to decreased filling in the setting of right ventricular failure.    Right Ventricle: The right ventricle is severely dilated. Systolic function is severely reduced.    Right Atrium: The right atrium is dilated.    Tricuspid Valve: There is moderate to severe regurgitation.    Pulmonary Artery: The estimated pulmonary artery systolic pressure is 82 mmHg.    IVC/SVC: Elevated venous pressure at 15 mmHg.  . Continue ACE/ARB Aldactone and monitor clinical status closely. Monitor on telemetry. Patient is on CHF pathway.  Monitor strict Is&Os and daily weights.  Place on fluid restriction of 1.5 L. Cardiology is consulted. Continue to stress to patient importance of self efficacy and  on diet for CHF. Last BNP reviewed- and noted below   Recent Labs   Lab 06/24/25  1106   BNP 2,003*   -IV lasix 40 mg BID  -Cont strict I's and O's   -Cont daily weights  -Cont low Na diet with 1.5 fluid restriction  -spironolactone cont at home dose  -appreciate cardiology   6/25-he is net -7.1 L since admission  6/26-he is -9 L since admission.  We will continue IV diuresis.  Cardiology is planning left heart catheterization on tomorrow  morning.  NPO after midnight.  6/27- NPO for [facility] today/ cont diuresis.  6/28- he is net - 14 L since admission  Cards plan below:   Switch Lasix to p.o. 40 mg daily and extra p.r.n.  Cont spironolactone   We will refer to hypertension Clinic for isolated pre capillary pulmonary hypertension management for advanced pulmonary hypertension treatment medications

## 2025-06-28 NOTE — PLAN OF CARE
Problem: Adult Inpatient Plan of Care  Goal: Plan of Care Review  Outcome: Met  Goal: Patient-Specific Goal (Individualized)  Outcome: Met  Goal: Absence of Hospital-Acquired Illness or Injury  Outcome: Met  Goal: Optimal Comfort and Wellbeing  Outcome: Met  Goal: Readiness for Transition of Care  Outcome: Met     Problem: Heart Failure  Goal: Optimal Coping  Outcome: Met  Goal: Optimal Cardiac Output  Outcome: Met  Goal: Stable Heart Rate and Rhythm  Outcome: Met  Goal: Optimal Functional Ability  Outcome: Met  Goal: Fluid and Electrolyte Balance  Outcome: Met  Goal: Improved Oral Intake  Outcome: Met  Goal: Effective Oxygenation and Ventilation  Outcome: Met  Goal: Effective Breathing Pattern During Sleep  Outcome: Met     Problem: Fall Injury Risk  Goal: Absence of Fall and Fall-Related Injury  Outcome: Met     Problem: Comorbidity Management  Goal: Maintenance of COPD Symptom Control  Outcome: Met  Goal: Blood Pressure in Desired Range  Outcome: Met     Problem: Wound  Goal: Optimal Coping  Outcome: Met  Goal: Optimal Functional Ability  Outcome: Met  Goal: Absence of Infection Signs and Symptoms  Outcome: Met  Goal: Improved Oral Intake  Outcome: Met  Goal: Optimal Pain Control and Function  Outcome: Met  Goal: Skin Health and Integrity  Outcome: Met  Goal: Optimal Wound Healing  Outcome: Met     Problem: Skin Injury Risk Increased  Goal: Skin Health and Integrity  Outcome: Met

## 2025-06-28 NOTE — DISCHARGE INSTRUCTIONS
If youre on spironolactone, foods to avoid include:  Avocados  Bananas  Carrot juice  Prune juice  Potatoes with skin  Leafy greens  Orange juice  Kiwi  Clams  Very salty foods

## 2025-06-28 NOTE — PLAN OF CARE
06/28/25 0921   Final Note   Assessment Type Final Discharge Note   Anticipated Discharge Disposition Home   What phone number can be called within the next 1-3 days to see how you are doing after discharge? [phone]   Post-Acute Status   Discharge Delays None known at this time      spoke with pt regarding discharge plan to home. Pt informed SW that his daughter was at the bedside and would transport him home at D/C. Pt had no social needs at time of D/C. Per previous CM note, no HH services or DME needed.     Follow up appointments scheduled below.     Future Appointments   Date Time Provider Department Center   7/7/2025  8:30 AM Kailee Valentin MD [facility] IMPRI [place]   7/21/2025 11:20 AM Lucie Judge MD [facility] DERM [place]   8/5/2025 10:30 AM [facility] [place]   8/5/2025 10:45 AM [facility] [place]   8/13/2025 11:00 AM Raza Gunter DPM [facility] POD [facility]   10/16/2025  1:20 PM Chip Chavira MD [facility] CARDIO [place]

## 2025-06-28 NOTE — DISCHARGE SUMMARY
[facility]  Discharge Summary      Patient Name: Fabiano Garvey  MRN: [id number]  NITA: [id number]  Patient Class: IP- Inpatient  Admission Date: 6/24/2025  Hospital Length of Stay: 4 days  Discharge Date and Time: 06/28/2025 11:22 AM  Attending Physician: Chantelle Barcenas*   Discharging Provider: Natasha Deleon NP  Primary Care Provider: Calin Buchanan MD    Primary Care Team: Networked reference to record PCT     HPI:   Fabiano Garvey this is a 69-year-old male with a past medical history of basal cell carcinoma, COPD, pulmonary fibrosis, pulmonary hypertension, and CHF.  Patient presented to the emergency department with complaints of worsening shortness of breath.  Patient reported a productive cough with clear sputum.  He also reported chest tightness and increased fluid retention.  Patient reports being compliant with his medication.  He denies any shortness of chest pain, palpitations, nausea, vomiting, abdominal pain, or any trauma.  His ED workup is notable for an elevated BNP and troponin.  His EKG with normal sinus rhythm.  Heart rate 98.  His chest x-ray is with a normal heart size, lungs demonstrated some mild chronic changes.  No acute process or worrisome change from his last chest x-ray.  He was given IV Lasix in the ED. Cardiology was consulted.  He will be admitted to the hospital medicine service for further workup.    Procedure(s) (LRB):  INSERTION, CATHETER, RIGHT HEART (Right)  Left heart cath (Left)  ANGIOGRAM, CORONARY ARTERY (N/A)      Hospital Course:   No notes on file     Goals of Care Treatment Preferences:  Code Status: Full Code         Consults:   Consults (From admission, onward)          Status Ordering Provider     Inpatient consult to Social Work/Case Management  Once        Provider:  (Not yet assigned)    NATASHA Wilson     Inpatient consult to Registered Dietitian/Nutritionist  Once        Provider:   (Not yet assigned)    Completed ROQUE SQUIRES     Inpatient consult to Cardiology  Once        Provider:  (Not yet assigned)    Completed ERROL HELTON            Assessment & Plan  Acute on chronic right-sided heart failure  Pulmonary hypertension     Patient is identified as having Systolic (HFrEF) heart failure that is Acute on Chronic. CHF is currently uncontrolled due to volume overload due to: Continued edema of extremities and Dyspnea not returned to baseline after 1 doses of IV diuretic. Latest ECHO performed and demonstrates- Results for orders placed during the hospital encounter of 05/28/25    Echo    Interpretation Summary    Left Ventricle: The left ventricle is smaller than normal. Normal wall thickness. Mild global hypokinesis present. Septal flattening in diastole and systole consistent with right ventricular volume and pressure overload. There is mildly reduced systolic function with a visually estimated ejection fraction of 40 - 45%. Quantitated ejection fraction is 50%.  LVEF may be underestimated due to decreased filling in the setting of right ventricular failure.    Right Ventricle: The right ventricle is severely dilated. Systolic function is severely reduced.    Right Atrium: The right atrium is dilated.    Tricuspid Valve: There is moderate to severe regurgitation.    Pulmonary Artery: The estimated pulmonary artery systolic pressure is 82 mmHg.    IVC/SVC: Elevated venous pressure at 15 mmHg.  . Continue ACE/ARB Aldactone and monitor clinical status closely. Monitor on telemetry. Patient is on CHF pathway.  Monitor strict Is&Os and daily weights.  Place on fluid restriction of 1.5 L. Cardiology is consulted. Continue to stress to patient importance of self efficacy and  on diet for CHF. Last BNP reviewed- and noted below   Recent Labs   Lab 06/24/25  1106   BNP 2,003*   -IV lasix 40 mg BID  -Cont strict I's and O's   -Cont daily weights  -Cont low Na diet with 1.5  fluid restriction  -spironolactone cont at home dose  -appreciate cardiology   6/25-he is net -7.1 L since admission  6/26-he is -9 L since admission.  We will continue IV diuresis.  Cardiology is planning left heart catheterization on tomorrow morning.  NPO after midnight.  6/27- NPO for [facility] today/ cont diuresis.  6/28- he is net - 14 L since admission  Cards plan below:   Switch Lasix to p.o. 40 mg daily and extra p.r.n.  Cont spironolactone   We will refer to hypertension Clinic for isolated pre capillary pulmonary hypertension management for advanced pulmonary hypertension treatment medications           Hyperlipidemia  -continue statin at home dose  Obstructive sleep apnea  -chronic problem    ILD (interstitial lung disease)  -chronic problem.  We will continue inhalers at home dose    Centrilobular emphysema   Patient is currently off COPD Pathway. Continue scheduled inhalers Supplemental oxygen and monitor respiratory status closely.  Respiratory distress thought to be more related to fluid.  Continue to monitor.  Pulmonary hypertension      Final Active Diagnoses:    Diagnosis Date Noted POA    PRINCIPAL PROBLEM:  Acute on chronic right-sided heart failure [I50.813] 06/24/2025 Yes    Pulmonary hypertension [I27.20] 06/03/2025 Yes    Centrilobular emphysema [J43.2] 11/10/2021 Yes    ILD (interstitial lung disease) [J84.9] 09/24/2020 Yes    Obstructive sleep apnea [G47.33] 11/21/2019 Yes    Hyperlipidemia [E78.5]  Yes      Problems Resolved During this Admission:       Discharged Condition: stable    Disposition: Home or Self Care    Follow Up:   Follow-up Information       Calin Buchanan MD Follow up in 1 week(s).    Specialty: Family Medicine  Contact information:  [address]  [address]  [place] [address]  [phone]               Chip Chavira MD Follow up in 2 week(s).    Specialties: Interventional Cardiology, Cardiology  Contact information:  [address]  [address]  [place]  [address]  [phone]                           Patient Instructions:   No discharge procedures on file.    Significant Diagnostic Studies: N/A    Pending Diagnostic Studies:       None           Medications:  Reconciled Home Medications:      Medication List        START taking these medications      aspirin 81 MG EC tablet  Commonly known as: ECOTRIN  Take 1 tablet (81 mg total) by mouth once daily.  Start taking on: June 29, 2025            CHANGE how you take these medications      furosemide 40 MG tablet  Commonly known as: LASIX  Take 1 tablet (40 mg total) by mouth once daily.  What changed: medication strength     gabapentin 300 MG capsule  Commonly known as: NEURONTIN  Take 1 capsule (300 mg total) by mouth 2 (two) times daily.  What changed:   how much to take  when to take this            CONTINUE taking these medications      albuterol 90 mcg/actuation inhaler  Commonly known as: VENTOLIN HFA  Inhale 2 puffs into the lungs every 6 (six) hours as needed for Wheezing (cough). Rescue     atorvastatin 40 MG tablet  Commonly known as: LIPITOR  Take 1 tablet (40 mg total) by mouth once daily.     BREZTRI AEROSPHERE 160-9-4.8 mcg/actuation Hfaa  Generic drug: budesonide-glycopyr-formoterol  Inhale 2 puffs into the lungs 2 (two) times a day.     leflunomide 20 MG Tab  Commonly known as: ARAVA  Take 1 tablet (20 mg total) by mouth once daily.     spironolactone 25 MG tablet  Commonly known as: ALDACTONE  Take 1 tablet (25 mg total) by mouth once daily.            STOP taking these medications      celecoxib 200 MG capsule  Commonly known as: CeleBREX     hepatitis A virus vaccine (PF) 1,440 JOSEE unit/mL Syrg  Commonly known as: HAVRIX     ibuprofen 800 MG tablet  Commonly known as: ADVIL,MOTRIN     losartan 25 MG tablet  Commonly known as: COZAAR     potassium chloride SA 10 MEQ tablet  Commonly known as: K-DUR,KLOR-CON M     predniSONE 5 MG tablet  Commonly known as: DELTASONE              Indwelling Lines/Drains  at time of discharge:   Lines/Drains/Airways       None                       Time spent on the discharge of patient: 45 minutes         Natasha Deleon NP  Department of Hospital Medicine  [place] - On license of [facility]

## 2025-06-28 NOTE — SUBJECTIVE & OBJECTIVE
Interval History:  Seen at the bedside.  No distress noted.  NPO since midnight. C today. Cont diuresis.    Review of Systems   Constitutional:  Negative for activity change and appetite change.   HENT:  Negative for trouble swallowing.    Respiratory:  Negative for shortness of breath.    Cardiovascular:  Positive for leg swelling (Improving). Negative for chest pain.   Gastrointestinal:  Negative for diarrhea, nausea and vomiting.   Genitourinary:  Negative for hematuria.   Psychiatric/Behavioral:  Negative for confusion.      Objective:     Vital Signs (Most Recent):  Temp: 97.6 °F (36.4 °C) (06/27/25 1936)  Pulse: 97 (06/27/25 1936)  Resp: 20 (06/27/25 1730)  BP: 108/73 (06/27/25 1936)  SpO2: (!) 94 % (06/27/25 1936) Vital Signs (24h Range):  Temp:  [97 °F (36.1 °C)-98.2 °F (36.8 °C)] 97.6 °F (36.4 °C)  Pulse:  [] 97  Resp:  [17-20] 20  SpO2:  [88 %-99 %] 94 %  BP: (102-123)/(71-85) 108/73     Weight: 63.4 kg (139 lb 12.4 oz)  Body mass index is 23.26 kg/m².    Intake/Output Summary (Last 24 hours) at 6/27/2025 1944  Last data filed at 6/27/2025 0554  Gross per 24 hour   Intake --   Output 900 ml   Net -900 ml         Physical Exam  Vitals and nursing note reviewed.   HENT:      Mouth/Throat:      Mouth: Mucous membranes are moist.   Eyes:      Extraocular Movements: Extraocular movements intact.   Cardiovascular:      Rate and Rhythm: Regular rhythm. Tachycardia present.   Pulmonary:      Effort: Pulmonary effort is normal.   Abdominal:      General: Bowel sounds are normal. There is no distension.      Palpations: Abdomen is soft.      Tenderness: There is no abdominal tenderness. There is no guarding.   Musculoskeletal:         General: Swelling present.      Cervical back: Normal range of motion and neck supple.      Right lower leg: Edema (Improving) present.      Left lower leg: Edema (Improving) present.   Skin:     General: Skin is warm and dry.      Capillary Refill: Capillary refill takes 2 to  3 seconds.   Neurological:      Mental Status: He is alert and oriented to person, place, and time.   Psychiatric:         Mood and Affect: Mood normal.         Behavior: Behavior normal.               Significant Labs: All pertinent labs within the past 24 hours have been reviewed.    Significant Imaging: I have reviewed all pertinent imaging results/findings within the past 24 hours.

## 2025-06-28 NOTE — PLAN OF CARE
Problem: Adult Inpatient Plan of Care  Goal: Plan of Care Review  Outcome: Progressing  Goal: Patient-Specific Goal (Individualized)  Outcome: Progressing  Goal: Absence of Hospital-Acquired Illness or Injury  Outcome: Progressing  Goal: Optimal Comfort and Wellbeing  Outcome: Progressing  Goal: Readiness for Transition of Care  Outcome: Progressing     Problem: Heart Failure  Goal: Optimal Coping  Outcome: Progressing  Goal: Optimal Cardiac Output  Outcome: Progressing  Goal: Stable Heart Rate and Rhythm  Outcome: Progressing  Goal: Optimal Functional Ability  Outcome: Progressing

## 2025-06-28 NOTE — ASSESSMENT & PLAN NOTE
Pulmonary hypertension     Patient is identified as having Systolic (HFrEF) heart failure that is Acute on Chronic. CHF is currently uncontrolled due to volume overload due to: Continued edema of extremities and Dyspnea not returned to baseline after 1 doses of IV diuretic. Latest ECHO performed and demonstrates- Results for orders placed during the hospital encounter of 05/28/25    Echo    Interpretation Summary    Left Ventricle: The left ventricle is smaller than normal. Normal wall thickness. Mild global hypokinesis present. Septal flattening in diastole and systole consistent with right ventricular volume and pressure overload. There is mildly reduced systolic function with a visually estimated ejection fraction of 40 - 45%. Quantitated ejection fraction is 50%.  LVEF may be underestimated due to decreased filling in the setting of right ventricular failure.    Right Ventricle: The right ventricle is severely dilated. Systolic function is severely reduced.    Right Atrium: The right atrium is dilated.    Tricuspid Valve: There is moderate to severe regurgitation.    Pulmonary Artery: The estimated pulmonary artery systolic pressure is 82 mmHg.    IVC/SVC: Elevated venous pressure at 15 mmHg.  . Continue ACE/ARB Aldactone and monitor clinical status closely. Monitor on telemetry. Patient is on CHF pathway.  Monitor strict Is&Os and daily weights.  Place on fluid restriction of 1.5 L. Cardiology is consulted. Continue to stress to patient importance of self efficacy and  on diet for CHF. Last BNP reviewed- and noted below   Recent Labs   Lab 06/24/25  1106   BNP 2,003*   -IV lasix 40 mg BID  -Cont strict I's and O's   -Cont daily weights  -Cont low Na diet with 1.5 fluid restriction  -spironolactone cont at home dose  -appreciate cardiology   6/25-he is net -7.1 L since admission  6/26-he is -9 L since admission.  We will continue IV diuresis.  Cardiology is planning left heart catheterization on tomorrow  morning.  NPO after midnight.  6/27- NPO for LHC today/ cont diuresis.

## 2025-06-28 NOTE — PROGRESS NOTES
[facility]  Progress Note    Patient Name: Fabiano Garvey  MRN: [id number]  Patient Class: IP- Inpatient   Admission Date: 6/24/2025  Length of Stay: 3 days  Attending Physician: Chantelle Barcenas*  Primary Care Provider: Calin Buchanan MD        Subjective     Principal Problem:Acute on chronic right-sided heart failure        HPI:  Fabiano Garvey this is a 69-year-old male with a past medical history of basal cell carcinoma, COPD, pulmonary fibrosis, pulmonary hypertension, and CHF.  Patient presented to the emergency department with complaints of worsening shortness of breath.  Patient reported a productive cough with clear sputum.  He also reported chest tightness and increased fluid retention.  Patient reports being compliant with his medication.  He denies any shortness of chest pain, palpitations, nausea, vomiting, abdominal pain, or any trauma.  His ED workup is notable for an elevated BNP and troponin.  His EKG with normal sinus rhythm.  Heart rate 98.  His chest x-ray is with a normal heart size, lungs demonstrated some mild chronic changes.  No acute process or worrisome change from his last chest x-ray.  He was given IV Lasix in the ED. Cardiology was consulted.  He will be admitted to the hospital medicine service for further workup.    Overview/Hospital Course:  No notes on file    Interval History:  Seen at the bedside.  No distress noted.  NPO since midnight. LHC today. Cont diuresis.    Review of Systems   Constitutional:  Negative for activity change and appetite change.   HENT:  Negative for trouble swallowing.    Respiratory:  Negative for shortness of breath.    Cardiovascular:  Positive for leg swelling (Improving). Negative for chest pain.   Gastrointestinal:  Negative for diarrhea, nausea and vomiting.   Genitourinary:  Negative for hematuria.   Psychiatric/Behavioral:  Negative for confusion.      Objective:     Vital Signs (Most Recent):  Temp: 97.6  °F (36.4 °C) (06/27/25 1936)  Pulse: 97 (06/27/25 1936)  Resp: 20 (06/27/25 1730)  BP: 108/73 (06/27/25 1936)  SpO2: (!) 94 % (06/27/25 1936) Vital Signs (24h Range):  Temp:  [97 °F (36.1 °C)-98.2 °F (36.8 °C)] 97.6 °F (36.4 °C)  Pulse:  [] 97  Resp:  [17-20] 20  SpO2:  [88 %-99 %] 94 %  BP: (102-123)/(71-85) 108/73     Weight: 63.4 kg (139 lb 12.4 oz)  Body mass index is 23.26 kg/m².    Intake/Output Summary (Last 24 hours) at 6/27/2025 1944  Last data filed at 6/27/2025 0554  Gross per 24 hour   Intake --   Output 900 ml   Net -900 ml         Physical Exam  Vitals and nursing note reviewed.   HENT:      Mouth/Throat:      Mouth: Mucous membranes are moist.   Eyes:      Extraocular Movements: Extraocular movements intact.   Cardiovascular:      Rate and Rhythm: Regular rhythm. Tachycardia present.   Pulmonary:      Effort: Pulmonary effort is normal.   Abdominal:      General: Bowel sounds are normal. There is no distension.      Palpations: Abdomen is soft.      Tenderness: There is no abdominal tenderness. There is no guarding.   Musculoskeletal:         General: Swelling present.      Cervical back: Normal range of motion and neck supple.      Right lower leg: Edema (Improving) present.      Left lower leg: Edema (Improving) present.   Skin:     General: Skin is warm and dry.      Capillary Refill: Capillary refill takes 2 to 3 seconds.   Neurological:      Mental Status: He is alert and oriented to person, place, and time.   Psychiatric:         Mood and Affect: Mood normal.         Behavior: Behavior normal.               Significant Labs: All pertinent labs within the past 24 hours have been reviewed.    Significant Imaging: I have reviewed all pertinent imaging results/findings within the past 24 hours.      Assessment & Plan  Acute on chronic right-sided heart failure  Pulmonary hypertension     Patient is identified as having Systolic (HFrEF) heart failure that is Acute on Chronic. CHF is currently  uncontrolled due to volume overload due to: Continued edema of extremities and Dyspnea not returned to baseline after 1 doses of IV diuretic. Latest ECHO performed and demonstrates- Results for orders placed during the hospital encounter of 05/28/25    Echo    Interpretation Summary    Left Ventricle: The left ventricle is smaller than normal. Normal wall thickness. Mild global hypokinesis present. Septal flattening in diastole and systole consistent with right ventricular volume and pressure overload. There is mildly reduced systolic function with a visually estimated ejection fraction of 40 - 45%. Quantitated ejection fraction is 50%.  LVEF may be underestimated due to decreased filling in the setting of right ventricular failure.    Right Ventricle: The right ventricle is severely dilated. Systolic function is severely reduced.    Right Atrium: The right atrium is dilated.    Tricuspid Valve: There is moderate to severe regurgitation.    Pulmonary Artery: The estimated pulmonary artery systolic pressure is 82 mmHg.    IVC/SVC: Elevated venous pressure at 15 mmHg.  . Continue ACE/ARB Aldactone and monitor clinical status closely. Monitor on telemetry. Patient is on CHF pathway.  Monitor strict Is&Os and daily weights.  Place on fluid restriction of 1.5 L. Cardiology is consulted. Continue to stress to patient importance of self efficacy and  on diet for CHF. Last BNP reviewed- and noted below   Recent Labs   Lab 06/24/25  1106   BNP 2,003*   -IV lasix 40 mg BID  -Cont strict I's and O's   -Cont daily weights  -Cont low Na diet with 1.5 fluid restriction  -spironolactone cont at home dose  -appreciate cardiology   6/25-he is net -7.1 L since admission  6/26-he is -9 L since admission.  We will continue IV diuresis.  Cardiology is planning left heart catheterization on tomorrow morning.  NPO after midnight.  6/27- NPO for C today/ cont diuresis.        Hyperlipidemia  -continue statin at home  dose  Obstructive sleep apnea  -chronic problem    ILD (interstitial lung disease)  -chronic problem.  We will continue inhalers at home dose    Centrilobular emphysema   Patient is currently off COPD Pathway. Continue scheduled inhalers Supplemental oxygen and monitor respiratory status closely.  Respiratory distress thought to be more related to fluid.  Continue to monitor.  Pulmonary hypertension      VTE Risk Mitigation (From admission, onward)           Ordered     heparin infusion 1,000 units/500 ml in 0.9% NaCl (pressure line flush)  Intra-op continuous PRN         06/27/25 1455     enoxaparin injection 40 mg  Daily         06/24/25 1419     IP VTE HIGH RISK PATIENT  Once         06/24/25 1419     Place sequential compression device  Until discontinued         06/24/25 1419                    Discharge Planning   THELMA: 6/29/2025     Code Status: Full Code   Medical Readiness for Discharge Date:   Discharge Plan A: Home, Home with family                        Natasha Deleon NP  Department of Hospital Medicine   [place] [facility]

## 2025-06-28 NOTE — PROGRESS NOTES
Discharge orders noted. Additional clinical references attached. Patient's discharge instructions given by bedside RN and reviewed via this VN.  Education provided on new and previous medications, diagnosis, and follow-up appointments.  New medications sent to patient's  pharmacy. Patient verbalized understanding and teach back method was used. Patient's ride/transportation home at bedside. All questions answered. Transport to [facility] requested. Floor nurse notified.              06/28/25 1152    Notification    Notified Of Discharge Status   Admission   Communication Issues? None   Shift   Virtual Nurse - Rounding Complete   Virtual Nurse - Patient Verbalized Approval Of Camera Use   Safety/Activity   Patient Rounds bed in low position;call light in patient/parent reach;clutter free environment maintained;visualized patient   Safety Promotion/Fall Prevention family to remain at bedside;side rails raised x 2   Activity Management Ambulated in room - L4   Positioning   Body Position supine   Head of Bed (HOB) Positioning HOB elevated

## 2025-06-30 ENCOUNTER — TELEPHONE (OUTPATIENT)
Dept: TRANSPLANT | Facility: CLINIC | Age: 70
End: 2025-06-30
Payer: MEDICARE

## 2025-06-30 DIAGNOSIS — I27.9 CHRONIC PULMONARY HEART DISEASE: ICD-10-CM

## 2025-06-30 DIAGNOSIS — Z79.899 POLYPHARMACY: Primary | ICD-10-CM

## 2025-06-30 DIAGNOSIS — R06.82 TACHYPNEA: ICD-10-CM

## 2025-06-30 LAB
OHS QRS DURATION: 82 MS
OHS QTC CALCULATION: 474 MS

## 2025-07-01 ENCOUNTER — TELEPHONE (OUTPATIENT)
Dept: CARDIOLOGY | Facility: CLINIC | Age: 70
End: 2025-07-01
Payer: MEDICARE

## 2025-07-01 NOTE — TELEPHONE ENCOUNTER
Copied from CRM #[id number]. Topic: Appointments - Hospital Follow Up  >> Jul 1, 2025  1:37 PM Lacy wrote:  .Type: Patient Call Back    Who called:pt    What is the request in detail: a call back in regards to making an hospital follow up visit, he was release this past Saturday. Nothing is available online    Can the clinic reply by [facility]?    Would the patient rather a call back or a response via [facility]? call    Best call back number:[phone] (M)    Additional Information:

## 2025-07-02 ENCOUNTER — PATIENT OUTREACH (OUTPATIENT)
Dept: ADMINISTRATIVE | Facility: CLINIC | Age: 70
End: 2025-07-02
Payer: MEDICARE

## 2025-07-02 NOTE — PROGRESS NOTES
C3 nurse spoke with Fabiano Garvey  for a TCC post hospital discharge follow up call. The patient has a scheduled HOSFU appointment with Kailee Valentin MD on 7/7/2025 at 8:30 AM.

## 2025-07-07 ENCOUNTER — OFFICE VISIT (OUTPATIENT)
Dept: PRIMARY CARE CLINIC | Facility: CLINIC | Age: 70
End: 2025-07-07
Payer: MEDICARE

## 2025-07-07 ENCOUNTER — LAB VISIT (OUTPATIENT)
Dept: LAB | Facility: HOSPITAL | Age: 70
End: 2025-07-07
Attending: INTERNAL MEDICINE
Payer: MEDICARE

## 2025-07-07 VITALS
DIASTOLIC BLOOD PRESSURE: 73 MMHG | WEIGHT: 129.88 LBS | OXYGEN SATURATION: 97 % | HEIGHT: 65 IN | HEART RATE: 91 BPM | SYSTOLIC BLOOD PRESSURE: 103 MMHG | BODY MASS INDEX: 21.64 KG/M2

## 2025-07-07 DIAGNOSIS — G47.33 OBSTRUCTIVE SLEEP APNEA: ICD-10-CM

## 2025-07-07 DIAGNOSIS — I27.20 PULMONARY HYPERTENSION: ICD-10-CM

## 2025-07-07 DIAGNOSIS — I50.43 ACUTE ON CHRONIC COMBINED SYSTOLIC AND DIASTOLIC HEART FAILURE: Primary | ICD-10-CM

## 2025-07-07 LAB
ALBUMIN SERPL BCP-MCNC: 3.4 G/DL (ref 3.5–5.2)
ALP SERPL-CCNC: 150 UNIT/L (ref 40–150)
ALT SERPL W/O P-5'-P-CCNC: 28 UNIT/L (ref 10–44)
ANION GAP (OHS): 12 MMOL/L (ref 8–16)
AST SERPL-CCNC: 41 UNIT/L (ref 11–45)
BILIRUB SERPL-MCNC: 1 MG/DL (ref 0.1–1)
BUN SERPL-MCNC: 38 MG/DL (ref 8–23)
CALCIUM SERPL-MCNC: 9.8 MG/DL (ref 8.7–10.5)
CHLORIDE SERPL-SCNC: 98 MMOL/L (ref 95–110)
CO2 SERPL-SCNC: 27 MMOL/L (ref 23–29)
CREAT SERPL-MCNC: 1.2 MG/DL (ref 0.5–1.4)
GFR SERPLBLD CREATININE-BSD FMLA CKD-EPI: >60 ML/MIN/1.73/M2
GLUCOSE SERPL-MCNC: 86 MG/DL (ref 70–110)
MAGNESIUM SERPL-MCNC: 2.3 MG/DL (ref 1.6–2.6)
POTASSIUM SERPL-SCNC: 4.1 MMOL/L (ref 3.5–5.1)
PROT SERPL-MCNC: 8.7 GM/DL (ref 6–8.4)
SODIUM SERPL-SCNC: 137 MMOL/L (ref 136–145)

## 2025-07-07 PROCEDURE — 3078F DIAST BP <80 MM HG: CPT | Mod: CPTII,S$GLB,, | Performed by: INTERNAL MEDICINE

## 2025-07-07 PROCEDURE — 3288F FALL RISK ASSESSMENT DOCD: CPT | Mod: CPTII,S$GLB,, | Performed by: INTERNAL MEDICINE

## 2025-07-07 PROCEDURE — 4010F ACE/ARB THERAPY RXD/TAKEN: CPT | Mod: CPTII,S$GLB,, | Performed by: INTERNAL MEDICINE

## 2025-07-07 PROCEDURE — 1126F AMNT PAIN NOTED NONE PRSNT: CPT | Mod: CPTII,S$GLB,, | Performed by: INTERNAL MEDICINE

## 2025-07-07 PROCEDURE — 99496 TRANSJ CARE MGMT HIGH F2F 7D: CPT | Mod: S$GLB,,, | Performed by: INTERNAL MEDICINE

## 2025-07-07 PROCEDURE — 83735 ASSAY OF MAGNESIUM: CPT

## 2025-07-07 PROCEDURE — 99999 PR PBB SHADOW E&M-EST. PATIENT-LVL III: CPT | Mod: PBBFAC,,, | Performed by: INTERNAL MEDICINE

## 2025-07-07 PROCEDURE — 3044F HG A1C LEVEL LT 7.0%: CPT | Mod: CPTII,S$GLB,, | Performed by: INTERNAL MEDICINE

## 2025-07-07 PROCEDURE — 36415 COLL VENOUS BLD VENIPUNCTURE: CPT

## 2025-07-07 PROCEDURE — 3074F SYST BP LT 130 MM HG: CPT | Mod: CPTII,S$GLB,, | Performed by: INTERNAL MEDICINE

## 2025-07-07 PROCEDURE — 80053 COMPREHEN METABOLIC PANEL: CPT

## 2025-07-07 PROCEDURE — 1101F PT FALLS ASSESS-DOCD LE1/YR: CPT | Mod: CPTII,S$GLB,, | Performed by: INTERNAL MEDICINE

## 2025-07-07 NOTE — PROGRESS NOTES
[facility]   New Visit Progress Note   Recent Hospital Discharge     PRESENTING HISTORY     Chief Complaint/Reason for Admission:  Follow up Hospital Discharge   PCP: Calin Buchanan MD    History of Present Illness:  Mr. Fabiano Garvey is a 69 y.o. male who was recently admitted to the hospital.    [facility] Medicine  Discharge Summary        Patient Name: Fabiano Garvey  MRN: [id number]  NITA: [id number]  Patient Class: IP- Inpatient  Admission Date: 6/24/2025  Hospital Length of Stay: 4 days  Discharge Date and Time: 06/28/2025 11:22 AM  Attending Physician: Chantelle Barcenas   Discharging Provider: Natasha Deleon NP  Primary Care Provider: Calin Buchanan MD  ___________________________________________________________________    Today:  [facility] for [facility] follow up.  Recently hospitalized for management of decompensated heart failure.  Complicated by pulmonary hypertension and emphysema.  Patient is supplemental oxygen dependent at baseline.  Admitted to [facility] Medicine service with Cardiology consultation.  IV Lasix initiated with good response- diuresed > 14 L over course of hospitalization.  Underwent LHC and RHC 6/27/25-> severe pre capillary pulmonary hypertension, mild non obstructive CAD, euvolemic status.   Medication regimen optimized.  Patient discharged to home.     Patient accompanied today by family.  Ambulatory and independent with ADL's.  Brought medication bottles for review.  Has portable concentrator but doesn't wear home oxygen consistently.  Admits to desaturation to low 80's with exertion.       Review of Systems  General ROS: negative for chills, fever + weight loss  Psychological ROS: negative for hallucination, depression or suicidal ideation  Ophthalmic ROS: negative for blurry vision, photophobia or eye pain  ENT ROS: negative for epistaxis, sore throat or rhinorrhea  Respiratory ROS: no  cough, shortness of breath, or wheezing  Cardiovascular ROS: no chest pain or dyspnea on exertion  Gastrointestinal ROS: no abdominal pain, change in bowel habits, or black/ bloody stools  Genito-Urinary ROS: no dysuria, trouble voiding, or hematuria  Musculoskeletal ROS: negative for gait disturbance or muscular weakness  Neurological ROS: no syncope or seizures; no ataxia  Dermatological ROS: negative for pruritis, rash and jaundice      PAST HISTORY:     Past Medical History:   Diagnosis Date    Basal cell carcinoma 01/2024    left upper chest    COPD (chronic obstructive pulmonary disease)     Degenerative disc disease, lumbar 06/16/2021    Hyperlipidemia     Interstitial pulmonary fibrosis     NU (obstructive sleep apnea)     Pulmonary fibrosis     Rheumatoid arthritis     SCC (squamous cell carcinoma) 07/2020    SCC right medial canthus    SCC (squamous cell carcinoma) 11/2021    mid lower neck     SCC (squamous cell carcinoma) 01/2022    right lateral cheek- in situ    SCC (squamous cell carcinoma) 09/2023    in situ L lower forearm and HAK R shoulder    SCC (squamous cell carcinoma) 01/09/2025    R lower forearm    SCC (squamous cell carcinoma) 11/2024    left upper shin    Small bowel obstruction due to adhesions 05/10/2023    Squamous cell carcinoma 07/2019    SCC in situ left temple    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    R lateral cheek       Past Surgical History:   Procedure Laterality Date    COLONOSCOPY N/A 7/25/2017    Procedure: COLONOSCOPY;  Surgeon: Bill Nicole MD;  Location: [facility] ENDO [address];  Service: Endoscopy;  Laterality: N/A;    CORONARY ANGIOGRAPHY N/A 6/27/2025    Procedure: ANGIOGRAM, CORONARY ARTERY;  Surgeon: Chip Chavira MD;  Location: [facility] CATH LAB/EP;  Service: Cardiology;  Laterality: N/A;    DIAGNOSTIC LAPAROSCOPY N/A 5/10/2023    Procedure: LAPAROSCOPY, DIAGNOSTIC possible laparotomy other as indicated;  Surgeon: Eitan WILHELM  MD Marielle;  Location: [facility] OR;  Service: General;  Laterality: N/A;    ENDOSCOPIC ULTRASOUND OF UPPER GASTROINTESTINAL TRACT N/A 12/13/2024    Procedure: ULTRASOUND, UPPER GI TRACT, ENDOSCOPIC;  Surgeon: Harrison Calderon MD;  Location: [facility] ENDO;  Service: Endoscopy;  Laterality: N/A;  10/25 portal-EUS at [facility] for dilated PD and weight loss. calderon -tt  10/31 r/s updated portal instr-tt  12/6 PRECALL ATTEMPTED-NA/RT  12/9 SWP PRECALL COMPLETE-RT    EPIDURAL STEROID INJECTION INTO LUMBAR SPINE N/A 7/29/2021    Procedure: Injection-steroid-epidural-lumbar L5-S1;  Surgeon: Shiv Vernon Jr., MD;  Location: [facility] PAIN MGT;  Service: Pain Management;  Laterality: N/A;  oral sedation   no pacemaker     FUSION OF SPINE WITH INSTRUMENTATION Left 12/14/2022    Procedure: FUSION, SPINE, WITH INSTRUMENTATION Stg1: Left L3-5 OLiF conduit lateral cage BMP;  Surgeon: August Aguilar MD;  Location: [facility] OR;  Service: Neurosurgery;  Laterality: Left;  Procedure: Stg1: Left L3-5 OLiF conduit lateral cage BMP  Surgery Time: 2hrs  LOS: 3  Anesthesia: General  Blood: Type & Screen  Radiology: C-arm  Brace: LSO  Bed: Regular Bed  Position: Lateral Right Down  Equip    FUSION, SPINE, POSTERIOR APPROACH N/A 12/14/2022    Procedure: FUSION,SPINE,POSTERIOR APPROACH Stg 2: L3-5 posterior instrumentation viper prime, L3-5 posterolateral arthrodesis;  Surgeon: August Aguilar MD;  Location: [facility] OR;  Service: Neurosurgery;  Laterality: N/A;  Procedure: Stg 2: L3-5 posterior instrumentation viper prime, L3-5 posterolateral arthrodesis  Surgery Time: 3hrs  LOS: 3  Anesthesia: General  Blood: Type & Screen  Radiology: Bra    LEFT HEART CATHETERIZATION Left 6/27/2025    Procedure: Left heart cath;  Surgeon: Chip Chavira MD;  Location: [facility] CATH LAB/EP;  Service: Cardiology;  Laterality: Left;    LYSIS OF ADHESIONS N/A 5/10/2023    Procedure: LYSIS, ADHESIONS;  Surgeon: Eitan Palomares MD;  Location: [facility] OR;  Service: General;   "Laterality: N/A;    NO PAST SURGERIES      RIGHT HEART CATHETERIZATION Right 6/27/2025    Procedure: INSERTION, CATHETER, RIGHT HEART;  Surgeon: Chip Chavira MD;  Location: [facility] CATH LAB/EP;  Service: Cardiology;  Laterality: Right;    TRANSFORAMINAL EPIDURAL INJECTION OF STEROID Right 6/29/2021    Procedure: Injection,steroid,epidural,transforaminal approach-RIGHT-- L4-5, L5-S1-- (IV Sedation);  Surgeon: Shiv Vernon Jr., MD;  Location: [facility] PAIN MGT;  Service: Pain Management;  Laterality: Right;    TRANSFORAMINAL EPIDURAL INJECTION OF STEROID Left 10/27/2022    Procedure: Injection,steroid,epidural,transforaminal left L4-5 and L5-S1;  Surgeon: Shiv Vernon Jr., MD;  Location: [facility] PAIN MGT;  Service: Pain Management;  Laterality: Left;       Family History   Problem Relation Name Age of Onset    Heart failure Mother          60s    Coronary artery disease Father          70s    Heart attack Father      Arthritis Sister x2     Arthritis Brother x1     Asthma Daughter x2     Gallbladder disease Daughter x2     Seizures Daughter x2     Cancer Paternal Uncle          lymphoma??    Diabetes Neg Hx      Melanoma Neg Hx           MEDICATIONS & ALLERGIES:     Medications Ordered Prior to Encounter[1]     Review of patient's allergies indicates:   Allergen Reactions    Plaquenil [hydroxychloroquine]      Lightheaded and muscle aches       OBJECTIVE:     Vital Signs:  /73 (BP Location: Left arm, Patient Position: Sitting)   Pulse 91   Ht 5' 5" (1.651 m)   Wt 58.9 kg (129 lb 13.6 oz)   SpO2 97%   BMI 21.61 kg/m²   Wt Readings from Last 3 Encounters:   07/07/25 0856 58.9 kg (129 lb 13.6 oz)   06/27/25 0844 63.4 kg (139 lb 12.4 oz)   06/26/25 0615 63.4 kg (139 lb 12.4 oz)   06/24/25 2107 68.8 kg (151 lb 10.8 oz)   06/24/25 1042 65.8 kg (145 lb)   06/05/25 0921 67.6 kg (149 lb)     Body mass index is 21.61 kg/m².        Physical Exam:  /73 (BP Location: Left arm, Patient Position: Sitting)   " "Pulse 91   Ht 5' 5" (1.651 m)   Wt 58.9 kg (129 lb 13.6 oz)   SpO2 97%   BMI 21.61 kg/m²   General appearance: alert, cooperative, no distress  Constitutional:Oriented to person, place, and time  + appears well-developed and well-nourished.   HEENT: Normocephalic, atraumatic, neck symmetrical, no nasal discharge   Eyes: conjunctivae/corneas clear, PERRL, EOM's intact  Lungs: clear to auscultation bilaterally, no dullness to percussion bilaterally  Heart: regular rate and rhythm without rub; no displacement of the PMI   Abdomen: soft, non-tender; bowel sounds normoactive; no organomegaly  Extremities: extremities symmetric; no clubbing, cyanosis, or edema  Integument: Skin color, texture, turgor normal; no rashes; hair distrubution normal  Neurologic: Alert and oriented X 3, normal strength, normal coordination and gait  Psychiatric: no pressured speech; normal affect; no evidence of impaired cognition     Laboratory  Lab Results   Component Value Date    WBC 8.06 06/27/2025    HGB 15.4 06/27/2025    HCT 47.2 06/27/2025    MCV 93 06/27/2025     06/27/2025     BMP  Lab Results   Component Value Date     06/27/2025    K 3.5 06/27/2025    CL 96 06/27/2025    CO2 32 (H) 06/27/2025    BUN 32 (H) 06/27/2025    CREATININE 1.1 06/27/2025    CALCIUM 8.5 (L) 06/27/2025    ANIONGAP 10 06/27/2025    EGFRNORACEVR >60 06/27/2025     Lab Results   Component Value Date    ALT 21 06/24/2025    AST 35 06/24/2025    ALKPHOS 143 06/24/2025    BILITOT 1.7 (H) 06/24/2025     Lab Results   Component Value Date    INR 1.0 11/22/2022     Lab Results   Component Value Date    HGBA1C 6.4 (H) 06/24/2025       Diagnostic Results:    TTE 5/28/25:    Left Ventricle: The left ventricle is smaller than normal. Normal wall thickness. Mild global hypokinesis present. Septal flattening in diastole and systole consistent with right ventricular volume and pressure overload. There is mildly reduced systolic function with a visually " estimated ejection fraction of 40 - 45%. Quantitated ejection fraction is 50%.  LVEF may be underestimated due to decreased filling in the setting of right ventricular failure.    Right Ventricle: The right ventricle is severely dilated. Systolic function is severely reduced.    Right Atrium: The right atrium is dilated.    Tricuspid Valve: There is moderate to severe regurgitation.    Pulmonary Artery: The estimated pulmonary artery systolic pressure is 82 mmHg.    IVC/SVC: Elevated venous pressure at 15 mmHg.    TRANSITION OF CARE:     [facility] On Call Contact Note: 7/2/25     Family and/or Caretaker present at visit?  Yes.  Diagnostic tests reviewed/disposition: I have reviewed all completed as well as pending diagnostic tests at the time of discharge.  Disease/illness education: Yes  Home health/community services discussion/referrals: Patient does not have home health established from hospital visit.  They do not need home health.  If needed, we will set up home health for the patient.   Establishment or re-establishment of referral orders for community resources: No other necessary community resources.   Discussion with other health care providers: No discussion with other health care providers necessary.     ASSESSMENT & PLAN:     Acute on chronic combined systolic and diastolic heart failure  - recent hospitalization as above   - diuresed > 14 L over course of hospitalization   - continue current medication regimen  - see Cardiology team 7/21/25    Pulmonary hypertension  - continue current medication regimen  - desaturates to 80's with exertion- I have advised patient to wear supplemental oxygen as directed   - 6 min walk pending 7/16/25  - see [facility] team 7/16/25  -     Comprehensive Metabolic Panel; Future; Expected date: 07/07/2025  -     Magnesium; Future; Expected date: 07/07/2025    Obstructive sleep apnea  - patient reports compliance with nightly CPAP     Patient will be released from hospital follow up  clinic.  Follow up as detailed below.       Instructions for the patient:      Scheduled Follow-up :  Future Appointments   Date Time Provider Department Center   7/16/2025  1:30 PM LAB, APPOINTMENT [place] [facility] LAB [facility]   7/16/2025  2:20 PM SIX, MINUTE WALK [facility] PUL [facility]   7/16/2025  3:00 PM Poli Sampson Jr., MD [facility] HEARTTX [facility]   7/21/2025 11:20 AM Lucie Judge MD [facility] DERM [place]   7/21/2025  3:40 PM Jessu Keita DNP [facility] CARDIO [facility]   8/5/2025 10:30 AM LAB, [facility] LAB [place]   8/5/2025 10:45 [facility] [place]   8/13/2025 11:00 AM Raza Gunter DPM [facility] POD [facility]   10/16/2025  1:20 PM Chip Chavira MD [facility] CARDIO [facility]       Post Visit Medication List:     Medication List            Accurate as of July 7, 2025  3:11 PM. If you have any questions, ask your nurse or doctor.                CONTINUE taking these medications      albuterol 90 mcg/actuation inhaler  Commonly known as: VENTOLIN HFA  Inhale 2 puffs into the lungs every 6 (six) hours as needed for Wheezing (cough). Rescue     aspirin 81 MG EC tablet  Commonly known as: ECOTRIN  Take 1 tablet (81 mg total) by mouth once daily.     atorvastatin 40 MG tablet  Commonly known as: LIPITOR  Take 1 tablet (40 mg total) by mouth once daily.     BREZTRI AEROSPHERE 160-9-4.8 mcg/actuation Hfaa  Generic drug: budesonide-glycopyr-formoterol  Inhale 2 puffs into the lungs 2 (two) times a day.     furosemide 40 MG tablet  Commonly known as: LASIX  Take 1 tablet (40 mg total) by mouth once daily.     gabapentin 300 MG capsule  Commonly known as: NEURONTIN  Take 1 capsule (300 mg total) by mouth 2 (two) times daily.     leflunomide 20 MG Tab  Commonly known as: ARAVA  Take 1 tablet (20 mg total) by mouth once daily.     spironolactone 25 MG tablet  Commonly known as: ALDACTONE  Take 1 tablet (25 mg total) by mouth once daily.              Signing Physician:  Kailee MIRANDA  MD Barbie         [1]   Current Outpatient Medications on File Prior to Visit   Medication Sig Dispense Refill    albuterol (VENTOLIN HFA) 90 mcg/actuation inhaler Inhale 2 puffs into the lungs every 6 (six) hours as needed for Wheezing (cough). Rescue 18 g 2    aspirin (ECOTRIN) 81 MG EC tablet Take 1 tablet (81 mg total) by mouth once daily. 30 tablet 11    atorvastatin (LIPITOR) 40 MG tablet Take 1 tablet (40 mg total) by mouth once daily. 90 tablet 3    budesonide-glycopyr-formoterol (BREZTRI AEROSPHERE) 160-9-4.8 mcg/actuation HFAA Inhale 2 puffs into the lungs 2 (two) times a day. 10.7 g 6    furosemide (LASIX) 40 MG tablet Take 1 tablet (40 mg total) by mouth once daily. 30 tablet 3    gabapentin (NEURONTIN) 300 MG capsule Take 1 capsule (300 mg total) by mouth 2 (two) times daily.      leflunomide (ARAVA) 20 MG Tab Take 1 tablet (20 mg total) by mouth once daily. 30 tablet 6    spironolactone (ALDACTONE) 25 MG tablet Take 1 tablet (25 mg total) by mouth once daily. 30 tablet 11     No current facility-administered medications on file prior to visit.

## 2025-07-16 ENCOUNTER — OFFICE VISIT (OUTPATIENT)
Dept: TRANSPLANT | Facility: CLINIC | Age: 70
End: 2025-07-16
Attending: INTERNAL MEDICINE
Payer: MEDICARE

## 2025-07-16 ENCOUNTER — LAB VISIT (OUTPATIENT)
Dept: LAB | Facility: HOSPITAL | Age: 70
End: 2025-07-16
Attending: INTERNAL MEDICINE
Payer: MEDICARE

## 2025-07-16 ENCOUNTER — HOSPITAL ENCOUNTER (OUTPATIENT)
Dept: PULMONOLOGY | Facility: CLINIC | Age: 70
Discharge: HOME | End: 2025-07-16
Attending: INTERNAL MEDICINE
Payer: MEDICARE

## 2025-07-16 VITALS
HEART RATE: 99 BPM | BODY MASS INDEX: 21.33 KG/M2 | WEIGHT: 129.63 LBS | HEIGHT: 65 IN | BODY MASS INDEX: 21.6 KG/M2 | SYSTOLIC BLOOD PRESSURE: 97 MMHG | DIASTOLIC BLOOD PRESSURE: 71 MMHG | WEIGHT: 128 LBS | OXYGEN SATURATION: 96 % | HEIGHT: 65 IN

## 2025-07-16 DIAGNOSIS — I27.9 CHRONIC PULMONARY HEART DISEASE: ICD-10-CM

## 2025-07-16 DIAGNOSIS — R06.82 TACHYPNEA: ICD-10-CM

## 2025-07-16 DIAGNOSIS — J43.2 CENTRILOBULAR EMPHYSEMA: ICD-10-CM

## 2025-07-16 DIAGNOSIS — J84.10 INTERSTITIAL PULMONARY FIBROSIS: ICD-10-CM

## 2025-07-16 DIAGNOSIS — J84.9 ILD (INTERSTITIAL LUNG DISEASE): ICD-10-CM

## 2025-07-16 DIAGNOSIS — M05.9 RHEUMATOID ARTHRITIS WITH POSITIVE RHEUMATOID FACTOR, INVOLVING UNSPECIFIED SITE: ICD-10-CM

## 2025-07-16 DIAGNOSIS — D84.9 IMMUNOSUPPRESSED STATUS: ICD-10-CM

## 2025-07-16 DIAGNOSIS — I27.20 PULMONARY HYPERTENSION: Primary | ICD-10-CM

## 2025-07-16 DIAGNOSIS — Z79.899 POLYPHARMACY: ICD-10-CM

## 2025-07-16 LAB
ABSOLUTE EOSINOPHIL (OHS): 0.25 K/UL
ABSOLUTE MONOCYTE (OHS): 0.83 K/UL (ref 0.3–1)
ABSOLUTE NEUTROPHIL COUNT (OHS): 5.21 K/UL (ref 1.8–7.7)
ALBUMIN SERPL BCP-MCNC: 3.4 G/DL (ref 3.5–5.2)
ALP SERPL-CCNC: 176 UNIT/L (ref 40–150)
ALT SERPL W/O P-5'-P-CCNC: 37 UNIT/L (ref 10–44)
ANION GAP (OHS): 11 MMOL/L (ref 8–16)
AST SERPL-CCNC: 43 UNIT/L (ref 11–45)
BASOPHILS # BLD AUTO: 0.11 K/UL
BASOPHILS NFR BLD AUTO: 1.2 %
BILIRUB SERPL-MCNC: 0.9 MG/DL (ref 0.1–1)
BNP SERPL-MCNC: 543 PG/ML (ref 0–99)
BUN SERPL-MCNC: 38 MG/DL (ref 8–23)
CALCIUM SERPL-MCNC: 9.3 MG/DL (ref 8.7–10.5)
CHLORIDE SERPL-SCNC: 100 MMOL/L (ref 95–110)
CO2 SERPL-SCNC: 27 MMOL/L (ref 23–29)
CREAT SERPL-MCNC: 1.4 MG/DL (ref 0.5–1.4)
ERYTHROCYTE [DISTWIDTH] IN BLOOD BY AUTOMATED COUNT: 16.7 % (ref 11.5–14.5)
GFR SERPLBLD CREATININE-BSD FMLA CKD-EPI: 54 ML/MIN/1.73/M2
GLUCOSE SERPL-MCNC: 119 MG/DL (ref 70–110)
HCT VFR BLD AUTO: 50.2 % (ref 40–54)
HGB BLD-MCNC: 16.7 GM/DL (ref 14–18)
IMM GRANULOCYTES # BLD AUTO: 0.1 K/UL (ref 0–0.04)
IMM GRANULOCYTES NFR BLD AUTO: 1.1 % (ref 0–0.5)
LYMPHOCYTES # BLD AUTO: 2.73 K/UL (ref 1–4.8)
MAGNESIUM SERPL-MCNC: 2.1 MG/DL (ref 1.6–2.6)
MCH RBC QN AUTO: 31.2 PG (ref 27–31)
MCHC RBC AUTO-ENTMCNC: 33.3 G/DL (ref 32–36)
MCV RBC AUTO: 94 FL (ref 82–98)
NUCLEATED RBC (/100WBC) (OHS): 0 /100 WBC
PLATELET # BLD AUTO: 154 K/UL (ref 150–450)
PMV BLD AUTO: 13.3 FL (ref 9.2–12.9)
POTASSIUM SERPL-SCNC: 4.6 MMOL/L (ref 3.5–5.1)
PROT SERPL-MCNC: 8 GM/DL (ref 6–8.4)
RBC # BLD AUTO: 5.35 M/UL (ref 4.6–6.2)
RELATIVE EOSINOPHIL (OHS): 2.7 %
RELATIVE LYMPHOCYTE (OHS): 29.6 % (ref 18–48)
RELATIVE MONOCYTE (OHS): 9 % (ref 4–15)
RELATIVE NEUTROPHIL (OHS): 56.4 % (ref 38–73)
SODIUM SERPL-SCNC: 138 MMOL/L (ref 136–145)
WBC # BLD AUTO: 9.23 K/UL (ref 3.9–12.7)

## 2025-07-16 PROCEDURE — 82040 ASSAY OF SERUM ALBUMIN: CPT

## 2025-07-16 PROCEDURE — 36415 COLL VENOUS BLD VENIPUNCTURE: CPT

## 2025-07-16 PROCEDURE — 83880 ASSAY OF NATRIURETIC PEPTIDE: CPT

## 2025-07-16 PROCEDURE — 83735 ASSAY OF MAGNESIUM: CPT

## 2025-07-16 PROCEDURE — 99999 PR PBB SHADOW E&M-EST. PATIENT-LVL III: CPT | Mod: PBBFAC,,, | Performed by: INTERNAL MEDICINE

## 2025-07-16 PROCEDURE — 85025 COMPLETE CBC W/AUTO DIFF WBC: CPT

## 2025-07-16 RX ORDER — SILDENAFIL CITRATE 20 MG/1
20 TABLET ORAL 3 TIMES DAILY
COMMUNITY
End: 2025-07-16 | Stop reason: SDUPTHER

## 2025-07-16 NOTE — PROGRESS NOTES
Subjective   Patient ID:  Fabiano Garvey is a 69 y.o. male who presents for evaluation of PH    70 yo m w/ seropositive RA, positive ROBEL/DsDNA in the past with no other features of SLE, followed by rheum, ILD (UIP), COPD/emphysema, SCC in the past who presents for evaluation of PH.    He sees rheumatology for his RA/ILD. Referred to Dr. Chavira (Cardiology) for abnormal TTE with concern for PH. Taken for combined LHC/RHC given severe coronary calcifications. No obstructive CAD but severe pre capillary PH. Referred to me for evaluation.    Although he has had longstanding interstitial lung disease, his shortness of breath has gotten progressively worse over the last about 6-8 months.  It became most apparent during a trip to [place] in December of last year.  He used to work in [employer], working in [employer], but at present is not very active as he is retired.  Takes care of basic work around the house such as feeding in the CAD, or cleaning the pool, and independent in his activities of daily living.  With moderate exertion such as heavy lifting or above-average levels of work, he has some shortness of breath.  Denies chest discomfort, leg swelling, PND, orthopnea, palpitations, presyncope, or syncope.    PMH/FH/SH  Past medical history includes seropositive RA, positive ROBEL/DsDNA in the past with no other features of SLE, followed by rheum, ILD (UIP), COPD/emphysema, SCC    No family history of pulmonary hypertension.    He is a former smoker, having quit approximately 2005.  Prior to this he smoked about half pack per day for about 30 years.  Denies alcohol or recreational drug use.    CT Chest 6/6/24  Impression:     Diffuse chronic appearing changes of the lungs in keeping with probable chronic interstitial lung disease.  Superimposed severe emphysematous change.  There are multiple irregular appearing pulmonary opacities as detailed above, which are overall stable from comparison CT 03/05/2024.  No new or  enlarging pulmonary nodules.  Clinical considerations will dictate the role in further follow-up and surveillance imaging of these findings.     Multiple enlarged mediastinal lymph nodes, for example a pretracheal lymph node measuring approximately 1.5 cm.  While findings may relate to reactive lymphadenopathy, pathologic adenopathy cannot be excluded.    TTE 5/28/24       Left Ventricle: The left ventricle is smaller than normal. Normal wall thickness. Mild global hypokinesis present. Septal flattening in diastole and systole consistent with right ventricular volume and pressure overload. There is mildly reduced systolic function with a visually estimated ejection fraction of 40 - 45%. Quantitated ejection fraction is 50%.  LVEF may be underestimated due to decreased filling in the setting of right ventricular failure.    Right Ventricle: The right ventricle is severely dilated. Systolic function is severely reduced.    Right Atrium: The right atrium is dilated.    Tricuspid Valve: There is moderate to severe regurgitation.    Pulmonary Artery: The estimated pulmonary artery systolic pressure is 82 mmHg.    IVC/SVC: Elevated venous pressure at 15 mmHg.    RHC/LHC 6/27/25  Right heart catheterization pressures in mmHg     PCWP:  13 / 14/ 8     PA:  63   /  29  /40  RV:  58  /   1 /7  RA:  9   / 6   /5     Oxygen saturations in%     PA:  58%  RV:  58%  RA:  63%  AO: 93%        Cardiac output:  3.6      l/min     Maggy  Cardiac index :  2.12       l/min     PVR:      11     WANG ( N:0.5-1.1)    711       Dynes.sec.cm-5  (N: 30-90)                 Left heart catheterization finding     LM:  BERT III flow 0% stenosis  LAD: BERT III flow.  Prox/mid 20% stenosis.  D1 small caliber vessel with 30% stenosis  LCx:  BERT III flow.  0% stenosis  RCA: BERT III flow.  Distally 10% stenosis.  Distal PDA 20-30% stenosis  Dominance right     LVgram: LVEDP 3 mm Hg    PFTs 8/27/24  FEV1: 70%  FVC: 99%  DLCO: 26%    ROS       Objective    Vitals:    07/16/25 1448   BP: 97/71   Pulse: 99       Physical Exam  Vitals reviewed.   Constitutional:       General: He is not in acute distress.  HENT:      Head: Atraumatic.   Eyes:      Extraocular Movements: Extraocular movements intact.   Cardiovascular:      Rate and Rhythm: Normal rate and regular rhythm.      Comments: Loud P2  Pulmonary:      Breath sounds: Wheezing and rales present.   Abdominal:      Palpations: Abdomen is soft.      Tenderness: There is no abdominal tenderness.   Musculoskeletal:         General: Normal range of motion.      Right lower leg: No edema.      Left lower leg: No edema.   Neurological:      General: No focal deficit present.      Mental Status: He is alert and oriented to person, place, and time.            Assessment and Plan     1. Pulmonary hypertension    2. Chronic pulmonary heart disease    3. Interstitial pulmonary fibrosis    4. Centrilobular emphysema    5. ILD (interstitial lung disease)    6. Immunosuppressed status    7. Rheumatoid arthritis with positive rheumatoid factor, involving unspecified site        Plan:  - comes in today for evaluation of newly diagnosed pre capillary pulmonary hypertension.  - Reveal score 13, high risk, FC 2 symptoms  - although he has some lung disease with centrilobular emphysema, and UIP related to his rheumatoid arthritis, his pulmonary hypertension appears to be out of proportion to his underlying lung disease indicating more connective tissue disease associated group 1 pulmonary hypertension rather than group 3 pulmonary hypertension.  - however the concomitant underlying lung disease also complicates what options can be safely used to treat his pulmonary hypertension.  - at a prolonged discussion with him in his wife who accompanies him for his visit about the natural history, and progression of pulmonary hypertension.  - we will start with sildenafil 20 mg t.i.d. and make sure he tolerates this well with no hypotension or  worsening hypoxia given the underlying lung disease.  We will have him stop his spironolactone given borderline low blood pressures, check labs again next week and if potassium is low can start potassium replacement.  - if he tolerates the sildenafil well, we will add macitentan to his regimen, again watching for any worsening hypoxia or shunt physiology.  - if therapy needs to be escalated, would likely prefer Uptravi over any inhaled treprostinil as given his COPD, concern that any inhaled treprostinil would lead to COPD exacerbations.  - finally, if needed Winrevair would also be an option for him.       Advance Care Planning     Date: 07/16/2025    Power of   I initiated the process of voluntary advance care planning today and explained the importance of this process to the patient.  I introduced the concept of advance directives to the patient, as well. Then the patient received detailed information about the importance of designating a Health Care Power of  (HCPOA). He was also instructed to communicate with this person about their wishes for future healthcare, should he become sick and lose decision-making capacity. The patient has not previously appointed a HCPOA. After our discussion, the patient has decided to complete a HCPOA. I encouraged him to communicate with this person about their wishes for future healthcare, should he become sick and lose decision-making capacity.      A total of 15 min was spent on advance care planning, goals of care discussion, emotional support, formulating and communicating prognosis and exploring burden/benefit of various approaches of treatment. This discussion occurred on a fully voluntary basis with the verbal consent of the patient and/or family.

## 2025-07-17 ENCOUNTER — TELEPHONE (OUTPATIENT)
Dept: TRANSPLANT | Facility: CLINIC | Age: 70
End: 2025-07-17
Payer: MEDICARE

## 2025-07-17 RX ORDER — SILDENAFIL CITRATE 20 MG/1
20 TABLET ORAL 3 TIMES DAILY
Qty: 90 TABLET | Refills: 11 | Status: SHIPPED | OUTPATIENT
Start: 2025-07-17

## 2025-07-17 NOTE — PROCEDURES
Fabiano Garvey is a 69 y.o.   male patient, who presents for a 6 minute walk test ordered by MD Camilla.  The diagnosis is Qualify for Oxygen.  The patient's BMI is 21.3 kg/m2. Predicted distance (lower limit of normal) is 381.71 meters.    Test Results:    The test was completed with stops.  The patient stopped 2 times for a total of 96 seconds.  The total time walked was 264 seconds.  During walking, the patient reported:  Dyspnea, Calf pain, Other (Comment) (leg weakness). The patient used supplemental oxygen during testing.     07/16/2025---------Distance: 182.88 meters (600 feet)     O2 Sat % Supplemental Oxygen Heart Rate Blood Pressure Stephen Scale   Pre-exercise  (Resting) 95 % 3 L/M 94 bpm 90/67  mmHg 3   During Exercise 89 % 3 L/M 125 bpm 93/69  mmHg 7-8   Post-exercise   94 % 3 L/M  98 bpm    mmHg       Recovery Time: 170 seconds    Oxygen Qualification:     O2 Sat % Supplemental Oxygen Heart Rate Blood Pressure Stephen Scale   Pre-exercise  (Resting) 88 % Room Air  94 bpm  90/67    mmHg 3    During Exercise              mmHg      Post-exercise                mmHg         Recovery Time:      Performing nurse/tech: AMANUEL Elias    PREVIOUS STUDY:   The patient had a previous study.  05/26/2025---------Distance: 182.88 meters (600 feet)       O2 Sat % Supplemental Oxygen Heart Rate Blood Pressure Stephen Scale   Pre-exercise  (Resting) 94 % 3 L/M 96 bpm 138/69  mmHg 2   During Exercise 89 % 3 L/M 130 bpm 143/87  mmHg 4   Post-exercise    91 % 3 L/M  110 bpm    mmHg         CLINICAL INTERPRETATION:  Six minute walk distance is 182.88 meters (600 feet) with very, very heavy dyspnea.  During exercise, there was significant desaturation while breathing supplemental oxygen.  Both blood pressure and heart rate remained stable with walking.  The patient reported non-pulmonary symptoms during exercise.  The patient did complete the study, walking 264 seconds of the 360 second test.  The patient may benefit from using  supplemental oxygen.  Since the previous study in 5/2025, exercise capacity is unchanged.  Based upon age and body mass index, exercise capacity is less than predicted.   Oxygen saturation did improve while breathing supplemental oxygen.

## 2025-07-17 NOTE — TELEPHONE ENCOUNTER
-late entry-    Spoke with the patient and wife in regard to the initiation of Sildenafil/Revatio. The patient was informed that Sildenafil is one 20 mg pill three times a day. The patient can take medication with or without food. The most common side effects are nose bleeds, upset stomach, difficulty sleeping, low blood pressure, skin reddening, Headache, Worsening Shortness of breath and/or congested nose.Patient was made aware that Sildenafil can drop blood pressure so it is very important for her check BP before starting to make sure it is within range and to monitor BP while taking PH medication. Nitrates are contraindicated with the use of adcirca because it can drop BP too quickly and too much.     Patient was informed that prescription will be sent to specialty pharmacy and will be processed through insurance. Information pamphlet/webiste provided to patient on medication information. Patient verbalized understanding and had no further questions. Nurse coordinator is available for further questions or concerns at any time, patient verbalized understanding.     Sildenafil/Revatio prescription was sent in to [facility] per patients request. Per Dr. Cross- if patient tolerates, he would like to add Opsumit to patient's regimen.

## 2025-07-21 ENCOUNTER — OFFICE VISIT (OUTPATIENT)
Dept: CARDIOLOGY | Facility: CLINIC | Age: 70
End: 2025-07-21
Payer: MEDICARE

## 2025-07-21 ENCOUNTER — OFFICE VISIT (OUTPATIENT)
Dept: DERMATOLOGY | Facility: CLINIC | Age: 70
End: 2025-07-21
Payer: MEDICARE

## 2025-07-21 VITALS
OXYGEN SATURATION: 90 % | DIASTOLIC BLOOD PRESSURE: 66 MMHG | HEART RATE: 90 BPM | HEIGHT: 65 IN | SYSTOLIC BLOOD PRESSURE: 94 MMHG | WEIGHT: 129 LBS | BODY MASS INDEX: 21.49 KG/M2

## 2025-07-21 DIAGNOSIS — I50.43 ACUTE ON CHRONIC COMBINED SYSTOLIC AND DIASTOLIC HEART FAILURE: ICD-10-CM

## 2025-07-21 DIAGNOSIS — L57.0 AK (ACTINIC KERATOSIS): Primary | ICD-10-CM

## 2025-07-21 DIAGNOSIS — E78.5 HYPERLIPIDEMIA, UNSPECIFIED HYPERLIPIDEMIA TYPE: ICD-10-CM

## 2025-07-21 DIAGNOSIS — I50.22 HEART FAILURE WITH MILDLY REDUCED EJECTION FRACTION (HFMREF): Primary | Chronic | ICD-10-CM

## 2025-07-21 DIAGNOSIS — I70.8 AORTO-ILIAC ATHEROSCLEROSIS: ICD-10-CM

## 2025-07-21 DIAGNOSIS — G47.33 OBSTRUCTIVE SLEEP APNEA: ICD-10-CM

## 2025-07-21 DIAGNOSIS — I50.810 RVF (RIGHT VENTRICULAR FAILURE): Chronic | ICD-10-CM

## 2025-07-21 DIAGNOSIS — I70.0 AORTO-ILIAC ATHEROSCLEROSIS: ICD-10-CM

## 2025-07-21 DIAGNOSIS — I27.20 PULMONARY HYPERTENSION: ICD-10-CM

## 2025-07-21 DIAGNOSIS — L82.1 SK (SEBORRHEIC KERATOSIS): ICD-10-CM

## 2025-07-21 DIAGNOSIS — I27.9 CHRONIC PULMONARY HEART DISEASE: ICD-10-CM

## 2025-07-21 PROCEDURE — 4010F ACE/ARB THERAPY RXD/TAKEN: CPT | Mod: CPTII,S$GLB,, | Performed by: DERMATOLOGY

## 2025-07-21 PROCEDURE — 1126F AMNT PAIN NOTED NONE PRSNT: CPT | Mod: CPTII,S$GLB,, | Performed by: DERMATOLOGY

## 2025-07-21 PROCEDURE — 99212 OFFICE O/P EST SF 10 MIN: CPT | Mod: 25,S$GLB,, | Performed by: DERMATOLOGY

## 2025-07-21 PROCEDURE — 1159F MED LIST DOCD IN RCRD: CPT | Mod: CPTII,S$GLB,, | Performed by: DERMATOLOGY

## 2025-07-21 PROCEDURE — 17000 DESTRUCT PREMALG LESION: CPT | Mod: S$GLB,,, | Performed by: DERMATOLOGY

## 2025-07-21 PROCEDURE — 17003 DESTRUCT PREMALG LES 2-14: CPT | Mod: S$GLB,,, | Performed by: DERMATOLOGY

## 2025-07-21 PROCEDURE — 1111F DSCHRG MED/CURRENT MED MERGE: CPT | Mod: CPTII,S$GLB,, | Performed by: DERMATOLOGY

## 2025-07-21 PROCEDURE — 3288F FALL RISK ASSESSMENT DOCD: CPT | Mod: CPTII,S$GLB,, | Performed by: DERMATOLOGY

## 2025-07-21 PROCEDURE — 1160F RVW MEDS BY RX/DR IN RCRD: CPT | Mod: CPTII,S$GLB,, | Performed by: DERMATOLOGY

## 2025-07-21 PROCEDURE — 3044F HG A1C LEVEL LT 7.0%: CPT | Mod: CPTII,S$GLB,, | Performed by: DERMATOLOGY

## 2025-07-21 PROCEDURE — 1101F PT FALLS ASSESS-DOCD LE1/YR: CPT | Mod: CPTII,S$GLB,, | Performed by: DERMATOLOGY

## 2025-07-21 PROCEDURE — 99999 PR PBB SHADOW E&M-EST. PATIENT-LVL IV: CPT | Mod: PBBFAC,,,

## 2025-07-21 PROCEDURE — 99999 PR PBB SHADOW E&M-EST. PATIENT-LVL III: CPT | Mod: PBBFAC,,, | Performed by: DERMATOLOGY

## 2025-07-21 RX ORDER — FUROSEMIDE 40 MG/1
TABLET ORAL
Qty: 30 TABLET | Refills: 3 | Status: SHIPPED | OUTPATIENT
Start: 2025-07-21

## 2025-07-21 NOTE — PROGRESS NOTES
Cardiology Clinic note    Subjective:   Patient ID:  Fabiano Garvey is a 70 y.o. male who presents for follow-up of pulm HTN, RV dysfunction     HPI    7/21/2025: Former patient of Dr. Chavira as below, initial visit for me. 71 yo M with hx of COPD, ILD/ pulm fibrosis, RA, RV dsysfunction, pulm HTN here for F/U. Seen in clinic with spouse, reports overall doing pretty well. Chronic LEE, no significant resting SOB. Using home O2 PRN, did not require during clinic visit, O2 90% on RA. Euvolemic on exam, weight stable, has not required additional lasix dosing. Tolerating sildanafil per pulm HTN clinic. No new CV s/s, denies significant CP, orthopnea, PND, sycnope, palps, LE edema. Reports compliance and tolerance with all medications.     06/04/2025: Initial evaluation.  Patient referred by Dr. Buchanan due to abnormal echocardiogram.  Echo showed mild reduced EF and severe pulmonary hypertension.  CT chest June 2024 showed extensive coronary artery calcifications.  History of rheumatoid arthritis and interstitial lung disease.  He reports significant dyspnea on exertion.  For the last 2 to three-month noticed worsening lower extremities edema bilaterally.  Started Lasix with some improvement.  After few steps has to stop to catch his breath.  He qualified home oxygen        SH:  Stopped 2010.  He smoked for 40 years     FH:  No premature CAD history     PMH:  Rheumatoid arthritis, interstitial lung disease, emphysema, on home O2         Prior cardiovascular  Hx  --------------------------------    LHC/RHC 6/2025  Assessment and plan:      Severe pre capillary pulmonary hypertension  Mild nonobstructive CAD  Euvolemic status     Plan  He is euvolemic  Switch Lasix to p.o. 40 mg daily and extra p.r.n.  We will refer to hypertension Clinic for isolated pre capillary pulmonary hypertension management for advanced pulmonary hypertension treatment medications     - ECHO 05/28/2025    Left Ventricle: The left ventricle is  smaller than normal. Normal wall thickness. Mild global hypokinesis present. Septal flattening in diastole and systole consistent with right ventricular volume and pressure overload. There is mildly reduced systolic function with a visually estimated ejection fraction of 40 - 45%. Quantitated ejection fraction is 50%.  LVEF may be underestimated due to decreased filling in the setting of right ventricular failure.    Right Ventricle: The right ventricle is severely dilated. Systolic function is severely reduced.    Right Atrium: The right atrium is dilated.    Tricuspid Valve: There is moderate to severe regurgitation.    Pulmonary Artery: The estimated pulmonary artery systolic pressure is 82 mmHg.    IVC/SVC: Elevated venous pressure at 15 mmHg.        - EKG May 2025 sinus rhythm, incomplete RBBB            The 10-year ASCVD risk score (Bryce MORRELL, et al., 2019) is: 16.9%    Values used to calculate the score:      Age: 69 years      Sex: Male      Is Non- : No      Diabetic: No      Tobacco smoker: No      Systolic Blood Pressure: 133 mmHg      Is BP treated: No      HDL Cholesterol: 45 mg/dL      Total Cholesterol: 139 mg/dL          Past Medical History:   Diagnosis Date    Basal cell carcinoma 01/2024    left upper chest    COPD (chronic obstructive pulmonary disease)     Degenerative disc disease, lumbar 06/16/2021    Hyperlipidemia     Interstitial pulmonary fibrosis     NU (obstructive sleep apnea)     Pulmonary fibrosis     Rheumatoid arthritis     SCC (squamous cell carcinoma) 07/2020    SCC right medial canthus    SCC (squamous cell carcinoma) 11/2021    mid lower neck     SCC (squamous cell carcinoma) 01/2022    right lateral cheek- in situ    SCC (squamous cell carcinoma) 09/2023    in situ L lower forearm and HAK R shoulder    SCC (squamous cell carcinoma) 01/09/2025    R lower forearm    SCC (squamous cell carcinoma) 11/2024    left upper shin    Small bowel obstruction due to  adhesions 05/10/2023    Squamous cell carcinoma 07/2019    SCC in situ left temple    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    R lateral cheek          Patient Active Problem List    Diagnosis Date Noted    RVF (right ventricular failure) 07/21/2025    Heart failure with mildly reduced ejection fraction (HFmrEF) 07/21/2025    Chronic pulmonary heart disease 07/16/2025    Acute on chronic combined systolic and diastolic heart failure 06/24/2025    Pulmonary hypertension 06/03/2025    Decreased ROM of right shoulder 03/28/2025    Decreased strength, endurance, and mobility 03/28/2025    Lung nodule 03/07/2024    Aorto-iliac atherosclerosis 02/27/2024    S/P lumbar spinal fusion 06/21/2023    Immunosuppressed status 05/22/2023    Decreased functional mobility 10/31/2022    Centrilobular emphysema 11/10/2021    Shortness of breath     Chronic pain 06/29/2021    Lumbar spondylosis 06/16/2021    Spinal stenosis of lumbar region with neurogenic claudication 06/16/2021    Degenerative disc disease, lumbar 06/16/2021    Lumbar radiculopathy 06/16/2021    Chronic bilateral low back pain with right-sided sciatica 05/24/2021    Decreased range of motion 05/24/2021    Muscle weakness 05/24/2021    ILD (interstitial lung disease) 09/24/2020    Interstitial pulmonary fibrosis     Obstructive sleep apnea 11/21/2019    Elevated LFTs 08/09/2019    Personal history of skin cancer 08/24/2017     NMSC nose many years ago  SCC in situ left temple- 7/2019  SCC right medial canthus- mohs 7/2020  SCC mid lower neck - 11/2021  SCC in situ right lateral cheek- 1/2022, aldara  SCC in situ R shoulder and left lower forearm - efudex 09/2023  BCC L upper chest 01/2024  SCC right lower forearm and left upper shin - 11/2024      Rheumatoid arthritis with positive rheumatoid factor     Hyperlipidemia        Patient's Medications   New Prescriptions    No medications on file   Previous Medications    ALBUTEROL  (VENTOLIN HFA) 90 MCG/ACTUATION INHALER    Inhale 2 puffs into the lungs every 6 (six) hours as needed for Wheezing (cough). Rescue    ASPIRIN (ECOTRIN) 81 MG EC TABLET    Take 1 tablet (81 mg total) by mouth once daily.    ATORVASTATIN (LIPITOR) 40 MG TABLET    Take 1 tablet (40 mg total) by mouth once daily.    BUDESONIDE-GLYCOPYR-FORMOTEROL (BREZTRI AEROSPHERE) 160-9-4.8 MCG/ACTUATION HFAA    Inhale 2 puffs into the lungs 2 (two) times a day.    GABAPENTIN (NEURONTIN) 300 MG CAPSULE    Take 1 capsule (300 mg total) by mouth 2 (two) times daily.    LEFLUNOMIDE (ARAVA) 20 MG TAB    Take 1 tablet (20 mg total) by mouth once daily.    SILDENAFIL (REVATIO) 20 MG TAB    Take 1 tablet (20 mg total) by mouth 3 (three) times daily.   Modified Medications    Modified Medication Previous Medication    FUROSEMIDE (LASIX) 40 MG TABLET furosemide (LASIX) 40 MG tablet       Take 1 tablet (40 mg total) by mouth once daily. May also take 1 tablet (40 mg total) daily as needed (>2 pound weight gain in 24 hours, > 5 pound weight gain in a week, leg swelling).    Take 1 tablet (40 mg total) by mouth once daily.   Discontinued Medications    No medications on file        Review of Systems   Constitutional: Negative for chills, decreased appetite, diaphoresis, malaise/fatigue, weight gain and weight loss.   Cardiovascular:  Positive for dyspnea on exertion. Negative for chest pain, claudication, irregular heartbeat, leg swelling, near-syncope, orthopnea, palpitations, paroxysmal nocturnal dyspnea and syncope.   Respiratory:  Negative for cough, hemoptysis, shortness of breath and snoring.    Gastrointestinal:  Negative for bloating, abdominal pain, nausea and vomiting.   Neurological:  Negative for light-headedness and weakness.       Family History   Problem Relation Name Age of Onset    Heart failure Mother          60s    Coronary artery disease Father          70s    Heart attack Father      Arthritis Sister x2     Arthritis  Brother x1     Asthma Daughter x2     Gallbladder disease Daughter x2     Seizures Daughter x2     Cancer Paternal Uncle          lymphoma??    Diabetes Neg Hx      Melanoma Neg Hx         Social History     Socioeconomic History    Marital status:    Occupational History     Employer: [employer]   Tobacco Use    Smoking status: Former     Current packs/day: 0.00     Average packs/day: 0.8 packs/day for 37.5 years (28.1 ttl pk-yrs)     Types: Cigarettes     Start date:      Quit date: 2012     Years since quittin.0     Passive exposure: Never    Smokeless tobacco: Never   Substance and Sexual Activity    Alcohol use: Yes     Comment: rarely    Drug use: Never    Sexual activity: Yes     Partners: Female     Birth control/protection: None   Social History Narrative    No aerobic exercise, walks a lot    No diet                                                                                                                                                                                         Social Drivers of Health     Financial Resource Strain: Low Risk  (2025)    Overall Financial Resource Strain (CARDIA)     Difficulty of Paying Living Expenses: Not hard at all   Food Insecurity: No Food Insecurity (2025)    Hunger Vital Sign     Worried About Running Out of Food in the Last Year: Never true     Ran Out of Food in the Last Year: Never true   Transportation Needs: No Transportation Needs (2025)    PRAPARE - Transportation     Lack of Transportation (Medical): No     Lack of Transportation (Non-Medical): No   Physical Activity: Inactive (2025)    Exercise Vital Sign     Days of Exercise per Week: 0 days     Minutes of Exercise per Session: 0 min   Stress: No Stress Concern Present (2025)    [place] [employer] - Occupational Stress Questionnaire     Feeling of Stress : Not at all   Housing Stability: Low Risk  (2025)     "Housing Stability Vital Sign     Unable to Pay for Housing in the Last Year: No     Number of Times Moved in the Last Year: 1     Homeless in the Last Year: No            Objective:   Vitals  Vitals:    07/21/25 1518 07/21/25 1521   BP: 98/69 94/66   Pulse: 90    SpO2: (!) 90%    Weight: 58.5 kg (128 lb 15.5 oz)    Height: 5' 5" (1.651 m)           Physical Exam  Vitals and nursing note reviewed.   Constitutional:       Appearance: Normal appearance.   Cardiovascular:      Rate and Rhythm: Normal rate and regular rhythm.      Heart sounds: No murmur heard.     No gallop.   Pulmonary:      Effort: Pulmonary effort is normal.      Breath sounds: Rales present. No wheezing.   Abdominal:      General: Bowel sounds are normal. There is no distension.      Palpations: Abdomen is soft.      Tenderness: There is no abdominal tenderness.   Musculoskeletal:      Right lower leg: No edema.      Left lower leg: No edema.   Skin:     General: Skin is warm and dry.   Neurological:      Mental Status: He is alert and oriented to person, place, and time.           Lab Results    Lab Results   Component Value Date    WBC 9.23 07/16/2025    HGB 16.7 07/16/2025    HGB 17.2 01/07/2025    HCT 50.2 07/16/2025    HCT 52.3 01/07/2025    MCV 94 07/16/2025    MCV 93 01/07/2025       Lab Results   Component Value Date     07/16/2025     01/07/2025    INR 1.0 11/22/2022       Lab Results   Component Value Date    K 4.6 07/16/2025    K 4.6 01/07/2025    MG 2.1 07/16/2025    BUN 38 (H) 07/16/2025    CREATININE 1.4 07/16/2025       Lab Results   Component Value Date     (H) 07/16/2025    GLU 93 01/07/2025    HGBA1C 6.4 (H) 06/24/2025    HGBA1C 5.9 (H) 01/07/2025       Lab Results   Component Value Date    AST 43 07/16/2025    AST 35 01/07/2025    ALT 37 07/16/2025    ALT 30 01/07/2025    ALBUMIN 3.4 (L) 07/16/2025    ALBUMIN 3.5 01/07/2025    PROT 8.0 07/16/2025    PROT 8.9 (H) 01/07/2025       Lab Results   Component Value " Date    CHOL 139 01/07/2025    HDL 45 01/07/2025    LDLCALC 77.6 01/07/2025    TRIG 82 01/07/2025       Lab Results   Component Value Date    CRP 9.4 (H) 06/09/2025     (H) 07/16/2025         Assessment:     1. Heart failure with mildly reduced ejection fraction (HFmrEF)    2. Acute on chronic combined systolic and diastolic heart failure    3. RVF (right ventricular failure)    4. Pulmonary hypertension    5. Hyperlipidemia, unspecified hyperlipidemia type    6. Chronic pulmonary heart disease    7. Aorto-iliac atherosclerosis    8. Obstructive sleep apnea        Plan:     HFmrEF  -stable  -continue volume management as above, lasix 40mg daily with additional PRN  -F/U with pulm HTN clinic    2. RVF, pulm HTN  -as above  -tolerate sildanafil  -volume management with lasix as above    3. HLD, atherosclerosis  -continue asa, statin    4. Pulm fibrosis, ILD  -continue home O2  -F/U with pulm as scheduled      -F/U ~ 1m, labs prior, or sooner PRN      The 10-year ASCVD risk score (Bryce MORRELL, et al., 2019) is: 10.3%    Values used to calculate the score:      Age: 70 years      Sex: Male      Is Non- : No      Diabetic: No      Tobacco smoker: No      Systolic Blood Pressure: 94 mmHg      Is BP treated: No      HDL Cholesterol: 45 mg/dL      Total Cholesterol: 139 mg/dL    Orders Placed This Encounter   Procedures    Basic metabolic panel     Standing Status:   Future     Expected Date:   8/21/2025     Expiration Date:   10/19/2026     Send normal result to authorizing provider's In Basket if patient is active on MyChart::   Yes    BNP     Standing Status:   Future     Expected Date:   8/21/2025     Expiration Date:   10/19/2026    Magnesium     Standing Status:   Future     Expected Date:   8/21/2025     Expiration Date:   10/19/2026     Send normal result to authorizing provider's In Basket if patient is active on MyChart::   Yes       He expressed verbal understanding and agreed with  the plan      Pertinent cardiac images and EKG reviewed independently.    Continue with current medical plan and lifestyle changes.  Return sooner for concerns or questions. If symptoms persist go to the ED.  I have reviewed all pertinent data including patient's medical history in detail and updated the computerized patient record.     Counseling included discussion regarding imaging findings, diagnosis, possibilities, treatment options, risks and benefits.    Thank you for the opportunity to care for this patient. Will be available for questions if needed.     Signed:  Jesus Keita DNP  07/21/2025

## 2025-07-21 NOTE — PROGRESS NOTES
Subjective:      Patient ID:  Fabiano Garvey is a 70 y.o. male who presents for   Chief Complaint   Patient presents with    Follow-up     Recheck scalp and r hand     Pt present today for recheck of scalp and r hand.     Follow-up      Review of Systems    Objective:   Physical Exam   Skin:   Areas Examined (abnormalities noted in diagram):   Scalp / Hair Palpated and Inspected  Head / Face Inspection Performed  Nails and Digits Inspection Performed                Diagram Legend     Erythematous scaling macule/papule c/w actinic keratosis       Vascular papule c/w angioma      Pigmented verrucoid papule/plaque c/w seborrheic keratosis      Yellow umbilicated papule c/w sebaceous hyperplasia      Irregularly shaped tan macule c/w lentigo     1-2 mm smooth white papules consistent with Milia      Movable subcutaneous cyst with punctum c/w epidermal inclusion cyst      Subcutaneous movable cyst c/w pilar cyst      Firm pink to brown papule c/w dermatofibroma      Pedunculated fleshy papule(s) c/w skin tag(s)      Evenly pigmented macule c/w junctional nevus     Mildly variegated pigmented, slightly irregular-bordered macule c/w mildly atypical nevus      Flesh colored to evenly pigmented papule c/w intradermal nevus       Pink pearly papule/plaque c/w basal cell carcinoma      Erythematous hyperkeratotic cursted plaque c/w SCC      Surgical scar with no sign of skin cancer recurrence      Open and closed comedones      Inflammatory papules and pustules      Verrucoid papule consistent consistent with wart     Erythematous eczematous patches and plaques     Dystrophic onycholytic nail with subungual debris c/w onychomycosis     Umbilicated papule    Erythematous-base heme-crusted tan verrucoid plaque consistent with inflamed seborrheic keratosis     Erythematous Silvery Scaling Plaque c/w Psoriasis     See annotation      Assessment / Plan:        AK (actinic keratosis)  Hypertrophic ak on right hand resolved with  bx and efudex    Cryosurgery Procedure Note    Verbal consent from the patient is obtained including, but not limited to, risk of hypopigmentation/hyperpigmentation, scar, recurrence of lesion. The patient is aware of the precancerous quality and need for treatment of these lesions. Liquid nitrogen cryosurgery is applied to the 4 actinic keratoses, as detailed in the physical exam, to produce a freeze injury. The patient is aware that blisters may form and is instructed on wound care with gentle cleansing and use of vaseline ointment to keep moist until healed. The patient is supplied a handout on cryosurgery and is instructed to call if lesions do not completely resolve.    SK (seborrheic keratosis)/ak scalp   S/p bx and resolved             Follow up in about 6 months (around 1/21/2026) for UBSE.

## 2025-07-25 ENCOUNTER — LAB VISIT (OUTPATIENT)
Dept: LAB | Facility: HOSPITAL | Age: 70
End: 2025-07-25
Attending: INTERNAL MEDICINE
Payer: MEDICARE

## 2025-07-25 DIAGNOSIS — Z79.899 POLYPHARMACY: ICD-10-CM

## 2025-07-25 DIAGNOSIS — R06.82 TACHYPNEA: ICD-10-CM

## 2025-07-25 LAB
ABSOLUTE EOSINOPHIL (OHS): 0.25 K/UL
ABSOLUTE MONOCYTE (OHS): 0.78 K/UL (ref 0.3–1)
ABSOLUTE NEUTROPHIL COUNT (OHS): 4.26 K/UL (ref 1.8–7.7)
ALBUMIN SERPL BCP-MCNC: 3.3 G/DL (ref 3.5–5.2)
ALP SERPL-CCNC: 181 UNIT/L (ref 40–150)
ALT SERPL W/O P-5'-P-CCNC: 47 UNIT/L (ref 0–55)
ANION GAP (OHS): 7 MMOL/L (ref 8–16)
AST SERPL-CCNC: 62 UNIT/L (ref 0–50)
BASOPHILS # BLD AUTO: 0.09 K/UL
BASOPHILS NFR BLD AUTO: 1.2 %
BILIRUB SERPL-MCNC: 0.8 MG/DL (ref 0.1–1)
BUN SERPL-MCNC: 33 MG/DL (ref 8–23)
CALCIUM SERPL-MCNC: 9.3 MG/DL (ref 8.7–10.5)
CHLORIDE SERPL-SCNC: 100 MMOL/L (ref 95–110)
CO2 SERPL-SCNC: 30 MMOL/L (ref 23–29)
CREAT SERPL-MCNC: 1.2 MG/DL (ref 0.5–1.4)
ERYTHROCYTE [DISTWIDTH] IN BLOOD BY AUTOMATED COUNT: 16.8 % (ref 11.5–14.5)
GFR SERPLBLD CREATININE-BSD FMLA CKD-EPI: >60 ML/MIN/1.73/M2
GLUCOSE SERPL-MCNC: 83 MG/DL (ref 70–110)
HCT VFR BLD AUTO: 46.9 % (ref 40–54)
HGB BLD-MCNC: 15.6 GM/DL (ref 14–18)
IMM GRANULOCYTES # BLD AUTO: 0.03 K/UL (ref 0–0.04)
IMM GRANULOCYTES NFR BLD AUTO: 0.4 % (ref 0–0.5)
LYMPHOCYTES # BLD AUTO: 2.26 K/UL (ref 1–4.8)
MAGNESIUM SERPL-MCNC: 2.1 MG/DL (ref 1.6–2.6)
MCH RBC QN AUTO: 31.5 PG (ref 27–31)
MCHC RBC AUTO-ENTMCNC: 33.3 G/DL (ref 32–36)
MCV RBC AUTO: 95 FL (ref 82–98)
NT-PROBNP SERPL-MCNC: 4304 PG/ML
NUCLEATED RBC (/100WBC) (OHS): 0 /100 WBC
PLATELET # BLD AUTO: 147 K/UL (ref 150–450)
PMV BLD AUTO: 13.2 FL (ref 9.2–12.9)
POTASSIUM SERPL-SCNC: 4.2 MMOL/L (ref 3.5–5.1)
PROT SERPL-MCNC: 7.4 GM/DL (ref 6–8.4)
RBC # BLD AUTO: 4.96 M/UL (ref 4.6–6.2)
RELATIVE EOSINOPHIL (OHS): 3.3 %
RELATIVE LYMPHOCYTE (OHS): 29.5 % (ref 18–48)
RELATIVE MONOCYTE (OHS): 10.2 % (ref 4–15)
RELATIVE NEUTROPHIL (OHS): 55.4 % (ref 38–73)
SODIUM SERPL-SCNC: 137 MMOL/L (ref 136–145)
WBC # BLD AUTO: 7.67 K/UL (ref 3.9–12.7)

## 2025-07-25 PROCEDURE — 85025 COMPLETE CBC W/AUTO DIFF WBC: CPT

## 2025-07-25 PROCEDURE — 83735 ASSAY OF MAGNESIUM: CPT

## 2025-07-25 PROCEDURE — 36415 COLL VENOUS BLD VENIPUNCTURE: CPT | Mod: PO

## 2025-07-25 PROCEDURE — 82040 ASSAY OF SERUM ALBUMIN: CPT

## 2025-07-25 PROCEDURE — 83880 ASSAY OF NATRIURETIC PEPTIDE: CPT

## 2025-07-29 ENCOUNTER — TELEPHONE (OUTPATIENT)
Dept: TRANSPLANT | Facility: CLINIC | Age: 70
End: 2025-07-29
Payer: MEDICARE

## 2025-07-29 NOTE — TELEPHONE ENCOUNTER
NN called the patient to discuss his lab work and see how he is feeling. The patient feels fine. Patient does not endorse any swelling nor weight gain since stopping his Spirolactone. NN told the patient she will check with the providers and call the patient tomorrow if any thing is needed like another set of labs or medication changes. Patient stated he feels good on the Sildenafil as well.

## 2025-08-05 ENCOUNTER — LAB VISIT (OUTPATIENT)
Dept: LAB | Facility: HOSPITAL | Age: 70
End: 2025-08-05
Attending: INTERNAL MEDICINE
Payer: MEDICARE

## 2025-08-05 DIAGNOSIS — Z79.899 HIGH RISK MEDICATION USE: ICD-10-CM

## 2025-08-05 DIAGNOSIS — R06.82 TACHYPNEA: ICD-10-CM

## 2025-08-05 LAB
ABSOLUTE EOSINOPHIL (OHS): 0.2 K/UL
ABSOLUTE MONOCYTE (OHS): 0.86 K/UL (ref 0.3–1)
ABSOLUTE NEUTROPHIL COUNT (OHS): 5.09 K/UL (ref 1.8–7.7)
ALBUMIN SERPL BCP-MCNC: 3.4 G/DL (ref 3.5–5.2)
ALP SERPL-CCNC: 175 UNIT/L (ref 40–150)
ALT SERPL W/O P-5'-P-CCNC: 33 UNIT/L (ref 0–55)
ANION GAP (OHS): 11 MMOL/L (ref 8–16)
AST SERPL-CCNC: 52 UNIT/L (ref 0–50)
BASOPHILS # BLD AUTO: 0.12 K/UL
BASOPHILS NFR BLD AUTO: 1.4 %
BILIRUB SERPL-MCNC: 0.7 MG/DL (ref 0.1–1)
BILIRUB UR QL STRIP.AUTO: NEGATIVE
BUN SERPL-MCNC: 22 MG/DL (ref 8–23)
C3 SERPL-MCNC: 138 MG/DL (ref 50–180)
C4 COMPLEMENT (OHS): 23 MG/DL (ref 11–44)
CALCIUM SERPL-MCNC: 9.3 MG/DL (ref 8.7–10.5)
CHLORIDE SERPL-SCNC: 101 MMOL/L (ref 95–110)
CLARITY UR: CLEAR
CO2 SERPL-SCNC: 25 MMOL/L (ref 23–29)
COLOR UR AUTO: YELLOW
CREAT SERPL-MCNC: 1.1 MG/DL (ref 0.5–1.4)
CREAT UR-MCNC: 32 MG/DL (ref 23–375)
CRP SERPL-MCNC: 5 MG/L
ERYTHROCYTE [DISTWIDTH] IN BLOOD BY AUTOMATED COUNT: 17.1 % (ref 11.5–14.5)
ERYTHROCYTE [SEDIMENTATION RATE] IN BLOOD BY PHOTOMETRIC METHOD: 13 MM/HR
GFR SERPLBLD CREATININE-BSD FMLA CKD-EPI: >60 ML/MIN/1.73/M2
GLUCOSE SERPL-MCNC: 74 MG/DL (ref 70–110)
GLUCOSE UR QL STRIP: NEGATIVE
HCT VFR BLD AUTO: 46.9 % (ref 40–54)
HGB BLD-MCNC: 14.9 GM/DL (ref 14–18)
HGB UR QL STRIP: NEGATIVE
IMM GRANULOCYTES # BLD AUTO: 0.02 K/UL (ref 0–0.04)
IMM GRANULOCYTES NFR BLD AUTO: 0.2 % (ref 0–0.5)
KETONES UR QL STRIP: NEGATIVE
LEUKOCYTE ESTERASE UR QL STRIP: NEGATIVE
LYMPHOCYTES # BLD AUTO: 2.44 K/UL (ref 1–4.8)
MCH RBC QN AUTO: 30.5 PG (ref 27–31)
MCHC RBC AUTO-ENTMCNC: 31.8 G/DL (ref 32–36)
MCV RBC AUTO: 96 FL (ref 82–98)
NITRITE UR QL STRIP: NEGATIVE
NT-PROBNP SERPL-MCNC: 3968 PG/ML
NUCLEATED RBC (/100WBC) (OHS): 0 /100 WBC
PH UR STRIP: 7 [PH]
PLATELET # BLD AUTO: 203 K/UL (ref 150–450)
PMV BLD AUTO: 12.9 FL (ref 9.2–12.9)
POTASSIUM SERPL-SCNC: 4 MMOL/L (ref 3.5–5.1)
PROT SERPL-MCNC: 7.7 GM/DL (ref 6–8.4)
PROT UR QL STRIP: NEGATIVE
PROT UR-MCNC: <7 MG/DL
PROT/CREAT UR: NORMAL MG/G{CREAT}
RBC # BLD AUTO: 4.88 M/UL (ref 4.6–6.2)
RELATIVE EOSINOPHIL (OHS): 2.3 %
RELATIVE LYMPHOCYTE (OHS): 27.9 % (ref 18–48)
RELATIVE MONOCYTE (OHS): 9.9 % (ref 4–15)
RELATIVE NEUTROPHIL (OHS): 58.3 % (ref 38–73)
SODIUM SERPL-SCNC: 137 MMOL/L (ref 136–145)
SP GR UR STRIP: 1.01
UROBILINOGEN UR STRIP-ACNC: NEGATIVE EU/DL
WBC # BLD AUTO: 8.73 K/UL (ref 3.9–12.7)

## 2025-08-05 PROCEDURE — 36415 COLL VENOUS BLD VENIPUNCTURE: CPT | Mod: PO

## 2025-08-05 PROCEDURE — 81003 URINALYSIS AUTO W/O SCOPE: CPT

## 2025-08-05 PROCEDURE — 86160 COMPLEMENT ANTIGEN: CPT | Mod: 59

## 2025-08-05 PROCEDURE — 86225 DNA ANTIBODY NATIVE: CPT

## 2025-08-05 PROCEDURE — 82570 ASSAY OF URINE CREATININE: CPT

## 2025-08-05 PROCEDURE — 85025 COMPLETE CBC W/AUTO DIFF WBC: CPT

## 2025-08-05 PROCEDURE — 86140 C-REACTIVE PROTEIN: CPT

## 2025-08-05 PROCEDURE — 83880 ASSAY OF NATRIURETIC PEPTIDE: CPT

## 2025-08-05 PROCEDURE — 84075 ASSAY ALKALINE PHOSPHATASE: CPT

## 2025-08-05 PROCEDURE — 86160 COMPLEMENT ANTIGEN: CPT

## 2025-08-05 PROCEDURE — 85652 RBC SED RATE AUTOMATED: CPT

## 2025-08-06 DIAGNOSIS — R74.01 TRANSAMINITIS: Primary | ICD-10-CM

## 2025-08-06 LAB
DSDNA ANTIBODY (OHS): NORMAL
DSDNA ANTIBODY TITER (OHS): NORMAL

## 2025-08-08 ENCOUNTER — TELEPHONE (OUTPATIENT)
Dept: TRANSPLANT | Facility: CLINIC | Age: 70
End: 2025-08-08
Payer: MEDICARE

## 2025-08-08 NOTE — TELEPHONE ENCOUNTER
Patient called NN with concerns over most recent labs that show elevated liver enzymes. Patient states that he feels it started when he started sildenafil and his rheumatologist is recommending that he stop avaro but patient is worried that it will affect how he feels without his treatment. NN explained typically ERA's usually have more of an affect on liver rather than PDE5i. NN will speak with Dr. Cross.     Patient also inquired about BNP and how it remains elevated ut patient follows a very strict low sodium diet, and he has not noticed any fluid retention. NN will consult with Dr. Cross and will reach back out to patient once Dr. Cross is back in clinic. Patient verbalized understanding.

## 2025-08-11 ENCOUNTER — OFFICE VISIT (OUTPATIENT)
Dept: HEPATOLOGY | Facility: CLINIC | Age: 70
End: 2025-08-11
Payer: MEDICARE

## 2025-08-11 DIAGNOSIS — R74.01 TRANSAMINITIS: Primary | ICD-10-CM

## 2025-08-11 DIAGNOSIS — I27.20 PULMONARY HYPERTENSION: ICD-10-CM

## 2025-08-11 DIAGNOSIS — R74.8 ELEVATED ALKALINE PHOSPHATASE LEVEL: ICD-10-CM

## 2025-08-11 DIAGNOSIS — D84.9 IMMUNOSUPPRESSED STATUS: ICD-10-CM

## 2025-08-11 DIAGNOSIS — M05.9 RHEUMATOID ARTHRITIS WITH POSITIVE RHEUMATOID FACTOR, INVOLVING UNSPECIFIED SITE: ICD-10-CM

## 2025-08-11 PROCEDURE — 98006 SYNCH AUDIO-VIDEO EST MOD 30: CPT | Mod: 95,,, | Performed by: NURSE PRACTITIONER

## 2025-08-11 PROCEDURE — 1160F RVW MEDS BY RX/DR IN RCRD: CPT | Mod: CPTII,95,, | Performed by: NURSE PRACTITIONER

## 2025-08-11 PROCEDURE — 1159F MED LIST DOCD IN RCRD: CPT | Mod: CPTII,95,, | Performed by: NURSE PRACTITIONER

## 2025-08-11 PROCEDURE — 3044F HG A1C LEVEL LT 7.0%: CPT | Mod: CPTII,95,, | Performed by: NURSE PRACTITIONER

## 2025-08-11 PROCEDURE — 4010F ACE/ARB THERAPY RXD/TAKEN: CPT | Mod: CPTII,95,, | Performed by: NURSE PRACTITIONER

## 2025-08-14 ENCOUNTER — PATIENT MESSAGE (OUTPATIENT)
Dept: FAMILY MEDICINE | Facility: CLINIC | Age: 70
End: 2025-08-14
Payer: MEDICARE

## 2025-08-14 DIAGNOSIS — M48.062 SPINAL STENOSIS OF LUMBAR REGION WITH NEUROGENIC CLAUDICATION: Primary | ICD-10-CM

## 2025-08-14 RX ORDER — GABAPENTIN 300 MG/1
300 CAPSULE ORAL 2 TIMES DAILY
Qty: 180 CAPSULE | Refills: 3 | Status: SHIPPED | OUTPATIENT
Start: 2025-08-14

## 2025-08-14 RX ORDER — GABAPENTIN 300 MG/1
300 CAPSULE ORAL 2 TIMES DAILY
Start: 2025-08-14 | End: 2025-08-14 | Stop reason: SDUPTHER

## 2025-08-15 ENCOUNTER — TELEPHONE (OUTPATIENT)
Dept: TRANSPLANT | Facility: CLINIC | Age: 70
End: 2025-08-15
Payer: MEDICARE

## 2025-08-15 RX ORDER — PREDNISONE 5 MG/1
TABLET ORAL
Qty: 180 TABLET | Refills: 0 | Status: SHIPPED | OUTPATIENT
Start: 2025-08-15 | End: 2025-12-13

## 2025-08-19 ENCOUNTER — PROCEDURE VISIT (OUTPATIENT)
Dept: HEPATOLOGY | Facility: CLINIC | Age: 70
End: 2025-08-19
Payer: MEDICARE

## 2025-08-19 ENCOUNTER — PATIENT MESSAGE (OUTPATIENT)
Dept: HEPATOLOGY | Facility: CLINIC | Age: 70
End: 2025-08-19

## 2025-08-19 DIAGNOSIS — R74.8 ELEVATED ALKALINE PHOSPHATASE LEVEL: ICD-10-CM

## 2025-08-19 DIAGNOSIS — M05.9 RHEUMATOID ARTHRITIS WITH POSITIVE RHEUMATOID FACTOR, INVOLVING UNSPECIFIED SITE: ICD-10-CM

## 2025-08-19 DIAGNOSIS — I27.20 PULMONARY HYPERTENSION: ICD-10-CM

## 2025-08-19 DIAGNOSIS — D84.9 IMMUNOSUPPRESSED STATUS: ICD-10-CM

## 2025-08-19 DIAGNOSIS — R74.01 TRANSAMINITIS: ICD-10-CM

## 2025-08-19 PROCEDURE — 91200 LIVER ELASTOGRAPHY: CPT | Mod: S$GLB,,, | Performed by: NURSE PRACTITIONER

## 2025-08-22 ENCOUNTER — LAB VISIT (OUTPATIENT)
Dept: LAB | Facility: HOSPITAL | Age: 70
End: 2025-08-22
Attending: INTERNAL MEDICINE
Payer: MEDICARE

## 2025-08-22 DIAGNOSIS — Z79.899 IMMUNODEFICIENCY DUE TO DRUG THERAPY: ICD-10-CM

## 2025-08-22 DIAGNOSIS — I27.20 PULMONARY HYPERTENSION: ICD-10-CM

## 2025-08-22 DIAGNOSIS — Z79.899 HIGH RISK MEDICATION USE: ICD-10-CM

## 2025-08-22 DIAGNOSIS — D84.821 IMMUNODEFICIENCY DUE TO DRUG THERAPY: ICD-10-CM

## 2025-08-22 DIAGNOSIS — M05.9 RHEUMATOID ARTHRITIS WITH POSITIVE RHEUMATOID FACTOR, INVOLVING UNSPECIFIED SITE: ICD-10-CM

## 2025-08-22 DIAGNOSIS — R74.8 ELEVATED ALKALINE PHOSPHATASE LEVEL: ICD-10-CM

## 2025-08-22 DIAGNOSIS — R74.01 TRANSAMINITIS: ICD-10-CM

## 2025-08-22 DIAGNOSIS — D84.9 IMMUNOSUPPRESSED STATUS: ICD-10-CM

## 2025-08-22 DIAGNOSIS — M06.9 RHEUMATOID ARTHRITIS FLARE: ICD-10-CM

## 2025-08-22 DIAGNOSIS — I50.43 ACUTE ON CHRONIC COMBINED SYSTOLIC AND DIASTOLIC HEART FAILURE: ICD-10-CM

## 2025-08-22 LAB
ABSOLUTE EOSINOPHIL (OHS): 0.16 K/UL
ABSOLUTE MONOCYTE (OHS): 0.64 K/UL (ref 0.3–1)
ABSOLUTE NEUTROPHIL COUNT (OHS): 5.66 K/UL (ref 1.8–7.7)
ALBUMIN SERPL BCP-MCNC: 3.6 G/DL (ref 3.5–5.2)
ALP SERPL-CCNC: 144 UNIT/L (ref 40–150)
ALT SERPL W/O P-5'-P-CCNC: 36 UNIT/L (ref 0–55)
ANION GAP (OHS): 9 MMOL/L (ref 8–16)
AST SERPL-CCNC: 53 UNIT/L (ref 0–50)
BASOPHILS # BLD AUTO: 0.11 K/UL
BASOPHILS NFR BLD AUTO: 1.3 %
BILIRUB SERPL-MCNC: 0.7 MG/DL (ref 0.1–1)
BUN SERPL-MCNC: 24 MG/DL (ref 8–23)
CALCIUM SERPL-MCNC: 9.1 MG/DL (ref 8.7–10.5)
CHLORIDE SERPL-SCNC: 102 MMOL/L (ref 95–110)
CK SERPL-CCNC: 75 U/L (ref 20–200)
CO2 SERPL-SCNC: 28 MMOL/L (ref 23–29)
CREAT SERPL-MCNC: 1.2 MG/DL (ref 0.5–1.4)
CRP SERPL-MCNC: 1.4 MG/L
ERYTHROCYTE [DISTWIDTH] IN BLOOD BY AUTOMATED COUNT: 16.9 % (ref 11.5–14.5)
ERYTHROCYTE [SEDIMENTATION RATE] IN BLOOD BY PHOTOMETRIC METHOD: 18 MM/HR
GFR SERPLBLD CREATININE-BSD FMLA CKD-EPI: >60 ML/MIN/1.73/M2
GLUCOSE SERPL-MCNC: 77 MG/DL (ref 70–110)
HAV IGM SERPL QL IA: NORMAL
HBV CORE IGM SERPL QL IA: NORMAL
HBV SURFACE AB SER-ACNC: <3 MIU/ML
HBV SURFACE AB SERPL IA-ACNC: NORMAL M[IU]/ML
HBV SURFACE AG SERPL QL IA: NORMAL
HCT VFR BLD AUTO: 46.9 % (ref 40–54)
HCV AB SERPL QL IA: NORMAL
HGB BLD-MCNC: 15.2 GM/DL (ref 14–18)
IGG SERPL-MCNC: 1221 MG/DL (ref 650–1600)
IGM SERPL-MCNC: 221 MG/DL (ref 50–300)
IMM GRANULOCYTES # BLD AUTO: 0.04 K/UL (ref 0–0.04)
IMM GRANULOCYTES NFR BLD AUTO: 0.5 % (ref 0–0.5)
LYMPHOCYTES # BLD AUTO: 1.76 K/UL (ref 1–4.8)
MAGNESIUM SERPL-MCNC: 2.1 MG/DL (ref 1.6–2.6)
MCH RBC QN AUTO: 31.3 PG (ref 27–31)
MCHC RBC AUTO-ENTMCNC: 32.4 G/DL (ref 32–36)
MCV RBC AUTO: 97 FL (ref 82–98)
NT-PROBNP SERPL-MCNC: 2895 PG/ML
NUCLEATED RBC (/100WBC) (OHS): 0 /100 WBC
PLATELET # BLD AUTO: 203 K/UL (ref 150–450)
PMV BLD AUTO: 11.8 FL (ref 9.2–12.9)
POTASSIUM SERPL-SCNC: 3.9 MMOL/L (ref 3.5–5.1)
PROT SERPL-MCNC: 7.7 GM/DL (ref 6–8.4)
RBC # BLD AUTO: 4.85 M/UL (ref 4.6–6.2)
RELATIVE EOSINOPHIL (OHS): 1.9 %
RELATIVE LYMPHOCYTE (OHS): 21 % (ref 18–48)
RELATIVE MONOCYTE (OHS): 7.6 % (ref 4–15)
RELATIVE NEUTROPHIL (OHS): 67.7 % (ref 38–73)
SODIUM SERPL-SCNC: 139 MMOL/L (ref 136–145)
WBC # BLD AUTO: 8.37 K/UL (ref 3.9–12.7)

## 2025-08-22 PROCEDURE — 86015 ACTIN ANTIBODY EACH: CPT

## 2025-08-22 PROCEDURE — 83735 ASSAY OF MAGNESIUM: CPT

## 2025-08-22 PROCEDURE — 85652 RBC SED RATE AUTOMATED: CPT

## 2025-08-22 PROCEDURE — 85025 COMPLETE CBC W/AUTO DIFF WBC: CPT

## 2025-08-22 PROCEDURE — 82784 ASSAY IGA/IGD/IGG/IGM EACH: CPT | Mod: 59

## 2025-08-22 PROCEDURE — 80053 COMPREHEN METABOLIC PANEL: CPT

## 2025-08-22 PROCEDURE — 82784 ASSAY IGA/IGD/IGG/IGM EACH: CPT

## 2025-08-22 PROCEDURE — 86381 MITOCHONDRIAL ANTIBODY EACH: CPT

## 2025-08-22 PROCEDURE — 86706 HEP B SURFACE ANTIBODY: CPT | Mod: 59

## 2025-08-22 PROCEDURE — 36415 COLL VENOUS BLD VENIPUNCTURE: CPT | Mod: PO

## 2025-08-22 PROCEDURE — 82550 ASSAY OF CK (CPK): CPT

## 2025-08-22 PROCEDURE — 80074 ACUTE HEPATITIS PANEL: CPT

## 2025-08-22 PROCEDURE — 86140 C-REACTIVE PROTEIN: CPT

## 2025-08-22 PROCEDURE — 83880 ASSAY OF NATRIURETIC PEPTIDE: CPT

## 2025-08-25 ENCOUNTER — OFFICE VISIT (OUTPATIENT)
Dept: CARDIOLOGY | Facility: CLINIC | Age: 70
End: 2025-08-25
Payer: MEDICARE

## 2025-08-25 VITALS
WEIGHT: 127.63 LBS | HEART RATE: 78 BPM | OXYGEN SATURATION: 94 % | DIASTOLIC BLOOD PRESSURE: 69 MMHG | HEIGHT: 65 IN | BODY MASS INDEX: 21.26 KG/M2 | SYSTOLIC BLOOD PRESSURE: 105 MMHG

## 2025-08-25 DIAGNOSIS — I70.0 AORTO-ILIAC ATHEROSCLEROSIS: ICD-10-CM

## 2025-08-25 DIAGNOSIS — I50.810 RVF (RIGHT VENTRICULAR FAILURE): Chronic | ICD-10-CM

## 2025-08-25 DIAGNOSIS — E78.5 HYPERLIPIDEMIA, UNSPECIFIED HYPERLIPIDEMIA TYPE: ICD-10-CM

## 2025-08-25 DIAGNOSIS — I50.22 HEART FAILURE WITH MILDLY REDUCED EJECTION FRACTION (HFMREF): Primary | Chronic | ICD-10-CM

## 2025-08-25 DIAGNOSIS — I27.9 CHRONIC PULMONARY HEART DISEASE: ICD-10-CM

## 2025-08-25 DIAGNOSIS — I70.8 AORTO-ILIAC ATHEROSCLEROSIS: ICD-10-CM

## 2025-08-25 DIAGNOSIS — I27.20 PULMONARY HYPERTENSION: ICD-10-CM

## 2025-08-25 PROBLEM — I50.43 ACUTE ON CHRONIC COMBINED SYSTOLIC AND DIASTOLIC HEART FAILURE: Status: RESOLVED | Noted: 2025-06-24 | Resolved: 2025-08-25

## 2025-08-25 LAB — MITOCHONDRIA AB TITR SER IF: NORMAL {TITER}

## 2025-08-25 PROCEDURE — 99999 PR PBB SHADOW E&M-EST. PATIENT-LVL IV: CPT | Mod: PBBFAC,,,

## 2025-08-27 LAB — SMOOTH MUSCLE AB TITR SER IF: ABNORMAL {TITER}

## 2025-08-29 DIAGNOSIS — R74.01 TRANSAMINITIS: Primary | ICD-10-CM

## 2025-08-29 DIAGNOSIS — I27.20 PULMONARY HYPERTENSION: ICD-10-CM

## 2025-08-29 DIAGNOSIS — M05.9 RHEUMATOID ARTHRITIS WITH POSITIVE RHEUMATOID FACTOR, INVOLVING UNSPECIFIED SITE: ICD-10-CM

## 2025-08-29 DIAGNOSIS — D84.9 IMMUNOSUPPRESSED STATUS: ICD-10-CM

## 2025-08-29 DIAGNOSIS — R74.8 ELEVATED ALKALINE PHOSPHATASE LEVEL: ICD-10-CM

## 2025-09-02 ENCOUNTER — OFFICE VISIT (OUTPATIENT)
Dept: RHEUMATOLOGY | Facility: CLINIC | Age: 70
End: 2025-09-02
Payer: MEDICARE

## 2025-09-02 ENCOUNTER — TELEPHONE (OUTPATIENT)
Dept: RHEUMATOLOGY | Facility: CLINIC | Age: 70
End: 2025-09-02
Payer: MEDICARE

## 2025-09-02 DIAGNOSIS — R74.01 TRANSAMINITIS: Primary | ICD-10-CM

## 2025-09-02 DIAGNOSIS — M94.9 DISORDER OF CARTILAGE, UNSPECIFIED: ICD-10-CM

## (undated) DEVICE — NDL 22GA X1 1/2 REG BEVEL

## (undated) DEVICE — DRESSING LEUKOPLAST FLEX 1X3IN

## (undated) DEVICE — GOWN POLY REINF BRTH SLV XL

## (undated) DEVICE — SUT CTD VICRYL 3-0 CR/SH

## (undated) DEVICE — SPONGE DERMA 8PLY 2X2

## (undated) DEVICE — CATH ANG PIGTAIL 4FR INFINITY

## (undated) DEVICE — TUBING SUC UNIV W/CONN 12FT

## (undated) DEVICE — BLADE SURG CARBON STEEL SZ11

## (undated) DEVICE — DRAPE THREE-QTR REINF 53X77IN

## (undated) DEVICE — SUT VICRYL PLUS 0 CT1 18IN

## (undated) DEVICE — TROCAR ENDOPATH XCEL 11MM 10CM

## (undated) DEVICE — COVER SNAP KAP 30

## (undated) DEVICE — PADS RADI PERIPHERAL SHIELD

## (undated) DEVICE — COVER PROBE US 5.5X58L NON LTX

## (undated) DEVICE — SUT VICRYL 2 0 CT 2

## (undated) DEVICE — TUBING HPCIL ROT M/F ADPT 48IN

## (undated) DEVICE — NDL HYPO REG 25G X 1 1/2

## (undated) DEVICE — DRAPE TOP 53X102IN

## (undated) DEVICE — DRESSING TEGADERM 2 3/8 X 2.75

## (undated) DEVICE — CORD BIPOLAR 12 FOOT

## (undated) DEVICE — KIT LEFT HEART MANIFOLD CUSTOM

## (undated) DEVICE — DISSECTOR EPIX LAPA 5MMX35CM

## (undated) DEVICE — DRAPE C-ARMOR EQUIPMENT COVER

## (undated) DEVICE — APPLICATOR CHLORAPREP ORN 26ML

## (undated) DEVICE — SEE MEDLINE ITEM 156905

## (undated) DEVICE — SCISSOR 5MMX35CM DIRECT DRIVE

## (undated) DEVICE — DRAPE C-ARM/MOBILE XRAY 44X80

## (undated) DEVICE — HEMOSTAT VASC BAND REG 24CM

## (undated) DEVICE — CATH SWAN DBL LMN 5FR 110CM

## (undated) DEVICE — DRAPE LAP T SHT W/ INSTR PAD

## (undated) DEVICE — DRAPE ANGIO BRACH 38X44IN

## (undated) DEVICE — KIT SPINAL PATIENT CARE JACK

## (undated) DEVICE — Device

## (undated) DEVICE — SUT PROLENE 0 CT-2 BL MONO

## (undated) DEVICE — TRAY FOLEY 16FR INFECTION CONT

## (undated) DEVICE — SUT VICRYL 3-0 27 SH

## (undated) DEVICE — DRAPE C-ARM 41X125IN

## (undated) DEVICE — GLIDESHEATH SLENDER SS 5FR10CM

## (undated) DEVICE — ADHESIVE MASTISOL VIAL 48/BX

## (undated) DEVICE — SUT MONOCRYL 5-0 P-3 UND 18

## (undated) DEVICE — GLOVE SURGICAL LATEX SZ 8

## (undated) DEVICE — DRESSING AQUACEL SACRAL 9 X 9

## (undated) DEVICE — BURR MIS CURVED 3.0MM

## (undated) DEVICE — KIT GLIDESHEATH SLEND 6FR 10CM

## (undated) DEVICE — DRESSING TELFA N ADH 3X8

## (undated) DEVICE — CATH IMPULSE 5F 100CM FR4

## (undated) DEVICE — PAD DEFIB CADENCE ADULT R2

## (undated) DEVICE — CATH OPTITORQUE TIGER 5F 100CM

## (undated) DEVICE — GUIDEWIRE WHOLEY STD STR 260CM

## (undated) DEVICE — DRESSING AQUACEL FOAM 5 X 5

## (undated) DEVICE — VISIPAQUE 320 200ML +PK

## (undated) DEVICE — DRESSING TRANS 4X4 3/4

## (undated) DEVICE — SUT ETHILON 3/0 18IN PS-1

## (undated) DEVICE — TOWEL OR NONABSORB ADH 17X26

## (undated) DEVICE — BLADE ELECTRO EDGE INSULATED

## (undated) DEVICE — DRESSING SURGICAL 1/2X1/2

## (undated) DEVICE — GLOVE BIOGEL PI MICRO SZ 7.5

## (undated) DEVICE — DRESSING AQUACEL SACRAL 8 X 7

## (undated) DEVICE — NDL SPINAL SPINOCAN 22GX3.5

## (undated) DEVICE — STRIP MEDI WND CLSR 1/2X4IN

## (undated) DEVICE — DRESSING AQUACEL FOAM 4X4IN

## (undated) DEVICE — GLOVE BIOGEL ECLIPSE SZ 7

## (undated) DEVICE — DRAPE STERILE Z BACK TABLE

## (undated) DEVICE — ADHESIVE DERMABOND ADVANCED

## (undated) DEVICE — DRAPE STERI-DRAPE 1000 17X11IN

## (undated) DEVICE — GELATIN SURGIPOWDER ABSORBABLE

## (undated) DEVICE — DRESSING TEGADERM 4.4X5IN

## (undated) DEVICE — COVER OVERHEAD SURG LT BLUE

## (undated) DEVICE — ELECTRODE REM PLYHSV RETURN 9

## (undated) DEVICE — TROCAR ENDOPATH XCEL 5MM 7.5CM

## (undated) DEVICE — GAUZE SPONGE 4X4 12PLY

## (undated) DEVICE — TOWEL OR DISP STRL BLUE 4/PK

## (undated) DEVICE — COVER BACK TBL HD 2-TIER 72IN

## (undated) DEVICE — MANIFOLD 4 PORT